# Patient Record
Sex: MALE | Race: WHITE | NOT HISPANIC OR LATINO | Employment: OTHER | URBAN - METROPOLITAN AREA
[De-identification: names, ages, dates, MRNs, and addresses within clinical notes are randomized per-mention and may not be internally consistent; named-entity substitution may affect disease eponyms.]

---

## 2017-01-23 ENCOUNTER — HOSPITAL ENCOUNTER (OUTPATIENT)
Dept: LAB | Facility: HOSPITAL | Age: 57
Discharge: HOME | End: 2017-01-23
Attending: THORACIC SURGERY (CARDIOTHORACIC VASCULAR SURGERY) | Admitting: THORACIC SURGERY (CARDIOTHORACIC VASCULAR SURGERY)

## 2017-01-23 LAB — CREAT UR-MCNC: 0.8 MG/DL (ref 0.7–1.2)

## 2017-01-24 ENCOUNTER — HOSPITAL ENCOUNTER (OUTPATIENT)
Dept: CT IMAGING | Facility: HOSPITAL | Age: 57
Discharge: HOME | End: 2017-01-24
Attending: THORACIC SURGERY (CARDIOTHORACIC VASCULAR SURGERY) | Admitting: THORACIC SURGERY (CARDIOTHORACIC VASCULAR SURGERY)

## 2017-02-16 ENCOUNTER — OFFICE VISIT (OUTPATIENT)
Dept: CARDIAC SURGERY | Facility: CLINIC | Age: 57
End: 2017-02-16

## 2017-02-16 VITALS
HEART RATE: 77 BPM | HEIGHT: 76 IN | TEMPERATURE: 97.5 F | OXYGEN SATURATION: 93 % | WEIGHT: 260 LBS | RESPIRATION RATE: 20 BRPM | BODY MASS INDEX: 31.66 KG/M2 | SYSTOLIC BLOOD PRESSURE: 134 MMHG | DIASTOLIC BLOOD PRESSURE: 88 MMHG

## 2017-02-16 DIAGNOSIS — I10 ESSENTIAL HYPERTENSION: ICD-10-CM

## 2017-02-16 DIAGNOSIS — I71.03 AORTIC DISSECTION, THORACOABDOMINAL (HCC): Primary | ICD-10-CM

## 2017-02-16 PROCEDURE — 99213 OFFICE O/P EST LOW 20 MIN: CPT | Performed by: THORACIC SURGERY (CARDIOTHORACIC VASCULAR SURGERY)

## 2017-02-19 NOTE — PROGRESS NOTES
2/19/2017      Chief Complaint:     Follow up evaluation of thoracic aortic dissection    History of Present Illness:       Dear Dr. Paulino and Colleagues,    It was nice to see Prashant Zhou in follow up evaluation for his thoracic dissection. He is a 56 y.o. male with a history of an acute type B aortic dissection in 3/16. He was admitted to the hospital and treated with Dp/Dt control and pain control protocol. He did well and has normalized his life style . He denies upper back, flank or chest pain, syncope, TIA or LE embolic signs. His last chest CT showed a stable dissection with a flap in the proximal DTA, a max diameter of 4.3 cm and no extravasation. His BP is well controlled. He has no family history of aneurysms or dissections..    Patient Active Problem List   Diagnosis   • Aortic dissection, thoracoabdominal   • Hypertension       Past Medical History   Diagnosis Date   • Aortic dissection    • Heart murmur    • History of noncompliance with medical treatment    • Hypertension        No past surgical history on file.    No Known Allergies      Current Outpatient Prescriptions:   •  amLODIPine (NORVASC) 10 MG tablet, , Disp: , Rfl: 2  •  atorvastatin (LIPITOR) 40 MG tablet, Take 40 mg by mouth daily., Disp: , Rfl:   •  cephalexin (KEFLEX) 250 MG capsule, Take 250 mg by mouth 4 (four) times a day., Disp: , Rfl:   •  cloNIDine (CATAPRES) 0.1 MG tablet, Take 0.1 mg by mouth 2 (two) times a day., Disp: , Rfl:   •  glimepiride (AMARYL) 4 MG tablet, Take 4 mg by mouth every morning before breakfast., Disp: , Rfl:   •  hydrALAZINE (APRESOLINE) 50 MG tablet, Take 50 mg by mouth 3 (three) times a day., Disp: , Rfl:   •  lisinopril-hydrochlorothiazide (PRINZIDE,ZESTORETIC) 20-25 MG per tablet, Take 1 tablet by mouth daily., Disp: , Rfl:   •  metFORMIN (GLUCOPHAGE) 500 MG tablet, Take 500 mg by mouth 2 (two) times a day with meals., Disp: , Rfl:   •  metoprolol tartrate (LOPRESSOR) 50 MG tablet, , Disp: ,  Rfl: 1    Social History     Social History   • Marital status:      Spouse name: N/A   • Number of children: N/A   • Years of education: N/A     Occupational History   • Not on file.     Social History Main Topics   • Smoking status: Current Every Day Smoker   • Smokeless tobacco: Not on file   • Alcohol use No   • Drug use: No   • Sexual activity: Not on file     Other Topics Concern   • Not on file     Social History Narrative     FMH  As HPI    Review of Systems   Constitutional: Positive for fatigue.   Respiratory: Negative for chest tightness and shortness of breath.    Cardiovascular: Negative for chest pain and leg swelling.   Genitourinary: Negative for flank pain.   All other systems reviewed and are negative.      Physical Exam:    Vital Signs:  Weight: 260 lb (118 kg)   Body mass index is 31.65 kg/(m^2).  Temp: 97.5 °F (36.4 °C)   Heart Rate: 77   BP: 134/88     Physical Exam   Constitutional: He is oriented to person, place, and time. He appears well-developed and well-nourished.   HENT:   Head: Normocephalic and atraumatic.   Nose: Nose normal.   Mouth/Throat: Oropharynx is clear and moist.   Eyes: Conjunctivae are normal. Pupils are equal, round, and reactive to light.   Neck: Normal range of motion. Neck supple. No JVD present. No thyromegaly present.   Cardiovascular: Normal rate, regular rhythm, normal heart sounds and intact distal pulses.  Exam reveals no gallop and no friction rub.    No murmur heard.  Pulmonary/Chest: Effort normal and breath sounds normal. He has no rales.   Abdominal: Soft. Bowel sounds are normal. He exhibits no distension and no mass. There is no tenderness.   Musculoskeletal: Normal range of motion. He exhibits no tenderness or deformity.   Lymphadenopathy:     He has no cervical adenopathy.   Neurological: He is alert and oriented to person, place, and time. He has normal reflexes.   Skin: Skin is warm and dry. No rash noted. No erythema.   Psychiatric: He has a  normal mood and affect. His behavior is normal.        Assessment:     Problem List Items Addressed This Visit        Cardiovascular and Mediastinum    Aortic dissection, thoracoabdominal - Primary    Hypertension        Stable type B aortic dissection, chronic at this point.  No mal perfusion or chronic pain.    Recommendation/Plan:     Chest/abdomen CT and office visit in one year.    Thank you for allowing me to participate in his care.    Regards,    Jonathan García M.D.

## 2018-03-26 ENCOUNTER — HOSPITAL ENCOUNTER (OUTPATIENT)
Dept: PREADMISSION TESTING | Facility: HOSPITAL | Age: 58
Discharge: HOME | End: 2018-03-26
Attending: THORACIC SURGERY (CARDIOTHORACIC VASCULAR SURGERY) | Admitting: THORACIC SURGERY (CARDIOTHORACIC VASCULAR SURGERY)

## 2018-03-26 LAB
ANION GAP SERPL CALC-SCNC: 11.6 MMOL/L (ref 10–20)
BUN SERPL-MCNC: 12 MG/DL (ref 8–20)
BUN/CREAT SERPL: 15 (ref 6.2–20.3)
CALCIUM SERPL-MCNC: 10.4 MG/DL (ref 8.9–10.3)
CHLORIDE SERPL-SCNC: 101 MMOL/L (ref 101–111)
CONV CO2: 26 MMOL/L (ref 22–32)
CREAT UR-MCNC: 0.8 MG/DL (ref 0.7–1.2)
GLUCOSE SERPL-MCNC: 228 MG/DL (ref 65–99)
POTASSIUM SERPL-SCNC: 3.6 MMOL/L (ref 3.6–5.1)
SODIUM SERPL-SCNC: 135 MMOL/L (ref 136–144)

## 2018-03-29 ENCOUNTER — HOSPITAL ENCOUNTER (OUTPATIENT)
Dept: CT IMAGING | Facility: HOSPITAL | Age: 58
Discharge: HOME | End: 2018-03-29
Attending: THORACIC SURGERY (CARDIOTHORACIC VASCULAR SURGERY) | Admitting: THORACIC SURGERY (CARDIOTHORACIC VASCULAR SURGERY)

## 2018-04-05 ENCOUNTER — OFFICE VISIT (OUTPATIENT)
Dept: CARDIAC SURGERY | Facility: CLINIC | Age: 58
End: 2018-04-05

## 2018-04-05 VITALS
WEIGHT: 254.2 LBS | SYSTOLIC BLOOD PRESSURE: 136 MMHG | RESPIRATION RATE: 20 BRPM | HEART RATE: 72 BPM | OXYGEN SATURATION: 96 % | HEIGHT: 76 IN | TEMPERATURE: 97.6 F | DIASTOLIC BLOOD PRESSURE: 82 MMHG | BODY MASS INDEX: 30.95 KG/M2

## 2018-04-05 DIAGNOSIS — I71.03 AORTIC DISSECTION, THORACOABDOMINAL (HCC): Primary | ICD-10-CM

## 2018-04-05 DIAGNOSIS — I10 ESSENTIAL HYPERTENSION: ICD-10-CM

## 2018-04-05 PROCEDURE — 99213 OFFICE O/P EST LOW 20 MIN: CPT | Performed by: THORACIC SURGERY (CARDIOTHORACIC VASCULAR SURGERY)

## 2018-04-05 RX ORDER — OMEPRAZOLE 40 MG/1
CAPSULE, DELAYED RELEASE ORAL
Refills: 0 | COMMUNITY
Start: 2017-12-28 | End: 2019-07-23 | Stop reason: ALTCHOICE

## 2018-04-08 PROBLEM — I71.03 AORTIC DISSECTION, THORACOABDOMINAL (HCC): Status: ACTIVE | Noted: 2018-04-08

## 2018-04-08 NOTE — PROGRESS NOTES
4/8/2018    Seen on 4/5/18    Chief Complaint:     Follow up evaluation of chronic aortic dissection    History of Present Illness:       Dear Dr. Paulino and Colleagues,  It was nice to see Prashant Zhou in follow up evaluation for his aortic dissection. He is a 57 y.o. male with a history of HTN and type B aortic dissection in 3/15. At that point he had uncontrolled HTN and non compliance. He has done well since and his BP is normalized . He denies new symptoms or signs in the interval. His last chest CT showed the descending aorta with mild dilatation, but stable at 4 cm. No evidence of mal perfusion. He has no complaints.    Patient Active Problem List   Diagnosis   • Hypertension   • Aortic dissection, thoracoabdominal       Past Medical History:   Diagnosis Date   • Aortic dissection    • Heart murmur    • History of noncompliance with medical treatment    • Hypertension    • Type B hypoplasia of aortic arch        No past surgical history on file.    No Known Allergies      Current Outpatient Prescriptions:   •  amLODIPine (NORVASC) 10 MG tablet, , Disp: , Rfl: 2  •  atorvastatin (LIPITOR) 40 MG tablet, Take 40 mg by mouth daily., Disp: , Rfl:   •  cloNIDine (CATAPRES) 0.1 MG tablet, Take 0.1 mg by mouth 2 (two) times a day., Disp: , Rfl:   •  glimepiride (AMARYL) 4 MG tablet, Take 4 mg by mouth every morning before breakfast., Disp: , Rfl:   •  hydrALAZINE (APRESOLINE) 50 MG tablet, Take 50 mg by mouth 3 (three) times a day., Disp: , Rfl:   •  lisinopril-hydrochlorothiazide (PRINZIDE,ZESTORETIC) 20-25 MG per tablet, Take 1 tablet by mouth daily., Disp: , Rfl:   •  metFORMIN (GLUCOPHAGE) 500 MG tablet, Take 500 mg by mouth 2 (two) times a day with meals., Disp: , Rfl:   •  metoprolol tartrate (LOPRESSOR) 50 MG tablet, , Disp: , Rfl: 1  •  omeprazole (priLOSEC) 40 MG capsule, TK 1 C PO QD BEFORE BREAKFAST, Disp: , Rfl: 0    Social History     Social History   • Marital status:      Spouse name:  N/A   • Number of children: N/A   • Years of education: N/A     Occupational History   • Not on file.     Social History Main Topics   • Smoking status: Current Every Day Smoker   • Smokeless tobacco: Not on file   • Alcohol use No   • Drug use: No   • Sexual activity: Not on file     Other Topics Concern   • Not on file     Social History Narrative   • No narrative on file       FMH:  Negative for aneurysms, dissections or connective tissue disorders    Review of Systems   Constitutional: Negative for fatigue.   Respiratory: Negative for shortness of breath.    Cardiovascular: Negative for chest pain.   Genitourinary: Negative for dysuria and flank pain.   Neurological: Negative for dizziness.   All other systems reviewed and are negative.      Physical Exam:    Vital Signs:  Weight: 115 kg (254 lb 3.2 oz)   Body mass index is 30.94 kg/m².  Temp: 97.6 °F (36.4 °C)   Heart Rate: 72   BP: 136/82     Physical Exam   Constitutional: He is oriented to person, place, and time. He appears well-developed and well-nourished.   HENT:   Head: Normocephalic and atraumatic.   Nose: Nose normal.   Mouth/Throat: Oropharynx is clear and moist.   Eyes: Conjunctivae are normal. Pupils are equal, round, and reactive to light.   Neck: Normal range of motion. Neck supple. No JVD present. No thyromegaly present.   Cardiovascular: Normal rate, regular rhythm, normal heart sounds and intact distal pulses.  Exam reveals no gallop and no friction rub.    No murmur heard.  Pulmonary/Chest: Effort normal and breath sounds normal. He has no rales.   Abdominal: Soft. Bowel sounds are normal. He exhibits no distension and no mass. There is no tenderness.   Musculoskeletal: Normal range of motion. He exhibits no tenderness or deformity.   Lymphadenopathy:     He has no cervical adenopathy.   Neurological: He is alert and oriented to person, place, and time. He has normal reflexes.   Skin: Skin is warm and dry. No rash noted. No erythema.    Psychiatric: He has a normal mood and affect. His behavior is normal.        Assessment:     Problem List Items Addressed This Visit        Cardiovascular and Mediastinum    Hypertension    Aortic dissection, thoracoabdominal - Primary      Other Visit Diagnoses    None.       Type B aortic dissection, mild enlargement, stable  HTN, well controlled    Recommendation/Plan:     I recommend a chest CT and office visit in one year. If stable, then 2 years later.      Thank you for allowing me to participate in his care.    Regards,    Jonathan García M.D.

## 2018-07-24 ENCOUNTER — HOSPITAL ENCOUNTER (OUTPATIENT)
Dept: FAMILY MEDICINE CLINIC | Facility: CLINIC | Age: 58
Setting detail: SPECIMEN
Discharge: HOME | End: 2018-07-24
Attending: FAMILY MEDICINE | Admitting: FAMILY MEDICINE

## 2018-07-24 LAB
ALBUMIN SERPL-MCNC: 4.1 G/DL (ref 3.5–4.8)
ALBUMIN/GLOB SERPL: 1.3 {RATIO} (ref 1–1.7)
ALP SERPL-CCNC: 59 IU/L (ref 32–91)
ALT SERPL-CCNC: 23 IU/L (ref 17–63)
ANION GAP SERPL CALC-SCNC: 12.3 MMOL/L (ref 10–20)
AST SERPL-CCNC: 19 IU/L (ref 15–41)
BASOPHILS # BLD AUTO: 0.1 10*3/UL (ref 0–0.2)
BASOPHILS NFR BLD AUTO: 1 % (ref 0–2)
BILIRUB SERPL-MCNC: 0.7 MG/DL (ref 0.3–1.2)
BUN SERPL-MCNC: 12 MG/DL (ref 8–20)
BUN/CREAT SERPL: 13.3 (ref 6.2–20.3)
CALCIUM SERPL-MCNC: 10.1 MG/DL (ref 8.9–10.3)
CHLORIDE SERPL-SCNC: 99 MMOL/L (ref 101–111)
CHOLEST SERPL-MCNC: 109 MG/DL
CHOLEST/HDLC SERPL: 4.4 {RATIO}
CONV CO2: 28 MMOL/L (ref 22–32)
CONV LDL CHOLESTEROL DIRECT: 59 MG/DL (ref 0–100)
CONV TOTAL PROTEIN: 7.2 G/DL (ref 6.1–7.9)
CREAT UR-MCNC: 0.9 MG/DL (ref 0.7–1.2)
DIFFERENTIAL METHOD BLD: (no result)
EOSINOPHIL # BLD AUTO: 0.1 10*3/UL (ref 0–0.3)
EOSINOPHIL # BLD AUTO: 1 % (ref 0–3)
ERYTHROCYTE [DISTWIDTH] IN BLOOD BY AUTOMATED COUNT: 13.4 % (ref 11.5–14.5)
GLOBULIN UR ELPH-MCNC: 3.1 G/DL (ref 2.5–3.8)
GLUCOSE SERPL-MCNC: 153 MG/DL (ref 65–99)
HCT VFR BLD AUTO: 44.5 % (ref 40–54)
HDLC SERPL-MCNC: 25 MG/DL
HGB BLD-MCNC: 15.4 G/DL (ref 14–18)
LDLC/HDLC SERPL: 2.4 {RATIO}
LIPID INTERPRETATION: ABNORMAL
LYMPHOCYTES # BLD AUTO: 2.8 10*3/UL (ref 0.8–4.8)
LYMPHOCYTES NFR BLD AUTO: 21 % (ref 18–42)
MCH RBC QN AUTO: 31.3 PG (ref 26–32)
MCHC RBC AUTO-ENTMCNC: 34.6 G/DL (ref 32–36)
MCV RBC AUTO: 90.3 FL (ref 80–94)
MONOCYTES # BLD AUTO: 0.9 10*3/UL (ref 0.1–1.3)
MONOCYTES NFR BLD AUTO: 7 % (ref 2–11)
NEUTROPHILS # BLD AUTO: 9.4 10*3/UL (ref 2.3–8.6)
NEUTROPHILS NFR BLD AUTO: 70 % (ref 50–75)
NRBC BLD AUTO-RTO: 0 /100{WBCS}
NRBC/RBC NFR BLD MANUAL: 0 10*3/UL
PLATELET # BLD AUTO: 287 10*3/UL (ref 150–450)
PMV BLD AUTO: 8.4 FL (ref 7.4–10.4)
POTASSIUM SERPL-SCNC: 4.3 MMOL/L (ref 3.6–5.1)
RBC # BLD AUTO: 4.93 10*6/UL (ref 4.6–6)
SODIUM SERPL-SCNC: 135 MMOL/L (ref 136–144)
TRIGL SERPL-MCNC: 182 MG/DL
VLDLC SERPL CALC-MCNC: 24.9 MG/DL
WBC # BLD AUTO: 13.2 10*3/UL (ref 4.5–11.5)

## 2019-01-22 ENCOUNTER — HOSPITAL ENCOUNTER (OUTPATIENT)
Dept: FAMILY MEDICINE CLINIC | Facility: CLINIC | Age: 59
Setting detail: SPECIMEN
Discharge: HOME | End: 2019-01-22
Attending: FAMILY MEDICINE | Admitting: FAMILY MEDICINE

## 2019-01-22 LAB
ALBUMIN SERPL-MCNC: 4.1 G/DL (ref 3.5–4.8)
ALBUMIN/GLOB SERPL: 1.5 {RATIO} (ref 1–1.7)
ALP SERPL-CCNC: 63 IU/L (ref 32–91)
ALT SERPL-CCNC: 25 IU/L (ref 17–63)
ANION GAP SERPL CALC-SCNC: 14.9 MMOL/L (ref 10–20)
AST SERPL-CCNC: 23 IU/L (ref 15–41)
BASOPHILS # BLD AUTO: 0.1 10*3/UL (ref 0–0.2)
BASOPHILS NFR BLD AUTO: 1 % (ref 0–2)
BILIRUB SERPL-MCNC: 0.6 MG/DL (ref 0.3–1.2)
BUN SERPL-MCNC: 12 MG/DL (ref 8–20)
BUN/CREAT SERPL: 15 (ref 6.2–20.3)
CALCIUM SERPL-MCNC: 10 MG/DL (ref 8.9–10.3)
CHLORIDE SERPL-SCNC: 99 MMOL/L (ref 101–111)
CHOLEST SERPL-MCNC: 105 MG/DL
CHOLEST/HDLC SERPL: 4.1 {RATIO}
CONV CO2: 26 MMOL/L (ref 22–32)
CONV LDL CHOLESTEROL DIRECT: 59 MG/DL (ref 0–100)
CONV TOTAL PROTEIN: 6.8 G/DL (ref 6.1–7.9)
CREAT UR-MCNC: 0.8 MG/DL (ref 0.7–1.2)
DIFFERENTIAL METHOD BLD: (no result)
EOSINOPHIL # BLD AUTO: 0.1 10*3/UL (ref 0–0.3)
EOSINOPHIL # BLD AUTO: 1 % (ref 0–3)
ERYTHROCYTE [DISTWIDTH] IN BLOOD BY AUTOMATED COUNT: 13.4 % (ref 11.5–14.5)
GLOBULIN UR ELPH-MCNC: 2.7 G/DL (ref 2.5–3.8)
GLUCOSE SERPL-MCNC: 212 MG/DL (ref 65–99)
HCT VFR BLD AUTO: 44.6 % (ref 40–54)
HDLC SERPL-MCNC: 26 MG/DL
HGB BLD-MCNC: 15.2 G/DL (ref 14–18)
LDLC/HDLC SERPL: 2.3 {RATIO}
LIPID INTERPRETATION: ABNORMAL
LYMPHOCYTES # BLD AUTO: 2.8 10*3/UL (ref 0.8–4.8)
LYMPHOCYTES NFR BLD AUTO: 24 % (ref 18–42)
MCH RBC QN AUTO: 31.3 PG (ref 26–32)
MCHC RBC AUTO-ENTMCNC: 34.1 G/DL (ref 32–36)
MCV RBC AUTO: 91.6 FL (ref 80–94)
MONOCYTES # BLD AUTO: 0.7 10*3/UL (ref 0.1–1.3)
MONOCYTES NFR BLD AUTO: 6 % (ref 2–11)
NEUTROPHILS # BLD AUTO: 7.9 10*3/UL (ref 2.3–8.6)
NEUTROPHILS NFR BLD AUTO: 68 % (ref 50–75)
NRBC BLD AUTO-RTO: 0 /100{WBCS}
NRBC/RBC NFR BLD MANUAL: 0 10*3/UL
PLATELET # BLD AUTO: 263 10*3/UL (ref 150–450)
PMV BLD AUTO: 8.6 FL (ref 7.4–10.4)
POTASSIUM SERPL-SCNC: 3.9 MMOL/L (ref 3.6–5.1)
RBC # BLD AUTO: 4.87 10*6/UL (ref 4.6–6)
SODIUM SERPL-SCNC: 136 MMOL/L (ref 136–144)
TRIGL SERPL-MCNC: 190 MG/DL
VLDLC SERPL CALC-MCNC: 20.2 MG/DL
WBC # BLD AUTO: 11.7 10*3/UL (ref 4.5–11.5)

## 2019-07-13 RX ORDER — CLONIDINE HYDROCHLORIDE 0.1 MG/1
TABLET ORAL
Qty: 180 TABLET | Refills: 0 | Status: SHIPPED | OUTPATIENT
Start: 2019-07-13 | End: 2019-10-11 | Stop reason: SDUPTHER

## 2019-07-13 RX ORDER — HYDRALAZINE HYDROCHLORIDE 50 MG/1
TABLET, FILM COATED ORAL
Qty: 270 TABLET | Refills: 0 | Status: SHIPPED | OUTPATIENT
Start: 2019-07-13 | End: 2019-10-11 | Stop reason: SDUPTHER

## 2019-07-23 ENCOUNTER — OFFICE VISIT (OUTPATIENT)
Dept: FAMILY MEDICINE CLINIC | Facility: CLINIC | Age: 59
End: 2019-07-23

## 2019-07-23 VITALS
SYSTOLIC BLOOD PRESSURE: 121 MMHG | OXYGEN SATURATION: 96 % | DIASTOLIC BLOOD PRESSURE: 76 MMHG | HEART RATE: 59 BPM | BODY MASS INDEX: 30.49 KG/M2 | WEIGHT: 245.2 LBS | TEMPERATURE: 98 F | HEIGHT: 75 IN

## 2019-07-23 DIAGNOSIS — E78.5 HYPERLIPIDEMIA, UNSPECIFIED HYPERLIPIDEMIA TYPE: ICD-10-CM

## 2019-07-23 DIAGNOSIS — I10 ESSENTIAL HYPERTENSION: ICD-10-CM

## 2019-07-23 DIAGNOSIS — E11.65 TYPE 2 DIABETES MELLITUS WITH HYPERGLYCEMIA, WITHOUT LONG-TERM CURRENT USE OF INSULIN (HCC): Primary | ICD-10-CM

## 2019-07-23 LAB
ALBUMIN SERPL-MCNC: 4.2 G/DL (ref 3.5–4.8)
ALBUMIN/GLOB SERPL: 1.8 G/DL (ref 1–1.7)
ALP SERPL-CCNC: 51 U/L (ref 32–91)
ALT SERPL W P-5'-P-CCNC: 21 U/L (ref 17–63)
ANION GAP SERPL CALCULATED.3IONS-SCNC: 14 MMOL/L (ref 5–15)
ARTICHOKE IGE QN: 60 MG/DL (ref 0–100)
AST SERPL-CCNC: 17 U/L (ref 15–41)
BASOPHILS # BLD AUTO: 0.1 10*3/MM3 (ref 0–0.2)
BASOPHILS NFR BLD AUTO: 1.2 % (ref 0–1.5)
BILIRUB SERPL-MCNC: 0.8 MG/DL (ref 0.3–1.2)
BUN BLD-MCNC: 11 MG/DL (ref 8–20)
BUN/CREAT SERPL: 15.7 (ref 6.2–20.3)
CALCIUM SPEC-SCNC: 10 MG/DL (ref 8.9–10.3)
CHLORIDE SERPL-SCNC: 101 MMOL/L (ref 101–111)
CHOLEST SERPL-MCNC: 99 MG/DL
CO2 SERPL-SCNC: 26 MMOL/L (ref 22–32)
CREAT BLD-MCNC: 0.7 MG/DL (ref 0.7–1.2)
DEPRECATED RDW RBC AUTO: 42.9 FL (ref 37–54)
EOSINOPHIL # BLD AUTO: 0.2 10*3/MM3 (ref 0–0.4)
EOSINOPHIL NFR BLD AUTO: 1.4 % (ref 0.3–6.2)
ERYTHROCYTE [DISTWIDTH] IN BLOOD BY AUTOMATED COUNT: 13.4 % (ref 12.3–15.4)
GFR SERPL CREATININE-BSD FRML MDRD: 116 ML/MIN/1.73
GLOBULIN UR ELPH-MCNC: 2.4 GM/DL (ref 2.5–3.8)
GLUCOSE BLD-MCNC: 142 MG/DL (ref 65–99)
HBA1C MFR BLD: 7 % (ref 3.5–5.6)
HCT VFR BLD AUTO: 42.9 % (ref 37.5–51)
HDLC SERPL QL: 3.41
HDLC SERPL-MCNC: 29 MG/DL
HGB BLD-MCNC: 14.8 G/DL (ref 13–17.7)
LDLC/HDLC SERPL: 1.72 {RATIO}
LYMPHOCYTES # BLD AUTO: 2.5 10*3/MM3 (ref 0.7–3.1)
LYMPHOCYTES NFR BLD AUTO: 22.5 % (ref 19.6–45.3)
MCH RBC QN AUTO: 31.2 PG (ref 26.6–33)
MCHC RBC AUTO-ENTMCNC: 34.5 G/DL (ref 31.5–35.7)
MCV RBC AUTO: 90.6 FL (ref 79–97)
MONOCYTES # BLD AUTO: 0.7 10*3/MM3 (ref 0.1–0.9)
MONOCYTES NFR BLD AUTO: 6.3 % (ref 5–12)
NEUTROPHILS # BLD AUTO: 7.7 10*3/MM3 (ref 1.7–7)
NEUTROPHILS NFR BLD AUTO: 68.6 % (ref 42.7–76)
NRBC BLD AUTO-RTO: 0 /100 WBC (ref 0–0.2)
PLATELET # BLD AUTO: 254 10*3/MM3 (ref 140–450)
PMV BLD AUTO: 8.6 FL (ref 6–12)
POTASSIUM BLD-SCNC: 4 MMOL/L (ref 3.6–5.1)
PROT SERPL-MCNC: 6.6 G/DL (ref 6.1–7.9)
RBC # BLD AUTO: 4.73 10*6/MM3 (ref 4.14–5.8)
SODIUM BLD-SCNC: 137 MMOL/L (ref 136–144)
TRIGL SERPL-MCNC: 101 MG/DL
VLDLC SERPL-MCNC: 20.2 MG/DL
WBC NRBC COR # BLD: 11.2 10*3/MM3 (ref 3.4–10.8)

## 2019-07-23 PROCEDURE — 99214 OFFICE O/P EST MOD 30 MIN: CPT | Performed by: FAMILY MEDICINE

## 2019-07-23 PROCEDURE — 83036 HEMOGLOBIN GLYCOSYLATED A1C: CPT | Performed by: FAMILY MEDICINE

## 2019-07-23 PROCEDURE — 80053 COMPREHEN METABOLIC PANEL: CPT | Performed by: FAMILY MEDICINE

## 2019-07-23 PROCEDURE — 85025 COMPLETE CBC W/AUTO DIFF WBC: CPT | Performed by: FAMILY MEDICINE

## 2019-07-23 PROCEDURE — 80061 LIPID PANEL: CPT | Performed by: FAMILY MEDICINE

## 2019-07-23 PROCEDURE — 36415 COLL VENOUS BLD VENIPUNCTURE: CPT | Performed by: FAMILY MEDICINE

## 2019-07-23 RX ORDER — BLOOD-GLUCOSE METER
EACH MISCELLANEOUS
COMMUNITY
Start: 2019-01-22 | End: 2023-01-23

## 2019-07-23 NOTE — PROGRESS NOTES
"Subjective   Prashant Zhou is a 58 y.o. male.     He is in today for follow-up on his chronic health issues.  He has type 2 diabetes, hypertension, and high cholesterol. He has not been good with diet or exercise.                   /76 (BP Location: Left arm, Patient Position: Sitting, Cuff Size: Large Adult)   Pulse 59   Temp 98 °F (36.7 °C) (Oral)   Ht 190.5 cm (75\")   Wt 111 kg (245 lb 3.2 oz)   SpO2 96%   BMI 30.65 kg/m²       Chief Complaint   Patient presents with   • Diabetes     6 month check up - no concerns    • Hypertension   • Hyperlipidemia           Current Outpatient Medications:   •  amLODIPine (NORVASC) 10 MG tablet, , Disp: , Rfl: 2  •  atorvastatin (LIPITOR) 40 MG tablet, Take 40 mg by mouth daily., Disp: , Rfl:   •  Blood Glucose Monitoring Suppl (ONE TOUCH ULTRA 2) w/Device kit, ONETOUCH ULTRA 2 w/Device KIT, Disp: , Rfl:   •  CloNIDine (CATAPRES) 0.1 MG tablet, TAKE ONE TABLET BY MOUTH TWICE A DAY, Disp: 180 tablet, Rfl: 0  •  glimepiride (AMARYL) 4 MG tablet, Take 4 mg by mouth every morning before breakfast., Disp: , Rfl:   •  glucose blood (ONE TOUCH ULTRA TEST) test strip, ONETOUCH ULTRA BLUE STRP, Disp: , Rfl:   •  hydrALAZINE (APRESOLINE) 50 MG tablet, TAKE ONE TABLET BY MOUTH EVERY 8 HOURS, Disp: 270 tablet, Rfl: 0  •  lisinopril-hydrochlorothiazide (PRINZIDE,ZESTORETIC) 20-25 MG per tablet, Take 1 tablet by mouth daily., Disp: , Rfl:   •  metFORMIN (GLUCOPHAGE) 500 MG tablet, Take 500 mg by mouth 2 (two) times a day with meals., Disp: , Rfl:   •  metoprolol tartrate (LOPRESSOR) 50 MG tablet, , Disp: , Rfl: 1  •  ONETOUCH DELICA LANCETS FINE misc, ONETOUCH DELICA LANCETS FINE, Disp: , Rfl:   •  omeprazole (priLOSEC) 40 MG capsule, TK 1 C PO QD BEFORE BREAKFAST, Disp: , Rfl: 0    No results found for: HGBA1C  Lab Results   Component Value Date    CREATININE 0.8 01/22/2019         Wt Readings from Last 3 Encounters:   07/23/19 111 kg (245 lb 3.2 oz)   04/05/18 115 kg (254 " lb 3.2 oz)   02/16/17 118 kg (260 lb)       The following portions of the patient's history were reviewed and updated as appropriate: allergies, current medications, past family history, past medical history, past social history, past surgical history and problem list.    Review of Systems   Constitutional: Negative for activity change, fatigue and fever.   HENT: Negative for congestion, sinus pressure, sinus pain, sore throat, trouble swallowing and voice change.    Eyes: Negative for visual disturbance.   Respiratory: Negative for chest tightness, shortness of breath and wheezing.    Cardiovascular: Negative for chest pain.   Gastrointestinal: Negative for abdominal distention, abdominal pain, constipation, diarrhea, nausea and vomiting.   Endocrine: Negative for polydipsia and polyuria.   Genitourinary: Negative for difficulty urinating, dysuria, frequency and urgency.   Musculoskeletal: Negative for back pain and neck pain.   Neurological: Positive for numbness. Negative for dizziness and weakness.   Psychiatric/Behavioral: Negative for agitation, hallucinations, sleep disturbance and suicidal ideas.       Objective   Physical Exam   Constitutional: He is oriented to person, place, and time. He appears well-developed and well-nourished. No distress.   HENT:   Nose: Nose normal.   Mouth/Throat: Oropharynx is clear and moist.   Eyes: Conjunctivae are normal. Pupils are equal, round, and reactive to light.   Neck: Normal range of motion. Neck supple.   Cardiovascular: Normal rate, regular rhythm and normal heart sounds.   Pulmonary/Chest: Effort normal and breath sounds normal. He has no wheezes. He has no rales.   Abdominal: Soft. Bowel sounds are normal. He exhibits no mass. There is no guarding.   Musculoskeletal: Normal range of motion.   Neurological: He is alert and oriented to person, place, and time. He displays normal reflexes. No sensory deficit.   Nursing note and vitals reviewed.        Assessment/Plan    Problems Addressed this Visit        Cardiovascular and Mediastinum    Hypertension    Relevant Orders    Comprehensive metabolic panel    Lipid panel    Hyperlipidemia    Relevant Orders    Comprehensive metabolic panel    Lipid panel       Endocrine    Type 2 diabetes mellitus with hyperglycemia (CMS/HCC) - Primary    Relevant Orders    Comprehensive metabolic panel    Hemoglobin A1c    Lipid panel    CBC w AUTO Differential      Will get labs today, will adjust meds as indicated by findings  We discussed diet changes and adding exercise  He states he will work at being better  He is also advised to get an eye exam before next visit, and I will see again in 3 mo

## 2019-08-08 DIAGNOSIS — E11.65 TYPE 2 DIABETES MELLITUS WITH HYPERGLYCEMIA, WITHOUT LONG-TERM CURRENT USE OF INSULIN (HCC): Primary | ICD-10-CM

## 2019-08-08 RX ORDER — GLIMEPIRIDE 4 MG/1
4 TABLET ORAL 2 TIMES DAILY
Qty: 180 TABLET | Refills: 0 | Status: SHIPPED | OUTPATIENT
Start: 2019-08-08 | End: 2019-08-12 | Stop reason: SDUPTHER

## 2019-08-12 DIAGNOSIS — E11.65 TYPE 2 DIABETES MELLITUS WITH HYPERGLYCEMIA, WITHOUT LONG-TERM CURRENT USE OF INSULIN (HCC): ICD-10-CM

## 2019-08-12 RX ORDER — GLIMEPIRIDE 4 MG/1
TABLET ORAL
Qty: 60 TABLET | Refills: 1 | Status: SHIPPED | OUTPATIENT
Start: 2019-08-12 | End: 2020-01-10

## 2019-08-19 PROBLEM — E11.9 TYPE 2 DIABETES MELLITUS WITHOUT COMPLICATIONS: Status: ACTIVE | Noted: 2018-07-24

## 2019-08-19 PROBLEM — L98.9 SKIN LESION OF FACE: Status: ACTIVE | Noted: 2018-07-24

## 2019-09-04 ENCOUNTER — OFFICE VISIT (OUTPATIENT)
Dept: CARDIOLOGY | Facility: CLINIC | Age: 59
End: 2019-09-04

## 2019-09-04 VITALS
HEART RATE: 67 BPM | WEIGHT: 243.5 LBS | BODY MASS INDEX: 29.65 KG/M2 | DIASTOLIC BLOOD PRESSURE: 78 MMHG | HEIGHT: 76 IN | SYSTOLIC BLOOD PRESSURE: 132 MMHG | OXYGEN SATURATION: 96 %

## 2019-09-04 DIAGNOSIS — I10 ESSENTIAL HYPERTENSION: ICD-10-CM

## 2019-09-04 DIAGNOSIS — E11.9 TYPE 2 DIABETES MELLITUS WITHOUT COMPLICATION, WITHOUT LONG-TERM CURRENT USE OF INSULIN (HCC): ICD-10-CM

## 2019-09-04 DIAGNOSIS — E78.00 PURE HYPERCHOLESTEROLEMIA: ICD-10-CM

## 2019-09-04 DIAGNOSIS — I71.03 AORTIC DISSECTION, THORACOABDOMINAL (HCC): Primary | ICD-10-CM

## 2019-09-04 PROCEDURE — 99213 OFFICE O/P EST LOW 20 MIN: CPT | Performed by: INTERNAL MEDICINE

## 2019-09-04 PROCEDURE — 93000 ELECTROCARDIOGRAM COMPLETE: CPT | Performed by: INTERNAL MEDICINE

## 2019-09-04 NOTE — PROGRESS NOTES
"    Subjective:     Encounter Date:09/04/2019      Patient ID: Prashant Zhou is a 58 y.o. male.    Chief Complaint:  History of Present Illness 58-year-old white male with history of aortic dissection history of hypertension hyperlipidemia and diabetes presents to my office for follow-up.  Patient is currently stable without any symptoms of chest pain or shortness of breath at rest on exertion.  No complaints of any PND orthopnea.  No palpitations dizziness syncope or swelling of the feet.  Patient has been taking all the medicines regularly.  Patient continues to smoke.  He does not exercise very well.    The following portions of the patient's history were reviewed and updated as appropriate: allergies, current medications, past family history, past medical history, past social history, past surgical history and problem list.  Past Medical History:   Diagnosis Date   • Aortic dissection (CMS/HCC)    • Heart murmur    • History of noncompliance with medical treatment    • Hypertension    • Type B hypoplasia of aortic arch      History reviewed. No pertinent surgical history.  /78 (BP Location: Left arm, Patient Position: Sitting)   Pulse 67   Ht 193 cm (76\")   Wt 110 kg (243 lb 8 oz)   SpO2 96%   BMI 29.64 kg/m²   Family History   Problem Relation Age of Onset   • Diabetes Mother        Current Outpatient Medications:   •  amLODIPine (NORVASC) 10 MG tablet, , Disp: , Rfl: 2  •  atorvastatin (LIPITOR) 40 MG tablet, Take 40 mg by mouth daily., Disp: , Rfl:   •  Blood Glucose Monitoring Suppl (ONE TOUCH ULTRA 2) w/Device kit, ONETOUCH ULTRA 2 w/Device KIT, Disp: , Rfl:   •  CloNIDine (CATAPRES) 0.1 MG tablet, TAKE ONE TABLET BY MOUTH TWICE A DAY, Disp: 180 tablet, Rfl: 0  •  glimepiride (AMARYL) 4 MG tablet, TAKE ONE TABLET BY MOUTH TWICE A DAY, Disp: 60 tablet, Rfl: 1  •  glucose blood (ONE TOUCH ULTRA TEST) test strip, ONETOUCH ULTRA BLUE STRP, Disp: , Rfl:   •  hydrALAZINE (APRESOLINE) 50 MG tablet, " TAKE ONE TABLET BY MOUTH EVERY 8 HOURS, Disp: 270 tablet, Rfl: 0  •  lisinopril-hydrochlorothiazide (PRINZIDE,ZESTORETIC) 20-25 MG per tablet, Take 1 tablet by mouth daily., Disp: , Rfl:   •  metFORMIN (GLUCOPHAGE) 850 MG tablet, TAKE ONE TABLET BY MOUTH TWICE A DAY WITH MEALS, Disp: 180 tablet, Rfl: 0  •  metoprolol tartrate (LOPRESSOR) 50 MG tablet, , Disp: , Rfl: 1  •  ONETOUCH DELICA LANCETS FINE misc, ONETOUCH DELICA LANCETS FINE, Disp: , Rfl:   •  metFORMIN (GLUCOPHAGE) 500 MG tablet, Take 500 mg by mouth 2 (two) times a day with meals., Disp: , Rfl:   No Known Allergies  Social History     Socioeconomic History   • Marital status:      Spouse name: Not on file   • Number of children: Not on file   • Years of education: Not on file   • Highest education level: Not on file   Tobacco Use   • Smoking status: Current Every Day Smoker     Packs/day: 1.00     Types: Cigarettes   Substance and Sexual Activity   • Alcohol use: No   • Drug use: No     Review of Systems   Constitution: Negative for fever and malaise/fatigue.   Cardiovascular: Negative for chest pain, dyspnea on exertion and palpitations.   Respiratory: Negative for cough and shortness of breath.    Skin: Negative for rash.   Gastrointestinal: Negative for abdominal pain, nausea and vomiting.   Neurological: Negative for focal weakness and headaches.   All other systems reviewed and are negative.             Objective:     Physical Exam   Constitutional: He appears well-developed and well-nourished.   HENT:   Head: Normocephalic and atraumatic.   Eyes: Conjunctivae are normal. No scleral icterus.   Neck: Normal range of motion. Neck supple. No JVD present. Carotid bruit is not present.   Cardiovascular: Normal rate, regular rhythm, S1 normal, S2 normal, normal heart sounds and intact distal pulses. PMI is not displaced.   Pulmonary/Chest: Effort normal and breath sounds normal. He has no wheezes. He has no rales.   Abdominal: Soft. Bowel sounds  are normal.   Neurological: He is alert. He has normal strength.   Skin: Skin is warm and dry. No rash noted.       ECG 12 Lead  Date/Time: 9/4/2019 10:42 AM  Performed by: Steve Mckeon MD  Authorized by: Steve Mckeon MD   Comments: Sinus rhythm with right bundle branch block.  When compared with previous ECG no new changes.            Lab Review:       Assessment:          Diagnosis Plan   1. Aortic dissection, thoracoabdominal (CMS/HCC)     2. Essential hypertension     3. Pure hypercholesterolemia     4. Type 2 diabetes mellitus without complication, without long-term current use of insulin (CMS/HCC)            Plan:       Patient has history of aortic dissection and is status post repair and is followed by the surgeon with a CT scan every year.  Patient's blood pressure and heart rate are stable.  Patient's lipid levels are followed by the primary care doctor.  Patient's sugar levels are followed by primary care doctor.  Continue current medicines and follow-up in 1 year.

## 2019-09-11 RX ORDER — METOPROLOL TARTRATE 50 MG/1
TABLET, FILM COATED ORAL
Qty: 180 TABLET | Refills: 0 | Status: SHIPPED | OUTPATIENT
Start: 2019-09-11 | End: 2019-10-11 | Stop reason: SDUPTHER

## 2019-10-12 RX ORDER — METOPROLOL TARTRATE 50 MG/1
TABLET, FILM COATED ORAL
Qty: 402 TABLET | Refills: 0 | Status: SHIPPED | OUTPATIENT
Start: 2019-10-12 | End: 2019-12-24

## 2019-10-12 RX ORDER — HYDRALAZINE HYDROCHLORIDE 50 MG/1
TABLET, FILM COATED ORAL
Qty: 270 TABLET | Refills: 0 | Status: SHIPPED | OUTPATIENT
Start: 2019-10-12 | End: 2020-01-10

## 2019-10-12 RX ORDER — CLONIDINE HYDROCHLORIDE 0.1 MG/1
TABLET ORAL
Qty: 180 TABLET | Refills: 0 | Status: SHIPPED | OUTPATIENT
Start: 2019-10-12 | End: 2020-01-10

## 2019-10-21 ENCOUNTER — OFFICE VISIT (OUTPATIENT)
Dept: FAMILY MEDICINE CLINIC | Facility: CLINIC | Age: 59
End: 2019-10-21

## 2019-10-21 VITALS
HEIGHT: 76 IN | WEIGHT: 242 LBS | OXYGEN SATURATION: 96 % | BODY MASS INDEX: 29.47 KG/M2 | HEART RATE: 63 BPM | DIASTOLIC BLOOD PRESSURE: 74 MMHG | SYSTOLIC BLOOD PRESSURE: 112 MMHG

## 2019-10-21 DIAGNOSIS — I10 ESSENTIAL HYPERTENSION: ICD-10-CM

## 2019-10-21 DIAGNOSIS — E78.00 PURE HYPERCHOLESTEROLEMIA: ICD-10-CM

## 2019-10-21 DIAGNOSIS — E11.65 TYPE 2 DIABETES MELLITUS WITH HYPERGLYCEMIA, WITHOUT LONG-TERM CURRENT USE OF INSULIN (HCC): Primary | ICD-10-CM

## 2019-10-21 LAB
ALBUMIN SERPL-MCNC: 4.3 G/DL (ref 3.5–5.2)
ALBUMIN/GLOB SERPL: 1.3 G/DL
ALP SERPL-CCNC: 58 U/L (ref 39–117)
ALT SERPL W P-5'-P-CCNC: 12 U/L (ref 1–41)
ANION GAP SERPL CALCULATED.3IONS-SCNC: 9.6 MMOL/L (ref 5–15)
AST SERPL-CCNC: 11 U/L (ref 1–40)
BASOPHILS # BLD AUTO: 0.07 10*3/MM3 (ref 0–0.2)
BASOPHILS NFR BLD AUTO: 0.6 % (ref 0–1.5)
BILIRUB SERPL-MCNC: 0.5 MG/DL (ref 0.2–1.2)
BUN BLD-MCNC: 13 MG/DL (ref 6–20)
BUN/CREAT SERPL: 17.6 (ref 7–25)
CALCIUM SPEC-SCNC: 10.1 MG/DL (ref 8.6–10.5)
CHLORIDE SERPL-SCNC: 98 MMOL/L (ref 98–107)
CHOLEST SERPL-MCNC: 100 MG/DL (ref 0–200)
CO2 SERPL-SCNC: 30.4 MMOL/L (ref 22–29)
CREAT BLD-MCNC: 0.74 MG/DL (ref 0.76–1.27)
DEPRECATED RDW RBC AUTO: 39.2 FL (ref 37–54)
EOSINOPHIL # BLD AUTO: 0.14 10*3/MM3 (ref 0–0.4)
EOSINOPHIL NFR BLD AUTO: 1.2 % (ref 0.3–6.2)
ERYTHROCYTE [DISTWIDTH] IN BLOOD BY AUTOMATED COUNT: 12.1 % (ref 12.3–15.4)
GFR SERPL CREATININE-BSD FRML MDRD: 109 ML/MIN/1.73
GLOBULIN UR ELPH-MCNC: 3.3 GM/DL
GLUCOSE BLD-MCNC: 120 MG/DL (ref 65–99)
HCT VFR BLD AUTO: 44.3 % (ref 37.5–51)
HDLC SERPL-MCNC: 29 MG/DL (ref 40–60)
HGB BLD-MCNC: 15.5 G/DL (ref 13–17.7)
IMM GRANULOCYTES # BLD AUTO: 0.04 10*3/MM3 (ref 0–0.05)
IMM GRANULOCYTES NFR BLD AUTO: 0.3 % (ref 0–0.5)
LDLC SERPL CALC-MCNC: 47 MG/DL (ref 0–100)
LDLC/HDLC SERPL: 1.61 {RATIO}
LYMPHOCYTES # BLD AUTO: 2.71 10*3/MM3 (ref 0.7–3.1)
LYMPHOCYTES NFR BLD AUTO: 23 % (ref 19.6–45.3)
MCH RBC QN AUTO: 31.3 PG (ref 26.6–33)
MCHC RBC AUTO-ENTMCNC: 35 G/DL (ref 31.5–35.7)
MCV RBC AUTO: 89.5 FL (ref 79–97)
MONOCYTES # BLD AUTO: 0.91 10*3/MM3 (ref 0.1–0.9)
MONOCYTES NFR BLD AUTO: 7.7 % (ref 5–12)
NEUTROPHILS # BLD AUTO: 7.9 10*3/MM3 (ref 1.7–7)
NEUTROPHILS NFR BLD AUTO: 67.2 % (ref 42.7–76)
NRBC BLD AUTO-RTO: 0 /100 WBC (ref 0–0.2)
PLATELET # BLD AUTO: 305 10*3/MM3 (ref 140–450)
PMV BLD AUTO: 10.4 FL (ref 6–12)
POTASSIUM BLD-SCNC: 3.8 MMOL/L (ref 3.5–5.2)
PROT SERPL-MCNC: 7.6 G/DL (ref 6–8.5)
RBC # BLD AUTO: 4.95 10*6/MM3 (ref 4.14–5.8)
SODIUM BLD-SCNC: 138 MMOL/L (ref 136–145)
TRIGL SERPL-MCNC: 121 MG/DL (ref 0–150)
VLDLC SERPL-MCNC: 24.2 MG/DL (ref 5–40)
WBC NRBC COR # BLD: 11.77 10*3/MM3 (ref 3.4–10.8)

## 2019-10-21 PROCEDURE — 36415 COLL VENOUS BLD VENIPUNCTURE: CPT | Performed by: FAMILY MEDICINE

## 2019-10-21 PROCEDURE — 85025 COMPLETE CBC W/AUTO DIFF WBC: CPT | Performed by: FAMILY MEDICINE

## 2019-10-21 PROCEDURE — 80061 LIPID PANEL: CPT | Performed by: FAMILY MEDICINE

## 2019-10-21 PROCEDURE — 83036 HEMOGLOBIN GLYCOSYLATED A1C: CPT | Performed by: FAMILY MEDICINE

## 2019-10-21 PROCEDURE — 99213 OFFICE O/P EST LOW 20 MIN: CPT | Performed by: FAMILY MEDICINE

## 2019-10-21 PROCEDURE — 80053 COMPREHEN METABOLIC PANEL: CPT | Performed by: FAMILY MEDICINE

## 2019-10-21 RX ORDER — METHYLPREDNISOLONE ACETATE 80 MG/ML
80 INJECTION, SUSPENSION INTRA-ARTICULAR; INTRALESIONAL; INTRAMUSCULAR; SOFT TISSUE ONCE
Status: DISCONTINUED | OUTPATIENT
Start: 2019-10-21 | End: 2019-10-21

## 2019-10-21 NOTE — PROGRESS NOTES
"Subjective   Prashant Zhou is a 58 y.o. male.     He is in for follow-up on his dm2 and htn.  He has high cholesterol as well.  He has been working on diet but knows he can be better.  He has not been exercising.  He feels his fasting sugars have been good, seemingly to run between 100-120.  He has not been checking any postprandial sugars.  He denies any chest pain or headaches.  In general, he has no immediate concerns and feels fine.           /74   Pulse 63   Ht 193 cm (76\")   Wt 110 kg (242 lb)   SpO2 96%   BMI 29.46 kg/m²       Chief Complaint   Patient presents with   • Hyperlipidemia   • Hypertension   • Diabetes           Current Outpatient Medications:   •  amLODIPine (NORVASC) 10 MG tablet, , Disp: , Rfl: 2  •  atorvastatin (LIPITOR) 40 MG tablet, Take 40 mg by mouth daily., Disp: , Rfl:   •  Blood Glucose Monitoring Suppl (ONE TOUCH ULTRA 2) w/Device kit, ONETOUCH ULTRA 2 w/Device KIT, Disp: , Rfl:   •  cloNIDine (CATAPRES) 0.1 MG tablet, TAKE ONE TABLET BY MOUTH TWICE A DAY, Disp: 180 tablet, Rfl: 0  •  glimepiride (AMARYL) 4 MG tablet, TAKE ONE TABLET BY MOUTH TWICE A DAY, Disp: 60 tablet, Rfl: 1  •  glucose blood (ONE TOUCH ULTRA TEST) test strip, ONETOUCH ULTRA BLUE STRP, Disp: , Rfl:   •  hydrALAZINE (APRESOLINE) 50 MG tablet, TAKE ONE TABLET BY MOUTH EVERY 8 HOURS, Disp: 270 tablet, Rfl: 0  •  lisinopril-hydrochlorothiazide (PRINZIDE,ZESTORETIC) 20-25 MG per tablet, Take 1 tablet by mouth daily., Disp: , Rfl:   •  metFORMIN (GLUCOPHAGE) 850 MG tablet, TAKE ONE TABLET BY MOUTH TWICE A DAY WITH MEALS, Disp: 180 tablet, Rfl: 0  •  metoprolol tartrate (LOPRESSOR) 50 MG tablet, TAKE THREE TABLETS BY MOUTH EVERY 12 HOURS, Disp: 402 tablet, Rfl: 0  •  ONETOUCH DELICA LANCETS FINE misc, ONETOUCH DELICA LANCETS FINE, Disp: , Rfl:   No current facility-administered medications for this visit.     Lab Results   Component Value Date    HGBA1C 7.0 (H) 07/23/2019     Lab Results   Component Value " Date    CREATININE 0.70 07/23/2019         Wt Readings from Last 3 Encounters:   10/21/19 110 kg (242 lb)   09/04/19 110 kg (243 lb 8 oz)   07/23/19 111 kg (245 lb 3.2 oz)       The following portions of the patient's history were reviewed and updated as appropriate: allergies, current medications, past family history, past medical history, past social history, past surgical history and problem list.    Review of Systems   Constitutional: Negative for activity change, fatigue and fever.   HENT: Negative for congestion, sinus pressure, sinus pain, sore throat and trouble swallowing.    Eyes: Negative for visual disturbance.   Respiratory: Negative for chest tightness, shortness of breath and wheezing.    Cardiovascular: Negative for chest pain.   Gastrointestinal: Negative for abdominal distention, abdominal pain, constipation, diarrhea, nausea and vomiting.   Endocrine: Negative for polydipsia and polyuria.   Genitourinary: Negative for difficulty urinating, dysuria, frequency and urgency.   Musculoskeletal: Negative for back pain and neck pain.   Skin: Negative for rash.   Neurological: Negative for dizziness, weakness, light-headedness, numbness and headaches.   Psychiatric/Behavioral: Negative for agitation, hallucinations, sleep disturbance and suicidal ideas.       Objective   Physical Exam   Constitutional: He is oriented to person, place, and time. No distress.   HENT:   Nose: Nose normal.   Mouth/Throat: Oropharynx is clear and moist. No oropharyngeal exudate.   Neck: Normal range of motion. Neck supple. No thyromegaly present.   Cardiovascular: Normal rate, regular rhythm and normal heart sounds.   No murmur heard.  Pulmonary/Chest: Effort normal and breath sounds normal. He has no wheezes. He has no rales.   Abdominal: Soft. Bowel sounds are normal. There is no guarding.   Musculoskeletal: Normal range of motion.   Lymphadenopathy:     He has no cervical adenopathy.   Neurological: He is alert and oriented  to person, place, and time. He displays normal reflexes. No sensory deficit.   Nursing note and vitals reviewed.        Assessment/Plan   Problems Addressed this Visit        Cardiovascular and Mediastinum    Hypertension    Hyperlipidemia       Endocrine    Type 2 diabetes mellitus with hyperglycemia (CMS/[facility]) - Primary    Relevant Orders    Comprehensive metabolic panel    Hemoglobin A1c    Lipid panel    CBC w AUTO Differential    CBC Auto Differential          He seems to be doing well but needs labs  I have asked him to be better with diet and better with exercise  I will see him back in 3 months and as long as he continues to have good visits and good readings  I will consider moving him out to every six-month visits after the next visit

## 2019-10-22 LAB — HBA1C MFR BLD: 6.2 % (ref 3.5–5.6)

## 2019-11-10 RX ORDER — AMLODIPINE BESYLATE 10 MG/1
TABLET ORAL
Qty: 90 TABLET | Refills: 0 | Status: SHIPPED | OUTPATIENT
Start: 2019-11-10 | End: 2020-02-10

## 2019-11-10 RX ORDER — ATORVASTATIN CALCIUM 40 MG/1
TABLET, FILM COATED ORAL
Qty: 90 TABLET | Refills: 0 | Status: SHIPPED | OUTPATIENT
Start: 2019-11-10 | End: 2020-02-10

## 2019-11-10 RX ORDER — LISINOPRIL AND HYDROCHLOROTHIAZIDE 25; 20 MG/1; MG/1
TABLET ORAL
Qty: 90 TABLET | Refills: 4 | Status: SHIPPED | OUTPATIENT
Start: 2019-11-10 | End: 2021-01-08

## 2019-12-24 RX ORDER — METOPROLOL TARTRATE 50 MG/1
TABLET, FILM COATED ORAL
Qty: 402 TABLET | Refills: 0 | Status: SHIPPED | OUTPATIENT
Start: 2019-12-24 | End: 2020-01-10

## 2020-01-10 DIAGNOSIS — E11.65 TYPE 2 DIABETES MELLITUS WITH HYPERGLYCEMIA, WITHOUT LONG-TERM CURRENT USE OF INSULIN (HCC): ICD-10-CM

## 2020-01-10 RX ORDER — HYDRALAZINE HYDROCHLORIDE 50 MG/1
TABLET, FILM COATED ORAL
Qty: 270 TABLET | Refills: 0 | Status: SHIPPED | OUTPATIENT
Start: 2020-01-10 | End: 2020-04-04

## 2020-01-10 RX ORDER — METOPROLOL TARTRATE 50 MG/1
TABLET, FILM COATED ORAL
Qty: 402 TABLET | Refills: 0 | Status: SHIPPED | OUTPATIENT
Start: 2020-01-10 | End: 2020-02-28

## 2020-01-10 RX ORDER — GLIMEPIRIDE 4 MG/1
TABLET ORAL
Qty: 44 TABLET | Refills: 0 | Status: SHIPPED | OUTPATIENT
Start: 2020-01-10 | End: 2020-02-10

## 2020-01-10 RX ORDER — CLONIDINE HYDROCHLORIDE 0.1 MG/1
TABLET ORAL
Qty: 180 TABLET | Refills: 0 | Status: SHIPPED | OUTPATIENT
Start: 2020-01-10 | End: 2020-04-04

## 2020-01-20 ENCOUNTER — OFFICE VISIT (OUTPATIENT)
Dept: FAMILY MEDICINE CLINIC | Facility: CLINIC | Age: 60
End: 2020-01-20

## 2020-01-20 VITALS
OXYGEN SATURATION: 96 % | HEIGHT: 75 IN | WEIGHT: 244 LBS | SYSTOLIC BLOOD PRESSURE: 110 MMHG | TEMPERATURE: 98.2 F | DIASTOLIC BLOOD PRESSURE: 71 MMHG | BODY MASS INDEX: 30.34 KG/M2 | HEART RATE: 71 BPM

## 2020-01-20 DIAGNOSIS — E78.00 PURE HYPERCHOLESTEROLEMIA: ICD-10-CM

## 2020-01-20 DIAGNOSIS — Z12.5 ENCOUNTER FOR SCREENING FOR MALIGNANT NEOPLASM OF PROSTATE: ICD-10-CM

## 2020-01-20 DIAGNOSIS — E11.65 TYPE 2 DIABETES MELLITUS WITH HYPERGLYCEMIA, WITHOUT LONG-TERM CURRENT USE OF INSULIN (HCC): Primary | ICD-10-CM

## 2020-01-20 DIAGNOSIS — I10 ESSENTIAL HYPERTENSION: ICD-10-CM

## 2020-01-20 LAB
ALBUMIN SERPL-MCNC: 4.5 G/DL (ref 3.5–5.2)
ALBUMIN/GLOB SERPL: 1.6 G/DL
ALP SERPL-CCNC: 54 U/L (ref 39–117)
ALT SERPL W P-5'-P-CCNC: 17 U/L (ref 1–41)
ANION GAP SERPL CALCULATED.3IONS-SCNC: 14.6 MMOL/L (ref 5–15)
AST SERPL-CCNC: 15 U/L (ref 1–40)
BASOPHILS # BLD AUTO: 0.07 10*3/MM3 (ref 0–0.2)
BASOPHILS NFR BLD AUTO: 0.6 % (ref 0–1.5)
BILIRUB SERPL-MCNC: 0.4 MG/DL (ref 0.2–1.2)
BUN BLD-MCNC: 15 MG/DL (ref 6–20)
BUN/CREAT SERPL: 18.5 (ref 7–25)
CALCIUM SPEC-SCNC: 10 MG/DL (ref 8.6–10.5)
CHLORIDE SERPL-SCNC: 97 MMOL/L (ref 98–107)
CHOLEST SERPL-MCNC: 99 MG/DL (ref 0–200)
CO2 SERPL-SCNC: 28.4 MMOL/L (ref 22–29)
CREAT BLD-MCNC: 0.81 MG/DL (ref 0.76–1.27)
DEPRECATED RDW RBC AUTO: 40.1 FL (ref 37–54)
EOSINOPHIL # BLD AUTO: 0.14 10*3/MM3 (ref 0–0.4)
EOSINOPHIL NFR BLD AUTO: 1.2 % (ref 0.3–6.2)
ERYTHROCYTE [DISTWIDTH] IN BLOOD BY AUTOMATED COUNT: 12.4 % (ref 12.3–15.4)
GFR SERPL CREATININE-BSD FRML MDRD: 98 ML/MIN/1.73
GLOBULIN UR ELPH-MCNC: 2.8 GM/DL
GLUCOSE BLD-MCNC: 107 MG/DL (ref 65–99)
HBA1C MFR BLD: 6.2 % (ref 3.5–5.6)
HCT VFR BLD AUTO: 44.9 % (ref 37.5–51)
HDLC SERPL-MCNC: 28 MG/DL (ref 40–60)
HGB BLD-MCNC: 15.6 G/DL (ref 13–17.7)
IMM GRANULOCYTES # BLD AUTO: 0.04 10*3/MM3 (ref 0–0.05)
IMM GRANULOCYTES NFR BLD AUTO: 0.4 % (ref 0–0.5)
LDLC SERPL CALC-MCNC: 47 MG/DL (ref 0–100)
LDLC/HDLC SERPL: 1.66 {RATIO}
LYMPHOCYTES # BLD AUTO: 2.64 10*3/MM3 (ref 0.7–3.1)
LYMPHOCYTES NFR BLD AUTO: 23.5 % (ref 19.6–45.3)
MCH RBC QN AUTO: 31 PG (ref 26.6–33)
MCHC RBC AUTO-ENTMCNC: 34.7 G/DL (ref 31.5–35.7)
MCV RBC AUTO: 89.3 FL (ref 79–97)
MONOCYTES # BLD AUTO: 0.73 10*3/MM3 (ref 0.1–0.9)
MONOCYTES NFR BLD AUTO: 6.5 % (ref 5–12)
NEUTROPHILS # BLD AUTO: 7.62 10*3/MM3 (ref 1.7–7)
NEUTROPHILS NFR BLD AUTO: 67.8 % (ref 42.7–76)
NRBC BLD AUTO-RTO: 0 /100 WBC (ref 0–0.2)
PLATELET # BLD AUTO: 265 10*3/MM3 (ref 140–450)
PMV BLD AUTO: 10.3 FL (ref 6–12)
POTASSIUM BLD-SCNC: 4.2 MMOL/L (ref 3.5–5.2)
PROT SERPL-MCNC: 7.3 G/DL (ref 6–8.5)
PSA SERPL-MCNC: 0.53 NG/ML (ref 0–4)
RBC # BLD AUTO: 5.03 10*6/MM3 (ref 4.14–5.8)
SODIUM BLD-SCNC: 140 MMOL/L (ref 136–145)
TRIGL SERPL-MCNC: 122 MG/DL (ref 0–150)
VLDLC SERPL-MCNC: 24.4 MG/DL (ref 5–40)
WBC NRBC COR # BLD: 11.24 10*3/MM3 (ref 3.4–10.8)

## 2020-01-20 PROCEDURE — 85025 COMPLETE CBC W/AUTO DIFF WBC: CPT | Performed by: FAMILY MEDICINE

## 2020-01-20 PROCEDURE — G0103 PSA SCREENING: HCPCS | Performed by: FAMILY MEDICINE

## 2020-01-20 PROCEDURE — 80061 LIPID PANEL: CPT | Performed by: FAMILY MEDICINE

## 2020-01-20 PROCEDURE — 99214 OFFICE O/P EST MOD 30 MIN: CPT | Performed by: FAMILY MEDICINE

## 2020-01-20 PROCEDURE — 83036 HEMOGLOBIN GLYCOSYLATED A1C: CPT | Performed by: FAMILY MEDICINE

## 2020-01-20 PROCEDURE — 80053 COMPREHEN METABOLIC PANEL: CPT | Performed by: FAMILY MEDICINE

## 2020-01-20 PROCEDURE — 36415 COLL VENOUS BLD VENIPUNCTURE: CPT | Performed by: FAMILY MEDICINE

## 2020-01-20 NOTE — PROGRESS NOTES
"Subjective   Prashant Zhou is a 59 y.o. male.     He is in today for follow-up on his chronic health issues.  He has type 2 diabetes, high cholesterol and hypertension.  He has been good with diet but he has not been exercising.  He denies any issues with chest pain or difficulty breathing.  He denies issues with bowel or bladder function.  He denies issues with vision or hearing and is due for his eye exam later this year.  He has not had any issues with sleep or mood.         /71 (BP Location: Left arm, Patient Position: Sitting, Cuff Size: Large Adult)   Pulse 71   Temp 98.2 °F (36.8 °C) (Oral)   Ht 190.5 cm (75\")   Wt 111 kg (244 lb)   SpO2 96%   BMI 30.50 kg/m²       Chief Complaint   Patient presents with   • Diabetes     He is in for follow up on dm,bp,and chol. No concerns today.    • Hyperlipidemia   • Hypertension           Current Outpatient Medications:   •  amLODIPine (NORVASC) 10 MG tablet, TAKE ONE TABLET BY MOUTH DAILY, Disp: 90 tablet, Rfl: 0  •  atorvastatin (LIPITOR) 40 MG tablet, TAKE ONE TABLET BY MOUTH EVERY NIGHT AT BEDTIME, Disp: 90 tablet, Rfl: 0  •  Blood Glucose Monitoring Suppl (ONE TOUCH ULTRA 2) w/Device kit, ONETOUCH ULTRA 2 w/Device KIT, Disp: , Rfl:   •  cloNIDine (CATAPRES) 0.1 MG tablet, TAKE ONE TABLET BY MOUTH TWICE A DAY, Disp: 180 tablet, Rfl: 0  •  glimepiride (AMARYL) 4 MG tablet, TAKE ONE TABLET BY MOUTH TWICE A DAY, Disp: 44 tablet, Rfl: 0  •  glucose blood (ONE TOUCH ULTRA TEST) test strip, ONETOUCH ULTRA BLUE STRP, Disp: , Rfl:   •  hydrALAZINE (APRESOLINE) 50 MG tablet, TAKE ONE TABLET BY MOUTH EVERY 8 HOURS, Disp: 270 tablet, Rfl: 0  •  lisinopril-hydrochlorothiazide (PRINZIDE,ZESTORETIC) 20-25 MG per tablet, TAKE ONE TABLET BY MOUTH DAILY, Disp: 90 tablet, Rfl: 4  •  metFORMIN (GLUCOPHAGE) 850 MG tablet, TAKE ONE TABLET BY MOUTH TWICE A DAY WITH MEALS, Disp: 180 tablet, Rfl: 0  •  metoprolol tartrate (LOPRESSOR) 50 MG tablet, TAKE THREE TABLETS BY MOUTH " EVERY 12 HOURS, Disp: 402 tablet, Rfl: 0  •  ONETOUCH DELICA LANCETS FINE misc, ONETOUCH DELICA LANCETS FINE, Disp: , Rfl:     Lab Results   Component Value Date    HGBA1C 6.2 (H) 10/21/2019    HGBA1C 7.0 (H) 07/23/2019     Lab Results   Component Value Date    CREATININE 0.74 (L) 10/21/2019         Wt Readings from Last 3 Encounters:   01/20/20 111 kg (244 lb)   10/21/19 110 kg (242 lb)   09/04/19 110 kg (243 lb 8 oz)       The following portions of the patient's history were reviewed and updated as appropriate: allergies, current medications, past family history, past medical history, past social history, past surgical history and problem list.    Review of Systems   Constitutional: Negative for activity change, fatigue and fever.   HENT: Negative for congestion, sinus pressure, sinus pain, sore throat and trouble swallowing.    Eyes: Negative for visual disturbance.   Respiratory: Negative for chest tightness, shortness of breath and wheezing.    Cardiovascular: Negative for chest pain.   Gastrointestinal: Negative for abdominal distention, abdominal pain, constipation, diarrhea, nausea and vomiting.   Genitourinary: Negative for difficulty urinating, dysuria, frequency and urgency.   Musculoskeletal: Negative for back pain and neck pain.   Neurological: Negative for dizziness, weakness, light-headedness, numbness and headaches.   Psychiatric/Behavioral: Negative for agitation, hallucinations, sleep disturbance and suicidal ideas. The patient is not nervous/anxious.        Objective   Physical Exam   Constitutional: He is oriented to person, place, and time. No distress.   Neck: Normal range of motion. Neck supple. No thyromegaly present.   Cardiovascular: Normal rate, regular rhythm and normal heart sounds.   No murmur heard.  Pulmonary/Chest: Effort normal and breath sounds normal. He has no wheezes.   Abdominal: Soft. Bowel sounds are normal. There is no guarding.   Musculoskeletal: He exhibits no edema or  deformity.   Lymphadenopathy:     He has no cervical adenopathy.   Neurological: He is alert and oriented to person, place, and time. He displays normal reflexes. No sensory deficit.   Skin: Skin is warm. No rash noted.   Nursing note and vitals reviewed.        Assessment/Plan   Problems Addressed this Visit        Cardiovascular and Mediastinum    Hypertension    Hyperlipidemia       Endocrine    Type 2 diabetes mellitus with hyperglycemia (CMS/HCC) - Primary    Relevant Orders    Comprehensive metabolic panel    Hemoglobin A1c    Lipid panel    CBC w AUTO Differential       Other    Encounter for screening for malignant neoplasm of prostate    Relevant Orders    PSA SCREENING          At this time I will intend to keep him on his current medication  I will update labs today to see if changes are needed  I advised him strongly to get better with exercise and to remain on good diet  I will see him back in 6 months, sooner if needed.

## 2020-02-10 DIAGNOSIS — E11.65 TYPE 2 DIABETES MELLITUS WITH HYPERGLYCEMIA, WITHOUT LONG-TERM CURRENT USE OF INSULIN (HCC): ICD-10-CM

## 2020-02-10 RX ORDER — ATORVASTATIN CALCIUM 40 MG/1
TABLET, FILM COATED ORAL
Qty: 90 TABLET | Refills: 0 | Status: SHIPPED | OUTPATIENT
Start: 2020-02-10 | End: 2020-05-04

## 2020-02-10 RX ORDER — GLIMEPIRIDE 4 MG/1
TABLET ORAL
Qty: 44 TABLET | Refills: 0 | Status: SHIPPED | OUTPATIENT
Start: 2020-02-10 | End: 2020-03-09

## 2020-02-10 RX ORDER — AMLODIPINE BESYLATE 10 MG/1
TABLET ORAL
Qty: 90 TABLET | Refills: 0 | Status: SHIPPED | OUTPATIENT
Start: 2020-02-10 | End: 2020-05-04

## 2020-02-28 RX ORDER — METOPROLOL TARTRATE 50 MG/1
TABLET, FILM COATED ORAL
Qty: 402 TABLET | Refills: 0 | Status: SHIPPED | OUTPATIENT
Start: 2020-02-28 | End: 2020-06-22 | Stop reason: SDUPTHER

## 2020-03-09 DIAGNOSIS — E11.65 TYPE 2 DIABETES MELLITUS WITH HYPERGLYCEMIA, WITHOUT LONG-TERM CURRENT USE OF INSULIN (HCC): ICD-10-CM

## 2020-03-09 RX ORDER — GLIMEPIRIDE 4 MG/1
TABLET ORAL
Qty: 44 TABLET | Refills: 0 | OUTPATIENT
Start: 2020-03-09

## 2020-03-09 RX ORDER — GLIMEPIRIDE 4 MG/1
TABLET ORAL
Qty: 60 TABLET | Refills: 2 | Status: SHIPPED | OUTPATIENT
Start: 2020-03-09 | End: 2020-06-09

## 2020-04-04 RX ORDER — CLONIDINE HYDROCHLORIDE 0.1 MG/1
TABLET ORAL
Qty: 180 TABLET | Refills: 0 | Status: SHIPPED | OUTPATIENT
Start: 2020-04-04 | End: 2020-07-05

## 2020-04-04 RX ORDER — HYDRALAZINE HYDROCHLORIDE 50 MG/1
TABLET, FILM COATED ORAL
Qty: 270 TABLET | Refills: 0 | Status: SHIPPED | OUTPATIENT
Start: 2020-04-04 | End: 2020-07-05

## 2020-05-04 RX ORDER — ATORVASTATIN CALCIUM 40 MG/1
TABLET, FILM COATED ORAL
Qty: 90 TABLET | Refills: 1 | Status: SHIPPED | OUTPATIENT
Start: 2020-05-04 | End: 2020-10-11

## 2020-05-04 RX ORDER — AMLODIPINE BESYLATE 10 MG/1
TABLET ORAL
Qty: 90 TABLET | Refills: 1 | Status: SHIPPED | OUTPATIENT
Start: 2020-05-04 | End: 2020-10-11

## 2020-06-09 DIAGNOSIS — E11.65 TYPE 2 DIABETES MELLITUS WITH HYPERGLYCEMIA, WITHOUT LONG-TERM CURRENT USE OF INSULIN (HCC): ICD-10-CM

## 2020-06-09 RX ORDER — GLIMEPIRIDE 4 MG/1
TABLET ORAL
Qty: 60 TABLET | Refills: 2 | Status: SHIPPED | OUTPATIENT
Start: 2020-06-09 | End: 2020-07-23 | Stop reason: SDUPTHER

## 2020-06-22 RX ORDER — METOPROLOL TARTRATE 50 MG/1
50 TABLET, FILM COATED ORAL EVERY 12 HOURS
Qty: 402 TABLET | Refills: 0 | Status: SHIPPED | OUTPATIENT
Start: 2020-06-22 | End: 2020-06-26

## 2020-06-26 ENCOUNTER — TELEPHONE (OUTPATIENT)
Dept: FAMILY MEDICINE CLINIC | Facility: CLINIC | Age: 60
End: 2020-06-26

## 2020-06-26 RX ORDER — METOPROLOL TARTRATE 50 MG/1
150 TABLET, FILM COATED ORAL EVERY 12 HOURS
Qty: 540 TABLET | Refills: 1 | Status: SHIPPED | OUTPATIENT
Start: 2020-06-26 | End: 2020-07-21

## 2020-06-26 NOTE — TELEPHONE ENCOUNTER
Jagdish called to check the directions of his Metoprolol tartrate.   The new script sent over on 06/22/20 was take 1 tablet by mouth every 12 hours.  Before it was take 3 tablets by mouth every 12 hours.

## 2020-07-05 RX ORDER — HYDRALAZINE HYDROCHLORIDE 50 MG/1
TABLET, FILM COATED ORAL
Qty: 270 TABLET | Refills: 0 | Status: SHIPPED | OUTPATIENT
Start: 2020-07-05 | End: 2020-07-27 | Stop reason: SDUPTHER

## 2020-07-05 RX ORDER — CLONIDINE HYDROCHLORIDE 0.1 MG/1
TABLET ORAL
Qty: 180 TABLET | Refills: 0 | Status: SHIPPED | OUTPATIENT
Start: 2020-07-05 | End: 2020-07-27 | Stop reason: SDUPTHER

## 2020-07-20 ENCOUNTER — LAB (OUTPATIENT)
Dept: FAMILY MEDICINE CLINIC | Facility: CLINIC | Age: 60
End: 2020-07-20

## 2020-07-20 ENCOUNTER — OFFICE VISIT (OUTPATIENT)
Dept: FAMILY MEDICINE CLINIC | Facility: CLINIC | Age: 60
End: 2020-07-20

## 2020-07-20 VITALS
BODY MASS INDEX: 30.75 KG/M2 | TEMPERATURE: 97.7 F | SYSTOLIC BLOOD PRESSURE: 150 MMHG | HEART RATE: 72 BPM | WEIGHT: 246 LBS | DIASTOLIC BLOOD PRESSURE: 88 MMHG | OXYGEN SATURATION: 96 %

## 2020-07-20 DIAGNOSIS — G89.29 CHRONIC LEFT SHOULDER PAIN: ICD-10-CM

## 2020-07-20 DIAGNOSIS — M25.512 CHRONIC LEFT SHOULDER PAIN: ICD-10-CM

## 2020-07-20 DIAGNOSIS — E11.65 TYPE 2 DIABETES MELLITUS WITH HYPERGLYCEMIA, WITHOUT LONG-TERM CURRENT USE OF INSULIN (HCC): Primary | ICD-10-CM

## 2020-07-20 DIAGNOSIS — I10 ESSENTIAL HYPERTENSION: ICD-10-CM

## 2020-07-20 LAB
ALBUMIN SERPL-MCNC: 4.4 G/DL (ref 3.5–5.2)
ALBUMIN/GLOB SERPL: 1.5 G/DL
ALP SERPL-CCNC: 51 U/L (ref 39–117)
ALT SERPL W P-5'-P-CCNC: 24 U/L (ref 1–41)
ANION GAP SERPL CALCULATED.3IONS-SCNC: 14.1 MMOL/L (ref 5–15)
AST SERPL-CCNC: 18 U/L (ref 1–40)
BASOPHILS # BLD AUTO: 0.06 10*3/MM3 (ref 0–0.2)
BASOPHILS NFR BLD AUTO: 0.5 % (ref 0–1.5)
BILIRUB SERPL-MCNC: 0.5 MG/DL (ref 0–1.2)
BUN SERPL-MCNC: 12 MG/DL (ref 6–20)
BUN/CREAT SERPL: 16.7 (ref 7–25)
CALCIUM SPEC-SCNC: 10.1 MG/DL (ref 8.6–10.5)
CHLORIDE SERPL-SCNC: 98 MMOL/L (ref 98–107)
CHOLEST SERPL-MCNC: 89 MG/DL (ref 0–200)
CO2 SERPL-SCNC: 25.9 MMOL/L (ref 22–29)
CREAT SERPL-MCNC: 0.72 MG/DL (ref 0.76–1.27)
DEPRECATED RDW RBC AUTO: 43.4 FL (ref 37–54)
EOSINOPHIL # BLD AUTO: 0.11 10*3/MM3 (ref 0–0.4)
EOSINOPHIL NFR BLD AUTO: 1 % (ref 0.3–6.2)
ERYTHROCYTE [DISTWIDTH] IN BLOOD BY AUTOMATED COUNT: 12.6 % (ref 12.3–15.4)
GFR SERPL CREATININE-BSD FRML MDRD: 112 ML/MIN/1.73
GLOBULIN UR ELPH-MCNC: 2.9 GM/DL
GLUCOSE SERPL-MCNC: 149 MG/DL (ref 65–99)
HBA1C MFR BLD: 7 % (ref 3.5–5.6)
HCT VFR BLD AUTO: 46.6 % (ref 37.5–51)
HDLC SERPL-MCNC: 26 MG/DL (ref 40–60)
HGB BLD-MCNC: 16.1 G/DL (ref 13–17.7)
IMM GRANULOCYTES # BLD AUTO: 0.03 10*3/MM3 (ref 0–0.05)
IMM GRANULOCYTES NFR BLD AUTO: 0.3 % (ref 0–0.5)
LDLC SERPL CALC-MCNC: 39 MG/DL (ref 0–100)
LDLC/HDLC SERPL: 1.51 {RATIO}
LYMPHOCYTES # BLD AUTO: 2.52 10*3/MM3 (ref 0.7–3.1)
LYMPHOCYTES NFR BLD AUTO: 21.8 % (ref 19.6–45.3)
MCH RBC QN AUTO: 32.3 PG (ref 26.6–33)
MCHC RBC AUTO-ENTMCNC: 34.5 G/DL (ref 31.5–35.7)
MCV RBC AUTO: 93.6 FL (ref 79–97)
MONOCYTES # BLD AUTO: 0.77 10*3/MM3 (ref 0.1–0.9)
MONOCYTES NFR BLD AUTO: 6.7 % (ref 5–12)
NEUTROPHILS NFR BLD AUTO: 69.7 % (ref 42.7–76)
NEUTROPHILS NFR BLD AUTO: 8.06 10*3/MM3 (ref 1.7–7)
NRBC BLD AUTO-RTO: 0.1 /100 WBC (ref 0–0.2)
PLATELET # BLD AUTO: 268 10*3/MM3 (ref 140–450)
PMV BLD AUTO: 10.2 FL (ref 6–12)
POTASSIUM SERPL-SCNC: 3.9 MMOL/L (ref 3.5–5.2)
PROT SERPL-MCNC: 7.3 G/DL (ref 6–8.5)
RBC # BLD AUTO: 4.98 10*6/MM3 (ref 4.14–5.8)
SODIUM SERPL-SCNC: 138 MMOL/L (ref 136–145)
TRIGL SERPL-MCNC: 119 MG/DL (ref 0–150)
VLDLC SERPL-MCNC: 23.8 MG/DL (ref 5–40)
WBC # BLD AUTO: 11.55 10*3/MM3 (ref 3.4–10.8)

## 2020-07-20 PROCEDURE — 36415 COLL VENOUS BLD VENIPUNCTURE: CPT | Performed by: FAMILY MEDICINE

## 2020-07-20 PROCEDURE — 99214 OFFICE O/P EST MOD 30 MIN: CPT | Performed by: FAMILY MEDICINE

## 2020-07-20 PROCEDURE — 80053 COMPREHEN METABOLIC PANEL: CPT | Performed by: FAMILY MEDICINE

## 2020-07-20 PROCEDURE — 85025 COMPLETE CBC W/AUTO DIFF WBC: CPT | Performed by: FAMILY MEDICINE

## 2020-07-20 PROCEDURE — 80061 LIPID PANEL: CPT | Performed by: FAMILY MEDICINE

## 2020-07-20 PROCEDURE — 83036 HEMOGLOBIN GLYCOSYLATED A1C: CPT | Performed by: FAMILY MEDICINE

## 2020-07-20 NOTE — PROGRESS NOTES
Subjective   Prashant Zhou is a 59 y.o. male.     He is in today for follow-up on his diabetes and high blood pressure.  He has not been as good with diet in the last 6 months.  He has not been exercising as he would like to.  He has had some pain in his left shoulder that he cannot explain and it is limiting his range of motion and making sleep uncomfortable.  He denies any chest pain or difficulty breathing.  He denies any dizziness or lightheadedness.  He denies any issue with bowel or bladder function.  He denies any issue with vision or hearing.  He has not had any fever or illness symptoms and he has been very good at following coronavirus restrictions.         /88 (BP Location: Left arm, Patient Position: Sitting, Cuff Size: Adult)   Pulse 72   Temp 97.7 °F (36.5 °C) (Temporal)   Wt 112 kg (246 lb)   SpO2 96%   BMI 30.75 kg/m²       Chief Complaint   Patient presents with   • Diabetes     6 month f/u           Current Outpatient Medications:   •  amLODIPine (NORVASC) 10 MG tablet, TAKE ONE TABLET BY MOUTH DAILY, Disp: 90 tablet, Rfl: 1  •  atorvastatin (LIPITOR) 40 MG tablet, TAKE ONE TABLET BY MOUTH EVERY NIGHT AT BEDTIME, Disp: 90 tablet, Rfl: 1  •  Blood Glucose Monitoring Suppl (ONE TOUCH ULTRA 2) w/Device kit, ONETOUCH ULTRA 2 w/Device KIT, Disp: , Rfl:   •  cloNIDine (CATAPRES) 0.1 MG tablet, TAKE ONE TABLET BY MOUTH TWICE A DAY, Disp: 180 tablet, Rfl: 0  •  glimepiride (AMARYL) 4 MG tablet, TAKE ONE TABLET BY MOUTH TWICE A DAY, Disp: 60 tablet, Rfl: 2  •  glucose blood (ONE TOUCH ULTRA TEST) test strip, ONETOUCH ULTRA BLUE STRP, Disp: , Rfl:   •  hydrALAZINE (APRESOLINE) 50 MG tablet, TAKE ONE TABLET BY MOUTH EVERY 8 HOURS, Disp: 270 tablet, Rfl: 0  •  lisinopril-hydrochlorothiazide (PRINZIDE,ZESTORETIC) 20-25 MG per tablet, TAKE ONE TABLET BY MOUTH DAILY, Disp: 90 tablet, Rfl: 4  •  metFORMIN (GLUCOPHAGE) 850 MG tablet, TAKE ONE TABLET BY MOUTH TWICE A DAY WITH MEALS, Disp: 180 tablet,  Rfl: 1  •  metoprolol tartrate (LOPRESSOR) 50 MG tablet, Take 3 tablets by mouth Every 12 (Twelve) Hours., Disp: 540 tablet, Rfl: 1  •  ONETOUCH DELICA LANCETS FINE mis, ONETOUCH DELICA LANCETS FINE, Disp: , Rfl:     Lab Results   Component Value Date    HGBA1C 6.2 (H) 01/20/2020    HGBA1C 6.2 (H) 10/21/2019    HGBA1C 7.0 (H) 07/23/2019     Lab Results   Component Value Date    CREATININE 0.81 01/20/2020         Wt Readings from Last 3 Encounters:   07/20/20 112 kg (246 lb)   01/20/20 111 kg (244 lb)   10/21/19 110 kg (242 lb)       The following portions of the patient's history were reviewed and updated as appropriate: allergies, current medications, past family history, past medical history, past social history, past surgical history and problem list.    Review of Systems   Constitutional: Negative for activity change, fatigue and fever.   HENT: Negative for congestion, sinus pressure, sinus pain, sore throat and trouble swallowing.    Eyes: Negative for visual disturbance.   Respiratory: Negative for chest tightness, shortness of breath and wheezing.    Cardiovascular: Negative for chest pain.   Gastrointestinal: Negative for abdominal distention, abdominal pain, constipation, diarrhea, nausea and vomiting.   Genitourinary: Negative for difficulty urinating, dysuria, frequency and urgency.   Musculoskeletal: Positive for arthralgias. Negative for back pain and neck pain.   Neurological: Negative for dizziness, weakness, light-headedness and numbness.   Psychiatric/Behavioral: Positive for sleep disturbance. Negative for agitation, hallucinations and suicidal ideas. The patient is not nervous/anxious.        Objective   Physical Exam   Constitutional: He is oriented to person, place, and time. No distress.   HENT:   Nose: Nose normal.   Mouth/Throat: No oropharyngeal exudate.   Neck: Neck supple. No thyromegaly present.   Cardiovascular: Normal rate, regular rhythm and normal heart sounds.   No murmur  heard.  Pulmonary/Chest: Effort normal and breath sounds normal. He has no wheezes.   Abdominal: Soft. Bowel sounds are normal. He exhibits no mass. There is no guarding.   Musculoskeletal: He exhibits no edema.   Limited range L shoulder, issue at the rotator cuff   Lymphadenopathy:     He has no cervical adenopathy.   Neurological: He is alert and oriented to person, place, and time. He displays normal reflexes.   Psychiatric: He has a normal mood and affect.   Nursing note and vitals reviewed.        Assessment/Plan   Problems Addressed this Visit        Cardiovascular and Mediastinum    Hypertension       Endocrine    Type 2 diabetes mellitus with hyperglycemia (CMS/HCC) - Primary    Relevant Orders    Comprehensive metabolic panel    Hemoglobin A1c    Lipid panel    CBC w AUTO Differential      Other Visit Diagnoses     Chronic left shoulder pain              I will refer to orthopedics for the shoulder issue  I will update appropriate labs for the diabetes and hypertension  I have asked him to be better with both diet and exercise  I will plan to see him back in 6 months, sooner if indicated

## 2020-07-21 RX ORDER — METOPROLOL TARTRATE 50 MG/1
TABLET, FILM COATED ORAL
Qty: 180 TABLET | Refills: 0 | Status: SHIPPED | OUTPATIENT
Start: 2020-07-21 | End: 2021-01-10

## 2020-07-23 DIAGNOSIS — E11.65 TYPE 2 DIABETES MELLITUS WITH HYPERGLYCEMIA, WITHOUT LONG-TERM CURRENT USE OF INSULIN (HCC): ICD-10-CM

## 2020-07-23 RX ORDER — GLIMEPIRIDE 4 MG/1
4 TABLET ORAL 2 TIMES DAILY
Qty: 60 TABLET | Refills: 2 | Status: SHIPPED | OUTPATIENT
Start: 2020-07-23 | End: 2020-12-05

## 2020-07-27 RX ORDER — HYDRALAZINE HYDROCHLORIDE 50 MG/1
50 TABLET, FILM COATED ORAL EVERY 8 HOURS
Qty: 270 TABLET | Refills: 1 | Status: SHIPPED | OUTPATIENT
Start: 2020-07-27 | End: 2021-02-11

## 2020-07-27 RX ORDER — CLONIDINE HYDROCHLORIDE 0.1 MG/1
0.1 TABLET ORAL 2 TIMES DAILY
Qty: 180 TABLET | Refills: 1 | Status: SHIPPED | OUTPATIENT
Start: 2020-07-27 | End: 2021-02-10

## 2020-07-29 ENCOUNTER — OFFICE VISIT (OUTPATIENT)
Dept: ORTHOPEDIC SURGERY | Facility: CLINIC | Age: 60
End: 2020-07-29

## 2020-07-29 VITALS
HEIGHT: 75 IN | BODY MASS INDEX: 30.59 KG/M2 | WEIGHT: 246 LBS | HEART RATE: 73 BPM | DIASTOLIC BLOOD PRESSURE: 81 MMHG | SYSTOLIC BLOOD PRESSURE: 144 MMHG

## 2020-07-29 DIAGNOSIS — M75.52 SUBACROMIAL BURSITIS OF LEFT SHOULDER JOINT: ICD-10-CM

## 2020-07-29 DIAGNOSIS — M25.512 LEFT SHOULDER PAIN, UNSPECIFIED CHRONICITY: Primary | ICD-10-CM

## 2020-07-29 DIAGNOSIS — M75.82 TENDINITIS OF LEFT ROTATOR CUFF: ICD-10-CM

## 2020-07-29 PROCEDURE — 99213 OFFICE O/P EST LOW 20 MIN: CPT | Performed by: FAMILY MEDICINE

## 2020-07-29 NOTE — PROGRESS NOTES
"Primary Care Sports Medicine Office Visit Note     Patient ID: Prashant Zhou is a 59 y.o. male.    Chief Complaint:  Chief Complaint   Patient presents with   • Left Shoulder - Initial Evaluation, Pain     X 2-3 months, nki     HPI:    Mr. Prashant Zhou is a 59 y.o. male who presents to the clinic today for  L shoulder pain. Today he states, L shoulder started bothering him 2-3 months ago. Has no pain at rests, but is moderately painful with specific movements. Knows and understands that working at waist level, causes no pain or problems. Overhead, or reaching behind him, both cause significant pain. No known injury, no fall, insidious onset. Occasional aleve, aspercream as needed.     Past Medical History:   Diagnosis Date   • Aortic dissection (CMS/HCC)    • Heart murmur    • History of noncompliance with medical treatment    • Hypertension    • Type B hypoplasia of aortic arch        History reviewed. No pertinent surgical history.    Family History   Problem Relation Age of Onset   • Diabetes Mother      Social History     Occupational History   • Not on file   Tobacco Use   • Smoking status: Current Every Day Smoker     Packs/day: 1.00     Types: Cigarettes   • Smokeless tobacco: Never Used   Substance and Sexual Activity   • Alcohol use: No   • Drug use: No   • Sexual activity: Defer      Review of Systems   Constitutional: Negative for activity change and fever.   Respiratory: Negative for cough and shortness of breath.    Cardiovascular: Negative for chest pain.   Gastrointestinal: Negative for constipation, diarrhea, nausea and vomiting.   Musculoskeletal: Positive for arthralgias.   Skin: Negative for color change and rash.   Neurological: Negative for weakness.   Hematological: Does not bruise/bleed easily.     Objective:    /81 (BP Location: Left arm, Patient Position: Sitting, Cuff Size: Large Adult)   Pulse 73   Ht 190.5 cm (75\")   Wt 112 kg (246 lb)   BMI 30.75 kg/m²     Physical " Examination:  Physical Exam   Constitutional: He appears well-developed and well-nourished. No distress.   HENT:   Head: Normocephalic and atraumatic.   Eyes: Conjunctivae are normal.   Cardiovascular: Intact distal pulses.   Pulmonary/Chest: Effort normal. No respiratory distress.   Neurological: He is alert.   Skin: Skin is warm. Capillary refill takes less than 2 seconds. He is not diaphoretic.   Nursing note and vitals reviewed.    Left Shoulder Exam     Range of Motion   Active abduction:  120 abnormal   Passive abduction: normal   Extension: normal   External rotation: normal   Forward flexion: normal   Internal rotation 0 degrees:  Sacrum abnormal     Muscle Strength   Abduction: 4/5   Internal rotation: 5/5   External rotation: 3/5   Supraspinatus: 3/5   Subscapularis: 5/5   Biceps: 5/5     Tests   Bush test: positive  Cross arm: negative  Impingement: positive  Drop arm: negative  Sulcus: absent    Other   Erythema: absent  Sensation: normal  Pulse: present     Comments:  Mildly positive Yovanny for pain, positive resisted external rotation for pain as well, negative liftoff.  Negative Emmons's, negative scarf.  Positive Neer.  Negative speeds/Yergason's.      Cervical range of motion is full with no tenderness to palpation to the bony midline or paraspinal cervical musculature.  Spurling's maneuver to the left is negative.            Imaging and other tests:  3V XR of the L shoulder today yields no obvious joint space narrowing or osteoarthritic change of the glenohumeral joint.  There is some mild AC joint arthropathy.  Calcific change noted at the greater tuberosity, consistent with insertional calcific tendinitis of the rotator cuff.    Assessment and Plan:    1. Left shoulder pain, unspecified chronicity  - XR Shoulder 2+ View Left    2. Subacromial bursitis of left shoulder joint    3. Tendinitis of left rotator cuff    After discussing pathology and all treatment options with the patient today, we  "decided to forego daily anti-inflammatory with known diabetes and his age.  I would like for him to start physical therapy for stretching strengthening the rotator cuff musculature, specifically infraspinatus which seems to be bothering him the most today.  He can return in 2-3 months for reevaluation.  We can also consider subacromial bursa corticosteroid injection at a later date if therapy proves to be too painful for him.  He decided to forego this treatment today.  RTC 3 months.    Giancarlo ALVARADO \"Chance\" Cuba MALONE DO, CAQSM  07/29/20  08:52    Disclaimer: Please note that areas of this note were completed with computer voice recognition software.  Quite often unanticipated grammatical, syntax, homophones, and other interpretive errors are inadvertently transcribed by the computer software. Please excuse any errors that have escaped final proofreading.  "

## 2020-08-05 ENCOUNTER — TREATMENT (OUTPATIENT)
Dept: PHYSICAL THERAPY | Facility: CLINIC | Age: 60
End: 2020-08-05

## 2020-08-05 DIAGNOSIS — M25.512 LEFT SHOULDER PAIN, UNSPECIFIED CHRONICITY: ICD-10-CM

## 2020-08-05 DIAGNOSIS — M77.8 TENDINITIS OF LEFT SHOULDER: Primary | ICD-10-CM

## 2020-08-05 PROCEDURE — 97110 THERAPEUTIC EXERCISES: CPT | Performed by: PHYSICAL THERAPIST

## 2020-08-05 PROCEDURE — 97161 PT EVAL LOW COMPLEX 20 MIN: CPT | Performed by: PHYSICAL THERAPIST

## 2020-08-05 NOTE — PROGRESS NOTES
Physical Therapy Initial Evaluation and Plan of Care    Patient: Prashant Zhou   : 1960  Diagnosis/ICD-10 Code:  Tendinitis of left shoulder [M75.82]  Referring practitioner: Giancarlo Brink I*  Date of Initial Visit: 2020  Today's Date: 2020  Patient seen for 1 sessions           Subjective Questionnaire: QuickDASH: ADL:  50%      Subjective Evaluation    History of Present Illness  Mechanism of injury: Pt has had pain for about 3 months ago but let it go and it cont to get worse so he saw Dr. Brink.  He did an xray and an exam and told him he has tendonitis and some fraying of the tendon.   He ordered PT.  He did not give him any exercises to start on.  He does not play any sports but he is very active.       Patient Occupation: pt is on disability due to heart issues   Precautions and Work Restrictions: no heavy lifting or strenuous activity or exerciseQuality of life: fair    Pain  Current pain ratin  At best pain ratin  At worst pain ratin  Location: left shld, upper arm to elbow  Quality: dull ache, sharp, knife-like and burning  Relieving factors: rest  Aggravating factors: sleeping, lifting, overhead activity, movement and outstretched reach  Progression: worsening    Social Support  Lives in: multiple-level home  Lives with: spouse    Hand dominance: right    Diagnostic Tests  X-ray: abnormal (DJD, tdinonitis)    Patient Goals  Patient goals for therapy: decreased pain, increased motion, increased strength, independence with ADLs/IADLs and return to sport/leisure activities             Objective          Postural Observations  Seated posture: fair  Standing posture: fair  Correction of posture: has no consistent effect        Palpation     Additional Palpation Details  TTP:  Negative throughout shld and cervical area    Cervical/Thoracic Screen   Cervical range of motion within normal limits  Cervical range of motion within normal limits with the following exceptions:  No pain in neck or shld    Neurological Testing     Reflexes   Left   Deltoid (C5): normal (2+)  Biceps (C5/C6): normal (2+)  Brachioradialis (C6): normal (2+)    Right   Deltoid (C5): normal (2+)  Biceps (C5/C6): normal (2+)  Brachioradialis (C6): normal (2+)    Active Range of Motion   Left Shoulder   Flexion: 130 degrees   Extension: 50 degrees   Abduction: 60 degrees   External rotation 0°: 20 degrees   External rotation 45°: 30 degrees   Internal rotation BTB: L3     Right Shoulder   Normal active range of motion  Internal rotation BTB: T7     Additional Active Range of Motion Details  Scaption:  110    All active motions stopped due to pain    Passive Range of Motion   Left Shoulder   Flexion: 150 degrees   Abduction: 125 degrees     Additional Passive Range of Motion Details  Scap:  150    Joint Play   Left Shoulder  Hypomobile in the posterior capsule and inferior capsule.    Strength/Myotome Testing     Left Shoulder     Planes of Motion   Flexion: 4-   Extension: 4+   Abduction: 4-   External rotation at 45°: 4-   Internal rotation at 45°: 4-     Right Shoulder   Normal muscle strength    Tests     Left Shoulder   Positive empty can, full can, Hawkin's, Neer's and Khadijah's.           Assessment & Plan     Assessment  Impairments: abnormal muscle tone, abnormal or restricted ROM, activity intolerance, impaired physical strength, lacks appropriate home exercise program and pain with function  Assessment details: Pt is a 60 y/o wm with a dx of left shld pain and tendonitis onset about 3 months ago.  He  Is on disability due to heart issues but reports he is pretty active.  Pt exhibits the above impairments and functional limitations and would benefit from skilled PT to address those areas and maximize function.  Functional Limitations: carrying objects, lifting, sleeping, pulling, pushing, uncomfortable because of pain, moving in bed, reaching behind back and reaching overhead  Goals  Plan Goals: STGs:  6  weeks  1.  Pt to tolerate initial HEP without inc pain.  2.  Pt to tolerate advancement of HEP without inc pain.  3.  Pt to report pain has decreased by at least 25%.  4.  Pt to improve  AROM of left shoulder flex to at least 150 deg and scaption to 140 deg.  5.  Pt to sit with proper posture without cues required.        LTGs:  By DC  1.  Pt to be (I) with HEP.  2.  Pt to report pain has decreased by at least 75% to allow better tolerance to and performance of normal daily activities.  3.  Pt to improve AROM of left shoulder  to WFL/WNL to allow normal performance of daily activities.  4.  Pt to improve strength of left shoulder  to 4+/5 or better to be adequate for      completion/performance of normal daily tasks.  5.  Pt to exhibit improved function as indicated by improved score on DASH vs score at eval.    6.  Pt to demonstrate (I) and appropriate use of body mechanics for daily home and/or work tasks.    Plan  Therapy options: will be seen for skilled physical therapy services  Planned modality interventions: TENS, ultrasound and cryotherapy  Planned therapy interventions: body mechanics training, home exercise program, joint mobilization, manual therapy, neuromuscular re-education, motor coordination training, postural training, strengthening, stretching and therapeutic activities  Frequency: 2x week  Plan details: 30 visits        See flowsheet for treatment details.    History # of Personal Factors and/or Comorbidities: MODERATE (1-2)  Examination of Body System(s): # of elements: MODERATE (3)  Clinical Presentation: STABLE   Clinical Decision Making: LOW       Timed:         Manual Therapy:         mins  70263;     Therapeutic Exercise:  15       mins  90452;     Neuromuscular Damian:        mins  56189;    Therapeutic Activity:          mins  12929;     Gait Training:           mins  38734;     Ultrasound:          mins  00487;    Ionto:                                   mins   77286  Self Care:                             mins   18095  Canalith Repos:                  mins   86327      Un-Timed:  Electrical Stimulation:         mins  01411 ( );  Traction          mins 89006  Low Eval  35        Mins  99131  Mod Eval          Mins  28553  High Eval                            Mins  72816  Re-Eval                               mins  16670        Timed Treatment: 15     mins   Total Treatment:   50     mins    PT SIGNATURE: Avis Baca, PT   DATE TREATMENT INITIATED: 8/5/2020    Initial Certification:  90 days Certification Period: 11/3/2020  I certify that the therapy services are furnished while this patient is under my care.  The services outlined above are required by this patient, and will be reviewed every 90 days.     PHYSICIAN: Giancarlo Brink II, DO      DATE:     Please sign and return via fax to [phone].. Thank you, Psychiatric Physical Therapy.

## 2020-08-13 ENCOUNTER — TREATMENT (OUTPATIENT)
Dept: PHYSICAL THERAPY | Facility: CLINIC | Age: 60
End: 2020-08-13

## 2020-08-13 DIAGNOSIS — M77.8 TENDINITIS OF LEFT SHOULDER: Primary | ICD-10-CM

## 2020-08-13 DIAGNOSIS — M25.512 LEFT SHOULDER PAIN, UNSPECIFIED CHRONICITY: ICD-10-CM

## 2020-08-13 PROCEDURE — 97110 THERAPEUTIC EXERCISES: CPT | Performed by: PHYSICAL THERAPIST

## 2020-08-13 PROCEDURE — G0283 ELEC STIM OTHER THAN WOUND: HCPCS | Performed by: PHYSICAL THERAPIST

## 2020-08-13 PROCEDURE — 97140 MANUAL THERAPY 1/> REGIONS: CPT | Performed by: PHYSICAL THERAPIST

## 2020-08-13 NOTE — PROGRESS NOTES
Physical Therapy Daily Progress Note    VISIT#: 2    Subjective Evaluation    History of Present Illness  Mechanism of injury: He missed his last visit as he totally forgot about it.  He has been working on the exercises at home but he has not inc to 3 sets due to inc pain with any amount of them.  He tried them yesterday and it hurt so bad he had to stop. He noticed he had a knot come up on his outer arm afterward.      Pain  Current pain ratin (at rest)  At worst pain ratin (witrh trying to do the exercises)          Objective     See Exercise, Manual, and Modality Logs for complete treatment.     Re-assessed AROM:  No change from eval    Pt had too much pain with AAROM using stick; trial of pulleys did well;  Addition of isometric ex for HEP as well as pulleys  ES at end of session for pain control    Patient Education: make or purchase pulleys    Assessment/Plan    fco fair today with poor tolerance to initial HEP; able to do easier ex today    Progress per Plan of Care            Timed:         Manual Therapy: 13        mins  25400;     Therapeutic Exercise:  25       mins  97469;     Neuromuscular Damian:        mins  52081;    Therapeutic Activity:          mins  63948;     Gait Training:           mins  10221;     Ultrasound:          mins  28573;    Ionto                                   mins   52236  Self Care                            mins   41975  Canalith Repos                   mins  4209    Un-Timed:  Electrical Stimulation:  18       mins  25552 ( );  Dry Needling          mins self-pay  Traction          mins 24722  Low Eval          Mins  44212  Mod Eval          Mins  00185  High Eval                            Mins  66057  Re-Eval                               mins  52602    Timed Treatment:  56    mins   Total Treatment:    56    mins    Avis Baca PT  Physical Therapist

## 2020-08-24 ENCOUNTER — TREATMENT (OUTPATIENT)
Dept: PHYSICAL THERAPY | Facility: CLINIC | Age: 60
End: 2020-08-24

## 2020-08-24 DIAGNOSIS — M77.8 TENDINITIS OF LEFT SHOULDER: Primary | ICD-10-CM

## 2020-08-24 DIAGNOSIS — M25.512 LEFT SHOULDER PAIN, UNSPECIFIED CHRONICITY: ICD-10-CM

## 2020-08-24 PROCEDURE — 97110 THERAPEUTIC EXERCISES: CPT | Performed by: PHYSICAL THERAPIST

## 2020-08-24 PROCEDURE — 97140 MANUAL THERAPY 1/> REGIONS: CPT | Performed by: PHYSICAL THERAPIST

## 2020-08-24 PROCEDURE — G0283 ELEC STIM OTHER THAN WOUND: HCPCS | Performed by: PHYSICAL THERAPIST

## 2020-08-24 NOTE — PROGRESS NOTES
Physical Therapy Daily Progress Note    VISIT#: 3    Subjective Evaluation    History of Present Illness  Mechanism of injury: Pt reports he has been doing the new exercises and his shoulder feels about the same.  He has mobility but not able to use or raise his arm without pain.  Thinks the ES from last session helped at least some.    Pain  Current pain ratin (up to 6/10 with exercise or use)         Objective     See Exercise, Manual, and Modality Logs for complete treatment.     Cont with man therapy and there ex as before; added to HEP today  Cont with ES for pain control at end of session      Assessment & Plan     Assessment  Assessment details: Pt fco better today and did exercises below 90 deg of flexion and scaption with better tolerance.            Progress per Plan of Care            Timed:         Manual Therapy:  14       mins  25956;     Therapeutic Exercise:    24     mins  49872;     Neuromuscular Damian:        mins  05661;    Therapeutic Activity:          mins  52425;     Gait Training:           mins  58494;     Ultrasound:          mins  27116;    Ionto                                   mins   86092  Self Care                            mins   15584  Canalith Repos                   mins  4209    Un-Timed:  Electrical Stimulation:  18       mins  08210 ( );  Dry Needling          mins self-pay  Traction          mins 35784  Low Eval          Mins  96955  Mod Eval          Mins  23221  High Eval                            Mins  23323  Re-Eval                               mins  23086    Timed Treatment: 38     mins   Total Treatment:   56     mins    Avis Baca PT  Physical Therapist

## 2020-08-27 ENCOUNTER — TREATMENT (OUTPATIENT)
Dept: PHYSICAL THERAPY | Facility: CLINIC | Age: 60
End: 2020-08-27

## 2020-08-27 PROCEDURE — 97110 THERAPEUTIC EXERCISES: CPT | Performed by: PHYSICAL THERAPIST

## 2020-08-27 PROCEDURE — 97140 MANUAL THERAPY 1/> REGIONS: CPT | Performed by: PHYSICAL THERAPIST

## 2020-08-27 PROCEDURE — G0283 ELEC STIM OTHER THAN WOUND: HCPCS | Performed by: PHYSICAL THERAPIST

## 2020-08-27 NOTE — PROGRESS NOTES
Physical Therapy Daily Progress Note    VISIT#: 4    Subjective Evaluation    History of Present Illness  Mechanism of injury: Pt reports he did all of the exercises at home and he did have issues with the new ones but maybe due to getting to those last.      Pain  Current pain ratin         Objective     See Exercise, Manual, and Modality Logs for complete treatment.   Cont with man there, there ex and ES (added CP today);  pt able to progress with some exercises today    Patient Education:  Cont to HOLD on cane ex and try to progress other exercises as able    Assessment & Plan     Assessment  Assessment details: Progressing with ex well and is able to progress some.  He felt better after last session after first feeling somewhat worse immediately after.          Progress per Plan of Care            Timed:         Manual Therapy: 15        mins  93937;     Therapeutic Exercise:  30       mins  44953;     Neuromuscular Damian:        mins  62086;    Therapeutic Activity:          mins  07988;     Gait Training:           mins  88436;     Ultrasound:          mins  13248;    Ionto                                   mins   20046  Self Care                            mins   26163  Canalith Repos                   mins  4209    Un-Timed:  Electrical Stimulation: 18        mins  90314 ( );  Dry Needling          mins self-pay  Traction          mins 28319  Low Eval          Mins  40213  Mod Eval          Mins  75722  High Eval                            Mins  39413  Re-Eval                               mins  42723    Timed Treatment:    45  mins   Total Treatment:    63    mins    Avis Baca, PT  Physical Therapist

## 2020-08-31 ENCOUNTER — TREATMENT (OUTPATIENT)
Dept: PHYSICAL THERAPY | Facility: CLINIC | Age: 60
End: 2020-08-31

## 2020-08-31 PROCEDURE — G0283 ELEC STIM OTHER THAN WOUND: HCPCS | Performed by: PHYSICAL THERAPIST

## 2020-08-31 PROCEDURE — 97140 MANUAL THERAPY 1/> REGIONS: CPT | Performed by: PHYSICAL THERAPIST

## 2020-08-31 PROCEDURE — 97110 THERAPEUTIC EXERCISES: CPT | Performed by: PHYSICAL THERAPIST

## 2020-08-31 NOTE — PROGRESS NOTES
Physical Therapy Daily Progress Note    VISIT#: 5    Subjective Evaluation    History of Present Illness  Mechanism of injury: Pt reports he is doing well with exercises and the only one that hurts is the one he does last.  Pain at rest is going down steadily; with use it still hurts quite a bit.    Pain  Current pain ratin         Objective     See Exercise, Manual, and Modality Logs for complete treatment.   Cont with there ex and manual therapy as noted and ended with ES      Assessment & Plan     Assessment  Assessment details: fco well today with ability to progress with AROM flex and scaption ex        Progress per Plan of Care            Timed:         Manual Therapy: 15        mins  53285;     Therapeutic Exercise:  30       mins  39246;     Neuromuscular Damian:        mins  49100;    Therapeutic Activity:          mins  45822;     Gait Training:           mins  10959;     Ultrasound:          mins  05603;    Ionto                                   mins   63995  Self Care                            mins   68686  Canalith Repos                   mins  4209    Un-Timed:  Electrical Stimulation: 18        mins  98331 ( );  Dry Needling          mins self-pay  Traction          mins 39534  Low Eval          Mins  62456  Mod Eval          Mins  75006  High Eval                            Mins  82621  Re-Eval                               mins  74979    Timed Treatment: 45     mins   Total Treatment:   63     mins    Avis Baca PT  Physical Therapist

## 2020-09-03 ENCOUNTER — TREATMENT (OUTPATIENT)
Dept: PHYSICAL THERAPY | Facility: CLINIC | Age: 60
End: 2020-09-03

## 2020-09-03 DIAGNOSIS — M25.512 LEFT SHOULDER PAIN, UNSPECIFIED CHRONICITY: ICD-10-CM

## 2020-09-03 DIAGNOSIS — M77.8 TENDINITIS OF LEFT SHOULDER: Primary | ICD-10-CM

## 2020-09-03 PROCEDURE — 97110 THERAPEUTIC EXERCISES: CPT | Performed by: PHYSICAL THERAPIST

## 2020-09-03 PROCEDURE — G0283 ELEC STIM OTHER THAN WOUND: HCPCS | Performed by: PHYSICAL THERAPIST

## 2020-09-03 PROCEDURE — 97140 MANUAL THERAPY 1/> REGIONS: CPT | Performed by: PHYSICAL THERAPIST

## 2020-09-03 NOTE — PROGRESS NOTES
Physical Therapy Daily Progress Note    Patient: Prashant Zhou   : 1960  Referring practitioner: Giancarlo Brink I*  Today's Date: 9/3/2020    VISIT#: 6    Subjective Evaluation    History of Present Illness  Mechanism of injury: Pt reports he cont with ex at home and has been changing the order of the exercises to cut down on the pain at the end    Pain  Current pain ratin         Objective     See Exercise, Manual, and Modality Logs for complete treatment.     Cont with man there and there ex as noted; pt able to progress with some ex today    Assessment & Plan     Assessment  Assessment details: Pt tolerated well today with all ex and with only mild discomfort with new ones for home          Progress per Plan of Care            Timed:         Manual Therapy:  15       mins  70548;     Therapeutic Exercise:   25      mins  62465;     Neuromuscular Damian:        mins  94565;    Therapeutic Activity:          mins  99605;     Gait Training:           mins  49501;     Ultrasound:          mins  76367;    Ionto                                   mins   32819  Self Care                            mins   91539  Canalith Repos                   mins  4209    Un-Timed:  Electrical Stimulation:  18       mins  55342 ( );  Dry Needling          mins self-pay  Traction          mins 48214  Low Eval          Mins  34824  Mod Eval          Mins  18214  High Eval                            Mins  85221  Re-Eval                               mins  28415    Timed Treatment: 40     mins   Total Treatment:   58     mins    Avis Baca PT  Physical Therapist

## 2020-09-09 ENCOUNTER — OFFICE VISIT (OUTPATIENT)
Dept: CARDIOLOGY | Facility: CLINIC | Age: 60
End: 2020-09-09

## 2020-09-09 VITALS
HEIGHT: 75 IN | HEART RATE: 70 BPM | SYSTOLIC BLOOD PRESSURE: 118 MMHG | DIASTOLIC BLOOD PRESSURE: 73 MMHG | BODY MASS INDEX: 30.71 KG/M2 | WEIGHT: 247 LBS | OXYGEN SATURATION: 96 %

## 2020-09-09 DIAGNOSIS — E11.9 TYPE 2 DIABETES MELLITUS WITHOUT COMPLICATION, WITHOUT LONG-TERM CURRENT USE OF INSULIN (HCC): ICD-10-CM

## 2020-09-09 DIAGNOSIS — I10 ESSENTIAL HYPERTENSION: Primary | ICD-10-CM

## 2020-09-09 DIAGNOSIS — I71.03 AORTIC DISSECTION, THORACOABDOMINAL (HCC): ICD-10-CM

## 2020-09-09 DIAGNOSIS — E78.00 PURE HYPERCHOLESTEROLEMIA: ICD-10-CM

## 2020-09-09 PROCEDURE — 99214 OFFICE O/P EST MOD 30 MIN: CPT | Performed by: INTERNAL MEDICINE

## 2020-09-09 NOTE — PROGRESS NOTES
"    Subjective:     Encounter Date:09/09/2020      Patient ID: Prashant Zhou is a 59 y.o. male.    Chief Complaint:  History of Present Illness 59-year-old white male with history of thoracoabdominal aortic dissection history of hypertension hyperlipidemia and diabetes presents to my office for follow-up.  Patient is currently stable without incidence of chest pain or shortness of breath at rest on exertion.  No complains any PND orthopnea.  No palpitation dizziness syncope or swelling of the feet.  He is taking his medicine regularly.  He still continues to smoke but is exercising regular.  He follows a good diet    The following portions of the patient's history were reviewed and updated as appropriate: allergies, current medications, past family history, past medical history, past social history, past surgical history and problem list.  Past Medical History:   Diagnosis Date   • Aortic dissection (CMS/HCC)    • Heart murmur    • History of noncompliance with medical treatment    • Hypertension    • Type B hypoplasia of aortic arch      History reviewed. No pertinent surgical history.  /73   Pulse 70   Ht 190.5 cm (75\")   Wt 112 kg (247 lb)   SpO2 96%   BMI 30.87 kg/m²   Family History   Problem Relation Age of Onset   • Diabetes Mother        Current Outpatient Medications:   •  amLODIPine (NORVASC) 10 MG tablet, TAKE ONE TABLET BY MOUTH DAILY, Disp: 90 tablet, Rfl: 1  •  atorvastatin (LIPITOR) 40 MG tablet, TAKE ONE TABLET BY MOUTH EVERY NIGHT AT BEDTIME, Disp: 90 tablet, Rfl: 1  •  Blood Glucose Monitoring Suppl (ONE TOUCH ULTRA 2) w/Device kit, ONETOUCH ULTRA 2 w/Device KIT, Disp: , Rfl:   •  cloNIDine (CATAPRES) 0.1 MG tablet, Take 1 tablet by mouth 2 (Two) Times a Day., Disp: 180 tablet, Rfl: 1  •  glimepiride (AMARYL) 4 MG tablet, Take 1 tablet by mouth 2 (Two) Times a Day., Disp: 60 tablet, Rfl: 2  •  glucose blood (ONE TOUCH ULTRA TEST) test strip, ONETOUCH ULTRA BLUE STRP, Disp: , Rfl:   • "  hydrALAZINE (APRESOLINE) 50 MG tablet, Take 1 tablet by mouth Every 8 (Eight) Hours., Disp: 270 tablet, Rfl: 1  •  lisinopril-hydrochlorothiazide (PRINZIDE,ZESTORETIC) 20-25 MG per tablet, TAKE ONE TABLET BY MOUTH DAILY, Disp: 90 tablet, Rfl: 4  •  metFORMIN (GLUCOPHAGE) 850 MG tablet, TAKE ONE TABLET BY MOUTH TWICE A DAY WITH MEALS, Disp: 180 tablet, Rfl: 1  •  metoprolol tartrate (LOPRESSOR) 50 MG tablet, TAKE THREE TABLETS BY MOUTH EVERY 12 HOURS, Disp: 180 tablet, Rfl: 0  •  ONETOUCH DELICA LANCETS FINE misc, ONETOUCH DELICA LANCETS FINE, Disp: , Rfl:   No Known Allergies  Social History     Socioeconomic History   • Marital status:      Spouse name: Not on file   • Number of children: Not on file   • Years of education: Not on file   • Highest education level: Not on file   Tobacco Use   • Smoking status: Current Every Day Smoker     Packs/day: 1.00     Types: Cigarettes   • Smokeless tobacco: Never Used   Substance and Sexual Activity   • Alcohol use: No   • Drug use: No   • Sexual activity: Defer     Review of Systems   Constitution: Negative for fever and malaise/fatigue.   HENT: Negative for ear pain and nosebleeds.    Eyes: Negative for blurred vision and double vision.   Cardiovascular: Negative for chest pain, dyspnea on exertion, leg swelling and palpitations.   Respiratory: Negative for cough and shortness of breath.    Skin: Negative for rash.   Musculoskeletal: Negative for joint pain.   Gastrointestinal: Negative for abdominal pain, nausea and vomiting.   Neurological: Negative for focal weakness, headaches, light-headedness and numbness.   Psychiatric/Behavioral: Negative for depression. The patient is not nervous/anxious.    All other systems reviewed and are negative.             Objective:     Physical Exam   Constitutional: He appears well-developed and well-nourished.   HENT:   Head: Normocephalic and atraumatic.   Eyes: Pupils are equal, round, and reactive to light. Conjunctivae and  EOM are normal. No scleral icterus.   Neck: Normal range of motion. Neck supple. No JVD present. Carotid bruit is not present.   Cardiovascular: Normal rate, regular rhythm, S1 normal, S2 normal, normal heart sounds and intact distal pulses. PMI is not displaced.   Pulmonary/Chest: Effort normal and breath sounds normal. He has no wheezes. He has no rales.   Abdominal: Soft. Bowel sounds are normal.   Musculoskeletal: Normal range of motion.   Neurological: He is alert. He has normal strength.   No focal deficits   Skin: Skin is warm and dry. No rash noted.   Psychiatric: He has a normal mood and affect.     Procedures    Lab Review:       Assessment:          Diagnosis Plan   1. Essential hypertension     2. Aortic dissection, thoracoabdominal (CMS/HCC)     3. Type 2 diabetes mellitus without complication, without long-term current use of insulin (CMS/HCC)     4. Pure hypercholesterolemia            Plan:       Patient has history of type B aortic dissection in the past and is followed by cardiac surgeons and has annual CT scans  Patient's LV function is normal without valvular heart disease  Patient blood pressure and heart rate stable  Patient's lipid levels are followed by the primary care doctor  Patient has diabetes and is on oral medicines  Continue current medicines and follow him in 1 year

## 2020-09-10 ENCOUNTER — TREATMENT (OUTPATIENT)
Dept: PHYSICAL THERAPY | Facility: CLINIC | Age: 60
End: 2020-09-10

## 2020-09-10 DIAGNOSIS — M25.512 LEFT SHOULDER PAIN, UNSPECIFIED CHRONICITY: ICD-10-CM

## 2020-09-10 DIAGNOSIS — M77.8 TENDINITIS OF LEFT SHOULDER: Primary | ICD-10-CM

## 2020-09-10 PROCEDURE — 97140 MANUAL THERAPY 1/> REGIONS: CPT | Performed by: PHYSICAL THERAPIST

## 2020-09-10 PROCEDURE — 97110 THERAPEUTIC EXERCISES: CPT | Performed by: PHYSICAL THERAPIST

## 2020-09-10 PROCEDURE — G0283 ELEC STIM OTHER THAN WOUND: HCPCS | Performed by: PHYSICAL THERAPIST

## 2020-09-10 NOTE — PROGRESS NOTES
Physical Therapy Daily Progress Note    Patient: Prashant Zhou   : 1960  Referring practitioner: Giancarlo Brink I*  Today's Date: 9/10/2020    VISIT#: 7    Subjective Evaluation    History of Present Illness  Mechanism of injury: Pt reports all about the same.  He can tell his shoulder is getting looser and stronger but the pain is still very easily aggravated and he still avoids using his arm very much at home.      Pain  Current pain ratin             Objective     See Exercise, Manual, and Modality Logs for complete treatment.   Progressed with tband ex today to red    Patient Education:  Cont with there ex and progress as able    Assessment & Plan     Assessment  Assessment details: Progressing with strength as pt is able to inc resistance for some exercises.          Progress per Plan of Care            Timed:         Manual Therapy: 15        mins  97558;     Therapeutic Exercise:  30       mins  64088;     Neuromuscular Damian:        mins  58112;    Therapeutic Activity:          mins  35708;     Gait Training:           mins  57602;     Ultrasound:          mins  82729;    Ionto                                   mins   51705  Self Care                            mins   54068  Canalith Repos                   mins  4209    Un-Timed:  Electrical Stimulation: 18        mins  34493 ( );  Dry Needling          mins self-pay  Traction          mins 47949  Low Eval          Mins  19650  Mod Eval          Mins  64689  High Eval                            Mins  55290  Re-Eval                               mins  12377    Timed Treatment: 45     mins   Total Treatment:    63    mins    Avis Baca PT  Physical Therapist

## 2020-09-14 ENCOUNTER — TREATMENT (OUTPATIENT)
Dept: PHYSICAL THERAPY | Facility: CLINIC | Age: 60
End: 2020-09-14

## 2020-09-14 DIAGNOSIS — M77.8 TENDINITIS OF LEFT SHOULDER: Primary | ICD-10-CM

## 2020-09-14 DIAGNOSIS — M25.512 LEFT SHOULDER PAIN, UNSPECIFIED CHRONICITY: ICD-10-CM

## 2020-09-14 PROCEDURE — G0283 ELEC STIM OTHER THAN WOUND: HCPCS | Performed by: PHYSICAL THERAPIST

## 2020-09-14 PROCEDURE — 97110 THERAPEUTIC EXERCISES: CPT | Performed by: PHYSICAL THERAPIST

## 2020-09-14 PROCEDURE — 97140 MANUAL THERAPY 1/> REGIONS: CPT | Performed by: PHYSICAL THERAPIST

## 2020-09-14 NOTE — PROGRESS NOTES
Physical Therapy Daily Progress Note    Patient: Prashant Zhou   : 1960  Referring practitioner: Giancarlo Brink I*  Today's Date: 2020    VISIT#: 8    Subjective Evaluation    History of Present Illness  Mechanism of injury: He still feels like the pain is about the same but he did get to sleep all night last night.  He is not sure if he laid on it or not.  He usually wakes up after a couple of hours and cannot go back to sleep.  He compensates for his pain with most activities and changes his methods.      Pain  Current pain ratin (at rest )             Objective     See Exercise, Manual, and Modality Logs for complete treatment.     Patient Education:    Assessment & Plan     Assessment  Assessment details: Pt might be turning a corner with progress as he was able to sleep all night last night.   Able to progress with flexion above shld height today.      Progress per Plan of Care            Timed:         Manual Therapy: 18        mins  86933;     Therapeutic Exercise:  27       mins  39418;     Neuromuscular Damian:        mins  64712;    Therapeutic Activity:          mins  78326;     Gait Training:           mins  42597;     Ultrasound:          mins  15222;    Ionto                                   mins   53282  Self Care                            mins   41531  Canalith Repos                   mins  4209    Un-Timed:  Electrical Stimulation:  18       mins  82520 ( );  Dry Needling          mins self-pay  Traction          mins 57634  Low Eval          Mins  55072  Mod Eval          Mins  46307  High Eval                            Mins  54013  Re-Eval                               mins  34093    Timed Treatment: 45     mins   Total Treatment:   60     mins    Avis Baca, PT  Physical Therapist

## 2020-09-17 ENCOUNTER — TREATMENT (OUTPATIENT)
Dept: PHYSICAL THERAPY | Facility: CLINIC | Age: 60
End: 2020-09-17

## 2020-09-17 DIAGNOSIS — M25.512 LEFT SHOULDER PAIN, UNSPECIFIED CHRONICITY: ICD-10-CM

## 2020-09-17 DIAGNOSIS — M77.8 TENDINITIS OF LEFT SHOULDER: Primary | ICD-10-CM

## 2020-09-17 PROCEDURE — 97110 THERAPEUTIC EXERCISES: CPT | Performed by: PHYSICAL THERAPIST

## 2020-09-17 PROCEDURE — 97140 MANUAL THERAPY 1/> REGIONS: CPT | Performed by: PHYSICAL THERAPIST

## 2020-09-17 PROCEDURE — G0283 ELEC STIM OTHER THAN WOUND: HCPCS | Performed by: PHYSICAL THERAPIST

## 2020-09-17 NOTE — PROGRESS NOTES
Physical Therapy Daily Progress Note    Patient: Prashant Zhou   : 1960  Referring practitioner: Giancarlo Brink I*  Today's Date: 2020    VISIT#: 9    Subjective Evaluation    History of Present Illness  Mechanism of injury: Pt reports he still feels like the mobility is good but the pain is still about the same if he raises it up    Pain  Current pain ratin (at rest)         Objective     See Exercise, Manual, and Modality Logs for complete treatment.   Cont with man there and added scap mobs today  Cont with there ex and added prone rows today    AROM:  Able to get above shld height without wt for scaption    Assessment & Plan     Assessment  Assessment details: fco well and able to tolerate progressions today without inc pain          Progress per Plan of Care            Timed:         Manual Therapy: 23        mins  27853;     Therapeutic Exercise:   17      mins  66863;     Neuromuscular Damian:        mins  88733;    Therapeutic Activity:          mins  91284;     Gait Training:           mins  89573;     Ultrasound:          mins  53166;    Ionto                                   mins   03091  Self Care                            mins   59299  Canalith Repos                   mins  4209    Un-Timed:  Electrical Stimulation:  18       mins  29412 ( );  Dry Needling          mins self-pay  Traction          mins 98824  Low Eval          Mins  80461  Mod Eval          Mins  98968  High Eval                            Mins  64756  Re-Eval                               mins  09583    Timed Treatment: 40     mins   Total Treatment:  58      mins    Avis Baca PT  Physical Therapist

## 2020-09-21 ENCOUNTER — TREATMENT (OUTPATIENT)
Dept: PHYSICAL THERAPY | Facility: CLINIC | Age: 60
End: 2020-09-21

## 2020-09-21 DIAGNOSIS — M77.8 TENDINITIS OF LEFT SHOULDER: Primary | ICD-10-CM

## 2020-09-21 DIAGNOSIS — M25.512 LEFT SHOULDER PAIN, UNSPECIFIED CHRONICITY: ICD-10-CM

## 2020-09-21 PROCEDURE — 97110 THERAPEUTIC EXERCISES: CPT | Performed by: PHYSICAL THERAPIST

## 2020-09-21 PROCEDURE — 97140 MANUAL THERAPY 1/> REGIONS: CPT | Performed by: PHYSICAL THERAPIST

## 2020-09-21 NOTE — PROGRESS NOTES
Physical Therapy Daily Progress Note    Patient: Prashant Zhou   : 1960  Referring practitioner: Giancarlo Brink I*  Today's Date: 2020    VISIT#: 10    Subjective Evaluation    History of Present Illness  Mechanism of injury: Pt reports he was able to sleep through the night the last couple of nights without having to get out of bed to sleep elsewhere.  His whole shoulder area was very stiff after the last session when the PT worked on his shoulder blade.  Pt reports 25-30% improvement since start of PT and notes mostly in movement but still with limiting pain.    DASH:  SDL:  50%  (unchanged since eval and most likely due to the patient avoiding doing the things that usually hurt him.)    Pain  Current pain ratin           Objective       Active Range of Motion   Left Shoulder   Flexion: 130 degrees   (unchanged from eval)  Extension: 50 degrees  (unchanged from eval-WFL)  Abduction: 60 degrees   (unchanged from eval)  External rotation 0°: 20 degrees   (40 deg- improved)  External rotation 45°: 30 degrees (45 deg  - improved)  Internal rotation BTB: L3   (unchanged from eval)    Additional Active Range of Motion Details  Scaption:  110  (dec from eval at 80 deg)    All active motions stopped due to pain     Passive Range of Motion   Left Shoulder   Flexion: 150 degrees (unchagned from eval)  Abduction: 125 degrees   (100 deg-improved)    Additional Passive Range of Motion Details  Scap:  150  (160 deg- improved)     Joint Play   Left Shoulder  Hypomobile in the posterior capsule and inferior capsule  (still tight but improved from eval)     Strength/Myotome Testing     Left Shoulder      Planes of Motion   Flexion: 4-    (4 to 4+  Improved)  Extension: 4+  (unchanged from eval --WFL)  Abduction: 4-   (4  Improved)  External rotation at 45°: 4-  (4--improved)  Internal rotation at 45°: 4-   (4 to 4+ -- improved )       Tests      Left Shoulder   Positive empty can  (Negative  today)  full can, Hawkin's, Neer's and Khadijah's  ( all still positive)      See Exercise, Manual, and Modality Logs for complete treatment.     Patient Education:    Assessment & Plan     Assessment  Assessment details: Pt has made some progress with   He has met  He would benefit from continued skilled PT to further improve function of L shoulder and to meet more of his goals as set at eval.           STGs:  6 weeks  1.  Pt to tolerate initial HEP without inc pain.  MET  2.  Pt to tolerate advancement of HEP without inc pain.  MET  3.  Pt to report pain has decreased by at least 25%.  MET  4.  Pt to improve  AROM of left shoulder flex to at least 150 deg and scaption to 140 deg.  5.  Pt to sit with proper posture without cues required.  MET      LTGs:  By DC  1.  Pt to be (I) with HEP.  2.  Pt to report pain has decreased by at least 75% to allow better tolerance to and performance of normal daily activities.  3.  Pt to improve AROM of left shoulder  to WFL/WNL to allow normal performance of daily activities.  4.  Pt to improve strength of left shoulder  to 4+/5 or better to be adequate for completion/performance of normal daily tasks.  5.  Pt to exhibit improved function as indicated by improved score on DASH vs score at eval.    6.  Pt to demonstrate (I) and appropriate use of body mechanics for daily home and/or work tasks.      Progress per Plan of Care            Timed:         Manual Therapy:  23       mins  62728;     Therapeutic Exercise:  20       mins  37380;     Neuromuscular Damian:        mins  38276;    Therapeutic Activity:          mins  11770;     Gait Training:           mins  67809;     Ultrasound:          mins  88269;    Ionto                                   mins   66769  Self Care                            mins   13013  Canalith Repos                   mins  4209    Un-Timed:  Electrical Stimulation:         mins  21093 ( );  Dry Needling          mins self-pay  Traction          mins  72118  Low Eval          Mins  80140  Mod Eval          Mins  14169  High Eval                            Mins  99174  Re-Eval                               mins  62107    Timed Treatment: 43     mins   Total Treatment:    43 __ mins    Avis Baca PT  Physical Therapist

## 2020-09-24 ENCOUNTER — TREATMENT (OUTPATIENT)
Dept: PHYSICAL THERAPY | Facility: CLINIC | Age: 60
End: 2020-09-24

## 2020-09-24 DIAGNOSIS — M25.512 LEFT SHOULDER PAIN, UNSPECIFIED CHRONICITY: ICD-10-CM

## 2020-09-24 DIAGNOSIS — M77.8 TENDINITIS OF LEFT SHOULDER: Primary | ICD-10-CM

## 2020-09-24 PROCEDURE — G0283 ELEC STIM OTHER THAN WOUND: HCPCS | Performed by: PHYSICAL THERAPIST

## 2020-09-24 PROCEDURE — 97110 THERAPEUTIC EXERCISES: CPT | Performed by: PHYSICAL THERAPIST

## 2020-09-24 PROCEDURE — 97140 MANUAL THERAPY 1/> REGIONS: CPT | Performed by: PHYSICAL THERAPIST

## 2020-09-24 NOTE — PROGRESS NOTES
Physical Therapy Daily Progress Note    Patient: Prashant Zhou   : 1960  Referring practitioner: Giancarlo Brink I*  Today's Date: 2020    VISIT#: 11    Subjective Evaluation    History of Present Illness  Mechanism of injury: Pt is going to try to get an appt  with the MD to see about getting an injection.  He will keep me informed.  His pain has been increased for the last 15 hours or so.    Pain  Current pain ratin         Objective     See Exercise, Manual, and Modality Logs for complete treatment.     Assessment & Plan     Assessment  Assessment details: Pt fco fair today with some inc pain upon arrival and with PROM ER and flexion today          Progress per Plan of Care            Timed:         Manual Therapy:  18       mins  24355;     Therapeutic Exercise:  30       mins  86406;     Neuromuscular Damian:        mins  16606;    Therapeutic Activity:          mins  88275;     Gait Training:           mins  75679;     Ultrasound:          mins  67079;    Ionto                                   mins   39857  Self Care                            mins   54666  Canalith Repos                   mins  4209    Un-Timed:  Electrical Stimulation: 18        mins  72835 ( );  Dry Needling          mins self-pay  Traction          mins 81825  Low Eval          Mins  35084  Mod Eval          Mins  98206  High Eval                            Mins  53925  Re-Eval                               mins  44744    Timed Treatment: 48     mins   Total Treatment:   66     mins    Avis Baca PT  Physical Therapist

## 2020-09-28 ENCOUNTER — OFFICE VISIT (OUTPATIENT)
Dept: ORTHOPEDIC SURGERY | Facility: CLINIC | Age: 60
End: 2020-09-28

## 2020-09-28 ENCOUNTER — TREATMENT (OUTPATIENT)
Dept: PHYSICAL THERAPY | Facility: CLINIC | Age: 60
End: 2020-09-28

## 2020-09-28 VITALS
HEART RATE: 73 BPM | WEIGHT: 245 LBS | DIASTOLIC BLOOD PRESSURE: 74 MMHG | BODY MASS INDEX: 30.46 KG/M2 | HEIGHT: 75 IN | SYSTOLIC BLOOD PRESSURE: 117 MMHG

## 2020-09-28 DIAGNOSIS — M75.52 SUBACROMIAL BURSITIS OF LEFT SHOULDER JOINT: ICD-10-CM

## 2020-09-28 DIAGNOSIS — M77.8 TENDINITIS OF LEFT SHOULDER: Primary | ICD-10-CM

## 2020-09-28 DIAGNOSIS — M25.512 LEFT SHOULDER PAIN, UNSPECIFIED CHRONICITY: ICD-10-CM

## 2020-09-28 DIAGNOSIS — M75.82 TENDINITIS OF LEFT ROTATOR CUFF: Primary | ICD-10-CM

## 2020-09-28 PROCEDURE — 99213 OFFICE O/P EST LOW 20 MIN: CPT | Performed by: FAMILY MEDICINE

## 2020-09-28 PROCEDURE — 97140 MANUAL THERAPY 1/> REGIONS: CPT | Performed by: PHYSICAL THERAPIST

## 2020-09-28 PROCEDURE — G0283 ELEC STIM OTHER THAN WOUND: HCPCS | Performed by: PHYSICAL THERAPIST

## 2020-09-28 PROCEDURE — 20610 DRAIN/INJ JOINT/BURSA W/O US: CPT | Performed by: FAMILY MEDICINE

## 2020-09-28 PROCEDURE — 97110 THERAPEUTIC EXERCISES: CPT | Performed by: PHYSICAL THERAPIST

## 2020-09-28 RX ORDER — TRIAMCINOLONE ACETONIDE 40 MG/ML
80 INJECTION, SUSPENSION INTRA-ARTICULAR; INTRAMUSCULAR
Status: COMPLETED | OUTPATIENT
Start: 2020-09-28 | End: 2020-09-28

## 2020-09-28 RX ADMIN — TRIAMCINOLONE ACETONIDE 80 MG: 40 INJECTION, SUSPENSION INTRA-ARTICULAR; INTRAMUSCULAR at 10:37

## 2020-09-28 NOTE — PROGRESS NOTES
Procedure   Large Joint Arthrocentesis: L subacromial bursa  Date/Time: 9/28/2020 10:37 AM  Consent given by: patient  Site marked: site marked  Timeout: Immediately prior to procedure a time out was called to verify the correct patient, procedure, equipment, support staff and site/side marked as required   Supporting Documentation  Indications: pain   Procedure Details  Location: shoulder - L subacromial bursa  Preparation: Patient was prepped and draped in the usual sterile fashion  Needle size: 22 G  Approach: posterior  Medications administered: 80 mg triamcinolone acetonide 40 MG/ML (6cc of 1% lidocaine without epinepherine and 2cc of 40mg Kenalog)  Patient tolerance: patient tolerated the procedure well with no immediate complications (Blood loss negligable, pt admits to almost immediate pain reflief post injection with gentle ROM.)

## 2020-09-28 NOTE — PROGRESS NOTES
Physical Therapy Daily Progress Note    Patient: Prashant Zhou   : 1960  Referring practitioner: Giancarlo Brink I*  Today's Date: 2020    VISIT#: 12    Subjective Evaluation    History of Present Illness  Mechanism of injury: Pt reports he is going to Dr. Brink right after PT today.  He still is testing much at home to see what still hurts.     Pain  Current pain ratin         Objective     See Exercise, Manual, and Modality Logs for complete treatment.   Cont with man therapy and there ex as noted; was able to add to program today with a prone ex into extension    Patient Education:    Assessment & Plan     Assessment  Assessment details: Pt with cont pain with abd and scaption planes of motion above shld height.  Will see ortho today for possible injection.          Progress per Plan of Care            Timed:         Manual Therapy: 18        mins  78956;     Therapeutic Exercise: 30       mins  12570;     Neuromuscular Damian:        mins  13814;    Therapeutic Activity:          mins  76680;     Gait Training:           mins  34686;     Ultrasound:          mins  78656;    Ionto                                   mins   86706  Self Care                            mins   22901  Canalith Repos                   mins  4209    Un-Timed:  Electrical Stimulation: 18        mins  62048 ( );  Dry Needling          mins self-pay  Traction          mins 16856  Low Eval          Mins  47314  Mod Eval          Mins  52255  High Eval                            Mins  18985  Re-Eval                               mins  11779    Timed Treatment: 48     mins   Total Treatment:   66     mins    Avis Baca PT  Physical Therapist

## 2020-09-28 NOTE — PROGRESS NOTES
"Primary Care Sports Medicine Office Visit Note     Patient ID: Prashant Zhou is a 59 y.o. male.    Chief Complaint:  Chief Complaint   Patient presents with   • Left Shoulder - Follow-up     HPI:    Mr. Prashant Zhou is a 59 y.o. male who presents to the clinic today for follow up of L shoulder pain. He states that he has been participating in PT as instructed. Shoulder ROM and strength seems to be improving but pain persists. Returns to request corticosteroid injection.     Past Medical History:   Diagnosis Date   • Aortic dissection (CMS/HCC)    • Heart murmur    • History of noncompliance with medical treatment    • Hypertension    • Type B hypoplasia of aortic arch        History reviewed. No pertinent surgical history.    Family History   Problem Relation Age of Onset   • Diabetes Mother      Social History     Occupational History   • Not on file   Tobacco Use   • Smoking status: Current Every Day Smoker     Packs/day: 1.00     Types: Cigarettes   • Smokeless tobacco: Never Used   Substance and Sexual Activity   • Alcohol use: No   • Drug use: No   • Sexual activity: Defer      Review of Systems   Constitutional: Negative for activity change, fatigue and fever.   Musculoskeletal: Positive for arthralgias.   Skin: Negative for color change and rash.   Neurological: Negative for numbness.       Objective:    /74 (BP Location: Left arm, Patient Position: Sitting, Cuff Size: Large Adult)   Pulse 73   Ht 190.5 cm (75\")   Wt 111 kg (245 lb)   BMI 30.62 kg/m²     Physical Examination:  Physical Exam  Vitals signs and nursing note reviewed.   Constitutional:       General: He is not in acute distress.     Appearance: He is well-developed. He is not diaphoretic.   HENT:      Head: Normocephalic and atraumatic.   Eyes:      Conjunctiva/sclera: Conjunctivae normal.   Pulmonary:      Effort: Pulmonary effort is normal. No respiratory distress.   Skin:     General: Skin is warm.      Capillary Refill: " "Capillary refill takes less than 2 seconds.   Neurological:      Mental Status: He is alert.       Left Shoulder Exam     Range of Motion   Active abduction:  120 abnormal   Passive abduction: normal   Extension: normal   External rotation: normal   Forward flexion: normal   Internal rotation 0 degrees:  Sacrum abnormal     Muscle Strength   Abduction: 4/5   Internal rotation: 5/5   External rotation: 4/5   Supraspinatus: 4/5   Subscapularis: 5/5   Biceps: 5/5     Tests   Drop arm: negative    Other   Erythema: absent  Sensation: normal  Pulse: present     Comments:  Mildly positive Yovanny for pain, positive resisted external rotation for pain as well, negative liftoff.          Imaging and other tests:  No new imaging today.     Assessment and Plan:    1. Tendinitis of left rotator cuff    2. Subacromial bursitis of left shoulder joint    After discussion of risks and benefits, the patient elected to proceed with corticosteroid injection to the L subacromial bursa.  The patient tolerated this procedure well without any complaints or problems.  I recommended continuation of conservative management as previous, RTC in 3-6 months or sooner if symptoms recur.    Giancarlo ALVARADO \"Chance\" Cuba MALONE DO, CAQSM  09/28/20  10:37 EDT    Disclaimer: Please note that areas of this note were completed with computer voice recognition software.  Quite often unanticipated grammatical, syntax, homophones, and other interpretive errors are inadvertently transcribed by the computer software. Please excuse any errors that have escaped final proofreading.  "

## 2020-10-01 ENCOUNTER — TREATMENT (OUTPATIENT)
Dept: PHYSICAL THERAPY | Facility: CLINIC | Age: 60
End: 2020-10-01

## 2020-10-01 DIAGNOSIS — M77.8 TENDINITIS OF LEFT SHOULDER: Primary | ICD-10-CM

## 2020-10-01 DIAGNOSIS — M25.512 LEFT SHOULDER PAIN, UNSPECIFIED CHRONICITY: ICD-10-CM

## 2020-10-01 PROCEDURE — G0283 ELEC STIM OTHER THAN WOUND: HCPCS | Performed by: PHYSICAL THERAPIST

## 2020-10-01 PROCEDURE — 97110 THERAPEUTIC EXERCISES: CPT | Performed by: PHYSICAL THERAPIST

## 2020-10-01 PROCEDURE — 97140 MANUAL THERAPY 1/> REGIONS: CPT | Performed by: PHYSICAL THERAPIST

## 2020-10-01 NOTE — PROGRESS NOTES
Physical Therapy Daily Progress Note    Patient: Prashant Zhou   : 1960  Referring practitioner: Giancarlo Brink I*  Today's Date: 10/1/2020    VISIT#: 13    Subjective Evaluation    History of Present Illness  Mechanism of injury: Pt reports he got the shot in the shoulder and it felt great for several hours but later in the evening it hurt all over the shoulder.  Now it hurts in the front vs the side.      Pain  Current pain ratin         Objective     See Exercise, Manual, and Modality Logs for complete treatment.   Concluded with ES/CP again today for pain management.      Assessment & Plan     Assessment  Assessment details: Pt is sore today from the injection and is not sure it has helped or not.        Progress per Plan of Care            Timed:         Manual Therapy: 20        mins  82290;     Therapeutic Exercise:  25       mins  61030;     Neuromuscular Damian:        mins  98834;    Therapeutic Activity:          mins  04525;     Gait Training:           mins  46505;     Ultrasound:          mins  70142;    Ionto                                   mins   27653  Self Care                            mins   44895  Canalith Repos                   mins  4209    Un-Timed:  Electrical Stimulation: 18        mins  31702 ( );  Dry Needling          mins self-pay  Traction          mins 01424  Low Eval          Mins  47318  Mod Eval          Mins  30483  High Eval                           Mins  68858  Re-Eval                               mins  76287    Timed Treatment: 45     mins   Total Treatment:  63   mins    Avis Baca PT  Physical Therapist

## 2020-10-05 ENCOUNTER — TREATMENT (OUTPATIENT)
Dept: PHYSICAL THERAPY | Facility: CLINIC | Age: 60
End: 2020-10-05

## 2020-10-05 DIAGNOSIS — M25.512 LEFT SHOULDER PAIN, UNSPECIFIED CHRONICITY: ICD-10-CM

## 2020-10-05 DIAGNOSIS — M77.8 TENDINITIS OF LEFT SHOULDER: Primary | ICD-10-CM

## 2020-10-05 PROCEDURE — G0283 ELEC STIM OTHER THAN WOUND: HCPCS | Performed by: PHYSICAL THERAPIST

## 2020-10-05 PROCEDURE — 97110 THERAPEUTIC EXERCISES: CPT | Performed by: PHYSICAL THERAPIST

## 2020-10-05 PROCEDURE — 97140 MANUAL THERAPY 1/> REGIONS: CPT | Performed by: PHYSICAL THERAPIST

## 2020-10-05 NOTE — PROGRESS NOTES
Physical Therapy Daily Progress Note    Patient: Prashant Zhou   : 1960  Referring practitioner: Giancarlo Brink I*  Today's Date: 10/5/2020    VISIT#: 14    Subjective Evaluation    History of Present Illness  Mechanism of injury: He still has pain and is not sure if the shot has helped or the PT or both.  He can now open and close the car door with the left hand and he could not do that before.  Reaching up to get things out of top cabinets is also easier too.  He has not tried to do anything with weight.    Pain  Current pain ratin           Objective     See Exercise, Manual, and Modality Logs for complete treatment.     Patient Education:    Assessment & Plan     Assessment  Assessment details: fco well today with all exercises          Progress per Plan of Care            Timed:         Manual Therapy: 20        mins  54945;     Therapeutic Exercise:  25       mins  13155;     Neuromuscular Damian:        mins  21798;    Therapeutic Activity:          mins  83758;     Gait Training:           mins  74927;     Ultrasound:          mins  61724;    Ionto                                   mins   21252  Self Care                            mins   11400  Canalith Repos                   mins  4209    Un-Timed:  Electrical Stimulation:  18       mins  82172 ( );  Dry Needling          mins self-pay  Traction          mins 94787  Low Eval          Mins  42216  Mod Eval          Mins  12116  High Eval                            Mins  84438  Re-Eval                               mins  72401    Timed Treatment: 45     mins   Total Treatment:   63     mins    Avis Baca PT  Physical Therapist

## 2020-10-08 ENCOUNTER — TREATMENT (OUTPATIENT)
Dept: PHYSICAL THERAPY | Facility: CLINIC | Age: 60
End: 2020-10-08

## 2020-10-08 DIAGNOSIS — M25.512 LEFT SHOULDER PAIN, UNSPECIFIED CHRONICITY: ICD-10-CM

## 2020-10-08 DIAGNOSIS — M77.8 TENDINITIS OF LEFT SHOULDER: Primary | ICD-10-CM

## 2020-10-08 PROCEDURE — 97140 MANUAL THERAPY 1/> REGIONS: CPT | Performed by: PHYSICAL THERAPIST

## 2020-10-08 PROCEDURE — 97110 THERAPEUTIC EXERCISES: CPT | Performed by: PHYSICAL THERAPIST

## 2020-10-08 NOTE — PROGRESS NOTES
Physical Therapy Daily Progress Note    Patient: Prashant Zhou   : 1960  Referring practitioner: Giancarlo Brink I*  Today's Date: 10/8/2020    VISIT#: 15    Subjective Evaluation    History of Present Illness  Mechanism of injury: Pt reports he was able to shower and dry off without hurting his left shoulder.  He still is not able to turn himself over in bed using that arm.    Pain  Current pain ratin             Objective     See Exercise, Manual, and Modality Logs for complete treatment.   Cont with there ex and man therapy as noted    Decided to hold on ES/CP today to re-assess its benefit post-treatment; will discuss at next session    Assessment & Plan     Assessment  Assessment details: Bridgette  Well with all ex today and added more aggressive stabilization act with man therapy          Progress per Plan of Care            Timed:         Manual Therapy: 20        mins  68148;     Therapeutic Exercise:  25       mins  36649;     Neuromuscular Damian:        mins  65525;    Therapeutic Activity:          mins  41079;     Gait Training:           mins  96592;     Ultrasound:          mins  03211;    Ionto                                   mins   91487  Self Care                            mins   50255  Canalith Repos                   mins  4209    Un-Timed:  Electrical Stimulation:         mins  02146 ( );  Dry Needling          mins self-pay  Traction          mins 92223  Low Eval          Mins  07269  Mod Eval          Mins  16070  High Eval                            Mins  46284  Re-Eval                               mins  92071    Timed Treatment: 45     mins   Total Treatment:   45     mins    Avis Baca PT  Physical Therapist

## 2020-10-11 RX ORDER — AMLODIPINE BESYLATE 10 MG/1
TABLET ORAL
Qty: 72 TABLET | Refills: 0 | Status: SHIPPED | OUTPATIENT
Start: 2020-10-11 | End: 2021-01-08

## 2020-10-11 RX ORDER — ATORVASTATIN CALCIUM 40 MG/1
TABLET, FILM COATED ORAL
Qty: 72 TABLET | Refills: 0 | Status: SHIPPED | OUTPATIENT
Start: 2020-10-11 | End: 2021-01-08

## 2020-10-19 ENCOUNTER — TREATMENT (OUTPATIENT)
Dept: PHYSICAL THERAPY | Facility: CLINIC | Age: 60
End: 2020-10-19

## 2020-10-19 DIAGNOSIS — M25.512 LEFT SHOULDER PAIN, UNSPECIFIED CHRONICITY: ICD-10-CM

## 2020-10-19 DIAGNOSIS — M77.8 TENDINITIS OF LEFT SHOULDER: Primary | ICD-10-CM

## 2020-10-19 PROCEDURE — 97110 THERAPEUTIC EXERCISES: CPT | Performed by: PHYSICAL THERAPIST

## 2020-10-19 PROCEDURE — 97140 MANUAL THERAPY 1/> REGIONS: CPT | Performed by: PHYSICAL THERAPIST

## 2020-10-19 NOTE — PROGRESS NOTES
Re-Assessment / Re-Certification        Patient: Prashant Zhou   : 1960  Diagnosis/ICD-10 Code:  Tendinitis of left shoulder [M77.8]  Referring practitioner: Giancarlo Brink I*  Date of Initial Visit: Type: THERAPY  Noted: 2020  Today's Date: 10/19/2020  Patient seen for 16 sessions      Subjective:     Subjective Questionnaire: QuickDASH: ADL:  20%;  Work:  38%  Clinical Progress: improved  Home Program Compliance: Yes  Treatment has included: therapeutic exercise, neuromuscular re-education, manual therapy, electrical stimulation and cryotherapy    Subjective Evaluation    History of Present Illness  Mechanism of injury: Pt reports at least 75% better overall from the start of PT.  He is able to do more now than in the beginning and the shot seemed to help some too.  Worst pain in the last couple of weeks is 4-5/10 and that was after a reflex movement of trying to catch something that was falling off of a table.  He was using his arms overhead this weekend and it did OK.      Pain  Current pain ratin         Objective          Active Range of Motion   Left Shoulder   Flexion: 140 degrees     Additional Active Range of Motion Details  Scaption:  125 deg    Strength/Myotome Testing     Left Shoulder     Planes of Motion   Flexion: 4   Extension: 4+   External rotation at 0°: 4   Internal rotation at 0°: 4     Additional Strength Details  Scaption:  4    NOTE:  Pt with only a twinge of pain with scaption testing and ER testing;  All were painful at the evaluation      Assessment/Plan     STGs:  6 weeks  1.  Pt to tolerate initial HEP without inc pain.  MET  2.  Pt to tolerate advancement of HEP without inc pain.  MET  3.  Pt to report pain has decreased by at least 25%.  MET  4.  Pt to improve  AROM of left shoulder flex to at least 150 deg and scaption to 140 deg.  5.  Pt to sit with proper posture without cues required.  MET      LTGs:  By DC  1.  Pt to be (I) with HEP.  2.  Pt to report  pain has decreased by at least 75% to allow better tolerance to and performance of normal daily activities.   MET  3.  Pt to improve AROM of left shoulder  to WFL/WNL to allow normal performance of daily activities.  4.  Pt to improve strength of left shoulder  to 4+/5 or better to be adequate for completion/performance of normal daily tasks.  5.  Pt to exhibit improved function as indicated by improved score on DASH vs score at eval.  MET  6.  Pt to demonstrate (I) and appropriate use of body mechanics for daily home and/or work tasks.     Progress toward previous goals: Partially Met       Recommendations: Continue as planned  Timeframe: 3 months  Prognosis to achieve goals: good    PT Signature: Avis Baca, PT      Based upon review of the patient's progress and continued therapy plan, it is my medical opinion that Prashant Zhou should continue physical therapy treatment at [facility] [address] [facility]  [address] [facility] [place] [address].    Signature: __________________________________  Giancarlo Brink II, DO  Please sign and return via fax to [phone].. Thank you, [facility].    Timed:         Manual Therapy:  15       mins  13683;     Therapeutic Exercise:   30      mins  82184;     Neuromuscular Damian:        mins  65403;    Therapeutic Activity:          mins  19820;     Gait Training:           mins  91989;     Ultrasound:          mins  69948;    Ionto                                   mins   35954  Self Care                            mins   46833  Canalith Repos:                  mins   49080          Un-Timed:  Electrical Stimulation:         mins  91637 ( );  Traction          mins 38621  Low Eval          Mins  08398  Mod Eval          Mins  17977  High Eval                            Mins  03481  Re-Eval                               mins  21191      Timed Treatment: 45     mins   Total Treatment:   45     mins

## 2020-10-22 ENCOUNTER — TREATMENT (OUTPATIENT)
Dept: PHYSICAL THERAPY | Facility: CLINIC | Age: 60
End: 2020-10-22

## 2020-10-22 DIAGNOSIS — M77.8 TENDINITIS OF LEFT SHOULDER: Primary | ICD-10-CM

## 2020-10-22 DIAGNOSIS — M25.512 LEFT SHOULDER PAIN, UNSPECIFIED CHRONICITY: ICD-10-CM

## 2020-10-22 PROCEDURE — 97110 THERAPEUTIC EXERCISES: CPT | Performed by: PHYSICAL THERAPIST

## 2020-10-22 PROCEDURE — 97140 MANUAL THERAPY 1/> REGIONS: CPT | Performed by: PHYSICAL THERAPIST

## 2020-10-22 NOTE — PROGRESS NOTES
Physical Therapy Daily Progress Note    Patient: Prashant Zhou   : 1960  Referring practitioner: Giancarlo Brink I*  Today's Date: 10/22/2020    VISIT#: 17    Subjective Evaluation    History of Present Illness  Mechanism of injury: Pt reports he does not have anything new to report other than he had a bad day on Tuesday due to babysitting 2 toddlers and an infant.  He had soreness afterwards    Pain  Current pain ratin         Objective     See Exercise, Manual, and Modality Logs for complete treatment.   Cont with man therapy and there ex as noted; pt able to progress with some ex today  At first was tight with scap mobs but loosened up quickly      Assessment & Plan     Assessment  Assessment details: fco well today despite inc soreness for the past couple of days          Progress per Plan of Care            Timed:         Manual Therapy: 20        mins  48243;     Therapeutic Exercise:  25       mins  46943;     Neuromuscular Damian:        mins  50356;    Therapeutic Activity:          mins  01699;     Gait Training:           mins  95472;     Ultrasound:          mins  82164;    Ionto                                   mins   17185  Self Care                            mins   60837  Canalith Repos                   mins  4209    Un-Timed:  Electrical Stimulation:         mins  43095 ( );  Dry Needling          mins self-pay  Traction          mins 88088  Low Eval          Mins  40003  Mod Eval          Mins  24628  High Eval                            Mins  03437  Re-Eval                               mins  26786    Timed Treatment:  45    mins   Total Treatment:    45    mins    Avis Baca PT  Physical Therapist

## 2020-10-26 ENCOUNTER — TREATMENT (OUTPATIENT)
Dept: PHYSICAL THERAPY | Facility: CLINIC | Age: 60
End: 2020-10-26

## 2020-10-26 DIAGNOSIS — M25.512 LEFT SHOULDER PAIN, UNSPECIFIED CHRONICITY: ICD-10-CM

## 2020-10-26 DIAGNOSIS — M77.8 TENDINITIS OF LEFT SHOULDER: Primary | ICD-10-CM

## 2020-10-26 PROCEDURE — 97140 MANUAL THERAPY 1/> REGIONS: CPT | Performed by: PHYSICAL THERAPIST

## 2020-10-26 PROCEDURE — 97110 THERAPEUTIC EXERCISES: CPT | Performed by: PHYSICAL THERAPIST

## 2020-10-26 NOTE — PROGRESS NOTES
Physical Therapy Daily Progress Note    Patient: Prashant Zhou   : 1960  Referring practitioner: Giancarlo Brink I*  Today's Date: 10/26/2020    VISIT#: 18    Subjective Evaluation    History of Present Illness  Mechanism of injury: Pt is still sore but not necessarily from babysitting; he worked over the weekend and used his arm quite a bit.  Sleep is going well and he does not wake up due to shoulder pain    Pain  Current pain ratin         Objective     See Exercise, Manual, and Modality Logs for complete treatment.     Patient Education:    Assessment & Plan     Assessment  Assessment details: Pt tolerated well and was able to inc difficulty with some exercises today          Progress per Plan of Care            Timed:         Manual Therapy:  10       mins  72863;     Therapeutic Exercise:   35      mins  41140;     Neuromuscular Damian:        mins  67848;    Therapeutic Activity:          mins  79874;     Gait Training:           mins  87314;     Ultrasound:          mins  26033;    Ionto                                   mins   96270  Self Care                            mins   27354  Canalith Repos                   mins  4209    Un-Timed:  Electrical Stimulation:         mins  79213 ( );  Dry Needling          mins self-pay  Traction          mins 64424  Low Eval          Mins  02920  Mod Eval          Mins  20956  High Eval                            Mins  50638  Re-Eval                               mins  25798    Timed Treatment: 45     mins   Total Treatment:   45     mins    Avis Baca PT  Physical Therapist

## 2020-10-29 ENCOUNTER — TREATMENT (OUTPATIENT)
Dept: PHYSICAL THERAPY | Facility: CLINIC | Age: 60
End: 2020-10-29

## 2020-10-29 DIAGNOSIS — M25.512 LEFT SHOULDER PAIN, UNSPECIFIED CHRONICITY: ICD-10-CM

## 2020-10-29 DIAGNOSIS — M77.8 TENDINITIS OF LEFT SHOULDER: Primary | ICD-10-CM

## 2020-10-29 PROCEDURE — 97110 THERAPEUTIC EXERCISES: CPT | Performed by: PHYSICAL THERAPIST

## 2020-10-29 PROCEDURE — 97140 MANUAL THERAPY 1/> REGIONS: CPT | Performed by: PHYSICAL THERAPIST

## 2020-10-29 NOTE — PROGRESS NOTES
Physical Therapy Daily Progress Note    Patient: Prashant Zhou   : 1960  Referring practitioner: Giancarlo Brink I*  Today's Date: 10/29/2020    VISIT#: 19    Subjective Evaluation    History of Present Illness  Mechanism of injury: Pt reports nothing new from last session    Pain  Current pain ratin         Objective     See Exercise, Manual, and Modality Logs for complete treatment.     Patient Education:    Assessment & Plan     Assessment  Assessment details: Was able to progress today with some ex without inc pain but they were challenging and he needed extra rest breaks.        Progress per Plan of Care            Timed:         Manual Therapy: 12        mins  88035;     Therapeutic Exercise: 32        mins  16310;     Neuromuscular Damian:        mins  79393;    Therapeutic Activity:          mins  25618;     Gait Training:           mins  26915;     Ultrasound:          mins  08161;    Ionto                                   mins   68730  Self Care                            mins   00016  Canalith Repos                   mins  4209    Un-Timed:  Electrical Stimulation:         mins  05971 ( );  Dry Needling          mins self-pay  Traction          mins 02304  Low Eval          Mins  89406  Mod Eval          Mins  37258  High Eval                            Mins  68619  Re-Eval                               mins  36418    Timed Treatment: 44     mins   Total Treatment:   44     mins    Avis Baca PT  Physical Therapist

## 2020-11-02 ENCOUNTER — TREATMENT (OUTPATIENT)
Dept: PHYSICAL THERAPY | Facility: CLINIC | Age: 60
End: 2020-11-02

## 2020-11-02 DIAGNOSIS — M77.8 TENDINITIS OF LEFT SHOULDER: Primary | ICD-10-CM

## 2020-11-02 DIAGNOSIS — M25.512 LEFT SHOULDER PAIN, UNSPECIFIED CHRONICITY: ICD-10-CM

## 2020-11-02 PROCEDURE — 97110 THERAPEUTIC EXERCISES: CPT | Performed by: PHYSICAL THERAPIST

## 2020-11-02 PROCEDURE — 97140 MANUAL THERAPY 1/> REGIONS: CPT | Performed by: PHYSICAL THERAPIST

## 2020-11-02 NOTE — PROGRESS NOTES
Physical Therapy Daily Progress Note    Patient: Prashant Zhou   : 1960  Referring practitioner: Giancarlo Brink I*  Today's Date: 2020    VISIT#: 20    Subjective Evaluation    History of Present Illness  Mechanism of injury: Sore today but is not sure why.      Pain  Current pain ratin       Objective   Subjective Questionnaire: QuickDASH: ADL:  20%;  Work:  38%    See Exercise, Manual, and Modality Logs for complete treatment.   Cont with man there and there ex as noted, progressing with some without inc pain     Assessment & Plan     Assessment  Assessment details: Pt fco well today despite soreness at the beginning      STGs:  6 weeks  1.  Pt to tolerate initial HEP without inc pain.  MET  2.  Pt to tolerate advancement of HEP without inc pain.  MET  3.  Pt to report pain has decreased by at least 25%.  MET  4.  Pt to improve  AROM of left shoulder flex to at least 150 deg and scaption to 140 deg.  5.  Pt to sit with proper posture without cues required.  MET      LTGs:  By DC  1.  Pt to be (I) with HEP.  2.  Pt to report pain has decreased by at least 75% to allow better tolerance to and performance of normal daily activities.   MET  3.  Pt to improve AROM of left shoulder  to WFL/WNL to allow normal performance of daily activities.  4.  Pt to improve strength of left shoulder  to 4+/5 or better to be adequate for completion/performance of normal daily tasks.  5.  Pt to exhibit improved function as indicated by improved score on DASH vs score at eval.  MET  6.  Pt to demonstrate (I) and appropriate use of body mechanics for daily home and/or work tasks.    Progress per Plan of Care        Timed:         Manual Therapy: 10        mins  17103;     Therapeutic Exercise:  35       mins  15026;     Neuromuscular Damian:        mins  72772;    Therapeutic Activity:          mins  04362;     Gait Training:           mins  39825;     Ultrasound:          mins  32484;    Ionto                                    mins   63192  Self Care                            mins   82459  Canalith Repos                   mins  4209    Un-Timed:  Electrical Stimulation:         mins  01786 ( );  Dry Needling          mins self-pay  Traction          mins 38377  Low Eval          Mins  27342  Mod Eval          Mins  80746  High Eval                            Mins  81492  Re-Eval                               mins  83900    Timed Treatment:  45    mins   Total Treatment:    45    mins    Avis Baca PT  Physical Therapist

## 2020-11-05 ENCOUNTER — TREATMENT (OUTPATIENT)
Dept: PHYSICAL THERAPY | Facility: CLINIC | Age: 60
End: 2020-11-05

## 2020-11-05 DIAGNOSIS — M25.512 LEFT SHOULDER PAIN, UNSPECIFIED CHRONICITY: ICD-10-CM

## 2020-11-05 DIAGNOSIS — M77.8 TENDINITIS OF LEFT SHOULDER: Primary | ICD-10-CM

## 2020-11-05 PROCEDURE — 97110 THERAPEUTIC EXERCISES: CPT | Performed by: PHYSICAL THERAPIST

## 2020-11-05 PROCEDURE — 97140 MANUAL THERAPY 1/> REGIONS: CPT | Performed by: PHYSICAL THERAPIST

## 2020-11-05 NOTE — PROGRESS NOTES
Physical Therapy Daily Progress Note    Patient: Prashant Zhou   : 1960  Referring practitioner: Giancarlo Brink I*  Today's Date: 2020    VISIT#: 21    Subjective Evaluation    History of Present Illness  Mechanism of injury: Nothing new to report; cont with ex at home as instructed    Pain  Current pain ratin         Objective     See Exercise, Manual, and Modality Logs for complete treatment.     Assessment & Plan     Assessment  Assessment details: fco well with today's ex/activities and had fatigue but no inc pain          Progress per Plan of Care            Timed:         Manual Therapy:  10       mins  71830;     Therapeutic Exercise:  30       mins  90029;     Neuromuscular Damian:        mins  43924;    Therapeutic Activity:          mins  54386;     Gait Training:           mins  01958;     Ultrasound:          mins  89458;    Ionto                                   mins   71797  Self Care                            mins   06857  Canalith Repos                   mins  4209    Un-Timed:  Electrical Stimulation:         mins  63994 ( );  Dry Needling          mins self-pay  Traction          mins 88307  Low Eval          Mins  50751  Mod Eval          Mins  94419  High Eval                            Mins  66954  Re-Eval                               mins  28914    Timed Treatment: 40     mins   Total Treatment:   40     mins    Avis Baca PT  Physical Therapist

## 2020-11-09 ENCOUNTER — TREATMENT (OUTPATIENT)
Dept: PHYSICAL THERAPY | Facility: CLINIC | Age: 60
End: 2020-11-09

## 2020-11-09 DIAGNOSIS — M25.512 LEFT SHOULDER PAIN, UNSPECIFIED CHRONICITY: ICD-10-CM

## 2020-11-09 DIAGNOSIS — M77.8 TENDINITIS OF LEFT SHOULDER: Primary | ICD-10-CM

## 2020-11-09 PROCEDURE — 97110 THERAPEUTIC EXERCISES: CPT | Performed by: PHYSICAL THERAPIST

## 2020-11-09 PROCEDURE — 97140 MANUAL THERAPY 1/> REGIONS: CPT | Performed by: PHYSICAL THERAPIST

## 2020-11-09 NOTE — PROGRESS NOTES
Physical Therapy Daily Progress Note    Patient: Prashant Zhou   : 1960  Referring practitioner: Giancarlo Brink I*  Today's Date: 2020    VISIT#: 22    Subjective Evaluation    History of Present Illness  Mechanism of injury: Pt has been able to work overhead some with lowering boxes down from a shelf (maybe weighed 15# or so) and it did not hurt.      Pain  Current pain ratin         Objective     See Exercise, Manual, and Modality Logs for complete treatment.     Cont with man therapy and there ex as noted; able to progress with some exercises today      Assessment & Plan     Assessment  Assessment details: fco well with ex today and able to progress with some today          Progress per Plan of Care          Timed:         Manual Therapy:  10       mins  73904;     Therapeutic Exercise:  35       mins  32893;     Neuromuscular Damian:        mins  83745;    Therapeutic Activity:          mins  10286;     Gait Training:           mins  37490;     Ultrasound:          mins  48747;    Ionto                                   mins   34086  Self Care                            mins   62165  Canalith Repos                   mins  4209    Un-Timed:  Electrical Stimulation:         mins  09554 ( );  Traction          mins 06393  Low Eval          Mins  83629  Mod Eval          Mins  09054  High Eval                            Mins  42725  Re-Eval                               mins  64685    Timed Treatment: 45     mins   Total Treatment:   45     mins    Avis Baca PT  Physical Therapist

## 2020-11-12 ENCOUNTER — TREATMENT (OUTPATIENT)
Dept: PHYSICAL THERAPY | Facility: CLINIC | Age: 60
End: 2020-11-12

## 2020-11-12 DIAGNOSIS — M77.8 TENDINITIS OF LEFT SHOULDER: Primary | ICD-10-CM

## 2020-11-12 DIAGNOSIS — M25.512 LEFT SHOULDER PAIN, UNSPECIFIED CHRONICITY: ICD-10-CM

## 2020-11-12 PROCEDURE — 97140 MANUAL THERAPY 1/> REGIONS: CPT | Performed by: PHYSICAL THERAPIST

## 2020-11-12 PROCEDURE — 97110 THERAPEUTIC EXERCISES: CPT | Performed by: PHYSICAL THERAPIST

## 2020-11-12 NOTE — PROGRESS NOTES
Physical Therapy Daily Progress Note    Patient: Prashant Zhou   : 1960  Referring practitioner: Giancarlo Brink I*  Today's Date: 2020    VISIT#: 23    Subjective Evaluation    History of Present Illness  Mechanism of injury: Pt has felt sore since doing harder ex on machine and he thinks it was the gripping on the weights.    Pain  Current pain ratin         Objective     See Exercise, Manual, and Modality Logs for complete treatment.   Cont with there ex and man therapy as noted; pt progressed several exercisers with some weight today    Patient Education:    Assessment & Plan     Assessment  Assessment details: Some progression of weight on some ex today with pt having a lot of fatigue after           Progress per Plan of Care            Timed:         Manual Therapy:  10       mins  80121;     Therapeutic Exercise:  30       mins  65074;     Neuromuscular Damian:        mins  97130;    Therapeutic Activity:          mins  83298;     Gait Training:           mins  26192;     Ultrasound:          mins  41910;    Ionto                                   mins   35121  Self Care                            mins   69585  Canalith Repos                   mins  4209    Un-Timed:  Electrical Stimulation:         mins  80565 ( );  Traction         mins 12370  Low Eval          Mins  72914  Mod Eval          Mins  29683  High Eval                            Mins  59731  Re-Eval                               mins  98969    Timed Treatment: 40     mins   Total Treatment:   40    mins    Avis Baca PT  Physical Therapist

## 2020-11-16 ENCOUNTER — TREATMENT (OUTPATIENT)
Dept: PHYSICAL THERAPY | Facility: CLINIC | Age: 60
End: 2020-11-16

## 2020-11-16 DIAGNOSIS — M25.512 LEFT SHOULDER PAIN, UNSPECIFIED CHRONICITY: ICD-10-CM

## 2020-11-16 DIAGNOSIS — M77.8 TENDINITIS OF LEFT SHOULDER: Primary | ICD-10-CM

## 2020-11-16 PROCEDURE — 97110 THERAPEUTIC EXERCISES: CPT | Performed by: PHYSICAL THERAPIST

## 2020-11-16 PROCEDURE — 97140 MANUAL THERAPY 1/> REGIONS: CPT | Performed by: PHYSICAL THERAPIST

## 2020-11-16 NOTE — PROGRESS NOTES
Physical Therapy Daily Progress Note    Patient: Prashant Zhou   : 1960  Referring practitioner: Giancarlo Brink I*  Today's Date: 2020    VISIT#: 24    Subjective Evaluation    History of Present Illness  Mechanism of injury: Pt reports he did fine after last session with inc weights and reps on several exercises.  He did ice at home after session and did not have much soreness and did not have inc pain at all.     Pain  Current pain ratin       Objective     See Exercise, Manual, and Modality Logs for complete treatment.   Cont with man and there ex as noted today, progressing with any that are getting less challenging; some inc tightness noted today in upper scapula but loosened up pretty quickly with mobilizations    Patient Education:  Cont with strengthening and stretching at home as instructed; let us know when any are getting easier so we can inc weight as able    Assessment & Plan     Assessment  Assessment details: fco well overall with some inc tightness noted with upper scapula today that loosened pretty quickly.        Progress per Plan of Care        Timed:         Manual Therapy: 10        mins  99041;     Therapeutic Exercise:  35       mins  39512;     Neuromuscular Damian:        mins  59311;    Therapeutic Activity:          mins  12155;     Gait Training:           mins  17617;     Ultrasound:          mins  92611;    Ionto                                   mins   03087  Self Care                            mins   34391  Canalith Repos                   mins  4209    Un-Timed:  Electrical Stimulation:         mins  98588 ( );  Traction          mins 84390  Low Eval          Mins  62061  Mod Eval          Mins  71127  High Eval                            Mins  44532  Re-Eval                               mins  79696    Timed Treatment: 45     mins   Total Treatment:   45     mins    Avis Baca PT  Physical Therapist

## 2020-11-19 ENCOUNTER — TREATMENT (OUTPATIENT)
Dept: PHYSICAL THERAPY | Facility: CLINIC | Age: 60
End: 2020-11-19

## 2020-11-19 DIAGNOSIS — M25.512 LEFT SHOULDER PAIN, UNSPECIFIED CHRONICITY: ICD-10-CM

## 2020-11-19 DIAGNOSIS — M77.8 TENDINITIS OF LEFT SHOULDER: Primary | ICD-10-CM

## 2020-11-19 PROCEDURE — 97110 THERAPEUTIC EXERCISES: CPT | Performed by: PHYSICAL THERAPIST

## 2020-11-19 PROCEDURE — 97140 MANUAL THERAPY 1/> REGIONS: CPT | Performed by: PHYSICAL THERAPIST

## 2020-11-19 NOTE — PROGRESS NOTES
Physical Therapy Daily Progress Note    Patient: Prashant Zhou   : 1960  Referring practitioner: Giancarlo Brink I*  Today's Date: 2020    VISIT#: 25    Subjective Evaluation    History of Present Illness  Mechanism of injury: Doing well with exercises at home          Objective     See Exercise, Manual, and Modality Logs for complete treatment.   Scapula not tight today but did cont with stretching to achieve full ROM    Patient Education:    Assessment & Plan     Assessment  Assessment details: Performed well today with all exercises without inc pain        Progress per Plan of Care          Timed:         Manual Therapy: 10        mins  09376;     Therapeutic Exercise:  35      mins  16634;     Neuromuscular Damian:        mins  83962;    Therapeutic Activity:          mins  23717;     Gait Training:           mins  56935;     Ultrasound:          mins  77635;    Ionto                                   mins   86911  Self Care                            mins   72604  Canalith Repos                   mins  4209    Un-Timed:  Electrical Stimulation:         mins  24349 ( );  Traction          mins 75451  Low Eval          Mins  87732  Mod Eval          Mins  68727  High Eval                            Mins  18524  Re-Eval                               mins  70565    Timed Treatment: 45     mins   Total Treatment:   45     mins    Avis Baca, PT  Physical Therapist

## 2020-11-23 ENCOUNTER — TREATMENT (OUTPATIENT)
Dept: PHYSICAL THERAPY | Facility: CLINIC | Age: 60
End: 2020-11-23

## 2020-11-23 DIAGNOSIS — M77.8 TENDINITIS OF LEFT SHOULDER: Primary | ICD-10-CM

## 2020-11-23 DIAGNOSIS — M25.512 LEFT SHOULDER PAIN, UNSPECIFIED CHRONICITY: ICD-10-CM

## 2020-11-23 PROCEDURE — 97140 MANUAL THERAPY 1/> REGIONS: CPT | Performed by: PHYSICAL THERAPIST

## 2020-11-23 PROCEDURE — 97110 THERAPEUTIC EXERCISES: CPT | Performed by: PHYSICAL THERAPIST

## 2020-11-23 NOTE — PROGRESS NOTES
Physical Therapy Daily Progress Note    Patient: Prashant Zhou   : 1960  Referring practitioner: Giancarlo Brink I*  Today's Date: 2020    VISIT#: 26    Subjective Evaluation    History of Present Illness  Mechanism of injury: Pt reports he had a rough day yesterday as he pushed himself pretty hard with exercises and he has been putting up [employer] lights over the weekend.  His whole arm was sore just from general activity.    Pain  Current pain ratin       Objective     See Exercise, Manual, and Modality Logs for complete treatment.   Cont with there ex and manual therapy as noted; pt was tighter today with PROM flexion vs last session      Assessment & Plan     Assessment  Assessment details: fco well today with all exercises; had fatigue after but no inc pain during or after.      Progress per Plan of Care        Timed:         Manual Therapy: 10        mins  39676;     Therapeutic Exercise:  35       mins  46395;     Neuromuscular Damian:        mins  46569;    Therapeutic Activity:          mins  93684;     Gait Training:           mins  50651;     Ultrasound:          mins  18314;    Ionto                                   mins   39191  Self Care                            mins   56469  Canalith Repos                   mins  4209    Un-Timed:  Electrical Stimulation:         mins  27390 ( );  Traction          mins 38277  Low Eval          Mins  21303  Mod Eval          Mins  81117  High Eval                            Mins  64842  Re-Eval                               mins  26835    Timed Treatment: 45     mins   Total Treatment:    45    mins    Avis Baca PT  Physical Therapist

## 2020-11-30 ENCOUNTER — TREATMENT (OUTPATIENT)
Dept: PHYSICAL THERAPY | Facility: CLINIC | Age: 60
End: 2020-11-30

## 2020-11-30 DIAGNOSIS — M25.512 LEFT SHOULDER PAIN, UNSPECIFIED CHRONICITY: ICD-10-CM

## 2020-11-30 DIAGNOSIS — M77.8 TENDINITIS OF LEFT SHOULDER: Primary | ICD-10-CM

## 2020-11-30 PROCEDURE — 97140 MANUAL THERAPY 1/> REGIONS: CPT | Performed by: PHYSICAL THERAPIST

## 2020-11-30 PROCEDURE — 97110 THERAPEUTIC EXERCISES: CPT | Performed by: PHYSICAL THERAPIST

## 2020-11-30 NOTE — PROGRESS NOTES
Physical Therapy Daily Progress Note    Patient: Prashant Zhou   : 1960  Referring practitioner: Giancarlo Brink I*  Today's Date: 2020    VISIT#: 27    Subjective Evaluation    History of Present Illness  Mechanism of injury: Pt reports he kind of took it easy this weekend and is not really sore today    Pain  Current pain ratin       Objective     See Exercise, Manual, and Modality Logs for complete treatment.   Cont with man and there ex as noted    Patient Education:  Cont with all ex at home, progress as able with more sets or inc weight    Assessment & Plan     Assessment  Assessment details: No soreness today upon arrival and good tolerance to all ex/act today      Progress per Plan of Care          Timed:         Manual Therapy: 12        mins  09063;     Therapeutic Exercise:  32       mins  52529;     Neuromuscular Damian:        mins  84340;    Therapeutic Activity:          mins  31447;     Gait Training:           mins  96844;     Ultrasound:          mins  22862;    Ionto                                   mins   94704  Self Care                            mins   69230  Canalith Repos                   mins  4209    Un-Timed:  Electrical Stimulation:         mins  24493 ( );  Traction          mins 63401  Low Eval          Mins  76810  Mod Eval          Mins  37416  High Eval                            Mins  28279  Re-Eval                               mins  08829    Timed Treatment: 44     mins   Total Treatment:   44     mins    Avis Baca PT  Physical Therapist

## 2020-12-03 ENCOUNTER — TREATMENT (OUTPATIENT)
Dept: PHYSICAL THERAPY | Facility: CLINIC | Age: 60
End: 2020-12-03

## 2020-12-03 DIAGNOSIS — M77.8 TENDINITIS OF LEFT SHOULDER: Primary | ICD-10-CM

## 2020-12-03 DIAGNOSIS — M25.512 LEFT SHOULDER PAIN, UNSPECIFIED CHRONICITY: ICD-10-CM

## 2020-12-03 PROCEDURE — 97110 THERAPEUTIC EXERCISES: CPT | Performed by: PHYSICAL THERAPIST

## 2020-12-03 PROCEDURE — 97140 MANUAL THERAPY 1/> REGIONS: CPT | Performed by: PHYSICAL THERAPIST

## 2020-12-03 NOTE — PROGRESS NOTES
Physical Therapy Daily Progress Note    Patient: Prashant Zhou   : 1960  Referring practitioner: Giancarlo Brink I*  Today's Date: 12/3/2020    VISIT#: 28    Subjective Evaluation    History of Present Illness  Mechanism of injury: Pt reports he has been busy again and he is a little sore today         Objective     See Exercise, Manual, and Modality Logs for complete treatment.     Cont with there ex and man therapy as noted;  Pt required cues for preventing compensation when fatigued during [facility] ER    Assessment/Plan      Progress per Plan of Care        Timed:         Manual Therapy:  10       mins  28316;     Therapeutic Exercise:  35       mins  63089;     Neuromuscular Damian:        mins  60656;    Therapeutic Activity:          mins  48738;     Gait Training:           mins  28670;     Ultrasound:          mins  04714;    Ionto                                   mins   59426  Self Care                            mins   48113  Canalith Repos                   mins  4209    Un-Timed:  Electrical Stimulation:         mins  65610 ( );  Traction          mins 55598  Low Eval          Mins  99649  Mod Eval          Mins  42073  High Eval                            Mins  94726  Re-Eval                               mins  21066    Timed Treatment: 45     mins   Total Treatment:   45     mins    Avis Baca, PT  Physical Therapist

## 2020-12-05 DIAGNOSIS — E11.65 TYPE 2 DIABETES MELLITUS WITH HYPERGLYCEMIA, WITHOUT LONG-TERM CURRENT USE OF INSULIN (HCC): ICD-10-CM

## 2020-12-05 RX ORDER — GLIMEPIRIDE 4 MG/1
TABLET ORAL
Qty: 60 TABLET | Refills: 1 | Status: SHIPPED | OUTPATIENT
Start: 2020-12-05 | End: 2021-01-13

## 2020-12-07 ENCOUNTER — TREATMENT (OUTPATIENT)
Dept: PHYSICAL THERAPY | Facility: CLINIC | Age: 60
End: 2020-12-07

## 2020-12-07 DIAGNOSIS — M25.512 LEFT SHOULDER PAIN, UNSPECIFIED CHRONICITY: ICD-10-CM

## 2020-12-07 DIAGNOSIS — M77.8 TENDINITIS OF LEFT SHOULDER: Primary | ICD-10-CM

## 2020-12-07 PROCEDURE — 97140 MANUAL THERAPY 1/> REGIONS: CPT | Performed by: PHYSICAL THERAPIST

## 2020-12-07 PROCEDURE — 97110 THERAPEUTIC EXERCISES: CPT | Performed by: PHYSICAL THERAPIST

## 2020-12-07 NOTE — PROGRESS NOTES
Physical Therapy Daily Progress Note    Patient: Prashant Zhou   : 1960  Referring practitioner: Giancarlo Brink I*  Today's Date: 2020    VISIT#: 29    Subjective Evaluation    History of Present Illness  Mechanism of injury: Pt reports he was busy over the weekend and used his arm quite a bit.  He is stiff this morning but is not in any more pain.    Pain  Current pain ratin       Subjective Questionnaire: QuickDASH: ADL:  20%;  Work:  50%  Pt's score on Work subsection demonstrates worsening situation due to pt has only recently been doing enough of his normal work to realize how much difficulty he is having       Objective     See Exercise, Manual, and Modality Logs for complete treatment.   Cont with man therapy and there ex as noted    Re-assessed for Medicare visit #30 today    AROM:  Flex:  150                Ext:  50                Scap:  150                ER:  50 at neutral                IR: BTB:  sacrum    Strength:  Flex:  4+                   Ext:  5                   Scap:  4-                   ER:  4-/4                   IR:  4    Patient Education:    Assessment & Plan     Assessment  Assessment details: Pt has improved with AROM and strength overall with all motions, with rotations lagging behind as is normal for a RTC tendonitis/small tear.  He has steadily gained function and continues to demonstrate positive changes in all aspects.  He would benefit from continued skilled PT to normalize ROM and strength and maximize function.         STGs:  6 weeks  1.  Pt to tolerate initial HEP without inc pain.  MET  2.  Pt to tolerate advancement of HEP without inc pain.  MET  3.  Pt to report pain has decreased by at least 25%.  MET  4.  Pt to improve  AROM of left shoulder flex to at least 150 deg and scaption to 140 deg.  MET  5.  Pt to sit with proper posture without cues required.  MET      LTGs:  By DC  1.  Pt to be (I) with HEP.  2.  Pt to report pain has decreased by at  least 75% to allow better tolerance to and performance of normal daily activities.   MET  3.  Pt to improve AROM of left shoulder  to WFL/WNL to allow normal performance of daily activities.  4.  Pt to improve strength of left shoulder  to 4+/5 or better to be adequate for completion/performance of normal daily tasks.  5.  Pt to exhibit improved function as indicated by improved score on DASH vs score at eval.  MET  6.  Pt to demonstrate (I) and appropriate use of body mechanics for daily home and/or work tasks.  MET      Progress per Plan of Care            Timed:         Manual Therapy: 10        mins  60118;     Therapeutic Exercise:   35      mins  17198;     Neuromuscular Damian:        mins  74356;    Therapeutic Activity:          mins  04620;     Gait Training:           mins  19905;     Ultrasound:          mins  35956;    Ionto                                   mins   83086  Self Care                            mins   95115  Canalith Repos                   mins  4209    Un-Timed:  Electrical Stimulation:         mins  61414 ( );  Traction          mins 45716  Low Eval          Mins  58520  Mod Eval          Mins  80790  High Eval                            Mins  91487  Re-Eval                               mins  70787    Timed Treatment: 45     mins   Total Treatment:   45     mins    Avis Baca PT  Physical Therapist

## 2020-12-10 ENCOUNTER — TREATMENT (OUTPATIENT)
Dept: PHYSICAL THERAPY | Facility: CLINIC | Age: 60
End: 2020-12-10

## 2020-12-10 DIAGNOSIS — M77.8 TENDINITIS OF LEFT SHOULDER: Primary | ICD-10-CM

## 2020-12-10 DIAGNOSIS — M25.512 LEFT SHOULDER PAIN, UNSPECIFIED CHRONICITY: ICD-10-CM

## 2020-12-10 PROCEDURE — 97110 THERAPEUTIC EXERCISES: CPT | Performed by: PHYSICAL THERAPIST

## 2020-12-10 PROCEDURE — 97140 MANUAL THERAPY 1/> REGIONS: CPT | Performed by: PHYSICAL THERAPIST

## 2020-12-10 NOTE — PROGRESS NOTES
Physical Therapy Daily Progress Note    Patient: Prashant Zhou   : 1960  Referring practitioner: Giancarlo Brink I*  Today's Date: 12/10/2020    VISIT#: 30    Subjective Evaluation    History of Present Illness  Mechanism of injury: Pt reports he is doing well with everything.  When he does more at home he will have inc pain if he moves the wrong way but he mostly gets fatigued now vs sore or tight from activity.      Pain  Current pain ratin         Objective     See Exercise, Manual, and Modality Logs for complete treatment.     Cont with man there and there ex as noted      Assessment & Plan     Assessment  Assessment details: Tolerated well today; some tightness noted with ER at 90 deg with PROM today      Progress per Plan of Care          Timed:         Manual Therapy: 10        mins  55422;     Therapeutic Exercise:  35       mins  07182;     Neuromuscular Damina:        mins  37504;    Therapeutic Activity:          mins  14503;     Gait Training:           mins  19699;     Ultrasound:          mins  30946;    Ionto                                   mins   49249  Self Care                            mins   24003  Canalith Repos                   mins  4209    Un-Timed:  Electrical Stimulation:         mins  99298 ( );  Traction          mins 83581  Low Eval          Mins  86578  Mod Eval          Mins  66792  High Eval                            Mins  15902  Re-Eval                               mins  47450    Timed Treatment: 45     mins   Total Treatment:   45     mins    Avis Baca PT  Physical Therapist

## 2020-12-14 ENCOUNTER — TREATMENT (OUTPATIENT)
Dept: PHYSICAL THERAPY | Facility: CLINIC | Age: 60
End: 2020-12-14

## 2020-12-14 DIAGNOSIS — M77.8 TENDINITIS OF LEFT SHOULDER: Primary | ICD-10-CM

## 2020-12-14 DIAGNOSIS — M25.512 LEFT SHOULDER PAIN, UNSPECIFIED CHRONICITY: ICD-10-CM

## 2020-12-14 PROCEDURE — 97140 MANUAL THERAPY 1/> REGIONS: CPT | Performed by: PHYSICAL THERAPIST

## 2020-12-14 PROCEDURE — 97110 THERAPEUTIC EXERCISES: CPT | Performed by: PHYSICAL THERAPIST

## 2020-12-14 NOTE — PROGRESS NOTES
Physical Therapy Daily Progress Note    Patient: Prashant Zhou   : 1960  Referring practitioner: Giancarlo Brink I*  Today's Date: 2020    VISIT#: 31    Subjective Evaluation    History of Present Illness  Mechanism of injury: Pt reports he increased the band over the weekend (up to blue) and it was hard but he was able to do it.  He is going back to his normal sleep positions and it is causing some inc pain.      Pain  Current pain ratin       Objective     See Exercise, Manual, and Modality Logs for complete treatment.     Cont with man there and there ex as noted; progressed with some today    Patient Education:  Cont with ex at home but do not do more than 3 sets of 10 of any one exercise    Assessment & Plan     Assessment  Assessment details: fco well with all ex today and was able to progress some today      Progress per Plan of Care          Timed:         Manual Therapy: 10        mins  31298;     Therapeutic Exercise:  35       mins  09592;     Neuromuscular Damian:        mins  03689;    Therapeutic Activity:          mins  07946;     Gait Training:           mins  61920;     Ultrasound:          mins  06401;    Ionto                                   mins   03835  Self Care                            mins   51176  Canalith Repos                   mins  4209    Un-Timed:  Electrical Stimulation:         mins  55566 ( );  Traction          mins 55524  Low Eval          Mins  75014  Mod Eval          Mins  61945  High Eval                            Mins  54317  Re-Eval                               mins  79576    Timed Treatment: 45     mins   Total Treatment:  45      mins    Avis Baca PT  Physical Therapist

## 2020-12-17 ENCOUNTER — TREATMENT (OUTPATIENT)
Dept: PHYSICAL THERAPY | Facility: CLINIC | Age: 60
End: 2020-12-17

## 2020-12-17 DIAGNOSIS — M77.8 TENDINITIS OF LEFT SHOULDER: Primary | ICD-10-CM

## 2020-12-17 DIAGNOSIS — M25.512 LEFT SHOULDER PAIN, UNSPECIFIED CHRONICITY: ICD-10-CM

## 2020-12-17 PROCEDURE — 97110 THERAPEUTIC EXERCISES: CPT | Performed by: PHYSICAL THERAPIST

## 2020-12-17 PROCEDURE — 97140 MANUAL THERAPY 1/> REGIONS: CPT | Performed by: PHYSICAL THERAPIST

## 2020-12-17 NOTE — PROGRESS NOTES
Physical Therapy Daily Progress Note    Patient: Prashant Zhou   : 1960  Referring practitioner: Giancarlo Brink I*  Today's Date: 2020    VISIT#: 32    Subjective Evaluation    History of Present Illness  Mechanism of injury: Pt reports he is doing well with all exercises; he dec to only 3 sets of 10 at home and knows he needs to inc the resistance vs number of sets when they get easy.  He requested the next band after blue today.  He always gets more pain on the days he keeps his grandkids.      Pain  Current pain ratin       Objective     See Exercise, Manual, and Modality Logs for complete treatment.   Cont with man therapy and there ex as noted     Assessment & Plan     Assessment  Assessment details: fco well with all ex today; able to progress with some more ex today      Progress per Plan of Care        Timed:         Manual Therapy:   15      mins  43459;     Therapeutic Exercise:   30      mins  94447;     Neuromuscular Damian:        mins  81269;    Therapeutic Activity:          mins  51509;     Gait Training:           mins  43346;     Ultrasound:          mins  86296;    Ionto                                   mins   02611  Self Care                            mins   24267  Canalith Repos                   mins  4209    Un-Timed:  Electrical Stimulation:         mins  98086 ( );  Traction          mins 15013  Low Eval          Mins  08230  Mod Eval          Mins  14244  High Eval                            Mins  69317  Re-Eval                               mins  58961    Timed Treatment: 45     mins   Total Treatment:   45     mins    Avis Baca PT  Physical Therapist

## 2020-12-21 ENCOUNTER — TREATMENT (OUTPATIENT)
Dept: PHYSICAL THERAPY | Facility: CLINIC | Age: 60
End: 2020-12-21

## 2020-12-21 DIAGNOSIS — M77.8 TENDINITIS OF LEFT SHOULDER: Primary | ICD-10-CM

## 2020-12-21 DIAGNOSIS — M25.512 LEFT SHOULDER PAIN, UNSPECIFIED CHRONICITY: ICD-10-CM

## 2020-12-21 PROCEDURE — 97140 MANUAL THERAPY 1/> REGIONS: CPT | Performed by: PHYSICAL THERAPIST

## 2020-12-21 PROCEDURE — 97530 THERAPEUTIC ACTIVITIES: CPT | Performed by: PHYSICAL THERAPIST

## 2020-12-21 PROCEDURE — 97110 THERAPEUTIC EXERCISES: CPT | Performed by: PHYSICAL THERAPIST

## 2020-12-21 NOTE — PROGRESS NOTES
Physical Therapy Daily Progress Note    Patient: Prashant Zhou   : 1960  Referring practitioner: Giancarlo Brink I*  Today's Date: 2020    VISIT#: 33    Subjective Evaluation    History of Present Illness  Mechanism of injury: Pt reports he changed to the black band over the weekend and did 3 sets of 10 but it is really hard to do.  He is stiff this am from sleeping on his arm but no pain.    Pain  Current pain ratin       Objective     See Exercise, Manual, and Modality Logs for complete treatment.     Added to program today with new stretch for man therapy and new ex for cont strengthening    Patient Education:  Add IR stretch prone at home as much as possible    Assessment & Plan     Assessment  Assessment details: Cont to do well with all man and ex here and progressing well at home with ROM and strength      Progress per Plan of Care        Timed:         Manual Therapy: 16        mins  29287;     Therapeutic Exercise:  20       mins  08367;     Neuromuscular Damian:        mins  04151;    Therapeutic Activity:   10       mins  93587;     Gait Training:           mins  79183;     Ultrasound:          mins  50526;    Ionto                                   mins   68758  Self Care                            mins   23427  Canalith Repos                   mins  4209    Un-Timed:  Electrical Stimulation:         mins  04195 ( );  Traction          mins 56193  Low Eval          Mins  66363  Mod Eval          Mins  13234  High Eval                            Mins  19306  Re-Eval                               mins  12086    Timed Treatment: 46     mins   Total Treatment:   46     mins    Avis Baca PT  Physical Therapist

## 2020-12-28 ENCOUNTER — TREATMENT (OUTPATIENT)
Dept: PHYSICAL THERAPY | Facility: CLINIC | Age: 60
End: 2020-12-28

## 2020-12-28 DIAGNOSIS — M77.8 TENDINITIS OF LEFT SHOULDER: Primary | ICD-10-CM

## 2020-12-28 DIAGNOSIS — M25.512 LEFT SHOULDER PAIN, UNSPECIFIED CHRONICITY: ICD-10-CM

## 2020-12-28 PROCEDURE — 97110 THERAPEUTIC EXERCISES: CPT | Performed by: PHYSICAL THERAPIST

## 2020-12-28 PROCEDURE — 97140 MANUAL THERAPY 1/> REGIONS: CPT | Performed by: PHYSICAL THERAPIST

## 2020-12-28 NOTE — PROGRESS NOTES
Physical Therapy Daily Progress Note    Patient: Prashant Zhou   : 1960  Referring practitioner: Giancarlo Brink I*  Today's Date: 2020    VISIT#: 34    Subjective Evaluation    History of Present Illness  Mechanism of injury: Pt was sore all weekend.  He was thinking he might want to get another shot.  He did his exercises on the weekend but not a much as normal.  He changed back to the blue band from the black one.       Objective     See Exercise, Manual, and Modality Logs for complete treatment.     Cont with man there and there ex as noted; pt tighter with IR and post mobs today      Assessment & Plan     Assessment  Assessment details: fco well today despite inc tightness and inc pain over the weekend      Progress per Plan of Care        Timed:         Manual Therapy:   20      mins  08270;     Therapeutic Exercise:   25      mins  85428;     Neuromuscular Damian:        mins  45807;    Therapeutic Activity:          mins  69023;     Gait Training:           mins  87763;     Ultrasound:          mins  60299;    Ionto                                   mins   17786  Self Care                            mins   04672  Canalith Repos                   mins  4209    Un-Timed:  Electrical Stimulation:         mins  66922 ( );  Traction          mins 30021  Low Eval          Mins  16598  Mod Eval          Mins  78833  High Eval                            Mins  24302  Re-Eval                               mins  55113    Timed Treatment: 45     mins   Total Treatment:   45     mins    Avis Baca PT  Physical Therapist

## 2021-01-04 ENCOUNTER — TREATMENT (OUTPATIENT)
Dept: PHYSICAL THERAPY | Facility: CLINIC | Age: 61
End: 2021-01-04

## 2021-01-04 DIAGNOSIS — M25.512 LEFT SHOULDER PAIN, UNSPECIFIED CHRONICITY: ICD-10-CM

## 2021-01-04 DIAGNOSIS — M77.8 TENDINITIS OF LEFT SHOULDER: Primary | ICD-10-CM

## 2021-01-04 PROCEDURE — 97140 MANUAL THERAPY 1/> REGIONS: CPT | Performed by: PHYSICAL THERAPIST

## 2021-01-04 PROCEDURE — 97110 THERAPEUTIC EXERCISES: CPT | Performed by: PHYSICAL THERAPIST

## 2021-01-04 NOTE — PROGRESS NOTES
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  He is still thinking he would like to get another shot.  He plans to call the MD office but he is not sure when.  The pain got up to about 3/10 brief flashes of pain during some activities but never at rest.    Pain  Current pain ratin       Objective     See Exercise, Manual, and Modality Logs for complete treatment.     Very tight today in all motions and with all mobs and ROM; scapula tighter as well     Patient Education:  Cont with all ex and stretching at home.    Assessment & Plan     Assessment  Assessment details: Tighter today with all mobs and PROM today and required more stretching.       Progress per Plan of Care        Timed:         Manual Therapy: 25       mins  45398;     Therapeutic Exercise:  20       mins  80601;     Neuromuscular Damian:        mins  66435;    Therapeutic Activity:          mins  62862;     Gait Training:           mins  86442;     Ultrasound:          mins  05417;    Ionto                                   mins   08752  Self Care                            mins   92812  Canalith Repos                   mins  4209    Un-Timed:  Electrical Stimulation:         mins  45193 ( );  Traction          mins 47424  Low Eval          Mins  11844  Mod Eval          Mins  12460  High Eval                            Mins  44122  Re-Eval                               mins  76984    Timed Treatment: 45     mins   Total Treatment:   45     mins    Avis Baca PT  Physical Therapist

## 2021-01-07 ENCOUNTER — TREATMENT (OUTPATIENT)
Dept: PHYSICAL THERAPY | Facility: CLINIC | Age: 61
End: 2021-01-07

## 2021-01-07 DIAGNOSIS — M77.8 TENDINITIS OF LEFT SHOULDER: Primary | ICD-10-CM

## 2021-01-07 DIAGNOSIS — M25.512 LEFT SHOULDER PAIN, UNSPECIFIED CHRONICITY: ICD-10-CM

## 2021-01-07 PROCEDURE — 97110 THERAPEUTIC EXERCISES: CPT | Performed by: PHYSICAL THERAPIST

## 2021-01-07 PROCEDURE — 97140 MANUAL THERAPY 1/> REGIONS: CPT | Performed by: PHYSICAL THERAPIST

## 2021-01-07 NOTE — PROGRESS NOTES
Physical Therapy Daily Progress Note    Patient: Prashant Zhou   : 1960  Referring practitioner: Giancarlo Brink I*  Today's Date: 2021    VISIT#: 36    Subjective Evaluation    History of Present Illness  Mechanism of injury: Flared up for some reason over the last couple of weeks    Pain  Current pain ratin         Objective     See Exercise, Manual, and Modality Logs for complete treatment.     Cont with man therapy and there ex today; reversed order of ex today to assess if he does better with less fatigue or it does not make a difference    Patient Education:  Cont with stretching and strengthening at home as much as possible    Assessment & Plan     Assessment  Assessment details: Pt fco well with all ex today; reversing order resulted in better tolerance to all ex today with less fatigue      Progress per Plan of Care        Timed:         Manual Therapy: 20        mins  15150;     Therapeutic Exercise:  25       mins  28465;     Neuromuscular Damian:        mins  58435;    Therapeutic Activity:          mins  87161;     Gait Training:           mins  27281;     Ultrasound:          mins  99365;    Ionto                                   mins   76864  Self Care                            mins   50540  Canalith Repos                   mins  4209    Un-Timed:  Electrical Stimulation:         mins  86812 ( );  Traction          mins 99136  Low Eval          Mins  89537  Mod Eval          Mins  79672  High Eval                            Mins  63505  Re-Eval                               mins  13471    Timed Treatment: 45     mins   Total Treatment:   45     mins    Avis Baca PT  Physical Therapist

## 2021-01-08 RX ORDER — AMLODIPINE BESYLATE 10 MG/1
TABLET ORAL
Qty: 90 TABLET | Refills: 0 | Status: SHIPPED | OUTPATIENT
Start: 2021-01-08 | End: 2021-01-10

## 2021-01-08 RX ORDER — ATORVASTATIN CALCIUM 40 MG/1
TABLET, FILM COATED ORAL
Qty: 90 TABLET | Refills: 0 | Status: SHIPPED | OUTPATIENT
Start: 2021-01-08 | End: 2021-01-10

## 2021-01-08 RX ORDER — LISINOPRIL AND HYDROCHLOROTHIAZIDE 25; 20 MG/1; MG/1
TABLET ORAL
Qty: 90 TABLET | Refills: 3 | Status: SHIPPED | OUTPATIENT
Start: 2021-01-08 | End: 2021-01-10

## 2021-01-10 RX ORDER — ATORVASTATIN CALCIUM 40 MG/1
TABLET, FILM COATED ORAL
Qty: 72 TABLET | Refills: 0 | Status: SHIPPED | OUTPATIENT
Start: 2021-01-10 | End: 2021-10-07

## 2021-01-10 RX ORDER — AMLODIPINE BESYLATE 10 MG/1
TABLET ORAL
Qty: 72 TABLET | Refills: 0 | Status: SHIPPED | OUTPATIENT
Start: 2021-01-10 | End: 2021-10-07

## 2021-01-10 RX ORDER — LISINOPRIL AND HYDROCHLOROTHIAZIDE 25; 20 MG/1; MG/1
TABLET ORAL
Qty: 90 TABLET | Refills: 3 | Status: SHIPPED | OUTPATIENT
Start: 2021-01-10 | End: 2022-04-08

## 2021-01-10 RX ORDER — METOPROLOL TARTRATE 50 MG/1
TABLET, FILM COATED ORAL
Qty: 540 TABLET | Refills: 0 | Status: SHIPPED | OUTPATIENT
Start: 2021-01-10 | End: 2021-05-10

## 2021-01-10 RX ORDER — AMLODIPINE BESYLATE 10 MG/1
TABLET ORAL
Qty: 72 TABLET | Refills: 0 | Status: SHIPPED | OUTPATIENT
Start: 2021-01-10 | End: 2021-07-14

## 2021-01-10 RX ORDER — ATORVASTATIN CALCIUM 40 MG/1
TABLET, FILM COATED ORAL
Qty: 72 TABLET | Refills: 0 | Status: SHIPPED | OUTPATIENT
Start: 2021-01-10 | End: 2021-07-09

## 2021-01-11 ENCOUNTER — TREATMENT (OUTPATIENT)
Dept: PHYSICAL THERAPY | Facility: CLINIC | Age: 61
End: 2021-01-11

## 2021-01-11 DIAGNOSIS — M77.8 TENDINITIS OF LEFT SHOULDER: Primary | ICD-10-CM

## 2021-01-11 DIAGNOSIS — M25.512 LEFT SHOULDER PAIN, UNSPECIFIED CHRONICITY: ICD-10-CM

## 2021-01-11 PROCEDURE — 97140 MANUAL THERAPY 1/> REGIONS: CPT | Performed by: PHYSICAL THERAPIST

## 2021-01-11 PROCEDURE — 97110 THERAPEUTIC EXERCISES: CPT | Performed by: PHYSICAL THERAPIST

## 2021-01-11 NOTE — PROGRESS NOTES
Physical Therapy Daily Progress Note/Discharge Summary    Patient: Prashant Zhou   : 1960  Referring practitioner: Giancarlo Brink I*  Today's Date: 2021    VISIT#: 37    Subjective Evaluation    History of Present Illness  Mechanism of injury: Pt reports he had a good weekend.  He is still busy with work in the [employer] business so he never really stops but he is still being careful with his arm.  He still has pain with certain movements or activities.  He is going to wait on the decision to get another shot.  Pt rates improvement at 70-75% at this point.     DASH:  ADL:  7%               Work:  13%    Pain  Current pain ratin         Objective     See Exercise, Manual, and Modality Logs for complete treatment.     AROM:  Flex:  150                Ext:  50                Scap:  150                ER:  50 at neutral                IR: BTB:  L 4     Strength:  Flex:  4+ to 5                     Ext:  5                   Scap:  4 to 4+                   ER:  4                   IR:  4 to 4+]                   No pain with any     Patient Education:  Progress with strengthening at home, making sure 3 sets of 10 are easy before moving ahead with another color.  Ease into lifting more and more weight to see what he can do without pain.    Assessment & Plan     Assessment  Assessment details: Pt has met all but one goal as set at eval.  He is (I) with HEP and is ready for DC.         STGs:  6 weeks  1.  Pt to tolerate initial HEP without inc pain.  MET  2.  Pt to tolerate advancement of HEP without inc pain.  MET  3.  Pt to report pain has decreased by at least 25%.  MET  4.  Pt to improve  AROM of left shoulder flex to at least 150 deg and scaption to 140 deg.  MET  5.  Pt to sit with proper posture without cues required.  MET      LTGs:  By DC  1.  Pt to be (I) with HEP.  2.  Pt to report pain has decreased by at least 75% to allow better tolerance to and performance of normal daily  activities.   MET  3.  Pt to improve AROM of left shoulder  to WFL/WNL to allow normal performance of daily activities. MET  4.  Pt to improve strength of left shoulder  to 4+/5 or better to be adequate for completion/performance of normal daily tasks.  PART MET  5.  Pt to exhibit improved function as indicated by improved score on DASH vs score at eval.  MET  6.  Pt to demonstrate (I) and appropriate use of body mechanics for daily home and/or work tasks.  MET       Progress per Plan of Care        Timed:         Manual Therapy: 15        mins  91859;     Therapeutic Exercise:  30       mins  99568;     Neuromuscular Damian:        mins  94570;    Therapeutic Activity:          mins  78670;     Gait Training:           mins  96746;     Ultrasound:          mins  98331;    Ionto                                   mins   20889  Self Care                            mins   54638  Canalith Repos                   mins  4209    Un-Timed:  Electrical Stimulation:         mins  66595 ( );  Traction          mins 96964  Low Eval          Mins  51091  Mod Eval          Mins  39644  High Eval                            Mins  00108  Re-Eval                               mins  27024    Timed Treatment: 45     mins   Total Treatment:   45     mins    Avis Baca PT  Physical Therapist        Discharge Summary  Discharge Summary from Physical Therapy Report        Number of Visits: 37     Discharge Status of Patient: See MD Note dated 1-11-21    Goals: Partially Met    Discharge Plan: Continue with current home exercise program as instructed    Comments :    Ready for DC today.    Date of Discharge 1-11-21      Avis Baca PT  Physical Therapist

## 2021-01-13 DIAGNOSIS — E11.65 TYPE 2 DIABETES MELLITUS WITH HYPERGLYCEMIA, WITHOUT LONG-TERM CURRENT USE OF INSULIN (HCC): ICD-10-CM

## 2021-01-13 RX ORDER — GLIMEPIRIDE 4 MG/1
TABLET ORAL
Qty: 60 TABLET | Refills: 5 | Status: SHIPPED | OUTPATIENT
Start: 2021-01-13 | End: 2021-07-09

## 2021-01-20 ENCOUNTER — LAB (OUTPATIENT)
Dept: FAMILY MEDICINE CLINIC | Facility: CLINIC | Age: 61
End: 2021-01-20

## 2021-01-20 ENCOUNTER — OFFICE VISIT (OUTPATIENT)
Dept: FAMILY MEDICINE CLINIC | Facility: CLINIC | Age: 61
End: 2021-01-20

## 2021-01-20 ENCOUNTER — DOCUMENTATION (OUTPATIENT)
Dept: FAMILY MEDICINE CLINIC | Facility: CLINIC | Age: 61
End: 2021-01-20

## 2021-01-20 VITALS
HEART RATE: 66 BPM | SYSTOLIC BLOOD PRESSURE: 125 MMHG | OXYGEN SATURATION: 97 % | TEMPERATURE: 97.3 F | BODY MASS INDEX: 30.37 KG/M2 | WEIGHT: 243 LBS | DIASTOLIC BLOOD PRESSURE: 80 MMHG

## 2021-01-20 DIAGNOSIS — E11.65 TYPE 2 DIABETES MELLITUS WITH HYPERGLYCEMIA, WITHOUT LONG-TERM CURRENT USE OF INSULIN (HCC): Primary | ICD-10-CM

## 2021-01-20 DIAGNOSIS — Z12.5 ENCOUNTER FOR SCREENING FOR MALIGNANT NEOPLASM OF PROSTATE: ICD-10-CM

## 2021-01-20 DIAGNOSIS — I10 ESSENTIAL HYPERTENSION: ICD-10-CM

## 2021-01-20 LAB
ALBUMIN SERPL-MCNC: 4.6 G/DL (ref 3.5–5.2)
ALBUMIN/GLOB SERPL: 1.8 G/DL
ALP SERPL-CCNC: 57 U/L (ref 39–117)
ALT SERPL W P-5'-P-CCNC: 19 U/L (ref 1–41)
ANION GAP SERPL CALCULATED.3IONS-SCNC: 10.7 MMOL/L (ref 5–15)
AST SERPL-CCNC: 17 U/L (ref 1–40)
BASOPHILS # BLD AUTO: 0.07 10*3/MM3 (ref 0–0.2)
BASOPHILS NFR BLD AUTO: 0.5 % (ref 0–1.5)
BILIRUB SERPL-MCNC: 0.5 MG/DL (ref 0–1.2)
BUN SERPL-MCNC: 14 MG/DL (ref 6–20)
BUN/CREAT SERPL: 19.7 (ref 7–25)
CALCIUM SPEC-SCNC: 10.2 MG/DL (ref 8.6–10.5)
CHLORIDE SERPL-SCNC: 98 MMOL/L (ref 98–107)
CHOLEST SERPL-MCNC: 92 MG/DL (ref 0–200)
CO2 SERPL-SCNC: 28.3 MMOL/L (ref 22–29)
CREAT SERPL-MCNC: 0.71 MG/DL (ref 0.76–1.27)
DEPRECATED RDW RBC AUTO: 39.2 FL (ref 37–54)
EOSINOPHIL # BLD AUTO: 0.15 10*3/MM3 (ref 0–0.4)
EOSINOPHIL NFR BLD AUTO: 1.1 % (ref 0.3–6.2)
ERYTHROCYTE [DISTWIDTH] IN BLOOD BY AUTOMATED COUNT: 12 % (ref 12.3–15.4)
GFR SERPL CREATININE-BSD FRML MDRD: 114 ML/MIN/1.73
GLOBULIN UR ELPH-MCNC: 2.5 GM/DL
GLUCOSE SERPL-MCNC: 132 MG/DL (ref 65–99)
HBA1C MFR BLD: 7.3 % (ref 3.5–5.6)
HCT VFR BLD AUTO: 46.3 % (ref 37.5–51)
HDLC SERPL-MCNC: 25 MG/DL (ref 40–60)
HGB BLD-MCNC: 15.6 G/DL (ref 13–17.7)
IMM GRANULOCYTES # BLD AUTO: 0.06 10*3/MM3 (ref 0–0.05)
IMM GRANULOCYTES NFR BLD AUTO: 0.4 % (ref 0–0.5)
LDLC SERPL CALC-MCNC: 45 MG/DL (ref 0–100)
LDLC/HDLC SERPL: 1.72 {RATIO}
LYMPHOCYTES # BLD AUTO: 2.48 10*3/MM3 (ref 0.7–3.1)
LYMPHOCYTES NFR BLD AUTO: 18.4 % (ref 19.6–45.3)
MCH RBC QN AUTO: 30.4 PG (ref 26.6–33)
MCHC RBC AUTO-ENTMCNC: 33.7 G/DL (ref 31.5–35.7)
MCV RBC AUTO: 90.3 FL (ref 79–97)
MONOCYTES # BLD AUTO: 1.03 10*3/MM3 (ref 0.1–0.9)
MONOCYTES NFR BLD AUTO: 7.6 % (ref 5–12)
NEUTROPHILS NFR BLD AUTO: 72 % (ref 42.7–76)
NEUTROPHILS NFR BLD AUTO: 9.71 10*3/MM3 (ref 1.7–7)
NRBC BLD AUTO-RTO: 0 /100 WBC (ref 0–0.2)
PLATELET # BLD AUTO: 301 10*3/MM3 (ref 140–450)
PMV BLD AUTO: 10 FL (ref 6–12)
POTASSIUM SERPL-SCNC: 4.2 MMOL/L (ref 3.5–5.2)
PROT SERPL-MCNC: 7.1 G/DL (ref 6–8.5)
PSA SERPL-MCNC: 0.53 NG/ML (ref 0–4)
RBC # BLD AUTO: 5.13 10*6/MM3 (ref 4.14–5.8)
SODIUM SERPL-SCNC: 137 MMOL/L (ref 136–145)
TRIGL SERPL-MCNC: 120 MG/DL (ref 0–150)
VLDLC SERPL-MCNC: 22 MG/DL (ref 5–40)
WBC # BLD AUTO: 13.5 10*3/MM3 (ref 3.4–10.8)

## 2021-01-20 PROCEDURE — G0103 PSA SCREENING: HCPCS | Performed by: FAMILY MEDICINE

## 2021-01-20 PROCEDURE — 99213 OFFICE O/P EST LOW 20 MIN: CPT | Performed by: FAMILY MEDICINE

## 2021-01-20 PROCEDURE — 83036 HEMOGLOBIN GLYCOSYLATED A1C: CPT | Performed by: FAMILY MEDICINE

## 2021-01-20 PROCEDURE — 80061 LIPID PANEL: CPT | Performed by: FAMILY MEDICINE

## 2021-01-20 PROCEDURE — 85025 COMPLETE CBC W/AUTO DIFF WBC: CPT | Performed by: FAMILY MEDICINE

## 2021-01-20 PROCEDURE — 36415 COLL VENOUS BLD VENIPUNCTURE: CPT | Performed by: FAMILY MEDICINE

## 2021-01-20 PROCEDURE — 80053 COMPREHEN METABOLIC PANEL: CPT | Performed by: FAMILY MEDICINE

## 2021-01-20 RX ORDER — VARENICLINE TARTRATE 1 MG/1
1 TABLET, FILM COATED ORAL 2 TIMES DAILY
Qty: 56 TABLET | Refills: 1 | Status: SHIPPED | OUTPATIENT
Start: 2021-02-17 | End: 2021-04-14

## 2021-01-20 NOTE — PROGRESS NOTES
Subjective   Prashant Zhou is a 60 y.o. male.     He is in today for follow-up on his diabetes and hypertension.  He denies any chest pain or headaches.  He denies any issue with bowel or bladder function.  He denies dizziness or lightheadedness.  He is still seeing physical therapy related to some left shoulder pain and feels he is improving, though he still has some discomfort at times.  Sleep and mood have been very good.  He denies any loss of independence or activities.  He has not had any issues with Covid to date.         /80 (BP Location: Left arm, Patient Position: Sitting, Cuff Size: Large Adult)   Pulse 66   Temp 97.3 °F (36.3 °C) (Temporal)   Wt 110 kg (243 lb)   SpO2 97%   BMI 30.37 kg/m²       Chief Complaint   Patient presents with   • Diabetes     6 month f/u - fasting for labs ( only black coffee)            Current Outpatient Medications:   •  amLODIPine (NORVASC) 10 MG tablet, TAKE ONE TABLET BY MOUTH DAILY, Disp: 72 tablet, Rfl: 0  •  amLODIPine (NORVASC) 10 MG tablet, TAKE ONE TABLET BY MOUTH DAILY, Disp: 72 tablet, Rfl: 0  •  atorvastatin (LIPITOR) 40 MG tablet, TAKE ONE TABLET BY MOUTH EVERY NIGHT AT BEDTIME, Disp: 72 tablet, Rfl: 0  •  atorvastatin (LIPITOR) 40 MG tablet, TAKE ONE TABLET BY MOUTH EVERY NIGHT AT BEDTIME, Disp: 72 tablet, Rfl: 0  •  Blood Glucose Monitoring Suppl (ONE TOUCH ULTRA 2) w/Device kit, ONETOUCH ULTRA 2 w/Device KIT, Disp: , Rfl:   •  cloNIDine (CATAPRES) 0.1 MG tablet, Take 1 tablet by mouth 2 (Two) Times a Day., Disp: 180 tablet, Rfl: 1  •  glimepiride (AMARYL) 4 MG tablet, TAKE ONE TABLET BY MOUTH TWICE A DAY, Disp: 60 tablet, Rfl: 5  •  glucose blood (ONE TOUCH ULTRA TEST) test strip, ONETOUCH ULTRA BLUE STRP, Disp: , Rfl:   •  hydrALAZINE (APRESOLINE) 50 MG tablet, Take 1 tablet by mouth Every 8 (Eight) Hours., Disp: 270 tablet, Rfl: 1  •  lisinopril-hydrochlorothiazide (PRINZIDE,ZESTORETIC) 20-25 MG per tablet, TAKE ONE TABLET BY MOUTH DAILY,  Disp: 90 tablet, Rfl: 3  •  metFORMIN (GLUCOPHAGE) 850 MG tablet, TAKE ONE TABLET BY MOUTH TWICE A DAY WITH A MEAL, Disp: 144 tablet, Rfl: 0  •  metFORMIN (GLUCOPHAGE) 850 MG tablet, TAKE ONE TABLET BY MOUTH TWICE A DAY WITH A MEAL, Disp: 144 tablet, Rfl: 0  •  metoprolol tartrate (LOPRESSOR) 50 MG tablet, TAKE THREE TABLETS BY MOUTH EVERY 12 HOURS, Disp: 540 tablet, Rfl: 0  •  ONETOUCH DELICA LANCETS FINE misc, ONETOUCH DELICA LANCETS FINE, Disp: , Rfl:     Lab Results   Component Value Date    HGBA1C 7.0 (H) 07/20/2020    HGBA1C 6.2 (H) 01/20/2020    HGBA1C 6.2 (H) 10/21/2019     Lab Results   Component Value Date    CREATININE 0.72 (L) 07/20/2020         Wt Readings from Last 3 Encounters:   01/20/21 110 kg (243 lb)   09/28/20 111 kg (245 lb)   09/09/20 112 kg (247 lb)       The following portions of the patient's history were reviewed and updated as appropriate: allergies, current medications, past family history, past medical history, past social history, past surgical history and problem list.    Review of Systems   Constitutional: Negative for activity change, fatigue and fever.   HENT: Negative for congestion, sinus pressure, sinus pain, sore throat and trouble swallowing.    Eyes: Negative for visual disturbance.   Respiratory: Negative for chest tightness, shortness of breath and wheezing.    Cardiovascular: Negative for chest pain.   Gastrointestinal: Negative for abdominal distention, abdominal pain, constipation, diarrhea, nausea and vomiting.   Genitourinary: Negative for difficulty urinating, dysuria, frequency and urgency.   Musculoskeletal: Positive for arthralgias. Negative for back pain and neck pain.   Neurological: Negative for dizziness, weakness, light-headedness and numbness.   Psychiatric/Behavioral: Positive for sleep disturbance. Negative for agitation, hallucinations and suicidal ideas. The patient is not nervous/anxious.        Objective   Physical Exam  Vitals signs and nursing note  reviewed.   Constitutional:       Appearance: Normal appearance.   HENT:      Right Ear: Tympanic membrane, ear canal and external ear normal.      Left Ear: Tympanic membrane, ear canal and external ear normal.   Eyes:      Conjunctiva/sclera: Conjunctivae normal.      Pupils: Pupils are equal, round, and reactive to light.   Neck:      Musculoskeletal: Neck supple.   Cardiovascular:      Rate and Rhythm: Normal rate and regular rhythm.      Heart sounds: Normal heart sounds. No murmur.   Pulmonary:      Effort: Pulmonary effort is normal.      Breath sounds: No wheezing or rales.   Abdominal:      General: Bowel sounds are normal.      Palpations: Abdomen is soft.      Tenderness: There is no abdominal tenderness. There is no guarding.   Musculoskeletal:      Comments: Still has pain in L shoulder despite shots and PT, but has better range at this time compared to previous   Neurological:      General: No focal deficit present.      Mental Status: He is alert and oriented to person, place, and time.   Psychiatric:         Mood and Affect: Mood normal.           Assessment/Plan   Problems Addressed this Visit        Cardiac and Vasculature    Hypertension       Endocrine and Metabolic    Type 2 diabetes mellitus with hyperglycemia (CMS/HCC) - Primary    Relevant Orders    Comprehensive metabolic panel    Hemoglobin A1c    Lipid panel    CBC w AUTO Differential       Genitourinary and Reproductive     Encounter for screening for malignant neoplasm of prostate    Relevant Orders    PSA SCREENING      Diagnoses       Codes Comments    Type 2 diabetes mellitus with hyperglycemia, without long-term current use of insulin (CMS/HCC)    -  Primary ICD-10-CM: E11.65  ICD-9-CM: 250.00, 790.29     Essential hypertension     ICD-10-CM: I10  ICD-9-CM: 401.9     Encounter for screening for malignant neoplasm of prostate     ICD-10-CM: Z12.5  ICD-9-CM: V76.44           I will update appropriate labs today  I will have him  continue to work with therapy and follow with orthopedics on that left shoulder  I will adjust treatment plan as indicated by lab findings  I will see him back in the office in 6 months, sooner if needed

## 2021-02-10 RX ORDER — CLONIDINE HYDROCHLORIDE 0.1 MG/1
TABLET ORAL
Qty: 8 TABLET | Refills: 0 | Status: SHIPPED | OUTPATIENT
Start: 2021-02-10 | End: 2021-02-11 | Stop reason: SDUPTHER

## 2021-02-11 ENCOUNTER — TELEPHONE (OUTPATIENT)
Dept: FAMILY MEDICINE CLINIC | Facility: CLINIC | Age: 61
End: 2021-02-11

## 2021-02-11 RX ORDER — CLONIDINE HYDROCHLORIDE 0.1 MG/1
0.1 TABLET ORAL 2 TIMES DAILY
Qty: 60 TABLET | Refills: 1 | Status: SHIPPED | OUTPATIENT
Start: 2021-02-11 | End: 2021-04-16 | Stop reason: SDUPTHER

## 2021-02-11 RX ORDER — HYDRALAZINE HYDROCHLORIDE 50 MG/1
TABLET, FILM COATED ORAL
Qty: 90 TABLET | Refills: 1 | Status: SHIPPED | OUTPATIENT
Start: 2021-02-11 | End: 2021-04-16 | Stop reason: SDUPTHER

## 2021-02-11 NOTE — TELEPHONE ENCOUNTER
Left detailed message on [facility] voicemail- Clonidine Qty 60 with 1 refill - take 1 tablet by mouth times a day. HUB TO READ

## 2021-02-11 NOTE — TELEPHONE ENCOUNTER
Pharmacy received a rx request for Clonidine with a quantity of 8. Pharmacy checking to see if that is correct.

## 2021-04-16 RX ORDER — HYDRALAZINE HYDROCHLORIDE 50 MG/1
50 TABLET, FILM COATED ORAL EVERY 8 HOURS
Qty: 90 TABLET | Refills: 1 | Status: SHIPPED | OUTPATIENT
Start: 2021-04-16 | End: 2021-06-14 | Stop reason: SDUPTHER

## 2021-04-16 RX ORDER — CLONIDINE HYDROCHLORIDE 0.1 MG/1
0.1 TABLET ORAL 2 TIMES DAILY
Qty: 60 TABLET | Refills: 1 | Status: SHIPPED | OUTPATIENT
Start: 2021-04-16 | End: 2021-06-14 | Stop reason: SDUPTHER

## 2021-05-10 RX ORDER — METOPROLOL TARTRATE 50 MG/1
TABLET, FILM COATED ORAL
Qty: 540 TABLET | Refills: 0 | Status: SHIPPED | OUTPATIENT
Start: 2021-05-10 | End: 2021-08-08

## 2021-06-14 RX ORDER — HYDRALAZINE HYDROCHLORIDE 50 MG/1
50 TABLET, FILM COATED ORAL EVERY 8 HOURS
Qty: 90 TABLET | Refills: 1 | Status: SHIPPED | OUTPATIENT
Start: 2021-06-14 | End: 2021-08-08

## 2021-06-14 RX ORDER — CLONIDINE HYDROCHLORIDE 0.1 MG/1
0.1 TABLET ORAL 2 TIMES DAILY
Qty: 60 TABLET | Refills: 1 | Status: SHIPPED | OUTPATIENT
Start: 2021-06-14 | End: 2021-08-08

## 2021-06-24 ENCOUNTER — TELEPHONE (OUTPATIENT)
Dept: FAMILY MEDICINE CLINIC | Facility: CLINIC | Age: 61
End: 2021-06-24

## 2021-06-24 RX ORDER — CHLORCYCLIZINE HYDROCHLORIDE AND PSEUDOEPHEDRINE HYDROCHLORIDE 25; 60 MG/1; MG/1
0.5 TABLET ORAL 2 TIMES DAILY PRN
Qty: 30 TABLET | Refills: 0 | Status: SHIPPED | OUTPATIENT
Start: 2021-06-24 | End: 2021-07-14

## 2021-06-24 RX ORDER — AMOXICILLIN AND CLAVULANATE POTASSIUM 875; 125 MG/1; MG/1
1 TABLET, FILM COATED ORAL 2 TIMES DAILY
Qty: 20 TABLET | Refills: 0 | Status: SHIPPED | OUTPATIENT
Start: 2021-06-24 | End: 2021-07-04

## 2021-06-24 NOTE — TELEPHONE ENCOUNTER
I sent prescriptions in for Augmentin and Stahist  I just need to know if he is not getting better

## 2021-06-24 NOTE — TELEPHONE ENCOUNTER
Patient HAS A SINUS INFECTION AND HIS APPT IS IN 3 WEEKS.     PATIENT IS WANTING SOMETHING TO BE CALLED IN TO [facility] - [facility] [place] - [address] - [phone]  - [phone]   [phone]    SINUS ARE STARTING TO SWELL     HE IS REQUESTING A CALL BACK FROM Prashant Nuñez (Self) [phone] (DAVID)

## 2021-07-09 DIAGNOSIS — E11.65 TYPE 2 DIABETES MELLITUS WITH HYPERGLYCEMIA, WITHOUT LONG-TERM CURRENT USE OF INSULIN (HCC): ICD-10-CM

## 2021-07-09 RX ORDER — ATORVASTATIN CALCIUM 40 MG/1
TABLET, FILM COATED ORAL
Qty: 90 TABLET | Refills: 0 | Status: SHIPPED | OUTPATIENT
Start: 2021-07-09 | End: 2021-07-14 | Stop reason: SDUPTHER

## 2021-07-09 RX ORDER — GLIMEPIRIDE 4 MG/1
TABLET ORAL
Qty: 78 TABLET | Refills: 0 | Status: SHIPPED | OUTPATIENT
Start: 2021-07-09 | End: 2021-07-12 | Stop reason: SDUPTHER

## 2021-07-12 DIAGNOSIS — E11.65 TYPE 2 DIABETES MELLITUS WITH HYPERGLYCEMIA, WITHOUT LONG-TERM CURRENT USE OF INSULIN (HCC): ICD-10-CM

## 2021-07-12 RX ORDER — GLIMEPIRIDE 4 MG/1
4 TABLET ORAL 2 TIMES DAILY
Qty: 90 TABLET | Refills: 0 | Status: SHIPPED | OUTPATIENT
Start: 2021-07-12 | End: 2021-08-08

## 2021-07-14 ENCOUNTER — OFFICE VISIT (OUTPATIENT)
Dept: CARDIOLOGY | Facility: CLINIC | Age: 61
End: 2021-07-14

## 2021-07-14 VITALS
OXYGEN SATURATION: 96 % | HEART RATE: 77 BPM | DIASTOLIC BLOOD PRESSURE: 76 MMHG | HEIGHT: 75 IN | BODY MASS INDEX: 30.09 KG/M2 | WEIGHT: 242 LBS | SYSTOLIC BLOOD PRESSURE: 117 MMHG

## 2021-07-14 DIAGNOSIS — I10 ESSENTIAL HYPERTENSION: Primary | ICD-10-CM

## 2021-07-14 DIAGNOSIS — E11.9 TYPE 2 DIABETES MELLITUS WITHOUT COMPLICATION, WITHOUT LONG-TERM CURRENT USE OF INSULIN (HCC): ICD-10-CM

## 2021-07-14 DIAGNOSIS — I71.03 AORTIC DISSECTION, THORACOABDOMINAL (HCC): ICD-10-CM

## 2021-07-14 DIAGNOSIS — E78.00 PURE HYPERCHOLESTEROLEMIA: ICD-10-CM

## 2021-07-14 PROCEDURE — 99214 OFFICE O/P EST MOD 30 MIN: CPT | Performed by: INTERNAL MEDICINE

## 2021-07-14 NOTE — PROGRESS NOTES
"    Subjective:     Encounter Date:07/14/2021      Patient ID: Prashant Zhou is a 60 y.o. male.    Chief Complaint:  History of Present Illness 60-year-old white male with history of thoracoabdominal aortic dissection in the past history of diabetes hypertension hyperlipidemia presents to my office for follow-up.  Patient is currently stable without is no chest pain or shortness of breath at rest on exertion but no complains any PND orthopnea.  No palpitation dizziness syncope or swelling of the feet.  Patient has been taking his medicine regularly.  Patient is continues to smoke    The following portions of the patient's history were reviewed and updated as appropriate: allergies, current medications, past family history, past medical history, past social history, past surgical history and problem list.  Past Medical History:   Diagnosis Date   • Aortic dissection (CMS/HCC)    • Heart murmur    • History of noncompliance with medical treatment    • Hypertension    • Type B hypoplasia of aortic arch      History reviewed. No pertinent surgical history.  /76 (BP Location: Left arm, Patient Position: Sitting)   Pulse 77   Ht 190.5 cm (75\")   Wt 110 kg (242 lb)   SpO2 96%   BMI 30.25 kg/m²   Family History   Problem Relation Age of Onset   • Diabetes Mother        Current Outpatient Medications:   •  amLODIPine (NORVASC) 10 MG tablet, TAKE ONE TABLET BY MOUTH DAILY, Disp: 72 tablet, Rfl: 0  •  atorvastatin (LIPITOR) 40 MG tablet, TAKE ONE TABLET BY MOUTH EVERY NIGHT AT BEDTIME, Disp: 72 tablet, Rfl: 0  •  Blood Glucose Monitoring Suppl (ONE TOUCH ULTRA 2) w/Device kit, ONETOUCH ULTRA 2 w/Device KIT, Disp: , Rfl:   •  cloNIDine (CATAPRES) 0.1 MG tablet, Take 1 tablet by mouth 2 (Two) Times a Day., Disp: 60 tablet, Rfl: 1  •  glimepiride (AMARYL) 4 MG tablet, Take 1 tablet by mouth 2 (Two) Times a Day., Disp: 90 tablet, Rfl: 0  •  glucose blood (ONE TOUCH ULTRA TEST) test strip, ONETOUCH ULTRA BLUE STRP, " Disp: , Rfl:   •  hydrALAZINE (APRESOLINE) 50 MG tablet, Take 1 tablet by mouth Every 8 (Eight) Hours., Disp: 90 tablet, Rfl: 1  •  lisinopril-hydrochlorothiazide (PRINZIDE,ZESTORETIC) 20-25 MG per tablet, TAKE ONE TABLET BY MOUTH DAILY, Disp: 90 tablet, Rfl: 3  •  metFORMIN (GLUCOPHAGE) 850 MG tablet, TAKE ONE TABLET BY MOUTH TWICE A DAY WITH A MEAL, Disp: 144 tablet, Rfl: 0  •  metoprolol tartrate (LOPRESSOR) 50 MG tablet, TAKE THREE TABLETS BY MOUTH EVERY 12 HOURS, Disp: 540 tablet, Rfl: 0  •  ONETOUCH DELICA LANCETS FINE misc, ONETOUCH DELICA LANCETS FINE, Disp: , Rfl:   No Known Allergies  Social History     Socioeconomic History   • Marital status:      Spouse name: Not on file   • Number of children: Not on file   • Years of education: Not on file   • Highest education level: Not on file   Tobacco Use   • Smoking status: Current Every Day Smoker     Packs/day: 1.00     Types: Cigarettes   • Smokeless tobacco: Never Used   Substance and Sexual Activity   • Alcohol use: No   • Drug use: No   • Sexual activity: Defer     Review of Systems   Constitutional: Negative for fever and malaise/fatigue.   Cardiovascular: Negative for chest pain, dyspnea on exertion and palpitations.   Respiratory: Negative for cough and shortness of breath.    Skin: Negative for rash.   Gastrointestinal: Negative for abdominal pain, nausea and vomiting.   Neurological: Negative for focal weakness and headaches.   All other systems reviewed and are negative.             Objective:     Constitutional:       Appearance: Well-developed.   Eyes:      General: No scleral icterus.     Conjunctiva/sclera: Conjunctivae normal.   HENT:      Head: Normocephalic and atraumatic.   Neck:      Vascular: No carotid bruit or JVD.   Pulmonary:      Effort: Pulmonary effort is normal.      Breath sounds: Normal breath sounds. No wheezing. No rales.   Cardiovascular:      Normal rate. Regular rhythm.   Pulses:     Intact distal pulses.   Abdominal:       General: Bowel sounds are normal.      Palpations: Abdomen is soft.   Musculoskeletal:      Cervical back: Normal range of motion and neck supple. Skin:     General: Skin is warm and dry.      Findings: No rash.   Neurological:      Mental Status: Alert.       Procedures    Lab Review:         MDM  1.  History of aortic dissection  Patient has aortic dissection in the past but is followed by cardiac surgeons but yearly CT scan which he missed last year and hence he will be followed by them soon  2.  Hypertension  Patient blood pressure currently stable on multiple medications  3.  Hyperlipidemia  Patient's lipid levels are followed by the primary care doctor and is on a statin  4.  Diabetes  Patient is currently on oral medicines and followed by the primary care doctor.

## 2021-07-21 ENCOUNTER — OFFICE VISIT (OUTPATIENT)
Dept: FAMILY MEDICINE CLINIC | Facility: CLINIC | Age: 61
End: 2021-07-21

## 2021-07-21 ENCOUNTER — LAB (OUTPATIENT)
Dept: FAMILY MEDICINE CLINIC | Facility: CLINIC | Age: 61
End: 2021-07-21

## 2021-07-21 VITALS
HEART RATE: 66 BPM | SYSTOLIC BLOOD PRESSURE: 127 MMHG | OXYGEN SATURATION: 94 % | TEMPERATURE: 98.7 F | DIASTOLIC BLOOD PRESSURE: 83 MMHG | WEIGHT: 243 LBS | BODY MASS INDEX: 30.37 KG/M2

## 2021-07-21 DIAGNOSIS — I10 ESSENTIAL HYPERTENSION: ICD-10-CM

## 2021-07-21 DIAGNOSIS — E78.00 PURE HYPERCHOLESTEROLEMIA: ICD-10-CM

## 2021-07-21 DIAGNOSIS — E11.65 TYPE 2 DIABETES MELLITUS WITH HYPERGLYCEMIA, WITHOUT LONG-TERM CURRENT USE OF INSULIN (HCC): Primary | ICD-10-CM

## 2021-07-21 LAB
ALBUMIN SERPL-MCNC: 4.3 G/DL (ref 3.5–5.2)
ALBUMIN/GLOB SERPL: 1.7 G/DL
ALP SERPL-CCNC: 55 U/L (ref 39–117)
ALT SERPL W P-5'-P-CCNC: 18 U/L (ref 1–41)
ANION GAP SERPL CALCULATED.3IONS-SCNC: 8.2 MMOL/L (ref 5–15)
AST SERPL-CCNC: 14 U/L (ref 1–40)
BASOPHILS # BLD AUTO: 0.07 10*3/MM3 (ref 0–0.2)
BASOPHILS NFR BLD AUTO: 0.6 % (ref 0–1.5)
BILIRUB SERPL-MCNC: 0.5 MG/DL (ref 0–1.2)
BUN SERPL-MCNC: 10 MG/DL (ref 6–20)
BUN/CREAT SERPL: 13.5 (ref 7–25)
CALCIUM SPEC-SCNC: 9.9 MG/DL (ref 8.6–10.5)
CHLORIDE SERPL-SCNC: 98 MMOL/L (ref 98–107)
CHOLEST SERPL-MCNC: 103 MG/DL (ref 0–200)
CO2 SERPL-SCNC: 29.8 MMOL/L (ref 22–29)
CREAT SERPL-MCNC: 0.74 MG/DL (ref 0.76–1.27)
DEPRECATED RDW RBC AUTO: 42.2 FL (ref 37–54)
EOSINOPHIL # BLD AUTO: 0.14 10*3/MM3 (ref 0–0.4)
EOSINOPHIL NFR BLD AUTO: 1.2 % (ref 0.3–6.2)
ERYTHROCYTE [DISTWIDTH] IN BLOOD BY AUTOMATED COUNT: 12.3 % (ref 12.3–15.4)
GFR SERPL CREATININE-BSD FRML MDRD: 108 ML/MIN/1.73
GLOBULIN UR ELPH-MCNC: 2.6 GM/DL
GLUCOSE SERPL-MCNC: 147 MG/DL (ref 65–99)
HBA1C MFR BLD: 7.7 % (ref 3.5–5.6)
HCT VFR BLD AUTO: 47.3 % (ref 37.5–51)
HDLC SERPL-MCNC: 29 MG/DL (ref 40–60)
HGB BLD-MCNC: 15.6 G/DL (ref 13–17.7)
IMM GRANULOCYTES # BLD AUTO: 0.04 10*3/MM3 (ref 0–0.05)
IMM GRANULOCYTES NFR BLD AUTO: 0.3 % (ref 0–0.5)
LDLC SERPL CALC-MCNC: 51 MG/DL (ref 0–100)
LDLC/HDLC SERPL: 1.66 {RATIO}
LYMPHOCYTES # BLD AUTO: 2.68 10*3/MM3 (ref 0.7–3.1)
LYMPHOCYTES NFR BLD AUTO: 23.1 % (ref 19.6–45.3)
MCH RBC QN AUTO: 30.5 PG (ref 26.6–33)
MCHC RBC AUTO-ENTMCNC: 33 G/DL (ref 31.5–35.7)
MCV RBC AUTO: 92.6 FL (ref 79–97)
MONOCYTES # BLD AUTO: 0.79 10*3/MM3 (ref 0.1–0.9)
MONOCYTES NFR BLD AUTO: 6.8 % (ref 5–12)
NEUTROPHILS NFR BLD AUTO: 68 % (ref 42.7–76)
NEUTROPHILS NFR BLD AUTO: 7.86 10*3/MM3 (ref 1.7–7)
NRBC BLD AUTO-RTO: 0 /100 WBC (ref 0–0.2)
PLATELET # BLD AUTO: 277 10*3/MM3 (ref 140–450)
PMV BLD AUTO: 10.1 FL (ref 6–12)
POTASSIUM SERPL-SCNC: 4.4 MMOL/L (ref 3.5–5.2)
PROT SERPL-MCNC: 6.9 G/DL (ref 6–8.5)
RBC # BLD AUTO: 5.11 10*6/MM3 (ref 4.14–5.8)
SODIUM SERPL-SCNC: 136 MMOL/L (ref 136–145)
TRIGL SERPL-MCNC: 130 MG/DL (ref 0–150)
VLDLC SERPL-MCNC: 23 MG/DL (ref 5–40)
WBC # BLD AUTO: 11.58 10*3/MM3 (ref 3.4–10.8)

## 2021-07-21 PROCEDURE — 83036 HEMOGLOBIN GLYCOSYLATED A1C: CPT | Performed by: FAMILY MEDICINE

## 2021-07-21 PROCEDURE — 80061 LIPID PANEL: CPT | Performed by: FAMILY MEDICINE

## 2021-07-21 PROCEDURE — 99213 OFFICE O/P EST LOW 20 MIN: CPT | Performed by: FAMILY MEDICINE

## 2021-07-21 PROCEDURE — 36415 COLL VENOUS BLD VENIPUNCTURE: CPT | Performed by: FAMILY MEDICINE

## 2021-07-21 PROCEDURE — 85025 COMPLETE CBC W/AUTO DIFF WBC: CPT | Performed by: FAMILY MEDICINE

## 2021-07-21 PROCEDURE — 80053 COMPREHEN METABOLIC PANEL: CPT | Performed by: FAMILY MEDICINE

## 2021-07-21 NOTE — PROGRESS NOTES
Subjective   Prashant Zhou is a 60 y.o. male.     Prashant Zhou is in for follow up on his issues with diabetes, high blood pressure and high cholesterol. There is no history of chest pain or dyspnea of late. There is no history of issue with bowel or bladder function. There is no history of vision or hearing problems. There is no history of dizziness or lightheadedness. There is no history of issue with sleep or mood. As for checking blood sugar readings, he has not seen anything high lately. There is no issue with medication compliance. Diet and exercise compliance has been better for the most part.         /83 (BP Location: Left arm, Patient Position: Sitting, Cuff Size: Large Adult)   Pulse 66   Temp 98.7 °F (37.1 °C) (Temporal)   Wt 110 kg (243 lb)   SpO2 94%   BMI 30.37 kg/m²       Chief Complaint   Patient presents with   • Diabetes           Current Outpatient Medications:   •  amLODIPine (NORVASC) 10 MG tablet, TAKE ONE TABLET BY MOUTH DAILY, Disp: 72 tablet, Rfl: 0  •  atorvastatin (LIPITOR) 40 MG tablet, TAKE ONE TABLET BY MOUTH EVERY NIGHT AT BEDTIME, Disp: 72 tablet, Rfl: 0  •  Blood Glucose Monitoring Suppl (ONE TOUCH ULTRA 2) w/Device kit, ONETOUCH ULTRA 2 w/Device KIT, Disp: , Rfl:   •  cloNIDine (CATAPRES) 0.1 MG tablet, Take 1 tablet by mouth 2 (Two) Times a Day., Disp: 60 tablet, Rfl: 1  •  glimepiride (AMARYL) 4 MG tablet, Take 1 tablet by mouth 2 (Two) Times a Day., Disp: 90 tablet, Rfl: 0  •  glucose blood (ONE TOUCH ULTRA TEST) test strip, ONETOUCH ULTRA BLUE STRP, Disp: , Rfl:   •  hydrALAZINE (APRESOLINE) 50 MG tablet, Take 1 tablet by mouth Every 8 (Eight) Hours., Disp: 90 tablet, Rfl: 1  •  lisinopril-hydrochlorothiazide (PRINZIDE,ZESTORETIC) 20-25 MG per tablet, TAKE ONE TABLET BY MOUTH DAILY, Disp: 90 tablet, Rfl: 3  •  metFORMIN (GLUCOPHAGE) 850 MG tablet, TAKE ONE TABLET BY MOUTH TWICE A DAY WITH A MEAL, Disp: 144 tablet, Rfl: 0  •  metoprolol tartrate (LOPRESSOR) 50  MG tablet, TAKE THREE TABLETS BY MOUTH EVERY 12 HOURS, Disp: 540 tablet, Rfl: 0  •  ONETOUCH DELICA LANCETS FINE misc, ONETOUCH DELICA LANCETS FINE, Disp: , Rfl:     Lab Results   Component Value Date    HGBA1C 7.3 (H) 01/20/2021    HGBA1C 7.0 (H) 07/20/2020    HGBA1C 6.2 (H) 01/20/2020     Lab Results   Component Value Date    CREATININE 0.71 (L) 01/20/2021         Wt Readings from Last 3 Encounters:   07/21/21 110 kg (243 lb)   07/14/21 110 kg (242 lb)   01/20/21 110 kg (243 lb)       The following portions of the patient's history were reviewed and updated as appropriate: allergies, current medications, past family history, past medical history, past social history, past surgical history, and problem list.    Review of Systems   Constitutional: Negative for activity change, fatigue and fever.   HENT: Negative for congestion, sinus pressure, sinus pain, sore throat and trouble swallowing.    Eyes: Negative for visual disturbance.   Respiratory: Negative for chest tightness, shortness of breath and wheezing.    Cardiovascular: Negative for chest pain.   Gastrointestinal: Negative for abdominal distention, abdominal pain, constipation, diarrhea, nausea and vomiting.   Genitourinary: Negative for difficulty urinating, dysuria, frequency and urgency.   Musculoskeletal: Positive for arthralgias. Negative for back pain and neck pain.   Neurological: Negative for dizziness, weakness, light-headedness and numbness.   Psychiatric/Behavioral: Positive for sleep disturbance. Negative for agitation, hallucinations and suicidal ideas. The patient is not nervous/anxious.        Objective   Physical Exam  Vitals and nursing note reviewed.   Constitutional:       Appearance: Normal appearance.   Eyes:      Conjunctiva/sclera: Conjunctivae normal.      Pupils: Pupils are equal, round, and reactive to light.   Cardiovascular:      Rate and Rhythm: Normal rate and regular rhythm.      Heart sounds: Normal heart sounds. No murmur  heard.     Pulmonary:      Effort: Pulmonary effort is normal.      Breath sounds: No wheezing or rales.   Abdominal:      General: Bowel sounds are normal.      Palpations: Abdomen is soft.      Tenderness: There is no abdominal tenderness. There is no guarding.   Musculoskeletal:      Cervical back: Neck supple.      Right lower leg: No edema.      Comments: Still has pain in L shoulder despite shots and PT, but has better range at this time compared to previous   Skin:     Findings: No rash.   Neurological:      General: No focal deficit present.      Mental Status: He is alert and oriented to person, place, and time.   Psychiatric:         Mood and Affect: Mood normal.           Assessment/Plan   Problems Addressed this Visit        Cardiac and Vasculature    Hypertension    Hyperlipidemia       Endocrine and Metabolic    Type 2 diabetes mellitus with hyperglycemia (CMS/HCC) - Primary    Relevant Orders    Comprehensive metabolic panel    Lipid panel    Hemoglobin A1c    CBC w AUTO Differential      Diagnoses       Codes Comments    Type 2 diabetes mellitus with hyperglycemia, without long-term current use of insulin (CMS/[facility])    -  Primary ICD-10-CM: E11.65  ICD-9-CM: 250.00, 790.29     Essential hypertension     ICD-10-CM: I10  ICD-9-CM: 401.9     Pure hypercholesterolemia     ICD-10-CM: E78.00  ICD-9-CM: 272.0           We discussed diet and exercise changes as he really needs to lose weight  I did answer his questions by Covid vaccine and recommended he get a Pfizer dose today  I will update labs and see if adjustments are needed in the treatment plan  I will see him back in 6 months  He is to call for new concerns in the interim

## 2021-08-08 DIAGNOSIS — E11.65 TYPE 2 DIABETES MELLITUS WITH HYPERGLYCEMIA, WITHOUT LONG-TERM CURRENT USE OF INSULIN (HCC): ICD-10-CM

## 2021-08-08 RX ORDER — GLIMEPIRIDE 4 MG/1
TABLET ORAL
Qty: 78 TABLET | Refills: 1 | Status: SHIPPED | OUTPATIENT
Start: 2021-08-08 | End: 2021-10-07

## 2021-08-08 RX ORDER — HYDRALAZINE HYDROCHLORIDE 50 MG/1
TABLET, FILM COATED ORAL
Qty: 90 TABLET | Refills: 1 | Status: SHIPPED | OUTPATIENT
Start: 2021-08-08 | End: 2021-10-07

## 2021-08-08 RX ORDER — CLONIDINE HYDROCHLORIDE 0.1 MG/1
TABLET ORAL
Qty: 60 TABLET | Refills: 1 | Status: SHIPPED | OUTPATIENT
Start: 2021-08-08 | End: 2021-10-07

## 2021-08-08 RX ORDER — METOPROLOL TARTRATE 50 MG/1
TABLET, FILM COATED ORAL
Qty: 540 TABLET | Refills: 0 | Status: SHIPPED | OUTPATIENT
Start: 2021-08-08 | End: 2021-11-09

## 2021-10-07 DIAGNOSIS — E11.65 TYPE 2 DIABETES MELLITUS WITH HYPERGLYCEMIA, WITHOUT LONG-TERM CURRENT USE OF INSULIN (HCC): ICD-10-CM

## 2021-10-07 RX ORDER — ATORVASTATIN CALCIUM 40 MG/1
TABLET, FILM COATED ORAL
Qty: 90 TABLET | Refills: 1 | Status: SHIPPED | OUTPATIENT
Start: 2021-10-07 | End: 2022-04-08

## 2021-10-07 RX ORDER — GLIMEPIRIDE 4 MG/1
TABLET ORAL
Qty: 60 TABLET | Refills: 5 | Status: SHIPPED | OUTPATIENT
Start: 2021-10-07 | End: 2022-04-08

## 2021-10-07 RX ORDER — HYDRALAZINE HYDROCHLORIDE 50 MG/1
TABLET, FILM COATED ORAL
Qty: 90 TABLET | Refills: 1 | Status: SHIPPED | OUTPATIENT
Start: 2021-10-07 | End: 2021-12-06

## 2021-10-07 RX ORDER — CLONIDINE HYDROCHLORIDE 0.1 MG/1
TABLET ORAL
Qty: 60 TABLET | Refills: 1 | Status: SHIPPED | OUTPATIENT
Start: 2021-10-07 | End: 2021-12-06

## 2021-10-07 RX ORDER — AMLODIPINE BESYLATE 10 MG/1
TABLET ORAL
Qty: 90 TABLET | Refills: 1 | Status: SHIPPED | OUTPATIENT
Start: 2021-10-07 | End: 2022-04-08

## 2021-11-09 RX ORDER — METOPROLOL TARTRATE 50 MG/1
TABLET, FILM COATED ORAL
Qty: 540 TABLET | Refills: 0 | Status: SHIPPED | OUTPATIENT
Start: 2021-11-09 | End: 2022-02-04

## 2021-12-06 RX ORDER — HYDRALAZINE HYDROCHLORIDE 50 MG/1
TABLET, FILM COATED ORAL
Qty: 90 TABLET | Refills: 1 | Status: SHIPPED | OUTPATIENT
Start: 2021-12-06 | End: 2022-04-08

## 2021-12-06 RX ORDER — CLONIDINE HYDROCHLORIDE 0.1 MG/1
TABLET ORAL
Qty: 60 TABLET | Refills: 1 | Status: SHIPPED | OUTPATIENT
Start: 2021-12-06 | End: 2022-02-04

## 2022-01-20 ENCOUNTER — LAB (OUTPATIENT)
Dept: FAMILY MEDICINE CLINIC | Facility: CLINIC | Age: 62
End: 2022-01-20

## 2022-01-20 ENCOUNTER — OFFICE VISIT (OUTPATIENT)
Dept: FAMILY MEDICINE CLINIC | Facility: CLINIC | Age: 62
End: 2022-01-20

## 2022-01-20 VITALS
BODY MASS INDEX: 30.05 KG/M2 | WEIGHT: 240.4 LBS | OXYGEN SATURATION: 97 % | DIASTOLIC BLOOD PRESSURE: 75 MMHG | HEART RATE: 66 BPM | TEMPERATURE: 97.1 F | SYSTOLIC BLOOD PRESSURE: 115 MMHG

## 2022-01-20 DIAGNOSIS — Z12.5 ENCOUNTER FOR SCREENING FOR MALIGNANT NEOPLASM OF PROSTATE: ICD-10-CM

## 2022-01-20 DIAGNOSIS — I10 ESSENTIAL HYPERTENSION: ICD-10-CM

## 2022-01-20 DIAGNOSIS — E11.65 TYPE 2 DIABETES MELLITUS WITH HYPERGLYCEMIA, WITHOUT LONG-TERM CURRENT USE OF INSULIN: Primary | ICD-10-CM

## 2022-01-20 LAB
ALBUMIN SERPL-MCNC: 4.5 G/DL (ref 3.5–5.2)
ALBUMIN UR-MCNC: 6 MG/DL
ALBUMIN/GLOB SERPL: 1.7 G/DL
ALP SERPL-CCNC: 64 U/L (ref 39–117)
ALT SERPL W P-5'-P-CCNC: 27 U/L (ref 1–41)
ANION GAP SERPL CALCULATED.3IONS-SCNC: 9 MMOL/L (ref 5–15)
AST SERPL-CCNC: 24 U/L (ref 1–40)
BASOPHILS # BLD AUTO: 0.06 10*3/MM3 (ref 0–0.2)
BASOPHILS NFR BLD AUTO: 0.6 % (ref 0–1.5)
BILIRUB SERPL-MCNC: 0.4 MG/DL (ref 0–1.2)
BUN SERPL-MCNC: 13 MG/DL (ref 8–23)
BUN/CREAT SERPL: 16.5 (ref 7–25)
CALCIUM SPEC-SCNC: 10.4 MG/DL (ref 8.6–10.5)
CHLORIDE SERPL-SCNC: 98 MMOL/L (ref 98–107)
CHOLEST SERPL-MCNC: 86 MG/DL (ref 0–200)
CO2 SERPL-SCNC: 29 MMOL/L (ref 22–29)
CREAT SERPL-MCNC: 0.79 MG/DL (ref 0.76–1.27)
DEPRECATED RDW RBC AUTO: 39.9 FL (ref 37–54)
EOSINOPHIL # BLD AUTO: 0.12 10*3/MM3 (ref 0–0.4)
EOSINOPHIL NFR BLD AUTO: 1.2 % (ref 0.3–6.2)
ERYTHROCYTE [DISTWIDTH] IN BLOOD BY AUTOMATED COUNT: 12.2 % (ref 12.3–15.4)
GFR SERPL CREATININE-BSD FRML MDRD: 100 ML/MIN/1.73
GLOBULIN UR ELPH-MCNC: 2.6 GM/DL
GLUCOSE SERPL-MCNC: 133 MG/DL (ref 65–99)
HBA1C MFR BLD: 7.4 % (ref 3.5–5.6)
HCT VFR BLD AUTO: 45.5 % (ref 37.5–51)
HDLC SERPL-MCNC: 24 MG/DL (ref 40–60)
HGB BLD-MCNC: 15.7 G/DL (ref 13–17.7)
IMM GRANULOCYTES # BLD AUTO: 0.03 10*3/MM3 (ref 0–0.05)
IMM GRANULOCYTES NFR BLD AUTO: 0.3 % (ref 0–0.5)
LDLC SERPL CALC-MCNC: 42 MG/DL (ref 0–100)
LDLC/HDLC SERPL: 1.72 {RATIO}
LYMPHOCYTES # BLD AUTO: 2.23 10*3/MM3 (ref 0.7–3.1)
LYMPHOCYTES NFR BLD AUTO: 22.9 % (ref 19.6–45.3)
MCH RBC QN AUTO: 31 PG (ref 26.6–33)
MCHC RBC AUTO-ENTMCNC: 34.5 G/DL (ref 31.5–35.7)
MCV RBC AUTO: 89.9 FL (ref 79–97)
MONOCYTES # BLD AUTO: 0.68 10*3/MM3 (ref 0.1–0.9)
MONOCYTES NFR BLD AUTO: 7 % (ref 5–12)
NEUTROPHILS NFR BLD AUTO: 6.62 10*3/MM3 (ref 1.7–7)
NEUTROPHILS NFR BLD AUTO: 68 % (ref 42.7–76)
NRBC BLD AUTO-RTO: 0.1 /100 WBC (ref 0–0.2)
PLATELET # BLD AUTO: 300 10*3/MM3 (ref 140–450)
PMV BLD AUTO: 10 FL (ref 6–12)
POTASSIUM SERPL-SCNC: 3.8 MMOL/L (ref 3.5–5.2)
PROT SERPL-MCNC: 7.1 G/DL (ref 6–8.5)
PSA SERPL-MCNC: 0.55 NG/ML (ref 0–4)
RBC # BLD AUTO: 5.06 10*6/MM3 (ref 4.14–5.8)
SODIUM SERPL-SCNC: 136 MMOL/L (ref 136–145)
TRIGL SERPL-MCNC: 104 MG/DL (ref 0–150)
VLDLC SERPL-MCNC: 20 MG/DL (ref 5–40)
WBC NRBC COR # BLD: 9.74 10*3/MM3 (ref 3.4–10.8)

## 2022-01-20 PROCEDURE — 80061 LIPID PANEL: CPT | Performed by: FAMILY MEDICINE

## 2022-01-20 PROCEDURE — 83036 HEMOGLOBIN GLYCOSYLATED A1C: CPT | Performed by: FAMILY MEDICINE

## 2022-01-20 PROCEDURE — 36415 COLL VENOUS BLD VENIPUNCTURE: CPT | Performed by: FAMILY MEDICINE

## 2022-01-20 PROCEDURE — 80053 COMPREHEN METABOLIC PANEL: CPT | Performed by: FAMILY MEDICINE

## 2022-01-20 PROCEDURE — 99214 OFFICE O/P EST MOD 30 MIN: CPT | Performed by: FAMILY MEDICINE

## 2022-01-20 PROCEDURE — 82043 UR ALBUMIN QUANTITATIVE: CPT | Performed by: FAMILY MEDICINE

## 2022-01-20 PROCEDURE — G0103 PSA SCREENING: HCPCS | Performed by: FAMILY MEDICINE

## 2022-01-20 PROCEDURE — 85025 COMPLETE CBC W/AUTO DIFF WBC: CPT | Performed by: FAMILY MEDICINE

## 2022-01-20 NOTE — PROGRESS NOTES
Subjective   Prashant Zhou is a 61 y.o. male.     Prashant Zhou is in for follow up on his diabetes and high blood pressure. There is no history of chest pain or dyspnea of late. There is no history of issue with bowel or bladder function. There is no history of vision or hearing problems. There is no history of dizziness or lightheadedness. There is no history of issue with mood. As for checking blood sugar readings, he does not check. There is no issue with medication compliance. Diet and exercise compliance has been okay but can be better on both fronts.         /75 (BP Location: Left arm, Patient Position: Sitting, Cuff Size: Large Adult)   Pulse 66   Temp 97.1 °F (36.2 °C) (Temporal)   Wt 109 kg (240 lb 6.4 oz)   SpO2 97%   BMI 30.05 kg/m²       Chief Complaint   Patient presents with   • Diabetes     6 month f/u            Current Outpatient Medications:   •  amLODIPine (NORVASC) 10 MG tablet, TAKE ONE TABLET BY MOUTH DAILY, Disp: 90 tablet, Rfl: 1  •  atorvastatin (LIPITOR) 40 MG tablet, TAKE ONE TABLET BY MOUTH EVERY NIGHT AT BEDTIME, Disp: 90 tablet, Rfl: 1  •  Blood Glucose Monitoring Suppl (ONE TOUCH ULTRA 2) w/Device kit, ONETOUCH ULTRA 2 w/Device KIT, Disp: , Rfl:   •  cloNIDine (CATAPRES) 0.1 MG tablet, TAKE ONE TABLET BY MOUTH TWICE A DAY, Disp: 60 tablet, Rfl: 1  •  glimepiride (AMARYL) 4 MG tablet, TAKE ONE TABLET BY MOUTH TWICE A DAY, Disp: 60 tablet, Rfl: 5  •  glucose blood (ONE TOUCH ULTRA TEST) test strip, ONETOUCH ULTRA BLUE STRP, Disp: , Rfl:   •  hydrALAZINE (APRESOLINE) 50 MG tablet, TAKE ONE TABLET BY MOUTH EVERY 8 HOURS, Disp: 90 tablet, Rfl: 1  •  lisinopril-hydrochlorothiazide (PRINZIDE,ZESTORETIC) 20-25 MG per tablet, TAKE ONE TABLET BY MOUTH DAILY, Disp: 90 tablet, Rfl: 3  •  metFORMIN (GLUCOPHAGE) 850 MG tablet, TAKE ONE TABLET BY MOUTH TWICE A DAY WITH MEALS, Disp: 144 tablet, Rfl: 1  •  metoprolol tartrate (LOPRESSOR) 50 MG tablet, TAKE THREE TABLETS BY MOUTH  EVERY 12 HOURS, Disp: 540 tablet, Rfl: 0  •  ONETOUCH DELICA LANCETS FINE misc, ONETOUCH DELICA LANCETS FINE, Disp: , Rfl:     Lab Results   Component Value Date    HGBA1C 7.7 (H) 07/21/2021    HGBA1C 7.3 (H) 01/20/2021    HGBA1C 7.0 (H) 07/20/2020     Lab Results   Component Value Date    CREATININE 0.74 (L) 07/21/2021         Wt Readings from Last 3 Encounters:   01/20/22 109 kg (240 lb 6.4 oz)   07/21/21 110 kg (243 lb)   07/14/21 110 kg (242 lb)       The following portions of the patient's history were reviewed and updated as appropriate: allergies, current medications, past family history, past medical history, past social history, past surgical history, and problem list.    Review of Systems   Constitutional: Negative for activity change, fatigue and fever.   HENT: Negative for congestion, sinus pressure, sinus pain, sore throat and trouble swallowing.    Eyes: Negative for visual disturbance.   Respiratory: Negative for chest tightness, shortness of breath and wheezing.    Cardiovascular: Negative for chest pain.   Gastrointestinal: Negative for abdominal distention, abdominal pain, constipation, diarrhea, nausea and vomiting.   Genitourinary: Negative for difficulty urinating, dysuria, frequency and urgency.   Musculoskeletal: Positive for arthralgias. Negative for back pain and neck pain.   Neurological: Negative for dizziness, weakness, light-headedness and numbness.   Psychiatric/Behavioral: Positive for sleep disturbance. Negative for agitation, hallucinations and suicidal ideas. The patient is not nervous/anxious.        Objective   Physical Exam  Vitals and nursing note reviewed.   HENT:      Right Ear: Tympanic membrane and ear canal normal.      Left Ear: Tympanic membrane and ear canal normal.   Cardiovascular:      Rate and Rhythm: Normal rate and regular rhythm.      Heart sounds: Normal heart sounds. No murmur heard.      Pulmonary:      Effort: Pulmonary effort is normal.      Breath sounds: No  wheezing or rales.   Abdominal:      General: Bowel sounds are normal.      Palpations: Abdomen is soft.      Tenderness: There is no abdominal tenderness. There is no guarding.   Musculoskeletal:      Cervical back: Neck supple.      Right lower leg: No edema.      Left lower leg: No edema.   Lymphadenopathy:      Cervical: No cervical adenopathy.   Skin:     Findings: No bruising or rash.   Neurological:      Mental Status: He is alert and oriented to person, place, and time. Mental status is at baseline.   Psychiatric:         Mood and Affect: Mood normal.           Assessment/Plan   Problems Addressed this Visit        Endocrine and Metabolic    Type 2 diabetes mellitus with hyperglycemia (HCC) - Primary    Relevant Orders    Comprehensive metabolic panel    Hemoglobin A1c    Lipid panel    CBC w AUTO Differential    MicroAlbumin, Urine, Random - Urine, Clean Catch       Genitourinary and Reproductive     Encounter for screening for malignant neoplasm of prostate    Relevant Orders    PSA SCREENING      Other Visit Diagnoses     Essential hypertension          Diagnoses       Codes Comments    Type 2 diabetes mellitus with hyperglycemia, without long-term current use of insulin (HCC)    -  Primary ICD-10-CM: E11.65  ICD-9-CM: 250.00, 790.29     Essential hypertension     ICD-10-CM: I10  ICD-9-CM: 401.9     Encounter for screening for malignant neoplasm of prostate     ICD-10-CM: Z12.5  ICD-9-CM: V76.44         I we will update some labs and adjust the treatment plan as indicated  I have asked him to be better with diet and exercise  I have asked him to get his COVID booster  I will see him back in 6 months, sooner if needed  He is to call for any new concerns in the interim

## 2022-02-04 RX ORDER — METOPROLOL TARTRATE 50 MG/1
TABLET, FILM COATED ORAL
Qty: 540 TABLET | Refills: 0 | Status: SHIPPED | OUTPATIENT
Start: 2022-02-04 | End: 2022-05-09

## 2022-02-04 RX ORDER — CLONIDINE HYDROCHLORIDE 0.1 MG/1
TABLET ORAL
Qty: 60 TABLET | Refills: 1 | Status: SHIPPED | OUTPATIENT
Start: 2022-02-04 | End: 2022-05-09

## 2022-04-08 DIAGNOSIS — E11.65 TYPE 2 DIABETES MELLITUS WITH HYPERGLYCEMIA, WITHOUT LONG-TERM CURRENT USE OF INSULIN: ICD-10-CM

## 2022-04-08 RX ORDER — LISINOPRIL AND HYDROCHLOROTHIAZIDE 25; 20 MG/1; MG/1
TABLET ORAL
Qty: 90 TABLET | Refills: 3 | Status: SHIPPED | OUTPATIENT
Start: 2022-04-08 | End: 2023-03-09

## 2022-04-08 RX ORDER — ATORVASTATIN CALCIUM 40 MG/1
TABLET, FILM COATED ORAL
Qty: 90 TABLET | Refills: 1 | Status: SHIPPED | OUTPATIENT
Start: 2022-04-08 | End: 2022-10-17

## 2022-04-08 RX ORDER — HYDRALAZINE HYDROCHLORIDE 50 MG/1
TABLET, FILM COATED ORAL
Qty: 90 TABLET | Refills: 1 | Status: SHIPPED | OUTPATIENT
Start: 2022-04-08 | End: 2022-07-20

## 2022-04-08 RX ORDER — AMLODIPINE BESYLATE 10 MG/1
TABLET ORAL
Qty: 90 TABLET | Refills: 1 | Status: SHIPPED | OUTPATIENT
Start: 2022-04-08 | End: 2022-10-17

## 2022-04-08 RX ORDER — GLIMEPIRIDE 4 MG/1
TABLET ORAL
Qty: 180 TABLET | Refills: 1 | Status: SHIPPED | OUTPATIENT
Start: 2022-04-08 | End: 2022-07-20

## 2022-05-09 RX ORDER — CLONIDINE HYDROCHLORIDE 0.1 MG/1
TABLET ORAL
Qty: 60 TABLET | Refills: 1 | Status: SHIPPED | OUTPATIENT
Start: 2022-05-09 | End: 2022-07-13

## 2022-05-09 RX ORDER — METOPROLOL TARTRATE 50 MG/1
TABLET, FILM COATED ORAL
Qty: 540 TABLET | Refills: 0 | Status: SHIPPED | OUTPATIENT
Start: 2022-05-09 | End: 2022-07-13

## 2022-07-13 RX ORDER — METOPROLOL TARTRATE 50 MG/1
TABLET, FILM COATED ORAL
Qty: 540 TABLET | Refills: 0 | Status: SHIPPED | OUTPATIENT
Start: 2022-07-13 | End: 2022-10-17

## 2022-07-13 RX ORDER — CLONIDINE HYDROCHLORIDE 0.1 MG/1
TABLET ORAL
Qty: 60 TABLET | Refills: 1 | Status: SHIPPED | OUTPATIENT
Start: 2022-07-13 | End: 2022-09-20

## 2022-07-20 ENCOUNTER — LAB (OUTPATIENT)
Dept: FAMILY MEDICINE CLINIC | Facility: CLINIC | Age: 62
End: 2022-07-20

## 2022-07-20 ENCOUNTER — OFFICE VISIT (OUTPATIENT)
Dept: FAMILY MEDICINE CLINIC | Facility: CLINIC | Age: 62
End: 2022-07-20

## 2022-07-20 VITALS
WEIGHT: 237 LBS | HEART RATE: 62 BPM | DIASTOLIC BLOOD PRESSURE: 79 MMHG | BODY MASS INDEX: 29.62 KG/M2 | TEMPERATURE: 97.7 F | OXYGEN SATURATION: 95 % | SYSTOLIC BLOOD PRESSURE: 126 MMHG

## 2022-07-20 DIAGNOSIS — E11.65 TYPE 2 DIABETES MELLITUS WITH HYPERGLYCEMIA, WITHOUT LONG-TERM CURRENT USE OF INSULIN: ICD-10-CM

## 2022-07-20 DIAGNOSIS — E11.9 TYPE 2 DIABETES MELLITUS WITHOUT COMPLICATION, WITHOUT LONG-TERM CURRENT USE OF INSULIN: Primary | ICD-10-CM

## 2022-07-20 DIAGNOSIS — E78.00 PURE HYPERCHOLESTEROLEMIA: ICD-10-CM

## 2022-07-20 DIAGNOSIS — I10 PRIMARY HYPERTENSION: ICD-10-CM

## 2022-07-20 DIAGNOSIS — Z12.11 SCREENING FOR COLON CANCER: ICD-10-CM

## 2022-07-20 LAB
ALBUMIN SERPL-MCNC: 4.4 G/DL (ref 3.5–5.2)
ALBUMIN/GLOB SERPL: 1.9 G/DL
ALP SERPL-CCNC: 54 U/L (ref 39–117)
ALT SERPL W P-5'-P-CCNC: 21 U/L (ref 1–41)
ANION GAP SERPL CALCULATED.3IONS-SCNC: 12 MMOL/L (ref 5–15)
AST SERPL-CCNC: 14 U/L (ref 1–40)
BASOPHILS # BLD AUTO: 0.05 10*3/MM3 (ref 0–0.2)
BASOPHILS NFR BLD AUTO: 0.5 % (ref 0–1.5)
BILIRUB SERPL-MCNC: 0.4 MG/DL (ref 0–1.2)
BUN SERPL-MCNC: 10 MG/DL (ref 8–23)
BUN/CREAT SERPL: 15.9 (ref 7–25)
CALCIUM SPEC-SCNC: 10.1 MG/DL (ref 8.6–10.5)
CHLORIDE SERPL-SCNC: 99 MMOL/L (ref 98–107)
CHOLEST SERPL-MCNC: 94 MG/DL (ref 0–200)
CO2 SERPL-SCNC: 28 MMOL/L (ref 22–29)
CREAT SERPL-MCNC: 0.63 MG/DL (ref 0.76–1.27)
DEPRECATED RDW RBC AUTO: 40.9 FL (ref 37–54)
EGFRCR SERPLBLD CKD-EPI 2021: 108.9 ML/MIN/1.73
EOSINOPHIL # BLD AUTO: 0.14 10*3/MM3 (ref 0–0.4)
EOSINOPHIL NFR BLD AUTO: 1.3 % (ref 0.3–6.2)
ERYTHROCYTE [DISTWIDTH] IN BLOOD BY AUTOMATED COUNT: 12.5 % (ref 12.3–15.4)
GLOBULIN UR ELPH-MCNC: 2.3 GM/DL
GLUCOSE SERPL-MCNC: 118 MG/DL (ref 65–99)
HBA1C MFR BLD: 6.7 % (ref 3.5–5.6)
HCT VFR BLD AUTO: 43.5 % (ref 37.5–51)
HDLC SERPL-MCNC: 29 MG/DL (ref 40–60)
HGB BLD-MCNC: 15.2 G/DL (ref 13–17.7)
IMM GRANULOCYTES # BLD AUTO: 0.04 10*3/MM3 (ref 0–0.05)
IMM GRANULOCYTES NFR BLD AUTO: 0.4 % (ref 0–0.5)
LDLC SERPL CALC-MCNC: 45 MG/DL (ref 0–100)
LDLC/HDLC SERPL: 1.53 {RATIO}
LYMPHOCYTES # BLD AUTO: 2.78 10*3/MM3 (ref 0.7–3.1)
LYMPHOCYTES NFR BLD AUTO: 26.4 % (ref 19.6–45.3)
MCH RBC QN AUTO: 31.8 PG (ref 26.6–33)
MCHC RBC AUTO-ENTMCNC: 34.9 G/DL (ref 31.5–35.7)
MCV RBC AUTO: 91 FL (ref 79–97)
MONOCYTES # BLD AUTO: 0.76 10*3/MM3 (ref 0.1–0.9)
MONOCYTES NFR BLD AUTO: 7.2 % (ref 5–12)
NEUTROPHILS NFR BLD AUTO: 6.78 10*3/MM3 (ref 1.7–7)
NEUTROPHILS NFR BLD AUTO: 64.2 % (ref 42.7–76)
NRBC BLD AUTO-RTO: 0 /100 WBC (ref 0–0.2)
PLATELET # BLD AUTO: 271 10*3/MM3 (ref 140–450)
PMV BLD AUTO: 9.9 FL (ref 6–12)
POTASSIUM SERPL-SCNC: 4.1 MMOL/L (ref 3.5–5.2)
PROT SERPL-MCNC: 6.7 G/DL (ref 6–8.5)
RBC # BLD AUTO: 4.78 10*6/MM3 (ref 4.14–5.8)
SODIUM SERPL-SCNC: 139 MMOL/L (ref 136–145)
TRIGL SERPL-MCNC: 103 MG/DL (ref 0–150)
VLDLC SERPL-MCNC: 20 MG/DL (ref 5–40)
WBC NRBC COR # BLD: 10.55 10*3/MM3 (ref 3.4–10.8)

## 2022-07-20 PROCEDURE — 80053 COMPREHEN METABOLIC PANEL: CPT | Performed by: FAMILY MEDICINE

## 2022-07-20 PROCEDURE — 80061 LIPID PANEL: CPT | Performed by: FAMILY MEDICINE

## 2022-07-20 PROCEDURE — 36415 COLL VENOUS BLD VENIPUNCTURE: CPT | Performed by: FAMILY MEDICINE

## 2022-07-20 PROCEDURE — 83036 HEMOGLOBIN GLYCOSYLATED A1C: CPT | Performed by: FAMILY MEDICINE

## 2022-07-20 PROCEDURE — 85025 COMPLETE CBC W/AUTO DIFF WBC: CPT | Performed by: FAMILY MEDICINE

## 2022-07-20 PROCEDURE — 99214 OFFICE O/P EST MOD 30 MIN: CPT | Performed by: FAMILY MEDICINE

## 2022-07-20 RX ORDER — HYDRALAZINE HYDROCHLORIDE 50 MG/1
TABLET, FILM COATED ORAL
Qty: 90 TABLET | Refills: 1 | Status: SHIPPED | OUTPATIENT
Start: 2022-07-20 | End: 2022-10-21

## 2022-07-20 RX ORDER — GLIMEPIRIDE 4 MG/1
TABLET ORAL
Qty: 180 TABLET | Refills: 1 | Status: SHIPPED | OUTPATIENT
Start: 2022-07-20 | End: 2023-03-09

## 2022-07-20 NOTE — PROGRESS NOTES
Subjective   Prashant Zhou is a 61 y.o. male.     Prashant Zhou is in for follow up on his diabetes. There is no history of chest pain or dyspnea of late. There is no history of issue with bowel or bladder function. There is no history of vision or hearing problems. There is no history of dizziness or lightheadedness. There is no history of issue with sleep or mood. As for checking blood sugar readings, he says they run 100-115, but he did not bring his log. There is no issue with medication compliance. Diet compliance has been okay but exercise has been lacking. He has some shoulder pain and arthritic stiffness that he says limits him but he can do his ADLs.         /79 (BP Location: Left arm, Patient Position: Sitting, Cuff Size: Large Adult)   Pulse 62   Temp 97.7 °F (36.5 °C) (Temporal)   Wt 108 kg (237 lb)   SpO2 95%   BMI 29.62 kg/m²       Chief Complaint   Patient presents with   • Diabetes     6 month f/u - fasting for labs            Current Outpatient Medications:   •  amLODIPine (NORVASC) 10 MG tablet, TAKE ONE TABLET BY MOUTH DAILY, Disp: 90 tablet, Rfl: 1  •  atorvastatin (LIPITOR) 40 MG tablet, TAKE ONE TABLET BY MOUTH EVERY NIGHT AT BEDTIME, Disp: 90 tablet, Rfl: 1  •  Blood Glucose Monitoring Suppl (ONE TOUCH ULTRA 2) w/Device kit, ONETOUCH ULTRA 2 w/Device KIT, Disp: , Rfl:   •  cloNIDine (CATAPRES) 0.1 MG tablet, TAKE ONE TABLET BY MOUTH TWICE A DAY, Disp: 60 tablet, Rfl: 1  •  glimepiride (AMARYL) 4 MG tablet, TAKE ONE TABLET BY MOUTH TWICE A DAY, Disp: 180 tablet, Rfl: 1  •  glucose blood test strip, ONETOUCH ULTRA BLUE STRP, Disp: , Rfl:   •  hydrALAZINE (APRESOLINE) 50 MG tablet, TAKE 1 TABLET BY MOUTH EVERY 8 HOURS, Disp: 90 tablet, Rfl: 1  •  lisinopril-hydrochlorothiazide (PRINZIDE,ZESTORETIC) 20-25 MG per tablet, TAKE ONE TABLET BY MOUTH DAILY, Disp: 90 tablet, Rfl: 3  •  metFORMIN (GLUCOPHAGE) 850 MG tablet, TAKE ONE TABLET BY MOUTH TWICE A DAY WITH MEALS, Disp: 144  tablet, Rfl: 1  •  metoprolol tartrate (LOPRESSOR) 50 MG tablet, TAKE THREE TABLETS BY MOUTH EVERY 12 HOURS, Disp: 540 tablet, Rfl: 0  •  ONETOUCH DELICA LANCETS FINE misc, ONETOUCH DELICA LANCETS FINE, Disp: , Rfl:     Lab Results   Component Value Date    HGBA1C 7.4 (H) 01/20/2022    HGBA1C 7.7 (H) 07/21/2021    HGBA1C 7.3 (H) 01/20/2021     Lab Results   Component Value Date    MICROALBUR 6.0 01/20/2022    CREATININE 0.79 01/20/2022         Wt Readings from Last 3 Encounters:   07/20/22 108 kg (237 lb)   01/20/22 109 kg (240 lb 6.4 oz)   07/21/21 110 kg (243 lb)       The following portions of the patient's history were reviewed and updated as appropriate: allergies, current medications, past family history, past medical history, past social history, past surgical history, and problem list.    Review of Systems   Constitutional: Negative for activity change, fatigue and fever.   HENT: Negative for congestion, sinus pressure, sinus pain, sore throat and trouble swallowing.    Eyes: Negative for visual disturbance.   Respiratory: Negative for chest tightness, shortness of breath and wheezing.    Cardiovascular: Negative for chest pain.   Gastrointestinal: Negative for abdominal distention, abdominal pain, constipation, diarrhea, nausea and vomiting.   Genitourinary: Negative for difficulty urinating, dysuria, frequency and urgency.   Musculoskeletal: Positive for arthralgias. Negative for back pain and neck pain.   Neurological: Negative for dizziness, weakness, light-headedness and numbness.   Psychiatric/Behavioral: Positive for sleep disturbance. Negative for agitation, hallucinations and suicidal ideas. The patient is not nervous/anxious.        Objective   Physical Exam  Vitals and nursing note reviewed.   HENT:      Right Ear: Tympanic membrane and ear canal normal.      Left Ear: Tympanic membrane and ear canal normal.   Eyes:      Conjunctiva/sclera: Conjunctivae normal.      Pupils: Pupils are equal,  round, and reactive to light.   Cardiovascular:      Rate and Rhythm: Normal rate and regular rhythm.      Heart sounds: Normal heart sounds. No murmur heard.  Pulmonary:      Effort: Pulmonary effort is normal.      Breath sounds: No wheezing or rales.   Abdominal:      General: Bowel sounds are normal.      Palpations: Abdomen is soft.      Tenderness: There is no abdominal tenderness. There is no guarding.   Musculoskeletal:      Cervical back: Neck supple.      Right lower leg: No edema.      Left lower leg: No edema.   Lymphadenopathy:      Cervical: No cervical adenopathy.   Skin:     Findings: No rash.   Neurological:      General: No focal deficit present.      Mental Status: He is alert and oriented to person, place, and time.   Psychiatric:         Mood and Affect: Mood normal.           Assessment & Plan   Problems Addressed this Visit        Cardiac and Vasculature    Hypertension    Hyperlipidemia       Endocrine and Metabolic    Type 2 diabetes mellitus without complications (HCC) - Primary    Relevant Orders    Comprehensive metabolic panel    Hemoglobin A1c    Lipid panel    CBC w AUTO Differential      Other Visit Diagnoses     Screening for colon cancer        Relevant Orders    Ambulatory Referral For Screening Colonoscopy (Completed)      Diagnoses       Codes Comments    Type 2 diabetes mellitus without complication, without long-term current use of insulin (HCC)    -  Primary ICD-10-CM: E11.9  ICD-9-CM: 250.00     Primary hypertension     ICD-10-CM: I10  ICD-9-CM: 401.9     Pure hypercholesterolemia     ICD-10-CM: E78.00  ICD-9-CM: 272.0     Screening for colon cancer     ICD-10-CM: Z12.11  ICD-9-CM: V76.51         I will update some labs and adjust his treatment plan if indicated  I offered him updated pneumonia vaccine and he declined  I offered him shingles vaccine prescription and he declined  I did order a colonoscopy and encouraged him greatly to get it done  I reminded him to get his eye  exam rescheduled as he had to cancel when his wife recently had COVID  I have asked him to continue fine-tuning his diet and lifestyle and educated him on some exercise techniques  As of this moment I will not change any of his medication but I will review the labs when resulted to see if changes are indicated  I have asked him to bring his glucose log with him to his next visit and I will see him back in 6 months for his follow-up and his Medicare wellness

## 2022-09-20 RX ORDER — CLONIDINE HYDROCHLORIDE 0.1 MG/1
TABLET ORAL
Qty: 60 TABLET | Refills: 1 | Status: SHIPPED | OUTPATIENT
Start: 2022-09-20 | End: 2022-11-29

## 2022-10-17 RX ORDER — METOPROLOL TARTRATE 50 MG/1
TABLET, FILM COATED ORAL
Qty: 540 TABLET | Refills: 0 | Status: SHIPPED | OUTPATIENT
Start: 2022-10-17 | End: 2022-12-27

## 2022-10-17 RX ORDER — AMLODIPINE BESYLATE 10 MG/1
TABLET ORAL
Qty: 90 TABLET | Refills: 0 | Status: SHIPPED | OUTPATIENT
Start: 2022-10-17 | End: 2022-12-27

## 2022-10-17 RX ORDER — ATORVASTATIN CALCIUM 40 MG/1
TABLET, FILM COATED ORAL
Qty: 90 TABLET | Refills: 0 | Status: SHIPPED | OUTPATIENT
Start: 2022-10-17 | End: 2022-12-27

## 2022-10-21 RX ORDER — HYDRALAZINE HYDROCHLORIDE 50 MG/1
TABLET, FILM COATED ORAL
Qty: 90 TABLET | Refills: 1 | Status: SHIPPED | OUTPATIENT
Start: 2022-10-21 | End: 2023-01-02

## 2022-11-29 RX ORDER — CLONIDINE HYDROCHLORIDE 0.1 MG/1
TABLET ORAL
Qty: 60 TABLET | Refills: 5 | Status: SHIPPED | OUTPATIENT
Start: 2022-11-29

## 2022-12-27 RX ORDER — METOPROLOL TARTRATE 50 MG/1
TABLET, FILM COATED ORAL
Qty: 540 TABLET | Refills: 0 | Status: SHIPPED | OUTPATIENT
Start: 2022-12-27 | End: 2023-03-09

## 2022-12-27 RX ORDER — ATORVASTATIN CALCIUM 40 MG/1
TABLET, FILM COATED ORAL
Qty: 90 TABLET | Refills: 0 | Status: SHIPPED | OUTPATIENT
Start: 2022-12-27 | End: 2023-03-09

## 2022-12-27 RX ORDER — AMLODIPINE BESYLATE 10 MG/1
TABLET ORAL
Qty: 90 TABLET | Refills: 0 | Status: SHIPPED | OUTPATIENT
Start: 2022-12-27 | End: 2023-03-09

## 2023-01-02 RX ORDER — HYDRALAZINE HYDROCHLORIDE 50 MG/1
TABLET, FILM COATED ORAL
Qty: 90 TABLET | Refills: 1 | Status: SHIPPED | OUTPATIENT
Start: 2023-01-02 | End: 2023-03-09

## 2023-01-23 ENCOUNTER — OFFICE VISIT (OUTPATIENT)
Dept: FAMILY MEDICINE CLINIC | Facility: CLINIC | Age: 63
End: 2023-01-23
Payer: MEDICARE

## 2023-01-23 ENCOUNTER — LAB (OUTPATIENT)
Dept: FAMILY MEDICINE CLINIC | Facility: CLINIC | Age: 63
End: 2023-01-23
Payer: MEDICARE

## 2023-01-23 ENCOUNTER — TELEPHONE (OUTPATIENT)
Dept: FAMILY MEDICINE CLINIC | Facility: CLINIC | Age: 63
End: 2023-01-23

## 2023-01-23 VITALS
HEART RATE: 62 BPM | WEIGHT: 231 LBS | OXYGEN SATURATION: 96 % | SYSTOLIC BLOOD PRESSURE: 116 MMHG | BODY MASS INDEX: 28.87 KG/M2 | TEMPERATURE: 97.5 F | DIASTOLIC BLOOD PRESSURE: 78 MMHG

## 2023-01-23 DIAGNOSIS — I10 ESSENTIAL HYPERTENSION: ICD-10-CM

## 2023-01-23 DIAGNOSIS — Z12.11 SCREENING FOR COLON CANCER: ICD-10-CM

## 2023-01-23 DIAGNOSIS — E11.65 TYPE 2 DIABETES MELLITUS WITH HYPERGLYCEMIA, WITHOUT LONG-TERM CURRENT USE OF INSULIN: ICD-10-CM

## 2023-01-23 DIAGNOSIS — Z00.00 MEDICARE ANNUAL WELLNESS VISIT, SUBSEQUENT: Primary | ICD-10-CM

## 2023-01-23 DIAGNOSIS — Z12.5 ENCOUNTER FOR SCREENING FOR MALIGNANT NEOPLASM OF PROSTATE: ICD-10-CM

## 2023-01-23 LAB
ALBUMIN SERPL-MCNC: 4.6 G/DL (ref 3.5–5.2)
ALBUMIN UR-MCNC: 18 MG/DL
ALBUMIN/GLOB SERPL: 1.8 G/DL
ALP SERPL-CCNC: 75 U/L (ref 39–117)
ALT SERPL W P-5'-P-CCNC: 11 U/L (ref 1–41)
ANION GAP SERPL CALCULATED.3IONS-SCNC: 10 MMOL/L (ref 5–15)
AST SERPL-CCNC: 14 U/L (ref 1–40)
BASOPHILS # BLD AUTO: 0.08 10*3/MM3 (ref 0–0.2)
BASOPHILS NFR BLD AUTO: 0.7 % (ref 0–1.5)
BILIRUB SERPL-MCNC: 0.4 MG/DL (ref 0–1.2)
BUN SERPL-MCNC: 11 MG/DL (ref 8–23)
BUN/CREAT SERPL: 15.3 (ref 7–25)
CALCIUM SPEC-SCNC: 10.3 MG/DL (ref 8.6–10.5)
CHLORIDE SERPL-SCNC: 98 MMOL/L (ref 98–107)
CHOLEST SERPL-MCNC: 109 MG/DL (ref 0–200)
CO2 SERPL-SCNC: 30 MMOL/L (ref 22–29)
CREAT SERPL-MCNC: 0.72 MG/DL (ref 0.76–1.27)
DEPRECATED RDW RBC AUTO: 37.7 FL (ref 37–54)
EGFRCR SERPLBLD CKD-EPI 2021: 103.9 ML/MIN/1.73
EOSINOPHIL # BLD AUTO: 0.15 10*3/MM3 (ref 0–0.4)
EOSINOPHIL NFR BLD AUTO: 1.4 % (ref 0.3–6.2)
ERYTHROCYTE [DISTWIDTH] IN BLOOD BY AUTOMATED COUNT: 11.9 % (ref 12.3–15.4)
GLOBULIN UR ELPH-MCNC: 2.5 GM/DL
GLUCOSE SERPL-MCNC: 136 MG/DL (ref 65–99)
HBA1C MFR BLD: 7.3 % (ref 3.5–5.6)
HCT VFR BLD AUTO: 44.9 % (ref 37.5–51)
HDLC SERPL-MCNC: 31 MG/DL (ref 40–60)
HGB BLD-MCNC: 15.4 G/DL (ref 13–17.7)
IMM GRANULOCYTES # BLD AUTO: 0.03 10*3/MM3 (ref 0–0.05)
IMM GRANULOCYTES NFR BLD AUTO: 0.3 % (ref 0–0.5)
LDLC SERPL CALC-MCNC: 58 MG/DL (ref 0–100)
LDLC/HDLC SERPL: 1.85 {RATIO}
LYMPHOCYTES # BLD AUTO: 2.65 10*3/MM3 (ref 0.7–3.1)
LYMPHOCYTES NFR BLD AUTO: 24.8 % (ref 19.6–45.3)
MCH RBC QN AUTO: 30.5 PG (ref 26.6–33)
MCHC RBC AUTO-ENTMCNC: 34.3 G/DL (ref 31.5–35.7)
MCV RBC AUTO: 88.9 FL (ref 79–97)
MONOCYTES # BLD AUTO: 0.68 10*3/MM3 (ref 0.1–0.9)
MONOCYTES NFR BLD AUTO: 6.4 % (ref 5–12)
NEUTROPHILS NFR BLD AUTO: 66.4 % (ref 42.7–76)
NEUTROPHILS NFR BLD AUTO: 7.1 10*3/MM3 (ref 1.7–7)
NRBC BLD AUTO-RTO: 0 /100 WBC (ref 0–0.2)
PLATELET # BLD AUTO: 275 10*3/MM3 (ref 140–450)
PMV BLD AUTO: 10 FL (ref 6–12)
POTASSIUM SERPL-SCNC: 3.8 MMOL/L (ref 3.5–5.2)
PROT SERPL-MCNC: 7.1 G/DL (ref 6–8.5)
PSA SERPL-MCNC: 0.59 NG/ML (ref 0–4)
RBC # BLD AUTO: 5.05 10*6/MM3 (ref 4.14–5.8)
SODIUM SERPL-SCNC: 138 MMOL/L (ref 136–145)
TRIGL SERPL-MCNC: 104 MG/DL (ref 0–150)
VLDLC SERPL-MCNC: 20 MG/DL (ref 5–40)
WBC NRBC COR # BLD: 10.69 10*3/MM3 (ref 3.4–10.8)

## 2023-01-23 PROCEDURE — 1170F FXNL STATUS ASSESSED: CPT | Performed by: FAMILY MEDICINE

## 2023-01-23 PROCEDURE — 82043 UR ALBUMIN QUANTITATIVE: CPT | Performed by: FAMILY MEDICINE

## 2023-01-23 PROCEDURE — 1159F MED LIST DOCD IN RCRD: CPT | Performed by: FAMILY MEDICINE

## 2023-01-23 PROCEDURE — 83036 HEMOGLOBIN GLYCOSYLATED A1C: CPT | Performed by: FAMILY MEDICINE

## 2023-01-23 PROCEDURE — 80053 COMPREHEN METABOLIC PANEL: CPT | Performed by: FAMILY MEDICINE

## 2023-01-23 PROCEDURE — 1160F RVW MEDS BY RX/DR IN RCRD: CPT | Performed by: FAMILY MEDICINE

## 2023-01-23 PROCEDURE — 36415 COLL VENOUS BLD VENIPUNCTURE: CPT | Performed by: FAMILY MEDICINE

## 2023-01-23 PROCEDURE — G0439 PPPS, SUBSEQ VISIT: HCPCS | Performed by: FAMILY MEDICINE

## 2023-01-23 PROCEDURE — 85025 COMPLETE CBC W/AUTO DIFF WBC: CPT | Performed by: FAMILY MEDICINE

## 2023-01-23 PROCEDURE — G0103 PSA SCREENING: HCPCS | Performed by: FAMILY MEDICINE

## 2023-01-23 PROCEDURE — 80061 LIPID PANEL: CPT | Performed by: FAMILY MEDICINE

## 2023-01-23 PROCEDURE — 99213 OFFICE O/P EST LOW 20 MIN: CPT | Performed by: FAMILY MEDICINE

## 2023-01-23 RX ORDER — LANCETS
EACH MISCELLANEOUS
Qty: 100 EACH | Refills: 12 | Status: SHIPPED | OUTPATIENT
Start: 2023-01-23 | End: 2024-01-23

## 2023-01-23 RX ORDER — BLOOD-GLUCOSE METER
1 EACH MISCELLANEOUS DAILY
Qty: 1 KIT | Refills: 0 | Status: SHIPPED | OUTPATIENT
Start: 2023-01-23

## 2023-01-23 RX ORDER — LANCETS
EACH MISCELLANEOUS
Qty: 100 EACH | Refills: 12 | Status: SHIPPED | OUTPATIENT
Start: 2023-01-23 | End: 2023-01-23 | Stop reason: SDUPTHER

## 2023-01-23 NOTE — PROGRESS NOTES
The ABCs of the Annual Wellness Visit  Subsequent Medicare Wellness Visit    Subjective    Prashant Zhou is a 62 y.o. male who presents for a Subsequent Medicare Wellness Visit.    The following portions of the patient's history were reviewed and   updated as appropriate: allergies, current medications, past family history, past medical history, past social history, past surgical history and problem list.    Compared to one year ago, the patient feels his physical   health is the same.    Compared to one year ago, the patient feels his mental   health is the same.    Recent Hospitalizations:  He was not admitted to the hospital during the last year.       Current Medical Providers:  Patient Care Team:  Lazaro Paulino MD as PCP - General  Steve Mckeon MD as Consulting Physician (Cardiology)    Outpatient Medications Prior to Visit   Medication Sig Dispense Refill   • amLODIPine (NORVASC) 10 MG tablet TAKE ONE TABLET BY MOUTH DAILY 90 tablet 0   • atorvastatin (LIPITOR) 40 MG tablet TAKE ONE TABLET BY MOUTH EVERY NIGHT AT BEDTIME 90 tablet 0   • Blood Glucose Monitoring Suppl (ONE TOUCH ULTRA 2) w/Device kit ONETOUCH ULTRA 2 w/Device KIT     • cloNIDine (CATAPRES) 0.1 MG tablet TAKE ONE TABLET BY MOUTH TWICE A DAY 60 tablet 5   • glimepiride (AMARYL) 4 MG tablet TAKE ONE TABLET BY MOUTH TWICE A  tablet 1   • glucose blood test strip ONETOUCH ULTRA BLUE STRP     • hydrALAZINE (APRESOLINE) 50 MG tablet TAKE ONE TABLET BY MOUTH EVERY 8 HOURS 90 tablet 1   • lisinopril-hydrochlorothiazide (PRINZIDE,ZESTORETIC) 20-25 MG per tablet TAKE ONE TABLET BY MOUTH DAILY 90 tablet 3   • metFORMIN (GLUCOPHAGE) 850 MG tablet TAKE ONE TABLET BY MOUTH TWICE A DAY WITH MEALS 144 tablet 0   • metoprolol tartrate (LOPRESSOR) 50 MG tablet TAKE THREE TABLETS BY MOUTH EVERY 12 HOURS 540 tablet 0   • ONETOUCH DELICA LANCETS FINE misc ONETOUCH DELICA LANCETS FINE       No facility-administered medications prior to visit.  "      No opioid medication identified on active medication list. I have reviewed chart for other potential  high risk medication/s and harmful drug interactions in the elderly.          Aspirin is not on active medication list.  Aspirin use is not indicated based on review of current medical condition/s. Risk of harm outweighs potential benefits.  .    Patient Active Problem List   Diagnosis   • Hypertension   • Aortic dissection, thoracoabdominal (HCC)   • Type 2 diabetes mellitus with hyperglycemia (HCC)   • Hyperlipidemia   • Encounter for screening for malignant neoplasm of prostate   • Type 2 diabetes mellitus without complications (HCC)   • Skin lesion of face   • Encounter for general adult medical examination without abnormal findings     Advance Care Planning  Advance Directive is not on file.  ACP discussion was held with the patient during this visit. Patient does not have an advance directive, information provided.     Objective    Vitals:    01/23/23 1038   BP: 116/78   BP Location: Left arm   Patient Position: Sitting   Cuff Size: Large Adult   Pulse: 62   Temp: 97.5 °F (36.4 °C)   TempSrc: Temporal   SpO2: 96%   Weight: 105 kg (231 lb)     Estimated body mass index is 28.87 kg/m² as calculated from the following:    Height as of 7/14/21: 190.5 cm (75\").    Weight as of this encounter: 105 kg (231 lb).    BMI is >= 25 and <30. (Overweight) The following options were offered after discussion;: exercise counseling/recommendations and nutrition counseling/recommendations      Does the patient have evidence of cognitive impairment? No          HEALTH RISK ASSESSMENT    Smoking Status:  Social History     Tobacco Use   Smoking Status Every Day   • Packs/day: 1.00   • Types: Cigarettes   Smokeless Tobacco Never     Alcohol Consumption:  Social History     Substance and Sexual Activity   Alcohol Use No     Fall Risk Screen:    STEADI Fall Risk Assessment was completed, and patient is at LOW risk for " falls.Assessment completed on:1/23/2023    Depression Screening:  PHQ-2/PHQ-9 Depression Screening 1/23/2023   Little Interest or Pleasure in Doing Things 0-->not at all   Feeling Down, Depressed or Hopeless 0-->not at all   PHQ-9: Brief Depression Severity Measure Score 0       Health Habits and Functional and Cognitive Screening:  Functional & Cognitive Status 1/23/2023   Do you have difficulty preparing food and eating? No   Do you have difficulty bathing yourself, getting dressed or grooming yourself? No   Do you have difficulty using the toilet? No   Do you have difficulty moving around from place to place? No   Do you have trouble with steps or getting out of a bed or a chair? No   Current Diet Well Balanced Diet   Dental Exam Not up to date   Eye Exam Up to date   Exercise (times per week) 0 times per week   Current Exercises Include Yard Work;House Cleaning;No Regular Exercise   Do you need help using the phone?  No   Are you deaf or do you have serious difficulty hearing?  No   Do you need help with transportation? No   Do you need help shopping? No   Do you need help preparing meals?  No   Do you need help with housework?  No   Do you need help with laundry? No   Do you need help taking your medications? No   Do you need help managing money? No   Do you ever drive or ride in a car without wearing a seat belt? Yes   Have you felt unusual stress, anger or loneliness in the last month? Yes   Who do you live with? Spouse   If you need help, do you have trouble finding someone available to you? No   Have you been bothered in the last four weeks by sexual problems? No   Do you have difficulty concentrating, remembering or making decisions? No       Age-appropriate Screening Schedule:  Refer to the list below for future screening recommendations based on patient's age, sex and/or medical conditions. Orders for these recommended tests are listed in the plan section. The patient has been provided with a written  plan.    Health Maintenance   Topic Date Due   • TDAP/TD VACCINES (1 - Tdap) Never done   • ZOSTER VACCINE (1 of 2) Never done   • DIABETIC FOOT EXAM  Never done   • INFLUENZA VACCINE  Never done   • HEMOGLOBIN A1C  01/20/2023   • URINE MICROALBUMIN  01/20/2023   • LIPID PANEL  07/20/2023   • DIABETIC EYE EXAM  07/26/2023                CMS Preventative Services Quick Reference  Risk Factors Identified During Encounter  Immunizations Discussed/Encouraged: Influenza, Shingrix and COVID19  Inactivity/Sedentary: Patient was advised to exercise at least 150 minutes a week per CDC recommendations.  Vision Screening Recommended  The above risks/problems have been discussed with the patient.  Pertinent information has been shared with the patient in the After Visit Summary.  An After Visit Summary and PPPS were made available to the patient.    Follow Up:   Next Medicare Wellness visit to be scheduled in 1 year.       Additional E&M Note during same encounter follows:  Patient has multiple medical problems which are significant and separately identifiable that require additional work above and beyond the Medicare Wellness Visit.      Chief Complaint  Medicare Wellness-subsequent (Needs a new glucose meter and strips - his stopped working & fasting for labs also PSA due last one 01/20/22)    Subjective        HPI  Prashant Zhou is also being seen today for follow-up on his diabetes and high blood pressure.  He has lost a little weight since our last visit.  He has not been checking his blood sugars of late because his glucometer has quit working and he needs new supplies.  He is as up-to-date on vaccines as he is willing to be at the moment.  He denies any issue with bowel or bladder function.  He denies any issue with chest pain or difficulty breathing.  I had ordered a colonoscopy previously that he refused to schedule but he is now willing to get a Cologuard.    Review of Systems   Constitutional: Negative for  activity change and fatigue.   HENT: Negative for congestion, facial swelling, nosebleeds, postnasal drip, rhinorrhea, tinnitus and trouble swallowing.    Eyes: Negative for visual disturbance.   Respiratory: Negative for cough, shortness of breath and wheezing.    Cardiovascular: Negative for chest pain and leg swelling.   Gastrointestinal: Negative for abdominal pain, constipation, diarrhea, nausea and vomiting.   Genitourinary: Negative for difficulty urinating, frequency and urgency.   Musculoskeletal: Positive for arthralgias. Negative for back pain and neck pain.   Skin: Negative for rash.   Neurological: Negative for dizziness, syncope, weakness, light-headedness, numbness and confusion.   Hematological: Does not bruise/bleed easily.   Psychiatric/Behavioral: Negative for decreased concentration, sleep disturbance and suicidal ideas. The patient is not nervous/anxious.        Objective   Vital Signs:  /78 (BP Location: Left arm, Patient Position: Sitting, Cuff Size: Large Adult)   Pulse 62   Temp 97.5 °F (36.4 °C) (Temporal)   Wt 105 kg (231 lb)   SpO2 96%   BMI 28.87 kg/m²     Physical Exam  Vitals and nursing note reviewed.   Constitutional:       Appearance: He is obese.   HENT:      Right Ear: Tympanic membrane and ear canal normal.      Left Ear: Tympanic membrane and ear canal normal.   Eyes:      Conjunctiva/sclera: Conjunctivae normal.      Pupils: Pupils are equal, round, and reactive to light.   Cardiovascular:      Rate and Rhythm: Normal rate and regular rhythm.      Heart sounds: Normal heart sounds. No murmur heard.  Pulmonary:      Effort: Pulmonary effort is normal.      Breath sounds: No wheezing or rales.   Abdominal:      General: Bowel sounds are normal.      Palpations: Abdomen is soft.      Tenderness: There is no abdominal tenderness. There is no guarding.   Musculoskeletal:      Cervical back: Neck supple.      Right lower leg: No edema.      Left lower leg: No edema.    Lymphadenopathy:      Cervical: No cervical adenopathy.   Neurological:      General: No focal deficit present.      Mental Status: He is alert and oriented to person, place, and time.   Psychiatric:         Mood and Affect: Mood normal.          The following data was reviewed by: Lazaro Paulino MD on 01/23/2023:  Common labs    Common Labs 7/20/22 7/20/22 7/20/22 7/20/22    1027 1027 1027 1027   Glucose  118 (A)     BUN  10     Creatinine  0.63 (A)     Sodium  139     Potassium  4.1     Chloride  99     Calcium  10.1     Albumin  4.40     Total Bilirubin  0.4     Alkaline Phosphatase  54     AST (SGOT)  14     ALT (SGPT)  21     WBC    10.55   Hemoglobin    15.2   Hematocrit    43.5   Platelets    271   Total Cholesterol   94    Triglycerides   103    HDL Cholesterol   29 (A)    LDL Cholesterol    45    Hemoglobin A1C 6.7 (A)      (A) Abnormal value            Data reviewed: N/A           Assessment and Plan   Diagnoses and all orders for this visit:    1. Medicare annual wellness visit, subsequent (Primary)    2. Type 2 diabetes mellitus with hyperglycemia, without long-term current use of insulin (HCC)    3. Essential hypertension           I spent 35 minutes caring for Prashant on this date of service. This time includes time spent by me in the following activities:preparing for the visit, obtaining and/or reviewing a separately obtained history, performing a medically appropriate examination and/or evaluation , counseling and educating the patient/family/caregiver, ordering medications, tests, or procedures and documenting information in the medical record  Follow Up   No follow-ups on file.  Patient was given instructions and counseling regarding his condition or for health maintenance advice. Please see specific information pulled into the AVS if appropriate.     I will order a Cologuard as he is at least more willing to do that  We discussed vaccinations but he is not willing to get any new ones  at this time  I have asked him to see me again in 6 months  I am going to get labs today and adjust his treatment plan if indicated in regard to his diabetes and high blood pressure  We discussed some exercise and diet changes that he can work on to improve his diabetes and hypertension control/ stability  I have asked him to call me with any new concerns

## 2023-01-27 NOTE — TELEPHONE ENCOUNTER
They sent a fax back saying they need these 3 things.     1. ICD 10 Dx code  2. How many times per day are they checking their blood sugar?  3. What is the days supply?

## 2023-02-16 DIAGNOSIS — R19.5 POSITIVE COLORECTAL CANCER SCREENING USING COLOGUARD TEST: Primary | ICD-10-CM

## 2023-03-09 DIAGNOSIS — E11.65 TYPE 2 DIABETES MELLITUS WITH HYPERGLYCEMIA, WITHOUT LONG-TERM CURRENT USE OF INSULIN: ICD-10-CM

## 2023-03-09 RX ORDER — METOPROLOL TARTRATE 50 MG/1
TABLET, FILM COATED ORAL
Qty: 540 TABLET | Refills: 0 | Status: SHIPPED | OUTPATIENT
Start: 2023-03-09

## 2023-03-09 RX ORDER — HYDRALAZINE HYDROCHLORIDE 50 MG/1
TABLET, FILM COATED ORAL
Qty: 90 TABLET | Refills: 1 | Status: SHIPPED | OUTPATIENT
Start: 2023-03-09

## 2023-03-09 RX ORDER — LISINOPRIL AND HYDROCHLOROTHIAZIDE 25; 20 MG/1; MG/1
TABLET ORAL
Qty: 90 TABLET | Refills: 3 | Status: SHIPPED | OUTPATIENT
Start: 2023-03-09

## 2023-03-09 RX ORDER — GLIMEPIRIDE 4 MG/1
TABLET ORAL
Qty: 180 TABLET | Refills: 1 | Status: SHIPPED | OUTPATIENT
Start: 2023-03-09

## 2023-03-09 RX ORDER — ATORVASTATIN CALCIUM 40 MG/1
TABLET, FILM COATED ORAL
Qty: 90 TABLET | Refills: 0 | Status: SHIPPED | OUTPATIENT
Start: 2023-03-09

## 2023-03-09 RX ORDER — AMLODIPINE BESYLATE 10 MG/1
TABLET ORAL
Qty: 90 TABLET | Refills: 0 | Status: SHIPPED | OUTPATIENT
Start: 2023-03-09

## 2023-05-26 ENCOUNTER — OFFICE (OUTPATIENT)
Dept: URBAN - METROPOLITAN AREA CLINIC 64 | Facility: CLINIC | Age: 63
End: 2023-05-26

## 2023-05-26 VITALS
HEIGHT: 76 IN | DIASTOLIC BLOOD PRESSURE: 93 MMHG | WEIGHT: 235 LBS | HEART RATE: 69 BPM | SYSTOLIC BLOOD PRESSURE: 151 MMHG

## 2023-05-26 DIAGNOSIS — R19.5 OTHER FECAL ABNORMALITIES: ICD-10-CM

## 2023-05-26 PROCEDURE — 99204 OFFICE O/P NEW MOD 45 MIN: CPT | Performed by: INTERNAL MEDICINE

## 2023-06-12 RX ORDER — HYDRALAZINE HYDROCHLORIDE 50 MG/1
TABLET, FILM COATED ORAL
Qty: 90 TABLET | Refills: 1 | Status: SHIPPED | OUTPATIENT
Start: 2023-06-12

## 2023-07-24 ENCOUNTER — LAB (OUTPATIENT)
Dept: FAMILY MEDICINE CLINIC | Facility: CLINIC | Age: 63
End: 2023-07-24
Payer: MEDICARE

## 2023-07-24 ENCOUNTER — OFFICE VISIT (OUTPATIENT)
Dept: FAMILY MEDICINE CLINIC | Facility: CLINIC | Age: 63
End: 2023-07-24
Payer: MEDICARE

## 2023-07-24 VITALS
DIASTOLIC BLOOD PRESSURE: 70 MMHG | TEMPERATURE: 97.3 F | HEART RATE: 68 BPM | BODY MASS INDEX: 28.8 KG/M2 | SYSTOLIC BLOOD PRESSURE: 110 MMHG | OXYGEN SATURATION: 95 % | WEIGHT: 230.4 LBS

## 2023-07-24 DIAGNOSIS — E11.65 TYPE 2 DIABETES MELLITUS WITH HYPERGLYCEMIA, WITHOUT LONG-TERM CURRENT USE OF INSULIN: Primary | ICD-10-CM

## 2023-07-24 DIAGNOSIS — I10 ESSENTIAL HYPERTENSION: ICD-10-CM

## 2023-07-24 LAB
ALBUMIN SERPL-MCNC: 4.7 G/DL (ref 3.5–5.2)
ALBUMIN/GLOB SERPL: 2.1 G/DL
ALP SERPL-CCNC: 67 U/L (ref 39–117)
ALT SERPL W P-5'-P-CCNC: 15 U/L (ref 1–41)
ANION GAP SERPL CALCULATED.3IONS-SCNC: 12 MMOL/L (ref 5–15)
AST SERPL-CCNC: 12 U/L (ref 1–40)
BASOPHILS # BLD AUTO: 0.08 10*3/MM3 (ref 0–0.2)
BASOPHILS NFR BLD AUTO: 0.7 % (ref 0–1.5)
BILIRUB SERPL-MCNC: 0.4 MG/DL (ref 0–1.2)
BUN SERPL-MCNC: 11 MG/DL (ref 8–23)
BUN/CREAT SERPL: 15.7 (ref 7–25)
CALCIUM SPEC-SCNC: 10.1 MG/DL (ref 8.6–10.5)
CHLORIDE SERPL-SCNC: 99 MMOL/L (ref 98–107)
CHOLEST SERPL-MCNC: 102 MG/DL (ref 0–200)
CO2 SERPL-SCNC: 26 MMOL/L (ref 22–29)
CREAT SERPL-MCNC: 0.7 MG/DL (ref 0.76–1.27)
DEPRECATED RDW RBC AUTO: 39.6 FL (ref 37–54)
EGFRCR SERPLBLD CKD-EPI 2021: 104.8 ML/MIN/1.73
EOSINOPHIL # BLD AUTO: 0.14 10*3/MM3 (ref 0–0.4)
EOSINOPHIL NFR BLD AUTO: 1.2 % (ref 0.3–6.2)
ERYTHROCYTE [DISTWIDTH] IN BLOOD BY AUTOMATED COUNT: 12.2 % (ref 12.3–15.4)
GLOBULIN UR ELPH-MCNC: 2.2 GM/DL
GLUCOSE SERPL-MCNC: 151 MG/DL (ref 65–99)
HBA1C MFR BLD: 8.3 % (ref 4.8–5.6)
HCT VFR BLD AUTO: 43.7 % (ref 37.5–51)
HDLC SERPL-MCNC: 28 MG/DL (ref 40–60)
HGB BLD-MCNC: 15 G/DL (ref 13–17.7)
IMM GRANULOCYTES # BLD AUTO: 0.04 10*3/MM3 (ref 0–0.05)
IMM GRANULOCYTES NFR BLD AUTO: 0.4 % (ref 0–0.5)
LDLC SERPL CALC-MCNC: 51 MG/DL (ref 0–100)
LDLC/HDLC SERPL: 1.74 {RATIO}
LYMPHOCYTES # BLD AUTO: 2.36 10*3/MM3 (ref 0.7–3.1)
LYMPHOCYTES NFR BLD AUTO: 21 % (ref 19.6–45.3)
MAGNESIUM SERPL-MCNC: 1.7 MG/DL (ref 1.6–2.4)
MCH RBC QN AUTO: 30.5 PG (ref 26.6–33)
MCHC RBC AUTO-ENTMCNC: 34.3 G/DL (ref 31.5–35.7)
MCV RBC AUTO: 89 FL (ref 79–97)
MONOCYTES # BLD AUTO: 0.83 10*3/MM3 (ref 0.1–0.9)
MONOCYTES NFR BLD AUTO: 7.4 % (ref 5–12)
NEUTROPHILS NFR BLD AUTO: 69.3 % (ref 42.7–76)
NEUTROPHILS NFR BLD AUTO: 7.77 10*3/MM3 (ref 1.7–7)
NRBC BLD AUTO-RTO: 0 /100 WBC (ref 0–0.2)
PLATELET # BLD AUTO: 286 10*3/MM3 (ref 140–450)
PMV BLD AUTO: 9.8 FL (ref 6–12)
POTASSIUM SERPL-SCNC: 4.1 MMOL/L (ref 3.5–5.2)
PROT SERPL-MCNC: 6.9 G/DL (ref 6–8.5)
RBC # BLD AUTO: 4.91 10*6/MM3 (ref 4.14–5.8)
SODIUM SERPL-SCNC: 137 MMOL/L (ref 136–145)
TRIGL SERPL-MCNC: 127 MG/DL (ref 0–150)
VLDLC SERPL-MCNC: 23 MG/DL (ref 5–40)
WBC NRBC COR # BLD: 11.22 10*3/MM3 (ref 3.4–10.8)

## 2023-07-24 PROCEDURE — 80053 COMPREHEN METABOLIC PANEL: CPT | Performed by: FAMILY MEDICINE

## 2023-07-24 PROCEDURE — 99214 OFFICE O/P EST MOD 30 MIN: CPT | Performed by: FAMILY MEDICINE

## 2023-07-24 PROCEDURE — 3051F HG A1C>EQUAL 7.0%<8.0%: CPT | Performed by: FAMILY MEDICINE

## 2023-07-24 PROCEDURE — 85025 COMPLETE CBC W/AUTO DIFF WBC: CPT | Performed by: FAMILY MEDICINE

## 2023-07-24 PROCEDURE — 3074F SYST BP LT 130 MM HG: CPT | Performed by: FAMILY MEDICINE

## 2023-07-24 PROCEDURE — 36415 COLL VENOUS BLD VENIPUNCTURE: CPT | Performed by: FAMILY MEDICINE

## 2023-07-24 PROCEDURE — 3078F DIAST BP <80 MM HG: CPT | Performed by: FAMILY MEDICINE

## 2023-07-24 PROCEDURE — 83036 HEMOGLOBIN GLYCOSYLATED A1C: CPT | Performed by: FAMILY MEDICINE

## 2023-07-24 PROCEDURE — 1159F MED LIST DOCD IN RCRD: CPT | Performed by: FAMILY MEDICINE

## 2023-07-24 PROCEDURE — 1160F RVW MEDS BY RX/DR IN RCRD: CPT | Performed by: FAMILY MEDICINE

## 2023-07-24 PROCEDURE — 83735 ASSAY OF MAGNESIUM: CPT | Performed by: FAMILY MEDICINE

## 2023-07-24 PROCEDURE — 80061 LIPID PANEL: CPT | Performed by: FAMILY MEDICINE

## 2023-07-24 RX ORDER — AMLODIPINE BESYLATE 10 MG/1
5 TABLET ORAL DAILY
Qty: 90 TABLET | Refills: 0 | COMMUNITY
Start: 2023-07-24 | End: 2023-07-28

## 2023-07-24 NOTE — PROGRESS NOTES
Subjective   Prashant Zhou is a 62 y.o. male.     History of Present Illness  Prashant Zhou is in for follow up on his issues with diabetes and high blood pressure. There is no history of chest pain or dyspnea of late. There is no history of issue with bowel or bladder function. There is no history of vision or hearing problems. There is no history of dizziness or lightheadedness. There is no history of issue with sleep or mood. As for checking blood sugar readings, he has been seeing 160s. There is no issue with medication compliance. Diet and exercise compliance has been less than expected. He has backed off on his fishing due to it taking more effort than he wants to give at this point.    Diabetes  Pertinent negatives for hypoglycemia include no dizziness or nervousness/anxiousness. Pertinent negatives for diabetes include no chest pain, no fatigue and no weakness.        /70 (BP Location: Left arm, Patient Position: Sitting, Cuff Size: Large Adult)   Pulse 68   Temp 97.3 °F (36.3 °C) (Temporal)   Wt 105 kg (230 lb 6.4 oz)   SpO2 95%   BMI 28.80 kg/m²       Chief Complaint   Patient presents with    Diabetes     6 month f/u & Medicare Wellness next visit & Fasting for labs            Current Outpatient Medications:     Accu-Chek Softclix Lancets lancets, Test daily E11.9, Disp: 100 each, Rfl: 12    amLODIPine (NORVASC) 10 MG tablet, TAKE ONE TABLET BY MOUTH DAILY, Disp: 90 tablet, Rfl: 0    atorvastatin (LIPITOR) 40 MG tablet, TAKE ONE TABLET BY MOUTH EVERY NIGHT AT BEDTIME, Disp: 90 tablet, Rfl: 0    Blood Glucose Monitoring Suppl (Accu-Chek Guide) w/Device kit, 1 Device Daily., Disp: 1 kit, Rfl: 0    cloNIDine (CATAPRES) 0.1 MG tablet, TAKE ONE TABLET BY MOUTH TWICE A DAY, Disp: 60 tablet, Rfl: 5    glimepiride (AMARYL) 4 MG tablet, TAKE ONE TABLET BY MOUTH TWICE A DAY, Disp: 180 tablet, Rfl: 1    glucose blood (Accu-Chek Guide) test strip, Test daily e11.9, Disp: 50 each, Rfl: 12     hydrALAZINE (APRESOLINE) 50 MG tablet, TAKE ONE TABLET BY MOUTH EVERY 8 HOURS, Disp: 90 tablet, Rfl: 1    lisinopril-hydrochlorothiazide (PRINZIDE,ZESTORETIC) 20-25 MG per tablet, TAKE ONE TABLET BY MOUTH DAILY, Disp: 90 tablet, Rfl: 3    metFORMIN (GLUCOPHAGE) 850 MG tablet, TAKE ONE TABLET BY MOUTH TWICE A DAY WITH MEALS, Disp: 144 tablet, Rfl: 1    metoprolol tartrate (LOPRESSOR) 50 MG tablet, TAKE THREE TABLETS BY MOUTH EVERY 12 HOURS, Disp: 540 tablet, Rfl: 0    Lab Results   Component Value Date    HGBA1C 7.3 (H) 01/23/2023    HGBA1C 6.7 (H) 07/20/2022    HGBA1C 7.4 (H) 01/20/2022     Lab Results   Component Value Date    MICROALBUR 18.0 01/23/2023    CREATININE 0.72 (L) 01/23/2023         Wt Readings from Last 3 Encounters:   07/24/23 105 kg (230 lb 6.4 oz)   01/23/23 105 kg (231 lb)   07/20/22 108 kg (237 lb)       The following portions of the patient's history were reviewed and updated as appropriate: allergies, current medications, past family history, past medical history, past social history, past surgical history, and problem list.    Review of Systems   Constitutional:  Negative for activity change, fatigue and fever.   HENT:  Negative for congestion, sinus pressure, sinus pain, sore throat and trouble swallowing.    Eyes:  Negative for visual disturbance.   Respiratory:  Negative for chest tightness, shortness of breath and wheezing.    Cardiovascular:  Negative for chest pain.   Gastrointestinal:  Negative for abdominal distention, abdominal pain, constipation, diarrhea, nausea and vomiting.   Genitourinary:  Negative for difficulty urinating, dysuria, frequency and urgency.   Musculoskeletal:  Positive for arthralgias. Negative for back pain and neck pain.   Neurological:  Negative for dizziness, weakness, light-headedness and numbness.   Psychiatric/Behavioral:  Positive for sleep disturbance. Negative for agitation, hallucinations and suicidal ideas. The patient is not nervous/anxious.       Objective   Physical Exam  Vitals and nursing note reviewed.   Constitutional:       Appearance: Normal appearance.   HENT:      Right Ear: Tympanic membrane and ear canal normal.      Left Ear: Tympanic membrane and ear canal normal.   Cardiovascular:      Rate and Rhythm: Normal rate and regular rhythm.      Heart sounds: Normal heart sounds. No murmur heard.  Pulmonary:      Effort: Pulmonary effort is normal.      Breath sounds: No wheezing or rales.   Abdominal:      General: Bowel sounds are normal.      Palpations: Abdomen is soft.      Tenderness: There is no abdominal tenderness. There is no guarding.   Musculoskeletal:      Cervical back: Neck supple.      Right lower leg: No edema.      Left lower leg: No edema.      Comments: Diffusely arthritic, but nothing limiting or unstable   Lymphadenopathy:      Cervical: No cervical adenopathy.   Neurological:      Mental Status: He is alert and oriented to person, place, and time. Mental status is at baseline.   Psychiatric:         Mood and Affect: Mood normal.         Assessment & Plan   Problems Addressed this Visit          Endocrine and Metabolic    Type 2 diabetes mellitus with hyperglycemia - Primary     Other Visit Diagnoses       Essential hypertension              Diagnoses         Codes Comments    Type 2 diabetes mellitus with hyperglycemia, without long-term current use of insulin    -  Primary ICD-10-CM: E11.65  ICD-9-CM: 250.00, 790.29     Essential hypertension     ICD-10-CM: I10  ICD-9-CM: 401.9           I will update some labs and adjust his treatment plan as indicated  I have asked him to be a little better with physical activities and hobbies  I will reduce his amlodipine to 5 mg to help with the lower blood pressure readings  I have asked him to monitor his blood sugar a little more closely  I have asked him to find some physical activities that he can do more comfortably and regularly  I have asked him to keep eye exams up-to-date and  see me again in 6 months for his Medicare wellness

## 2023-07-28 RX ORDER — AMLODIPINE BESYLATE 10 MG/1
TABLET ORAL
Qty: 90 TABLET | Refills: 0 | Status: SHIPPED | OUTPATIENT
Start: 2023-07-28 | End: 2023-07-31

## 2023-07-28 RX ORDER — METOPROLOL TARTRATE 50 MG/1
TABLET, FILM COATED ORAL
Qty: 540 TABLET | Refills: 0 | Status: SHIPPED | OUTPATIENT
Start: 2023-07-28 | End: 2023-07-31

## 2023-07-28 RX ORDER — ATORVASTATIN CALCIUM 40 MG/1
TABLET, FILM COATED ORAL
Qty: 90 TABLET | Refills: 0 | Status: SHIPPED | OUTPATIENT
Start: 2023-07-28 | End: 2023-07-31

## 2023-07-31 RX ORDER — METOPROLOL TARTRATE 50 MG/1
TABLET, FILM COATED ORAL
Qty: 540 TABLET | Refills: 0 | Status: SHIPPED | OUTPATIENT
Start: 2023-07-31

## 2023-07-31 RX ORDER — ATORVASTATIN CALCIUM 40 MG/1
TABLET, FILM COATED ORAL
Qty: 90 TABLET | Refills: 0 | Status: SHIPPED | OUTPATIENT
Start: 2023-07-31

## 2023-07-31 RX ORDER — AMLODIPINE BESYLATE 10 MG/1
TABLET ORAL
Qty: 90 TABLET | Refills: 0 | Status: SHIPPED | OUTPATIENT
Start: 2023-07-31

## 2023-08-02 RX ORDER — HYDRALAZINE HYDROCHLORIDE 50 MG/1
50 TABLET, FILM COATED ORAL EVERY 8 HOURS
Qty: 90 TABLET | Refills: 1 | Status: SHIPPED | OUTPATIENT
Start: 2023-08-02

## 2023-08-31 ENCOUNTER — HOSPITAL ENCOUNTER (OUTPATIENT)
Facility: HOSPITAL | Age: 63
Setting detail: HOSPITAL OUTPATIENT SURGERY
Discharge: HOME | End: 2023-08-31
Attending: INTERNAL MEDICINE | Admitting: INTERNAL MEDICINE
Payer: MEDICARE

## 2023-08-31 ENCOUNTER — ANESTHESIA EVENT (OUTPATIENT)
Dept: GASTROENTEROLOGY | Facility: HOSPITAL | Age: 63
End: 2023-08-31
Payer: MEDICARE

## 2023-08-31 ENCOUNTER — ANESTHESIA (OUTPATIENT)
Dept: GASTROENTEROLOGY | Facility: HOSPITAL | Age: 63
End: 2023-08-31
Payer: MEDICARE

## 2023-08-31 ENCOUNTER — ON CAMPUS - OUTPATIENT (OUTPATIENT)
Dept: URBAN - METROPOLITAN AREA HOSPITAL 85 | Facility: HOSPITAL | Age: 63
End: 2023-08-31

## 2023-08-31 VITALS
SYSTOLIC BLOOD PRESSURE: 135 MMHG | OXYGEN SATURATION: 95 % | DIASTOLIC BLOOD PRESSURE: 84 MMHG | WEIGHT: 222.66 LBS | HEART RATE: 106 BPM | RESPIRATION RATE: 16 BRPM | HEIGHT: 76 IN | TEMPERATURE: 97.8 F | BODY MASS INDEX: 27.11 KG/M2

## 2023-08-31 DIAGNOSIS — R19.5 POSITIVE COLORECTAL CANCER SCREENING USING COLOGUARD TEST: ICD-10-CM

## 2023-08-31 DIAGNOSIS — K64.8 OTHER HEMORRHOIDS: ICD-10-CM

## 2023-08-31 DIAGNOSIS — R19.5 OTHER FECAL ABNORMALITIES: ICD-10-CM

## 2023-08-31 DIAGNOSIS — K63.5 POLYP OF COLON: ICD-10-CM

## 2023-08-31 DIAGNOSIS — K63.89 OTHER SPECIFIED DISEASES OF INTESTINE: ICD-10-CM

## 2023-08-31 LAB — GLUCOSE BLDC GLUCOMTR-MCNC: 177 MG/DL (ref 70–105)

## 2023-08-31 PROCEDURE — 45380 COLONOSCOPY AND BIOPSY: CPT | Mod: 59 | Performed by: INTERNAL MEDICINE

## 2023-08-31 PROCEDURE — 82948 REAGENT STRIP/BLOOD GLUCOSE: CPT

## 2023-08-31 PROCEDURE — 45381 COLONOSCOPY SUBMUCOUS NJX: CPT | Mod: 59 | Performed by: INTERNAL MEDICINE

## 2023-08-31 PROCEDURE — 25010000002 PROPOFOL 1000 MG/100ML EMULSION: Performed by: NURSE ANESTHETIST, CERTIFIED REGISTERED

## 2023-08-31 PROCEDURE — 45385 COLONOSCOPY W/LESION REMOVAL: CPT | Performed by: INTERNAL MEDICINE

## 2023-08-31 PROCEDURE — 88342 IMHCHEM/IMCYTCHM 1ST ANTB: CPT | Performed by: INTERNAL MEDICINE

## 2023-08-31 PROCEDURE — 88305 TISSUE EXAM BY PATHOLOGIST: CPT | Performed by: INTERNAL MEDICINE

## 2023-08-31 RX ORDER — LIDOCAINE HYDROCHLORIDE 20 MG/ML
INJECTION, SOLUTION EPIDURAL; INFILTRATION; INTRACAUDAL; PERINEURAL AS NEEDED
Status: DISCONTINUED | OUTPATIENT
Start: 2023-08-31 | End: 2023-08-31 | Stop reason: SURG

## 2023-08-31 RX ORDER — PROPOFOL 10 MG/ML
INJECTION, EMULSION INTRAVENOUS AS NEEDED
Status: DISCONTINUED | OUTPATIENT
Start: 2023-08-31 | End: 2023-08-31 | Stop reason: SURG

## 2023-08-31 RX ORDER — SODIUM CHLORIDE 9 MG/ML
50 INJECTION, SOLUTION INTRAVENOUS CONTINUOUS
Status: DISCONTINUED | OUTPATIENT
Start: 2023-08-31 | End: 2023-08-31 | Stop reason: HOSPADM

## 2023-08-31 RX ADMIN — SODIUM CHLORIDE 50 ML/HR: 9 INJECTION, SOLUTION INTRAVENOUS at 10:35

## 2023-08-31 RX ADMIN — PROPOFOL 20 MG: 10 INJECTION, EMULSION INTRAVENOUS at 11:03

## 2023-08-31 RX ADMIN — PROPOFOL 80 MG: 10 INJECTION, EMULSION INTRAVENOUS at 10:52

## 2023-08-31 RX ADMIN — PROPOFOL 20 MG: 10 INJECTION, EMULSION INTRAVENOUS at 11:11

## 2023-08-31 RX ADMIN — LIDOCAINE HYDROCHLORIDE 100 MG: 20 INJECTION, SOLUTION EPIDURAL; INFILTRATION; INTRACAUDAL; PERINEURAL at 10:52

## 2023-08-31 RX ADMIN — PROPOFOL 20 MG: 10 INJECTION, EMULSION INTRAVENOUS at 11:07

## 2023-08-31 RX ADMIN — PROPOFOL 20 MG: 10 INJECTION, EMULSION INTRAVENOUS at 10:59

## 2023-08-31 RX ADMIN — PROPOFOL 20 MG: 10 INJECTION, EMULSION INTRAVENOUS at 10:55

## 2023-08-31 NOTE — DISCHARGE INSTRUCTIONS
A responsible adult should stay with you and you should rest quietly for the rest of the day.    Do not drink alcohol, drive, operate any heavy machinery or power tools or make any legal/important decisions for the next 24 hours.     Progress your diet as tolerated.  If you begin to experience severe pain, increased shortness of breath, racing heartbeat or a fever above 101 F, seek immediate medical attention.     Follow up with MD as instructed. Call office for results in 3 to 5 days if needed.     Dr Jacob @ [phone]    Impression:  1.  Large circumferential obstructing mass in the sigmoid colon at approx 30cm from the anal verge, with ulceration, friable mucosa, contact bleeding highly concerning for malignancy.  The mass was nearly completely obstructing, could see a tiny lumen but could not traverse with colonoscope, pediatric colonoscope or gastroscope.  The mass appeared to be very distal sigmoid colon or near rectosigmoid junction. Multiple biopsies were obtained with cold forceps for histology.  Submucosal injection was performed with scleroneedle for tattoo 1 fold distal to the mass.  Was unable to traverse and was unable to reach the cecum.  2.  Four colon polyps which appeared benign, sessile and removed in a single piece with cold snare polypectomy and were completely removed.  Two polyps in the sigmoid colon 4 to 5 mm in diameter.  Two polyps in the rectum 3 to 4 mm in diameter.  3.  Medium size nonbleeding internal hemorrhoids        Recommendations:  -We will refer to colorectal surgery  -Needs CT of chest, abdomen, pelvis for staging  -Consider pelvic MRI if appears to be in the rectum on imaging  -will refer to oncology once we have pathology results  -Will need colonoscopy to clear the rest of the colon after surgery, will plan for 6-month recall or could be done around the time of surgery  -Recommend MiraLAX daily to prevent constipation/obstruction

## 2023-08-31 NOTE — H&P
GI CONSULT  NOTE:    Referring Provider:    Lazaro Paulino MD  [unfilled]    Chief complaint: <principal problem not specified>    Subjective .       Pre op diagnosis  Positive colorectal cancer screening using Cologuard test [R19.5]  Other fecal abnormalities [R19.5]      History of present illness:      Prashant Zhou is a 62 y.o. male who presents today for Procedure(s):  COLONOSCOPY for the indications listed below.     The updated Patient Profile was reviewed prior to the procedure, in conjunction with the Physical Exam, including medical conditions, surgical procedures, medications, allergies, family history and social history.     Pre-operatively, I reviewed the indication(s) for the procedure, the risks of the procedure [including but not limited to: unexpected bleeding possibly requiring hospitalization and/or unplanned repeat procedures, perforation possibly requiring surgical treatment, missed lesions and complications of sedation/MAC (also explained by anesthesia staff)].     I have evaluated the patient for risks associated with the planned anesthesia and the procedure to be performed and find the patient an acceptable candidate for IV sedation.    Multiple opportunities were provided for any questions or concerns, and all questions were answered satisfactorily before any anesthesia was administered. We will proceed with the planned procedure.    Past Medical History:  Past Medical History:   Diagnosis Date    Aortic dissection     Diabetes mellitus     Heart murmur     History of noncompliance with medical treatment     Hyperlipidemia     Hypertension     Type B hypoplasia of aortic arch        Past Surgical History:  History reviewed. No pertinent surgical history.    Social History:  Social History     Tobacco Use    Smoking status: Every Day     Packs/day: 1.00     Types: Cigarettes    Smokeless tobacco: Never    Tobacco comments:     Do not smoke dos    Substance Use Topics     Alcohol use: No    Drug use: No       Family History:  Family History   Problem Relation Age of Onset    Diabetes Mother        Medications:  Medications Prior to Admission   Medication Sig Dispense Refill Last Dose    Accu-Chek Softclix Lancets lancets Test daily E11.9 100 each 12 8/30/2023    amLODIPine (NORVASC) 10 MG tablet TAKE ONE TABLET BY MOUTH DAILY (Patient taking differently: Take 1 tablet by mouth Daily.) 90 tablet 0 8/30/2023    atorvastatin (LIPITOR) 40 MG tablet TAKE ONE TABLET BY MOUTH EVERY NIGHT AT BEDTIME (Patient taking differently: Take 1 tablet by mouth.) 90 tablet 0 8/30/2023    Blood Glucose Monitoring Suppl (Accu-Chek Guide) w/Device kit 1 Device Daily. 1 kit 0 8/30/2023    cloNIDine (CATAPRES) 0.1 MG tablet TAKE ONE TABLET BY MOUTH TWICE A DAY (Patient taking differently: Take 1 tablet by mouth 2 (Two) Times a Day.) 60 tablet 5 8/30/2023    glimepiride (AMARYL) 4 MG tablet TAKE ONE TABLET BY MOUTH TWICE A DAY (Patient taking differently: Take 1 tablet by mouth 2 (Two) Times a Day.) 180 tablet 1 8/30/2023    glucose blood (Accu-Chek Guide) test strip Test daily e11.9 50 each 12 8/30/2023    hydrALAZINE (APRESOLINE) 50 MG tablet Take 1 tablet by mouth Every 8 (Eight) Hours. 90 tablet 1 8/30/2023    lisinopril-hydrochlorothiazide (PRINZIDE,ZESTORETIC) 20-25 MG per tablet TAKE ONE TABLET BY MOUTH DAILY (Patient taking differently: Take 1 tablet by mouth Daily.) 90 tablet 3 8/30/2023    metFORMIN (GLUCOPHAGE) 850 MG tablet TAKE ONE TABLET BY MOUTH TWICE A DAY WITH MEALS (Patient taking differently: Take 1 tablet by mouth 2 (Two) Times a Day With Meals.) 144 tablet 1 8/30/2023    metoprolol tartrate (LOPRESSOR) 50 MG tablet TAKE THREE TABLETS BY MOUTH EVERY 12 HOURS (Patient taking differently: Take 1 tablet by mouth 2 (Two) Times a Day.) 540 tablet 0 8/30/2023       Scheduled Meds:  Continuous Infusions:No current facility-administered medications for this encounter.    PRN  "Meds:.    ALLERGIES:  Patient has no known allergies.    ROS:  The following systems were reviewed and negative;  Constitution:  No fevers, chills, no unintentional weight loss  Skin: no rash, no jaundice  Eyes:  No blurry vision, no eye pain  HENT:  No change in hearing or smell  Resp:  No dyspnea or cough  CV:  No chest pain or palpitations  :  No dysuria, hematuria  Musculoskeletal:  No leg cramps or arthralgias  Neuro:  No tremor, no numbness  Psych:  No depression or confsusion    Objective     Vital Signs:   Vitals:    08/21/23 1638   Weight: 99.8 kg (220 lb)   Height: 193 cm (76\")       Physical Exam:       General Appearance:    Awake and alert, in no acute distress   Head:    Normocephalic, without obvious abnormality, atraumatic   Throat:   No oral lesions, no thrush, oral mucosa moist   Lungs:     respirations regular, even and unlabored   Skin:   No rash, no jaundice       Results Review:  Lab Results (last 24 hours)       Procedure Component Value Units Date/Time    POC Glucose Once [[id number]]  (Abnormal) Collected: 08/31/23 1013    Specimen: Blood Updated: 08/31/23 1014     Glucose 177 mg/dL      Comment: Serial Number: [id number]:  [id number]               Imaging Results (Last 24 Hours)       ** No results found for the last 24 hours. **             I reviewed the patient's labs and imaging.    ASSESSMENT AND PLAN:      Active Problems:    * No active hospital problems. *       Procedure(s):  COLONOSCOPY      I discussed the patient's findings and my recommendations with the patient.    CESAR Jacob MD  08/31/23  10:25 EDT                "

## 2023-08-31 NOTE — ANESTHESIA PREPROCEDURE EVALUATION
Anesthesia Evaluation     Patient summary reviewed and Nursing notes reviewed   NPO Solid Status: > 8 hours  NPO Liquid Status: > 8 hours           Airway   Mallampati: III  TM distance: >3 FB  Neck ROM: full  No difficulty expected  Dental - normal exam     Pulmonary - normal exam   Cardiovascular - normal exam    ECG reviewed    (+) hypertension, hyperlipidemia      Neuro/Psych  GI/Hepatic/Renal/Endo    (+) diabetes mellitus    Musculoskeletal     Abdominal  - normal exam    Bowel sounds: normal.   Substance History      OB/GYN          Other        ROS/Med Hx Other: Hx asc aortic aneurysm per patient 4cm  No follow up or symptoms since 5 yrs ago  Abdominal CT normal, no AAA                  Anesthesia Plan    ASA 3     general and MAC     intravenous induction     Anesthetic plan, risks, benefits, and alternatives have been provided, discussed and informed consent has been obtained with: patient.    Plan discussed with CRNA.    CODE STATUS:          79 yo M w/ hx prostate cancer, metastatic HCC, admitted to medicine with SBP and thrombocytopenia. General surgery consulted for CT finding c/f subcapsular hematoma.     - may be small subcapsular hematoma however given patient stability and Hgb 8 no intervention is indicated at this time  - recommend serial CBCs  - serial abd exams  - if patient becomes unstable recommend calling interventional radiology for angioembolization  - plan discussed with chief resident and attending on call

## 2023-08-31 NOTE — OP NOTE
COLONOSCOPY Procedure Report    Patient Name:  Prashant Zhou  YOB: 1960    Date of Surgery:  8/31/2023     Pre-Op Diagnosis:  Positive colorectal cancer screening using Cologuard test [R19.5]  Other fecal abnormalities [R19.5]       Post-Op Diagnosis Codes:     * Positive colorectal cancer screening using Cologuard test [R19.5]     * Other fecal abnormalities [R19.5]    Postop diagnosis:  1.  Sigmoid colon mass concerning for malignancy  2.  Colon polyps  3.  Internal hemorrhoids      Procedure/CPT® Codes:      Procedure(s):  COLONOSCOPY with sigmoid mass tattooing at 35cm, sigmoid and rectal polypectomy    Staff:  Surgeon(s):  TORIBIO Jacob MD      Anesthesia: Monitored Anesthesia Care    Description of Procedure:  A description of the procedure as well as risks, benefits and alternative methods were explained to the patient who voiced understanding and signed the corresponding consent form. A physical exam was performed and vital signs were monitored throughout the procedure.    A rectal exam was performed which was normal. An Olympus colonoscope was placed into the rectum and proceeded under direct visualization through the colon to the sigmoid colon, the cecum was not reached due to obstructing mass. Careful visualization occurred upon slow withdraw of the scope. The scope was then retroflexed and the distal rectum was visualized. The quality of the prep was good. The procedure was not difficult and there were no immediate complications.  There was minimal amount of blood loss from biopsies.    Impression:  1.  Large circumferential obstructing mass in the sigmoid colon at approx 30cm from the anal verge, with ulceration, friable mucosa, contact bleeding highly concerning for malignancy.  The mass was nearly completely obstructing, could see a tiny lumen but could not traverse with colonoscope, pediatric colonoscope or gastroscope.  The mass appeared to be very distal sigmoid colon or  near rectosigmoid junction. Multiple biopsies were obtained with cold forceps for histology.  Submucosal injection was performed with scleroneedle for tattoo 1 fold distal to the mass.  Was unable to traverse and was unable to reach the cecum.  2.  Four colon polyps which appeared benign, sessile and removed in a single piece with cold snare polypectomy and were completely removed.  Two polyps in the sigmoid colon 4 to 5 mm in diameter.  Two polyps in the rectum 3 to 4 mm in diameter.  3.  Medium size nonbleeding internal hemorrhoids      Recommendations:  -We will refer to colorectal surgery  -Needs CT of chest, abdomen, pelvis for staging  -Consider pelvic MRI if appears to be in the rectum on imaging  -will refer to oncology once we have pathology results  -Will need colonoscopy to clear the rest of the colon after surgery, will plan for 6-month recall or could be done around the time of surgery  -Recommend MiraLAX daily to prevent constipation/obstruction      CESAR Jacob MD     Date: 8/31/2023    Time: 11:20 EDT

## 2023-08-31 NOTE — ANESTHESIA POSTPROCEDURE EVALUATION
Patient: Prashant Zhou    Procedure Summary       Date: 08/31/23 Room / Location: [facility] ENDOSCOPY 1 / [facility] ENDOSCOPY    Anesthesia Start: 1044 Anesthesia Stop: 1126    Procedure: COLONOSCOPY with sigmoid mass tattooing at 35cm, sigmoid and rectal polypectomy Diagnosis:       Positive colorectal cancer screening using Cologuard test      Other fecal abnormalities      (Positive colorectal cancer screening using Cologuard test [R19.5])      (Other fecal abnormalities [R19.5])    Surgeons: TORIBIO Jacob MD Provider: Ernesto Morgan MD    Anesthesia Type: general, MAC ASA Status: 3            Anesthesia Type: general, MAC    Vitals  Vitals Value Taken Time   /84 08/31/23 1142   Temp     Pulse 106 08/31/23 1142   Resp 16 08/31/23 1142   SpO2 95 % 08/31/23 1142           Post Anesthesia Care and Evaluation    Patient location during evaluation: PACU  Patient participation: complete - patient participated  Level of consciousness: awake  Pain management: adequate    Airway patency: patent  Anesthetic complications: No anesthetic complications  PONV Status: none  Cardiovascular status: acceptable  Respiratory status: acceptable  Hydration status: acceptable    Comments: Patient seen and examined postoperatively; vital signs stable; SpO2 greater than or equal to 90%; cardiopulmonary status stable; nausea/vomiting adequately controlled; pain adequately controlled; no apparent anesthesia complications; patient discharged from anesthesia care when discharge criteria were met

## 2023-09-01 LAB
LAB AP CASE REPORT: NORMAL
LAB AP DIAGNOSIS COMMENT: NORMAL
PATH REPORT.FINAL DX SPEC: NORMAL
PATH REPORT.GROSS SPEC: NORMAL

## 2023-09-05 LAB
LAB AP CASE REPORT: NORMAL
LAB AP DIAGNOSIS COMMENT: NORMAL
PATH REPORT.ADDENDUM SPEC: NORMAL
PATH REPORT.FINAL DX SPEC: NORMAL
PATH REPORT.GROSS SPEC: NORMAL

## 2023-10-25 DIAGNOSIS — E11.65 TYPE 2 DIABETES MELLITUS WITH HYPERGLYCEMIA, WITHOUT LONG-TERM CURRENT USE OF INSULIN: ICD-10-CM

## 2023-10-25 RX ORDER — GLIMEPIRIDE 4 MG/1
TABLET ORAL
Qty: 180 TABLET | Refills: 1 | Status: SHIPPED | OUTPATIENT
Start: 2023-10-25

## 2023-11-03 RX ORDER — HYDRALAZINE HYDROCHLORIDE 50 MG/1
50 TABLET, FILM COATED ORAL EVERY 8 HOURS
Qty: 90 TABLET | Refills: 1 | Status: SHIPPED | OUTPATIENT
Start: 2023-11-03 | End: 2023-11-06

## 2023-11-06 RX ORDER — HYDRALAZINE HYDROCHLORIDE 50 MG/1
50 TABLET, FILM COATED ORAL EVERY 8 HOURS
Qty: 90 TABLET | Refills: 1 | Status: SHIPPED | OUTPATIENT
Start: 2023-11-06

## 2023-11-07 RX ORDER — CLONIDINE HYDROCHLORIDE 0.1 MG/1
0.1 TABLET ORAL 2 TIMES DAILY
Qty: 8 TABLET | Refills: 2 | Status: SHIPPED | OUTPATIENT
Start: 2023-11-07 | End: 2023-11-09

## 2023-11-09 RX ORDER — CLONIDINE HYDROCHLORIDE 0.1 MG/1
0.1 TABLET ORAL 2 TIMES DAILY
Qty: 8 TABLET | Refills: 2 | Status: SHIPPED | OUTPATIENT
Start: 2023-11-09

## 2023-11-14 ENCOUNTER — HOSPITAL ENCOUNTER (OUTPATIENT)
Dept: INTERVENTIONAL RADIOLOGY/VASCULAR | Facility: HOSPITAL | Age: 63
Discharge: HOME | End: 2023-11-14
Admitting: RADIOLOGY
Payer: MEDICARE

## 2023-11-14 VITALS
TEMPERATURE: 97.7 F | WEIGHT: 216 LBS | HEART RATE: 61 BPM | SYSTOLIC BLOOD PRESSURE: 112 MMHG | DIASTOLIC BLOOD PRESSURE: 61 MMHG | HEIGHT: 76 IN | OXYGEN SATURATION: 98 % | BODY MASS INDEX: 26.3 KG/M2 | RESPIRATION RATE: 14 BRPM

## 2023-11-14 DIAGNOSIS — F17.200 SMOKING: ICD-10-CM

## 2023-11-14 DIAGNOSIS — D36.9 ADENOMA: ICD-10-CM

## 2023-11-14 DIAGNOSIS — E55.9 VITAMIN D DEFICIENCY: ICD-10-CM

## 2023-11-14 DIAGNOSIS — N48.9 LESION OF PENIS: ICD-10-CM

## 2023-11-14 LAB
APTT PPP: 28.2 SECONDS (ref 24–31)
BASOPHILS # BLD AUTO: 0.1 10*3/MM3 (ref 0–0.2)
BASOPHILS NFR BLD AUTO: 0.7 % (ref 0–1.5)
DEPRECATED RDW RBC AUTO: 43.8 FL (ref 37–54)
EOSINOPHIL # BLD AUTO: 0.2 10*3/MM3 (ref 0–0.4)
EOSINOPHIL NFR BLD AUTO: 1.3 % (ref 0.3–6.2)
ERYTHROCYTE [DISTWIDTH] IN BLOOD BY AUTOMATED COUNT: 13.8 % (ref 12.3–15.4)
HCT VFR BLD AUTO: 46 % (ref 37.5–51)
HGB BLD-MCNC: 15.9 G/DL (ref 13–17.7)
INR PPP: 0.99 (ref 0.93–1.1)
LYMPHOCYTES # BLD AUTO: 2.6 10*3/MM3 (ref 0.7–3.1)
LYMPHOCYTES NFR BLD AUTO: 17.8 % (ref 19.6–45.3)
MCH RBC QN AUTO: 31.6 PG (ref 26.6–33)
MCHC RBC AUTO-ENTMCNC: 34.6 G/DL (ref 31.5–35.7)
MCV RBC AUTO: 91.3 FL (ref 79–97)
MONOCYTES # BLD AUTO: 1.1 10*3/MM3 (ref 0.1–0.9)
MONOCYTES NFR BLD AUTO: 7.3 % (ref 5–12)
NEUTROPHILS NFR BLD AUTO: 10.7 10*3/MM3 (ref 1.7–7)
NEUTROPHILS NFR BLD AUTO: 72.9 % (ref 42.7–76)
NRBC BLD AUTO-RTO: 0 /100 WBC (ref 0–0.2)
PLATELET # BLD AUTO: 293 10*3/MM3 (ref 140–450)
PMV BLD AUTO: 8.1 FL (ref 6–12)
PROTHROMBIN TIME: 10.8 SECONDS (ref 9.6–11.7)
RBC # BLD AUTO: 5.04 10*6/MM3 (ref 4.14–5.8)
WBC NRBC COR # BLD: 14.7 10*3/MM3 (ref 3.4–10.8)

## 2023-11-14 PROCEDURE — C1788 PORT, INDWELLING, IMP: HCPCS

## 2023-11-14 PROCEDURE — 25010000002 LIDOCAINE 1 % SOLUTION: Performed by: RADIOLOGY

## 2023-11-14 PROCEDURE — C1894 INTRO/SHEATH, NON-LASER: HCPCS

## 2023-11-14 PROCEDURE — 85730 THROMBOPLASTIN TIME PARTIAL: CPT | Performed by: RADIOLOGY

## 2023-11-14 PROCEDURE — 85610 PROTHROMBIN TIME: CPT | Performed by: RADIOLOGY

## 2023-11-14 PROCEDURE — 99152 MOD SED SAME PHYS/QHP 5/>YRS: CPT

## 2023-11-14 PROCEDURE — 25010000002 ONDANSETRON PER 1 MG: Performed by: RADIOLOGY

## 2023-11-14 PROCEDURE — 25010000002 HEPARIN LOCK FLUSH PER 10 UNITS: Performed by: RADIOLOGY

## 2023-11-14 PROCEDURE — 25010000002 MIDAZOLAM PER 1 MG: Performed by: RADIOLOGY

## 2023-11-14 PROCEDURE — 85025 COMPLETE CBC W/AUTO DIFF WBC: CPT | Performed by: RADIOLOGY

## 2023-11-14 PROCEDURE — 25010000002 FENTANYL CITRATE (PF) 50 MCG/ML SOLUTION: Performed by: RADIOLOGY

## 2023-11-14 PROCEDURE — 77001 FLUOROGUIDE FOR VEIN DEVICE: CPT

## 2023-11-14 PROCEDURE — 25010000002 CEFAZOLIN PER 500 MG: Performed by: RADIOLOGY

## 2023-11-14 PROCEDURE — 99153 MOD SED SAME PHYS/QHP EA: CPT

## 2023-11-14 PROCEDURE — 76937 US GUIDE VASCULAR ACCESS: CPT

## 2023-11-14 PROCEDURE — 25810000003 SODIUM CHLORIDE 0.9 % SOLUTION: Performed by: RADIOLOGY

## 2023-11-14 RX ORDER — HEPARIN SODIUM (PORCINE) LOCK FLUSH IV SOLN 100 UNIT/ML 100 UNIT/ML
SOLUTION INTRAVENOUS AS NEEDED
Status: COMPLETED | OUTPATIENT
Start: 2023-11-14 | End: 2023-11-14

## 2023-11-14 RX ORDER — MIDAZOLAM HYDROCHLORIDE 1 MG/ML
INJECTION INTRAMUSCULAR; INTRAVENOUS AS NEEDED
Status: COMPLETED | OUTPATIENT
Start: 2023-11-14 | End: 2023-11-14

## 2023-11-14 RX ORDER — SODIUM CHLORIDE 0.9 % (FLUSH) 0.9 %
10 SYRINGE (ML) INJECTION AS NEEDED
Status: DISCONTINUED | OUTPATIENT
Start: 2023-11-14 | End: 2023-11-15 | Stop reason: HOSPADM

## 2023-11-14 RX ORDER — LIDOCAINE HYDROCHLORIDE AND EPINEPHRINE 10; 10 MG/ML; UG/ML
INJECTION, SOLUTION INFILTRATION; PERINEURAL AS NEEDED
Status: COMPLETED | OUTPATIENT
Start: 2023-11-14 | End: 2023-11-14

## 2023-11-14 RX ORDER — SODIUM CHLORIDE 9 MG/ML
75 INJECTION, SOLUTION INTRAVENOUS CONTINUOUS
Status: DISCONTINUED | OUTPATIENT
Start: 2023-11-14 | End: 2023-11-15 | Stop reason: HOSPADM

## 2023-11-14 RX ORDER — ONDANSETRON 2 MG/ML
INJECTION INTRAMUSCULAR; INTRAVENOUS AS NEEDED
Status: COMPLETED | OUTPATIENT
Start: 2023-11-14 | End: 2023-11-14

## 2023-11-14 RX ORDER — FENTANYL CITRATE 50 UG/ML
INJECTION, SOLUTION INTRAMUSCULAR; INTRAVENOUS AS NEEDED
Status: COMPLETED | OUTPATIENT
Start: 2023-11-14 | End: 2023-11-14

## 2023-11-14 RX ORDER — SODIUM CHLORIDE 0.9 % (FLUSH) 0.9 %
10 SYRINGE (ML) INJECTION EVERY 12 HOURS SCHEDULED
Status: DISCONTINUED | OUTPATIENT
Start: 2023-11-14 | End: 2023-11-15 | Stop reason: HOSPADM

## 2023-11-14 RX ORDER — LIDOCAINE HYDROCHLORIDE 10 MG/ML
INJECTION, SOLUTION INFILTRATION; PERINEURAL AS NEEDED
Status: COMPLETED | OUTPATIENT
Start: 2023-11-14 | End: 2023-11-14

## 2023-11-14 RX ADMIN — Medication 500 UNITS: at 13:41

## 2023-11-14 RX ADMIN — FENTANYL CITRATE 50 MCG: 50 INJECTION, SOLUTION INTRAMUSCULAR; INTRAVENOUS at 13:35

## 2023-11-14 RX ADMIN — LIDOCAINE HYDROCHLORIDE,EPINEPHRINE BITARTRATE 10 ML: 10; .01 INJECTION, SOLUTION INFILTRATION; PERINEURAL at 13:39

## 2023-11-14 RX ADMIN — Medication 10 ML: at 12:22

## 2023-11-14 RX ADMIN — MIDAZOLAM 1 MG: 1 INJECTION INTRAMUSCULAR; INTRAVENOUS at 13:32

## 2023-11-14 RX ADMIN — SODIUM CHLORIDE 75 ML/HR: 9 INJECTION, SOLUTION INTRAVENOUS at 11:30

## 2023-11-14 RX ADMIN — MIDAZOLAM 1 MG: 1 INJECTION INTRAMUSCULAR; INTRAVENOUS at 13:26

## 2023-11-14 RX ADMIN — CEFAZOLIN 2000 MG: 1 INJECTION, POWDER, FOR SOLUTION INTRAMUSCULAR; INTRAVENOUS at 13:13

## 2023-11-14 RX ADMIN — FENTANYL CITRATE 100 MCG: 50 INJECTION, SOLUTION INTRAMUSCULAR; INTRAVENOUS at 13:26

## 2023-11-14 RX ADMIN — ONDANSETRON 4 MG: 2 INJECTION INTRAMUSCULAR; INTRAVENOUS at 13:13

## 2023-11-14 RX ADMIN — LIDOCAINE HYDROCHLORIDE 10 ML: 10 INJECTION, SOLUTION INFILTRATION; PERINEURAL at 13:38

## 2023-11-14 NOTE — H&P
[facility]   Interventional Radiology H&P    Patient Name: Prashant Zhou  : 1960  MRN: [id number]  Primary Care Physician:  Lazaro Paulino MD  Referring Physician: Dayday Greer MD  Date of admission: 2023    Subjective   Subjective     HPI:  Prashant Zhou is a 62 y.o. male here for port placement..    Review of Systems:   Constitutional no fever,  no weight loss       Otolaryngeal no difficulty swallowing   Cardiovascular no chest pain   Pulmonary no cough, no sputum production   Gastrointestinal no constipation, no diarrhea                         Personal History       Past Medical/Surgical History:   Past Medical History:   Diagnosis Date    Aortic dissection     Diabetes mellitus     Heart murmur     History of noncompliance with medical treatment     Hyperlipidemia     Hypertension     Type B hypoplasia of aortic arch      Past Surgical History:   Procedure Laterality Date    COLONOSCOPY N/A 2023    Procedure: COLONOSCOPY with sigmoid mass tattooing at 35cm, sigmoid and rectal polypectomy;  Surgeon: TORIBIO Jacob MD;  Location: [facility] ENDOSCOPY;  Service: Gastroenterology;  Laterality: N/A;  sigmoid mass, colon polyps       Social History:  reports that he has been smoking cigarettes. He has been smoking an average of 1 pack per day. He has never used smokeless tobacco. He reports that he does not drink alcohol and does not use drugs.    Medications:  (Not in a hospital admission)    Current medications:  sodium chloride, 10 mL, Intravenous, Q12H      Current IV drips:  ceFAZolin,   sodium chloride, 75 mL/hr, Last Rate: 75 mL/hr (23 1130)        Allergies:  No Known Allergies    Objective    Objective     Vitals:   Temp:  [97.7 °F (36.5 °C)] 97.7 °F (36.5 °C)  Heart Rate:  [65] 65  Resp:  [14] 14  BP: (110)/(62) 110/62      Physical Exam:   Constitutional: Awake, alert, No acute distress    Respiratory: Clear to auscultation bilaterally, nonlabored  "respirations    Cardiovascular: RRR, no murmurs, rubs, or gallops, palpable pedal pulses bilaterally   Gastrointestinal: Positive bowel sounds, soft, nontender, nondistended        ASA SCALE ASSESSMENT:  2-Mild to moderate systemic disease, medically well controlled, with no functional limitation    MALLAMPATI CLASSIFICATION:  2-Able to visualize the soft palate, fauces, uvula. The anterior & posterior tonsilar pillars are hidden by the tongue.       Result Review        Result Review:     No results found for: \"NA\"    No results found for: \"K\"    No results found for: \"CL\"    No results found for: \"PLASMABICARB\"    No results found for: \"BUN\"    No results found for: \"CREATININE\"    No results found for: \"CALCIUM\"        No components found for: \"GLUCOSE.*\"  Results from last 7 days   Lab Units 11/14/23  1103   WBC 10*3/mm3 14.70*   HEMOGLOBIN g/dL 15.9   HEMATOCRIT % 46.0   PLATELETS 10*3/mm3 293      Results from last 7 days   Lab Units 11/14/23  1103   INR  0.99           Assessment / Plan     Assesment:   Port placement.      Plan:   Port placement.    The risks and benefits of the procedure were discussed with the patient.    Electronically signed by Aldair Martinez MD, 11/14/23, 1:19 PM EST.   "

## 2023-11-30 RX ORDER — CLONIDINE HYDROCHLORIDE 0.1 MG/1
0.1 TABLET ORAL 2 TIMES DAILY
Qty: 60 TABLET | Refills: 5 | Status: SHIPPED | OUTPATIENT
Start: 2023-11-30

## 2024-01-03 ENCOUNTER — TELEPHONE (OUTPATIENT)
Dept: ONCOLOGY | Facility: CLINIC | Age: 64
End: 2024-01-03
Payer: MEDICARE

## 2024-01-03 ENCOUNTER — TELEPHONE (OUTPATIENT)
Dept: ONCOLOGY | Facility: CLINIC | Age: 64
End: 2024-01-03

## 2024-01-03 NOTE — TELEPHONE ENCOUNTER
Caller: Prashant Zhou    Relationship to patient: Self    Best call back number: [phone]     Chief complaint: PATIENT RETURNING CALL AS HE WAS PREVIOUSLY MISTAKEN ON LAST TREATMENT W/ MD JONES.    PATIENT LAST INFUSION PUMP REMOVAL 12/28/24.    HE WILL BE DUE FOR NEST TREATMENT 1/9/24 NOT 1/19/24.    CALLING TO VERIFY IF CHANGE NEEDS TO BE MADE TO NEW PATIENT APPT

## 2024-01-03 NOTE — TELEPHONE ENCOUNTER
After having a secure chat w/both Ginna and Jennifer.  I called the Pt to find out if he was getting his INF on 1/9. He stated he was and was due again on 1/23. I stated this on the chat. I called Pt back and changed appt to 1/22 as instructed. Pt v/u

## 2024-01-19 NOTE — PROGRESS NOTES
HEMATOLOGY ONCOLOGY OUTPATIENT CONSULTATION       Patient name: Prashant Zhou  : 1960  MRN: [id number]  Primary Care Physician: Lazaro Paulino MD  Referring Physician: No ref. 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     History of Present Illness:  Patient is a 63 y.o.     2024: Mr. Zhou carried a long history of severe hypertension and of an aneurysm of the ascending aorta.  He had been receiving antihypertensive medication after correction of the aneurysm with good results.  In 2023 he underwent his first screening colonoscopy.  A large circumferential and obstructive tumor was identified at the sigmoid/rectosigmoid junction.  The tumor could not be traversed even with the smallest instrument.  Biopsies showed moderately differentiated colonic adenocarcinoma without loss of nuclear expression of the mismatch repair proteins.  Imaging studies at the time revealed the known thoracic aneurysm that has increased mildly since the previous scan.  There was a benign-appearing subpleural nodule in the right posteromedial chest wall and pulmonary nodules that have been identified since 2018 remained stable.  Some mediastinal nonspecific and stable adenopathy was reported as well.  In the abdomen the sigmoid tumor was confirmed and there was no suggestion of metastatic disease.  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  He underwent excision of the squamous cell carcinoma that proved to be a high-grade squamous intraepithelial lesion.  He also had a robotic left colectomy.  The final report of pathology was of moderately differentiated adenocarcinoma of the colon, that measured 3.5 cm.  The tumor involved the serosal surface and the antimesenteric serosa.  Lymphovascular space invasion was identified.  All margins were  negative for disease but 2 of 27 lymph nodes had metastatic involvement.  As well there were at least 5 peritoneal deposits that were excised and that were confirmed to correspond to metastatic lesions.  The tumor had intact expression of MLH1, MSH 1, MSH 6 and PMS2.  Next-generation sequencing revealed a pathogenic mutation of exon 2 of the KRAS gene. ERBB2, BRAF, NTRK, RET, and PIK3CA were negative. PD-L1 was negative, as well.  He was started on treatment with FOLFOX and bevacizumab on November 28, 2023.  He reported adequate tolerance to the treatment, with the expected cold intolerance.  He had had no nausea but since the beginning of the present illness he had lost approximately 20 pounds.  He was eating reasonably well.  He had been free of chest pains and no longer had abdominal pain.  All his surgical incisions had healed and he was having regular defecations, at times of liquid stool.  The physical exam revealed him to be a chronically ill-appearing man who did not seem in distress.  He was conversant, in good spirits and without jaundice.  Lungs diminished bilaterally.  Heart regular.  Abdomen soft and nontender.  No edema.  The laboratory exams and all the records were reviewed and discussed with him and with his wife.  To resume treatment with the idea of obtaining a new set of scans after 6 cycles of chemotherapy.  After 12 cycles of chemotherapy discuss the possibility of additional surgery if there was any residual peritoneal disease at the time of surgery.  To see me at the end of February with the scans.    Subjective:  1/22/2024 in the office for the first time with the above.    Past Medical History:   Diagnosis Date    Aortic dissection     Diabetes mellitus     Heart murmur     History of noncompliance with medical treatment     Hyperlipidemia     Hypertension     Type B hypoplasia of aortic arch      Past Surgical History:   Procedure Laterality Date    COLECTOMY PARTIAL / TOTAL  2023     COLONOSCOPY N/A 08/31/2023    Procedure: COLONOSCOPY with sigmoid mass tattooing at 35cm, sigmoid and rectal polypectomy;  Surgeon: TORIBIO Jacob MD;  Location: [facility] ENDOSCOPY;  Service: Gastroenterology;  Laterality: N/A;  sigmoid mass, colon polyps       Current Outpatient Medications:     Accu-Chek Softclix Lancets lancets, Test daily E11.9, Disp: 100 each, Rfl: 12    amLODIPine (NORVASC) 10 MG tablet, TAKE ONE TABLET BY MOUTH DAILY (Patient taking differently: Take 1 tablet by mouth Daily.), Disp: 90 tablet, Rfl: 0    atorvastatin (LIPITOR) 40 MG tablet, TAKE ONE TABLET BY MOUTH EVERY NIGHT AT BEDTIME (Patient taking differently: Take 1 tablet by mouth.), Disp: 90 tablet, Rfl: 0    Blood Glucose Monitoring Suppl (Accu-Chek Guide) w/Device kit, 1 Device Daily., Disp: 1 kit, Rfl: 0    cloNIDine (CATAPRES) 0.1 MG tablet, Take 1 tablet by mouth 2 (Two) Times a Day., Disp: 60 tablet, Rfl: 5    glimepiride (AMARYL) 4 MG tablet, TAKE ONE TABLET BY MOUTH TWICE A DAY, Disp: 180 tablet, Rfl: 1    glucose blood (Accu-Chek Guide) test strip, Test daily e11.9, Disp: 50 each, Rfl: 12    hydrALAZINE (APRESOLINE) 50 MG tablet, TAKE ONE TABLET BY MOUTH EVERY 8 HOURS, Disp: 90 tablet, Rfl: 1    lisinopril-hydrochlorothiazide (PRINZIDE,ZESTORETIC) 20-25 MG per tablet, TAKE ONE TABLET BY MOUTH DAILY (Patient taking differently: Take 1 tablet by mouth Daily.), Disp: 90 tablet, Rfl: 3    metFORMIN (GLUCOPHAGE) 850 MG tablet, TAKE 1 TABLET BY MOUTH TWICE A DAY WITH MEALS, Disp: 144 tablet, Rfl: 1    metoprolol tartrate (LOPRESSOR) 50 MG tablet, TAKE THREE TABLETS BY MOUTH EVERY 12 HOURS (Patient taking differently: Take 1 tablet by mouth 2 (Two) Times a Day.), Disp: 540 tablet, Rfl: 0    potassium chloride 10 MEQ CR tablet, , Disp: , Rfl:     vitamin D3 125 MCG (5000 UT) capsule capsule, Take 1 capsule by mouth Daily., Disp: , Rfl:     No Known Allergies    Family History   Problem Relation Age of Onset    Diabetes Mother      Dementia Mother     Sudden death Father      Cancer-related family history is not on file.    Social History     Tobacco Use    Smoking status: Every Day     Packs/day: 2.00     Years: 53.00     Additional pack years: 0.00     Total pack years: 106.00     Types: Cigarettes     Start date: 1971    Smokeless tobacco: Never    Tobacco comments:     Do not smoke dos    Vaping Use    Vaping Use: Never used   Substance Use Topics    Alcohol use: No     Comment: Abstinent since around 2008    Drug use: Not Currently     Types: Marijuana     Comment: Abstinent since at least the 1980's     Social History     Social History Narrative    Not on file     ROS:   Review of Systems   Constitutional:  Positive for fatigue. Negative for activity change, appetite change, chills, diaphoresis, fever and unexpected weight change.   HENT:  Negative for congestion, dental problem, drooling, ear discharge, ear pain, facial swelling, hearing loss, mouth sores, nosebleeds, postnasal drip, rhinorrhea, sinus pressure, sinus pain, sneezing, sore throat, tinnitus, trouble swallowing and voice change.    Eyes:  Negative for photophobia, pain, discharge, redness, itching and visual disturbance.   Respiratory:  Negative for apnea, cough, choking, chest tightness, shortness of breath, wheezing and stridor.    Cardiovascular:  Negative for chest pain, palpitations and leg swelling.   Gastrointestinal:  Negative for abdominal distention, abdominal pain, anal bleeding, blood in stool, constipation, diarrhea, nausea, rectal pain and vomiting.   Endocrine: Negative for cold intolerance, heat intolerance, polydipsia and polyuria.   Genitourinary:  Negative for decreased urine volume, difficulty urinating, dysuria, flank pain, frequency, genital sores, hematuria and urgency.   Musculoskeletal:  Negative for arthralgias, back pain, gait problem, joint swelling, myalgias, neck pain and neck stiffness.   Skin:  Negative for color change, pallor and rash.  "  Neurological:  Negative for dizziness, tremors, seizures, syncope, facial asymmetry, speech difficulty, weakness, light-headedness, numbness and headaches.        Increased cold sensitivity since the commencement of treatment with oxaliplatin   Hematological:  Negative for adenopathy. Does not bruise/bleed easily.   Psychiatric/Behavioral:  Negative for agitation, behavioral problems, confusion, decreased concentration, hallucinations, self-injury, sleep disturbance and suicidal ideas. The patient is not nervous/anxious.      Objective:    Vital Signs:  Vitals:    01/22/24 0839   BP: 108/72   Pulse: 73   Temp: 98.1 °F (36.7 °C)   TempSrc: Oral   SpO2: 93%   Weight: 96.4 kg (212 lb 9.6 oz)   Height: 193 cm (76\")   PainSc: 0-No pain     Body mass index is 25.88 kg/m².    ECOG  (1) Restricted in physically strenuous activity, ambulatory and able to do work of light nature    Physical Exam:   Physical Exam  Constitutional:       General: He is not in acute distress.     Appearance: He is not ill-appearing, toxic-appearing or diaphoretic.   HENT:      Head: Normocephalic and atraumatic.      Right Ear: External ear normal.      Left Ear: External ear normal.      Nose: Nose normal.      Mouth/Throat:      Mouth: Mucous membranes are moist.      Pharynx: Oropharynx is clear.   Eyes:      General: No scleral icterus.        Right eye: No discharge.         Left eye: No discharge.      Conjunctiva/sclera: Conjunctivae normal.      Pupils: Pupils are equal, round, and reactive to light.   Cardiovascular:      Rate and Rhythm: Normal rate and regular rhythm.      Pulses: Normal pulses.      Heart sounds: Normal heart sounds. No murmur heard.     No friction rub. No gallop.   Pulmonary:      Effort: No respiratory distress.      Breath sounds: No stridor. No wheezing, rhonchi or rales.      Comments: Diminished symmetrically bilaterally.  Chest:      Chest wall: No tenderness.   Abdominal:      General: Bowel sounds are " normal. There is no distension.      Palpations: Abdomen is soft. There is no mass.      Tenderness: There is no abdominal tenderness. There is no right CVA tenderness, left CVA tenderness, guarding or rebound.      Comments: Surgical incisions well-healed.   Musculoskeletal:         General: No swelling, tenderness, deformity or signs of injury.      Cervical back: No rigidity.      Right lower leg: No edema.      Left lower leg: No edema.   Lymphadenopathy:      Cervical: No cervical adenopathy.   Skin:     General: Skin is warm and dry.      Coloration: Skin is not jaundiced.      Findings: No bruising or rash.   Neurological:      General: No focal deficit present.      Mental Status: He is alert and oriented to person, place, and time.      Gait: Gait normal.   Psychiatric:         Mood and Affect: Mood normal.         Behavior: Behavior normal.         Thought Content: Thought content normal.         Judgment: Judgment normal.     MIGUEL Wagner MD performed the physical exam on 1/22/2024 as documented above.    Lab Results - Last 18 Months   Lab Units 01/22/24  0834 11/14/23  1103 07/24/23  1133   WBC 10*3/mm3 12.17* 14.70* 11.22*   HEMOGLOBIN g/dL 13.7 15.9 15.0   HEMATOCRIT % 41.6 46.0 43.7   PLATELETS 10*3/mm3 203 293 286   MCV fL 94.3 91.3 89.0     Lab Results - Last 18 Months   Lab Units 07/24/23  1133 01/23/23  1113   SODIUM mmol/L 137 138   POTASSIUM mmol/L 4.1 3.8   CHLORIDE mmol/L 99 98   CO2 mmol/L 26.0 30.0*   BUN mg/dL 11 11   CREATININE mg/dL 0.70* 0.72*   CALCIUM mg/dL 10.1 10.3   BILIRUBIN mg/dL 0.4 0.4   ALK PHOS U/L 67 75   ALT (SGPT) U/L 15 11   AST (SGOT) U/L 12 14   GLUCOSE mg/dL 151* 136*     Lab Results   Component Value Date    GLUCOSE 151 (H) 07/24/2023    BUN 11 07/24/2023    CREATININE 0.70 (L) 07/24/2023    EGFRIFNONA 100 01/20/2022    BCR 15.7 07/24/2023    K 4.1 07/24/2023    CO2 26.0 07/24/2023    CALCIUM 10.1 07/24/2023    ALBUMIN 4.7 07/24/2023    LABIL2 1.5 01/22/2019     AST 12 07/24/2023    ALT 15 07/24/2023     Lab Results - Last 18 Months   Lab Units 11/14/23  1103   INR  0.99   APTT seconds 28.2     Lab Results   Component Value Date    PTT 28.2 11/14/2023    INR 0.99 11/14/2023     Lab Results   Component Value Date    CEA 1.2 09/13/2023       Lab Results   Component Value Date    PSA 0.594 01/23/2023     Assessment & Plan     1.  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  Discussed with him at great length.  Explained the plan.  Consider scans after 6 cycles of chemotherapy.  Discuss surgery after at least 12 cycles of chemotherapy.  This only if residual peritoneal disease was present after the initial surgery.  Tumor markers were explained.  Will follow closely and he will have scans in mid February 2024 and will see me with the results.  2.  Cigarette smoker: Discussed with him at length.  3.  Aortic aneurysm  4.  Severe hypertension  5.  I reviewed all the records from [facility] and from [facility].  Reviewed all laboratory exams and all imaging studies available.  6.  He is to see me in February 2024.    Lars Wagner MD on 1/22/2024 at 10:05 AM.

## 2024-01-22 ENCOUNTER — LAB (OUTPATIENT)
Dept: LAB | Facility: HOSPITAL | Age: 64
End: 2024-01-22
Payer: MEDICARE

## 2024-01-22 ENCOUNTER — CONSULT (OUTPATIENT)
Dept: ONCOLOGY | Facility: CLINIC | Age: 64
End: 2024-01-22
Payer: MEDICARE

## 2024-01-22 VITALS
WEIGHT: 212.6 LBS | BODY MASS INDEX: 25.89 KG/M2 | DIASTOLIC BLOOD PRESSURE: 72 MMHG | HEIGHT: 76 IN | HEART RATE: 73 BPM | OXYGEN SATURATION: 93 % | TEMPERATURE: 98.1 F | SYSTOLIC BLOOD PRESSURE: 108 MMHG

## 2024-01-22 DIAGNOSIS — I10 PRIMARY HYPERTENSION: Primary | ICD-10-CM

## 2024-01-22 DIAGNOSIS — C20 RECTAL CANCER: ICD-10-CM

## 2024-01-22 DIAGNOSIS — C20 RECTAL CANCER: Primary | ICD-10-CM

## 2024-01-22 PROBLEM — C78.6 METASTASIS TO PERITONEAL CAVITY: Status: ACTIVE | Noted: 2024-01-22

## 2024-01-22 LAB
ALBUMIN SERPL-MCNC: 4.3 G/DL (ref 3.5–5.2)
ALBUMIN/GLOB SERPL: 1.2 G/DL
ALP SERPL-CCNC: 86 U/L (ref 39–117)
ALT SERPL W P-5'-P-CCNC: 16 U/L (ref 1–41)
ANION GAP SERPL CALCULATED.3IONS-SCNC: 14 MMOL/L (ref 5–15)
AST SERPL-CCNC: 16 U/L (ref 1–40)
BASOPHILS # BLD AUTO: 0.05 10*3/MM3 (ref 0–0.2)
BASOPHILS NFR BLD AUTO: 0.4 % (ref 0–1.5)
BILIRUB SERPL-MCNC: 0.6 MG/DL (ref 0–1.2)
BUN SERPL-MCNC: 9 MG/DL (ref 8–23)
BUN/CREAT SERPL: 12.5 (ref 7–25)
CALCIUM SPEC-SCNC: 10.7 MG/DL (ref 8.6–10.5)
CEA SERPL-MCNC: 1.84 NG/ML
CHLORIDE SERPL-SCNC: 95 MMOL/L (ref 98–107)
CO2 SERPL-SCNC: 29 MMOL/L (ref 22–29)
CREAT SERPL-MCNC: 0.72 MG/DL (ref 0.76–1.27)
DEPRECATED RDW RBC AUTO: 50.6 FL (ref 37–54)
EGFRCR SERPLBLD CKD-EPI 2021: 103.3 ML/MIN/1.73
EOSINOPHIL # BLD AUTO: 0.19 10*3/MM3 (ref 0–0.4)
EOSINOPHIL NFR BLD AUTO: 1.6 % (ref 0.3–6.2)
ERYTHROCYTE [DISTWIDTH] IN BLOOD BY AUTOMATED COUNT: 15.4 % (ref 12.3–15.4)
GLOBULIN UR ELPH-MCNC: 3.5 GM/DL
GLUCOSE SERPL-MCNC: 101 MG/DL (ref 65–99)
HCT VFR BLD AUTO: 41.6 % (ref 37.5–51)
HGB BLD-MCNC: 13.7 G/DL (ref 13–17.7)
HOLD SPECIMEN: NORMAL
LYMPHOCYTES # BLD AUTO: 1.83 10*3/MM3 (ref 0.7–3.1)
LYMPHOCYTES NFR BLD AUTO: 15 % (ref 19.6–45.3)
MCH RBC QN AUTO: 31.1 PG (ref 26.6–33)
MCHC RBC AUTO-ENTMCNC: 32.9 G/DL (ref 31.5–35.7)
MCV RBC AUTO: 94.3 FL (ref 79–97)
MONOCYTES # BLD AUTO: 1.36 10*3/MM3 (ref 0.1–0.9)
MONOCYTES NFR BLD AUTO: 11.2 % (ref 5–12)
NEUTROPHILS NFR BLD AUTO: 71.8 % (ref 42.7–76)
NEUTROPHILS NFR BLD AUTO: 8.74 10*3/MM3 (ref 1.7–7)
PLATELET # BLD AUTO: 203 10*3/MM3 (ref 140–450)
PMV BLD AUTO: 9.1 FL (ref 6–12)
POTASSIUM SERPL-SCNC: 3.9 MMOL/L (ref 3.5–5.2)
PROT SERPL-MCNC: 7.8 G/DL (ref 6–8.5)
RBC # BLD AUTO: 4.41 10*6/MM3 (ref 4.14–5.8)
SODIUM SERPL-SCNC: 138 MMOL/L (ref 136–145)
WBC NRBC COR # BLD AUTO: 12.17 10*3/MM3 (ref 3.4–10.8)

## 2024-01-22 PROCEDURE — 3078F DIAST BP <80 MM HG: CPT | Performed by: INTERNAL MEDICINE

## 2024-01-22 PROCEDURE — 1160F RVW MEDS BY RX/DR IN RCRD: CPT | Performed by: INTERNAL MEDICINE

## 2024-01-22 PROCEDURE — 3074F SYST BP LT 130 MM HG: CPT | Performed by: INTERNAL MEDICINE

## 2024-01-22 PROCEDURE — 82378 CARCINOEMBRYONIC ANTIGEN: CPT | Performed by: INTERNAL MEDICINE

## 2024-01-22 PROCEDURE — 99204 OFFICE O/P NEW MOD 45 MIN: CPT | Performed by: INTERNAL MEDICINE

## 2024-01-22 PROCEDURE — 85025 COMPLETE CBC W/AUTO DIFF WBC: CPT

## 2024-01-22 PROCEDURE — 1159F MED LIST DOCD IN RCRD: CPT | Performed by: INTERNAL MEDICINE

## 2024-01-22 PROCEDURE — 80053 COMPREHEN METABOLIC PANEL: CPT | Performed by: INTERNAL MEDICINE

## 2024-01-22 PROCEDURE — 36415 COLL VENOUS BLD VENIPUNCTURE: CPT

## 2024-01-22 PROCEDURE — 1126F AMNT PAIN NOTED NONE PRSNT: CPT | Performed by: INTERNAL MEDICINE

## 2024-01-22 RX ORDER — POTASSIUM CHLORIDE 750 MG/1
TABLET, FILM COATED, EXTENDED RELEASE ORAL
COMMUNITY
Start: 2023-12-26

## 2024-01-23 ENCOUNTER — HOSPITAL ENCOUNTER (OUTPATIENT)
Dept: ONCOLOGY | Facility: HOSPITAL | Age: 64
Discharge: HOME | End: 2024-01-23
Payer: MEDICARE

## 2024-01-23 VITALS
TEMPERATURE: 97.5 F | WEIGHT: 211.3 LBS | OXYGEN SATURATION: 93 % | HEART RATE: 75 BPM | HEIGHT: 76 IN | DIASTOLIC BLOOD PRESSURE: 72 MMHG | RESPIRATION RATE: 16 BRPM | SYSTOLIC BLOOD PRESSURE: 119 MMHG | BODY MASS INDEX: 25.73 KG/M2

## 2024-01-23 DIAGNOSIS — C78.6 METASTASIS TO PERITONEAL CAVITY: ICD-10-CM

## 2024-01-23 DIAGNOSIS — R80.9 PROTEINURIA, UNSPECIFIED TYPE: Primary | ICD-10-CM

## 2024-01-23 DIAGNOSIS — C20 RECTAL CANCER: Primary | ICD-10-CM

## 2024-01-23 LAB
BILIRUB UR QL STRIP: ABNORMAL
CLARITY UR: ABNORMAL
COLOR UR: ABNORMAL
GLUCOSE UR STRIP-MCNC: ABNORMAL MG/DL
HGB UR QL STRIP.AUTO: NEGATIVE
KETONES UR QL STRIP: ABNORMAL
LEUKOCYTE ESTERASE UR QL STRIP.AUTO: NEGATIVE
NITRITE UR QL STRIP: NEGATIVE
PH UR STRIP.AUTO: 5.5 [PH] (ref 5–8)
PROT UR QL STRIP: ABNORMAL
SP GR UR STRIP: >=1.03 (ref 1–1.03)
UROBILINOGEN UR QL STRIP: ABNORMAL

## 2024-01-23 PROCEDURE — 96413 CHEMO IV INFUSION 1 HR: CPT

## 2024-01-23 PROCEDURE — 25010000002 BEVACIZUMAB-BVZR 400 MG/16ML SOLUTION 16 ML VIAL: Performed by: STUDENT IN AN ORGANIZED HEALTH CARE EDUCATION/TRAINING PROGRAM

## 2024-01-23 PROCEDURE — 96366 THER/PROPH/DIAG IV INF ADDON: CPT

## 2024-01-23 PROCEDURE — 25010000002 LEUCOVORIN 200 MG RECONSTITUTED SOLUTION 200 MG VIAL: Performed by: INTERNAL MEDICINE

## 2024-01-23 PROCEDURE — 96416 CHEMO PROLONG INFUSE W/PUMP: CPT

## 2024-01-23 PROCEDURE — 25810000003 SODIUM CHLORIDE 0.9 % SOLUTION: Performed by: STUDENT IN AN ORGANIZED HEALTH CARE EDUCATION/TRAINING PROGRAM

## 2024-01-23 PROCEDURE — 25010000002 OXALIPLATIN PER 0.5 MG: Performed by: STUDENT IN AN ORGANIZED HEALTH CARE EDUCATION/TRAINING PROGRAM

## 2024-01-23 PROCEDURE — 96368 THER/DIAG CONCURRENT INF: CPT

## 2024-01-23 PROCEDURE — 25010000002 PALONOSETRON 0.25 MG/5ML SOLUTION PREFILLED SYRINGE: Performed by: STUDENT IN AN ORGANIZED HEALTH CARE EDUCATION/TRAINING PROGRAM

## 2024-01-23 PROCEDURE — 0 DEXTROSE 5 % SOLUTION: Performed by: STUDENT IN AN ORGANIZED HEALTH CARE EDUCATION/TRAINING PROGRAM

## 2024-01-23 PROCEDURE — 0 DEXTROSE 5 % SOLUTION 250 ML FLEX CONT: Performed by: STUDENT IN AN ORGANIZED HEALTH CARE EDUCATION/TRAINING PROGRAM

## 2024-01-23 PROCEDURE — G0498 CHEMO EXTEND IV INFUS W/PUMP: HCPCS

## 2024-01-23 PROCEDURE — 25010000002 FLUOROURACIL PER 500 MG: Performed by: STUDENT IN AN ORGANIZED HEALTH CARE EDUCATION/TRAINING PROGRAM

## 2024-01-23 PROCEDURE — 96415 CHEMO IV INFUSION ADDL HR: CPT

## 2024-01-23 PROCEDURE — 96411 CHEMO IV PUSH ADDL DRUG: CPT

## 2024-01-23 PROCEDURE — 25010000002 DEXAMETHASONE SODIUM PHOSPHATE 120 MG/30ML SOLUTION: Performed by: STUDENT IN AN ORGANIZED HEALTH CARE EDUCATION/TRAINING PROGRAM

## 2024-01-23 PROCEDURE — 25010000002 LEUCOVORIN 500 MG RECONSTITUTED SOLUTION 1 EACH VIAL: Performed by: INTERNAL MEDICINE

## 2024-01-23 PROCEDURE — 25010000002 BEVACIZUMAB-BVZR 100 MG/4ML SOLUTION 4 ML VIAL: Performed by: STUDENT IN AN ORGANIZED HEALTH CARE EDUCATION/TRAINING PROGRAM

## 2024-01-23 PROCEDURE — 96417 CHEMO IV INFUS EACH ADDL SEQ: CPT

## 2024-01-23 PROCEDURE — 25810000003 SODIUM CHLORIDE 0.9 % SOLUTION 500 ML FLEX CONT: Performed by: STUDENT IN AN ORGANIZED HEALTH CARE EDUCATION/TRAINING PROGRAM

## 2024-01-23 PROCEDURE — 96375 TX/PRO/DX INJ NEW DRUG ADDON: CPT

## 2024-01-23 PROCEDURE — 96367 TX/PROPH/DG ADDL SEQ IV INF: CPT

## 2024-01-23 PROCEDURE — 81003 URINALYSIS AUTO W/O SCOPE: CPT | Performed by: INTERNAL MEDICINE

## 2024-01-23 PROCEDURE — 0 DEXTROSE 5 % SOLUTION 250 ML FLEX CONT: Performed by: INTERNAL MEDICINE

## 2024-01-23 RX ORDER — DEXTROSE MONOHYDRATE 50 MG/ML
20 INJECTION, SOLUTION INTRAVENOUS ONCE
Status: COMPLETED | OUTPATIENT
Start: 2024-01-23 | End: 2024-01-23

## 2024-01-23 RX ORDER — PALONOSETRON 0.05 MG/ML
0.25 INJECTION, SOLUTION INTRAVENOUS ONCE
Status: COMPLETED | OUTPATIENT
Start: 2024-01-23 | End: 2024-01-23

## 2024-01-23 RX ORDER — SODIUM CHLORIDE 9 MG/ML
20 INJECTION, SOLUTION INTRAVENOUS ONCE
Status: COMPLETED | OUTPATIENT
Start: 2024-01-23 | End: 2024-01-23

## 2024-01-23 RX ORDER — FLUOROURACIL 50 MG/ML
400 INJECTION, SOLUTION INTRAVENOUS ONCE
Status: COMPLETED | OUTPATIENT
Start: 2024-01-23 | End: 2024-01-23

## 2024-01-23 RX ADMIN — OXALIPLATIN 195 MG: 5 INJECTION, SOLUTION INTRAVENOUS at 10:10

## 2024-01-23 RX ADMIN — PALONOSETRON 0.25 MG: 0.25 INJECTION, SOLUTION INTRAVENOUS at 09:10

## 2024-01-23 RX ADMIN — SODIUM CHLORIDE 20 ML/HR: 9 INJECTION, SOLUTION INTRAVENOUS at 09:09

## 2024-01-23 RX ADMIN — FLUOROURACIL 5450 MG: 50 INJECTION, SOLUTION INTRAVENOUS at 12:19

## 2024-01-23 RX ADMIN — LEUCOVORIN CALCIUM 900 MG: 500 INJECTION, POWDER, LYOPHILIZED, FOR SOLUTION INTRAMUSCULAR; INTRAVENOUS at 10:10

## 2024-01-23 RX ADMIN — FLUOROURACIL 910 MG: 50 INJECTION, SOLUTION INTRAVENOUS at 12:14

## 2024-01-23 RX ADMIN — DEXTROSE MONOHYDRATE 20 ML/HR: 50 INJECTION, SOLUTION INTRAVENOUS at 10:08

## 2024-01-23 RX ADMIN — DEXAMETHASONE SODIUM PHOSPHATE 12 MG: 4 INJECTION, SOLUTION INTRA-ARTICULAR; INTRALESIONAL; INTRAMUSCULAR; INTRAVENOUS; SOFT TISSUE at 09:09

## 2024-01-23 RX ADMIN — SODIUM CHLORIDE 480 MG: 900 INJECTION, SOLUTION INTRAVENOUS at 09:33

## 2024-01-23 NOTE — PROGRESS NOTES
Patient in clinic for C5 D1 Zirabev/Folfox.  Port accessed by Nely Garcia RN with sterile technique and positive blood return noted. Labs were drawn on 1/22/24 prior to MD visit. UA was obtained and urine protein is 2+. Dr. Wagner made aware and gave to the order okay to treat. Patient tolerated treatment. Declined AVS.

## 2024-01-25 ENCOUNTER — HOSPITAL ENCOUNTER (OUTPATIENT)
Dept: ONCOLOGY | Facility: HOSPITAL | Age: 64
Discharge: HOME | End: 2024-01-25
Payer: MEDICARE

## 2024-01-25 ENCOUNTER — OFFICE VISIT (OUTPATIENT)
Dept: FAMILY MEDICINE CLINIC | Facility: CLINIC | Age: 64
End: 2024-01-25
Payer: MEDICARE

## 2024-01-25 ENCOUNTER — LAB (OUTPATIENT)
Dept: FAMILY MEDICINE CLINIC | Facility: CLINIC | Age: 64
End: 2024-01-25
Payer: MEDICARE

## 2024-01-25 VITALS
OXYGEN SATURATION: 97 % | DIASTOLIC BLOOD PRESSURE: 80 MMHG | HEART RATE: 78 BPM | TEMPERATURE: 98.6 F | BODY MASS INDEX: 26.32 KG/M2 | WEIGHT: 216.2 LBS | SYSTOLIC BLOOD PRESSURE: 133 MMHG

## 2024-01-25 VITALS — HEART RATE: 89 BPM | SYSTOLIC BLOOD PRESSURE: 128 MMHG | DIASTOLIC BLOOD PRESSURE: 72 MMHG

## 2024-01-25 DIAGNOSIS — I10 ESSENTIAL HYPERTENSION: ICD-10-CM

## 2024-01-25 DIAGNOSIS — C78.6 METASTASIS TO PERITONEAL CAVITY: Primary | ICD-10-CM

## 2024-01-25 DIAGNOSIS — R19.5 POSITIVE COLORECTAL CANCER SCREENING USING COLOGUARD TEST: ICD-10-CM

## 2024-01-25 DIAGNOSIS — C20 RECTAL CANCER: ICD-10-CM

## 2024-01-25 DIAGNOSIS — E11.65 TYPE 2 DIABETES MELLITUS WITH HYPERGLYCEMIA, WITHOUT LONG-TERM CURRENT USE OF INSULIN: ICD-10-CM

## 2024-01-25 DIAGNOSIS — Z00.00 MEDICARE ANNUAL WELLNESS VISIT, SUBSEQUENT: Primary | ICD-10-CM

## 2024-01-25 LAB
CHOLEST SERPL-MCNC: 105 MG/DL (ref 0–200)
HBA1C MFR BLD: 7.4 % (ref 4.8–5.6)
HDLC SERPL-MCNC: 35 MG/DL (ref 40–60)
LDLC SERPL CALC-MCNC: 46 MG/DL (ref 0–100)
LDLC/HDLC SERPL: 1.24 {RATIO}
TRIGL SERPL-MCNC: 133 MG/DL (ref 0–150)
VLDLC SERPL-MCNC: 24 MG/DL (ref 5–40)

## 2024-01-25 PROCEDURE — 80061 LIPID PANEL: CPT | Performed by: FAMILY MEDICINE

## 2024-01-25 PROCEDURE — 83036 HEMOGLOBIN GLYCOSYLATED A1C: CPT | Performed by: FAMILY MEDICINE

## 2024-01-25 PROCEDURE — 25010000002 HEPARIN LOCK FLUSH PER 10 UNITS: Performed by: INTERNAL MEDICINE

## 2024-01-25 RX ORDER — HEPARIN SODIUM (PORCINE) LOCK FLUSH IV SOLN 100 UNIT/ML 100 UNIT/ML
500 SOLUTION INTRAVENOUS AS NEEDED
OUTPATIENT
Start: 2024-01-25

## 2024-01-25 RX ORDER — SODIUM CHLORIDE 0.9 % (FLUSH) 0.9 %
20 SYRINGE (ML) INJECTION AS NEEDED
OUTPATIENT
Start: 2024-01-25

## 2024-01-25 RX ORDER — HEPARIN SODIUM (PORCINE) LOCK FLUSH IV SOLN 100 UNIT/ML 100 UNIT/ML
500 SOLUTION INTRAVENOUS AS NEEDED
Status: DISCONTINUED | OUTPATIENT
Start: 2024-01-25 | End: 2024-01-27 | Stop reason: HOSPADM

## 2024-01-25 RX ORDER — SODIUM CHLORIDE 0.9 % (FLUSH) 0.9 %
20 SYRINGE (ML) INJECTION AS NEEDED
Status: DISCONTINUED | OUTPATIENT
Start: 2024-01-25 | End: 2024-01-27 | Stop reason: HOSPADM

## 2024-01-25 RX ADMIN — Medication 20 ML: at 14:48

## 2024-01-25 RX ADMIN — HEPARIN 500 UNITS: 100 SYRINGE at 14:48

## 2024-01-25 NOTE — PROGRESS NOTES
The ABCs of the Annual Wellness Visit  Subsequent Medicare Wellness Visit    Subjective    Prashant Zhou is a 63 y.o. male who presents for a Subsequent Medicare Wellness Visit.    The following portions of the patient's history were reviewed and   updated as appropriate: allergies, current medications, past family history, past medical history, past social history, past surgical history, and problem list.    Compared to one year ago, the patient feels his physical   health is worse.    Compared to one year ago, the patient feels his mental   health is worse.    Recent Hospitalizations:  He was not admitted to the hospital during the last year.       Current Medical Providers:  Patient Care Team:  Lazaro Paulino MD as PCP - General  Steve Mckeon MD as Consulting Physician (Cardiology)  Lars Wagner MD as Consulting Physician (Hematology and Oncology)    Outpatient Medications Prior to Visit   Medication Sig Dispense Refill    amLODIPine (NORVASC) 10 MG tablet TAKE ONE TABLET BY MOUTH DAILY (Patient taking differently: Take 1 tablet by mouth Daily.) 90 tablet 0    atorvastatin (LIPITOR) 40 MG tablet TAKE ONE TABLET BY MOUTH EVERY NIGHT AT BEDTIME (Patient taking differently: Take 1 tablet by mouth.) 90 tablet 0    Blood Glucose Monitoring Suppl (Accu-Chek Guide) w/Device kit 1 Device Daily. 1 kit 0    cloNIDine (CATAPRES) 0.1 MG tablet Take 1 tablet by mouth 2 (Two) Times a Day. 60 tablet 5    glimepiride (AMARYL) 4 MG tablet TAKE ONE TABLET BY MOUTH TWICE A  tablet 1    glucose blood (Accu-Chek Guide) test strip Test daily e11.9 50 each 12    hydrALAZINE (APRESOLINE) 50 MG tablet TAKE ONE TABLET BY MOUTH EVERY 8 HOURS 90 tablet 1    lisinopril-hydrochlorothiazide (PRINZIDE,ZESTORETIC) 20-25 MG per tablet TAKE ONE TABLET BY MOUTH DAILY (Patient taking differently: Take 1 tablet by mouth Daily.) 90 tablet 3    metFORMIN (GLUCOPHAGE) 850 MG tablet TAKE 1 TABLET BY MOUTH TWICE A DAY WITH  "MEALS 144 tablet 1    metoprolol tartrate (LOPRESSOR) 50 MG tablet TAKE THREE TABLETS BY MOUTH EVERY 12 HOURS (Patient taking differently: Take 1 tablet by mouth 2 (Two) Times a Day.) 540 tablet 0    potassium chloride 10 MEQ CR tablet       vitamin D3 125 MCG (5000 UT) capsule capsule Take 1 capsule by mouth Daily.       No facility-administered medications prior to visit.       No opioid medication identified on active medication list. I have reviewed chart for other potential  high risk medication/s and harmful drug interactions in the elderly.        Aspirin is not on active medication list.  Aspirin use is not indicated based on review of current medical condition/s. Risk of harm outweighs potential benefits.  .    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  ACP discussion was held with the patient during this visit. Patient does not have an advance directive, information provided.     Objective    Vitals:    01/25/24 0921   BP: 133/80   BP Location: Left arm   Patient Position: Sitting   Cuff Size: Large Adult   Pulse: 78   Temp: 98.6 °F (37 °C)   TempSrc: Temporal   SpO2: 97%   Weight: 98.1 kg (216 lb 3.2 oz)     Estimated body mass index is 26.32 kg/m² as calculated from the following:    Height as of 1/23/24: 193 cm (76\").    Weight as of this encounter: 98.1 kg (216 lb 3.2 oz).    BMI is >= 25 and <30. (Overweight) The following options were offered after discussion;: nutrition counseling/recommendations      Does the patient have " evidence of cognitive impairment? No          HEALTH RISK ASSESSMENT    Smoking Status:  Social History     Tobacco Use   Smoking Status Every Day    Packs/day: 2.00    Years: 53.00    Additional pack years: 0.00    Total pack years: 106.00    Types: Cigarettes    Start date:    Smokeless Tobacco Never   Tobacco Comments    Do not smoke dos      Alcohol Consumption:  Social History     Substance and Sexual Activity   Alcohol Use No    Comment: Abstinent since around      Fall Risk Screen:    DANIELLE Fall Risk Assessment was completed, and patient is at LOW risk for falls.Assessment completed on:2024    Depression Screenin/25/2024     9:19 AM   PHQ-2/PHQ-9 Depression Screening   Little Interest or Pleasure in Doing Things 0-->not at all   Feeling Down, Depressed or Hopeless 0-->not at all   PHQ-9: Brief Depression Severity Measure Score 0       Health Habits and Functional and Cognitive Screenin/25/2024     9:24 AM   Functional & Cognitive Status   Do you have difficulty preparing food and eating? No   Do you have difficulty bathing yourself, getting dressed or grooming yourself? No   Do you have difficulty using the toilet? No   Do you have difficulty moving around from place to place? No   Do you have trouble with steps or getting out of a bed or a chair? No   Current Diet Well Balanced Diet   Dental Exam Up to date   Eye Exam Not up to date   Exercise (times per week) 0 times per week   Current Exercises Include No Regular Exercise;Walking   Do you need help using the phone?  No   Are you deaf or do you have serious difficulty hearing?  No   Do you need help to go to places out of walking distance? No   Do you need help shopping? No   Do you need help preparing meals?  No   Do you need help with housework?  No   Do you need help with laundry? No   Do you need help taking your medications? No   Do you need help managing money? No   Do you ever drive or ride in a car without wearing a  seat belt? No   Have you felt unusual stress, anger or loneliness in the last month? Yes   Who do you live with? Spouse   If you need help, do you have trouble finding someone available to you? No   Have you been bothered in the last four weeks by sexual problems? No   Do you have difficulty concentrating, remembering or making decisions? No       Age-appropriate Screening Schedule:  Refer to the list below for future screening recommendations based on patient's age, sex and/or medical conditions. Orders for these recommended tests are listed in the plan section. The patient has been provided with a written plan.    Health Maintenance   Topic Date Due    TDAP/TD VACCINES (1 - Tdap) Never done    ZOSTER VACCINE (1 of 2) Never done    Pneumococcal Vaccine 0-64 (2 of 2 - PPSV23 or PCV20) 11/24/2015    HEPATITIS C SCREENING  Never done    DIABETIC FOOT EXAM  Never done    INFLUENZA VACCINE  Never done    COVID-19 Vaccine (3 - 2023-24 season) 09/01/2023    URINE MICROALBUMIN  01/23/2024    BMI FOLLOWUP  01/23/2024    HEMOGLOBIN A1C  03/26/2024    LIPID PANEL  07/24/2024    DIABETIC EYE EXAM  08/01/2024    LUNG CANCER SCREENING  09/19/2024    ANNUAL WELLNESS VISIT  01/25/2025    COLONOSCOPY  08/31/2033    COLOGUARD  Discontinued                  CMS Preventative Services Quick Reference  Risk Factors Identified During Encounter  Immunizations Discussed/Encouraged: COVID19 and RSV (Respiratory Syncytial Virus)  Dental Screening Recommended  Vision Screening Recommended  The above risks/problems have been discussed with the patient.  Pertinent information has been shared with the patient in the After Visit Summary.  An After Visit Summary and PPPS were made available to the patient.    Follow Up:   Next Medicare Wellness visit to be scheduled in 1 year.       Additional E&M Note during same encounter follows:  Patient has multiple medical problems which are significant and separately identifiable that require additional  work above and beyond the Medicare Wellness Visit.      Chief Complaint  Medicare Wellness-subsequent    Subjective        HPI  Prashant Zhou is also being seen today for follow up on his diabetes and high cholesterol.  He is currently an active patient with our oncology group for treatment of metastatic colorectal cancer.  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  He is on chemotherapy and has had a port placed.  He is visibly concerned and worried but understands that he does have at least a chance with treatment.         Objective   Vital Signs:  /80 (BP Location: Left arm, Patient Position: Sitting, Cuff Size: Large Adult)   Pulse 78   Temp 98.6 °F (37 °C) (Temporal)   Wt 98.1 kg (216 lb 3.2 oz)   SpO2 97%   BMI 26.32 kg/m²     Physical Exam     The following data was reviewed by: Lazaro Paulino MD on 01/25/2024:  Common labs          7/24/2023    11:33 11/14/2023    11:03 1/22/2024    08:34   Common Labs   Glucose 151   101    BUN 11   9    Creatinine 0.70   0.72    Sodium 137   138    Potassium 4.1   3.9    Chloride 99   95    Calcium 10.1   10.7    Albumin 4.7   4.3    Total Bilirubin 0.4   0.6    Alkaline Phosphatase 67   86    AST (SGOT) 12   16    ALT (SGPT) 15   16    WBC 11.22  14.70  12.17    Hemoglobin 15.0  15.9  13.7    Hematocrit 43.7  46.0  41.6    Platelets 286  293  203    Total Cholesterol 102      Triglycerides 127      HDL Cholesterol 28      LDL Cholesterol  51      Hemoglobin A1C 8.30                   Assessment and Plan   Diagnoses and all orders for this visit:    1. Medicare annual wellness visit, subsequent (Primary)    2. Type 2 diabetes mellitus with hyperglycemia, without long-term current use of insulin  -     Lipid panel  -     Hemoglobin A1c    3. Essential hypertension    4. Positive colorectal cancer screening using Cologuard test    5. Rectal cancer           I spent 40 minutes  caring for Prashant on this date of service. This time includes time spent by me in the following activities:preparing for the visit, reviewing tests, obtaining and/or reviewing a separately obtained history, performing a medically appropriate examination and/or evaluation , counseling and educating the patient/family/caregiver, ordering medications, tests, or procedures, and documenting information in the medical record  Follow Up   Return in about 6 months (around 7/25/2024) for Recheck.  Patient was given instructions and counseling regarding his condition or for health maintenance advice. Please see specific information pulled into the AVS if appropriate.     I will follow-up on his response to chemotherapy and his visits with oncology  They do get labs but I will add a lipid and A1c to his next lab draw  I did ask him to keep me apprised of his condition and status  I did ask him to stay as active as he can tolerate  I did ask him to make sure he is eating reasonably well and staying hydrated  I plan to see him back no later than 6 months  I did ask him to call me with any new concerns or questions

## 2024-01-28 RX ORDER — ATORVASTATIN CALCIUM 40 MG/1
TABLET, FILM COATED ORAL
Qty: 90 TABLET | Refills: 0 | Status: SHIPPED | OUTPATIENT
Start: 2024-01-28

## 2024-01-28 RX ORDER — METOPROLOL TARTRATE 50 MG/1
TABLET, FILM COATED ORAL
Qty: 540 TABLET | Refills: 0 | Status: SHIPPED | OUTPATIENT
Start: 2024-01-28

## 2024-01-28 RX ORDER — AMLODIPINE BESYLATE 10 MG/1
10 TABLET ORAL DAILY
Qty: 90 TABLET | Refills: 0 | Status: SHIPPED | OUTPATIENT
Start: 2024-01-28

## 2024-01-29 ENCOUNTER — TELEPHONE (OUTPATIENT)
Dept: FAMILY MEDICINE CLINIC | Facility: CLINIC | Age: 64
End: 2024-01-29
Payer: MEDICARE

## 2024-01-29 RX ORDER — CHLORCYCLIZINE HYDROCHLORIDE AND PSEUDOEPHEDRINE HYDROCHLORIDE 25; 60 MG/1; MG/1
0.5 TABLET ORAL 2 TIMES DAILY PRN
Qty: 42 TABLET | Refills: 0 | Status: SHIPPED | OUTPATIENT
Start: 2024-01-29

## 2024-01-29 RX ORDER — PREDNISONE 10 MG/1
TABLET ORAL
Qty: 40 TABLET | Refills: 0 | Status: SHIPPED | OUTPATIENT
Start: 2024-01-29

## 2024-01-29 NOTE — TELEPHONE ENCOUNTER
He thought you were going to call in something for a cold/chest or control sinus. [address]

## 2024-02-01 ENCOUNTER — APPOINTMENT (OUTPATIENT)
Dept: PET IMAGING | Facility: HOSPITAL | Age: 64
End: 2024-02-01
Payer: MEDICARE

## 2024-02-01 ENCOUNTER — HOSPITAL ENCOUNTER (OUTPATIENT)
Dept: PET IMAGING | Facility: HOSPITAL | Age: 64
Discharge: HOME | End: 2024-02-01
Admitting: INTERNAL MEDICINE
Payer: MEDICARE

## 2024-02-01 DIAGNOSIS — C20 RECTAL CANCER: ICD-10-CM

## 2024-02-01 PROCEDURE — 71260 CT THORAX DX C+: CPT

## 2024-02-01 PROCEDURE — 74177 CT ABD & PELVIS W/CONTRAST: CPT

## 2024-02-01 PROCEDURE — 25510000001 IOPAMIDOL PER 1 ML: Performed by: INTERNAL MEDICINE

## 2024-02-01 RX ADMIN — IOPAMIDOL 100 ML: 755 INJECTION, SOLUTION INTRAVENOUS at 14:27

## 2024-02-06 ENCOUNTER — HOSPITAL ENCOUNTER (OUTPATIENT)
Dept: ONCOLOGY | Facility: HOSPITAL | Age: 64
Discharge: HOME | End: 2024-02-06
Admitting: INTERNAL MEDICINE
Payer: MEDICARE

## 2024-02-06 VITALS
HEART RATE: 59 BPM | SYSTOLIC BLOOD PRESSURE: 122 MMHG | RESPIRATION RATE: 16 BRPM | DIASTOLIC BLOOD PRESSURE: 78 MMHG | TEMPERATURE: 97.9 F | HEIGHT: 76 IN | OXYGEN SATURATION: 95 % | BODY MASS INDEX: 25.36 KG/M2 | WEIGHT: 208.3 LBS

## 2024-02-06 DIAGNOSIS — C78.6 METASTASIS TO PERITONEAL CAVITY: ICD-10-CM

## 2024-02-06 DIAGNOSIS — C20 RECTAL CANCER: Primary | ICD-10-CM

## 2024-02-06 LAB
ALBUMIN SERPL-MCNC: 4.2 G/DL (ref 3.5–5.2)
ALBUMIN/GLOB SERPL: 1.3 G/DL
ALP SERPL-CCNC: 90 U/L (ref 39–117)
ALT SERPL W P-5'-P-CCNC: 9 U/L (ref 1–41)
ANION GAP SERPL CALCULATED.3IONS-SCNC: 10 MMOL/L (ref 5–15)
AST SERPL-CCNC: 9 U/L (ref 1–40)
BASOPHILS # BLD AUTO: 0.04 10*3/MM3 (ref 0–0.2)
BASOPHILS NFR BLD AUTO: 0.5 % (ref 0–1.5)
BILIRUB SERPL-MCNC: 0.4 MG/DL (ref 0–1.2)
BILIRUB UR QL STRIP: ABNORMAL
BUN SERPL-MCNC: 19 MG/DL (ref 8–23)
BUN/CREAT SERPL: 29.7 (ref 7–25)
CALCIUM SPEC-SCNC: 11.4 MG/DL (ref 8.6–10.5)
CHLORIDE SERPL-SCNC: 96 MMOL/L (ref 98–107)
CLARITY UR: ABNORMAL
CO2 SERPL-SCNC: 30 MMOL/L (ref 22–29)
COLOR UR: ABNORMAL
CREAT SERPL-MCNC: 0.64 MG/DL (ref 0.76–1.27)
DEPRECATED RDW RBC AUTO: 51.3 FL (ref 37–54)
EGFRCR SERPLBLD CKD-EPI 2021: 107 ML/MIN/1.73
EOSINOPHIL # BLD AUTO: 0.03 10*3/MM3 (ref 0–0.4)
EOSINOPHIL NFR BLD AUTO: 0.3 % (ref 0.3–6.2)
ERYTHROCYTE [DISTWIDTH] IN BLOOD BY AUTOMATED COUNT: 15.7 % (ref 12.3–15.4)
GLOBULIN UR ELPH-MCNC: 3.3 GM/DL
GLUCOSE SERPL-MCNC: 187 MG/DL (ref 65–99)
GLUCOSE UR STRIP-MCNC: ABNORMAL MG/DL
HCT VFR BLD AUTO: 40.5 % (ref 37.5–51)
HGB BLD-MCNC: 13.4 G/DL (ref 13–17.7)
HGB UR QL STRIP.AUTO: NEGATIVE
KETONES UR QL STRIP: NEGATIVE
LEUKOCYTE ESTERASE UR QL STRIP.AUTO: NEGATIVE
LYMPHOCYTES # BLD AUTO: 1.87 10*3/MM3 (ref 0.7–3.1)
LYMPHOCYTES NFR BLD AUTO: 21.5 % (ref 19.6–45.3)
MCH RBC QN AUTO: 31 PG (ref 26.6–33)
MCHC RBC AUTO-ENTMCNC: 33.1 G/DL (ref 31.5–35.7)
MCV RBC AUTO: 93.8 FL (ref 79–97)
MONOCYTES # BLD AUTO: 0.88 10*3/MM3 (ref 0.1–0.9)
MONOCYTES NFR BLD AUTO: 10.1 % (ref 5–12)
NEUTROPHILS NFR BLD AUTO: 5.86 10*3/MM3 (ref 1.7–7)
NEUTROPHILS NFR BLD AUTO: 67.6 % (ref 42.7–76)
NITRITE UR QL STRIP: NEGATIVE
PH UR STRIP.AUTO: 5.5 [PH] (ref 5–8)
PLATELET # BLD AUTO: 277 10*3/MM3 (ref 140–450)
PMV BLD AUTO: 9.1 FL (ref 6–12)
POTASSIUM SERPL-SCNC: 4.2 MMOL/L (ref 3.5–5.2)
PROT SERPL-MCNC: 7.5 G/DL (ref 6–8.5)
PROT UR QL STRIP: ABNORMAL
RBC # BLD AUTO: 4.32 10*6/MM3 (ref 4.14–5.8)
SODIUM SERPL-SCNC: 136 MMOL/L (ref 136–145)
SP GR UR STRIP: >=1.03 (ref 1–1.03)
UROBILINOGEN UR QL STRIP: ABNORMAL
WBC NRBC COR # BLD AUTO: 8.68 10*3/MM3 (ref 3.4–10.8)

## 2024-02-06 PROCEDURE — 25010000002 PALONOSETRON 0.25 MG/5ML SOLUTION PREFILLED SYRINGE: Performed by: INTERNAL MEDICINE

## 2024-02-06 PROCEDURE — 25810000003 SODIUM CHLORIDE 0.9 % SOLUTION 250 ML FLEX CONT: Performed by: INTERNAL MEDICINE

## 2024-02-06 PROCEDURE — 25810000003 SODIUM CHLORIDE 0.9 % SOLUTION: Performed by: INTERNAL MEDICINE

## 2024-02-06 PROCEDURE — 96367 TX/PROPH/DG ADDL SEQ IV INF: CPT

## 2024-02-06 PROCEDURE — 0 DEXTROSE 5 % SOLUTION 250 ML FLEX CONT: Performed by: INTERNAL MEDICINE

## 2024-02-06 PROCEDURE — 25010000002 BEVACIZUMAB-BVZR 400 MG/16ML SOLUTION 16 ML VIAL: Performed by: INTERNAL MEDICINE

## 2024-02-06 PROCEDURE — 85025 COMPLETE CBC W/AUTO DIFF WBC: CPT | Performed by: INTERNAL MEDICINE

## 2024-02-06 PROCEDURE — 0 DEXTROSE 5 % SOLUTION: Performed by: INTERNAL MEDICINE

## 2024-02-06 PROCEDURE — 96411 CHEMO IV PUSH ADDL DRUG: CPT

## 2024-02-06 PROCEDURE — 81003 URINALYSIS AUTO W/O SCOPE: CPT | Performed by: INTERNAL MEDICINE

## 2024-02-06 PROCEDURE — 96417 CHEMO IV INFUS EACH ADDL SEQ: CPT

## 2024-02-06 PROCEDURE — 25010000002 LEUCOVORIN CALCIUM PER 50 MG: Performed by: INTERNAL MEDICINE

## 2024-02-06 PROCEDURE — 25010000002 OXALIPLATIN PER 0.5 MG: Performed by: INTERNAL MEDICINE

## 2024-02-06 PROCEDURE — 25010000002 FLUOROURACIL PER 500 MG: Performed by: INTERNAL MEDICINE

## 2024-02-06 PROCEDURE — 25010000002 BEVACIZUMAB-BVZR 100 MG/4ML SOLUTION 4 ML VIAL: Performed by: INTERNAL MEDICINE

## 2024-02-06 PROCEDURE — 96415 CHEMO IV INFUSION ADDL HR: CPT

## 2024-02-06 PROCEDURE — 96366 THER/PROPH/DIAG IV INF ADDON: CPT

## 2024-02-06 PROCEDURE — 96413 CHEMO IV INFUSION 1 HR: CPT

## 2024-02-06 PROCEDURE — 25010000002 LEUCOVORIN 200 MG RECONSTITUTED SOLUTION 200 MG VIAL: Performed by: INTERNAL MEDICINE

## 2024-02-06 PROCEDURE — 25010000002 DEXAMETHASONE SODIUM PHOSPHATE 120 MG/30ML SOLUTION: Performed by: INTERNAL MEDICINE

## 2024-02-06 PROCEDURE — G0498 CHEMO EXTEND IV INFUS W/PUMP: HCPCS

## 2024-02-06 PROCEDURE — 96368 THER/DIAG CONCURRENT INF: CPT

## 2024-02-06 PROCEDURE — 80053 COMPREHEN METABOLIC PANEL: CPT | Performed by: INTERNAL MEDICINE

## 2024-02-06 PROCEDURE — 96416 CHEMO PROLONG INFUSE W/PUMP: CPT

## 2024-02-06 PROCEDURE — 96375 TX/PRO/DX INJ NEW DRUG ADDON: CPT

## 2024-02-06 RX ORDER — SODIUM CHLORIDE 9 MG/ML
20 INJECTION, SOLUTION INTRAVENOUS ONCE
Status: COMPLETED | OUTPATIENT
Start: 2024-02-06 | End: 2024-02-06

## 2024-02-06 RX ORDER — PALONOSETRON 0.05 MG/ML
0.25 INJECTION, SOLUTION INTRAVENOUS ONCE
Status: COMPLETED | OUTPATIENT
Start: 2024-02-06 | End: 2024-02-06

## 2024-02-06 RX ORDER — DEXTROSE MONOHYDRATE 50 MG/ML
20 INJECTION, SOLUTION INTRAVENOUS ONCE
Status: CANCELLED | OUTPATIENT
Start: 2024-02-06

## 2024-02-06 RX ORDER — FLUOROURACIL 50 MG/ML
400 INJECTION, SOLUTION INTRAVENOUS ONCE
Status: COMPLETED | OUTPATIENT
Start: 2024-02-06 | End: 2024-02-06

## 2024-02-06 RX ORDER — SODIUM CHLORIDE 9 MG/ML
20 INJECTION, SOLUTION INTRAVENOUS ONCE
Status: CANCELLED | OUTPATIENT
Start: 2024-02-06

## 2024-02-06 RX ORDER — DEXTROSE MONOHYDRATE 50 MG/ML
20 INJECTION, SOLUTION INTRAVENOUS ONCE
Status: COMPLETED | OUTPATIENT
Start: 2024-02-06 | End: 2024-02-06

## 2024-02-06 RX ORDER — FLUOROURACIL 50 MG/ML
400 INJECTION, SOLUTION INTRAVENOUS ONCE
Status: CANCELLED | OUTPATIENT
Start: 2024-02-06

## 2024-02-06 RX ORDER — PALONOSETRON 0.05 MG/ML
0.25 INJECTION, SOLUTION INTRAVENOUS ONCE
Status: CANCELLED | OUTPATIENT
Start: 2024-02-06

## 2024-02-06 RX ORDER — DIPHENHYDRAMINE HYDROCHLORIDE 50 MG/ML
50 INJECTION INTRAMUSCULAR; INTRAVENOUS AS NEEDED
Status: CANCELLED | OUTPATIENT
Start: 2024-02-06

## 2024-02-06 RX ORDER — FAMOTIDINE 10 MG/ML
20 INJECTION, SOLUTION INTRAVENOUS AS NEEDED
Status: CANCELLED | OUTPATIENT
Start: 2024-02-06

## 2024-02-06 RX ADMIN — LEUCOVORIN CALCIUM 900 MG: 350 INJECTION, POWDER, LYOPHILIZED, FOR SUSPENSION INTRAMUSCULAR; INTRAVENOUS at 10:07

## 2024-02-06 RX ADMIN — PALONOSETRON 0.25 MG: 0.25 INJECTION, SOLUTION INTRAVENOUS at 09:05

## 2024-02-06 RX ADMIN — SODIUM CHLORIDE 20 ML/HR: 9 INJECTION, SOLUTION INTRAVENOUS at 09:03

## 2024-02-06 RX ADMIN — DEXTROSE MONOHYDRATE 20 ML/HR: 50 INJECTION, SOLUTION INTRAVENOUS at 10:06

## 2024-02-06 RX ADMIN — SODIUM CHLORIDE 480 MG: 9 INJECTION, SOLUTION INTRAVENOUS at 09:31

## 2024-02-06 RX ADMIN — OXALIPLATIN 195 MG: 5 INJECTION, SOLUTION INTRAVENOUS at 10:07

## 2024-02-06 RX ADMIN — FLUOROURACIL 5450 MG: 50 INJECTION, SOLUTION INTRAVENOUS at 12:18

## 2024-02-06 RX ADMIN — DEXAMETHASONE SODIUM PHOSPHATE 12 MG: 4 INJECTION, SOLUTION INTRA-ARTICULAR; INTRALESIONAL; INTRAMUSCULAR; INTRAVENOUS; SOFT TISSUE at 09:07

## 2024-02-06 RX ADMIN — FLUOROURACIL 910 MG: 50 INJECTION, SOLUTION INTRAVENOUS at 12:13

## 2024-02-06 NOTE — PROGRESS NOTES
Pt here for C6D1 Zirabev/ Folfox. CBC ok, He does have ongoing cough that he reports has had for couple months, he reports coughing up white phlegm (denies fevers). He saw Dr Paulino (PCP) and was placed on prednisone dose pack. He reports has around 5 days left. He did had CT on 2/1/24 with results available. Reviewed above with Dr Caldwell and pt to proceed with treatment as planned.

## 2024-02-08 ENCOUNTER — HOSPITAL ENCOUNTER (OUTPATIENT)
Dept: ONCOLOGY | Facility: HOSPITAL | Age: 64
Discharge: HOME | End: 2024-02-08
Payer: MEDICARE

## 2024-02-08 VITALS — TEMPERATURE: 97.5 F | SYSTOLIC BLOOD PRESSURE: 131 MMHG | DIASTOLIC BLOOD PRESSURE: 78 MMHG | HEART RATE: 65 BPM

## 2024-02-08 DIAGNOSIS — C78.6 METASTASIS TO PERITONEAL CAVITY: Primary | ICD-10-CM

## 2024-02-08 DIAGNOSIS — C20 RECTAL CANCER: ICD-10-CM

## 2024-02-08 PROCEDURE — 25010000002 HEPARIN LOCK FLUSH PER 10 UNITS: Performed by: INTERNAL MEDICINE

## 2024-02-08 RX ORDER — SODIUM CHLORIDE 0.9 % (FLUSH) 0.9 %
20 SYRINGE (ML) INJECTION AS NEEDED
OUTPATIENT
Start: 2024-02-08

## 2024-02-08 RX ORDER — SODIUM CHLORIDE 0.9 % (FLUSH) 0.9 %
20 SYRINGE (ML) INJECTION AS NEEDED
Status: DISCONTINUED | OUTPATIENT
Start: 2024-02-08 | End: 2024-02-09 | Stop reason: HOSPADM

## 2024-02-08 RX ORDER — HEPARIN SODIUM (PORCINE) LOCK FLUSH IV SOLN 100 UNIT/ML 100 UNIT/ML
500 SOLUTION INTRAVENOUS AS NEEDED
OUTPATIENT
Start: 2024-02-08

## 2024-02-08 RX ORDER — HEPARIN SODIUM (PORCINE) LOCK FLUSH IV SOLN 100 UNIT/ML 100 UNIT/ML
500 SOLUTION INTRAVENOUS AS NEEDED
Status: DISCONTINUED | OUTPATIENT
Start: 2024-02-08 | End: 2024-02-09 | Stop reason: HOSPADM

## 2024-02-08 RX ADMIN — HEPARIN 500 UNITS: 100 SYRINGE at 14:07

## 2024-02-08 RX ADMIN — Medication 20 ML: at 14:07

## 2024-02-08 NOTE — PROGRESS NOTES
Pt here for CIV DC,  pump empty with blood return noted.  Pt only c/o is that everything taste same.  Advised pt to use peppermint/lemon drop prior to eating.  Advised to pt to try using plastic cutlery.  Pt v/u.

## 2024-02-13 ENCOUNTER — TELEPHONE (OUTPATIENT)
Dept: ONCOLOGY | Facility: CLINIC | Age: 64
End: 2024-02-13
Payer: MEDICARE

## 2024-02-13 PROBLEM — E86.0 DEHYDRATION: Status: ACTIVE | Noted: 2024-02-13

## 2024-02-13 RX ORDER — FLUCONAZOLE 100 MG/1
200 TABLET ORAL DAILY
Qty: 20 TABLET | Refills: 0 | Status: SHIPPED | OUTPATIENT
Start: 2024-02-13 | End: 2024-02-23

## 2024-02-14 ENCOUNTER — HOSPITAL ENCOUNTER (OUTPATIENT)
Dept: ONCOLOGY | Facility: HOSPITAL | Age: 64
Discharge: HOME | End: 2024-02-14
Admitting: INTERNAL MEDICINE
Payer: MEDICARE

## 2024-02-14 VITALS
HEART RATE: 123 BPM | OXYGEN SATURATION: 95 % | RESPIRATION RATE: 18 BRPM | DIASTOLIC BLOOD PRESSURE: 100 MMHG | SYSTOLIC BLOOD PRESSURE: 147 MMHG | BODY MASS INDEX: 23.89 KG/M2 | WEIGHT: 196.3 LBS | TEMPERATURE: 97.8 F

## 2024-02-14 DIAGNOSIS — C78.6 METASTASIS TO PERITONEAL CAVITY: ICD-10-CM

## 2024-02-14 DIAGNOSIS — E86.0 DEHYDRATION: Primary | ICD-10-CM

## 2024-02-14 LAB
ALP BLD-CCNC: 67 U/L (ref 53–128)
BASOPHILS # BLD AUTO: 0.02 10*3/MM3 (ref 0–0.2)
BASOPHILS NFR BLD AUTO: 0.1 % (ref 0–1.5)
BUN BLDA-MCNC: 20 MG/DL (ref 7–22)
CALCIUM BLD QL: 10.9 MG/DL (ref 8–10.3)
CHLORIDE BLDA-SCNC: 96 MMOL/L (ref 98–108)
CO2 BLDA-SCNC: 29 MMOL/L (ref 18–33)
CREAT BLDA-MCNC: 0.6 MG/DL (ref 0.6–1.2)
DEPRECATED RDW RBC AUTO: 53.3 FL (ref 37–54)
EGFRCR SERPLBLD CKD-EPI 2021: 109.1 ML/MIN/1.73
EOSINOPHIL # BLD AUTO: 0.03 10*3/MM3 (ref 0–0.4)
EOSINOPHIL NFR BLD AUTO: 0.2 % (ref 0.3–6.2)
ERYTHROCYTE [DISTWIDTH] IN BLOOD BY AUTOMATED COUNT: 16.2 % (ref 12.3–15.4)
GLUCOSE BLDC GLUCOMTR-MCNC: 220 MG/DL (ref 73–118)
HCT VFR BLD AUTO: 47.1 % (ref 37.5–51)
HGB BLD-MCNC: 15.4 G/DL (ref 13–17.7)
LYMPHOCYTES # BLD AUTO: 2.86 10*3/MM3 (ref 0.7–3.1)
LYMPHOCYTES NFR BLD AUTO: 21.1 % (ref 19.6–45.3)
MCH RBC QN AUTO: 30.7 PG (ref 26.6–33)
MCHC RBC AUTO-ENTMCNC: 32.7 G/DL (ref 31.5–35.7)
MCV RBC AUTO: 94 FL (ref 79–97)
MONOCYTES # BLD AUTO: 1.46 10*3/MM3 (ref 0.1–0.9)
MONOCYTES NFR BLD AUTO: 10.8 % (ref 5–12)
NEUTROPHILS NFR BLD AUTO: 67.8 % (ref 42.7–76)
NEUTROPHILS NFR BLD AUTO: 9.16 10*3/MM3 (ref 1.7–7)
PLATELET # BLD AUTO: 270 10*3/MM3 (ref 140–450)
PMV BLD AUTO: 9.9 FL (ref 6–12)
POC ALBUMIN: 3.9 G/L (ref 3.3–5.5)
POC ALT (SGPT): 21 U/L (ref 10–47)
POC AST (SGOT): 24 U/L (ref 11–38)
POC TOTAL BILIRUBIN: 0.9 MG/DL (ref 0.2–1.6)
POC TOTAL PROTEIN: 7.2 G/DL (ref 6.4–8.1)
POTASSIUM BLDA-SCNC: 3.5 MMOL/L (ref 3.6–5.1)
RBC # BLD AUTO: 5.01 10*6/MM3 (ref 4.14–5.8)
SODIUM BLD-SCNC: 143 MMOL/L (ref 128–145)
WBC NRBC COR # BLD AUTO: 13.53 10*3/MM3 (ref 3.4–10.8)

## 2024-02-14 PROCEDURE — 80053 COMPREHEN METABOLIC PANEL: CPT

## 2024-02-14 PROCEDURE — 25810000003 SODIUM CHLORIDE 0.9 % SOLUTION: Performed by: INTERNAL MEDICINE

## 2024-02-14 PROCEDURE — 85025 COMPLETE CBC W/AUTO DIFF WBC: CPT | Performed by: INTERNAL MEDICINE

## 2024-02-14 PROCEDURE — 25010000002 HEPARIN LOCK FLUSH PER 10 UNITS: Performed by: INTERNAL MEDICINE

## 2024-02-14 PROCEDURE — 96360 HYDRATION IV INFUSION INIT: CPT

## 2024-02-14 PROCEDURE — 96361 HYDRATE IV INFUSION ADD-ON: CPT

## 2024-02-14 RX ORDER — HEPARIN SODIUM (PORCINE) LOCK FLUSH IV SOLN 100 UNIT/ML 100 UNIT/ML
500 SOLUTION INTRAVENOUS AS NEEDED
Status: DISCONTINUED | OUTPATIENT
Start: 2024-02-14 | End: 2024-02-15 | Stop reason: HOSPADM

## 2024-02-14 RX ORDER — SODIUM CHLORIDE 0.9 % (FLUSH) 0.9 %
20 SYRINGE (ML) INJECTION AS NEEDED
Status: DISCONTINUED | OUTPATIENT
Start: 2024-02-14 | End: 2024-02-15 | Stop reason: HOSPADM

## 2024-02-14 RX ORDER — HEPARIN SODIUM (PORCINE) LOCK FLUSH IV SOLN 100 UNIT/ML 100 UNIT/ML
500 SOLUTION INTRAVENOUS AS NEEDED
Status: CANCELLED | OUTPATIENT
Start: 2024-02-14

## 2024-02-14 RX ORDER — SODIUM CHLORIDE 0.9 % (FLUSH) 0.9 %
20 SYRINGE (ML) INJECTION AS NEEDED
Status: CANCELLED | OUTPATIENT
Start: 2024-02-14

## 2024-02-14 RX ADMIN — Medication 20 ML: at 12:22

## 2024-02-14 RX ADMIN — HEPARIN 500 UNITS: 100 SYRINGE at 12:22

## 2024-02-14 RX ADMIN — SODIUM CHLORIDE 1000 ML: 9 INJECTION, SOLUTION INTRAVENOUS at 10:17

## 2024-02-16 ENCOUNTER — OFFICE VISIT (OUTPATIENT)
Dept: ONCOLOGY | Facility: CLINIC | Age: 64
End: 2024-02-16
Payer: MEDICARE

## 2024-02-16 ENCOUNTER — LAB (OUTPATIENT)
Dept: LAB | Facility: HOSPITAL | Age: 64
End: 2024-02-16
Payer: MEDICARE

## 2024-02-16 ENCOUNTER — HOSPITAL ENCOUNTER (OUTPATIENT)
Dept: ONCOLOGY | Facility: HOSPITAL | Age: 64
Discharge: HOME | End: 2024-02-16
Payer: MEDICARE

## 2024-02-16 VITALS
BODY MASS INDEX: 24.06 KG/M2 | SYSTOLIC BLOOD PRESSURE: 121 MMHG | HEIGHT: 76 IN | WEIGHT: 197.6 LBS | HEART RATE: 131 BPM | DIASTOLIC BLOOD PRESSURE: 80 MMHG | OXYGEN SATURATION: 91 %

## 2024-02-16 DIAGNOSIS — I48.91 ATRIAL FIBRILLATION, UNSPECIFIED TYPE: Primary | ICD-10-CM

## 2024-02-16 DIAGNOSIS — J18.9 PNEUMONIA OF LEFT LOWER LOBE DUE TO INFECTIOUS ORGANISM: ICD-10-CM

## 2024-02-16 DIAGNOSIS — C20 RECTAL CANCER: ICD-10-CM

## 2024-02-16 DIAGNOSIS — E86.0 DEHYDRATION: Primary | ICD-10-CM

## 2024-02-16 DIAGNOSIS — C78.6 METASTASIS TO PERITONEAL CAVITY: ICD-10-CM

## 2024-02-16 LAB
ALBUMIN SERPL-MCNC: 3.8 G/DL (ref 3.5–5.2)
ALBUMIN/GLOB SERPL: 1 G/DL
ALP SERPL-CCNC: 98 U/L (ref 39–117)
ALT SERPL W P-5'-P-CCNC: 8 U/L (ref 1–41)
ANION GAP SERPL CALCULATED.3IONS-SCNC: 15 MMOL/L (ref 5–15)
AST SERPL-CCNC: 10 U/L (ref 1–40)
BASOPHILS # BLD AUTO: 0.02 10*3/MM3 (ref 0–0.2)
BASOPHILS NFR BLD AUTO: 0.1 % (ref 0–1.5)
BILIRUB SERPL-MCNC: 0.7 MG/DL (ref 0–1.2)
BUN SERPL-MCNC: 19 MG/DL (ref 8–23)
BUN/CREAT SERPL: 20.7 (ref 7–25)
CALCIUM SPEC-SCNC: 12.4 MG/DL (ref 8.6–10.5)
CEA SERPL-MCNC: 1.77 NG/ML
CHLORIDE SERPL-SCNC: 96 MMOL/L (ref 98–107)
CO2 SERPL-SCNC: 24 MMOL/L (ref 22–29)
CREAT SERPL-MCNC: 0.92 MG/DL (ref 0.76–1.27)
DEPRECATED RDW RBC AUTO: 57.1 FL (ref 37–54)
EGFRCR SERPLBLD CKD-EPI 2021: 94.1 ML/MIN/1.73
EOSINOPHIL # BLD AUTO: 0.01 10*3/MM3 (ref 0–0.4)
EOSINOPHIL NFR BLD AUTO: 0 % (ref 0.3–6.2)
ERYTHROCYTE [DISTWIDTH] IN BLOOD BY AUTOMATED COUNT: 16.9 % (ref 12.3–15.4)
GLOBULIN UR ELPH-MCNC: 3.8 GM/DL
GLUCOSE SERPL-MCNC: 292 MG/DL (ref 65–99)
HCT VFR BLD AUTO: 44.5 % (ref 37.5–51)
HGB BLD-MCNC: 14.4 G/DL (ref 13–17.7)
LYMPHOCYTES # BLD AUTO: 1.5 10*3/MM3 (ref 0.7–3.1)
LYMPHOCYTES NFR BLD AUTO: 5.2 % (ref 19.6–45.3)
MCH RBC QN AUTO: 31.2 PG (ref 26.6–33)
MCHC RBC AUTO-ENTMCNC: 32.4 G/DL (ref 31.5–35.7)
MCV RBC AUTO: 96.5 FL (ref 79–97)
MONOCYTES # BLD AUTO: 3.46 10*3/MM3 (ref 0.1–0.9)
MONOCYTES NFR BLD AUTO: 11.9 % (ref 5–12)
NEUTROPHILS NFR BLD AUTO: 24.02 10*3/MM3 (ref 1.7–7)
NEUTROPHILS NFR BLD AUTO: 82.8 % (ref 42.7–76)
PLATELET # BLD AUTO: 226 10*3/MM3 (ref 140–450)
PMV BLD AUTO: 10 FL (ref 6–12)
POTASSIUM SERPL-SCNC: 4.2 MMOL/L (ref 3.5–5.2)
PROT SERPL-MCNC: 7.6 G/DL (ref 6–8.5)
RBC # BLD AUTO: 4.61 10*6/MM3 (ref 4.14–5.8)
SODIUM SERPL-SCNC: 135 MMOL/L (ref 136–145)
WBC NRBC COR # BLD AUTO: 29.01 10*3/MM3 (ref 3.4–10.8)

## 2024-02-16 PROCEDURE — 85025 COMPLETE CBC W/AUTO DIFF WBC: CPT

## 2024-02-16 PROCEDURE — 96361 HYDRATE IV INFUSION ADD-ON: CPT

## 2024-02-16 PROCEDURE — 80053 COMPREHEN METABOLIC PANEL: CPT | Performed by: INTERNAL MEDICINE

## 2024-02-16 PROCEDURE — 25010000002 HEPARIN LOCK FLUSH PER 10 UNITS: Performed by: INTERNAL MEDICINE

## 2024-02-16 PROCEDURE — 82378 CARCINOEMBRYONIC ANTIGEN: CPT | Performed by: INTERNAL MEDICINE

## 2024-02-16 PROCEDURE — 96360 HYDRATION IV INFUSION INIT: CPT

## 2024-02-16 PROCEDURE — 36415 COLL VENOUS BLD VENIPUNCTURE: CPT

## 2024-02-16 PROCEDURE — 25810000003 SODIUM CHLORIDE 0.9 % SOLUTION: Performed by: INTERNAL MEDICINE

## 2024-02-16 RX ORDER — DOXYCYCLINE HYCLATE 100 MG
100 TABLET ORAL 2 TIMES DAILY
Qty: 14 TABLET | Refills: 0 | Status: SHIPPED | OUTPATIENT
Start: 2024-02-16

## 2024-02-16 RX ORDER — TRAMADOL HYDROCHLORIDE 50 MG/1
50 TABLET ORAL EVERY 6 HOURS PRN
Qty: 60 TABLET | Refills: 0 | Status: SHIPPED | OUTPATIENT
Start: 2024-02-16

## 2024-02-16 RX ORDER — SODIUM CHLORIDE 0.9 % (FLUSH) 0.9 %
20 SYRINGE (ML) INJECTION AS NEEDED
OUTPATIENT
Start: 2024-02-16

## 2024-02-16 RX ORDER — SODIUM CHLORIDE 0.9 % (FLUSH) 0.9 %
20 SYRINGE (ML) INJECTION AS NEEDED
Status: DISCONTINUED | OUTPATIENT
Start: 2024-02-16 | End: 2024-02-17 | Stop reason: HOSPADM

## 2024-02-16 RX ORDER — HEPARIN SODIUM (PORCINE) LOCK FLUSH IV SOLN 100 UNIT/ML 100 UNIT/ML
500 SOLUTION INTRAVENOUS AS NEEDED
OUTPATIENT
Start: 2024-02-16

## 2024-02-16 RX ORDER — HEPARIN SODIUM (PORCINE) LOCK FLUSH IV SOLN 100 UNIT/ML 100 UNIT/ML
500 SOLUTION INTRAVENOUS AS NEEDED
Status: DISCONTINUED | OUTPATIENT
Start: 2024-02-16 | End: 2024-02-17 | Stop reason: HOSPADM

## 2024-02-16 RX ADMIN — SODIUM CHLORIDE 1000 ML: 9 INJECTION, SOLUTION INTRAVENOUS at 11:24

## 2024-02-16 RX ADMIN — Medication 20 ML: at 13:26

## 2024-02-16 RX ADMIN — HEPARIN 500 UNITS: 100 SYRINGE at 13:26

## 2024-02-20 ENCOUNTER — HOSPITAL ENCOUNTER (OUTPATIENT)
Dept: ONCOLOGY | Facility: HOSPITAL | Age: 64
Discharge: HOME | End: 2024-02-20
Admitting: INTERNAL MEDICINE
Payer: MEDICARE

## 2024-02-20 VITALS
HEIGHT: 76 IN | HEART RATE: 82 BPM | BODY MASS INDEX: 24.65 KG/M2 | WEIGHT: 202.4 LBS | RESPIRATION RATE: 18 BRPM | OXYGEN SATURATION: 96 % | SYSTOLIC BLOOD PRESSURE: 107 MMHG | TEMPERATURE: 98 F | DIASTOLIC BLOOD PRESSURE: 76 MMHG

## 2024-02-20 DIAGNOSIS — C20 RECTAL CANCER: Primary | ICD-10-CM

## 2024-02-20 DIAGNOSIS — C78.6 METASTASIS TO PERITONEAL CAVITY: ICD-10-CM

## 2024-02-20 DIAGNOSIS — E86.0 DEHYDRATION: ICD-10-CM

## 2024-02-20 LAB
ALBUMIN SERPL-MCNC: 3.2 G/DL (ref 3.5–5.2)
ALBUMIN/GLOB SERPL: 0.9 G/DL
ALP SERPL-CCNC: 91 U/L (ref 39–117)
ALT SERPL W P-5'-P-CCNC: 16 U/L (ref 1–41)
ANION GAP SERPL CALCULATED.3IONS-SCNC: 14 MMOL/L (ref 5–15)
AST SERPL-CCNC: 18 U/L (ref 1–40)
BASOPHILS # BLD AUTO: 0.03 10*3/MM3 (ref 0–0.2)
BASOPHILS NFR BLD AUTO: 0.2 % (ref 0–1.5)
BILIRUB SERPL-MCNC: 0.6 MG/DL (ref 0–1.2)
BILIRUB UR QL STRIP: ABNORMAL
BUN SERPL-MCNC: 29 MG/DL (ref 8–23)
BUN/CREAT SERPL: 42.6 (ref 7–25)
CALCIUM SPEC-SCNC: 11.2 MG/DL (ref 8.6–10.5)
CHLORIDE SERPL-SCNC: 98 MMOL/L (ref 98–107)
CLARITY UR: ABNORMAL
CO2 SERPL-SCNC: 25 MMOL/L (ref 22–29)
COLOR UR: ABNORMAL
CREAT SERPL-MCNC: 0.68 MG/DL (ref 0.76–1.27)
DEPRECATED RDW RBC AUTO: 58.4 FL (ref 37–54)
EGFRCR SERPLBLD CKD-EPI 2021: 105.1 ML/MIN/1.73
EOSINOPHIL # BLD AUTO: 0.04 10*3/MM3 (ref 0–0.4)
EOSINOPHIL NFR BLD AUTO: 0.3 % (ref 0.3–6.2)
ERYTHROCYTE [DISTWIDTH] IN BLOOD BY AUTOMATED COUNT: 17.4 % (ref 12.3–15.4)
GLOBULIN UR ELPH-MCNC: 3.5 GM/DL
GLUCOSE SERPL-MCNC: 251 MG/DL (ref 65–99)
GLUCOSE UR STRIP-MCNC: ABNORMAL MG/DL
HCT VFR BLD AUTO: 41.1 % (ref 37.5–51)
HGB BLD-MCNC: 13.3 G/DL (ref 13–17.7)
HGB UR QL STRIP.AUTO: NEGATIVE
KETONES UR QL STRIP: NEGATIVE
LEUKOCYTE ESTERASE UR QL STRIP.AUTO: NEGATIVE
LYMPHOCYTES # BLD AUTO: 2.82 10*3/MM3 (ref 0.7–3.1)
LYMPHOCYTES NFR BLD AUTO: 22 % (ref 19.6–45.3)
MCH RBC QN AUTO: 31.2 PG (ref 26.6–33)
MCHC RBC AUTO-ENTMCNC: 32.4 G/DL (ref 31.5–35.7)
MCV RBC AUTO: 96.5 FL (ref 79–97)
MONOCYTES # BLD AUTO: 1.58 10*3/MM3 (ref 0.1–0.9)
MONOCYTES NFR BLD AUTO: 12.3 % (ref 5–12)
NEUTROPHILS NFR BLD AUTO: 65.2 % (ref 42.7–76)
NEUTROPHILS NFR BLD AUTO: 8.34 10*3/MM3 (ref 1.7–7)
NITRITE UR QL STRIP: NEGATIVE
PH UR STRIP.AUTO: 6 [PH] (ref 5–8)
PLATELET # BLD AUTO: 188 10*3/MM3 (ref 140–450)
PMV BLD AUTO: 10 FL (ref 6–12)
POTASSIUM SERPL-SCNC: 3.9 MMOL/L (ref 3.5–5.2)
PROT SERPL-MCNC: 6.7 G/DL (ref 6–8.5)
PROT UR QL STRIP: ABNORMAL
RBC # BLD AUTO: 4.26 10*6/MM3 (ref 4.14–5.8)
SODIUM SERPL-SCNC: 137 MMOL/L (ref 136–145)
SP GR UR STRIP: >=1.03 (ref 1–1.03)
UROBILINOGEN UR QL STRIP: ABNORMAL
WBC NRBC COR # BLD AUTO: 12.81 10*3/MM3 (ref 3.4–10.8)

## 2024-02-20 PROCEDURE — 85025 COMPLETE CBC W/AUTO DIFF WBC: CPT | Performed by: INTERNAL MEDICINE

## 2024-02-20 PROCEDURE — 80053 COMPREHEN METABOLIC PANEL: CPT | Performed by: INTERNAL MEDICINE

## 2024-02-20 PROCEDURE — 96360 HYDRATION IV INFUSION INIT: CPT

## 2024-02-20 PROCEDURE — 25010000002 HEPARIN LOCK FLUSH PER 10 UNITS: Performed by: INTERNAL MEDICINE

## 2024-02-20 PROCEDURE — 25810000003 SODIUM CHLORIDE 0.9 % SOLUTION: Performed by: NURSE PRACTITIONER

## 2024-02-20 PROCEDURE — 81003 URINALYSIS AUTO W/O SCOPE: CPT | Performed by: INTERNAL MEDICINE

## 2024-02-20 PROCEDURE — 96361 HYDRATE IV INFUSION ADD-ON: CPT

## 2024-02-20 RX ORDER — HEPARIN SODIUM (PORCINE) LOCK FLUSH IV SOLN 100 UNIT/ML 100 UNIT/ML
500 SOLUTION INTRAVENOUS AS NEEDED
Status: DISCONTINUED | OUTPATIENT
Start: 2024-02-20 | End: 2024-02-21 | Stop reason: HOSPADM

## 2024-02-20 RX ORDER — SODIUM CHLORIDE 0.9 % (FLUSH) 0.9 %
20 SYRINGE (ML) INJECTION AS NEEDED
OUTPATIENT
Start: 2024-02-20

## 2024-02-20 RX ORDER — HEPARIN SODIUM (PORCINE) LOCK FLUSH IV SOLN 100 UNIT/ML 100 UNIT/ML
500 SOLUTION INTRAVENOUS AS NEEDED
OUTPATIENT
Start: 2024-02-20

## 2024-02-20 RX ORDER — SODIUM CHLORIDE 0.9 % (FLUSH) 0.9 %
20 SYRINGE (ML) INJECTION AS NEEDED
Status: DISCONTINUED | OUTPATIENT
Start: 2024-02-20 | End: 2024-02-21 | Stop reason: HOSPADM

## 2024-02-20 RX ADMIN — Medication 20 ML: at 10:45

## 2024-02-20 RX ADMIN — HEPARIN 500 UNITS: 100 SYRINGE at 10:45

## 2024-02-20 RX ADMIN — SODIUM CHLORIDE 1000 ML: 9 INJECTION, SOLUTION INTRAVENOUS at 09:00

## 2024-02-20 NOTE — PROGRESS NOTES
Patient in clinic for C7 Folfox/Zirabev.  Port accessed by Nely Garcia RN with sterile technique and positive blood return noted.  Blood drawn from port.  10 cc blood wasted prior to specimen collection.  Specimens sent to lab for processing.  Dr. Wagner and Dr. Caldwell made aware that he does not feel well today. He is very weak and fatigued. He fell this morning after becoming dizzy when standing. He did not hit his head or loose consciousness. He twisted his ankle. His mouth sores and pain are improving but he hasn't been able to eat or drink a lot due to loss of appetite, mouth pain and taste changes. He is drinking protein drinks, 2-3 a day, and eating fruit. He has a strong, congested cough that is about the same as last week. He is taking his Doxycycline. /76, HR 82. UA 2+ protein. Dr. Caldwell ordered 1 L NS bolus over two hours and after 1 hour I reassessed dizziness. Patient was still weak and unsteady on his feet. The patient stated he would like to hold treatment this week and Dr. Wagner was agreeable. Treatment was rescheduled for next week. Port de accessed. Patient discharged with AVS.

## 2024-02-27 ENCOUNTER — HOSPITAL ENCOUNTER (OUTPATIENT)
Dept: ONCOLOGY | Facility: HOSPITAL | Age: 64
Discharge: HOME | End: 2024-02-27
Admitting: INTERNAL MEDICINE
Payer: MEDICARE

## 2024-02-27 VITALS
BODY MASS INDEX: 25.15 KG/M2 | TEMPERATURE: 97.7 F | RESPIRATION RATE: 16 BRPM | DIASTOLIC BLOOD PRESSURE: 72 MMHG | WEIGHT: 206.5 LBS | SYSTOLIC BLOOD PRESSURE: 94 MMHG | HEART RATE: 74 BPM | HEIGHT: 76 IN | OXYGEN SATURATION: 94 %

## 2024-02-27 DIAGNOSIS — C78.6 METASTASIS TO PERITONEAL CAVITY: ICD-10-CM

## 2024-02-27 DIAGNOSIS — C20 RECTAL CANCER: Primary | ICD-10-CM

## 2024-02-27 DIAGNOSIS — C20 RECTAL CANCER: ICD-10-CM

## 2024-02-27 DIAGNOSIS — C78.6 METASTASIS TO PERITONEAL CAVITY: Primary | ICD-10-CM

## 2024-02-27 LAB
ALBUMIN SERPL-MCNC: 3.1 G/DL (ref 3.5–5.2)
ALBUMIN/GLOB SERPL: 0.8 G/DL
ALP SERPL-CCNC: 92 U/L (ref 39–117)
ALT SERPL W P-5'-P-CCNC: 30 U/L (ref 1–41)
ANION GAP SERPL CALCULATED.3IONS-SCNC: 12 MMOL/L (ref 5–15)
AST SERPL-CCNC: 28 U/L (ref 1–40)
BASOPHILS # BLD AUTO: 0.04 10*3/MM3 (ref 0–0.2)
BASOPHILS NFR BLD AUTO: 0.2 % (ref 0–1.5)
BILIRUB SERPL-MCNC: 0.3 MG/DL (ref 0–1.2)
BILIRUB UR QL STRIP: NEGATIVE
BUN SERPL-MCNC: 21 MG/DL (ref 8–23)
BUN/CREAT SERPL: 52.5 (ref 7–25)
CALCIUM SPEC-SCNC: 10.3 MG/DL (ref 8.6–10.5)
CHLORIDE SERPL-SCNC: 97 MMOL/L (ref 98–107)
CLARITY UR: CLEAR
CO2 SERPL-SCNC: 26 MMOL/L (ref 22–29)
COLOR UR: YELLOW
CREAT SERPL-MCNC: 0.4 MG/DL (ref 0.76–1.27)
DEPRECATED RDW RBC AUTO: 62.2 FL (ref 37–54)
EGFRCR SERPLBLD CKD-EPI 2021: 123.4 ML/MIN/1.73
EOSINOPHIL # BLD AUTO: 0.11 10*3/MM3 (ref 0–0.4)
EOSINOPHIL NFR BLD AUTO: 0.7 % (ref 0.3–6.2)
ERYTHROCYTE [DISTWIDTH] IN BLOOD BY AUTOMATED COUNT: 18.1 % (ref 12.3–15.4)
GLOBULIN UR ELPH-MCNC: 3.7 GM/DL
GLUCOSE SERPL-MCNC: 128 MG/DL (ref 65–99)
GLUCOSE UR STRIP-MCNC: NEGATIVE MG/DL
HCT VFR BLD AUTO: 36.4 % (ref 37.5–51)
HGB BLD-MCNC: 11.5 G/DL (ref 13–17.7)
HGB UR QL STRIP.AUTO: NEGATIVE
KETONES UR QL STRIP: ABNORMAL
LEUKOCYTE ESTERASE UR QL STRIP.AUTO: ABNORMAL
LYMPHOCYTES # BLD AUTO: 2.01 10*3/MM3 (ref 0.7–3.1)
LYMPHOCYTES NFR BLD AUTO: 12 % (ref 19.6–45.3)
MCH RBC QN AUTO: 31 PG (ref 26.6–33)
MCHC RBC AUTO-ENTMCNC: 31.6 G/DL (ref 31.5–35.7)
MCV RBC AUTO: 98.1 FL (ref 79–97)
MONOCYTES # BLD AUTO: 1.65 10*3/MM3 (ref 0.1–0.9)
MONOCYTES NFR BLD AUTO: 9.8 % (ref 5–12)
NEUTROPHILS NFR BLD AUTO: 12.99 10*3/MM3 (ref 1.7–7)
NEUTROPHILS NFR BLD AUTO: 77.3 % (ref 42.7–76)
NITRITE UR QL STRIP: NEGATIVE
PH UR STRIP.AUTO: 6 [PH] (ref 5–8)
PLATELET # BLD AUTO: 280 10*3/MM3 (ref 140–450)
PMV BLD AUTO: 9.3 FL (ref 6–12)
POTASSIUM SERPL-SCNC: 4.2 MMOL/L (ref 3.5–5.2)
PROT SERPL-MCNC: 6.8 G/DL (ref 6–8.5)
PROT UR QL STRIP: ABNORMAL
RBC # BLD AUTO: 3.71 10*6/MM3 (ref 4.14–5.8)
SODIUM SERPL-SCNC: 135 MMOL/L (ref 136–145)
SP GR UR STRIP: 1.02 (ref 1–1.03)
UROBILINOGEN UR QL STRIP: ABNORMAL
WBC NRBC COR # BLD AUTO: 16.8 10*3/MM3 (ref 3.4–10.8)

## 2024-02-27 PROCEDURE — G0498 CHEMO EXTEND IV INFUS W/PUMP: HCPCS

## 2024-02-27 PROCEDURE — 0 DEXTROSE 5 % SOLUTION 250 ML FLEX CONT: Performed by: INTERNAL MEDICINE

## 2024-02-27 PROCEDURE — 81003 URINALYSIS AUTO W/O SCOPE: CPT | Performed by: INTERNAL MEDICINE

## 2024-02-27 PROCEDURE — 96417 CHEMO IV INFUS EACH ADDL SEQ: CPT

## 2024-02-27 PROCEDURE — 96367 TX/PROPH/DG ADDL SEQ IV INF: CPT

## 2024-02-27 PROCEDURE — 85025 COMPLETE CBC W/AUTO DIFF WBC: CPT | Performed by: INTERNAL MEDICINE

## 2024-02-27 PROCEDURE — 25010000002 PALONOSETRON 0.25 MG/5ML SOLUTION PREFILLED SYRINGE: Performed by: INTERNAL MEDICINE

## 2024-02-27 PROCEDURE — 25010000002 BEVACIZUMAB-BVZR 400 MG/16ML SOLUTION 16 ML VIAL: Performed by: INTERNAL MEDICINE

## 2024-02-27 PROCEDURE — 0 DEXTROSE 5 % SOLUTION: Performed by: INTERNAL MEDICINE

## 2024-02-27 PROCEDURE — 80053 COMPREHEN METABOLIC PANEL: CPT | Performed by: INTERNAL MEDICINE

## 2024-02-27 PROCEDURE — 96368 THER/DIAG CONCURRENT INF: CPT

## 2024-02-27 PROCEDURE — 25010000002 DEXAMETHASONE SODIUM PHOSPHATE 120 MG/30ML SOLUTION: Performed by: INTERNAL MEDICINE

## 2024-02-27 PROCEDURE — 25010000002 LEUCOVORIN CALCIUM PER 50 MG: Performed by: INTERNAL MEDICINE

## 2024-02-27 PROCEDURE — 25810000003 SODIUM CHLORIDE 0.9 % SOLUTION: Performed by: INTERNAL MEDICINE

## 2024-02-27 PROCEDURE — 25010000002 FLUOROURACIL PER 500 MG: Performed by: INTERNAL MEDICINE

## 2024-02-27 PROCEDURE — 25010000002 BEVACIZUMAB-BVZR 100 MG/4ML SOLUTION 4 ML VIAL: Performed by: INTERNAL MEDICINE

## 2024-02-27 PROCEDURE — 96413 CHEMO IV INFUSION 1 HR: CPT

## 2024-02-27 PROCEDURE — 96416 CHEMO PROLONG INFUSE W/PUMP: CPT

## 2024-02-27 PROCEDURE — 25010000002 LEUCOVORIN 200 MG RECONSTITUTED SOLUTION 200 MG VIAL: Performed by: INTERNAL MEDICINE

## 2024-02-27 PROCEDURE — 96375 TX/PRO/DX INJ NEW DRUG ADDON: CPT

## 2024-02-27 PROCEDURE — 96415 CHEMO IV INFUSION ADDL HR: CPT

## 2024-02-27 PROCEDURE — 25810000003 SODIUM CHLORIDE 0.9 % SOLUTION 250 ML FLEX CONT: Performed by: INTERNAL MEDICINE

## 2024-02-27 PROCEDURE — 25010000002 OXALIPLATIN PER 0.5 MG: Performed by: INTERNAL MEDICINE

## 2024-02-27 RX ORDER — DIPHENHYDRAMINE HYDROCHLORIDE 50 MG/ML
50 INJECTION INTRAMUSCULAR; INTRAVENOUS AS NEEDED
Status: CANCELLED | OUTPATIENT
Start: 2024-02-27

## 2024-02-27 RX ORDER — FAMOTIDINE 10 MG/ML
20 INJECTION, SOLUTION INTRAVENOUS AS NEEDED
Status: CANCELLED | OUTPATIENT
Start: 2024-02-27

## 2024-02-27 RX ORDER — SODIUM CHLORIDE 9 MG/ML
20 INJECTION, SOLUTION INTRAVENOUS ONCE
Status: COMPLETED | OUTPATIENT
Start: 2024-02-27 | End: 2024-02-27

## 2024-02-27 RX ORDER — DEXTROSE MONOHYDRATE 50 MG/ML
20 INJECTION, SOLUTION INTRAVENOUS ONCE
Status: COMPLETED | OUTPATIENT
Start: 2024-02-27 | End: 2024-02-27

## 2024-02-27 RX ORDER — DEXTROSE MONOHYDRATE 50 MG/ML
20 INJECTION, SOLUTION INTRAVENOUS ONCE
Status: CANCELLED | OUTPATIENT
Start: 2024-02-27

## 2024-02-27 RX ORDER — PALONOSETRON 0.05 MG/ML
0.25 INJECTION, SOLUTION INTRAVENOUS ONCE
Status: COMPLETED | OUTPATIENT
Start: 2024-02-27 | End: 2024-02-27

## 2024-02-27 RX ADMIN — SODIUM CHLORIDE 480 MG: 9 INJECTION, SOLUTION INTRAVENOUS at 10:00

## 2024-02-27 RX ADMIN — DEXTROSE MONOHYDRATE 20 ML/HR: 50 INJECTION, SOLUTION INTRAVENOUS at 10:36

## 2024-02-27 RX ADMIN — DEXAMETHASONE SODIUM PHOSPHATE 12 MG: 4 INJECTION, SOLUTION INTRA-ARTICULAR; INTRALESIONAL; INTRAMUSCULAR; INTRAVENOUS; SOFT TISSUE at 09:38

## 2024-02-27 RX ADMIN — DEXTROSE MONOHYDRATE 900 MG: 50 INJECTION, SOLUTION INTRAVENOUS at 10:57

## 2024-02-27 RX ADMIN — OXALIPLATIN 195 MG: 5 INJECTION, SOLUTION INTRAVENOUS at 10:56

## 2024-02-27 RX ADMIN — SODIUM CHLORIDE 20 ML/HR: 9 INJECTION, SOLUTION INTRAVENOUS at 09:35

## 2024-02-27 RX ADMIN — FLUOROURACIL 5450 MG: 50 INJECTION, SOLUTION INTRAVENOUS at 13:06

## 2024-02-27 RX ADMIN — PALONOSETRON 0.25 MG: 0.25 INJECTION, SOLUTION INTRAVENOUS at 09:36

## 2024-02-29 ENCOUNTER — HOSPITAL ENCOUNTER (OUTPATIENT)
Dept: ONCOLOGY | Facility: HOSPITAL | Age: 64
Discharge: HOME | End: 2024-02-29
Admitting: INTERNAL MEDICINE
Payer: MEDICARE

## 2024-02-29 VITALS
SYSTOLIC BLOOD PRESSURE: 111 MMHG | TEMPERATURE: 97.6 F | HEART RATE: 82 BPM | OXYGEN SATURATION: 93 % | DIASTOLIC BLOOD PRESSURE: 76 MMHG

## 2024-02-29 DIAGNOSIS — C20 RECTAL CANCER: ICD-10-CM

## 2024-02-29 DIAGNOSIS — E86.0 DEHYDRATION: ICD-10-CM

## 2024-02-29 DIAGNOSIS — C78.6 METASTASIS TO PERITONEAL CAVITY: Primary | ICD-10-CM

## 2024-02-29 PROCEDURE — 96361 HYDRATE IV INFUSION ADD-ON: CPT

## 2024-02-29 PROCEDURE — 25010000002 HEPARIN LOCK FLUSH PER 10 UNITS: Performed by: INTERNAL MEDICINE

## 2024-02-29 PROCEDURE — 25810000003 SODIUM CHLORIDE 0.9 % SOLUTION: Performed by: INTERNAL MEDICINE

## 2024-02-29 PROCEDURE — 96360 HYDRATION IV INFUSION INIT: CPT

## 2024-02-29 RX ORDER — HEPARIN SODIUM (PORCINE) LOCK FLUSH IV SOLN 100 UNIT/ML 100 UNIT/ML
500 SOLUTION INTRAVENOUS AS NEEDED
Status: DISCONTINUED | OUTPATIENT
Start: 2024-02-29 | End: 2024-03-01 | Stop reason: HOSPADM

## 2024-02-29 RX ORDER — SODIUM CHLORIDE 0.9 % (FLUSH) 0.9 %
20 SYRINGE (ML) INJECTION AS NEEDED
Status: DISCONTINUED | OUTPATIENT
Start: 2024-02-29 | End: 2024-03-01 | Stop reason: HOSPADM

## 2024-02-29 RX ORDER — SODIUM CHLORIDE 0.9 % (FLUSH) 0.9 %
20 SYRINGE (ML) INJECTION AS NEEDED
OUTPATIENT
Start: 2024-02-29

## 2024-02-29 RX ORDER — HEPARIN SODIUM (PORCINE) LOCK FLUSH IV SOLN 100 UNIT/ML 100 UNIT/ML
500 SOLUTION INTRAVENOUS AS NEEDED
OUTPATIENT
Start: 2024-02-29

## 2024-02-29 RX ADMIN — HEPARIN 500 UNITS: 100 SYRINGE at 14:57

## 2024-02-29 RX ADMIN — Medication 20 ML: at 14:57

## 2024-02-29 RX ADMIN — SODIUM CHLORIDE 1000 ML: 9 INJECTION, SOLUTION INTRAVENOUS at 13:17

## 2024-03-05 DIAGNOSIS — C78.6 METASTASIS TO PERITONEAL CAVITY: ICD-10-CM

## 2024-03-05 DIAGNOSIS — C20 RECTAL CANCER: ICD-10-CM

## 2024-03-05 DIAGNOSIS — J18.9 PNEUMONIA OF LEFT LOWER LOBE DUE TO INFECTIOUS ORGANISM: ICD-10-CM

## 2024-03-05 DIAGNOSIS — I48.91 ATRIAL FIBRILLATION, UNSPECIFIED TYPE: ICD-10-CM

## 2024-03-07 RX ORDER — TRAMADOL HYDROCHLORIDE 50 MG/1
50 TABLET ORAL EVERY 6 HOURS PRN
Qty: 28 TABLET | Refills: 0 | Status: ON HOLD | OUTPATIENT
Start: 2024-03-07

## 2024-03-07 NOTE — PROGRESS NOTES
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     History of Present Illness:  Patient is a 63 y.o.     2024: Mr. Zhou carried a long history of severe hypertension and of an aneurysm of the ascending aorta.  He had been receiving antihypertensive medication after correction of the aneurysm with good results.  In 2023 he underwent his first screening colonoscopy.  A large circumferential and obstructive tumor was identified at the sigmoid/rectosigmoid junction.  The tumor could not be traversed even with the smallest instrument.  Biopsies showed moderately differentiated colonic adenocarcinoma without loss of nuclear expression of the mismatch repair proteins.  Imaging studies at the time revealed the known thoracic aneurysm that has increased mildly since the previous scan.  There was a benign-appearing subpleural nodule in the right posteromedial chest wall and pulmonary nodules that have been identified since 2018 remained stable.  Some mediastinal nonspecific and stable adenopathy was reported as well.  In the abdomen the sigmoid tumor was confirmed and there was no suggestion of metastatic disease.  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  He underwent excision of the squamous cell carcinoma that proved to be a high-grade squamous intraepithelial lesion.  He also had a robotic left colectomy.  The final report of pathology was of moderately differentiated adenocarcinoma of the colon, that measured 3.5 cm.  The tumor involved the serosal surface and the antimesenteric serosa.  Lymphovascular space invasion was identified.  All margins were  negative for disease but 2 of 27 lymph nodes had metastatic involvement.  As well there were at least 5 peritoneal deposits that were excised and that were confirmed to correspond to metastatic lesions.  The tumor had intact expression of MLH1, MSH 1, MSH 6 and PMS2.  Next-generation sequencing revealed a pathogenic mutation of exon 2 of the KRAS gene. ERBB2, BRAF, NTRK, RET, and PIK3CA were negative. PD-L1 was negative, as well.  He was started on treatment with FOLFOX and bevacizumab on November 28, 2023.  He reported adequate tolerance to the treatment, with the expected cold intolerance.  He had had no nausea but since the beginning of the present illness he had lost approximately 20 pounds.  He was eating reasonably well.  He had been free of chest pains and no longer had abdominal pain.  All his surgical incisions had healed and he was having regular defecations, at times of liquid stool.  The physical exam revealed him to be a chronically ill-appearing man who did not seem in distress.  He was conversant, in good spirits and without jaundice.  Lungs diminished bilaterally.  Heart regular.  Abdomen soft and nontender.  No edema.  The laboratory exams and all the records were reviewed and discussed with him and with his wife.  To resume treatment with the idea of obtaining a new set of scans after 6 cycles of chemotherapy.  After 12 cycles of chemotherapy discuss the possibility of additional surgery if there was any residual peritoneal disease at the time of surgery.  To see me at the end of February with the scans.    2/16/2024: In the office to review scans. In the last few days has had severe glossodynia and odynophagia. Also has been coughing and expectorating some clear sputum. No fevers. Has had pain on the left side of the chest. No dyspnea. He was prescribed Hiral's magic potion and fluconazole that has resulted in relief. On exam he does not appear acutely ill. No jaundice or pallor. Lungs are  diminished bilaterally and heart irregularly irregular. Abdomen soft. No edema. Reviewed the laboratory exams. Reviewed the images of the scans. Sent prescriptions for doxycycline as the lesion in the left lower lobe is likely infectious in nature, given his leukocytosis and symptoms. Will obtain an electrocardiogram. Will follow next week.     3/8/2024: Feeling poorly. Weak and not moving much. Has continued to have diffuse pain. No fevers. His spouse believes he has an ulcer on the backside. On exam he is conversant and oriented. No jaundice. No pallor. The lungs are diminished bilaterally. Heart regular. There is indeed an ulcer in the sacral region, in the intergluteal crease. Reviewed the laboratory exams. He is to continue with the same chemotherapy. Referred to the wound clinic. To have an electrocardiogram. He is to see me in a few weeks with new scans.     Past Medical History:   Diagnosis Date    Aortic dissection     Diabetes mellitus     Heart murmur     History of noncompliance with medical treatment     Hyperlipidemia     Hypertension     Type B hypoplasia of aortic arch      Past Surgical History:   Procedure Laterality Date    COLECTOMY PARTIAL / TOTAL  2023    COLONOSCOPY N/A 08/31/2023    Procedure: COLONOSCOPY with sigmoid mass tattooing at 35cm, sigmoid and rectal polypectomy;  Surgeon: TORIBIO Jacob MD;  Location: [facility] ENDOSCOPY;  Service: Gastroenterology;  Laterality: N/A;  sigmoid mass, colon polyps       Current Outpatient Medications:     amLODIPine (NORVASC) 10 MG tablet, TAKE 1 TABLET BY MOUTH DAILY, Disp: 90 tablet, Rfl: 0    atorvastatin (LIPITOR) 40 MG tablet, TAKE ONE TABLET BY MOUTH EVERY NIGHT AT BEDTIME, Disp: 90 tablet, Rfl: 0    Blood Glucose Monitoring Suppl (Accu-Chek Guide) w/Device kit, 1 Device Daily., Disp: 1 kit, Rfl: 0    Chlorcyclizine-Pseudoephed (Stahist AD) 25-60 MG tablet, Take 0.5 tablets by mouth 2 (Two) Times a Day As Needed (Congestion, drainage)., Disp: 42  tablet, Rfl: 0    cloNIDine (CATAPRES) 0.1 MG tablet, Take 1 tablet by mouth 2 (Two) Times a Day., Disp: 60 tablet, Rfl: 5    doxycycline (VIBRAMYICN) 100 MG tablet, Take 1 tablet by mouth 2 (Two) Times a Day., Disp: 14 tablet, Rfl: 0    glimepiride (AMARYL) 4 MG tablet, TAKE ONE TABLET BY MOUTH TWICE A DAY, Disp: 180 tablet, Rfl: 1    glucose blood (Accu-Chek Guide) test strip, Test daily e11.9, Disp: 50 each, Rfl: 12    hydrALAZINE (APRESOLINE) 50 MG tablet, TAKE ONE TABLET BY MOUTH EVERY 8 HOURS, Disp: 90 tablet, Rfl: 1    lisinopril-hydrochlorothiazide (PRINZIDE,ZESTORETIC) 20-25 MG per tablet, TAKE ONE TABLET BY MOUTH DAILY (Patient taking differently: Take 1 tablet by mouth Daily.), Disp: 90 tablet, Rfl: 3    magic mouthwash oral susp (nystatin - diphenhydrAMINE HCl - hydrocortisone), Swish and swallow 5 mL Every 6 (Six) Hours As Needed for Mucositis or Stomatitis., Disp: 1 each, Rfl: 3    metFORMIN (GLUCOPHAGE) 850 MG tablet, TAKE 1 TABLET BY MOUTH TWICE A DAY WITH MEALS, Disp: 144 tablet, Rfl: 1    metoprolol tartrate (LOPRESSOR) 50 MG tablet, TAKE THREE TABLETS BY MOUTH EVERY 12 HOURS, Disp: 540 tablet, Rfl: 0    potassium chloride 10 MEQ CR tablet, , Disp: , Rfl:     predniSONE (DELTASONE) 10 MG tablet, Take 4 tabs po daily x 4 d, then 3 tabs po daily x 4 d , then 2 tabs po daily x 4 d, then 1 tab po daily x 4 d, Disp: 40 tablet, Rfl: 0    traMADol (ULTRAM) 50 MG tablet, TAKE 1 TABLET BY MOUTH EVERY 6 HOURS AS NEEDED FOR MODERATE PAIN, Disp: 28 tablet, Rfl: 0    vitamin D3 125 MCG (5000 UT) capsule capsule, Take 1 capsule by mouth Daily., Disp: , Rfl:     No Known Allergies    Family History   Problem Relation Age of Onset    Diabetes Mother     Dementia Mother     Sudden death Father      Cancer-related family history is not on file.    Social History     Tobacco Use    Smoking status: Every Day     Current packs/day: 2.00     Average packs/day: 2.0 packs/day for 53.2 years (106.4 ttl pk-yrs)     Types:  Cigarettes     Start date: 1971    Smokeless tobacco: Never   Vaping Use    Vaping status: Never Used   Substance Use Topics    Alcohol use: No     Comment: Abstinent since around 2008    Drug use: Not Currently     Types: Marijuana     Comment: Abstinent since at least the 1980's     Social History     Social History Narrative    Not on file     ROS:   Review of Systems   Constitutional:  Positive for activity change, appetite change, fatigue and unexpected weight change. Negative for chills, diaphoresis and fever.   HENT:  Positive for sore throat and trouble swallowing. Negative for congestion, dental problem, drooling, ear discharge, ear pain, facial swelling, hearing loss, mouth sores, nosebleeds, postnasal drip, rhinorrhea, sinus pressure, sinus pain, sneezing, tinnitus and voice change.    Eyes:  Negative for photophobia, pain, discharge, redness, itching and visual disturbance.   Respiratory:  Positive for cough and shortness of breath. Negative for apnea, choking, chest tightness, wheezing and stridor.    Cardiovascular:  Negative for chest pain, palpitations and leg swelling.   Gastrointestinal:  Negative for abdominal distention, abdominal pain, anal bleeding, blood in stool, constipation, diarrhea, nausea, rectal pain and vomiting.   Endocrine: Negative for cold intolerance, heat intolerance, polydipsia and polyuria.   Genitourinary:  Negative for decreased urine volume, difficulty urinating, dysuria, flank pain, frequency, genital sores, hematuria and urgency.   Musculoskeletal:  Negative for arthralgias, back pain, gait problem, joint swelling, myalgias, neck pain and neck stiffness.   Skin:  Negative for color change, pallor and rash.   Neurological:  Negative for dizziness, tremors, seizures, syncope, facial asymmetry, speech difficulty, weakness, light-headedness, numbness and headaches.        Increased cold sensitivity since the commencement of treatment with oxaliplatin   Hematological:  Negative  "for adenopathy. Does not bruise/bleed easily.   Psychiatric/Behavioral:  Negative for agitation, behavioral problems, confusion, decreased concentration, hallucinations, self-injury, sleep disturbance and suicidal ideas. The patient is not nervous/anxious.      Objective:    Vital Signs:  Vitals:    03/08/24 1137   BP: 109/71   Pulse: 86   Temp: 97.4 °F (36.3 °C)   TempSrc: Oral   SpO2: 94%   Weight: 91.2 kg (201 lb)   Height: 193 cm (76\")   PainSc: 0-No pain     Body mass index is 24.47 kg/m².    ECOG  (1) Restricted in physically strenuous activity, ambulatory and able to do work of light nature    Physical Exam:   Physical Exam  Constitutional:       General: He is not in acute distress.     Appearance: He is not ill-appearing, toxic-appearing or diaphoretic.      Comments: Does not appear acutely ill. Conversant and oriented. No jaundice or pallor.    HENT:      Head: Normocephalic and atraumatic.      Right Ear: External ear normal.      Left Ear: External ear normal.      Nose: Nose normal.      Mouth/Throat:      Mouth: Mucous membranes are moist.      Pharynx: Oropharynx is clear.   Eyes:      General: No scleral icterus.        Right eye: No discharge.         Left eye: No discharge.      Conjunctiva/sclera: Conjunctivae normal.      Pupils: Pupils are equal, round, and reactive to light.   Cardiovascular:      Rate and Rhythm: Normal rate. Rhythm irregular.      Pulses: Normal pulses.      Heart sounds: Normal heart sounds. No murmur heard.     No friction rub. No gallop.      Comments: Irregularly irregular.   Pulmonary:      Effort: No respiratory distress.      Breath sounds: No stridor. No wheezing, rhonchi or rales.      Comments: Diminished bilaterally and with fine crackles in the bases.   Chest:      Chest wall: No tenderness.   Abdominal:      General: Bowel sounds are normal. There is no distension.      Palpations: Abdomen is soft. There is no mass.      Tenderness: There is no abdominal " tenderness. There is no right CVA tenderness, left CVA tenderness, guarding or rebound.      Comments: Surgical incisions well-healed.   Musculoskeletal:         General: No swelling, tenderness, deformity or signs of injury.      Cervical back: No rigidity.      Right lower leg: No edema.      Left lower leg: No edema.   Lymphadenopathy:      Cervical: No cervical adenopathy.   Skin:     General: Skin is warm and dry.      Coloration: Skin is not jaundiced.      Findings: No bruising or rash.   Neurological:      General: No focal deficit present.      Mental Status: He is alert and oriented to person, place, and time.      Gait: Gait normal.   Psychiatric:         Mood and Affect: Mood normal.         Behavior: Behavior normal.         Thought Content: Thought content normal.         Judgment: Judgment normal.     MIGUEL Wagner MD performed the physical exam on 3/8/2024 as documented above.     Lab Results - Last 18 Months   Lab Units 03/08/24  1139 02/27/24  0827 02/20/24  0828   WBC 10*3/mm3 12.09* 16.80* 12.81*   HEMOGLOBIN g/dL 11.0* 11.5* 13.3   HEMATOCRIT % 34.7* 36.4* 41.1   PLATELETS 10*3/mm3 347 280 188   MCV fL 98.0* 98.1* 96.5     Lab Results - Last 18 Months   Lab Units 02/27/24  0827 02/20/24  0828 02/16/24  0953   SODIUM mmol/L 135* 137 135*   POTASSIUM mmol/L 4.2 3.9 4.2   CHLORIDE mmol/L 97* 98 96*   CO2 mmol/L 26.0 25.0 24.0   BUN mg/dL 21 29* 19   CREATININE mg/dL 0.40* 0.68* 0.92   CALCIUM mg/dL 10.3 11.2* 12.4*   BILIRUBIN mg/dL 0.3 0.6 0.7   ALK PHOS U/L 92 91 98   ALT (SGPT) U/L 30 16 8   AST (SGOT) U/L 28 18 10   GLUCOSE mg/dL 128* 251* 292*     Lab Results   Component Value Date    GLUCOSE 128 (H) 02/27/2024    BUN 21 02/27/2024    CREATININE 0.40 (L) 02/27/2024    EGFRIFNONA 100 01/20/2022    BCR 52.5 (H) 02/27/2024    K 4.2 02/27/2024    CO2 26.0 02/27/2024    CALCIUM 10.3 02/27/2024    ALBUMIN 3.1 (L) 02/27/2024    LABIL2 1.5 01/22/2019    AST 28 02/27/2024    ALT 30 02/27/2024      Lab Results - Last 18 Months   Lab Units 11/14/23  1103   INR  0.99   APTT seconds 28.2     Lab Results   Component Value Date    PTT 28.2 11/14/2023    INR 0.99 11/14/2023     Lab Results   Component Value Date    CEA 1.77 02/16/2024       Lab Results   Component Value Date    PSA 0.594 01/23/2023     Assessment & Plan     1.  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  Recent scans don't show evidence of progressive disease. To follow the left lower lobe nodule. Continue same chemotherapy.   2. Pneumonia?: Completed the antibiotic. Will follow with a repeat scan.   2.  Cigarette smoker: Discussed with him. He continues to smoke  3.  Aortic aneurysm  4.  Severe hypertension: Much better controlled.   5.  Sacral decubitus ulcer: Referred to the wound clinic.   6.  Atrial fibrillation?  7.  He will see me in 4 weeks with new scans of the chest.      Lars Wagner MD on 3/8/2024 at 12:56

## 2024-03-08 ENCOUNTER — HOSPITAL ENCOUNTER (OUTPATIENT)
Dept: CARDIOLOGY | Facility: HOSPITAL | Age: 64
Discharge: HOME | End: 2024-03-08
Payer: MEDICARE

## 2024-03-08 ENCOUNTER — LAB (OUTPATIENT)
Dept: LAB | Facility: HOSPITAL | Age: 64
End: 2024-03-08
Payer: MEDICARE

## 2024-03-08 ENCOUNTER — OFFICE VISIT (OUTPATIENT)
Dept: ONCOLOGY | Facility: CLINIC | Age: 64
End: 2024-03-08
Payer: MEDICARE

## 2024-03-08 VITALS
HEART RATE: 86 BPM | WEIGHT: 201 LBS | BODY MASS INDEX: 24.48 KG/M2 | HEIGHT: 76 IN | TEMPERATURE: 97.4 F | SYSTOLIC BLOOD PRESSURE: 109 MMHG | DIASTOLIC BLOOD PRESSURE: 71 MMHG | OXYGEN SATURATION: 94 %

## 2024-03-08 DIAGNOSIS — C20 RECTAL CANCER: Primary | ICD-10-CM

## 2024-03-08 DIAGNOSIS — C20 RECTAL CANCER: ICD-10-CM

## 2024-03-08 DIAGNOSIS — C78.6 METASTASIS TO PERITONEAL CAVITY: ICD-10-CM

## 2024-03-08 DIAGNOSIS — I48.91 ATRIAL FIBRILLATION, UNSPECIFIED TYPE: ICD-10-CM

## 2024-03-08 LAB
BASOPHILS # BLD AUTO: 0.04 10*3/MM3 (ref 0–0.2)
BASOPHILS NFR BLD AUTO: 0.3 % (ref 0–1.5)
DEPRECATED RDW RBC AUTO: 58.8 FL (ref 37–54)
EOSINOPHIL # BLD AUTO: 0 10*3/MM3 (ref 0–0.4)
EOSINOPHIL NFR BLD AUTO: 0 % (ref 0.3–6.2)
ERYTHROCYTE [DISTWIDTH] IN BLOOD BY AUTOMATED COUNT: 17.3 % (ref 12.3–15.4)
HCT VFR BLD AUTO: 34.7 % (ref 37.5–51)
HGB BLD-MCNC: 11 G/DL (ref 13–17.7)
HOLD SPECIMEN: NORMAL
HOLD SPECIMEN: NORMAL
LYMPHOCYTES # BLD AUTO: 1.38 10*3/MM3 (ref 0.7–3.1)
LYMPHOCYTES NFR BLD AUTO: 11.4 % (ref 19.6–45.3)
MCH RBC QN AUTO: 31.1 PG (ref 26.6–33)
MCHC RBC AUTO-ENTMCNC: 31.7 G/DL (ref 31.5–35.7)
MCV RBC AUTO: 98 FL (ref 79–97)
MONOCYTES # BLD AUTO: 1.31 10*3/MM3 (ref 0.1–0.9)
MONOCYTES NFR BLD AUTO: 10.8 % (ref 5–12)
NEUTROPHILS NFR BLD AUTO: 77.5 % (ref 42.7–76)
NEUTROPHILS NFR BLD AUTO: 9.36 10*3/MM3 (ref 1.7–7)
PLATELET # BLD AUTO: 347 10*3/MM3 (ref 140–450)
PMV BLD AUTO: 8.9 FL (ref 6–12)
QT INTERVAL: 400 MS
QTC INTERVAL: 456 MS
RBC # BLD AUTO: 3.54 10*6/MM3 (ref 4.14–5.8)
WBC NRBC COR # BLD AUTO: 12.09 10*3/MM3 (ref 3.4–10.8)

## 2024-03-08 PROCEDURE — 85025 COMPLETE CBC W/AUTO DIFF WBC: CPT

## 2024-03-08 PROCEDURE — 36415 COLL VENOUS BLD VENIPUNCTURE: CPT

## 2024-03-08 PROCEDURE — 93005 ELECTROCARDIOGRAM TRACING: CPT | Performed by: INTERNAL MEDICINE

## 2024-03-09 ENCOUNTER — APPOINTMENT (OUTPATIENT)
Dept: CT IMAGING | Facility: HOSPITAL | Age: 64
DRG: 853 | End: 2024-03-09
Payer: MEDICARE

## 2024-03-09 ENCOUNTER — HOSPITAL ENCOUNTER (INPATIENT)
Facility: HOSPITAL | Age: 64
LOS: 8 days | Discharge: NURSING, REHAB OR LONG-TERM CARE | DRG: 853 | End: 2024-03-17
Attending: EMERGENCY MEDICINE | Admitting: STUDENT IN AN ORGANIZED HEALTH CARE EDUCATION/TRAINING PROGRAM
Payer: MEDICARE

## 2024-03-09 ENCOUNTER — APPOINTMENT (OUTPATIENT)
Dept: GENERAL RADIOLOGY | Facility: HOSPITAL | Age: 64
DRG: 853 | End: 2024-03-09
Payer: MEDICARE

## 2024-03-09 DIAGNOSIS — J86.9 EMPYEMA: ICD-10-CM

## 2024-03-09 DIAGNOSIS — J18.9 PNEUMONIA DUE TO INFECTIOUS ORGANISM, UNSPECIFIED LATERALITY, UNSPECIFIED PART OF LUNG: Primary | ICD-10-CM

## 2024-03-09 DIAGNOSIS — C78.6 METASTASIS TO PERITONEAL CAVITY: ICD-10-CM

## 2024-03-09 PROBLEM — E87.1 HYPONATREMIA: Status: ACTIVE | Noted: 2024-03-09

## 2024-03-09 PROBLEM — Z72.0 TOBACCO USE: Status: ACTIVE | Noted: 2024-03-09

## 2024-03-09 PROBLEM — I74.10 AORTIC THROMBUS: Status: ACTIVE | Noted: 2024-03-09

## 2024-03-09 PROBLEM — A41.9 SEPSIS: Status: ACTIVE | Noted: 2024-03-09

## 2024-03-09 PROBLEM — I71.20 THORACIC AORTIC ANEURYSM WITHOUT RUPTURE: Status: ACTIVE | Noted: 2024-03-09

## 2024-03-09 PROBLEM — J96.01 ACUTE RESPIRATORY FAILURE WITH HYPOXIA: Status: ACTIVE | Noted: 2024-03-09

## 2024-03-09 LAB
ALBUMIN SERPL-MCNC: 3.1 G/DL (ref 3.5–5.2)
ALBUMIN/GLOB SERPL: 0.6 G/DL
ALP SERPL-CCNC: 118 U/L (ref 39–117)
ALT SERPL W P-5'-P-CCNC: 50 U/L (ref 1–41)
ANION GAP SERPL CALCULATED.3IONS-SCNC: 16 MMOL/L (ref 5–15)
AST SERPL-CCNC: 33 U/L (ref 1–40)
B PARAPERT DNA SPEC QL NAA+PROBE: NOT DETECTED
B PERT DNA SPEC QL NAA+PROBE: NOT DETECTED
BASOPHILS # BLD AUTO: 0 10*3/MM3 (ref 0–0.2)
BASOPHILS # BLD AUTO: 0.1 10*3/MM3 (ref 0–0.2)
BASOPHILS NFR BLD AUTO: 0.5 % (ref 0–1.5)
BASOPHILS NFR BLD AUTO: 1.1 % (ref 0–1.5)
BILIRUB SERPL-MCNC: 0.3 MG/DL (ref 0–1.2)
BUN SERPL-MCNC: 11 MG/DL (ref 8–23)
BUN/CREAT SERPL: 19.6 (ref 7–25)
C PNEUM DNA NPH QL NAA+NON-PROBE: NOT DETECTED
CALCIUM SPEC-SCNC: 11.1 MG/DL (ref 8.6–10.5)
CHLORIDE SERPL-SCNC: 88 MMOL/L (ref 98–107)
CO2 SERPL-SCNC: 26 MMOL/L (ref 22–29)
CREAT SERPL-MCNC: 0.56 MG/DL (ref 0.76–1.27)
D-LACTATE SERPL-SCNC: 3.8 MMOL/L (ref 0.3–2)
D-LACTATE SERPL-SCNC: 4.3 MMOL/L (ref 0.5–2)
DEPRECATED RDW RBC AUTO: 59.5 FL (ref 37–54)
DEPRECATED RDW RBC AUTO: 63.4 FL (ref 37–54)
EGFRCR SERPLBLD CKD-EPI 2021: 111.4 ML/MIN/1.73
EOSINOPHIL # BLD AUTO: 0 10*3/MM3 (ref 0–0.4)
EOSINOPHIL # BLD AUTO: 0 10*3/MM3 (ref 0–0.4)
EOSINOPHIL NFR BLD AUTO: 0 % (ref 0.3–6.2)
EOSINOPHIL NFR BLD AUTO: 0 % (ref 0.3–6.2)
ERYTHROCYTE [DISTWIDTH] IN BLOOD BY AUTOMATED COUNT: 18.8 % (ref 12.3–15.4)
ERYTHROCYTE [DISTWIDTH] IN BLOOD BY AUTOMATED COUNT: 19 % (ref 12.3–15.4)
FLUAV SUBTYP SPEC NAA+PROBE: NOT DETECTED
FLUBV RNA ISLT QL NAA+PROBE: NOT DETECTED
GLOBULIN UR ELPH-MCNC: 4.9 GM/DL
GLUCOSE SERPL-MCNC: 291 MG/DL (ref 65–99)
HADV DNA SPEC NAA+PROBE: NOT DETECTED
HCOV 229E RNA SPEC QL NAA+PROBE: NOT DETECTED
HCOV HKU1 RNA SPEC QL NAA+PROBE: NOT DETECTED
HCOV NL63 RNA SPEC QL NAA+PROBE: NOT DETECTED
HCOV OC43 RNA SPEC QL NAA+PROBE: NOT DETECTED
HCT VFR BLD AUTO: 30.6 % (ref 37.5–51)
HCT VFR BLD AUTO: 34 % (ref 37.5–51)
HGB BLD-MCNC: 10.1 G/DL (ref 13–17.7)
HGB BLD-MCNC: 11.3 G/DL (ref 13–17.7)
HMPV RNA NPH QL NAA+NON-PROBE: NOT DETECTED
HPIV1 RNA ISLT QL NAA+PROBE: NOT DETECTED
HPIV2 RNA SPEC QL NAA+PROBE: NOT DETECTED
HPIV3 RNA NPH QL NAA+PROBE: NOT DETECTED
HPIV4 P GENE NPH QL NAA+PROBE: NOT DETECTED
LYMPHOCYTES # BLD AUTO: 0.9 10*3/MM3 (ref 0.7–3.1)
LYMPHOCYTES # BLD AUTO: 1 10*3/MM3 (ref 0.7–3.1)
LYMPHOCYTES NFR BLD AUTO: 11.7 % (ref 19.6–45.3)
LYMPHOCYTES NFR BLD AUTO: 8.8 % (ref 19.6–45.3)
M PNEUMO IGG SER IA-ACNC: NOT DETECTED
MCH RBC QN AUTO: 30 PG (ref 26.6–33)
MCH RBC QN AUTO: 30.2 PG (ref 26.6–33)
MCHC RBC AUTO-ENTMCNC: 32.9 G/DL (ref 31.5–35.7)
MCHC RBC AUTO-ENTMCNC: 33.2 G/DL (ref 31.5–35.7)
MCV RBC AUTO: 90.8 FL (ref 79–97)
MCV RBC AUTO: 91.2 FL (ref 79–97)
MONOCYTES # BLD AUTO: 1.5 10*3/MM3 (ref 0.1–0.9)
MONOCYTES # BLD AUTO: 1.6 10*3/MM3 (ref 0.1–0.9)
MONOCYTES NFR BLD AUTO: 15.1 % (ref 5–12)
MONOCYTES NFR BLD AUTO: 19.1 % (ref 5–12)
NEUTROPHILS NFR BLD AUTO: 5.6 10*3/MM3 (ref 1.7–7)
NEUTROPHILS NFR BLD AUTO: 68.1 % (ref 42.7–76)
NEUTROPHILS NFR BLD AUTO: 7.4 10*3/MM3 (ref 1.7–7)
NEUTROPHILS NFR BLD AUTO: 75.6 % (ref 42.7–76)
NRBC BLD AUTO-RTO: 0 /100 WBC (ref 0–0.2)
NRBC BLD AUTO-RTO: 0.1 /100 WBC (ref 0–0.2)
NT-PROBNP SERPL-MCNC: 425 PG/ML (ref 0–900)
PLATELET # BLD AUTO: 396 10*3/MM3 (ref 140–450)
PLATELET # BLD AUTO: 435 10*3/MM3 (ref 140–450)
PMV BLD AUTO: 7.1 FL (ref 6–12)
PMV BLD AUTO: 7.8 FL (ref 6–12)
POTASSIUM SERPL-SCNC: 4.5 MMOL/L (ref 3.5–5.2)
PROT SERPL-MCNC: 8 G/DL (ref 6–8.5)
RBC # BLD AUTO: 3.36 10*6/MM3 (ref 4.14–5.8)
RBC # BLD AUTO: 3.75 10*6/MM3 (ref 4.14–5.8)
RHINOVIRUS RNA SPEC NAA+PROBE: NOT DETECTED
RSV RNA NPH QL NAA+NON-PROBE: NOT DETECTED
SARS-COV-2 RNA NPH QL NAA+NON-PROBE: NOT DETECTED
SODIUM SERPL-SCNC: 130 MMOL/L (ref 136–145)
TROPONIN T SERPL HS-MCNC: 10 NG/L
WBC NRBC COR # BLD AUTO: 8.2 10*3/MM3 (ref 3.4–10.8)
WBC NRBC COR # BLD AUTO: 9.8 10*3/MM3 (ref 3.4–10.8)

## 2024-03-09 PROCEDURE — 71275 CT ANGIOGRAPHY CHEST: CPT

## 2024-03-09 PROCEDURE — 25010000002 CEFEPIME PER 500 MG: Performed by: NURSE PRACTITIONER

## 2024-03-09 PROCEDURE — 87040 BLOOD CULTURE FOR BACTERIA: CPT | Performed by: NURSE PRACTITIONER

## 2024-03-09 PROCEDURE — 93005 ELECTROCARDIOGRAM TRACING: CPT

## 2024-03-09 PROCEDURE — 84484 ASSAY OF TROPONIN QUANT: CPT | Performed by: NURSE PRACTITIONER

## 2024-03-09 PROCEDURE — 25010000002 METRONIDAZOLE 500 MG/100ML SOLUTION: Performed by: NURSE PRACTITIONER

## 2024-03-09 PROCEDURE — 93005 ELECTROCARDIOGRAM TRACING: CPT | Performed by: EMERGENCY MEDICINE

## 2024-03-09 PROCEDURE — 25010000002 CEFEPIME PER 500 MG: Performed by: EMERGENCY MEDICINE

## 2024-03-09 PROCEDURE — 83605 ASSAY OF LACTIC ACID: CPT

## 2024-03-09 PROCEDURE — 99285 EMERGENCY DEPT VISIT HI MDM: CPT

## 2024-03-09 PROCEDURE — 83880 ASSAY OF NATRIURETIC PEPTIDE: CPT | Performed by: NURSE PRACTITIONER

## 2024-03-09 PROCEDURE — 25810000003 SEPSIS FLUID NS 0.9 % SOLUTION: Performed by: EMERGENCY MEDICINE

## 2024-03-09 PROCEDURE — 71045 X-RAY EXAM CHEST 1 VIEW: CPT

## 2024-03-09 PROCEDURE — 85025 COMPLETE CBC W/AUTO DIFF WBC: CPT | Performed by: NURSE PRACTITIONER

## 2024-03-09 PROCEDURE — 25010000002 VANCOMYCIN HCL IN NACL 1.75-0.9 GM/500ML-% SOLUTION: Performed by: EMERGENCY MEDICINE

## 2024-03-09 PROCEDURE — 36415 COLL VENOUS BLD VENIPUNCTURE: CPT

## 2024-03-09 PROCEDURE — 25810000003 SODIUM CHLORIDE 0.9 % SOLUTION: Performed by: NURSE PRACTITIONER

## 2024-03-09 PROCEDURE — 25510000001 IOPAMIDOL PER 1 ML: Performed by: EMERGENCY MEDICINE

## 2024-03-09 PROCEDURE — 25810000003 SODIUM CHLORIDE 0.9 % SOLUTION: Performed by: EMERGENCY MEDICINE

## 2024-03-09 PROCEDURE — 0202U NFCT DS 22 TRGT SARS-COV-2: CPT | Performed by: NURSE PRACTITIONER

## 2024-03-09 PROCEDURE — 87641 MR-STAPH DNA AMP PROBE: CPT | Performed by: NURSE PRACTITIONER

## 2024-03-09 PROCEDURE — 80053 COMPREHEN METABOLIC PANEL: CPT | Performed by: NURSE PRACTITIONER

## 2024-03-09 RX ORDER — HYDROCHLOROTHIAZIDE 25 MG/1
25 TABLET ORAL
Status: DISCONTINUED | OUTPATIENT
Start: 2024-03-10 | End: 2024-03-17 | Stop reason: HOSPADM

## 2024-03-09 RX ORDER — METRONIDAZOLE 500 MG/100ML
500 INJECTION, SOLUTION INTRAVENOUS ONCE
Status: DISCONTINUED | OUTPATIENT
Start: 2024-03-09 | End: 2024-03-09

## 2024-03-09 RX ORDER — TRAMADOL HYDROCHLORIDE 50 MG/1
50 TABLET ORAL EVERY 6 HOURS PRN
Status: DISCONTINUED | OUTPATIENT
Start: 2024-03-09 | End: 2024-03-11

## 2024-03-09 RX ORDER — ATORVASTATIN CALCIUM 40 MG/1
40 TABLET, FILM COATED ORAL DAILY
Status: DISCONTINUED | OUTPATIENT
Start: 2024-03-10 | End: 2024-03-17 | Stop reason: HOSPADM

## 2024-03-09 RX ORDER — AMLODIPINE BESYLATE 5 MG/1
10 TABLET ORAL DAILY
Status: DISCONTINUED | OUTPATIENT
Start: 2024-03-10 | End: 2024-03-17 | Stop reason: HOSPADM

## 2024-03-09 RX ORDER — HYDRALAZINE HYDROCHLORIDE 25 MG/1
50 TABLET, FILM COATED ORAL EVERY 8 HOURS SCHEDULED
Status: DISCONTINUED | OUTPATIENT
Start: 2024-03-09 | End: 2024-03-17 | Stop reason: HOSPADM

## 2024-03-09 RX ORDER — IPRATROPIUM BROMIDE AND ALBUTEROL SULFATE 2.5; .5 MG/3ML; MG/3ML
3 SOLUTION RESPIRATORY (INHALATION) EVERY 4 HOURS PRN
Status: DISCONTINUED | OUTPATIENT
Start: 2024-03-09 | End: 2024-03-17 | Stop reason: HOSPADM

## 2024-03-09 RX ORDER — ACETAMINOPHEN 325 MG/1
650 TABLET ORAL EVERY 6 HOURS PRN
Status: DISCONTINUED | OUTPATIENT
Start: 2024-03-09 | End: 2024-03-11

## 2024-03-09 RX ORDER — IBUPROFEN 600 MG/1
1 TABLET ORAL
Status: DISCONTINUED | OUTPATIENT
Start: 2024-03-09 | End: 2024-03-17 | Stop reason: HOSPADM

## 2024-03-09 RX ORDER — DEXTROSE MONOHYDRATE 25 G/50ML
25 INJECTION, SOLUTION INTRAVENOUS
Status: DISCONTINUED | OUTPATIENT
Start: 2024-03-09 | End: 2024-03-17 | Stop reason: HOSPADM

## 2024-03-09 RX ORDER — CLONIDINE HYDROCHLORIDE 0.1 MG/1
0.1 TABLET ORAL 2 TIMES DAILY
Status: DISCONTINUED | OUTPATIENT
Start: 2024-03-09 | End: 2024-03-17 | Stop reason: HOSPADM

## 2024-03-09 RX ORDER — BENZONATATE 100 MG/1
100 CAPSULE ORAL 3 TIMES DAILY PRN
Status: DISCONTINUED | OUTPATIENT
Start: 2024-03-09 | End: 2024-03-17 | Stop reason: HOSPADM

## 2024-03-09 RX ORDER — ACETAMINOPHEN 500 MG
1000 TABLET ORAL ONCE
Status: DISCONTINUED | OUTPATIENT
Start: 2024-03-09 | End: 2024-03-11

## 2024-03-09 RX ORDER — NICOTINE POLACRILEX 4 MG
15 LOZENGE BUCCAL
Status: DISCONTINUED | OUTPATIENT
Start: 2024-03-09 | End: 2024-03-17 | Stop reason: HOSPADM

## 2024-03-09 RX ORDER — GUAIFENESIN 600 MG/1
600 TABLET, EXTENDED RELEASE ORAL EVERY 12 HOURS SCHEDULED
Status: DISCONTINUED | OUTPATIENT
Start: 2024-03-09 | End: 2024-03-13

## 2024-03-09 RX ORDER — LISINOPRIL AND HYDROCHLOROTHIAZIDE 25; 20 MG/1; MG/1
1 TABLET ORAL DAILY
COMMUNITY
End: 2024-03-22

## 2024-03-09 RX ORDER — METOPROLOL TARTRATE 50 MG/1
150 TABLET, FILM COATED ORAL EVERY 12 HOURS SCHEDULED
Status: DISCONTINUED | OUTPATIENT
Start: 2024-03-09 | End: 2024-03-15

## 2024-03-09 RX ORDER — LISINOPRIL 20 MG/1
20 TABLET ORAL
Status: DISCONTINUED | OUTPATIENT
Start: 2024-03-10 | End: 2024-03-17 | Stop reason: HOSPADM

## 2024-03-09 RX ORDER — METRONIDAZOLE 500 MG/100ML
500 INJECTION, SOLUTION INTRAVENOUS EVERY 8 HOURS
Status: DISCONTINUED | OUTPATIENT
Start: 2024-03-09 | End: 2024-03-15

## 2024-03-09 RX ORDER — INSULIN LISPRO 100 [IU]/ML
2-9 INJECTION, SOLUTION INTRAVENOUS; SUBCUTANEOUS
Status: DISCONTINUED | OUTPATIENT
Start: 2024-03-09 | End: 2024-03-17 | Stop reason: HOSPADM

## 2024-03-09 RX ORDER — VANCOMYCIN 1.75 GRAM/500 ML IN 0.9 % SODIUM CHLORIDE INTRAVENOUS
20 ONCE
Status: COMPLETED | OUTPATIENT
Start: 2024-03-09 | End: 2024-03-09

## 2024-03-09 RX ORDER — SODIUM CHLORIDE 0.9 % (FLUSH) 0.9 %
10 SYRINGE (ML) INJECTION AS NEEDED
Status: DISCONTINUED | OUTPATIENT
Start: 2024-03-09 | End: 2024-03-17 | Stop reason: HOSPADM

## 2024-03-09 RX ORDER — SODIUM CHLORIDE 9 MG/ML
100 INJECTION, SOLUTION INTRAVENOUS CONTINUOUS
Status: DISCONTINUED | OUTPATIENT
Start: 2024-03-09 | End: 2024-03-12

## 2024-03-09 RX ADMIN — METOPROLOL TARTRATE 150 MG: 50 TABLET, FILM COATED ORAL at 23:50

## 2024-03-09 RX ADMIN — IOPAMIDOL 100 ML: 755 INJECTION, SOLUTION INTRAVENOUS at 19:38

## 2024-03-09 RX ADMIN — TRAMADOL HYDROCHLORIDE 50 MG: 50 TABLET, COATED ORAL at 23:50

## 2024-03-09 RX ADMIN — CLONIDINE HYDROCHLORIDE 0.1 MG: 0.1 TABLET ORAL at 23:50

## 2024-03-09 RX ADMIN — METRONIDAZOLE 500 MG: 500 INJECTION, SOLUTION INTRAVENOUS at 23:51

## 2024-03-09 RX ADMIN — BENZONATATE 100 MG: 100 CAPSULE ORAL at 23:50

## 2024-03-09 RX ADMIN — SODIUM CHLORIDE 2736 ML: 9 INJECTION, SOLUTION INTRAVENOUS at 21:16

## 2024-03-09 RX ADMIN — SODIUM CHLORIDE 1000 ML: 9 INJECTION, SOLUTION INTRAVENOUS at 17:57

## 2024-03-09 RX ADMIN — GUAIFENESIN 600 MG: 600 TABLET, EXTENDED RELEASE ORAL at 23:50

## 2024-03-09 RX ADMIN — SODIUM CHLORIDE 100 ML/HR: 9 INJECTION, SOLUTION INTRAVENOUS at 23:54

## 2024-03-09 RX ADMIN — CEFEPIME 2000 MG: 2 INJECTION, POWDER, FOR SOLUTION INTRAVENOUS at 17:57

## 2024-03-09 RX ADMIN — HYDRALAZINE HYDROCHLORIDE 50 MG: 25 TABLET ORAL at 23:50

## 2024-03-09 RX ADMIN — CEFEPIME 2000 MG: 2 INJECTION, POWDER, FOR SOLUTION INTRAVENOUS at 23:50

## 2024-03-09 RX ADMIN — Medication 1750 MG: at 17:58

## 2024-03-09 NOTE — LETTER
EMS Transport Request  For use at [facility], [place], [place], [place], and [place] only   Patient Name: Prashant Zhou : 1960   Weight:89.4 kg (197 lb) Pick-up Location: [facility] BLS/ALS: BLS/ALS: BLS   Insurance: MEDICARE Auth End Date:    Pre-Cert #: D/C Summary complete:    Destination: Other [facility]   Contact Precautions: None   Equipment (O2, Fluids, etc.): O2, settings 4L   Arrive By Date/Time: 3/16/2024 1500 Stretcher/WC: Wheelchair   CM Requesting: Gladys Mendoza RN Ext: [phone]   Notes/Medical Necessity: verify readiness with primary rn     ______________________________________________________________________    *Only 2 patient bags OR 1 carry-on size bag are permitted.  Wheelchairs and walkers CANNOT transported with the patient. Acknowledge: Yes

## 2024-03-09 NOTE — ED PROVIDER NOTES
Subjective     Provider in Triage Note  Patient is a 63-year-old woman who has colon cancer and is taking chemotherapy next dose is due Tuesday-began having shortness of breath cough congestion last night that got worse today he presents to triage with a room air saturation of 81% he was immediately placed on oxygen at 2 L and oxygen saturation increased to 90%.  Patient is alert oriented nontoxic in no acute distress he does have a pulse of 122 the oral temperature was 99 the patient needs a rectal temp.    Due to significant overcrowding in the emergency department patient was initially seen and evaluated in triage.  Provider in triage recommended patient placement in the treatment area to initiate therapy and movement to an ER bed as soon as possible.    History of Present Illness  Chief complaint: Patient is a 63-year-old with a known history of colon cancer.  I reviewed the provider in triage note.  Patient has a history of colon cancer.  Has been on chemotherapy and is due chemotherapy on Tuesday.  He has had progressive cough and shortness of breath that started yesterday and became more severe today.  He presents 81% on room air triage.  He is coughing up thick white mucus.  He has no history of lung problems.  No history of COPD.  He has not had any swelling or pain in his leg.  No pain in his chest.  He is not sure if he has had a fever or not.  He has no abdominal pain or vomiting.  But with worsening progressive severe shortness of breath he presented here to the emergency department.    Context:    Duration:    Timing:    Severity: Severe    Associated Symptoms:        PCP:  LMP:      Review of Systems   Constitutional:  Positive for fatigue.   HENT:  Positive for congestion.    Respiratory:  Positive for cough and shortness of breath.    Cardiovascular:  Negative for chest pain and leg swelling.   Gastrointestinal:  Negative for abdominal pain.   Genitourinary: Negative.    Musculoskeletal: Negative.     Skin: Negative.    Neurological: Negative.        Past Medical History:   Diagnosis Date    Aortic dissection     Diabetes mellitus     Heart murmur     History of noncompliance with medical treatment     Hyperlipidemia     Hypertension     Type B hypoplasia of aortic arch        No Known Allergies    Past Surgical History:   Procedure Laterality Date    COLECTOMY PARTIAL / TOTAL  2023    COLONOSCOPY N/A 08/31/2023    Procedure: COLONOSCOPY with sigmoid mass tattooing at 35cm, sigmoid and rectal polypectomy;  Surgeon: TORIBIO Jacob MD;  Location: [facility] ENDOSCOPY;  Service: Gastroenterology;  Laterality: N/A;  sigmoid mass, colon polyps       Family History   Problem Relation Age of Onset    Diabetes Mother     Dementia Mother     Sudden death Father        Social History     Socioeconomic History    Marital status:    Tobacco Use    Smoking status: Every Day     Current packs/day: 2.00     Average packs/day: 2.0 packs/day for 53.2 years (106.4 ttl pk-yrs)     Types: Cigarettes     Start date: 1971    Smokeless tobacco: Never   Vaping Use    Vaping status: Never Used   Substance and Sexual Activity    Alcohol use: No     Comment: Abstinent since around 2008    Drug use: Not Currently     Types: Marijuana     Comment: Abstinent since at least the 1980's    Sexual activity: Defer           Objective   Physical Exam  Vitals and nursing note reviewed.   Constitutional:       General: He is in acute distress.   HENT:      Head: Normocephalic and atraumatic.   Cardiovascular:      Rate and Rhythm: Regular rhythm. Tachycardia present.   Pulmonary:      Effort: Tachypnea present.      Breath sounds: Examination of the right-middle field reveals rales. Examination of the right-lower field reveals rales. Decreased breath sounds present.   Abdominal:      Palpations: Abdomen is soft.   Musculoskeletal:      Cervical back: Normal range of motion.      Right lower leg: No tenderness. No edema.      Left lower leg:  No tenderness. No edema.   Skin:     General: Skin is warm and dry.   Neurological:      Mental Status: He is alert.   Psychiatric:         Mood and Affect: Mood normal.         Behavior: Behavior normal.         Procedures           ED Course                                 Results for orders placed or performed during the hospital encounter of 03/09/24   Respiratory Panel PCR w/COVID-19(SARS-CoV-2) BREA/BELL/JOHNIE/PAD/[facility]/FINESSE In-House, NP Swab in UTM/VTM, 2 HR TAT - Swab, Nasopharynx    Specimen: Nasopharynx; Swab   Result Value Ref Range    ADENOVIRUS, PCR Not Detected Not Detected    Coronavirus 229E Not Detected Not Detected    Coronavirus HKU1 Not Detected Not Detected    Coronavirus NL63 Not Detected Not Detected    Coronavirus OC43 Not Detected Not Detected    COVID19 Not Detected Not Detected - Ref. Range    Human Metapneumovirus Not Detected Not Detected    Human Rhinovirus/Enterovirus Not Detected Not Detected    Influenza A PCR Not Detected Not Detected    Influenza B PCR Not Detected Not Detected    Parainfluenza Virus 1 Not Detected Not Detected    Parainfluenza Virus 2 Not Detected Not Detected    Parainfluenza Virus 3 Not Detected Not Detected    Parainfluenza Virus 4 Not Detected Not Detected    RSV, PCR Not Detected Not Detected    Bordetella pertussis pcr Not Detected Not Detected    Bordetella parapertussis PCR Not Detected Not Detected    Chlamydophila pneumoniae PCR Not Detected Not Detected    Mycoplasma pneumo by PCR Not Detected Not Detected   Comprehensive Metabolic Panel    Specimen: Blood   Result Value Ref Range    Glucose 291 (H) 65 - 99 mg/dL    BUN 11 8 - 23 mg/dL    Creatinine 0.56 (L) 0.76 - 1.27 mg/dL    Sodium 130 (L) 136 - 145 mmol/L    Potassium 4.5 3.5 - 5.2 mmol/L    Chloride 88 (L) 98 - 107 mmol/L    CO2 26.0 22.0 - 29.0 mmol/L    Calcium 11.1 (H) 8.6 - 10.5 mg/dL    Total Protein 8.0 6.0 - 8.5 g/dL    Albumin 3.1 (L) 3.5 - 5.2 g/dL    ALT (SGPT) 50 (H) 1 - 41 U/L    AST (SGOT)  33 1 - 40 U/L    Alkaline Phosphatase 118 (H) 39 - 117 U/L    Total Bilirubin 0.3 0.0 - 1.2 mg/dL    Globulin 4.9 gm/dL    A/G Ratio 0.6 g/dL    BUN/Creatinine Ratio 19.6 7.0 - 25.0    Anion Gap 16.0 (H) 5.0 - 15.0 mmol/L    eGFR 111.4 >60.0 mL/min/1.73   BNP    Specimen: Blood   Result Value Ref Range    proBNP 425.0 0.0 - 900.0 pg/mL   Single High Sensitivity Troponin T    Specimen: Blood   Result Value Ref Range    HS Troponin T 10 <22 ng/L   CBC Auto Differential    Specimen: Blood   Result Value Ref Range    WBC 9.80 3.40 - 10.80 10*3/mm3    RBC 3.75 (L) 4.14 - 5.80 10*6/mm3    Hemoglobin 11.3 (L) 13.0 - 17.7 g/dL    Hematocrit 34.0 (L) 37.5 - 51.0 %    MCV 90.8 79.0 - 97.0 fL    MCH 30.2 26.6 - 33.0 pg    MCHC 33.2 31.5 - 35.7 g/dL    RDW 19.0 (H) 12.3 - 15.4 %    RDW-SD 63.4 (H) 37.0 - 54.0 fl    MPV 7.8 6.0 - 12.0 fL    Platelets 435 140 - 450 10*3/mm3    Neutrophil % 75.6 42.7 - 76.0 %    Lymphocyte % 8.8 (L) 19.6 - 45.3 %    Monocyte % 15.1 (H) 5.0 - 12.0 %    Eosinophil % 0.0 (L) 0.3 - 6.2 %    Basophil % 0.5 0.0 - 1.5 %    Neutrophils, Absolute 7.40 (H) 1.70 - 7.00 10*3/mm3    Lymphocytes, Absolute 0.90 0.70 - 3.10 10*3/mm3    Monocytes, Absolute 1.50 (H) 0.10 - 0.90 10*3/mm3    Eosinophils, Absolute 0.00 0.00 - 0.40 10*3/mm3    Basophils, Absolute 0.00 0.00 - 0.20 10*3/mm3    nRBC 0.1 0.0 - 0.2 /100 WBC   STAT Lactic Acid, Reflex    Specimen: Blood   Result Value Ref Range    Lactate 4.3 (C) 0.5 - 2.0 mmol/L   POC Lactate    Specimen: Blood   Result Value Ref Range    Lactate 3.8 (C) 0.3 - 2.0 mmol/L   ECG 12 Lead Dyspnea   Result Value Ref Range    QT Interval 332 ms    QTC Interval 481 ms       CT Angiogram Chest Pulmonary Embolism    Result Date: 3/9/2024  Impression: Groundglass opacities and tree-in-bud nodularity throughout the left upper and lower lobes as well as the right middle lobe consistent with multifocal pneumonia. There is a 17 cm loculated gas and fluid collection within the left  pleural space with associated thickening of the pleural wall, which likely represents empyema. No evidence of pulmonary embolism. There is pulmonary artery enlargement which can be seen with pulmonary hypertension. 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 Recommend vascular surgery referral. Electronically Signed: Camacho Vee MD  3/9/2024 7:48 PM EST  Workstation ID: [id number]    XR Chest 1 View    Result Date: 3/9/2024  Impression: 1.Tenting left hemidiaphragm with underlying airspace disease which could be secondary to pneumonia. Given the more masslike appearance on prior CT a follow-up CT chest suggested to reassess. 2.Possible left parapneumonic effusion. 3.There is air shadow in the left upper quadrant of the abdomen. This could relate to bowel as opposed to air within the left basilar area that might relate to cavitary area or loculated pneumothorax. This again could be assessed by follow-up CT. Electronically Signed: Santiago Tariq MD  3/9/2024 5:34 PM EST  Workstation ID: [id number]      SEPTIC SHOCK FOCUSED EXAM ATTESTATION    I attest that I have reassessed tissue perfusion after the fluid bolus given.    Jose Ortiz,   03/09/24  21:04 EST        Medical Decision Making  Patient was seen and evaluated for the above problem    Differential diagnosis includes was not limited to sepsis, pneumonia, CHF, PE    Patient had initial EKG interpreted myself sinus tachycardia rate of 126 with PVCs.  There is a right bundle branch block that is present on old EKG dated 3/8/2024.  Patient's initial troponin is normal as well as BNP.  He did have fever here and initial lactic acid was elevated.  He received an initial liter of fluids and then finished the sepsis bolus here in the emergency department.  He has multifocal pneumonia with an empyema present on CT scan.  No signs of PE.  Flagyl  was added to the cefepime and vancomycin.  At this point in time will be admitted to the hospital for further workup of his infection and sepsis.  I discussed findings with patient.  He verbalized understanding.  I discussed with Marilu on-call for Dr. Naqvi who agrees to admit the patient    Problems Addressed:  Pneumonia due to infectious organism, unspecified laterality, unspecified part of lung: complicated acute illness or injury    Amount and/or Complexity of Data Reviewed  Labs: ordered. Decision-making details documented in ED Course.     Details: Labs reviewed by myself  Radiology: ordered and independent interpretation performed. Decision-making details documented in ED Course.     Details: CT scan reviewed by myself as above  ECG/medicine tests: ordered and independent interpretation performed.    Risk  OTC drugs.  Prescription drug management.  Decision regarding hospitalization.        Final diagnoses:   None   Multifocal pneumonia  Empyema  Sepsis    ED Disposition  ED Disposition       None            No follow-up provider specified.       Medication List      No changes were made to your prescriptions during this visit.            Jose Ortiz,   03/09/24 1955

## 2024-03-09 NOTE — Clinical Note
Level of Care: Progressive Care [20]   Diagnosis: Sepsis [[id number]]   Admitting Physician: YASH KRAMER [[id number]]   Attending Physician: YASH KRAMER [[id number]]   Bed Request Comments: cardiac monitor   Certification: I Certify That Inpatient Hospital Services Are Medically Necessary For Greater Than 2 Midnights

## 2024-03-09 NOTE — ED NOTES
Patient evaluated by provider and will return to waiting room with Intravenous line in place.  Patient has been instructed not to inject anything into IV, or leave premises with line in place. Patient pending further evaluation, treatment, testing / monitoring.

## 2024-03-10 LAB
ABO GROUP BLD: NORMAL
ANION GAP SERPL CALCULATED.3IONS-SCNC: 10 MMOL/L (ref 5–15)
ANION GAP SERPL CALCULATED.3IONS-SCNC: 11 MMOL/L (ref 5–15)
APTT PPP: 31.2 SECONDS (ref 24–31)
BASOPHILS # BLD AUTO: 0 10*3/MM3 (ref 0–0.2)
BASOPHILS NFR BLD AUTO: 0.4 % (ref 0–1.5)
BLD GP AB SCN SERPL QL: NEGATIVE
BUN SERPL-MCNC: 7 MG/DL (ref 8–23)
BUN SERPL-MCNC: 8 MG/DL (ref 8–23)
BUN/CREAT SERPL: 19.4 (ref 7–25)
BUN/CREAT SERPL: 20 (ref 7–25)
CALCIUM SPEC-SCNC: 10.1 MG/DL (ref 8.6–10.5)
CALCIUM SPEC-SCNC: 9.8 MG/DL (ref 8.6–10.5)
CHLORIDE SERPL-SCNC: 95 MMOL/L (ref 98–107)
CHLORIDE SERPL-SCNC: 95 MMOL/L (ref 98–107)
CO2 SERPL-SCNC: 26 MMOL/L (ref 22–29)
CO2 SERPL-SCNC: 28 MMOL/L (ref 22–29)
CREAT SERPL-MCNC: 0.36 MG/DL (ref 0.76–1.27)
CREAT SERPL-MCNC: 0.4 MG/DL (ref 0.76–1.27)
D-LACTATE SERPL-SCNC: 2 MMOL/L (ref 0.5–2)
D-LACTATE SERPL-SCNC: 2.5 MMOL/L (ref 0.5–2)
DEPRECATED RDW RBC AUTO: 57.8 FL (ref 37–54)
EGFRCR SERPLBLD CKD-EPI 2021: 123.4 ML/MIN/1.73
EGFRCR SERPLBLD CKD-EPI 2021: 127.4 ML/MIN/1.73
EOSINOPHIL # BLD AUTO: 0 10*3/MM3 (ref 0–0.4)
EOSINOPHIL NFR BLD AUTO: 0.6 % (ref 0.3–6.2)
ERYTHROCYTE [DISTWIDTH] IN BLOOD BY AUTOMATED COUNT: 18.4 % (ref 12.3–15.4)
GLUCOSE BLDC GLUCOMTR-MCNC: 150 MG/DL (ref 70–105)
GLUCOSE BLDC GLUCOMTR-MCNC: 164 MG/DL (ref 70–105)
GLUCOSE BLDC GLUCOMTR-MCNC: 171 MG/DL (ref 70–105)
GLUCOSE BLDC GLUCOMTR-MCNC: 185 MG/DL (ref 70–105)
GLUCOSE SERPL-MCNC: 110 MG/DL (ref 65–99)
GLUCOSE SERPL-MCNC: 182 MG/DL (ref 65–99)
HCT VFR BLD AUTO: 28.9 % (ref 37.5–51)
HGB BLD-MCNC: 9.4 G/DL (ref 13–17.7)
INR PPP: 1.21 (ref 0.93–1.1)
LYMPHOCYTES # BLD AUTO: 0.8 10*3/MM3 (ref 0.7–3.1)
LYMPHOCYTES NFR BLD AUTO: 14.6 % (ref 19.6–45.3)
MCH RBC QN AUTO: 29.5 PG (ref 26.6–33)
MCHC RBC AUTO-ENTMCNC: 32.6 G/DL (ref 31.5–35.7)
MCV RBC AUTO: 90.6 FL (ref 79–97)
MONOCYTES # BLD AUTO: 1.2 10*3/MM3 (ref 0.1–0.9)
MONOCYTES NFR BLD AUTO: 20.9 % (ref 5–12)
MRSA DNA SPEC QL NAA+PROBE: NORMAL
MRSA DNA SPEC QL NAA+PROBE: NORMAL
NEUTROPHILS NFR BLD AUTO: 3.6 10*3/MM3 (ref 1.7–7)
NEUTROPHILS NFR BLD AUTO: 63.5 % (ref 42.7–76)
NRBC BLD AUTO-RTO: 0.1 /100 WBC (ref 0–0.2)
PLATELET # BLD AUTO: 355 10*3/MM3 (ref 140–450)
PMV BLD AUTO: 6.9 FL (ref 6–12)
POTASSIUM SERPL-SCNC: 3.4 MMOL/L (ref 3.5–5.2)
POTASSIUM SERPL-SCNC: 4 MMOL/L (ref 3.5–5.2)
PROTHROMBIN TIME: 13 SECONDS (ref 9.6–11.7)
QT INTERVAL: 332 MS
QTC INTERVAL: 481 MS
RBC # BLD AUTO: 3.19 10*6/MM3 (ref 4.14–5.8)
RH BLD: POSITIVE
S PNEUM AG SPEC QL LA: NEGATIVE
SODIUM SERPL-SCNC: 131 MMOL/L (ref 136–145)
SODIUM SERPL-SCNC: 134 MMOL/L (ref 136–145)
T&S EXPIRATION DATE: NORMAL
VANCOMYCIN SERPL-MCNC: 4.6 MCG/ML (ref 5–40)
VANCOMYCIN SERPL-MCNC: 4.9 MCG/ML (ref 5–40)
WBC NRBC COR # BLD AUTO: 5.7 10*3/MM3 (ref 3.4–10.8)

## 2024-03-10 PROCEDURE — 25810000003 SODIUM CHLORIDE 0.9 % SOLUTION: Performed by: NURSE PRACTITIONER

## 2024-03-10 PROCEDURE — 80202 ASSAY OF VANCOMYCIN: CPT | Performed by: INTERNAL MEDICINE

## 2024-03-10 PROCEDURE — 80202 ASSAY OF VANCOMYCIN: CPT | Performed by: NURSE PRACTITIONER

## 2024-03-10 PROCEDURE — 94664 DEMO&/EVAL PT USE INHALER: CPT

## 2024-03-10 PROCEDURE — 94640 AIRWAY INHALATION TREATMENT: CPT

## 2024-03-10 PROCEDURE — 85025 COMPLETE CBC W/AUTO DIFF WBC: CPT | Performed by: NURSE PRACTITIONER

## 2024-03-10 PROCEDURE — 99222 1ST HOSP IP/OBS MODERATE 55: CPT | Performed by: STUDENT IN AN ORGANIZED HEALTH CARE EDUCATION/TRAINING PROGRAM

## 2024-03-10 PROCEDURE — 63710000001 INSULIN LISPRO (HUMAN) PER 5 UNITS: Performed by: NURSE PRACTITIONER

## 2024-03-10 PROCEDURE — 25810000003 SODIUM CHLORIDE 0.9 % SOLUTION 250 ML FLEX CONT: Performed by: NURSE PRACTITIONER

## 2024-03-10 PROCEDURE — 85610 PROTHROMBIN TIME: CPT | Performed by: SURGERY

## 2024-03-10 PROCEDURE — 83605 ASSAY OF LACTIC ACID: CPT

## 2024-03-10 PROCEDURE — 97162 PT EVAL MOD COMPLEX 30 MIN: CPT

## 2024-03-10 PROCEDURE — 25010000002 HEPARIN (PORCINE) PER 1000 UNITS: Performed by: INTERNAL MEDICINE

## 2024-03-10 PROCEDURE — 82948 REAGENT STRIP/BLOOD GLUCOSE: CPT

## 2024-03-10 PROCEDURE — 25010000002 VANCOMYCIN HCL 1.25 G RECONSTITUTED SOLUTION 1 EACH VIAL: Performed by: NURSE PRACTITIONER

## 2024-03-10 PROCEDURE — 25010000002 CEFEPIME PER 500 MG: Performed by: NURSE PRACTITIONER

## 2024-03-10 PROCEDURE — 87641 MR-STAPH DNA AMP PROBE: CPT | Performed by: SURGERY

## 2024-03-10 PROCEDURE — 80048 BASIC METABOLIC PNL TOTAL CA: CPT | Performed by: NURSE PRACTITIONER

## 2024-03-10 PROCEDURE — 99222 1ST HOSP IP/OBS MODERATE 55: CPT | Performed by: SURGERY

## 2024-03-10 PROCEDURE — 85730 THROMBOPLASTIN TIME PARTIAL: CPT | Performed by: SURGERY

## 2024-03-10 PROCEDURE — 86850 RBC ANTIBODY SCREEN: CPT | Performed by: SURGERY

## 2024-03-10 PROCEDURE — 94799 UNLISTED PULMONARY SVC/PX: CPT

## 2024-03-10 PROCEDURE — 86900 BLOOD TYPING SEROLOGIC ABO: CPT | Performed by: SURGERY

## 2024-03-10 PROCEDURE — 87205 SMEAR GRAM STAIN: CPT | Performed by: NURSE PRACTITIONER

## 2024-03-10 PROCEDURE — 86900 BLOOD TYPING SEROLOGIC ABO: CPT

## 2024-03-10 PROCEDURE — 36415 COLL VENOUS BLD VENIPUNCTURE: CPT | Performed by: NURSE PRACTITIONER

## 2024-03-10 PROCEDURE — 86901 BLOOD TYPING SEROLOGIC RH(D): CPT

## 2024-03-10 PROCEDURE — 87899 AGENT NOS ASSAY W/OPTIC: CPT | Performed by: NURSE PRACTITIONER

## 2024-03-10 PROCEDURE — 86901 BLOOD TYPING SEROLOGIC RH(D): CPT | Performed by: SURGERY

## 2024-03-10 PROCEDURE — 87070 CULTURE OTHR SPECIMN AEROBIC: CPT | Performed by: NURSE PRACTITIONER

## 2024-03-10 PROCEDURE — 94761 N-INVAS EAR/PLS OXIMETRY MLT: CPT

## 2024-03-10 PROCEDURE — 25010000002 METRONIDAZOLE 500 MG/100ML SOLUTION: Performed by: NURSE PRACTITIONER

## 2024-03-10 PROCEDURE — 82948 REAGENT STRIP/BLOOD GLUCOSE: CPT | Performed by: NURSE PRACTITIONER

## 2024-03-10 RX ORDER — IPRATROPIUM BROMIDE AND ALBUTEROL SULFATE 2.5; .5 MG/3ML; MG/3ML
3 SOLUTION RESPIRATORY (INHALATION)
Status: DISCONTINUED | OUTPATIENT
Start: 2024-03-10 | End: 2024-03-13

## 2024-03-10 RX ORDER — HEPARIN SODIUM 5000 [USP'U]/ML
5000 INJECTION, SOLUTION INTRAVENOUS; SUBCUTANEOUS EVERY 8 HOURS SCHEDULED
Status: DISCONTINUED | OUTPATIENT
Start: 2024-03-10 | End: 2024-03-17 | Stop reason: HOSPADM

## 2024-03-10 RX ORDER — BUDESONIDE 0.5 MG/2ML
0.5 INHALANT ORAL
Status: DISCONTINUED | OUTPATIENT
Start: 2024-03-10 | End: 2024-03-17 | Stop reason: HOSPADM

## 2024-03-10 RX ADMIN — CLONIDINE HYDROCHLORIDE 0.1 MG: 0.1 TABLET ORAL at 10:41

## 2024-03-10 RX ADMIN — HYDRALAZINE HYDROCHLORIDE 50 MG: 25 TABLET ORAL at 06:34

## 2024-03-10 RX ADMIN — HEPARIN SODIUM 5000 UNITS: 5000 INJECTION, SOLUTION INTRAVENOUS; SUBCUTANEOUS at 22:39

## 2024-03-10 RX ADMIN — METRONIDAZOLE 500 MG: 500 INJECTION, SOLUTION INTRAVENOUS at 13:37

## 2024-03-10 RX ADMIN — SODIUM CHLORIDE 100 ML/HR: 9 INJECTION, SOLUTION INTRAVENOUS at 23:17

## 2024-03-10 RX ADMIN — Medication 10 ML: at 06:29

## 2024-03-10 RX ADMIN — HYDRALAZINE HYDROCHLORIDE 50 MG: 25 TABLET ORAL at 13:37

## 2024-03-10 RX ADMIN — METOPROLOL TARTRATE 150 MG: 50 TABLET, FILM COATED ORAL at 10:41

## 2024-03-10 RX ADMIN — TRAMADOL HYDROCHLORIDE 50 MG: 50 TABLET, COATED ORAL at 06:34

## 2024-03-10 RX ADMIN — HYDRALAZINE HYDROCHLORIDE 50 MG: 25 TABLET ORAL at 22:40

## 2024-03-10 RX ADMIN — GUAIFENESIN 600 MG: 600 TABLET, EXTENDED RELEASE ORAL at 20:59

## 2024-03-10 RX ADMIN — AMLODIPINE BESYLATE 10 MG: 5 TABLET ORAL at 10:41

## 2024-03-10 RX ADMIN — METRONIDAZOLE 500 MG: 500 INJECTION, SOLUTION INTRAVENOUS at 22:36

## 2024-03-10 RX ADMIN — HEPARIN SODIUM 5000 UNITS: 5000 INJECTION, SOLUTION INTRAVENOUS; SUBCUTANEOUS at 15:47

## 2024-03-10 RX ADMIN — Medication 5000 UNITS: at 15:52

## 2024-03-10 RX ADMIN — INSULIN LISPRO 2 UNITS: 100 INJECTION, SOLUTION INTRAVENOUS; SUBCUTANEOUS at 10:41

## 2024-03-10 RX ADMIN — METRONIDAZOLE 500 MG: 500 INJECTION, SOLUTION INTRAVENOUS at 06:29

## 2024-03-10 RX ADMIN — INSULIN LISPRO 2 UNITS: 100 INJECTION, SOLUTION INTRAVENOUS; SUBCUTANEOUS at 18:14

## 2024-03-10 RX ADMIN — SODIUM CHLORIDE 100 ML/HR: 9 INJECTION, SOLUTION INTRAVENOUS at 13:59

## 2024-03-10 RX ADMIN — LISINOPRIL 20 MG: 20 TABLET ORAL at 10:41

## 2024-03-10 RX ADMIN — INSULIN LISPRO 2 UNITS: 100 INJECTION, SOLUTION INTRAVENOUS; SUBCUTANEOUS at 13:37

## 2024-03-10 RX ADMIN — HYDROCHLOROTHIAZIDE 25 MG: 25 TABLET ORAL at 10:41

## 2024-03-10 RX ADMIN — Medication 10 ML: at 06:27

## 2024-03-10 RX ADMIN — VANCOMYCIN HYDROCHLORIDE 1250 MG: 1.25 INJECTION, POWDER, LYOPHILIZED, FOR SOLUTION INTRAVENOUS at 18:15

## 2024-03-10 RX ADMIN — ATORVASTATIN CALCIUM 40 MG: 40 TABLET, FILM COATED ORAL at 10:41

## 2024-03-10 RX ADMIN — CEFEPIME 2000 MG: 2 INJECTION, POWDER, FOR SOLUTION INTRAVENOUS at 15:47

## 2024-03-10 RX ADMIN — IPRATROPIUM BROMIDE AND ALBUTEROL SULFATE 3 ML: .5; 3 SOLUTION RESPIRATORY (INHALATION) at 14:45

## 2024-03-10 RX ADMIN — GUAIFENESIN 600 MG: 600 TABLET, EXTENDED RELEASE ORAL at 10:41

## 2024-03-10 RX ADMIN — VANCOMYCIN HYDROCHLORIDE 1250 MG: 1.25 INJECTION, POWDER, LYOPHILIZED, FOR SOLUTION INTRAVENOUS at 06:26

## 2024-03-10 RX ADMIN — CEFEPIME 2000 MG: 2 INJECTION, POWDER, FOR SOLUTION INTRAVENOUS at 10:40

## 2024-03-10 NOTE — ATTESTATION SEPSIS FOCUSED EXAM
SEPTIC SHOCK FOCUSED EXAM ATTESTATION    I attest that I have reassessed tissue perfusion after the fluid bolus given.    Marilu Sams, APRN  03/10/24  00:08 EST

## 2024-03-10 NOTE — H&P
"    [facility] Medicine Services  History & Physical    Patient Name: Prashant Zhou  : 1960  MRN: [id number]  Primary Care Physician:  Lazaro Paulino MD  Date of admission: 3/9/2024  Date and Time of Service: 3/9/2024 at 2150    Subjective      Chief Complaint: shortness of breath     History of Present Illness: Prashant Zhou is a 63 y.o. male with a CMH of rectal cance status post partial colectomy, , currently undergoing chemotherapy next course of chemotherapy this Tuesday, diabetes mellitus type 2, hypertension, hyperlipidemia, thoracic aortic aneurysm secondary to hypertension, who presented to [facility] on 3/9/2024 with plaints of shortness of breath, cough and congestion since last night it worsened today.  He denies any fevers chills or diaphoresis.  Cough productive of yellow sputum.  Air saturation was 81% in ED and was placed on 2 L oxygen high flow nasal cannula.  He does not wear home oxygen.  Abs today showed a sodium of 130, chloride 88, glucose 291, albumin 3.1, lactate was 3.8, WBC not elevated, hemoglobin 11.3, CTA per radiology shows:    \"Groundglass opacities and tree-in-bud nodularity throughout the left upper and lower lobes as well as the right middle lobe consistent with multifocal pneumonia. There is a 17 cm loculated gas and fluid collection within the left pleural space with   associated thickening of the pleural wall, which likely represents empyema.     No evidence of pulmonary embolism. There is pulmonary artery enlargement which can be seen with pulmonary hypertension.     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 Recommend vascular surgery referral.  \"    He triggered sepsis for lactate, and tachycardia and fever of 100.2.  He is not hypotensive.  He was given IV fluid bolus in ED per sepsis protocol and started on IV " cefepime and IV vancomycin with blood cultures pending.  500 mg IV Flagyl has been added given empyema and pulmonary has been consulted for  empyema . Given increased eccentric noncalcified thrombus in aorta vascular surgery has been consulted.  Per RN spoke to vascular surgery no heparin drip needed will see in AM.    Review of Systems   HENT: Negative.     Eyes: Negative.    Respiratory:  Positive for cough and shortness of breath.    Gastrointestinal: Negative.    Endocrine: Negative.    Genitourinary: Negative.    Musculoskeletal: Negative.    Skin: Negative.    Allergic/Immunologic: Negative.    Neurological: Negative.    Hematological: Negative.    Psychiatric/Behavioral: Negative.     All other systems reviewed and are negative.      Personal History     Past Medical History:   Diagnosis Date    Aortic dissection     Diabetes mellitus     Heart murmur     History of noncompliance with medical treatment     Hyperlipidemia     Hypertension     Type B hypoplasia of aortic arch        Past Surgical History:   Procedure Laterality Date    COLECTOMY PARTIAL / TOTAL  2023    COLONOSCOPY N/A 08/31/2023    Procedure: COLONOSCOPY with sigmoid mass tattooing at 35cm, sigmoid and rectal polypectomy;  Surgeon: TORIBIO Jacob MD;  Location: [facility] ENDOSCOPY;  Service: Gastroenterology;  Laterality: N/A;  sigmoid mass, colon polyps       Family History: family history includes Dementia in his mother; Diabetes in his mother; Sudden death in his father. Otherwise pertinent FHx was reviewed and not pertinent to current issue.    Social History:  reports that he has been smoking cigarettes. He started smoking about 53 years ago. He has a 106.4 pack-year smoking history. He has never used smokeless tobacco. He reports that he does not currently use drugs after having used the following drugs: Marijuana. He reports that he does not drink alcohol.    Home Medications:  Prior to Admission Medications       Prescriptions Last  Dose Informant Patient Reported? Taking?    amLODIPine (NORVASC) 10 MG tablet   No No    TAKE 1 TABLET BY MOUTH DAILY    atorvastatin (LIPITOR) 40 MG tablet   No No    TAKE ONE TABLET BY MOUTH EVERY NIGHT AT BEDTIME    Blood Glucose Monitoring Suppl (Accu-Chek Guide) w/Device kit   No No    1 Device Daily.    Chlorcyclizine-Pseudoephed (Stahist AD) 25-60 MG tablet   No No    Take 0.5 tablets by mouth 2 (Two) Times a Day As Needed (Congestion, drainage).    cloNIDine (CATAPRES) 0.1 MG tablet   No No    Take 1 tablet by mouth 2 (Two) Times a Day.    doxycycline (VIBRAMYICN) 100 MG tablet   No No    Take 1 tablet by mouth 2 (Two) Times a Day.    glimepiride (AMARYL) 4 MG tablet   No No    TAKE ONE TABLET BY MOUTH TWICE A DAY    glucose blood (Accu-Chek Guide) test strip   No No    Test daily e11.9    hydrALAZINE (APRESOLINE) 50 MG tablet   No No    TAKE ONE TABLET BY MOUTH EVERY 8 HOURS    lisinopril-hydrochlorothiazide (PRINZIDE,ZESTORETIC) 20-25 MG per tablet   No No    TAKE ONE TABLET BY MOUTH DAILY    Patient taking differently:  Take 1 tablet by mouth Daily.    magic mouthwash oral susp (nystatin - diphenhydrAMINE HCl - hydrocortisone)   No No    Swish and swallow 5 mL Every 6 (Six) Hours As Needed for Mucositis or Stomatitis.    metFORMIN (GLUCOPHAGE) 850 MG tablet   No No    TAKE 1 TABLET BY MOUTH TWICE A DAY WITH MEALS    metoprolol tartrate (LOPRESSOR) 50 MG tablet   No No    TAKE THREE TABLETS BY MOUTH EVERY 12 HOURS    potassium chloride 10 MEQ CR tablet   Yes No    predniSONE (DELTASONE) 10 MG tablet   No No    Take 4 tabs po daily x 4 d, then 3 tabs po daily x 4 d , then 2 tabs po daily x 4 d, then 1 tab po daily x 4 d    traMADol (ULTRAM) 50 MG tablet   No No    TAKE 1 TABLET BY MOUTH EVERY 6 HOURS AS NEEDED FOR MODERATE PAIN    vitamin D3 125 MCG (5000 UT) capsule capsule   Yes No    Take 1 capsule by mouth Daily.              Allergies:  No Known Allergies    Objective      Vitals:   Temp:  [99 °F (37.2  °C)-100.2 °F (37.9 °C)] 100.2 °F (37.9 °C)  Heart Rate:  [122-168] 129  Resp:  [20] 20  BP: (111-138)/(66-80) 125/66  Body mass index is 25.12 kg/m².  Physical Exam  Vitals reviewed.   Constitutional:       Appearance: He is ill-appearing.   HENT:      Head: Normocephalic and atraumatic.      Right Ear: External ear normal.      Left Ear: External ear normal.      Nose: Nose normal.      Mouth/Throat:      Mouth: Mucous membranes are moist.   Eyes:      Extraocular Movements: Extraocular movements intact.   Cardiovascular:      Rate and Rhythm: Regular rhythm. Tachycardia present.      Pulses: Normal pulses.      Heart sounds: Normal heart sounds.   Pulmonary:      Effort: Pulmonary effort is normal.      Breath sounds: Wheezing present.   Abdominal:      Palpations: Abdomen is soft.   Genitourinary:     Comments: deferred  Musculoskeletal:         General: Normal range of motion.      Cervical back: Normal range of motion and neck supple.   Skin:     General: Skin is warm and dry.   Neurological:      General: No focal deficit present.      Mental Status: He is alert and oriented to person, place, and time.   Psychiatric:         Mood and Affect: Mood normal.         Behavior: Behavior normal.         Thought Content: Thought content normal.         Judgment: Judgment normal.         Diagnostic Data:  Lab Results (last 24 hours)       Procedure Component Value Units Date/Time    CBC & Differential [[id number]]  (Abnormal) Collected: 03/09/24 2351    Specimen: Blood Updated: 03/09/24 2354    Narrative:      The following orders were created for panel order CBC & Differential.  Procedure                               Abnormality         Status                     ---------                               -----------         ------                     CBC Auto Differential[[id number]]        Abnormal            Final result                 Please view results for these tests on the individual orders.    CBC Auto  Differential [[id number]]  (Abnormal) Collected: 03/09/24 2351    Specimen: Blood Updated: 03/09/24 2354     WBC 8.20 10*3/mm3      RBC 3.36 10*6/mm3      Hemoglobin 10.1 g/dL      Hematocrit 30.6 %      MCV 91.2 fL      MCH 30.0 pg      MCHC 32.9 g/dL      RDW 18.8 %      RDW-SD 59.5 fl      MPV 7.1 fL      Platelets 396 10*3/mm3      Neutrophil % 68.1 %      Lymphocyte % 11.7 %      Monocyte % 19.1 %      Eosinophil % 0.0 %      Basophil % 1.1 %      Neutrophils, Absolute 5.60 10*3/mm3      Lymphocytes, Absolute 1.00 10*3/mm3      Monocytes, Absolute 1.60 10*3/mm3      Eosinophils, Absolute 0.00 10*3/mm3      Basophils, Absolute 0.10 10*3/mm3      nRBC 0.0 /100 WBC     MRSA Screen, PCR (Inpatient) - Swab, Nares [[id number]] Collected: 03/09/24 2351    Specimen: Swab from Nares Updated: 03/09/24 2351    Basic Metabolic Panel [[id number]] Collected: 03/09/24 2351    Specimen: Blood Updated: 03/09/24 2351    STAT Lactic Acid, Reflex [[id number]] Collected: 03/09/24 2351    Specimen: Blood Updated: 03/09/24 2351    Lactic Acid, Plasma [[id number]] Collected: 03/09/24 2351    Specimen: Blood Updated: 03/09/24 2351    STAT Lactic Acid, Reflex [[id number]]  (Abnormal) Collected: 03/09/24 2006    Specimen: Blood Updated: 03/09/24 2043     Lactate 4.3 mmol/L     Respiratory Panel PCR w/COVID-19(SARS-CoV-2) BREA/BELL/JOHNIE/PAD/COR/FINESSE In-House, NP Swab in UTM/VTM, 2 HR TAT - Swab, Nasopharynx [[id number]]  (Normal) Collected: 03/09/24 1701    Specimen: Swab from Nasopharynx Updated: 03/09/24 1756     ADENOVIRUS, PCR Not Detected     Coronavirus 229E Not Detected     Coronavirus HKU1 Not Detected     Coronavirus NL63 Not Detected     Coronavirus OC43 Not Detected     COVID19 Not Detected     Human Metapneumovirus Not Detected     Human Rhinovirus/Enterovirus Not Detected     Influenza A PCR Not Detected     Influenza B PCR Not Detected     Parainfluenza Virus 1 Not Detected     Parainfluenza Virus 2 Not Detected     Parainfluenza Virus  3 Not Detected     Parainfluenza Virus 4 Not Detected     RSV, PCR Not Detected     Bordetella pertussis pcr Not Detected     Bordetella parapertussis PCR Not Detected     Chlamydophila pneumoniae PCR Not Detected     Mycoplasma pneumo by PCR Not Detected    Narrative:      In the setting of a positive respiratory panel with a viral infection PLUS a negative procalcitonin without other underlying concern for bacterial infection, consider observing off antibiotics or discontinuation of antibiotics and continue supportive care. If the respiratory panel is positive for atypical bacterial infection (Bordetella pertussis, Chlamydophila pneumoniae, or Mycoplasma pneumoniae), consider antibiotic de-escalation to target atypical bacterial infection.    Blood Culture - Blood, Arm, Right [[id number]] Collected: 03/09/24 1745    Specimen: Blood from Arm, Right Updated: 03/09/24 1747    CBC & Differential [[id number]]  (Abnormal) Collected: 03/09/24 1701    Specimen: Blood Updated: 03/09/24 1745    Narrative:      The following orders were created for panel order CBC & Differential.  Procedure                               Abnormality         Status                     ---------                               -----------         ------                     CBC Auto Differential[[id number]]        Abnormal            Final result               Scan Slide[[id number]]                                                                    Please view results for these tests on the individual orders.    CBC Auto Differential [[id number]]  (Abnormal) Collected: 03/09/24 1701    Specimen: Blood Updated: 03/09/24 1745     WBC 9.80 10*3/mm3      RBC 3.75 10*6/mm3      Hemoglobin 11.3 g/dL      Hematocrit 34.0 %      MCV 90.8 fL      MCH 30.2 pg      MCHC 33.2 g/dL      RDW 19.0 %      RDW-SD 63.4 fl      MPV 7.8 fL      Platelets 435 10*3/mm3      Neutrophil % 75.6 %      Lymphocyte % 8.8 %      Monocyte % 15.1 %      Eosinophil % 0.0 %       Basophil % 0.5 %      Neutrophils, Absolute 7.40 10*3/mm3      Lymphocytes, Absolute 0.90 10*3/mm3      Monocytes, Absolute 1.50 10*3/mm3      Eosinophils, Absolute 0.00 10*3/mm3      Basophils, Absolute 0.00 10*3/mm3      nRBC 0.1 /100 WBC     Comprehensive Metabolic Panel [[id number]]  (Abnormal) Collected: 03/09/24 1701    Specimen: Blood Updated: 03/09/24 1735     Glucose 291 mg/dL      BUN 11 mg/dL      Creatinine 0.56 mg/dL      Sodium 130 mmol/L      Potassium 4.5 mmol/L      Chloride 88 mmol/L      CO2 26.0 mmol/L      Calcium 11.1 mg/dL      Total Protein 8.0 g/dL      Albumin 3.1 g/dL      ALT (SGPT) 50 U/L      AST (SGOT) 33 U/L      Alkaline Phosphatase 118 U/L      Total Bilirubin 0.3 mg/dL      Globulin 4.9 gm/dL      A/G Ratio 0.6 g/dL      BUN/Creatinine Ratio 19.6     Anion Gap 16.0 mmol/L      eGFR 111.4 mL/min/1.73     Narrative:      GFR Normal >60  Chronic Kidney Disease <60  Kidney Failure <15      BNP [[id number]]  (Normal) Collected: 03/09/24 1701    Specimen: Blood Updated: 03/09/24 1735     proBNP 425.0 pg/mL     Narrative:      This assay is used as an aid in the diagnosis of individuals suspected of having heart failure. It can be used as an aid in the diagnosis of acute decompensated heart failure (ADHF) in patients presenting with signs and symptoms of ADHF to the emergency department (ED). In addition, NT-proBNP of <300 pg/mL indicates ADHF is not likely.    Age Range Result Interpretation  NT-proBNP Concentration (pg/mL:      <50             Positive            >450                   Gray                 300-450                    Negative             <300    50-75           Positive            >900                  Gray                300-900                  Negative            <300      >75             Positive            >1800                  Gray                300-1800                  Negative            <300    Single High Sensitivity Troponin T [[id number]]  (Normal) Collected:  03/09/24 1701    Specimen: Blood Updated: 03/09/24 1735     HS Troponin T 10 ng/L     Narrative:      High Sensitive Troponin T Reference Range:  <14.0 ng/L- Negative Female for AMI  <22.0 ng/L- Negative Male for AMI  >=14 - Abnormal Female indicating possible myocardial injury.  >=22 - Abnormal Male indicating possible myocardial injury.   Clinicians would have to utilize clinical acumen, EKG, Troponin, and serial changes to determine if it is an Acute Myocardial Infarction or myocardial injury due to an underlying chronic condition.         POC Lactate [[id number]]  (Abnormal) Collected: 03/09/24 1705    Specimen: Blood Updated: 03/09/24 1707     Lactate 3.8 mmol/L      Comment: Serial Number: [id number]:  [id number]       Blood Culture - Blood, Arm, Left [[id number]] Collected: 03/09/24 1701    Specimen: Blood from Arm, Left Updated: 03/09/24 1705             Imaging Results (Last 24 Hours)       Procedure Component Value Units Date/Time    CT Angiogram Chest Pulmonary Embolism [[id number]] Collected: 03/09/24 1940     Updated: 03/09/24 1950    Narrative:      CT ANGIOGRAM CHEST PULMONARY EMBOLISM    Date of Exam: 3/9/2024 7:37 PM EST    Indication: dyspnea hypoxia.    Comparison: Same day chest radiograph    Technique: Axial CT images were obtained of the chest after the uneventful intravenous administration of iodinated contrast utilizing pulmonary embolism protocol.  Sagittal and coronal reconstructions were performed.  Automated exposure control and   iterative reconstruction methods were used.      Findings:  There is no evidence of pulmonary embolism. The pulmonary arteries are enlarged with the right main pulmonary artery measuring up to 3.3 cm. There is no evidence of right heart strain, however the right heart is enlarged. No significant pericardial   effusion. Atherosclerotic calcifications of the coronary arteries. There is a descending aortic aneurysm measuring up to 4.3 cm as seen on axial image  85. There is also eccentric none calcified thrombus within the posterior aspect of the proximal   descending aorta (sagittal image 88), which appears increased from 2/1/2024 CT.    The central airways are patent. There are groundglass opacities and tree-in-bud nodularity throughout the left upper and lower lobes as well as the right middle lobe. There is denser consolidation in the posterior left lower lobe. There is an adjacent   loculated gas and fluid collection within the left pleura with thickening of the pleural wall. This collection measures 17.0 x 5.4 cm on axial image 114. There is no evidence of right-sided pleural effusion or pneumothorax. Mild centrilobular emphysema.    Right chest wall port with distal lead tip in the superior vena cava. The chest wall soft tissues show no acute abnormality. There is no evidence of fracture or suspicious osseous lesion.    Included portions of the upper abdomen show no acute abnormality.      Impression:      Impression:  Groundglass opacities and tree-in-bud nodularity throughout the left upper and lower lobes as well as the right middle lobe consistent with multifocal pneumonia. There is a 17 cm loculated gas and fluid collection within the left pleural space with   associated thickening of the pleural wall, which likely represents empyema.    No evidence of pulmonary embolism. There is pulmonary artery enlargement which can be seen with pulmonary hypertension.    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 Recommend vascular surgery referral.        Electronically Signed: Camacho Vee MD    3/9/2024 7:48 PM EST    Workstation ID: [id number]    XR Chest 1 View [[id number]] Collected: 03/09/24 1730     Updated: 03/09/24 1736    Narrative:      XR CHEST 1 VW    Date of Exam: 3/9/2024 5:17 PM EST    Indication: CHF/COPD Protocol  CHF/COPD  Protocol    Comparison: CT chest February 1, 2024  Findings:   There is tenting left hemidiaphragm with airspace disease noted in this area. On the prior CT there was a somewhat masslike area of consolidation. Findings may be secondary to pneumonia. There may be a parapneumonic effusion. There is air shadow at the   left base. It is to tell whether this is subpulmonic or intrapulmonary. This may relate to bowel in the left upper quadrant of the abdomen. The right lung is clear. A port catheter is noted with its tip in the superior vena cava.      Impression:      Impression:  1.Tenting left hemidiaphragm with underlying airspace disease which could be secondary to pneumonia. Given the more masslike appearance on prior CT a follow-up CT chest suggested to reassess.  2.Possible left parapneumonic effusion.  3.There is air shadow in the left upper quadrant of the abdomen. This could relate to bowel as opposed to air within the left basilar area that might relate to cavitary area or loculated pneumothorax. This again could be assessed by follow-up CT.      Electronically Signed: Santiago Tariq MD    3/9/2024 5:34 PM EST    Workstation ID: [id number]              Assessment & Plan        This is a 63 y.o. male with:    Active and Resolved Problems  Active Hospital Problems    Diagnosis  POA    **Sepsis [A41.9]  Yes     Priority: High    Acute respiratory failure with hypoxia [J96.01]  Yes     Priority: High    Pneumonia [J18.9]  Yes     Priority: Medium    Empyema lung [J86.9]  Yes     Priority: Medium    Thoracic aortic aneurysm without rupture [I71.20]  Yes     Priority: Medium    Aortic thrombus [I74.10]  Yes     Priority: Medium    Rectal cancer [C20]  Yes     Priority: Medium    Hyponatremia [E87.1]  Yes    Tobacco use [Z72.0]  Yes    Hypertension [I10]  Yes    Type 2 diabetes mellitus with hyperglycemia [E11.65]  Yes    Hyperlipidemia [E78.5]  Yes      Resolved Hospital Problems   No resolved problems to display.      Sepsis, lactate 3.8, tachycardia, fever 100.2, not hypotensive likely secondary to pneumonia lung empyema, received IV fluid bolus in ED per sepsis protocol, continue IV vancomycin IV cefepime pharmacy to dose until blood cultures resulted, added IV Flagyl for empyema, maintenance fluids ordered, lactic acid every 6 hours until normalized, monitor CBC    Acute respiratory failure with hypoxia, likely secondary to above, oxygen to maintain sats 90 to 92%, DuoNebs every 4 hours as needed    Pneumonia/empyema lung, pulmonary consulted, see plan above, Tessalon Perles for cough, Mucinex, DuoNebs, antibiotics as above    Thoracic aortic aneurysm with without rupture/aortic thrombus increased per CTA report, vascular surgery consulted, no heparin drip per vascular at this time vascular to see in a.m.    Rectal cancer, status postresection on chemotherapy followed by oncology, oncology consulted is to have next round of chemotherapy on Tuesday, on home tramadol    Hyponatremia, normal saline IV infusion repeat BMP in a.m.    Tobacco use encourage cessation declined nicotine patch    Hypertension, reordered home amlodipine, clonidine, hydralazine, lisinopril, hydrochlorothiazide, metoprolol monitor BP    Type 2 diabetes mellitus with hyperglycemia, hold home glimepiride and metformin while inpatient and SSI as needed with Accu-Cheks ACHS consistent carb heart healthy diet    Hyperlipidemia on statin      DVT prophylaxis:  No DVT prophylaxis order currently exists.        The patient desires to be as follows:    CODE STATUS:    Code Status (Patient has no pulse and is not breathing): CPR (Attempt to Resuscitate)  Medical Interventions (Patient has pulse or is breathing): Full Support        Admission Status:  I believe this patient meets inpatient status.    Expected Length of Stay: pending clinical course    PDMP and Medication Dispenses via Sidebar reviewed and consistent with patient reported medications.    I  discussed the patient's findings and my recommendations with patient and family.      Signature:     This document has been electronically signed by ROSA MARIA Boyce on March 10, 2024 00:07 [facility]

## 2024-03-10 NOTE — THERAPY EVALUATION
Patient Name: Prashant Zhou  : 1960    MRN: [id number]                              Today's Date: 3/10/2024       Admit Date: 3/9/2024    Visit Dx:     ICD-10-CM ICD-9-CM   1. Pneumonia due to infectious organism, unspecified laterality, unspecified part of lung  J18.9 486   2. 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    Dehydration    Sepsis    Pneumonia    Empyema lung    Acute respiratory failure with hypoxia    Hyponatremia    Thoracic aortic aneurysm without rupture    Aortic thrombus    Tobacco use     Past Medical History:   Diagnosis Date    Aortic dissection     Diabetes mellitus     Heart murmur     History of noncompliance with medical treatment     Hyperlipidemia     Hypertension     Type B hypoplasia of aortic arch      Past Surgical History:   Procedure Laterality Date    COLECTOMY PARTIAL / TOTAL      COLONOSCOPY N/A 2023    Procedure: COLONOSCOPY with sigmoid mass tattooing at 35cm, sigmoid and rectal polypectomy;  Surgeon: TORIBIO Jacob MD;  Location: [facility] ENDOSCOPY;  Service: Gastroenterology;  Laterality: N/A;  sigmoid mass, colon polyps      General Information       Row Name 03/10/24 1502          Physical Therapy Time and Intention    Document Type evaluation  -EL     Mode of Treatment individual therapy;physical therapy  -EL       Row Name 03/10/24 1502          General Information    Prior Level of Function independent:;all household mobility;ADL's  Has had a decline, since beginning Chemo treatments  -EL       Row Name 03/10/24 1503          Living  Environment    People in Home spouse  -       Row Name 03/10/24 1502          Home Main Entrance    Number of Stairs, Main Entrance six  -EL     Stair Railings, Main Entrance railings safe and in good condition  -       Row Name 03/10/24 1502          Stairs Within Home, Primary    Number of Stairs, Within Home, Primary twelve;other (see comments)  has a bed on main floor, shower up flight of steps  -       Row Name 03/10/24 1502          Cognition    Orientation Status (Cognition) oriented x 4  -       Row Name 03/10/24 1502          Safety Issues, Functional Mobility    Impairments Affecting Function (Mobility) pain;strength  -EL               User Key  (r) = Recorded By, (t) = Taken By, (c) = Cosigned By      Initials Name Provider Type    Prabhjot Monzon PT Physical Therapist                   Mobility       Row Name 03/10/24 1508          Bed Mobility    Bed Mobility supine-sit  -EL     Supine-Sit May (Bed Mobility) modified independence  -EL     Assistive Device (Bed Mobility) bed rails  -       Row Name 03/10/24 1508          Sit-Stand Transfer    Sit-Stand May (Transfers) contact guard  -EL     Assistive Device (Sit-Stand Transfers) walker, front-wheeled  -EL       Row Name 03/10/24 1508          Gait/Stairs (Locomotion)    May Level (Gait) contact guard  -EL     Assistive Device (Gait) walker, front-wheeled  -EL     Distance in Feet (Gait) 30  -EL     Deviations/Abnormal Patterns (Gait) gait speed decreased  -EL     Comment, (Gait/Stairs) Reliant on RWx  -EL               User Key  (r) = Recorded By, (t) = Taken By, (c) = Cosigned By      Initials Name Provider Type    Prabhjot Monzon PT Physical Therapist                   Obj/Interventions       Row Name 03/10/24 1509          Range of Motion Comprehensive    General Range of Motion bilateral lower extremity ROM WFL  -       Row Name 03/10/24 1509          Strength Comprehensive (MMT)    General Manual Muscle Testing  (MMT) Assessment lower extremity strength deficits identified  -EL     Comment, General Manual Muscle Testing (MMT) Assessment Global weakness BLE 4-/5 gross  -EL       Row Name 03/10/24 1509          Balance    Balance Assessment sitting static balance;standing static balance;standing dynamic balance  -EL     Static Sitting Balance independent  -EL     Static Standing Balance contact guard  -EL     Dynamic Standing Balance contact guard  -EL       Row Name 03/10/24 1509          Sensory Assessment (Somatosensory)    Sensory Assessment (Somatosensory) sensation intact  -EL               User Key  (r) = Recorded By, (t) = Taken By, (c) = Cosigned By      Initials Name Provider Type    EL Prabhjot Tobias, PT Physical Therapist                   Goals/Plan       Row Name 03/10/24 1521          Bed Mobility Goal 1 (PT)    Activity/Assistive Device (Bed Mobility Goal 1, PT) bed mobility activities, all  -EL     [place] Level/Cues Needed (Bed Mobility Goal 1, PT) independent  -EL     Time Frame (Bed Mobility Goal 1, PT) long term goal (LTG);2 weeks  -EL       Row Name 03/10/24 1521          Transfer Goal 1 (PT)    Activity/Assistive Device (Transfer Goal 1, PT) transfers, all;walker, rolling  -EL     [place] Level/Cues Needed (Transfer Goal 1, PT) modified independence  -EL     Time Frame (Transfer Goal 1, PT) long term goal (LTG);2 weeks  -EL       Row Name 03/10/24 1521          Gait Training Goal 1 (PT)    Activity/Assistive Device (Gait Training Goal 1, PT) gait (walking locomotion);walker, rolling  -EL     [place] Level (Gait Training Goal 1, PT) modified independence  -EL     Time Frame (Gait Training Goal 1, PT) long term goal (LTG);2 weeks  -EL       Row Name 03/10/24 1521          Stairs Goal 1 (PT)    Activity/Assistive Device (Stairs Goal 1, PT) stairs, all skills  -EL     [place] Level/Cues Needed (Stairs Goal 1, PT) standby assist  -EL     Number of Stairs (Stairs Goal 1, PT) 10  -EL     Time  Frame (Stairs Goal 1, PT) long term goal (LTG);2 weeks  -       Row Name 03/10/24 1521          Therapy Assessment/Plan (PT)    Planned Therapy Interventions (PT) neuromuscular re-education;balance training;bed mobility training;transfer training;gait training;patient/family education;strengthening  -EL               User Key  (r) = Recorded By, (t) = Taken By, (c) = Cosigned By      Initials Name Provider Type    Prabhjot Monzon, PT Physical Therapist                   Clinical Impression       Row Name 03/10/24 1512          Pain    Additional Documentation Pain Scale: FACES Pre/Post-Treatment (Group)  -       Row Name 03/10/24 1512          Pain Scale: FACES Pre/Post-Treatment    Pain: FACES Scale, Pretreatment 2-->hurts little bit  -EL     Posttreatment Pain Rating 2-->hurts little bit  -EL     Pain Location generalized  -       Row Name 03/10/24 1512          Plan of Care Review    Plan of Care Reviewed With patient;spouse;son  -EL     Outcome Evaluation Pt is a 62 YO M with hx of colon cancer, currently undergoing chemo treatments. Pt with severe SOA upon admission and O2 saturations at 81% in ER. Pt states he lives at home with spouse and was independent prior to initiation of chemo treatments. There are 6 steps to enter home with a rail and a flight of steps to shower. Pt since has required assistance with bathing, but is ambulating in home with rollator independently. Pt this date demonstrates global weakness but good mobiltiy using RWx. Pt ambulated in room with CGA, on 5L O2 with O2 maintaining above 90%. Pt tyipcally uses not supplemental O2 at home. Pt will require 6 min walk test prior to d/c and recommendation is return home with family assist and HHPT.  -EL       Row Name 03/10/24 1512          Therapy Assessment/Plan (PT)    Rehab Potential (PT) good, to achieve stated therapy goals  -EL     Criteria for Skilled Interventions Met (PT) yes  -EL     Therapy Frequency (PT) 5 times/wk  -EL      Predicted Duration of Therapy Intervention (PT) until d/c  -EL       Row Name 03/10/24 1512          Vital Signs    O2 Delivery Pre Treatment room air  -EL     O2 Delivery Intra Treatment room air  -EL     O2 Delivery Post Treatment room air  -EL     Pre Patient Position Supine  -EL     Intra Patient Position Standing  -EL     Post Patient Position Sitting  -EL       Row Name 03/10/24 1512          Positioning and Restraints    Pre-Treatment Position in bed  -EL     Post Treatment Position bed  -EL     In Bed notified nsg;sitting EOB;call light within reach;encouraged to call for assist  -EL               User Key  (r) = Recorded By, (t) = Taken By, (c) = Cosigned By      Initials Name Provider Type    Prabhjot Monzon PT Physical Therapist                   Outcome Measures       Row Name 03/10/24 1522 03/10/24 0331       How much help from another person do you currently need...    Turning from your back to your side while in flat bed without using bedrails? 3  -EL 2  -LG    Moving from lying on back to sitting on the side of a flat bed without bedrails? 3  -EL 2  -LG    Moving to and from a bed to a chair (including a wheelchair)? 3  -EL 2  -LG    Standing up from a chair using your arms (e.g., wheelchair, bedside chair)? 3  -EL 2  -LG    Climbing 3-5 steps with a railing? 2  -EL 2  -LG    To walk in hospital room? 3  -EL 2  -LG    AM-PAC 6 Clicks Score (PT) 17  -EL 12  -LG    Highest Level of Mobility Goal 5 --> Static standing  -EL 4 --> Transfer to chair/commode  -LG      Row Name 03/10/24 1522          Functional Assessment    Outcome Measure Options AM-PAC 6 Clicks Basic Mobility (PT)  -EL               User Key  (r) = Recorded By, (t) = Taken By, (c) = Cosigned By      Initials Name Provider Type    Prabhjot Monzon PT Physical Therapist    Sindi Esquivel, RN Registered Nurse                                 Physical Therapy Education       Title: PT OT SLP Therapies (Done)       Topic: Physical Therapy  (Done)       Point: Mobility training (Done)       Learning Progress Summary             Patient Acceptance, E,TB, VU by  at 3/10/2024 1524                         Point: Precautions (Done)       Learning Progress Summary             Patient Acceptance, E,TB, VU by  at 3/10/2024 1524                                         User Key       Initials Effective Dates Name Provider Type Discipline     06/23/20 -  Prabhjot Tobias, PT Physical Therapist PT                  PT Recommendation and Plan  Planned Therapy Interventions (PT): neuromuscular re-education, balance training, bed mobility training, transfer training, gait training, patient/family education, strengthening  Plan of Care Reviewed With: patient, spouse, son  Outcome Evaluation: Pt is a 64 YO M with hx of colon cancer, currently undergoing chemo treatments. Pt with severe SOA upon admission and O2 saturations at 81% in ER. Pt states he lives at home with spouse and was independent prior to initiation of chemo treatments. There are 6 steps to enter home with a rail and a flight of steps to shower. Pt since has required assistance with bathing, but is ambulating in home with rollator independently. Pt this date demonstrates global weakness but good mobiltiy using RWx. Pt ambulated in room with CGA, on 5L O2 with O2 maintaining above 90%. Pt tyipcally uses not supplemental O2 at home. Pt will require 6 min walk test prior to d/c and recommendation is return home with family assist and HHPT.     Time Calculation:   PT Evaluation Complexity  History, PT Evaluation Complexity: 1-2 personal factors and/or comorbidities  Examination of Body Systems (PT Eval Complexity): total of 3 or more elements  Clinical Presentation (PT Evaluation Complexity): evolving  Clinical Decision Making (PT Evaluation Complexity): moderate complexity  Overall Complexity (PT Evaluation Complexity): moderate complexity     PT Charges       Row Name 03/10/24 1525             Time  Calculation    Start Time 1335  -EL      Stop Time 1355  -EL      Time Calculation (min) 20 min  -EL      PT Received On 03/10/24  -EL      PT - Next Appointment 03/11/24  -EL      PT Goal Re-Cert Due Date 03/24/24  -EL                User Key  (r) = Recorded By, (t) = Taken By, (c) = Cosigned By      Initials Name Provider Type    rPabhjot Monzon, PT Physical Therapist                  Therapy Charges for Today       Code Description Service Date Service Provider Modifiers Qty    [id number] HC PT EVAL MOD COMPLEXITY 4 3/10/2024 Prabhjot Tobias, PT GP 1            PT G-Codes  Outcome Measure Options: AM-PAC 6 Clicks Basic Mobility (PT)  AM-PAC 6 Clicks Score (PT): 17  PT Discharge Summary  Anticipated Discharge Disposition (PT): home with home health    Prabhjot Tobias PT  3/10/2024

## 2024-03-10 NOTE — PLAN OF CARE
Goal Outcome Evaluation:      Pt remains on 4L high flow, eating well, vitals stable. Wife remains at bed side.

## 2024-03-10 NOTE — CONSULTS
Infectious Diseases Consult Note    Referring Provider: Joel Hollingsworth MD    Reason for Consultation: Possible empyema    Patient Care Team:  Lazaro Paulino MD as PCP - Steve Keith MD as Consulting Physician (Cardiology)  Lars Wagner MD as Consulting Physician (Hematology and Oncology)    Chief complaint shortness of breath, cough, fatigue    Subjective     The patient has been afebrile for the last 24 hours.  The patient is on room air, hemodynamically stable, and is tolerating antimicrobial therapy.    History of present illness:      This is a 63-year-old male presents to the hospital on 3/9/2024 with complaints of worsening shortness of breath cough and fatigue has been going on for several days.  Patient does not usually wear oxygen at home.  Patient is currently receiving chemotherapy for rectal cancer.  Has had some low-grade fevers and chills.  Denies GI symptoms, abdominal pain, chest pain, urinary symptoms.  Patient denies any significant wounds.    Review of Systems   Review of Systems   Constitutional: Negative.  Positive for chills, fatigue and fever.   HENT: Negative.     Eyes: Negative.    Respiratory: Negative.  Positive for cough and shortness of breath.    Cardiovascular: Negative.    Gastrointestinal: Negative.    Endocrine: Negative.    Genitourinary: Negative.    Musculoskeletal: Negative.    Skin: Negative.    Neurological: Negative.    Psychiatric/Behavioral: Negative.     All other systems reviewed and are negative.      Medications  Medications Prior to Admission   Medication Sig Dispense Refill Last Dose    amLODIPine (NORVASC) 10 MG tablet TAKE 1 TABLET BY MOUTH DAILY 90 tablet 0     atorvastatin (LIPITOR) 40 MG tablet TAKE ONE TABLET BY MOUTH EVERY NIGHT AT BEDTIME 90 tablet 0     Chlorcyclizine-Pseudoephed (Stahist AD) 25-60 MG tablet Take 0.5 tablets by mouth 2 (Two) Times a Day As Needed (Congestion, drainage). 42 tablet 0     cloNIDine (CATAPRES) 0.1  [facility]  Progress Note     Billy Cates Patient Status:  Observation    1938 MRN [id number]   [facility] 3NW-A Attending Sheldon Bautista MD   Hosp Day # 0 PCP PHYSICIAN NONSTAFF       Chief Complaint: back pain     HPI/[place] MG tablet Take 1 tablet by mouth 2 (Two) Times a Day. 60 tablet 5     glimepiride (AMARYL) 4 MG tablet TAKE ONE TABLET BY MOUTH TWICE A  tablet 1     hydrALAZINE (APRESOLINE) 50 MG tablet TAKE ONE TABLET BY MOUTH EVERY 8 HOURS 90 tablet 1     lisinopril-hydrochlorothiazide (PRINZIDE,ZESTORETIC) 20-25 MG per tablet Take 1 tablet by mouth Daily.       metFORMIN (GLUCOPHAGE) 850 MG tablet TAKE 1 TABLET BY MOUTH TWICE A DAY WITH MEALS 144 tablet 1     metoprolol tartrate (LOPRESSOR) 50 MG tablet TAKE THREE TABLETS BY MOUTH EVERY 12 HOURS 540 tablet 0     potassium chloride 10 MEQ CR tablet Take 1 tablet by mouth Daily.       traMADol (ULTRAM) 50 MG tablet TAKE 1 TABLET BY MOUTH EVERY 6 HOURS AS NEEDED FOR MODERATE PAIN 28 tablet 0     vitamin D3 125 MCG (5000 UT) capsule capsule Take 1 capsule by mouth Daily.       Blood Glucose Monitoring Suppl (Accu-Chek Guide) w/Device kit 1 Device Daily. 1 kit 0     glucose blood (Accu-Chek Guide) test strip Test daily e11.9 50 each 12        History  Past Medical History:   Diagnosis Date    Aortic dissection     Diabetes mellitus     Heart murmur     History of noncompliance with medical treatment     Hyperlipidemia     Hypertension     Type B hypoplasia of aortic arch      Past Surgical History:   Procedure Laterality Date    COLECTOMY PARTIAL / TOTAL  2023    COLONOSCOPY N/A 08/31/2023    Procedure: COLONOSCOPY with sigmoid mass tattooing at 35cm, sigmoid and rectal polypectomy;  Surgeon: TORIBIO Jacob MD;  Location: [facility] ENDOSCOPY;  Service: Gastroenterology;  Laterality: N/A;  sigmoid mass, colon polyps       Family History  Family History   Problem Relation Age of Onset    Diabetes Mother     Dementia Mother     Sudden death Father        Social History   reports that he has been smoking cigarettes. He started smoking about 53 years ago. He has a 106.4 pack-year smoking history. He has never used smokeless tobacco. He reports that he does not currently use drugs  after having used the following drugs: Marijuana. He reports that he does not drink alcohol.    Allergies  Patient has no known allergies.    Objective     Vital Signs   Vital Signs (last 24 hours)         03/09 0700  03/10 0659 03/10 0700  03/10 1619   Most Recent      Temp (°F) 99 -  100.2      99.2     99.2 (37.3) 03/10 0932    Heart Rate 97 -  168    88 -  107     93 03/10 1452    Resp   20    24 -  26     24 03/10 1452    /69 -  138/80    122/97 -  131/73     122/97 03/10 1040    SpO2 (%) 90 -  94      92     92 03/10 1452    Flow (L/min) 4 -  6      4     4 03/10 1452    Oxygen Concentration (%)     36     36 03/10 1445            Physical Exam:  Physical Exam  Vitals and nursing note reviewed.   Constitutional:       General: He is not in acute distress.     Appearance: He is well-developed and normal weight. He is ill-appearing. He is not diaphoretic.   HENT:      Head: Normocephalic and atraumatic.   Eyes:      Conjunctiva/sclera: Conjunctivae normal.      Pupils: Pupils are equal, round, and reactive to light.   Cardiovascular:      Rate and Rhythm: Normal rate and regular rhythm.      Heart sounds: Normal heart sounds, S1 normal and S2 normal.   Pulmonary:      Effort: Pulmonary effort is normal. No respiratory distress.      Breath sounds: No stridor. Wheezing present. No rales.      Comments: Diminished throughout  Abdominal:      General: Bowel sounds are normal. There is no distension.      Palpations: Abdomen is soft. There is no mass.      Tenderness: There is no abdominal tenderness. There is no guarding.   Musculoskeletal:         General: No deformity. Normal range of motion.      Cervical back: Neck supple.   Skin:     General: Skin is warm and dry.      Coloration: Skin is not pale.      Findings: No erythema or rash.      Comments: Port   Neurological:      Mental Status: He is alert and oriented to person, place, and time.      Cranial Nerves: No cranial nerve deficit.   Psychiatric:          Mood and Affect: Mood normal.         Microbiology  Microbiology Results (last 10 days)       Procedure Component Value - Date/Time    MRSA Screen, PCR (Inpatient) - Swab, Nares [[id number]]  (Normal) Collected: 03/09/24 2351    Lab Status: Final result Specimen: Swab from Nares Updated: 03/10/24 0103     MRSA PCR No MRSA Detected    Narrative:      The negative predictive value of this diagnostic test is high and should only be used to consider de-escalating anti-MRSA therapy. A positive result may indicate colonization with MRSA and must be correlated clinically.    Respiratory Panel PCR w/COVID-19(SARS-CoV-2) BREA/BELL/JOHNIE/PAD/COR/FINESSE In-House, NP Swab in UTM/VTM, 2 HR TAT - Swab, Nasopharynx [[id number]]  (Normal) Collected: 03/09/24 1701    Lab Status: Final result Specimen: Swab from Nasopharynx Updated: 03/09/24 1756     ADENOVIRUS, PCR Not Detected     Coronavirus 229E Not Detected     Coronavirus HKU1 Not Detected     Coronavirus NL63 Not Detected     Coronavirus OC43 Not Detected     COVID19 Not Detected     Human Metapneumovirus Not Detected     Human Rhinovirus/Enterovirus Not Detected     Influenza A PCR Not Detected     Influenza B PCR Not Detected     Parainfluenza Virus 1 Not Detected     Parainfluenza Virus 2 Not Detected     Parainfluenza Virus 3 Not Detected     Parainfluenza Virus 4 Not Detected     RSV, PCR Not Detected     Bordetella pertussis pcr Not Detected     Bordetella parapertussis PCR Not Detected     Chlamydophila pneumoniae PCR Not Detected     Mycoplasma pneumo by PCR Not Detected    Narrative:      In the setting of a positive respiratory panel with a viral infection PLUS a negative procalcitonin without other underlying concern for bacterial infection, consider observing off antibiotics or discontinuation of antibiotics and continue supportive care. If the respiratory panel is positive for atypical bacterial infection (Bordetella pertussis, Chlamydophila pneumoniae, or Mycoplasma  pneumoniae), consider antibiotic de-escalation to target atypical bacterial infection.            Laboratory  Results from last 7 days   Lab Units 03/09/24  2351   WBC 10*3/mm3 8.20   HEMOGLOBIN g/dL 10.1*   HEMATOCRIT % 30.6*   PLATELETS 10*3/mm3 396     Results from last 7 days   Lab Units 03/09/24  2351   SODIUM mmol/L 131*   POTASSIUM mmol/L 4.0   CHLORIDE mmol/L 95*   CO2 mmol/L 26.0   BUN mg/dL 8   CREATININE mg/dL 0.40*   GLUCOSE mg/dL 182*   CALCIUM mg/dL 9.8     Results from last 7 days   Lab Units 03/09/24  2351   SODIUM mmol/L 131*   POTASSIUM mmol/L 4.0   CHLORIDE mmol/L 95*   CO2 mmol/L 26.0   BUN mg/dL 8   CREATININE mg/dL 0.40*   GLUCOSE mg/dL 182*   CALCIUM mg/dL 9.8                   Radiology  Imaging Results (Last 72 Hours)       Procedure Component Value Units Date/Time    CT Angiogram Chest Pulmonary Embolism [[id number]] Collected: 03/09/24 1940     Updated: 03/09/24 1950    Narrative:      CT ANGIOGRAM CHEST PULMONARY EMBOLISM    Date of Exam: 3/9/2024 7:37 PM EST    Indication: dyspnea hypoxia.    Comparison: Same day chest radiograph    Technique: Axial CT images were obtained of the chest after the uneventful intravenous administration of iodinated contrast utilizing pulmonary embolism protocol.  Sagittal and coronal reconstructions were performed.  Automated exposure control and   iterative reconstruction methods were used.      Findings:  There is no evidence of pulmonary embolism. The pulmonary arteries are enlarged with the right main pulmonary artery measuring up to 3.3 cm. There is no evidence of right heart strain, however the right heart is enlarged. No significant pericardial   effusion. Atherosclerotic calcifications of the coronary arteries. There is a descending aortic aneurysm measuring up to 4.3 cm as seen on axial image 85. There is also eccentric none calcified thrombus within the posterior aspect of the proximal   descending aorta (sagittal image 88), which appears increased  from 2/1/2024 CT.    The central airways are patent. There are groundglass opacities and tree-in-bud nodularity throughout the left upper and lower lobes as well as the right middle lobe. There is denser consolidation in the posterior left lower lobe. There is an adjacent   loculated gas and fluid collection within the left pleura with thickening of the pleural wall. This collection measures 17.0 x 5.4 cm on axial image 114. There is no evidence of right-sided pleural effusion or pneumothorax. Mild centrilobular emphysema.    Right chest wall port with distal lead tip in the superior vena cava. The chest wall soft tissues show no acute abnormality. There is no evidence of fracture or suspicious osseous lesion.    Included portions of the upper abdomen show no acute abnormality.      Impression:      Impression:  Groundglass opacities and tree-in-bud nodularity throughout the left upper and lower lobes as well as the right middle lobe consistent with multifocal pneumonia. There is a 17 cm loculated gas and fluid collection within the left pleural space with   associated thickening of the pleural wall, which likely represents empyema.    No evidence of pulmonary embolism. There is pulmonary artery enlargement which can be seen with pulmonary hypertension.    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 Recommend vascular surgery referral.        Electronically Signed: Camacho Vee MD    3/9/2024 7:48 PM EST    Workstation ID: [id number]    XR Chest 1 View [[id number]] Collected: 03/09/24 1730     Updated: 03/09/24 1736    Narrative:      XR CHEST 1 VW    Date of Exam: 3/9/2024 5:17 PM EST    Indication: CHF/COPD Protocol  CHF/COPD Protocol    Comparison: CT chest February 1, 2024  Findings:   There is tenting left hemidiaphragm with airspace disease noted in this area. On the prior CT there was a  somewhat masslike area of consolidation. Findings may be secondary to pneumonia. There may be a parapneumonic effusion. There is air shadow at the   left base. It is to tell whether this is subpulmonic or intrapulmonary. This may relate to bowel in the left upper quadrant of the abdomen. The right lung is clear. A port catheter is noted with its tip in the superior vena cava.      Impression:      Impression:  1.Tenting left hemidiaphragm with underlying airspace disease which could be secondary to pneumonia. Given the more masslike appearance on prior CT a follow-up CT chest suggested to reassess.  2.Possible left parapneumonic effusion.  3.There is air shadow in the left upper quadrant of the abdomen. This could relate to bowel as opposed to air within the left basilar area that might relate to cavitary area or loculated pneumothorax. This again could be assessed by follow-up CT.      Electronically Signed: Santiago Tariq MD    3/9/2024 5:34 PM EST    Workstation ID: [id number]            Cardiology      Results Review:  I have reviewed all clinical data, test, lab, and imaging results.       Schedule Meds  acetaminophen, 1,000 mg, Oral, Once  amLODIPine, 10 mg, Oral, Daily  atorvastatin, 40 mg, Oral, Daily  budesonide, 0.5 mg, Nebulization, BID - RT  cefepime, 2,000 mg, Intravenous, Q8H  cloNIDine, 0.1 mg, Oral, BID  guaiFENesin, 600 mg, Oral, Q12H  heparin (porcine), 5,000 Units, Subcutaneous, Q8H  hydrALAZINE, 50 mg, Oral, Q8H  lisinopril, 20 mg, Oral, Q24H   And  hydroCHLOROthiazide, 25 mg, Oral, Q24H  insulin lispro, 2-9 Units, Subcutaneous, 4x Daily AC & at Bedtime  ipratropium-albuterol, 3 mL, Nebulization, 4x Daily - RT  metoprolol tartrate, 150 mg, Oral, Q12H  metroNIDAZOLE, 500 mg, Intravenous, Q8H  vancomycin, 1,250 mg, Intravenous, Q12H  vitamin D3, 5,000 Units, Oral, Daily        Infusion Meds  Pharmacy to Dose Cefepime,   Pharmacy to dose vancomycin,   sodium chloride, 100 mL/hr, Last Rate: 100 mL/hr  (03/10/24 1737)        PRN Meds    acetaminophen    benzonatate    dextrose    dextrose    glucagon (human recombinant)    ipratropium-albuterol    Pharmacy to Dose Cefepime    Pharmacy to dose vancomycin    sodium chloride    traMADol      Assessment & Plan       Assessment    Multifocal pneumonia with a loculated gas and fluid collection in the left pleural space concerning for empyema.  Patient is currently on 2 L of oxygen by nasal cannula.  MRSA of the nares screen is negative.    Patient is recently been on doxycycline.  Pulmonary also following    Rectosigmoid cancer-currently on chemotherapy.  Oncology following patient does have a port    Type 2 diabetes-recent A1c is 7.4    Tobacco abuse    Plan    Continue IV vancomycin asked pharmacy to monitor dose  Continue IV cefepime 2 g every 8 hours  Continue IV Flagyl 500 mg every 8 hours  Pneumococcal urine antigen  Sputum culture  Thoracic surgery has already been consulted  Patient will likely need at least a chest tube placed but may need a decortication/VATS  Continue supportive care  A.m. labs  Discussed with patient and wife at bedside    ROSA MARIA Leiva  03/10/24  16:19 EDT    Note is dictated utilizing voice recognition software/Dragon

## 2024-03-10 NOTE — PROGRESS NOTES
"    [facility] MEDICINE SERVICE  DAILY PROGRESS NOTE    NAME: Prashant Zhou  : 1960  MRN: [id number]      LOS: 1 day     PROVIDER OF SERVICE: Joel Hollingsworth MD    Chief Complaint: Sepsis    Subjective:     Interval History:  History taken from: patient    Subjective       Chief Complaint: shortness of breath      History of Present Illness: Prashant Zhou is a 63 y.o. male with a CMH of rectal cance status post partial colectomy, , currently undergoing chemotherapy next course of chemotherapy this Tuesday, diabetes mellitus type 2, hypertension, hyperlipidemia, thoracic aortic aneurysm secondary to hypertension, who presented to [facility] on 3/9/2024 with plaints of shortness of breath, cough and congestion since last night it worsened today.  He denies any fevers chills or diaphoresis.  Cough productive of yellow sputum.  Air saturation was 81% in ED and was placed on 2 L oxygen high flow nasal cannula.  He does not wear home oxygen.  Abs today showed a sodium of 130, chloride 88, glucose 291, albumin 3.1, lactate was 3.8, WBC not elevated, hemoglobin 11.3, CTA per radiology shows:     \"Groundglass opacities and tree-in-bud nodularity throughout the left upper and lower lobes as well as the right middle lobe consistent with multifocal pneumonia. There is a 17 cm loculated gas and fluid collection within the left pleural space with   associated thickening of the pleural wall, which likely represents empyema.     No evidence of pulmonary embolism. There is pulmonary artery enlargement which can be seen with pulmonary hypertension.     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 Recommend vascular surgery referral.  \"     He triggered sepsis for lactate, and tachycardia and fever of 100.2.  He is not hypotensive.  He was given IV fluid bolus in ED per sepsis " protocol and started on IV cefepime and IV vancomycin with blood cultures pending.  500 mg IV Flagyl has been added given empyema and pulmonary has been consulted for  empyema . Given increased eccentric noncalcified thrombus in aorta vascular surgery has been consulted.  Per RN spoke to vascular surgery no heparin drip needed will see in AM.     3/10/2024 patient seen and examined in bed no acute distress, patient with dyspnea fatigue weakness.  Tolerating antibiotics.  WBC 8.2, Tmax 100.2.  Review of Systems  ENT: Negative.     Eyes: Negative.    Respiratory:  Positive for cough and shortness of breath.    Gastrointestinal: Negative.    Endocrine: Negative.    Genitourinary: Negative.    Musculoskeletal: Negative.    Skin: Negative.    Allergic/Immunologic: Negative.    Neurological: Negative.    Hematological: Negative.    Psychiatric/Behavioral: Negative.     All other systems reviewed and are negative.  Objective:     Vital Signs  Temp:  [99 °F (37.2 °C)-100.2 °F (37.9 °C)] 99.2 °F (37.3 °C)  Heart Rate:  [] 107  Resp:  [20-26] 26  BP: (111-138)/(66-97) 122/97  Flow (L/min):  [4-6] 4   Body mass index is 25.1 kg/m².      Physical Exam  Constitutional:       Appearance: He is ill-appearing.   HENT:      Head: Normocephalic and atraumatic.      Right Ear: External ear normal.      Left Ear: External ear normal.      Nose: Nose normal.      Mouth/Throat:      Mouth: Mucous membranes are moist.   Eyes:      Extraocular Movements: Extraocular movements intact.   Cardiovascular:      Rate and Rhythm: Regular rhythm. Tachycardia present.      Pulses: Normal pulses.      Heart sounds: Normal heart sounds.   Pulmonary:      Effort: Pulmonary effort is normal.      Breath sounds: Wheezing present.   Abdominal:      Palpations: Abdomen is soft.   Genitourinary:     Comments: deferred  Musculoskeletal:         General: Normal range of motion.      Cervical back: Normal range of motion and neck supple.   Skin:      General: Skin is warm and dry.   Neurological:      General: No focal deficit present.      Mental Status: He is alert and oriented to person, place, and time.   Psychiatric:         Mood and Affect: Mood normal.         Behavior: Behavior normal.         Thought Content: Thought content normal.         Judgment: Judgment normal.      Scheduled Meds   acetaminophen, 1,000 mg, Oral, Once  amLODIPine, 10 mg, Oral, Daily  atorvastatin, 40 mg, Oral, Daily  cefepime, 2,000 mg, Intravenous, Q8H  cloNIDine, 0.1 mg, Oral, BID  guaiFENesin, 600 mg, Oral, Q12H  hydrALAZINE, 50 mg, Oral, Q8H  lisinopril, 20 mg, Oral, Q24H   And  hydroCHLOROthiazide, 25 mg, Oral, Q24H  insulin lispro, 2-9 Units, Subcutaneous, 4x Daily AC & at Bedtime  metoprolol tartrate, 150 mg, Oral, Q12H  metroNIDAZOLE, 500 mg, Intravenous, Q8H  vancomycin, 1,250 mg, Intravenous, Q12H  vitamin D3, 5,000 Units, Oral, Daily       PRN Meds     acetaminophen    benzonatate    dextrose    dextrose    glucagon (human recombinant)    ipratropium-albuterol    Pharmacy to Dose Cefepime    Pharmacy to dose vancomycin    sodium chloride    traMADol   Infusions  Pharmacy to Dose Cefepime,   Pharmacy to dose vancomycin,   sodium chloride, 100 mL/hr, Last Rate: 100 mL/hr (03/09/24 2354)          Diagnostic Data    Results from last 7 days   Lab Units 03/09/24  2351 03/09/24  1701   WBC 10*3/mm3 8.20 9.80   HEMOGLOBIN g/dL 10.1* 11.3*   HEMATOCRIT % 30.6* 34.0*   PLATELETS 10*3/mm3 396 435   GLUCOSE mg/dL 182* 291*   CREATININE mg/dL 0.40* 0.56*   BUN mg/dL 8 11   SODIUM mmol/L 131* 130*   POTASSIUM mmol/L 4.0 4.5   AST (SGOT) U/L  --  33   ALT (SGPT) U/L  --  50*   ALK PHOS U/L  --  118*   BILIRUBIN mg/dL  --  0.3   ANION GAP mmol/L 10.0 16.0*       CT Angiogram Chest Pulmonary Embolism    Result Date: 3/9/2024  Impression: Groundglass opacities and tree-in-bud nodularity throughout the left upper and lower lobes as well as the right middle lobe consistent with multifocal  pneumonia. There is a 17 cm loculated gas and fluid collection within the left pleural space with associated thickening of the pleural wall, which likely represents empyema. No evidence of pulmonary embolism. There is pulmonary artery enlargement which can be seen with pulmonary hypertension. 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 Recommend vascular surgery referral. Electronically Signed: Camacho Vee MD  3/9/2024 7:48 PM EST  Workstation ID: [id number]    XR Chest 1 View    Result Date: 3/9/2024  Impression: 1.Tenting left hemidiaphragm with underlying airspace disease which could be secondary to pneumonia. Given the more masslike appearance on prior CT a follow-up CT chest suggested to reassess. 2.Possible left parapneumonic effusion. 3.There is air shadow in the left upper quadrant of the abdomen. This could relate to bowel as opposed to air within the left basilar area that might relate to cavitary area or loculated pneumothorax. This again could be assessed by follow-up CT. Electronically Signed: Santiago Tariq MD  3/9/2024 5:34 PM EST  Workstation ID: [id number]       I reviewed the patient's new clinical results.    Assessment/Plan:     Active and Resolved Problems  Active Hospital Problems    Diagnosis  POA    **Sepsis [A41.9]  Yes    Pneumonia [J18.9]  Yes    Empyema lung [J86.9]  Yes    Acute respiratory failure with hypoxia [J96.01]  Yes    Hyponatremia [E87.1]  Yes    Thoracic aortic aneurysm without rupture [I71.20]  Yes    Aortic thrombus [I74.10]  Yes    Tobacco use [Z72.0]  Yes    Rectal cancer [C20]  Yes    Hypertension [I10]  Yes    Type 2 diabetes mellitus with hyperglycemia [E11.65]  Yes    Hyperlipidemia [E78.5]  Yes      Resolved Hospital Problems   No resolved problems to display.     Sepsis, lactate 3.8, tachycardia, fever 100.2, not hypotensive likely secondary to  pneumonia lung empyema, received IV fluid bolus in ED per sepsis protocol, continue IV vancomycin IV cefepime pharmacy to dose until blood cultures resulted, added IV Flagyl for empyema, maintenance fluids ordered, lactic acid every 6 hours until normalized, monitor CBC     Acute respiratory failure with hypoxia, likely secondary to above, oxygen to maintain sats 90 to 92%, DuoNebs every 4 hours as needed     Pneumonia/empyema lung, pulmonary consulted, see plan above, Tessalon Perles for cough, Mucinex, DuoNebs, antibiotics as above     Thoracic aortic aneurysm with without rupture/aortic thrombus increased per CTA report, vascular surgery consulted, no heparin drip per vascular at this time vascular to see in a.m.     Rectal cancer, status postresection on chemotherapy followed by oncology, oncology consulted is to have next round of chemotherapy on Tuesday, on home tramadol     Hyponatremia, normal saline IV infusion repeat BMP in a.m.     Tobacco use encourage cessation declined nicotine patch     Hypertension, reordered home amlodipine, clonidine, hydralazine, lisinopril, hydrochlorothiazide, metoprolol monitor BP     Type 2 diabetes mellitus with hyperglycemia, hold home glimepiride and metformin while inpatient and SSI as needed with Accu-Cheks ACHS consistent carb heart healthy diet     Hyperlipidemia on statin     DVT prophylaxis:  No DVT prophylaxis order currently exists.    Code status is   Code Status and Medical Interventions:   Ordered at: 03/09/24 2117     Code Status (Patient has no pulse and is not breathing):    CPR (Attempt to Resuscitate)     Medical Interventions (Patient has pulse or is breathing):    Full Support     Plan for disposition:nh  in 3 days    Time: 30 minutes    Signature: Electronically signed by Joel Hollingsworth MD, 03/10/24, 12:41 EDT.  [facility] Hospitalist Team

## 2024-03-10 NOTE — CONSULTS
Hematology/Oncology Inpatient Consultation    Patient name: Prashant Zhou  : 1960  MRN: [id number]  Referring Provider: Dr. Naqvi  Reason for Consultation: Established patient    Chief complaint: Shortness of breath    History of present illness:    Prashant Zhou is a 63 y.o. male who presented to [facility] on 3/9/2024 with complaints of shortness of breath, cough, and congestion that has worsened.  He denies fevers, chills, or diaphoresis.  Cough is productive with yellow sputum.  In ED, air saturation was 81% and he was placed on 2 L oxygen high flow nasal cannula.  He typically does not wear oxygen at home.  Patient was tachycardic and low-grade fever of 100.2 Fahrenheit.  He is normotensive.  Respiratory panel negative.  Cultures are pending.    3/9/2024 CT angiogram chest:  Impression:  Groundglass opacities and tree-in-bud nodularity throughout the left upper and lower lobes as well as the right middle lobe consistent with multifocal pneumonia. There is a 17 cm loculated gas and fluid collection within the left pleural space with   associated thickening of the pleural wall, which likely represents empyema.     No evidence of pulmonary embolism. There is pulmonary artery enlargement which can be seen with pulmonary hypertension.     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 Recommend vascular surgery referral.    03/10/24  Hematology/Oncology was consulted.  Patient is established with Dr. Wagner for metastatic adenocarcinoma of the rectosigmoid currently on treatment with FOLFOX and bevacizumab.  Most recent cycle 7-day 1 on 2024.  CT imaging from 2024 with no evidence of progressive disease.  There was a new area masslike consolidation in the left lower lobe for which for which she was sent antibiotics, doxycycline.        Oncology History  - from record:  1/22/2024: Mr. Zhou carried a long history of severe hypertension and of an aneurysm of the ascending aorta.  He had been receiving antihypertensive medication after correction of the aneurysm with good results.  In August 2023 he underwent his first screening colonoscopy.  A large circumferential and obstructive tumor was identified at the sigmoid/rectosigmoid junction.  The tumor could not be traversed even with the smallest instrument.  Biopsies showed moderately differentiated colonic adenocarcinoma without loss of nuclear expression of the mismatch repair proteins.  Imaging studies at the time revealed the known thoracic aneurysm that has increased mildly since the previous scan.  There was a benign-appearing subpleural nodule in the right posteromedial chest wall and pulmonary nodules that have been identified since 2018 remained stable.  Some mediastinal nonspecific and stable adenopathy was reported as well.  In the abdomen the sigmoid tumor was confirmed and there was no suggestion of metastatic disease.  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  He underwent excision of the squamous cell carcinoma that proved to be a high-grade squamous intraepithelial lesion.  He also had a robotic left colectomy.  The final report of pathology was of moderately differentiated adenocarcinoma of the colon, that measured 3.5 cm.  The tumor involved the serosal surface and the antimesenteric serosa.  Lymphovascular space invasion was identified.  All margins were negative for disease but 2 of 27 lymph nodes had metastatic involvement.  As well there were at least 5 peritoneal deposits that were excised and that were confirmed to correspond to metastatic lesions.  The tumor had intact expression of MLH1, MSH 1, MSH 6 and PMS2.  Next-generation sequencing revealed a pathogenic mutation of exon 2 of the KRAS gene. ERBB2, BRAF, NTRK, RET, and PIK3CA were negative.  PD-L1 was negative, as well.  He was started on treatment with FOLFOX and bevacizumab on November 28, 2023.  He reported adequate tolerance to the treatment, with the expected cold intolerance.  He had had no nausea but since the beginning of the present illness he had lost approximately 20 pounds.  He was eating reasonably well.  He had been free of chest pains and no longer had abdominal pain.  All his surgical incisions had healed and he was having regular defecations, at times of liquid stool.  The physical exam revealed him to be a chronically ill-appearing man who did not seem in distress.  He was conversant, in good spirits and without jaundice.  Lungs diminished bilaterally.  Heart regular.  Abdomen soft and nontender.  No edema.  The laboratory exams and all the records were reviewed and discussed with him and with his wife.  To resume treatment with the idea of obtaining a new set of scans after 6 cycles of chemotherapy.  After 12 cycles of chemotherapy discuss the possibility of additional surgery if there was any residual peritoneal disease at the time of surgery.  To see me at the end of February with the scans.     2/16/2024: In the office to review scans. In the last few days has had severe glossodynia and odynophagia. Also has been coughing and expectorating some clear sputum. No fevers. Has had pain on the left side of the chest. No dyspnea. He was prescribed Hiral's magic potion and fluconazole that has resulted in relief. On exam he does not appear acutely ill. No jaundice or pallor. Lungs are diminished bilaterally and heart irregularly irregular. Abdomen soft. No edema. Reviewed the laboratory exams. Reviewed the images of the scans. Sent prescriptions for doxycycline as the lesion in the left lower lobe is likely infectious in nature, given his leukocytosis and symptoms. Will obtain an electrocardiogram. Will follow next week.      3/8/2024: Feeling poorly. Weak and not moving much. Has  continued to have diffuse pain. No fevers. His spouse believes he has an ulcer on the backside. On exam he is conversant and oriented. No jaundice. No pallor. The lungs are diminished bilaterally. Heart regular. There is indeed an ulcer in the sacral region, in the intergluteal crease. Reviewed the laboratory exams. He is to continue with the same chemotherapy. Referred to the wound clinic. To have an electrocardiogram. He is to see me in a few weeks with new scans.     He/She  has a past medical history of Aortic dissection, Diabetes mellitus, Heart murmur, History of noncompliance with medical treatment, Hyperlipidemia, Hypertension, and Type B hypoplasia of aortic arch.    PCP: Lazaro Paulino MD    INTERVAL HISTORY: patient seen today for initial evaluation. He appears to be short of breath on lying down and while having conversation. Denied any febrile episodes recently. He reported having worsening cough and shortness of breath since Friday evening, cough is associated with whitish sputum production. Reported feeling weak and debilitated overall. On 7L/min o2 supplementation. ROS otherwise negative.    History:  Past Medical History:   Diagnosis Date    Aortic dissection     Diabetes mellitus     Heart murmur     History of noncompliance with medical treatment     Hyperlipidemia     Hypertension     Type B hypoplasia of aortic arch    ,   Past Surgical History:   Procedure Laterality Date    COLECTOMY PARTIAL / TOTAL  2023    COLONOSCOPY N/A 08/31/2023    Procedure: COLONOSCOPY with sigmoid mass tattooing at 35cm, sigmoid and rectal polypectomy;  Surgeon: TORIBIO Jacob MD;  Location: [facility] ENDOSCOPY;  Service: Gastroenterology;  Laterality: N/A;  sigmoid mass, colon polyps   ,   Family History   Problem Relation Age of Onset    Diabetes Mother     Dementia Mother     Sudden death Father    ,   Social History     Tobacco Use    Smoking status: Every Day     Current packs/day: 2.00     Average  packs/day: 2.0 packs/day for 53.2 years (106.4 ttl pk-yrs)     Types: Cigarettes     Start date: 1971    Smokeless tobacco: Never   Vaping Use    Vaping status: Never Used   Substance Use Topics    Alcohol use: No     Comment: Abstinent since around 2008    Drug use: Not Currently     Types: Marijuana     Comment: Abstinent since at least the 1980's   ,   Medications Prior to Admission   Medication Sig Dispense Refill Last Dose    amLODIPine (NORVASC) 10 MG tablet TAKE 1 TABLET BY MOUTH DAILY 90 tablet 0     atorvastatin (LIPITOR) 40 MG tablet TAKE ONE TABLET BY MOUTH EVERY NIGHT AT BEDTIME 90 tablet 0     Chlorcyclizine-Pseudoephed (Stahist AD) 25-60 MG tablet Take 0.5 tablets by mouth 2 (Two) Times a Day As Needed (Congestion, drainage). 42 tablet 0     cloNIDine (CATAPRES) 0.1 MG tablet Take 1 tablet by mouth 2 (Two) Times a Day. 60 tablet 5     glimepiride (AMARYL) 4 MG tablet TAKE ONE TABLET BY MOUTH TWICE A  tablet 1     hydrALAZINE (APRESOLINE) 50 MG tablet TAKE ONE TABLET BY MOUTH EVERY 8 HOURS 90 tablet 1     lisinopril-hydrochlorothiazide (PRINZIDE,ZESTORETIC) 20-25 MG per tablet Take 1 tablet by mouth Daily.       metFORMIN (GLUCOPHAGE) 850 MG tablet TAKE 1 TABLET BY MOUTH TWICE A DAY WITH MEALS 144 tablet 1     metoprolol tartrate (LOPRESSOR) 50 MG tablet TAKE THREE TABLETS BY MOUTH EVERY 12 HOURS 540 tablet 0     potassium chloride 10 MEQ CR tablet Take 1 tablet by mouth Daily.       traMADol (ULTRAM) 50 MG tablet TAKE 1 TABLET BY MOUTH EVERY 6 HOURS AS NEEDED FOR MODERATE PAIN 28 tablet 0     vitamin D3 125 MCG (5000 UT) capsule capsule Take 1 capsule by mouth Daily.       Blood Glucose Monitoring Suppl (Accu-Chek Guide) w/Device kit 1 Device Daily. 1 kit 0     glucose blood (Accu-Chek Guide) test strip Test daily e11.9 50 each 12    , Scheduled Meds:  acetaminophen, 1,000 mg, Oral, Once  amLODIPine, 10 mg, Oral, Daily  atorvastatin, 40 mg, Oral, Daily  cefepime, 2,000 mg, Intravenous,  "Q8H  cloNIDine, 0.1 mg, Oral, BID  guaiFENesin, 600 mg, Oral, Q12H  hydrALAZINE, 50 mg, Oral, Q8H  lisinopril, 20 mg, Oral, Q24H   And  hydroCHLOROthiazide, 25 mg, Oral, Q24H  insulin lispro, 2-9 Units, Subcutaneous, 4x Daily AC & at Bedtime  metoprolol tartrate, 150 mg, Oral, Q12H  metroNIDAZOLE, 500 mg, Intravenous, Q8H  vancomycin, 1,250 mg, Intravenous, Q12H  vitamin D3, 5,000 Units, Oral, Daily    , Continuous Infusions:  Pharmacy to Dose Cefepime,   Pharmacy to dose vancomycin,   sodium chloride, 100 mL/hr, Last Rate: 100 mL/hr (03/09/24 8560)    , PRN Meds:    acetaminophen    benzonatate    dextrose    dextrose    glucagon (human recombinant)    ipratropium-albuterol    Pharmacy to Dose Cefepime    Pharmacy to dose vancomycin    sodium chloride    traMADol   Allergies:  Patient has no known allergies.    Subjective     ROS:  Review of Systems   Constitutional: Negative.    HENT: Negative.     Eyes: Negative.    Respiratory:  Positive for cough, chest tightness and shortness of breath.    Cardiovascular: Negative.    Gastrointestinal: Negative.    Endocrine: Negative.    Genitourinary: Negative.    Musculoskeletal: Negative.    Skin: Negative.    Allergic/Immunologic: Negative.    Neurological: Negative.    Hematological: Negative.    Psychiatric/Behavioral: Negative.          Objective   Vital Signs:   /75   Pulse 99   Temp 99.5 °F (37.5 °C)   Resp 20   Ht 190.5 cm (75\")   Wt 91.1 kg (200 lb 13.4 oz)   SpO2 94%   BMI 25.10 kg/m²     Physical Exam: (performed by MD)  Physical Exam  Constitutional:       Appearance: Normal appearance. He is normal weight.   HENT:      Head: Normocephalic and atraumatic.      Right Ear: External ear normal.      Left Ear: External ear normal.      Nose: Nose normal.      Mouth/Throat:      Mouth: Mucous membranes are moist.      Pharynx: Oropharynx is clear.   Eyes:      Extraocular Movements: Extraocular movements intact.      Conjunctiva/sclera: Conjunctivae " normal.      Pupils: Pupils are equal, round, and reactive to light.   Cardiovascular:      Rate and Rhythm: Normal rate and regular rhythm.      Pulses: Normal pulses.      Heart sounds: Normal heart sounds.   Pulmonary:      Effort: Pulmonary effort is normal. Respiratory distress present.      Breath sounds: Wheezing present.      Comments: Decreased breath sounds bilaterally  Abdominal:      General: Abdomen is flat. Bowel sounds are normal.      Palpations: Abdomen is soft.   Musculoskeletal:         General: Normal range of motion.      Cervical back: Normal range of motion and neck supple.   Skin:     General: Skin is warm.   Neurological:      Mental Status: He is alert.   Psychiatric:         Mood and Affect: Mood normal.         Behavior: Behavior normal.         Thought Content: Thought content normal.         Judgment: Judgment normal.         Results Review:  Lab Results (last 48 hours)       Procedure Component Value Units Date/Time    POC Glucose 4x Daily Before Meals & at Bedtime [[id number]]  (Abnormal) Collected: 03/10/24 0720    Specimen: Blood Updated: 03/10/24 0722     Glucose 185 mg/dL      Comment: Serial Number: [id number]:  [id number]       STAT Lactic Acid, Reflex [[id number]]  (Normal) Collected: 03/10/24 0427    Specimen: Blood Updated: 03/10/24 0459     Lactate 2.0 mmol/L     MRSA Screen, PCR (Inpatient) - Swab, Nares [[id number]]  (Normal) Collected: 03/09/24 2351    Specimen: Swab from Nares Updated: 03/10/24 0103     MRSA PCR No MRSA Detected    Narrative:      The negative predictive value of this diagnostic test is high and should only be used to consider de-escalating anti-MRSA therapy. A positive result may indicate colonization with MRSA and must be correlated clinically.    STAT Lactic Acid, Reflex [[id number]]  (Abnormal) Collected: 03/09/24 2351    Specimen: Blood Updated: 03/10/24 0032     Lactate 2.5 mmol/L     Basic Metabolic Panel [[id number]]  (Abnormal) Collected:  03/09/24 2351    Specimen: Blood Updated: 03/10/24 0023     Glucose 182 mg/dL      BUN 8 mg/dL      Creatinine 0.40 mg/dL      Sodium 131 mmol/L      Potassium 4.0 mmol/L      Chloride 95 mmol/L      CO2 26.0 mmol/L      Calcium 9.8 mg/dL      BUN/Creatinine Ratio 20.0     Anion Gap 10.0 mmol/L      eGFR 123.4 mL/min/1.73     Narrative:      GFR Normal >60  Chronic Kidney Disease <60  Kidney Failure <15      CBC & Differential [[id number]]  (Abnormal) Collected: 03/09/24 2351    Specimen: Blood Updated: 03/09/24 2354    Narrative:      The following orders were created for panel order CBC & Differential.  Procedure                               Abnormality         Status                     ---------                               -----------         ------                     CBC Auto Differential[[id number]]        Abnormal            Final result                 Please view results for these tests on the individual orders.    CBC Auto Differential [[id number]]  (Abnormal) Collected: 03/09/24 2351    Specimen: Blood Updated: 03/09/24 2354     WBC 8.20 10*3/mm3      RBC 3.36 10*6/mm3      Hemoglobin 10.1 g/dL      Hematocrit 30.6 %      MCV 91.2 fL      MCH 30.0 pg      MCHC 32.9 g/dL      RDW 18.8 %      RDW-SD 59.5 fl      MPV 7.1 fL      Platelets 396 10*3/mm3      Neutrophil % 68.1 %      Lymphocyte % 11.7 %      Monocyte % 19.1 %      Eosinophil % 0.0 %      Basophil % 1.1 %      Neutrophils, Absolute 5.60 10*3/mm3      Lymphocytes, Absolute 1.00 10*3/mm3      Monocytes, Absolute 1.60 10*3/mm3      Eosinophils, Absolute 0.00 10*3/mm3      Basophils, Absolute 0.10 10*3/mm3      nRBC 0.0 /100 WBC     STAT Lactic Acid, Reflex [[id number]]  (Abnormal) Collected: 03/09/24 2006    Specimen: Blood Updated: 03/09/24 2043     Lactate 4.3 mmol/L     Respiratory Panel PCR w/COVID-19(SARS-CoV-2) BREA/BELL/JOHNIE/PAD/COR/FINESSE In-House, NP Swab in UTM/VTM, 2 HR TAT - Swab, Nasopharynx [[id number]]  (Normal) Collected: 03/09/24 1641     Specimen: Swab from Nasopharynx Updated: 03/09/24 1756     ADENOVIRUS, PCR Not Detected     Coronavirus 229E Not Detected     Coronavirus HKU1 Not Detected     Coronavirus NL63 Not Detected     Coronavirus OC43 Not Detected     COVID19 Not Detected     Human Metapneumovirus Not Detected     Human Rhinovirus/Enterovirus Not Detected     Influenza A PCR Not Detected     Influenza B PCR Not Detected     Parainfluenza Virus 1 Not Detected     Parainfluenza Virus 2 Not Detected     Parainfluenza Virus 3 Not Detected     Parainfluenza Virus 4 Not Detected     RSV, PCR Not Detected     Bordetella pertussis pcr Not Detected     Bordetella parapertussis PCR Not Detected     Chlamydophila pneumoniae PCR Not Detected     Mycoplasma pneumo by PCR Not Detected    Narrative:      In the setting of a positive respiratory panel with a viral infection PLUS a negative procalcitonin without other underlying concern for bacterial infection, consider observing off antibiotics or discontinuation of antibiotics and continue supportive care. If the respiratory panel is positive for atypical bacterial infection (Bordetella pertussis, Chlamydophila pneumoniae, or Mycoplasma pneumoniae), consider antibiotic de-escalation to target atypical bacterial infection.    Blood Culture - Blood, Arm, Right [[id number]] Collected: 03/09/24 1745    Specimen: Blood from Arm, Right Updated: 03/09/24 1747    CBC & Differential [[id number]]  (Abnormal) Collected: 03/09/24 1701    Specimen: Blood Updated: 03/09/24 1745    Narrative:      The following orders were created for panel order CBC & Differential.  Procedure                               Abnormality         Status                     ---------                               -----------         ------                     CBC Auto Differential[[id number]]        Abnormal            Final result               Scan Slide[[id number]]                                                                    Please  view results for these tests on the individual orders.    CBC Auto Differential [[id number]]  (Abnormal) Collected: 03/09/24 1701    Specimen: Blood Updated: 03/09/24 1745     WBC 9.80 10*3/mm3      RBC 3.75 10*6/mm3      Hemoglobin 11.3 g/dL      Hematocrit 34.0 %      MCV 90.8 fL      MCH 30.2 pg      MCHC 33.2 g/dL      RDW 19.0 %      RDW-SD 63.4 fl      MPV 7.8 fL      Platelets 435 10*3/mm3      Neutrophil % 75.6 %      Lymphocyte % 8.8 %      Monocyte % 15.1 %      Eosinophil % 0.0 %      Basophil % 0.5 %      Neutrophils, Absolute 7.40 10*3/mm3      Lymphocytes, Absolute 0.90 10*3/mm3      Monocytes, Absolute 1.50 10*3/mm3      Eosinophils, Absolute 0.00 10*3/mm3      Basophils, Absolute 0.00 10*3/mm3      nRBC 0.1 /100 WBC     Comprehensive Metabolic Panel [[id number]]  (Abnormal) Collected: 03/09/24 1701    Specimen: Blood Updated: 03/09/24 1735     Glucose 291 mg/dL      BUN 11 mg/dL      Creatinine 0.56 mg/dL      Sodium 130 mmol/L      Potassium 4.5 mmol/L      Chloride 88 mmol/L      CO2 26.0 mmol/L      Calcium 11.1 mg/dL      Total Protein 8.0 g/dL      Albumin 3.1 g/dL      ALT (SGPT) 50 U/L      AST (SGOT) 33 U/L      Alkaline Phosphatase 118 U/L      Total Bilirubin 0.3 mg/dL      Globulin 4.9 gm/dL      A/G Ratio 0.6 g/dL      BUN/Creatinine Ratio 19.6     Anion Gap 16.0 mmol/L      eGFR 111.4 mL/min/1.73     Narrative:      GFR Normal >60  Chronic Kidney Disease <60  Kidney Failure <15      BNP [[id number]]  (Normal) Collected: 03/09/24 1701    Specimen: Blood Updated: 03/09/24 1735     proBNP 425.0 pg/mL     Narrative:      This assay is used as an aid in the diagnosis of individuals suspected of having heart failure. It can be used as an aid in the diagnosis of acute decompensated heart failure (ADHF) in patients presenting with signs and symptoms of ADHF to the emergency department (ED). In addition, NT-proBNP of <300 pg/mL indicates ADHF is not likely.    Age Range Result Interpretation   NT-proBNP Concentration (pg/mL:      <50             Positive            >450                   Gray                 300-450                    Negative             <300    50-75           Positive            >900                  Gray                300-900                  Negative            <300      >75             Positive            >1800                  Gray                300-1800                  Negative            <300    Single High Sensitivity Troponin T [[id number]]  (Normal) Collected: 03/09/24 1701    Specimen: Blood Updated: 03/09/24 1735     HS Troponin T 10 ng/L     Narrative:      High Sensitive Troponin T Reference Range:  <14.0 ng/L- Negative Female for AMI  <22.0 ng/L- Negative Male for AMI  >=14 - Abnormal Female indicating possible myocardial injury.  >=22 - Abnormal Male indicating possible myocardial injury.   Clinicians would have to utilize clinical acumen, EKG, Troponin, and serial changes to determine if it is an Acute Myocardial Infarction or myocardial injury due to an underlying chronic condition.         POC Lactate [[id number]]  (Abnormal) Collected: 03/09/24 1705    Specimen: Blood Updated: 03/09/24 1707     Lactate 3.8 mmol/L      Comment: Serial Number: [id number]:  [id number]       Blood Culture - Blood, Arm, Left [[id number]] Collected: 03/09/24 1701    Specimen: Blood from Arm, Left Updated: 03/09/24 1705             Pending Results: Blood cultures    Imaging Reviewed:   CT Angiogram Chest Pulmonary Embolism    Result Date: 3/9/2024  Impression: Groundglass opacities and tree-in-bud nodularity throughout the left upper and lower lobes as well as the right middle lobe consistent with multifocal pneumonia. There is a 17 cm loculated gas and fluid collection within the left pleural space with associated thickening of the pleural wall, which likely represents empyema. No evidence of pulmonary embolism. There is pulmonary artery enlargement which can be seen with pulmonary  hypertension. 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 Recommend vascular surgery referral. Electronically Signed: Camacho Vee MD  3/9/2024 7:48 PM EST  Workstation ID: [id number]    XR Chest 1 View    Result Date: 3/9/2024  Impression: 1.Tenting left hemidiaphragm with underlying airspace disease which could be secondary to pneumonia. Given the more masslike appearance on prior CT a follow-up CT chest suggested to reassess. 2.Possible left parapneumonic effusion. 3.There is air shadow in the left upper quadrant of the abdomen. This could relate to bowel as opposed to air within the left basilar area that might relate to cavitary area or loculated pneumothorax. This again could be assessed by follow-up CT. Electronically Signed: Santiago Tariq MD  3/9/2024 5:34 PM EST  Workstation ID: [id number]          Assessment & Plan     Prashant Zhou is a 63 y.o. 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  Admitted with concern of sepsis secondary to a focal pneumonia and possible empyema.    [id number] invasive moderately differentiated adenocarcinoma of the sigmoid with metastatic involvement of the peritoneum and KRAS mutated.    Currently on treatment with FOLFOX bevacizumab.  No plans for chemotherapy while admitted.  Will consider restarting once improvement in condition.    Multifocal Infiltrate: suspected pneumonia.    Suspected left sided empyema.    Patient has failed outpatient antibiotics with doxycycline with worsening of respiratory symptoms and new onset loculated fluid collection.    Now on broad spectrum IV antibiotics.    Will request Pulmonology consultation for consideration of bronchoscopy & chest tube placement/drainage of empyema.  -Will request ID evaluation for appropriate antibiotic  therapy.    Normocytic anemia.    Likely multifactorial with ongoing treatment and acute infection.  Continue to monitor CBC    Non occlusive aortic plaque: Thrombus vs atherosclerosis:  -Noted to have a non occlusive plaque in descending aorta/Arch of aorta. This could be concerning for atherosclerotic deposition vs Thrombosis, although Aortic Thrombosis is rare and could be associated with significant complications and morbidity.  -Vascular Surgery eval as per Primary team.    Sacral decubitus ulcer.    Referred to the wound clinic as outpatient    Electronically signed by Cheri Veloz PA-C, 03/10/24    I agree with history, ROS & Assessment/Plan as documented by APRN/PA. Patient has been independently evaluated and examined by myself. Pertinent changes have been made to the relevant sections of this note to reflect my assessment and recommendations.      Thank you for this consult. We will be happy to follow along with you.

## 2024-03-10 NOTE — PLAN OF CARE
Goal Outcome Evaluation:  Plan of Care Reviewed With: patient, spouse, son           Outcome Evaluation: Pt is a 62 YO M with hx of colon cancer, currently undergoing chemo treatments. Pt with severe SOA upon admission and O2 saturations at 81% in ER. Pt states he lives at home with spouse and was independent prior to initiation of chemo treatments. There are 6 steps to enter home with a rail and a flight of steps to shower. Pt since has required assistance with bathing, but is ambulating in home with rollator independently. Pt this date demonstrates global weakness but good mobiltiy using RWx. Pt ambulated in room with CGA, on 5L O2 with O2 maintaining above 90%. Pt tyipcally uses not supplemental O2 at home. Pt will require 6 min walk test prior to d/c and recommendation is return home with family assist and HHPT.      Anticipated Discharge Disposition (PT): home with home health

## 2024-03-10 NOTE — CONSULTS
PULMONARY CRITICAL CARE CONSULT NOTE      PATIENT IDENTIFICATION:  Name: Prashant Zhou  MRN: [id number]  :  1960     Age: 63 y.o.  Sex: male        DATE OF CONSULTATION:  3/10/2024  PRIMARY CARE PHYSICIAN    Lazaro Paulino MD                  CHIEF COMPLAINT: SOB    History of Present Illness:   Prashant Zhou is a 63 y.o. male with a history of COPD, HTN, Tobacco abuse, DM II, HTN, Hyperlipidemia, Rectal cancer currently receiving chemotherapy developed esophagitis treated with Diflucan, AAA, and Hx medical non-compliance who came to the ER with complaints of shortness of breath, cough and congestion. Patient noted with pneumonia and admitted for further treatment.       Review of Systems:   Constitutional: As above   Eyes: negative   ENT/oropharynx: negative   Cardiovascular: negative   Respiratory: As above   Gastrointestinal: negative   Genitourinary: negative   Neurological: negative   Musculoskeletal: negative   Integument/breast: negative   Endocrine: negative   Allergic/Immunologic: negative     Past Medical History:  Past Medical History:   Diagnosis Date    Aortic dissection     Diabetes mellitus     Heart murmur     History of noncompliance with medical treatment     Hyperlipidemia     Hypertension     Type B hypoplasia of aortic arch        Past Surgical History:  Past Surgical History:   Procedure Laterality Date    COLECTOMY PARTIAL / TOTAL      COLONOSCOPY N/A 2023    Procedure: COLONOSCOPY with sigmoid mass tattooing at 35cm, sigmoid and rectal polypectomy;  Surgeon: TORIBIO Jacob MD;  Location: [facility] ENDOSCOPY;  Service: Gastroenterology;  Laterality: N/A;  sigmoid mass, colon polyps        Family History:  Family History   Problem Relation Age of Onset    Diabetes Mother     Dementia Mother     Sudden death Father         Social History:   Social History     Tobacco Use    Smoking status: Every Day     Current packs/day: 2.00     Average packs/day: 2.0  packs/day for 53.2 years (106.4 ttl pk-yrs)     Types: Cigarettes     Start date:     Smokeless tobacco: Never   Substance Use Topics    Alcohol use: No     Comment: Abstinent since around         Allergies:  No Known Allergies    Home Meds:  Medications Prior to Admission   Medication Sig Dispense Refill Last Dose    amLODIPine (NORVASC) 10 MG tablet TAKE 1 TABLET BY MOUTH DAILY 90 tablet 0     atorvastatin (LIPITOR) 40 MG tablet TAKE ONE TABLET BY MOUTH EVERY NIGHT AT BEDTIME 90 tablet 0     Chlorcyclizine-Pseudoephed (Stahist AD) 25-60 MG tablet Take 0.5 tablets by mouth 2 (Two) Times a Day As Needed (Congestion, drainage). 42 tablet 0     cloNIDine (CATAPRES) 0.1 MG tablet Take 1 tablet by mouth 2 (Two) Times a Day. 60 tablet 5     glimepiride (AMARYL) 4 MG tablet TAKE ONE TABLET BY MOUTH TWICE A  tablet 1     hydrALAZINE (APRESOLINE) 50 MG tablet TAKE ONE TABLET BY MOUTH EVERY 8 HOURS 90 tablet 1     lisinopril-hydrochlorothiazide (PRINZIDE,ZESTORETIC) 20-25 MG per tablet Take 1 tablet by mouth Daily.       metFORMIN (GLUCOPHAGE) 850 MG tablet TAKE 1 TABLET BY MOUTH TWICE A DAY WITH MEALS 144 tablet 1     metoprolol tartrate (LOPRESSOR) 50 MG tablet TAKE THREE TABLETS BY MOUTH EVERY 12 HOURS 540 tablet 0     potassium chloride 10 MEQ CR tablet Take 1 tablet by mouth Daily.       traMADol (ULTRAM) 50 MG tablet TAKE 1 TABLET BY MOUTH EVERY 6 HOURS AS NEEDED FOR MODERATE PAIN 28 tablet 0     vitamin D3 125 MCG (5000 UT) capsule capsule Take 1 capsule by mouth Daily.       Blood Glucose Monitoring Suppl (Accu-Chek Guide) w/Device kit 1 Device Daily. 1 kit 0     glucose blood (Accu-Chek Guide) test strip Test daily e11.9 50 each 12        Objective:  tMax 24 hrs: Temp (24hrs), Av.5 °F (37.5 °C), Min:99 °F (37.2 °C), Max:100.2 °F (37.9 °C)      Vitals Ranges:   Temp:  [99 °F (37.2 °C)-100.2 °F (37.9 °C)] 99.2 °F (37.3 °C)  Heart Rate:  [] 107  Resp:  [20-26] 26  BP: (111-138)/(84-20)  "122/97    Intake and Output Last 3 Shifts:   I/O last 3 completed shifts:  In: 2260 [P.O.:150; I.V.:310; IV Piggyback:1800]  Out: 400 [Urine:400]    Exam:  /97   Pulse 107   Temp 99.2 °F (37.3 °C) (Oral)   Resp 26   Ht 190.5 cm (75\")   Wt 91.1 kg (200 lb 13.4 oz)   SpO2 94%   BMI 25.10 kg/m²       03/10/24  0401 03/10/24  0500   Weight: 91.1 kg (200 lb 13.4 oz) 91.1 kg (200 lb 13.4 oz)     General Appearance: Alert on O2     HEENT:  Normocephalic, without obvious abnormality, atraumatic. Conjunctivae/corneas clear.  Normal external ear canals. Nares normal, no drainage.  Neck:  Supple, symmetrical, trachea midline. No JVD.  Lungs /Chest wall:   Bilateral basal rhonchi, respirations unlabored, symmetrical wall movement.     Heart:  Regular rate and rhythm, systolic murmur PMI left sternal border  Abdomen: Soft, nontender, no masses, no organomegaly.    Extremities: Trace edema, no clubbing or cyanosis        Data Review:  All labs (24hrs):   Recent Results (from the past 24 hour(s))   ECG 12 Lead Dyspnea    Collection Time: 03/09/24  4:50 PM   Result Value Ref Range    QT Interval 332 ms    QTC Interval 481 ms   Comprehensive Metabolic Panel    Collection Time: 03/09/24  5:01 PM    Specimen: Blood   Result Value Ref Range    Glucose 291 (H) 65 - 99 mg/dL    BUN 11 8 - 23 mg/dL    Creatinine 0.56 (L) 0.76 - 1.27 mg/dL    Sodium 130 (L) 136 - 145 mmol/L    Potassium 4.5 3.5 - 5.2 mmol/L    Chloride 88 (L) 98 - 107 mmol/L    CO2 26.0 22.0 - 29.0 mmol/L    Calcium 11.1 (H) 8.6 - 10.5 mg/dL    Total Protein 8.0 6.0 - 8.5 g/dL    Albumin 3.1 (L) 3.5 - 5.2 g/dL    ALT (SGPT) 50 (H) 1 - 41 U/L    AST (SGOT) 33 1 - 40 U/L    Alkaline Phosphatase 118 (H) 39 - 117 U/L    Total Bilirubin 0.3 0.0 - 1.2 mg/dL    Globulin 4.9 gm/dL    A/G Ratio 0.6 g/dL    BUN/Creatinine Ratio 19.6 7.0 - 25.0    Anion Gap 16.0 (H) 5.0 - 15.0 mmol/L    eGFR 111.4 >60.0 mL/min/1.73   BNP    Collection Time: 03/09/24  5:01 PM    Specimen: " Blood   Result Value Ref Range    proBNP 425.0 0.0 - 900.0 pg/mL   Single High Sensitivity Troponin T    Collection Time: 03/09/24  5:01 PM    Specimen: Blood   Result Value Ref Range    HS Troponin T 10 <22 ng/L   CBC Auto Differential    Collection Time: 03/09/24  5:01 PM    Specimen: Blood   Result Value Ref Range    WBC 9.80 3.40 - 10.80 10*3/mm3    RBC 3.75 (L) 4.14 - 5.80 10*6/mm3    Hemoglobin 11.3 (L) 13.0 - 17.7 g/dL    Hematocrit 34.0 (L) 37.5 - 51.0 %    MCV 90.8 79.0 - 97.0 fL    MCH 30.2 26.6 - 33.0 pg    MCHC 33.2 31.5 - 35.7 g/dL    RDW 19.0 (H) 12.3 - 15.4 %    RDW-SD 63.4 (H) 37.0 - 54.0 fl    MPV 7.8 6.0 - 12.0 fL    Platelets 435 140 - 450 10*3/mm3    Neutrophil % 75.6 42.7 - 76.0 %    Lymphocyte % 8.8 (L) 19.6 - 45.3 %    Monocyte % 15.1 (H) 5.0 - 12.0 %    Eosinophil % 0.0 (L) 0.3 - 6.2 %    Basophil % 0.5 0.0 - 1.5 %    Neutrophils, Absolute 7.40 (H) 1.70 - 7.00 10*3/mm3    Lymphocytes, Absolute 0.90 0.70 - 3.10 10*3/mm3    Monocytes, Absolute 1.50 (H) 0.10 - 0.90 10*3/mm3    Eosinophils, Absolute 0.00 0.00 - 0.40 10*3/mm3    Basophils, Absolute 0.00 0.00 - 0.20 10*3/mm3    nRBC 0.1 0.0 - 0.2 /100 WBC   Respiratory Panel PCR w/COVID-19(SARS-CoV-2) BREA/BELL/JOHNIE/PAD/COR/[facility]ES[facility] In-House, NP Swab in [facility], 2 HR TAT - Swab, Nasopharynx    Collection Time: 03/09/24  5:01 PM    Specimen: Nasopharynx; Swab   Result Value Ref Range    ADENOVIRUS, PCR Not Detected Not Detected    Coronavirus 229E Not Detected Not Detected    Coronavirus HKU1 Not Detected Not Detected    Coronavirus NL63 Not Detected Not Detected    Coronavirus OC43 Not Detected Not Detected    COVID19 Not Detected Not Detected - Ref. Range    Human Metapneumovirus Not Detected Not Detected    Human Rhinovirus/Enterovirus Not Detected Not Detected    Influenza A PCR Not Detected Not Detected    Influenza B PCR Not Detected Not Detected    Parainfluenza Virus 1 Not Detected Not Detected    Parainfluenza Virus 2 Not Detected Not Detected     Parainfluenza Virus 3 Not Detected Not Detected    Parainfluenza Virus 4 Not Detected Not Detected    RSV, PCR Not Detected Not Detected    Bordetella pertussis pcr Not Detected Not Detected    Bordetella parapertussis PCR Not Detected Not Detected    Chlamydophila pneumoniae PCR Not Detected Not Detected    Mycoplasma pneumo by PCR Not Detected Not Detected   POC Lactate    Collection Time: 03/09/24  5:05 PM    Specimen: Blood   Result Value Ref Range    Lactate 3.8 (C) 0.3 - 2.0 mmol/L   STAT Lactic Acid, Reflex    Collection Time: 03/09/24  8:06 PM    Specimen: Blood   Result Value Ref Range    Lactate 4.3 (C) 0.5 - 2.0 mmol/L   STAT Lactic Acid, Reflex    Collection Time: 03/09/24 11:51 PM    Specimen: Blood   Result Value Ref Range    Lactate 2.5 (C) 0.5 - 2.0 mmol/L   Basic Metabolic Panel    Collection Time: 03/09/24 11:51 PM    Specimen: Blood   Result Value Ref Range    Glucose 182 (H) 65 - 99 mg/dL    BUN 8 8 - 23 mg/dL    Creatinine 0.40 (L) 0.76 - 1.27 mg/dL    Sodium 131 (L) 136 - 145 mmol/L    Potassium 4.0 3.5 - 5.2 mmol/L    Chloride 95 (L) 98 - 107 mmol/L    CO2 26.0 22.0 - 29.0 mmol/L    Calcium 9.8 8.6 - 10.5 mg/dL    BUN/Creatinine Ratio 20.0 7.0 - 25.0    Anion Gap 10.0 5.0 - 15.0 mmol/L    eGFR 123.4 >60.0 mL/min/1.73   CBC Auto Differential    Collection Time: 03/09/24 11:51 PM    Specimen: Blood   Result Value Ref Range    WBC 8.20 3.40 - 10.80 10*3/mm3    RBC 3.36 (L) 4.14 - 5.80 10*6/mm3    Hemoglobin 10.1 (L) 13.0 - 17.7 g/dL    Hematocrit 30.6 (L) 37.5 - 51.0 %    MCV 91.2 79.0 - 97.0 fL    MCH 30.0 26.6 - 33.0 pg    MCHC 32.9 31.5 - 35.7 g/dL    RDW 18.8 (H) 12.3 - 15.4 %    RDW-SD 59.5 (H) 37.0 - 54.0 fl    MPV 7.1 6.0 - 12.0 fL    Platelets 396 140 - 450 10*3/mm3    Neutrophil % 68.1 42.7 - 76.0 %    Lymphocyte % 11.7 (L) 19.6 - 45.3 %    Monocyte % 19.1 (H) 5.0 - 12.0 %    Eosinophil % 0.0 (L) 0.3 - 6.2 %    Basophil % 1.1 0.0 - 1.5 %    Neutrophils, Absolute 5.60 1.70 - 7.00 10*3/mm3     Lymphocytes, Absolute 1.00 0.70 - 3.10 10*3/mm3    Monocytes, Absolute 1.60 (H) 0.10 - 0.90 10*3/mm3    Eosinophils, Absolute 0.00 0.00 - 0.40 10*3/mm3    Basophils, Absolute 0.10 0.00 - 0.20 10*3/mm3    nRBC 0.0 0.0 - 0.2 /100 WBC   MRSA Screen, PCR (Inpatient) - Swab, Nares    Collection Time: 03/09/24 11:51 PM    Specimen: Nares; Swab   Result Value Ref Range    MRSA PCR No MRSA Detected No MRSA Detected   STAT Lactic Acid, Reflex    Collection Time: 03/10/24  4:27 AM    Specimen: Blood   Result Value Ref Range    Lactate 2.0 0.5 - 2.0 mmol/L   POC Glucose 4x Daily Before Meals & at Bedtime    Collection Time: 03/10/24  7:20 AM    Specimen: Blood   Result Value Ref Range    Glucose 185 (H) 70 - 105 mg/dL   POC Glucose 4x Daily Before Meals & at Bedtime    Collection Time: 03/10/24 11:51 AM    Specimen: Blood   Result Value Ref Range    Glucose 171 (H) 70 - 105 mg/dL        Imaging:  CT Angiogram Chest Pulmonary Embolism    Result Date: 3/9/2024  CT ANGIOGRAM CHEST PULMONARY EMBOLISM Date of Exam: 3/9/2024 7:37 PM EST Indication: dyspnea hypoxia. Comparison: Same day chest radiograph Technique: Axial CT images were obtained of the chest after the uneventful intravenous administration of iodinated contrast utilizing pulmonary embolism protocol.  Sagittal and coronal reconstructions were performed.  Automated exposure control and iterative reconstruction methods were used. Findings: There is no evidence of pulmonary embolism. The pulmonary arteries are enlarged with the right main pulmonary artery measuring up to 3.3 cm. There is no evidence of right heart strain, however the right heart is enlarged. No significant pericardial effusion. Atherosclerotic calcifications of the coronary arteries. There is a descending aortic aneurysm measuring up to 4.3 cm as seen on axial image 85. There is also eccentric none calcified thrombus within the posterior aspect of the proximal descending aorta (sagittal image 88), which  appears increased from 2/1/2024 CT. The central airways are patent. There are groundglass opacities and tree-in-bud nodularity throughout the left upper and lower lobes as well as the right middle lobe. There is denser consolidation in the posterior left lower lobe. There is an adjacent loculated gas and fluid collection within the left pleura with thickening of the pleural wall. This collection measures 17.0 x 5.4 cm on axial image 114. There is no evidence of right-sided pleural effusion or pneumothorax. Mild centrilobular emphysema. Right chest wall port with distal lead tip in the superior vena cava. The chest wall soft tissues show no acute abnormality. There is no evidence of fracture or suspicious osseous lesion. Included portions of the upper abdomen show no acute abnormality.     Impression: Groundglass opacities and tree-in-bud nodularity throughout the left upper and lower lobes as well as the right middle lobe consistent with multifocal pneumonia. There is a 17 cm loculated gas and fluid collection within the left pleural space with associated thickening of the pleural wall, which likely represents empyema. No evidence of pulmonary embolism. There is pulmonary artery enlargement which can be seen with pulmonary hypertension. 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 Recommend vascular surgery referral. Electronically Signed: Camacho Vee MD  3/9/2024 7:48 PM EST  Workstation ID: [id number]    XR Chest 1 View    Result Date: 3/9/2024  XR CHEST 1 VW Date of Exam: 3/9/2024 5:17 PM EST Indication: CHF/COPD Protocol CHF/COPD Protocol Comparison: CT chest February 1, 2024 Findings:  There is tenting left hemidiaphragm with airspace disease noted in this area. On the prior CT there was a somewhat masslike area of consolidation. Findings may be secondary to pneumonia. There may be a  parapneumonic effusion. There is air shadow at the left base. It is to tell whether this is subpulmonic or intrapulmonary. This may relate to bowel in the left upper quadrant of the abdomen. The right lung is clear. A port catheter is noted with its tip in the superior vena cava.     Impression: 1.Tenting left hemidiaphragm with underlying airspace disease which could be secondary to pneumonia. Given the more masslike appearance on prior CT a follow-up CT chest suggested to reassess. 2.Possible left parapneumonic effusion. 3.There is air shadow in the left upper quadrant of the abdomen. This could relate to bowel as opposed to air within the left basilar area that might relate to cavitary area or loculated pneumothorax. This again could be assessed by follow-up CT. Electronically Signed: Santiago Tariq MD  3/9/2024 5:34 PM EST  Workstation ID: [id number]       Current:  acetaminophen, 1,000 mg, Oral, Once  amLODIPine, 10 mg, Oral, Daily  atorvastatin, 40 mg, Oral, Daily  cefepime, 2,000 mg, Intravenous, Q8H  cloNIDine, 0.1 mg, Oral, BID  guaiFENesin, 600 mg, Oral, Q12H  hydrALAZINE, 50 mg, Oral, Q8H  lisinopril, 20 mg, Oral, Q24H   And  hydroCHLOROthiazide, 25 mg, Oral, Q24H  insulin lispro, 2-9 Units, Subcutaneous, 4x Daily AC & at Bedtime  metoprolol tartrate, 150 mg, Oral, Q12H  metroNIDAZOLE, 500 mg, Intravenous, Q8H  vancomycin, 1,250 mg, Intravenous, Q12H  vitamin D3, 5,000 Units, Oral, Daily        Infusion:  Pharmacy to Dose Cefepime,   Pharmacy to dose vancomycin,   sodium chloride, 100 mL/hr, Last Rate: 100 mL/hr (03/09/24 8749)         PRN:    acetaminophen    benzonatate    dextrose    dextrose    glucagon (human recombinant)    ipratropium-albuterol    Pharmacy to Dose Cefepime    Pharmacy to dose vancomycin    sodium chloride    traMADol    ASSESSMENT:  Acute hypoxic respiratory failure   Pneumonia possible aspiration  Empyema  Sepsis  HTN  DM II  Hyperlipidemia  Rectal cancer  adenocarcinoma  Hyponatremia  AAA  Tobacco abuse          PLAN:  The pleural effusion localized in multiple pocket with some free fluid I am going to ask CT surgery to evaluate for possible need for decortication  Broad-spectrum antibiotics for now  Encouraged use I-S flutter valve  Bronchodilators  Inhaled corticosteroids  Incentive spirometer/Flutter valve  Electrolytes/ glycemic control  DVT and GI prophylaxis patient with history of malignancy or putting on subcu heparin    Discussed with Dr Gustavo Muhammad, APRN  3/10/2024  13:03 EDT    I personally have examined  and interviewed the patient. I have reviewed the history, data, problems, assessment and plan with our NP.  Total Critical care time in direct medical management (   ) minutes, This time specifically excludes time spent performing procedures.    Chico Chen MD   3/10/2024  13:37 EDT

## 2024-03-10 NOTE — CONSULTS
Progress note    Full consult note to follow.    Consult received for left loculated effusion/empyema.    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    He had a CT scan of the chest on 2/1/2024 which showed 3.6 cm left lower lobe mass/consolidation.  I am not sure if this was the starting point of community-acquired pneumonia or the mass represent pulmonary metastasis.  He has developed significant loculated effusion with a thick rind around the left lower lobe.  There is also nodularity along the pleura which could be pleural plaque versus metastasis.  Though it would be unusual that the nodularity represent accelerated metastases in 6 weeks.    I will discuss with the patient possible left thoracoscopy, pleural biopsy and intraoperatively depending on benign or malignant lesion I will determine whether he needs decortication or not.  I will also discuss his case with his oncologist to determine his long-term prognosis.    I have ordered transthoracic echo for preop risk stratification.  He has some EKG changes and he has no prior echo in the chart.    NPO at midnight.  Tentatively scheduled for the OR tomorrow, pending discussion with the patient and transthoracic echo results.    Saqib Keller MD  Thoracic surgeon

## 2024-03-10 NOTE — CONSULTS
Name: Prashant Zhou ADMIT: 3/9/2024   : 1960  PCP: Lazaro Paulino MD    MRN: [id number] LOS: 1 days   AGE/SEX: 63 y.o. male  ROOM:    [facility]      Patient Care Team:  Lazaro Paulino MD as PCP - General  Steve Mckeon MD as Consulting Physician (Cardiology)  Lars Wagner MD as Consulting Physician (Hematology and Oncology)  Chief Complaint   Patient presents with    Shortness of Breath     Pt reports increased SOA that started today and worsened.  Pt was 81% on RA upon arrival to triage.  Pt reports productive cough with thick white sputum.   Pt reports his next chemo treatment for colon cancer on Tuesday.      CC: Thoracic aneurysm evaluation.    Subjective     Inpatient Vascular Surgery Consult  Consult performed by: Dax Dowling MD  Consult ordered by: Marilu Sams APRN        History of Present Illness  Review of Systems    Past Medical History:   Diagnosis Date    Aortic dissection     Diabetes mellitus     Heart murmur     History of noncompliance with medical treatment     Hyperlipidemia     Hypertension     Type B hypoplasia of aortic arch      Past Surgical History:   Procedure Laterality Date    COLECTOMY PARTIAL / TOTAL      COLONOSCOPY N/A 2023    Procedure: COLONOSCOPY with sigmoid mass tattooing at 35cm, sigmoid and rectal polypectomy;  Surgeon: TORIBIO Jacob MD;  Location: [facility];  Service: Gastroenterology;  Laterality: N/A;  sigmoid mass, colon polyps     Family History   Problem Relation Age of Onset    Diabetes Mother     Dementia Mother     Sudden death Father        Social History     Tobacco Use    Smoking status: Every Day     Current packs/day: 2.00     Average packs/day: 2.0 packs/day for 53.2 years (106.4 ttl pk-yrs)     Types: Cigarettes     Start date:     Smokeless tobacco: Never   Vaping Use    Vaping status: Never Used   Substance Use Topics    Alcohol use: No     Comment: Abstinent since around  2008    Drug use: Not Currently     Types: Marijuana     Comment: Abstinent since at least the 1980's     Medications Prior to Admission   Medication Sig Dispense Refill Last Dose    amLODIPine (NORVASC) 10 MG tablet TAKE 1 TABLET BY MOUTH DAILY 90 tablet 0     atorvastatin (LIPITOR) 40 MG tablet TAKE ONE TABLET BY MOUTH EVERY NIGHT AT BEDTIME 90 tablet 0     Chlorcyclizine-Pseudoephed (Stahist AD) 25-60 MG tablet Take 0.5 tablets by mouth 2 (Two) Times a Day As Needed (Congestion, drainage). 42 tablet 0     cloNIDine (CATAPRES) 0.1 MG tablet Take 1 tablet by mouth 2 (Two) Times a Day. 60 tablet 5     glimepiride (AMARYL) 4 MG tablet TAKE ONE TABLET BY MOUTH TWICE A  tablet 1     hydrALAZINE (APRESOLINE) 50 MG tablet TAKE ONE TABLET BY MOUTH EVERY 8 HOURS 90 tablet 1     lisinopril-hydrochlorothiazide (PRINZIDE,ZESTORETIC) 20-25 MG per tablet Take 1 tablet by mouth Daily.       metFORMIN (GLUCOPHAGE) 850 MG tablet TAKE 1 TABLET BY MOUTH TWICE A DAY WITH MEALS 144 tablet 1     metoprolol tartrate (LOPRESSOR) 50 MG tablet TAKE THREE TABLETS BY MOUTH EVERY 12 HOURS 540 tablet 0     potassium chloride 10 MEQ CR tablet Take 1 tablet by mouth Daily.       traMADol (ULTRAM) 50 MG tablet TAKE 1 TABLET BY MOUTH EVERY 6 HOURS AS NEEDED FOR MODERATE PAIN 28 tablet 0     vitamin D3 125 MCG (5000 UT) capsule capsule Take 1 capsule by mouth Daily.       Blood Glucose Monitoring Suppl (Accu-Chek Guide) w/Device kit 1 Device Daily. 1 kit 0     glucose blood (Accu-Chek Guide) test strip Test daily e11.9 50 each 12      acetaminophen, 1,000 mg, Oral, Once  amLODIPine, 10 mg, Oral, Daily  atorvastatin, 40 mg, Oral, Daily  budesonide, 0.5 mg, Nebulization, BID - RT  cefepime, 2,000 mg, Intravenous, Q8H  cloNIDine, 0.1 mg, Oral, BID  guaiFENesin, 600 mg, Oral, Q12H  heparin (porcine), 5,000 Units, Subcutaneous, Q8H  hydrALAZINE, 50 mg, Oral, Q8H  lisinopril, 20 mg, Oral, Q24H   And  hydroCHLOROthiazide, 25 mg, Oral, Q24H  insulin  "lispro, 2-9 Units, Subcutaneous, 4x Daily AC & at Bedtime  ipratropium-albuterol, 3 mL, Nebulization, 4x Daily - RT  metoprolol tartrate, 150 mg, Oral, Q12H  metroNIDAZOLE, 500 mg, Intravenous, Q8H  vancomycin, 1,250 mg, Intravenous, Q12H  vitamin D3, 5,000 Units, Oral, Daily      Pharmacy to Dose Cefepime,   Pharmacy to dose vancomycin,   sodium chloride, 100 mL/hr, Last Rate: 100 mL/hr (03/10/24 1359)        acetaminophen    benzonatate    dextrose    dextrose    glucagon (human recombinant)    ipratropium-albuterol    Pharmacy to Dose Cefepime    Pharmacy to dose vancomycin    sodium chloride    traMADol  Patient has no known allergies.    Objective     Physical Exam:  Physical Exam  Constitutional:       Appearance: He is well-developed.   Pulmonary:      Effort: Pulmonary effort is normal. No respiratory distress.   Abdominal:      General: There is no distension.      Palpations: Abdomen is soft.   Neurological:      Mental Status: He is alert and oriented to person, place, and time.          Vital Signs and Labs:  Vital Signs Patient Vitals for the past 24 hrs:   BP Temp Temp src Pulse Resp SpO2 Height Weight   03/10/24 1801 98/61 98.1 °F (36.7 °C) Oral -- 26 -- -- --   03/10/24 1452 -- -- -- 93 24 92 % -- --   03/10/24 1445 -- -- -- 88 24 92 % -- --   03/10/24 1300 103/59 98.3 °F (36.8 °C) Oral -- (!) 29 -- -- --   03/10/24 1040 122/97 -- -- 107 -- -- -- --   03/10/24 0932 131/73 99.2 °F (37.3 °C) Oral 92 26 -- -- --   03/10/24 0634 115/75 -- -- 99 -- -- -- --   03/10/24 0500 117/69 -- -- -- -- -- -- 91.1 kg (200 lb 13.4 oz)   03/10/24 0453 125/73 99.5 °F (37.5 °C) -- 97 -- -- -- --   03/10/24 0401 127/73 99.5 °F (37.5 °C) Oral -- 20 94 % 190.5 cm (75\") 91.1 kg (200 lb 13.4 oz)   03/09/24 2350 125/66 -- -- (!) 129 -- -- -- --   03/09/24 2051 -- -- -- (!) 124 -- 94 % -- --   03/09/24 2047 111/69 -- -- (!) 130 -- 92 % -- --   03/09/24 2008 -- -- -- (!) 129 -- 93 % -- --   03/09/24 2001 138/80 -- -- (!) 168 -- " 91 % -- --     I/O:  I/O last 3 completed shifts:  In: 2260 [P.O.:150; I.V.:310; IV Piggyback:1800]  Out: 400 [Urine:400]    CBC    Results from last 7 days   Lab Units 03/09/24  2351 03/09/24  1701 03/08/24  1139   WBC 10*3/mm3 8.20 9.80 12.09*   HEMOGLOBIN g/dL 10.1* 11.3* 11.0*   PLATELETS 10*3/mm3 396 435 347     BMP   Results from last 7 days   Lab Units 03/09/24  2351 03/09/24  1701   SODIUM mmol/L 131* 130*   POTASSIUM mmol/L 4.0 4.5   CHLORIDE mmol/L 95* 88*   CO2 mmol/L 26.0 26.0   BUN mg/dL 8 11   CREATININE mg/dL 0.40* 0.56*   GLUCOSE mg/dL 182* 291*     Cr Clearance Estimated Creatinine Clearance: 246.7 mL/min (A) (by C-G formula based on SCr of 0.4 mg/dL (L)).  [facility] [place] CT Angiogram Chest Pulmonary Embolism    Result Date: 3/9/2024  Impression: Groundglass opacities and tree-in-bud nodularity throughout the left upper and lower lobes as well as the right middle lobe consistent with multifocal pneumonia. There is a 17 cm loculated gas and fluid collection within the left pleural space with associated thickening of the pleural wall, which likely represents empyema. No evidence of pulmonary embolism. There is pulmonary artery enlargement which can be seen with pulmonary hypertension. 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 Recommend vascular surgery referral. Electronically Signed: Camacho Vee MD  3/9/2024 7:48 PM EST  Workstation ID: [id number]    XR Chest 1 View    Result Date: 3/9/2024  Impression: 1.Tenting left hemidiaphragm with underlying airspace disease which could be secondary to pneumonia. Given the more masslike appearance on prior CT a follow-up CT chest suggested to reassess. 2.Possible left parapneumonic effusion. 3.There is air shadow in the left upper quadrant of the abdomen. This could relate to bowel as opposed to air within the left basilar  area that might relate to cavitary area or loculated pneumothorax. This again could be assessed by follow-up CT. Electronically Signed: Santiago Tariq MD  3/9/2024 5:34 PM EST  Workstation ID: [id number]     Active Hospital Problems    Diagnosis  POA    **Sepsis [A41.9]  Yes    Pneumonia [J18.9]  Yes    Empyema lung [J86.9]  Yes    Acute respiratory failure with hypoxia [J96.01]  Yes    Hyponatremia [E87.1]  Yes    Thoracic aortic aneurysm without rupture [I71.20]  Yes    Aortic thrombus [I74.10]  Yes    Tobacco use [Z72.0]  Yes    Rectal cancer [C20]  Yes    Hypertension [I10]  Yes    Type 2 diabetes mellitus with hyperglycemia [E11.65]  Yes    Hyperlipidemia [E78.5]  Yes      Resolved Hospital Problems   No resolved problems to display.     Problem Points:  3:  Patient has a new problem, with no additional work-up planned (max of 1)  Total problem points:3    Data Points:  1:  I have reviewed or order clinical lab test  1:  I have reviewed or order radiology test (except heart catheterization or echo)  2:  I have personally and independently review of image, tracing, or specimen  Total data points:4 or more    Risk Points:  High:  Any illness that poses threat to life or body funciton    MDM requires 2/3 (Problem points, Data points and Risk)  MDM Prob point Data point Risk   SF 1 1 Minimal   Low 2 2 Low   Mod 3 3 Moderate   High 4 4 High     Code requires 3/3 (MDM, History and Exam)  Code MDM History Exam Time   42033 SF/Low Detailed Detailed 30   28843 Mod Comprehensive Comprehensive 50   09594 High Comprehensive Comprehensive 70     Detailed history:  4 elements HPI or status of 3 chronic problems; 2-9 system ROS  Comprehensive:  4 elements HPI or status of 3 chronic problems;  10 system ROS    Detailed Exam:  12 findings from any organ system  Comprehensive Exam:  2 findings from each of 9 systems.   22852    Assessment & Plan       Sepsis    Hypertension    Type 2 diabetes mellitus with hyperglycemia     Hyperlipidemia    Rectal cancer    Pneumonia    Empyema lung    Acute respiratory failure with hypoxia    Hyponatremia    Thoracic aortic aneurysm without rupture    Aortic thrombus    Tobacco use      63 y.o. male patient admitted to the hospitalist for multifocal pneumonia.  Incidentally seen on his CT scan was a 4.3 cm unchanged thoracic aneurysm.  He knew about this and says this was related to a dissection years ago.  Is currently asymptomatic.  Would recommend the patient be on aspirin and statin therapy.  He can follow-up with me 1 to 2 months postdischarge for ongoing surveillance.    I discussed the patients findings and my recommendations with patient and family.    Dax Dowling MD  03/10/24  18:32 EDT    Please call my office with any question: [phone]

## 2024-03-10 NOTE — PROGRESS NOTES
"Pharmacy Antimicrobial Dosing Service    Subjective:  Prashant Zhou is a 63 y.o.male admitted with SOA and congestion. Pharmacy has been consulted to dose Vancomycin and Cefepime for possible pneumonia/sepsis.    PMH: DM, hx of colon cancer currently on chemotherapy      Assessment/Plan    1. Day #1 Vancomycin: Goal -600 mcg*h/mL. 1750mg (~19mg/kg ABW) IV x1 dose followed by 1250mg (~14mg/kg ABW) IV q12h.  Random level ordered for 3/10 at 1300.    2. Day #1 Cefepime: 2000mg IV q8h for estCrCl > 60 mL/min.    3. Day #1 Metronidazole 500mg IV q8h    Will continue to monitor drug levels, renal function, culture and sensitivities, and patient clinical status.       Objective:  Relevant clinical data and objective history reviewed:  190.5 cm (75\")   91.2 kg (201 lb)   Ideal body weight: 84.5 kg (186 lb 4.6 oz)  Adjusted ideal body weight: 87.2 kg (192 lb 2.8 oz)  Body mass index is 25.12 kg/m².        Results from last 7 days   Lab Units 03/09/24  1701   CREATININE mg/dL 0.56*     Estimated Creatinine Clearance: 176.4 mL/min (A) (by C-G formula based on SCr of 0.56 mg/dL (L)).  I/O last 3 completed shifts:  In: 100 [IV Piggyback:100]  Out: -     Results from last 7 days   Lab Units 03/09/24  1701 03/08/24  1139   WBC 10*3/mm3 9.80 12.09*     Temperature    03/09/24 1644 03/09/24 1746   Temp: 99 °F (37.2 °C) 100.2 °F (37.9 °C)     Baseline culture/source/susceptibility:  Microbiology Results (last 10 days)       Procedure Component Value - Date/Time    Respiratory Panel PCR w/COVID-19(SARS-CoV-2) [facility] In-House, NP Swab in UTM/VTM, 2 HR TAT - Swab, Nasopharynx [[id number]]  (Normal) Collected: 03/09/24 1701    Lab Status: Final result Specimen: Swab from Nasopharynx Updated: 03/09/24 1756     ADENOVIRUS, PCR Not Detected     Coronavirus 229E Not Detected     Coronavirus HKU1 Not Detected     Coronavirus NL63 Not Detected     Coronavirus OC43 Not Detected     COVID19 Not Detected     Human " Metapneumovirus Not Detected     Human Rhinovirus/Enterovirus Not Detected     Influenza A PCR Not Detected     Influenza B PCR Not Detected     Parainfluenza Virus 1 Not Detected     Parainfluenza Virus 2 Not Detected     Parainfluenza Virus 3 Not Detected     Parainfluenza Virus 4 Not Detected     RSV, PCR Not Detected     Bordetella pertussis pcr Not Detected     Bordetella parapertussis PCR Not Detected     Chlamydophila pneumoniae PCR Not Detected     Mycoplasma pneumo by PCR Not Detected    Narrative:      In the setting of a positive respiratory panel with a viral infection PLUS a negative procalcitonin without other underlying concern for bacterial infection, consider observing off antibiotics or discontinuation of antibiotics and continue supportive care. If the respiratory panel is positive for atypical bacterial infection (Bordetella pertussis, Chlamydophila pneumoniae, or Mycoplasma pneumoniae), consider antibiotic de-escalation to target atypical bacterial infection.            Manish Chavez  03/09/24 22:02 EST

## 2024-03-11 ENCOUNTER — APPOINTMENT (OUTPATIENT)
Dept: GENERAL RADIOLOGY | Facility: HOSPITAL | Age: 64
DRG: 853 | End: 2024-03-11
Payer: MEDICARE

## 2024-03-11 ENCOUNTER — APPOINTMENT (OUTPATIENT)
Dept: CARDIOLOGY | Facility: HOSPITAL | Age: 64
DRG: 853 | End: 2024-03-11
Payer: MEDICARE

## 2024-03-11 ENCOUNTER — ANESTHESIA (OUTPATIENT)
Dept: PERIOP | Facility: HOSPITAL | Age: 64
End: 2024-03-11
Payer: MEDICARE

## 2024-03-11 ENCOUNTER — ANESTHESIA EVENT (OUTPATIENT)
Dept: PERIOP | Facility: HOSPITAL | Age: 64
End: 2024-03-11
Payer: MEDICARE

## 2024-03-11 DIAGNOSIS — J86.9 EMPYEMA LUNG: Primary | ICD-10-CM

## 2024-03-11 LAB
ANION GAP SERPL CALCULATED.3IONS-SCNC: 10 MMOL/L (ref 5–15)
ARTERIAL PATENCY WRIST A: ABNORMAL
ARTERIAL PATENCY WRIST A: ABNORMAL
ATMOSPHERIC PRESS: ABNORMAL MM[HG]
ATMOSPHERIC PRESS: ABNORMAL MM[HG]
BASE DEFICIT: ABNORMAL
BASE EXCESS BLDA CALC-SCNC: -1.2 MMOL/L (ref 0–3)
BASE EXCESS BLDA CALC-SCNC: -1.7 MMOL/L (ref 0–3)
BASE EXCESS BLDA CALC-SCNC: <0 MMOL/L (ref 0–3)
BDY SITE: ABNORMAL
BDY SITE: ABNORMAL
BH CV ECHO MEAS - ACS: 2.3 CM
BH CV ECHO MEAS - AO MAX PG: 7.5 MMHG
BH CV ECHO MEAS - AO MEAN PG: 4 MMHG
BH CV ECHO MEAS - AO ROOT DIAM: 3.3 CM
BH CV ECHO MEAS - AO V2 MAX: 137 CM/SEC
BH CV ECHO MEAS - AO V2 VTI: 16.8 CM
BH CV ECHO MEAS - AVA(I,D): 4.3 CM2
BH CV ECHO MEAS - EDV(CUBED): 59.3 ML
BH CV ECHO MEAS - EDV(MOD-SP4): 73.3 ML
BH CV ECHO MEAS - EF(MOD-BP): 65 %
BH CV ECHO MEAS - EF(MOD-SP4): 65.2 %
BH CV ECHO MEAS - ESV(CUBED): 17.6 ML
BH CV ECHO MEAS - ESV(MOD-SP4): 25.5 ML
BH CV ECHO MEAS - FS: 33.3 %
BH CV ECHO MEAS - IVS/LVPW: 1 CM
BH CV ECHO MEAS - IVSD: 1.1 CM
BH CV ECHO MEAS - LA DIMENSION: 4.2 CM
BH CV ECHO MEAS - LV DIASTOLIC VOL/BSA (35-75): 33.6 CM2
BH CV ECHO MEAS - LV MASS(C)D: 140.1 GRAMS
BH CV ECHO MEAS - LV MAX PG: 6.8 MMHG
BH CV ECHO MEAS - LV MEAN PG: 4 MMHG
BH CV ECHO MEAS - LV SYSTOLIC VOL/BSA (12-30): 11.7 CM2
BH CV ECHO MEAS - LV V1 MAX: 130 CM/SEC
BH CV ECHO MEAS - LV V1 VTI: 17.2 CM
BH CV ECHO MEAS - LVIDD: 3.9 CM
BH CV ECHO MEAS - LVIDS: 2.6 CM
BH CV ECHO MEAS - LVOT AREA: 4.2 CM2
BH CV ECHO MEAS - LVOT DIAM: 2.3 CM
BH CV ECHO MEAS - LVPWD: 1.1 CM
BH CV ECHO MEAS - MV A MAX VEL: 104 CM/SEC
BH CV ECHO MEAS - MV DEC SLOPE: 1631 CM/SEC2
BH CV ECHO MEAS - MV DEC TIME: 0.09 SEC
BH CV ECHO MEAS - MV E MAX VEL: 92 CM/SEC
BH CV ECHO MEAS - MV E/A: 0.88
BH CV ECHO MEAS - MV MAX PG: 7.7 MMHG
BH CV ECHO MEAS - MV MEAN PG: 4 MMHG
BH CV ECHO MEAS - MV P1/2T: 23.7 MSEC
BH CV ECHO MEAS - MV V2 VTI: 22 CM
BH CV ECHO MEAS - MVA(P1/2T): 9.3 CM2
BH CV ECHO MEAS - MVA(VTI): 3.2 CM2
BH CV ECHO MEAS - PA V2 MAX: 122 CM/SEC
BH CV ECHO MEAS - RAP SYSTOLE: 15 MMHG
BH CV ECHO MEAS - RV MAX PG: 1.44 MMHG
BH CV ECHO MEAS - RV V1 MAX: 59.9 CM/SEC
BH CV ECHO MEAS - RV V1 VTI: 6.6 CM
BH CV ECHO MEAS - RVSP: 33.7 MMHG
BH CV ECHO MEAS - SI(MOD-SP4): 21.9 ML/M2
BH CV ECHO MEAS - SV(LVOT): 71.5 ML
BH CV ECHO MEAS - SV(MOD-SP4): 47.8 ML
BH CV ECHO MEAS - TAPSE (>1.6): 1.9 CM
BH CV ECHO MEAS - TR MAX PG: 18.7 MMHG
BH CV ECHO MEAS - TR MAX VEL: 216 CM/SEC
BUN SERPL-MCNC: 8 MG/DL (ref 8–23)
BUN/CREAT SERPL: 18.6 (ref 7–25)
CA-I BLDA-SCNC: 1.46 MMOL/L (ref 1.12–1.32)
CALCIUM SPEC-SCNC: 10.1 MG/DL (ref 8.6–10.5)
CHLORIDE SERPL-SCNC: 99 MMOL/L (ref 98–107)
CO2 BLDA-SCNC: 25.2 MMOL/L (ref 22–29)
CO2 BLDA-SCNC: 26.3 MMOL/L (ref 22–29)
CO2 BLDA-SCNC: 27 MMOL/L (ref 23–27)
CO2 SERPL-SCNC: 26 MMOL/L (ref 22–29)
CREAT SERPL-MCNC: 0.43 MG/DL (ref 0.76–1.27)
DEPRECATED RDW RBC AUTO: 62.1 FL (ref 37–54)
EGFRCR SERPLBLD CKD-EPI 2021: 120.7 ML/MIN/1.73
ERYTHROCYTE [DISTWIDTH] IN BLOOD BY AUTOMATED COUNT: 18.8 % (ref 12.3–15.4)
GLUCOSE BLDC GLUCOMTR-MCNC: 103 MG/DL (ref 70–105)
GLUCOSE BLDC GLUCOMTR-MCNC: 104 MG/DL (ref 70–105)
GLUCOSE BLDC GLUCOMTR-MCNC: 144 MG/DL (ref 70–105)
GLUCOSE BLDC GLUCOMTR-MCNC: 151 MG/DL (ref 70–105)
GLUCOSE SERPL-MCNC: 137 MG/DL (ref 65–99)
HCO3 BLDA-SCNC: 23.9 MMOL/L (ref 21–28)
HCO3 BLDA-SCNC: 24.8 MMOL/L (ref 21–28)
HCO3 BLDA-SCNC: 25.1 MMOL/L (ref 22–26)
HCT VFR BLD AUTO: 29 % (ref 37.5–51)
HCT VFR BLDA CALC: 28 % (ref 38–51)
HEMODILUTION: NO
HEMODILUTION: NO
HGB BLD-MCNC: 9.5 G/DL (ref 13–17.7)
HGB BLDA-MCNC: 9.5 G/DL (ref 12–17)
INHALED O2 CONCENTRATION: 100 %
INHALED O2 CONCENTRATION: 70 %
LEFT ATRIUM VOLUME INDEX: 21.4 ML/M2
MAGNESIUM SERPL-MCNC: 1.9 MG/DL (ref 1.6–2.4)
MCH RBC QN AUTO: 29.3 PG (ref 26.6–33)
MCHC RBC AUTO-ENTMCNC: 32.6 G/DL (ref 31.5–35.7)
MCV RBC AUTO: 89.6 FL (ref 79–97)
MODALITY: ABNORMAL
MODALITY: ABNORMAL
PCO2 BLDA: 41 MM HG (ref 35–48)
PCO2 BLDA: 47.7 MM HG (ref 35–45)
PCO2 BLDA: 49.2 MM HG (ref 35–48)
PEEP RESPIRATORY: 10 CM[H2O]
PEEP RESPIRATORY: 8 CM[H2O]
PH BLDA: 7.31 PH UNITS (ref 7.35–7.45)
PH BLDA: 7.33 PH UNITS (ref 7.35–7.45)
PH BLDA: 7.38 PH UNITS (ref 7.35–7.45)
PLATELET # BLD AUTO: 503 10*3/MM3 (ref 140–450)
PMV BLD AUTO: 7.5 FL (ref 6–12)
PO2 BLDA: 175.1 MM HG (ref 83–108)
PO2 BLDA: 46.5 MM HG (ref 83–108)
PO2 BLDA: 48 MM HG (ref 80–105)
POTASSIUM BLDA-SCNC: 3.4 MMOL/L (ref 3.5–4.9)
POTASSIUM SERPL-SCNC: 3.5 MMOL/L (ref 3.5–5.2)
PSV: 20 CMH2O
QT INTERVAL: 328 MS
QTC INTERVAL: 515 MS
RBC # BLD AUTO: 3.23 10*6/MM3 (ref 4.14–5.8)
RESPIRATORY RATE: 15
SAO2 % BLDCOA: 80 % (ref 95–98)
SAO2 % BLDCOA: 81.1 % (ref 94–98)
SAO2 % BLDCOA: 99.4 % (ref 94–98)
SINUS: 3.1 CM
SODIUM BLD-SCNC: 135 MMOL/L (ref 138–146)
SODIUM SERPL-SCNC: 135 MMOL/L (ref 136–145)
VENTILATOR MODE: AC
VT ON VENT VENT: 550 ML
WBC NRBC COR # BLD AUTO: 10.2 10*3/MM3 (ref 3.4–10.8)

## 2024-03-11 PROCEDURE — C9290 INJ, BUPIVACAINE LIPOSOME: HCPCS | Performed by: ANESTHESIOLOGY

## 2024-03-11 PROCEDURE — 25010000002 ALBUMIN HUMAN 5% PER 50 ML: Performed by: SURGERY

## 2024-03-11 PROCEDURE — 25010000002 PROPOFOL 200 MG/20ML EMULSION: Performed by: NURSE ANESTHETIST, CERTIFIED REGISTERED

## 2024-03-11 PROCEDURE — 88305 TISSUE EXAM BY PATHOLOGIST: CPT | Performed by: SURGERY

## 2024-03-11 PROCEDURE — 82330 ASSAY OF CALCIUM: CPT

## 2024-03-11 PROCEDURE — 94799 UNLISTED PULMONARY SVC/PX: CPT

## 2024-03-11 PROCEDURE — 25010000002 PHENYLEPHRINE 10 MG/ML SOLUTION: Performed by: NURSE ANESTHETIST, CERTIFIED REGISTERED

## 2024-03-11 PROCEDURE — 25010000002 BUPIVACAINE 0.25 % SOLUTION: Performed by: SURGERY

## 2024-03-11 PROCEDURE — 85027 COMPLETE CBC AUTOMATED: CPT | Performed by: SURGERY

## 2024-03-11 PROCEDURE — 25010000002 HEPARIN (PORCINE) 1000-0.9 UT/500ML-% SOLUTION: Performed by: ANESTHESIOLOGY

## 2024-03-11 PROCEDURE — P9041 ALBUMIN (HUMAN),5%, 50ML: HCPCS | Performed by: SURGERY

## 2024-03-11 PROCEDURE — 80048 BASIC METABOLIC PNL TOTAL CA: CPT | Performed by: SURGERY

## 2024-03-11 PROCEDURE — C1729 CATH, DRAINAGE: HCPCS | Performed by: SURGERY

## 2024-03-11 PROCEDURE — 93306 TTE W/DOPPLER COMPLETE: CPT

## 2024-03-11 PROCEDURE — 25010000002 FENTANYL CITRATE (PF) 100 MCG/2ML SOLUTION: Performed by: ANESTHESIOLOGY

## 2024-03-11 PROCEDURE — 25010000002 PROPOFOL 10 MG/ML EMULSION: Performed by: ANESTHESIOLOGY

## 2024-03-11 PROCEDURE — 25810000003 SODIUM CHLORIDE 0.9 % SOLUTION 250 ML FLEX CONT: Performed by: NURSE ANESTHETIST, CERTIFIED REGISTERED

## 2024-03-11 PROCEDURE — 94002 VENT MGMT INPAT INIT DAY: CPT

## 2024-03-11 PROCEDURE — 82803 BLOOD GASES ANY COMBINATION: CPT

## 2024-03-11 PROCEDURE — P9041 ALBUMIN (HUMAN),5%, 50ML: HCPCS | Performed by: NURSE ANESTHETIST, CERTIFIED REGISTERED

## 2024-03-11 PROCEDURE — 25810000003 SODIUM CHLORIDE 0.9 % SOLUTION: Performed by: NURSE ANESTHETIST, CERTIFIED REGISTERED

## 2024-03-11 PROCEDURE — 25810000003 SODIUM CHLORIDE 0.9 % SOLUTION: Performed by: NURSE PRACTITIONER

## 2024-03-11 PROCEDURE — 83735 ASSAY OF MAGNESIUM: CPT | Performed by: SURGERY

## 2024-03-11 PROCEDURE — 0BNL4ZZ RELEASE LEFT LUNG, PERCUTANEOUS ENDOSCOPIC APPROACH: ICD-10-PCS | Performed by: SURGERY

## 2024-03-11 PROCEDURE — 25010000002 BUPIVACAINE (PF) 0.5 % SOLUTION: Performed by: ANESTHESIOLOGY

## 2024-03-11 PROCEDURE — 25810000003 SODIUM CHLORIDE 0.9 % SOLUTION 250 ML FLEX CONT: Performed by: NURSE PRACTITIONER

## 2024-03-11 PROCEDURE — 25010000002 VANCOMYCIN HCL 1.25 G RECONSTITUTED SOLUTION 1 EACH VIAL: Performed by: NURSE PRACTITIONER

## 2024-03-11 PROCEDURE — 87075 CULTR BACTERIA EXCEPT BLOOD: CPT | Performed by: SURGERY

## 2024-03-11 PROCEDURE — 87102 FUNGUS ISOLATION CULTURE: CPT | Performed by: SURGERY

## 2024-03-11 PROCEDURE — C9290 INJ, BUPIVACAINE LIPOSOME: HCPCS | Performed by: SURGERY

## 2024-03-11 PROCEDURE — 94664 DEMO&/EVAL PT USE INHALER: CPT

## 2024-03-11 PROCEDURE — 25010000002 MIDAZOLAM PER 1 MG: Performed by: ANESTHESIOLOGY

## 2024-03-11 PROCEDURE — 71045 X-RAY EXAM CHEST 1 VIEW: CPT

## 2024-03-11 PROCEDURE — 87206 SMEAR FLUORESCENT/ACID STAI: CPT | Performed by: SURGERY

## 2024-03-11 PROCEDURE — 0BBP4ZZ EXCISION OF LEFT PLEURA, PERCUTANEOUS ENDOSCOPIC APPROACH: ICD-10-PCS | Performed by: SURGERY

## 2024-03-11 PROCEDURE — 84295 ASSAY OF SERUM SODIUM: CPT

## 2024-03-11 PROCEDURE — 82948 REAGENT STRIP/BLOOD GLUCOSE: CPT

## 2024-03-11 PROCEDURE — 88312 SPECIAL STAINS GROUP 1: CPT | Performed by: SURGERY

## 2024-03-11 PROCEDURE — 25010000002 PHENYLEPHRINE 10 MG/ML SOLUTION 5 ML VIAL: Performed by: NURSE ANESTHETIST, CERTIFIED REGISTERED

## 2024-03-11 PROCEDURE — 25010000002 GLYCOPYRROLATE 0.2 MG/ML SOLUTION: Performed by: ANESTHESIOLOGY

## 2024-03-11 PROCEDURE — 32652 THORACOSCOPY REM TOTL CORTEX: CPT | Performed by: SURGERY

## 2024-03-11 PROCEDURE — 94761 N-INVAS EAR/PLS OXIMETRY MLT: CPT

## 2024-03-11 PROCEDURE — 25010000002 METRONIDAZOLE 500 MG/100ML SOLUTION: Performed by: NURSE PRACTITIONER

## 2024-03-11 PROCEDURE — 82948 REAGENT STRIP/BLOOD GLUCOSE: CPT | Performed by: NURSE PRACTITIONER

## 2024-03-11 PROCEDURE — 25010000002 KETOROLAC TROMETHAMINE PER 15 MG: Performed by: ANESTHESIOLOGY

## 2024-03-11 PROCEDURE — 25010000002 CEFEPIME PER 500 MG: Performed by: NURSE PRACTITIONER

## 2024-03-11 PROCEDURE — 94660 CPAP INITIATION&MGMT: CPT

## 2024-03-11 PROCEDURE — 87205 SMEAR GRAM STAIN: CPT | Performed by: SURGERY

## 2024-03-11 PROCEDURE — 85014 HEMATOCRIT: CPT

## 2024-03-11 PROCEDURE — 8E0W4CZ ROBOTIC ASSISTED PROCEDURE OF TRUNK REGION, PERCUTANEOUS ENDOSCOPIC APPROACH: ICD-10-PCS | Performed by: SURGERY

## 2024-03-11 PROCEDURE — 0 BUPIVACAINE LIPOSOME 1.3 % SUSPENSION: Performed by: ANESTHESIOLOGY

## 2024-03-11 PROCEDURE — 87070 CULTURE OTHR SPECIMN AEROBIC: CPT | Performed by: SURGERY

## 2024-03-11 PROCEDURE — 87116 MYCOBACTERIA CULTURE: CPT | Performed by: SURGERY

## 2024-03-11 PROCEDURE — 25010000002 ALBUMIN HUMAN 5% PER 50 ML: Performed by: NURSE ANESTHETIST, CERTIFIED REGISTERED

## 2024-03-11 PROCEDURE — 93306 TTE W/DOPPLER COMPLETE: CPT | Performed by: INTERNAL MEDICINE

## 2024-03-11 PROCEDURE — 0BJ08ZZ INSPECTION OF TRACHEOBRONCHIAL TREE, VIA NATURAL OR ARTIFICIAL OPENING ENDOSCOPIC: ICD-10-PCS | Performed by: SURGERY

## 2024-03-11 PROCEDURE — 0 BUPIVACAINE LIPOSOME 1.3 % SUSPENSION: Performed by: SURGERY

## 2024-03-11 PROCEDURE — 93005 ELECTROCARDIOGRAM TRACING: CPT | Performed by: INTERNAL MEDICINE

## 2024-03-11 PROCEDURE — 25810000003 LACTATED RINGERS PER 1000 ML: Performed by: ANESTHESIOLOGY

## 2024-03-11 PROCEDURE — 93005 ELECTROCARDIOGRAM TRACING: CPT | Performed by: NURSE PRACTITIONER

## 2024-03-11 PROCEDURE — 99222 1ST HOSP IP/OBS MODERATE 55: CPT | Performed by: INTERNAL MEDICINE

## 2024-03-11 PROCEDURE — 82947 ASSAY GLUCOSE BLOOD QUANT: CPT

## 2024-03-11 PROCEDURE — 25010000002 SUGAMMADEX 200 MG/2ML SOLUTION: Performed by: ANESTHESIOLOGY

## 2024-03-11 PROCEDURE — 84132 ASSAY OF SERUM POTASSIUM: CPT

## 2024-03-11 PROCEDURE — 25010000002 DEXAMETHASONE SODIUM PHOSPHATE 20 MG/5ML SOLUTION: Performed by: NURSE ANESTHETIST, CERTIFIED REGISTERED

## 2024-03-11 PROCEDURE — 25010000002 SUCCINYLCHOLINE PER 20 MG: Performed by: NURSE ANESTHETIST, CERTIFIED REGISTERED

## 2024-03-11 RX ORDER — EPHEDRINE SULFATE 5 MG/ML
5 INJECTION INTRAVENOUS ONCE AS NEEDED
Status: DISCONTINUED | OUTPATIENT
Start: 2024-03-11 | End: 2024-03-11 | Stop reason: HOSPADM

## 2024-03-11 RX ORDER — DIPHENHYDRAMINE HYDROCHLORIDE 50 MG/ML
12.5 INJECTION INTRAMUSCULAR; INTRAVENOUS
Status: DISCONTINUED | OUTPATIENT
Start: 2024-03-11 | End: 2024-03-11 | Stop reason: HOSPADM

## 2024-03-11 RX ORDER — ALBUTEROL SULFATE 2.5 MG/3ML
2.5 SOLUTION RESPIRATORY (INHALATION)
Status: DISCONTINUED | OUTPATIENT
Start: 2024-03-12 | End: 2024-03-12

## 2024-03-11 RX ORDER — NITROGLYCERIN 0.4 MG/1
0.4 TABLET SUBLINGUAL
Status: DISCONTINUED | OUTPATIENT
Start: 2024-03-11 | End: 2024-03-17 | Stop reason: HOSPADM

## 2024-03-11 RX ORDER — LIDOCAINE HYDROCHLORIDE 20 MG/ML
INJECTION, SOLUTION EPIDURAL; INFILTRATION; INTRACAUDAL; PERINEURAL AS NEEDED
Status: DISCONTINUED | OUTPATIENT
Start: 2024-03-11 | End: 2024-03-11 | Stop reason: SURG

## 2024-03-11 RX ORDER — MIDAZOLAM HYDROCHLORIDE 1 MG/ML
INJECTION INTRAMUSCULAR; INTRAVENOUS AS NEEDED
Status: DISCONTINUED | OUTPATIENT
Start: 2024-03-11 | End: 2024-03-11 | Stop reason: SURG

## 2024-03-11 RX ORDER — HYDRALAZINE HYDROCHLORIDE 20 MG/ML
5 INJECTION INTRAMUSCULAR; INTRAVENOUS
Status: DISCONTINUED | OUTPATIENT
Start: 2024-03-11 | End: 2024-03-11 | Stop reason: HOSPADM

## 2024-03-11 RX ORDER — SUCCINYLCHOLINE CHLORIDE 20 MG/ML
INJECTION INTRAMUSCULAR; INTRAVENOUS AS NEEDED
Status: DISCONTINUED | OUTPATIENT
Start: 2024-03-11 | End: 2024-03-11 | Stop reason: SURG

## 2024-03-11 RX ORDER — OXYCODONE HYDROCHLORIDE 5 MG/1
5 TABLET ORAL EVERY 4 HOURS PRN
Status: DISCONTINUED | OUTPATIENT
Start: 2024-03-11 | End: 2024-03-16

## 2024-03-11 RX ORDER — VANCOMYCIN/0.9 % SOD CHLORIDE 1.5G/250ML
1500 PLASTIC BAG, INJECTION (ML) INTRAVENOUS EVERY 12 HOURS
Status: DISCONTINUED | OUTPATIENT
Start: 2024-03-11 | End: 2024-03-14

## 2024-03-11 RX ORDER — BUPIVACAINE HYDROCHLORIDE 5 MG/ML
INJECTION, SOLUTION EPIDURAL; INTRACAUDAL
Status: COMPLETED | OUTPATIENT
Start: 2024-03-11 | End: 2024-03-11

## 2024-03-11 RX ORDER — ONDANSETRON 2 MG/ML
4 INJECTION INTRAMUSCULAR; INTRAVENOUS ONCE AS NEEDED
Status: DISCONTINUED | OUTPATIENT
Start: 2024-03-11 | End: 2024-03-11 | Stop reason: HOSPADM

## 2024-03-11 RX ORDER — SODIUM CHLORIDE, SODIUM LACTATE, POTASSIUM CHLORIDE, CALCIUM CHLORIDE 600; 310; 30; 20 MG/100ML; MG/100ML; MG/100ML; MG/100ML
9 INJECTION, SOLUTION INTRAVENOUS CONTINUOUS PRN
Status: DISCONTINUED | OUTPATIENT
Start: 2024-03-11 | End: 2024-03-11 | Stop reason: HOSPADM

## 2024-03-11 RX ORDER — DIPHENHYDRAMINE HYDROCHLORIDE 50 MG/ML
12.5 INJECTION INTRAMUSCULAR; INTRAVENOUS ONCE AS NEEDED
Status: DISCONTINUED | OUTPATIENT
Start: 2024-03-11 | End: 2024-03-11 | Stop reason: HOSPADM

## 2024-03-11 RX ORDER — SODIUM CHLORIDE 9 MG/ML
INJECTION, SOLUTION INTRAVENOUS CONTINUOUS PRN
Status: DISCONTINUED | OUTPATIENT
Start: 2024-03-11 | End: 2024-03-11 | Stop reason: SURG

## 2024-03-11 RX ORDER — GLYCOPYRROLATE 0.2 MG/ML
INJECTION INTRAMUSCULAR; INTRAVENOUS AS NEEDED
Status: DISCONTINUED | OUTPATIENT
Start: 2024-03-11 | End: 2024-03-11 | Stop reason: SURG

## 2024-03-11 RX ORDER — KETOROLAC TROMETHAMINE 30 MG/ML
INJECTION, SOLUTION INTRAMUSCULAR; INTRAVENOUS AS NEEDED
Status: DISCONTINUED | OUTPATIENT
Start: 2024-03-11 | End: 2024-03-11 | Stop reason: SURG

## 2024-03-11 RX ORDER — GABAPENTIN 300 MG/1
300 CAPSULE ORAL 3 TIMES DAILY
Status: DISCONTINUED | OUTPATIENT
Start: 2024-03-12 | End: 2024-03-17 | Stop reason: HOSPADM

## 2024-03-11 RX ORDER — ALBUTEROL SULFATE 2.5 MG/3ML
SOLUTION RESPIRATORY (INHALATION) AS NEEDED
Status: DISCONTINUED | OUTPATIENT
Start: 2024-03-11 | End: 2024-03-11 | Stop reason: SURG

## 2024-03-11 RX ORDER — ROCURONIUM BROMIDE 10 MG/ML
INJECTION, SOLUTION INTRAVENOUS AS NEEDED
Status: DISCONTINUED | OUTPATIENT
Start: 2024-03-11 | End: 2024-03-11 | Stop reason: SURG

## 2024-03-11 RX ORDER — ACETAMINOPHEN 500 MG
1000 TABLET ORAL 3 TIMES DAILY
Status: DISCONTINUED | OUTPATIENT
Start: 2024-03-12 | End: 2024-03-17 | Stop reason: HOSPADM

## 2024-03-11 RX ORDER — FENTANYL CITRATE 50 UG/ML
INJECTION, SOLUTION INTRAMUSCULAR; INTRAVENOUS AS NEEDED
Status: DISCONTINUED | OUTPATIENT
Start: 2024-03-11 | End: 2024-03-11 | Stop reason: SURG

## 2024-03-11 RX ORDER — KETOROLAC TROMETHAMINE 30 MG/ML
15 INJECTION, SOLUTION INTRAMUSCULAR; INTRAVENOUS EVERY 8 HOURS
Qty: 15 ML | Refills: 0 | Status: DISCONTINUED | OUTPATIENT
Start: 2024-03-12 | End: 2024-03-16

## 2024-03-11 RX ORDER — OXYCODONE HYDROCHLORIDE 5 MG/1
10 TABLET ORAL EVERY 4 HOURS PRN
Status: DISCONTINUED | OUTPATIENT
Start: 2024-03-11 | End: 2024-03-11 | Stop reason: HOSPADM

## 2024-03-11 RX ORDER — HEPARIN SODIUM 200 [USP'U]/100ML
INJECTION, SOLUTION INTRAVENOUS
Status: COMPLETED | OUTPATIENT
Start: 2024-03-11 | End: 2024-03-11

## 2024-03-11 RX ORDER — PHENYLEPHRINE HYDROCHLORIDE 10 MG/ML
INJECTION INTRAVENOUS AS NEEDED
Status: DISCONTINUED | OUTPATIENT
Start: 2024-03-11 | End: 2024-03-11 | Stop reason: SURG

## 2024-03-11 RX ORDER — ALBUMIN, HUMAN INJ 5% 5 %
250 SOLUTION INTRAVENOUS ONCE
Status: COMPLETED | OUTPATIENT
Start: 2024-03-11 | End: 2024-03-11

## 2024-03-11 RX ORDER — DEXAMETHASONE SODIUM PHOSPHATE 4 MG/ML
INJECTION, SOLUTION INTRA-ARTICULAR; INTRALESIONAL; INTRAMUSCULAR; INTRAVENOUS; SOFT TISSUE AS NEEDED
Status: DISCONTINUED | OUTPATIENT
Start: 2024-03-11 | End: 2024-03-11 | Stop reason: SURG

## 2024-03-11 RX ORDER — ALBUMIN, HUMAN INJ 5% 5 %
SOLUTION INTRAVENOUS CONTINUOUS PRN
Status: DISCONTINUED | OUTPATIENT
Start: 2024-03-11 | End: 2024-03-11 | Stop reason: SURG

## 2024-03-11 RX ORDER — ONDANSETRON 2 MG/ML
4 INJECTION INTRAMUSCULAR; INTRAVENOUS EVERY 6 HOURS PRN
Status: DISCONTINUED | OUTPATIENT
Start: 2024-03-11 | End: 2024-03-17 | Stop reason: HOSPADM

## 2024-03-11 RX ORDER — SODIUM CHLORIDE, SODIUM LACTATE, POTASSIUM CHLORIDE, CALCIUM CHLORIDE 600; 310; 30; 20 MG/100ML; MG/100ML; MG/100ML; MG/100ML
40 INJECTION, SOLUTION INTRAVENOUS CONTINUOUS
Status: DISCONTINUED | OUTPATIENT
Start: 2024-03-12 | End: 2024-03-14

## 2024-03-11 RX ORDER — LABETALOL HYDROCHLORIDE 5 MG/ML
5 INJECTION, SOLUTION INTRAVENOUS
Status: DISCONTINUED | OUTPATIENT
Start: 2024-03-11 | End: 2024-03-11 | Stop reason: HOSPADM

## 2024-03-11 RX ORDER — BUPIVACAINE HYDROCHLORIDE 2.5 MG/ML
INJECTION, SOLUTION INFILTRATION; PERINEURAL AS NEEDED
Status: DISCONTINUED | OUTPATIENT
Start: 2024-03-11 | End: 2024-03-11 | Stop reason: HOSPADM

## 2024-03-11 RX ORDER — OXYCODONE HYDROCHLORIDE 5 MG/1
5 TABLET ORAL ONCE AS NEEDED
Status: DISCONTINUED | OUTPATIENT
Start: 2024-03-11 | End: 2024-03-11 | Stop reason: HOSPADM

## 2024-03-11 RX ORDER — SODIUM CHLORIDE 0.9 % (FLUSH) 0.9 %
10 SYRINGE (ML) INJECTION AS NEEDED
Status: DISCONTINUED | OUTPATIENT
Start: 2024-03-11 | End: 2024-03-11 | Stop reason: HOSPADM

## 2024-03-11 RX ORDER — DILTIAZEM HCL/D5W 125 MG/125
5-15 PLASTIC BAG, INJECTION (ML) INTRAVENOUS
Status: DISCONTINUED | OUTPATIENT
Start: 2024-03-11 | End: 2024-03-11 | Stop reason: HOSPADM

## 2024-03-11 RX ORDER — POLYETHYLENE GLYCOL 3350 17 G/17G
17 POWDER, FOR SOLUTION ORAL DAILY
Status: DISCONTINUED | OUTPATIENT
Start: 2024-03-12 | End: 2024-03-17 | Stop reason: HOSPADM

## 2024-03-11 RX ORDER — NALOXONE HCL 0.4 MG/ML
0.4 VIAL (ML) INJECTION AS NEEDED
Status: DISCONTINUED | OUTPATIENT
Start: 2024-03-11 | End: 2024-03-11 | Stop reason: HOSPADM

## 2024-03-11 RX ORDER — DILTIAZEM HCL/D5W 125 MG/125
5-15 PLASTIC BAG, INJECTION (ML) INTRAVENOUS
Status: DISCONTINUED | OUTPATIENT
Start: 2024-03-11 | End: 2024-03-12

## 2024-03-11 RX ORDER — NITROGLYCERIN 0.4 MG/1
0.4 TABLET SUBLINGUAL
Status: DISCONTINUED | OUTPATIENT
Start: 2024-03-11 | End: 2024-03-11 | Stop reason: SDUPTHER

## 2024-03-11 RX ORDER — SODIUM CHLORIDE 0.9 % (FLUSH) 0.9 %
10 SYRINGE (ML) INJECTION EVERY 12 HOURS SCHEDULED
Status: DISCONTINUED | OUTPATIENT
Start: 2024-03-11 | End: 2024-03-11 | Stop reason: HOSPADM

## 2024-03-11 RX ORDER — PROPOFOL 10 MG/ML
INJECTION, EMULSION INTRAVENOUS AS NEEDED
Status: DISCONTINUED | OUTPATIENT
Start: 2024-03-11 | End: 2024-03-11 | Stop reason: SURG

## 2024-03-11 RX ORDER — DOCUSATE SODIUM 100 MG/1
100 CAPSULE, LIQUID FILLED ORAL 2 TIMES DAILY
Status: DISCONTINUED | OUTPATIENT
Start: 2024-03-12 | End: 2024-03-17 | Stop reason: HOSPADM

## 2024-03-11 RX ORDER — ACETYLCYSTEINE 200 MG/ML
3 SOLUTION ORAL; RESPIRATORY (INHALATION)
Status: DISCONTINUED | OUTPATIENT
Start: 2024-03-12 | End: 2024-03-13

## 2024-03-11 RX ORDER — ONDANSETRON 4 MG/1
4 TABLET, ORALLY DISINTEGRATING ORAL EVERY 6 HOURS PRN
Status: DISCONTINUED | OUTPATIENT
Start: 2024-03-11 | End: 2024-03-17 | Stop reason: HOSPADM

## 2024-03-11 RX ORDER — IPRATROPIUM BROMIDE AND ALBUTEROL SULFATE 2.5; .5 MG/3ML; MG/3ML
3 SOLUTION RESPIRATORY (INHALATION) ONCE AS NEEDED
Status: DISCONTINUED | OUTPATIENT
Start: 2024-03-11 | End: 2024-03-11 | Stop reason: HOSPADM

## 2024-03-11 RX ADMIN — PROPOFOL INJECTABLE EMULSION 20 MCG/KG/MIN: 10 INJECTION, EMULSION INTRAVENOUS at 22:12

## 2024-03-11 RX ADMIN — ACETYLCYSTEINE 3 ML: 200 SOLUTION ORAL; RESPIRATORY (INHALATION) at 23:40

## 2024-03-11 RX ADMIN — ALBUTEROL SULFATE 2.5 MG: 2.5 SOLUTION RESPIRATORY (INHALATION) at 23:39

## 2024-03-11 RX ADMIN — LISINOPRIL 20 MG: 20 TABLET ORAL at 09:30

## 2024-03-11 RX ADMIN — PROPOFOL 150 MG: 10 INJECTION, EMULSION INTRAVENOUS at 18:11

## 2024-03-11 RX ADMIN — VANCOMYCIN HYDROCHLORIDE 1250 MG: 1.25 INJECTION, POWDER, LYOPHILIZED, FOR SOLUTION INTRAVENOUS at 01:49

## 2024-03-11 RX ADMIN — SODIUM CHLORIDE 100 ML/HR: 9 INJECTION, SOLUTION INTRAVENOUS at 09:29

## 2024-03-11 RX ADMIN — HYDROCHLOROTHIAZIDE 25 MG: 25 TABLET ORAL at 09:30

## 2024-03-11 RX ADMIN — BUPIVACAINE 10 ML: 13.3 INJECTION, SUSPENSION, LIPOSOMAL INFILTRATION at 17:05

## 2024-03-11 RX ADMIN — FENTANYL CITRATE 50 MCG: 50 INJECTION, SOLUTION INTRAMUSCULAR; INTRAVENOUS at 20:09

## 2024-03-11 RX ADMIN — ALBUMIN (HUMAN): 12.5 INJECTION, SOLUTION INTRAVENOUS at 18:56

## 2024-03-11 RX ADMIN — SUGAMMADEX 200 MG: 100 INJECTION, SOLUTION INTRAVENOUS at 20:09

## 2024-03-11 RX ADMIN — IPRATROPIUM BROMIDE AND ALBUTEROL SULFATE 3 ML: .5; 3 SOLUTION RESPIRATORY (INHALATION) at 07:35

## 2024-03-11 RX ADMIN — BUDESONIDE 0.5 MG: 0.5 INHALANT RESPIRATORY (INHALATION) at 07:40

## 2024-03-11 RX ADMIN — CEFEPIME 2000 MG: 2 INJECTION, POWDER, FOR SOLUTION INTRAVENOUS at 09:30

## 2024-03-11 RX ADMIN — VANCOMYCIN HYDROCHLORIDE 1250 MG: 1.25 INJECTION, POWDER, LYOPHILIZED, FOR SOLUTION INTRAVENOUS at 09:31

## 2024-03-11 RX ADMIN — PHENYLEPHRINE HYDROCHLORIDE 0.5 MCG/KG/MIN: 10 INJECTION INTRAVENOUS at 18:15

## 2024-03-11 RX ADMIN — ROCURONIUM BROMIDE 10 MG: 10 INJECTION, SOLUTION INTRAVENOUS at 18:11

## 2024-03-11 RX ADMIN — HEPARIN SODIUM 1000 UNITS: 200 INJECTION, SOLUTION INTRAVENOUS at 18:12

## 2024-03-11 RX ADMIN — ALBUMIN (HUMAN) 250 ML: 12.5 INJECTION, SOLUTION INTRAVENOUS at 22:01

## 2024-03-11 RX ADMIN — AMLODIPINE BESYLATE 10 MG: 5 TABLET ORAL at 09:29

## 2024-03-11 RX ADMIN — SODIUM CHLORIDE, SODIUM LACTATE, POTASSIUM CHLORIDE, AND CALCIUM CHLORIDE: .6; .31; .03; .02 INJECTION, SOLUTION INTRAVENOUS at 18:05

## 2024-03-11 RX ADMIN — IPRATROPIUM BROMIDE AND ALBUTEROL SULFATE 3 ML: .5; 3 SOLUTION RESPIRATORY (INHALATION) at 11:50

## 2024-03-11 RX ADMIN — SUCCINYLCHOLINE CHLORIDE 100 MG: 20 INJECTION, SOLUTION INTRAMUSCULAR; INTRAVENOUS at 18:11

## 2024-03-11 RX ADMIN — ACETAMINOPHEN 650 MG: 325 TABLET, FILM COATED ORAL at 09:36

## 2024-03-11 RX ADMIN — GUAIFENESIN 600 MG: 600 TABLET, EXTENDED RELEASE ORAL at 09:30

## 2024-03-11 RX ADMIN — MIDAZOLAM 2 MG: 1 INJECTION INTRAMUSCULAR; INTRAVENOUS at 16:55

## 2024-03-11 RX ADMIN — FENTANYL CITRATE 50 MCG: 50 INJECTION, SOLUTION INTRAMUSCULAR; INTRAVENOUS at 20:12

## 2024-03-11 RX ADMIN — ROCURONIUM BROMIDE 30 MG: 10 INJECTION, SOLUTION INTRAVENOUS at 18:30

## 2024-03-11 RX ADMIN — METOPROLOL TARTRATE 150 MG: 50 TABLET, FILM COATED ORAL at 07:03

## 2024-03-11 RX ADMIN — GLYCOPYRROLATE 0.2 MG: 0.2 INJECTION INTRAMUSCULAR; INTRAVENOUS at 20:10

## 2024-03-11 RX ADMIN — Medication 5000 UNITS: at 09:29

## 2024-03-11 RX ADMIN — Medication 5 MG/HR: at 19:32

## 2024-03-11 RX ADMIN — LIDOCAINE HYDROCHLORIDE 50 MG: 20 INJECTION, SOLUTION EPIDURAL; INFILTRATION; INTRACAUDAL; PERINEURAL at 18:11

## 2024-03-11 RX ADMIN — CLONIDINE HYDROCHLORIDE 0.1 MG: 0.1 TABLET ORAL at 09:30

## 2024-03-11 RX ADMIN — ATORVASTATIN CALCIUM 40 MG: 40 TABLET, FILM COATED ORAL at 09:29

## 2024-03-11 RX ADMIN — METRONIDAZOLE 500 MG: 500 INJECTION, SOLUTION INTRAVENOUS at 17:11

## 2024-03-11 RX ADMIN — TRAMADOL HYDROCHLORIDE 50 MG: 50 TABLET, COATED ORAL at 05:50

## 2024-03-11 RX ADMIN — BUPIVACAINE HYDROCHLORIDE 10 ML: 5 INJECTION, SOLUTION EPIDURAL; INTRACAUDAL; PERINEURAL at 17:05

## 2024-03-11 RX ADMIN — ALBUTEROL SULFATE 2.5 MG: 2.5 SOLUTION RESPIRATORY (INHALATION) at 20:47

## 2024-03-11 RX ADMIN — KETOROLAC TROMETHAMINE 30 MG: 30 INJECTION, SOLUTION INTRAMUSCULAR at 20:25

## 2024-03-11 RX ADMIN — METRONIDAZOLE 500 MG: 500 INJECTION, SOLUTION INTRAVENOUS at 05:43

## 2024-03-11 RX ADMIN — DEXAMETHASONE SODIUM PHOSPHATE 8 MG: 4 INJECTION, SOLUTION INTRAMUSCULAR; INTRAVENOUS at 18:45

## 2024-03-11 RX ADMIN — SODIUM CHLORIDE: 9 INJECTION, SOLUTION INTRAVENOUS at 18:15

## 2024-03-11 RX ADMIN — PHENYLEPHRINE HYDROCHLORIDE 100 MCG: 10 INJECTION INTRAVENOUS at 18:53

## 2024-03-11 RX ADMIN — ROCURONIUM BROMIDE 10 MG: 10 INJECTION, SOLUTION INTRAVENOUS at 18:53

## 2024-03-11 RX ADMIN — HYDRALAZINE HYDROCHLORIDE 50 MG: 25 TABLET ORAL at 05:19

## 2024-03-11 RX ADMIN — PHENYLEPHRINE HYDROCHLORIDE 100 MCG: 10 INJECTION INTRAVENOUS at 18:19

## 2024-03-11 RX ADMIN — CEFEPIME 2000 MG: 2 INJECTION, POWDER, FOR SOLUTION INTRAVENOUS at 00:03

## 2024-03-11 NOTE — CASE MANAGEMENT/SOCIAL WORK
Continued Stay Note  NAZARIO Aguilar     Patient Name: Prashant Zhou  MRN: [id number]  Today's Date: 3/11/2024    Admit Date: 3/9/2024        Discharge Plan       Row Name 03/11/24 1502       Plan    Patient/Family in Agreement with Plan unable to assess    Plan Comments Attempted to meet with patient at bedside to discuss dc planning and CM assessment, however patient off the floor for scheduled thoracoscopy to be performed. Attempted to contact patient’s spouse, Jodi listed in emergency contacts, however no answer so voicemail was left with CM name and phone number. CM will f/u at later time.                Kaylah Wilburn RN     Office Phone: [phone]  Office Cell: [phone]

## 2024-03-11 NOTE — ANESTHESIA PROCEDURE NOTES
Airway  Date/Time: 3/11/2024 6:12 PM    General Information and Staff    Patient location during procedure: OR    Indications and Patient Condition  Indications for airway management: airway protection    Preoxygenated: yes  MILS maintained throughout  Mask difficulty assessment: 1 - vent by mask    Final Airway Details  Final airway type: endotracheal airway      Successful airway: EBT - double lumen left  Cuffed: yes   Successful intubation technique: video laryngoscopy  Facilitating devices/methods: intubating stylet and cricoid pressure  Endotracheal tube insertion site: oral  Blade: Field  Blade size: 4  EBT DL size (fr): 39  Cormack-Lehane Classification: grade I - full view of glottis  Placement verified by: chest auscultation and capnometry   Number of attempts at approach: 1  Assessment: lips, teeth, and gum same as pre-op and atraumatic intubation

## 2024-03-11 NOTE — CONSULTS
CARDIOLOGY CONSULT:    Prashant Zhou  1960  male  [id number]      Referring Provider: Hospitalist  Reason for Consultation: Shortness of breath and A-fib    Patient Care Team:  Lazaro Paulino MD as PCP - General  Steve Mckeon MD as Consulting Physician (Cardiology)  Lars Wagner MD as Consulting Physician (Hematology and Oncology)    Chief complaint shortness of breath    Subjective .     History of present illness:  Prashant Zhou is a 63 y.o. male with history of aortic dissection in the past hypertension hyperlipidemia diabetes and history of colon cancer with carcinomatosis presented to the hospital complains of increasing shortness of breath.  No complaint of any chest pain.  No PND or orthopnea.  No palpitation dizziness syncope or swelling of the feet.  Patient was noted to have pneumonia and is on antibiotics but he later had atrial fibrillation with rapid response and hence a cardiology consult is called.  Patient had history of aortic dissection in the past but never had surgery and was on medical treatment only.  Review of Systems   Constitutional: Negative for fever and malaise/fatigue.   HENT:  Negative for ear pain and nosebleeds.    Eyes:  Negative for blurred vision and double vision.   Cardiovascular:  Negative for chest pain, dyspnea on exertion and palpitations.   Respiratory:  Positive for shortness of breath. Negative for cough.    Skin:  Negative for rash.   Musculoskeletal:  Negative for joint pain.   Gastrointestinal:  Negative for abdominal pain, nausea and vomiting.   Neurological:  Negative for focal weakness and headaches.   Psychiatric/Behavioral:  Negative for depression. The patient is not nervous/anxious.    All other systems reviewed and are negative.      History  Past Medical History:   Diagnosis Date    Aortic dissection     Diabetes mellitus     Heart murmur     History of noncompliance with medical treatment     Hyperlipidemia     Hypertension      Type B hypoplasia of aortic arch        Past Surgical History:   Procedure Laterality Date    COLECTOMY PARTIAL / TOTAL  2023    COLONOSCOPY N/A 08/31/2023    Procedure: COLONOSCOPY with sigmoid mass tattooing at 35cm, sigmoid and rectal polypectomy;  Surgeon: TORIBIO Jacob MD;  Location: [facility] ENDOSCOPY;  Service: Gastroenterology;  Laterality: N/A;  sigmoid mass, colon polyps       Family History   Problem Relation Age of Onset    Diabetes Mother     Dementia Mother     Sudden death Father        Social History     Tobacco Use    Smoking status: Every Day     Current packs/day: 2.00     Average packs/day: 2.0 packs/day for 53.2 years (106.4 ttl pk-yrs)     Types: Cigarettes     Start date: 1971    Smokeless tobacco: Never   Vaping Use    Vaping status: Never Used   Substance Use Topics    Alcohol use: No     Comment: Abstinent since around 2008    Drug use: Not Currently     Types: Marijuana     Comment: Abstinent since at least the 1980's        Medications Prior to Admission   Medication Sig Dispense Refill Last Dose    amLODIPine (NORVASC) 10 MG tablet TAKE 1 TABLET BY MOUTH DAILY 90 tablet 0     atorvastatin (LIPITOR) 40 MG tablet TAKE ONE TABLET BY MOUTH EVERY NIGHT AT BEDTIME 90 tablet 0     Chlorcyclizine-Pseudoephed (Stahist AD) 25-60 MG tablet Take 0.5 tablets by mouth 2 (Two) Times a Day As Needed (Congestion, drainage). 42 tablet 0     cloNIDine (CATAPRES) 0.1 MG tablet Take 1 tablet by mouth 2 (Two) Times a Day. 60 tablet 5     glimepiride (AMARYL) 4 MG tablet TAKE ONE TABLET BY MOUTH TWICE A  tablet 1     hydrALAZINE (APRESOLINE) 50 MG tablet TAKE ONE TABLET BY MOUTH EVERY 8 HOURS 90 tablet 1     lisinopril-hydrochlorothiazide (PRINZIDE,ZESTORETIC) 20-25 MG per tablet Take 1 tablet by mouth Daily.       metFORMIN (GLUCOPHAGE) 850 MG tablet TAKE 1 TABLET BY MOUTH TWICE A DAY WITH MEALS 144 tablet 1     metoprolol tartrate (LOPRESSOR) 50 MG tablet TAKE THREE TABLETS BY MOUTH EVERY 12 HOURS  "540 tablet 0     potassium chloride 10 MEQ CR tablet Take 1 tablet by mouth Daily.       traMADol (ULTRAM) 50 MG tablet TAKE 1 TABLET BY MOUTH EVERY 6 HOURS AS NEEDED FOR MODERATE PAIN 28 tablet 0     vitamin D3 125 MCG (5000 UT) capsule capsule Take 1 capsule by mouth Daily.       Blood Glucose Monitoring Suppl (Accu-Chek Guide) w/Device kit 1 Device Daily. 1 kit 0     glucose blood (Accu-Chek Guide) test strip Test daily e11.9 50 each 12        Allergies: Patient has no known allergies.    Scheduled Meds:acetaminophen, 1,000 mg, Oral, Once  amLODIPine, 10 mg, Oral, Daily  atorvastatin, 40 mg, Oral, Daily  budesonide, 0.5 mg, Nebulization, BID - RT  cefepime, 2,000 mg, Intravenous, Q8H  cloNIDine, 0.1 mg, Oral, BID  guaiFENesin, 600 mg, Oral, Q12H  heparin (porcine), 5,000 Units, Subcutaneous, Q8H  hydrALAZINE, 50 mg, Oral, Q8H  lisinopril, 20 mg, Oral, Q24H   And  hydroCHLOROthiazide, 25 mg, Oral, Q24H  insulin lispro, 2-9 Units, Subcutaneous, 4x Daily AC & at Bedtime  ipratropium-albuterol, 3 mL, Nebulization, 4x Daily - RT  metoprolol tartrate, 150 mg, Oral, Q12H  metroNIDAZOLE, 500 mg, Intravenous, Q8H  vancomycin, 1,250 mg, Intravenous, Q8H  vitamin D3, 5,000 Units, Oral, Daily      Continuous Infusions:Pharmacy to Dose Cefepime,   Pharmacy to dose vancomycin,   sodium chloride, 100 mL/hr, Last Rate: 100 mL/hr (03/11/24 0929)      PRN Meds:.  acetaminophen    benzonatate    dextrose    dextrose    glucagon (human recombinant)    ipratropium-albuterol    Pharmacy to Dose Cefepime    Pharmacy to dose vancomycin    sodium chloride    traMADol    Objective     VITAL SIGNS  Vitals:    03/11/24 0740 03/11/24 0749 03/11/24 0846 03/11/24 0853   BP:    99/74   BP Location:       Patient Position:       Pulse: (!) 131 (!) 131  100   Resp: 20 (!) 2  19   Temp:    97.3 °F (36.3 °C)   TempSrc:    Oral   SpO2: 95% 93%  91%   Weight:   89.4 kg (197 lb)    Height:   190.5 cm (75\")        Flowsheet Rows      Flowsheet Row " "First Filed Value   Admission Height 190.5 cm (75\") Documented at 03/09/2024 1644   Admission Weight 91.2 kg (201 lb) Documented at 03/09/2024 1644             TELEMETRY: Atrial fibrillation with bundle branch block pattern.    Physical Exam:  Constitutional:       Appearance: Well-developed.   Eyes:      General: No scleral icterus.     Conjunctiva/sclera: Conjunctivae normal.      Pupils: Pupils are equal, round, and reactive to light.   HENT:      Head: Normocephalic and atraumatic.   Neck:      Vascular: No carotid bruit or JVD.   Pulmonary:      Effort: Pulmonary effort is normal.      Breath sounds: Normal breath sounds. No wheezing. No rales.   Cardiovascular:      Normal rate. Regular rhythm.   Pulses:     Intact distal pulses.   Abdominal:      General: Bowel sounds are normal.      Palpations: Abdomen is soft.   Musculoskeletal: Normal range of motion.      Cervical back: Normal range of motion and neck supple. Skin:     General: Skin is warm and dry.      Findings: No rash.   Neurological:      Mental Status: Alert.      Comments: No focal deficits          Results Review:   I reviewed the patient's new clinical results.  Lab Results (last 24 hours)       Procedure Component Value Units Date/Time    Respiratory Culture - Sputum, Cough [[id number]] Collected: 03/10/24 2102    Specimen: Sputum from Cough Updated: 03/11/24 1117     Respiratory Culture No growth     Gram Stain Many (4+) WBCs per low power field      No Epithelial cells seen      Few (2+) Gram positive cocci in chains    POC Glucose 4x Daily Before Meals & at Bedtime [[id number]]  (Normal) Collected: 03/11/24 1106    Specimen: Blood Updated: 03/11/24 1107     Glucose 103 mg/dL      Comment: Serial Number: [id number]:  [id number]       POC Glucose 4x Daily Before Meals & at Bedtime [[id number]]  (Normal) Collected: 03/11/24 0705    Specimen: Blood Updated: 03/11/24 0706     Glucose 104 mg/dL      Comment: Serial Number: [id number]: "  [id number]       Basic Metabolic Panel [[id number]]  (Abnormal) Collected: 03/10/24 2236    Specimen: Blood Updated: 03/10/24 2307     Glucose 110 mg/dL      BUN 7 mg/dL      Creatinine 0.36 mg/dL      Sodium 134 mmol/L      Potassium 3.4 mmol/L      Chloride 95 mmol/L      CO2 28.0 mmol/L      Calcium 10.1 mg/dL      BUN/Creatinine Ratio 19.4     Anion Gap 11.0 mmol/L      eGFR 127.4 mL/min/1.73     Narrative:      GFR Normal >60  Chronic Kidney Disease <60  Kidney Failure <15      Protime-INR [[id number]]  (Abnormal) Collected: 03/10/24 2236    Specimen: Blood Updated: 03/10/24 2302     Protime 13.0 Seconds      INR 1.21    aPTT [[id number]]  (Abnormal) Collected: 03/10/24 2236    Specimen: Blood Updated: 03/10/24 2302     PTT 31.2 seconds     S. Pneumo Ag Urine or CSF - Urine, Urine, Clean Catch [[id number]]  (Normal) Collected: 03/10/24 2111    Specimen: Urine, Clean Catch Updated: 03/10/24 2256     Strep Pneumo Ag Negative    MRSA Screen, PCR (Inpatient) - Swab, Nares [[id number]]  (Normal) Collected: 03/10/24 2056    Specimen: Swab from Nares Updated: 03/10/24 2248     MRSA PCR No MRSA Detected    Narrative:      The negative predictive value of this diagnostic test is high and should only be used to consider de-escalating anti-MRSA therapy. A positive result may indicate colonization with MRSA and must be correlated clinically.    CBC & Differential [[id number]]  (Abnormal) Collected: 03/10/24 2236    Specimen: Blood Updated: 03/10/24 2247    Narrative:      The following orders were created for panel order CBC & Differential.  Procedure                               Abnormality         Status                     ---------                               -----------         ------                     CBC Auto Differential[[id number]]        Abnormal            Final result               Scan Slide[[id number]]                                                                    Please view results for these tests on the  individual orders.    CBC Auto Differential [[id number]]  (Abnormal) Collected: 03/10/24 2236    Specimen: Blood Updated: 03/10/24 2246     WBC 5.70 10*3/mm3      RBC 3.19 10*6/mm3      Hemoglobin 9.4 g/dL      Hematocrit 28.9 %      MCV 90.6 fL      MCH 29.5 pg      MCHC 32.6 g/dL      RDW 18.4 %      RDW-SD 57.8 fl      MPV 6.9 fL      Platelets 355 10*3/mm3      Neutrophil % 63.5 %      Lymphocyte % 14.6 %      Monocyte % 20.9 %      Eosinophil % 0.6 %      Basophil % 0.4 %      Neutrophils, Absolute 3.60 10*3/mm3      Lymphocytes, Absolute 0.80 10*3/mm3      Monocytes, Absolute 1.20 10*3/mm3      Eosinophils, Absolute 0.00 10*3/mm3      Basophils, Absolute 0.00 10*3/mm3      nRBC 0.1 /100 WBC     POC Glucose Once [[id number]]  (Abnormal) Collected: 03/10/24 1913    Specimen: Blood Updated: 03/10/24 1915     Glucose 150 mg/dL      Comment: Serial Number: [id number]:  [id number]       Blood Culture - Blood, Arm, Right [[id number]]  (Normal) Collected: 03/09/24 1745    Specimen: Blood from Arm, Right Updated: 03/10/24 1900     Blood Culture No growth at 24 hours    Blood Culture - Blood, Arm, Left [[id number]]  (Normal) Collected: 03/09/24 1701    Specimen: Blood from Arm, Left Updated: 03/10/24 1815     Blood Culture No growth at 24 hours    Vancomycin, Random [[id number]]  (Abnormal) Collected: 03/10/24 1609    Specimen: Blood from Arm, Right Updated: 03/10/24 1641     Vancomycin Random 4.90 mcg/mL     Narrative:      Therapeutic Ranges for Vancomycin    Vancomycin Random   5.0-40.0 mcg/mL  Vancomycin Trough   5.0-20.0 mcg/mL  Vancomycin Peak     20.0-40.0 mcg/mL    Vancomycin, Random [[id number]]  (Abnormal) Collected: 03/10/24 1609    Specimen: Blood from Arm, Right Updated: 03/10/24 1638     Vancomycin Random 4.60 mcg/mL     Narrative:      Therapeutic Ranges for Vancomycin    Vancomycin Random   5.0-40.0 mcg/mL  Vancomycin Trough   5.0-20.0 mcg/mL  Vancomycin Peak     20.0-40.0 mcg/mL    POC Glucose Once  [[id number]]  (Abnormal) Collected: 03/10/24 1630    Specimen: Blood Updated: 03/10/24 1633     Glucose 164 mg/dL      Comment: Serial Number: [id number]:  [id number]       POC Glucose 4x Daily Before Meals & at Bedtime [[id number]]  (Abnormal) Collected: 03/10/24 1151    Specimen: Blood Updated: 03/10/24 1153     Glucose 171 mg/dL      Comment: Serial Number: [id number]:  [id number]               Imaging Results (Last 24 Hours)       Procedure Component Value Units Date/Time    XR Chest 1 View [[id number]] Collected: 03/11/24 0750     Updated: 03/11/24 0755    Narrative:      XR CHEST 1 VW    Date of Exam: 3/11/2024 4:27 AM EDT    Indication: pre-op testing    Comparison: 3/9/2024 at 1720 hours, CT of the chest 3/9/2024.    Findings:  Infiltrates are again seen throughout the left lung. There remains evidence for left empyema. Mild atelectasis versus infiltrates are also noted within the right lower lobe. The findings are stable. The cardiac silhouette and mediastinum are stable. The   right portacatheter is stable in position.      Impression:      Impression:  Stable chest x-ray.      Electronically Signed: Willy Ugalde MD    3/11/2024 7:53 AM EDT    Workstation ID: [id number]            EKG      I personally viewed and interpreted the patient's EKG/Telemetry data:    ECHOCARDIOGRAM:  Results for orders placed during the hospital encounter of 03/09/24    Adult Transthoracic Echo Complete W/ Cont if Necessary Per Protocol    Interpretation Summary    Left ventricular systolic function is normal. Calculated left ventricular EF = 65% Left ventricular ejection fraction appears to be 61 - 65%.    Left ventricular diastolic function is consistent with (grade I) impaired relaxation.    There is calcification of the aortic valve mainly affecting the non-coronary cusp(s).    Estimated right ventricular systolic pressure from tricuspid regurgitation is normal (<35 mmHg).    No significant valvular abnormalities  noted.         STRESS MYOVIEW:       CARDIAC CATHETERIZATION:    No results found for this or any previous visit.       OTHER:         MDM      1 shortness of breath  Patient presented with shortness of breath and had significant pneumonia and is on IV antibiotics  Patient will have an echocardiogram performed  2.  Aortic dissection  Patient had history of aortic dissection in the past but had medical treatment only at that time and no surgery and is followed by the vascular surgeons  3.  Atrial fibrillation  Patient has new onset atrial fibrillation and is on metoprolol at home which we will restart but he may need anticoagulation once all the tests are done and if required with vascular and oncologist.  Patient is ruled out for MI  Patient is having an echocardiogram performed for LV function valvular abnormalities  Further treatment based on echocardiogram findings  4.  Hypertension  Patient blood pressure currently stable on beta-blockers along with hydralazine and amlodipine  5.  Diabetes  Patient is on oral medicines and followed by the primary care doctor  6.  Hyperlipidemia  Patient is on statins and the lipid levels are followed by the primary care doctor  7.  Rectal cancer  Patient received chemotherapy and after that he developed a esophagitis which was treated with Diflucan but he is also followed by the oncologist    I discussed the patients findings and my recommendations with patient and nurse    Steve Mckeon MD  03/11/24  11:48 EDT

## 2024-03-11 NOTE — ANESTHESIA PREPROCEDURE EVALUATION
Anesthesia Evaluation     Patient summary reviewed and Nursing notes reviewed   history of anesthetic complications:  prolonged sedation  NPO Solid Status: > 8 hours  NPO Liquid Status: > 2 hours           Airway   Dental      Pulmonary    (+) pneumonia , pleural effusion, a smoker Current,shortness of breath  Cardiovascular     ECG reviewed  Patient on routine beta blocker    (+) hypertension, valvular problems/murmurs murmur, hyperlipidemia      Neuro/Psych  GI/Hepatic/Renal/Endo    (+) diabetes mellitus    Musculoskeletal     Abdominal    Substance History      OB/GYN          Other   blood dyscrasia anemia,   history of cancer    ROS/Med Hx Other: Additional History:  Hyponatremia, hyperglycemia, Afib/RVR, rectal cancer with peritoneal mets, type B hypoplasia of aortic arch, aortic thrombus, h/o aortic dissection, thoracic aortic aneurysm, noncompliance, sepsis, ?aspiration, empyema, acute respiratory failure with hypoxemia, dehydration, slight coagulopathy    Echo:    Echocardiogram Findings    Left Ventricle Left ventricular systolic function is normal. Calculated left ventricular EF = 65% Left ventricular ejection fraction appears to be 61 - 65%.     Normal left ventricular cavity size and wall thickness noted. All left ventricular wall segments contract normally. Left ventricular diastolic function is consistent with (grade I) impaired relaxation.  Right Ventricle Normal right ventricular cavity size, wall thickness, systolic function and septal motion noted.  Left Atrium Normal left atrial size and volume noted.  Right Atrium Normal right atrial cavity size noted.  Aortic Valve The aortic valve is abnormal in structure. There is calcification of the aortic valve mainly affecting the non-coronary cusp(s). Trace aortic valve regurgitation is present. No aortic valve stenosis is present.  Mitral Valve The mitral valve is grossly normal in structure. Trace to mild mitral valve regurgitation is present. No  significant mitral valve stenosis is present.  Tricuspid Valve The tricuspid valve is grossly normal in structure. Trace to mild tricuspid valve regurgitation is present. Estimated right ventricular systolic pressure from tricuspid regurgitation is normal (<35 mmHg). No evidence of pulmonary hypertension is present. No tricuspid valve stenosis is present.  Pulmonic Valve The pulmonic valve is not well visualized. The pulmonic valve is grossly normal in structure. There is trace to mild pulmonic valve regurgitation present. There is no pulmonic valve stenosis present.  Greater Vessels No dilation of the aortic root is present. No dilation of the sinuses of Valsalva is present.  Pericardium There is no evidence of pericardial effusion. .        PSH:  COLONOSCOPY COLECTOMY PARTIAL / TOTAL              Anesthesia Plan    ASA 4     general with block     (Patient identified; pre-operative vital signs, all relevant labs/studies, complete medical/surgical/anesthetic history, full medication list, full allergy list, and NPO status obtained/reviewed; physical assessment performed; anesthetic options, side effects, potential complications, risks, and benefits discussed; questions answered; written anesthesia consent obtained; patient cleared for procedure; anesthesia machine and equipment checked and functioning)  intravenous induction     Anesthetic plan, risks, benefits, and alternatives have been provided, discussed and informed consent has been obtained with: patient.    Plan discussed with CRNA and CAA.    CODE STATUS:    Code Status (Patient has no pulse and is not breathing): CPR (Attempt to Resuscitate)  Medical Interventions (Patient has pulse or is breathing): Full Support

## 2024-03-11 NOTE — PROGRESS NOTES
Daily Progress Note          Assessment    Acute hypoxic respiratory failure   Pneumonia possible aspiration  Empyema  Sepsis  HTN  DM II  Hyperlipidemia  Rectal cancer adenocarcinoma  Hyponatremia  AAA  Tobacco abuse            PLAN:  Oxygen supplement and titration to maintain saturation 90-95%: Currently requiring 8 L per nasal cannula  CTS following the patient and planning surgery today  Broad-spectrum antibiotics   Encouraged use I-S flutter valve  Bronchodilators  Inhaled corticosteroids  Incentive spirometer/Flutter valve  Electrolytes/ glycemic control  Mucinex  Blood pressure controlled  Glucose control  DVT  prophylaxis     I personally reviewed the radiological images             LOS: 2 days     Subjective     Patient reports cough and shortness of breath    Objective     Vital signs for last 24 hours:  Vitals:    03/11/24 0735 03/11/24 0739 03/11/24 0740 03/11/24 0749   BP:       BP Location:       Patient Position:       Pulse: (!) 130 (!) 130 (!) 131 (!) 131   Resp: 22 22 20 (!) 2   Temp:       TempSrc:       SpO2: 96% 96% 95% 93%   Weight:       Height:           Intake/Output last 3 shifts:  I/O last 3 completed shifts:  In: 3160 [P.O.:150; I.V.:1310; IV Piggyback:1700]  Out: 2300 [Urine:2300]  Intake/Output this shift:  No intake/output data recorded.      Radiology  Imaging Results (Last 24 Hours)       Procedure Component Value Units Date/Time    XR Chest 1 View [[id number]] Collected: 03/11/24 0750     Updated: 03/11/24 0755    Narrative:      XR CHEST 1 VW    Date of Exam: 3/11/2024 4:27 AM EDT    Indication: pre-op testing    Comparison: 3/9/2024 at 1720 hours, CT of the chest 3/9/2024.    Findings:  Infiltrates are again seen throughout the left lung. There remains evidence for left empyema. Mild atelectasis versus infiltrates are also noted within the right lower lobe. The findings are stable. The cardiac silhouette and mediastinum are stable. The   right portacatheter is stable in  position.      Impression:      Impression:  Stable chest x-ray.      Electronically Signed: Willy Ugalde MD    3/11/2024 7:53 AM EDT    Workstation ID: [id number]            Labs:  Results from last 7 days   Lab Units 03/10/24  2236   WBC 10*3/mm3 5.70   HEMOGLOBIN g/dL 9.4*   HEMATOCRIT % 28.9*   PLATELETS 10*3/mm3 355     Results from last 7 days   Lab Units 03/10/24  2236 03/09/24  2351 03/09/24  1701   SODIUM mmol/L 134*   < > 130*   POTASSIUM mmol/L 3.4*   < > 4.5   CHLORIDE mmol/L 95*   < > 88*   CO2 mmol/L 28.0   < > 26.0   BUN mg/dL 7*   < > 11   CREATININE mg/dL 0.36*   < > 0.56*   CALCIUM mg/dL 10.1   < > 11.1*   BILIRUBIN mg/dL  --   --  0.3   ALK PHOS U/L  --   --  118*   ALT (SGPT) U/L  --   --  50*   AST (SGOT) U/L  --   --  33   GLUCOSE mg/dL 110*   < > 291*    < > = values in this interval not displayed.         Results from last 7 days   Lab Units 03/09/24  1701   ALBUMIN g/dL 3.1*     Results from last 7 days   Lab Units 03/09/24  1701   HSTROP T ng/L 10             Results from last 7 days   Lab Units 03/10/24  2236   INR  1.21*   APTT seconds 31.2*               Meds:   SCHEDULE  acetaminophen, 1,000 mg, Oral, Once  amLODIPine, 10 mg, Oral, Daily  atorvastatin, 40 mg, Oral, Daily  budesonide, 0.5 mg, Nebulization, BID - RT  cefepime, 2,000 mg, Intravenous, Q8H  cloNIDine, 0.1 mg, Oral, BID  guaiFENesin, 600 mg, Oral, Q12H  heparin (porcine), 5,000 Units, Subcutaneous, Q8H  hydrALAZINE, 50 mg, Oral, Q8H  lisinopril, 20 mg, Oral, Q24H   And  hydroCHLOROthiazide, 25 mg, Oral, Q24H  insulin lispro, 2-9 Units, Subcutaneous, 4x Daily AC & at Bedtime  ipratropium-albuterol, 3 mL, Nebulization, 4x Daily - RT  metoprolol tartrate, 150 mg, Oral, Q12H  metroNIDAZOLE, 500 mg, Intravenous, Q8H  vancomycin, 1,250 mg, Intravenous, Q8H  vitamin D3, 5,000 Units, Oral, Daily      Infusions  Pharmacy to Dose Cefepime,   Pharmacy to dose vancomycin,   sodium chloride, 100 mL/hr, Last Rate: 100 mL/hr (03/10/24  0907)      PRNs    acetaminophen    benzonatate    dextrose    dextrose    glucagon (human recombinant)    ipratropium-albuterol    Pharmacy to Dose Cefepime    Pharmacy to dose vancomycin    sodium chloride    traMADol    Physical Exam:  General Appearance:  Alert   HEENT:  Normocephalic, without obvious abnormality, Conjunctiva/corneas clear,.   Nares normal, no drainage     Neck:  Supple, symmetrical, trachea midline.   Lungs /Chest wall:   Left-sided rhonchi with decreased breath sounds, respirations unlabored, symmetrical wall movement.     Heart:  Regular rate and rhythm, S1 S2 normal  Abdomen: Soft, non-tender, no masses, no organomegaly.    Extremities: No edema, no clubbing or cyanosis     ROS  Constitutional: Negative for chills, fever and malaise/fatigue.   HENT: Negative.    Eyes: Negative.    Cardiovascular: Negative.    Respiratory: Positive for cough and shortness of breath.    Skin: Negative.    Musculoskeletal: Negative.    Gastrointestinal: Negative.    Genitourinary: Negative.    Neurological: Generalized weakness    I reviewed the recent clinical results  I personally reviewed the latest radiological studies    Part of this note may be an electronic transcription/translation of spoken language to printed text using the Dragon Dictation System.

## 2024-03-11 NOTE — ANESTHESIA PROCEDURE NOTES
Arterial Line    Pre-sedation assessment completed: 3/11/2024 6:12 PM    Patient reassessed immediately prior to procedure    Patient location during procedure: OR  Start time: 3/11/2024 6:12 PM  Stop Time:3/11/2024 6:14 PM       Line placed for hemodynamic monitoring, MD/Surgeon request and ABGs/Labs/ISTAT.  Performed By   Anesthesiologist: Sidney Estrella MD   Preanesthetic Checklist  Completed: patient identified, IV checked, site marked, risks and benefits discussed, surgical consent, monitors and equipment checked, pre-op evaluation and timeout performed  Arterial Line Prep    Sterile Tech: cap, gloves, mask and sterile barriers  Prep: ChloraPrep  Patient monitoring: blood pressure monitoring, continuous pulse oximetry and EKG  Arterial Line Procedure   Laterality:left  Location:  radial artery  Catheter size: 20 G   Guidance: palpation technique  Number of attempts: 1  Successful placement: yes  Heparin (Porcine) in NaCl 1000-0.9 UT/500ML-% for arterial line pressure bag - Intra-arterial   1,000 Units - 3/11/2024 6:12:00 PM   Post Assessment   Dressing Type: occlusive dressing applied, secured with tape and wrist guard applied.   Complications no  Circ/Move/Sens Assessment: normal and unchanged.   Patient Tolerance: patient tolerated the procedure well with no apparent complications

## 2024-03-11 NOTE — PROGRESS NOTES
"    [facility] MEDICINE SERVICE  DAILY PROGRESS NOTE    NAME: Prashant Zhou  : 1960  MRN: [id number]      LOS: 2 days     PROVIDER OF SERVICE: Joel Hollingsworth MD    Chief Complaint: Sepsis    Subjective:     Interval History:  History taken from: patient    Subjective       Chief Complaint: shortness of breath      History of Present Illness: Prashant Zhou is a 63 y.o. male with a CMH of rectal cance status post partial colectomy, , currently undergoing chemotherapy next course of chemotherapy this Tuesday, diabetes mellitus type 2, hypertension, hyperlipidemia, thoracic aortic aneurysm secondary to hypertension, who presented to [facility] on 3/9/2024 with plaints of shortness of breath, cough and congestion since last night it worsened today.  He denies any fevers chills or diaphoresis.  Cough productive of yellow sputum.  Air saturation was 81% in ED and was placed on 2 L oxygen high flow nasal cannula.  He does not wear home oxygen.  Abs today showed a sodium of 130, chloride 88, glucose 291, albumin 3.1, lactate was 3.8, WBC not elevated, hemoglobin 11.3, CTA per radiology shows:     \"Groundglass opacities and tree-in-bud nodularity throughout the left upper and lower lobes as well as the right middle lobe consistent with multifocal pneumonia. There is a 17 cm loculated gas and fluid collection within the left pleural space with   associated thickening of the pleural wall, which likely represents empyema.     No evidence of pulmonary embolism. There is pulmonary artery enlargement which can be seen with pulmonary hypertension.     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 Recommend vascular surgery referral.  \"     He triggered sepsis for lactate, and tachycardia and fever of 100.2.  He is not hypotensive.  He was given IV fluid bolus in ED per sepsis " protocol and started on IV cefepime and IV vancomycin with blood cultures pending.  500 mg IV Flagyl has been added given empyema and pulmonary has been consulted for  empyema . Given increased eccentric noncalcified thrombus in aorta vascular surgery has been consulted.  Per RN spoke to vascular surgery no heparin drip needed will see in AM.     3/10/2024 patient seen and examined in bed no acute distress, patient with dyspnea fatigue weakness.  Tolerating antibiotics.  WBC 8.2, Tmax 100.2.  3/11/2024 patient seen and examined in bed no acute distress, tolerating antibiotics, MRSA screen negative.  Vancomycin discontinued.  Discussed with RN.CTS following the patient and planning surgery today     Review of Systems  ENT: Negative.     Eyes: Negative.    Respiratory:  Positive for cough and shortness of breath.    Gastrointestinal: Negative.    Endocrine: Negative.    Genitourinary: Negative.    Musculoskeletal: Negative.    Skin: Negative.    Allergic/Immunologic: Negative.    Neurological: Negative.    Hematological: Negative.    Psychiatric/Behavioral: Negative.     All other systems reviewed and are negative.  Objective:     Vital Signs  Temp:  [97.3 °F (36.3 °C)-98.3 °F (36.8 °C)] 97.3 °F (36.3 °C)  Heart Rate:  [] 87  Resp:  [2-29] 20  BP: ()/(59-82) 99/74  Flow (L/min):  [4-8] 8   Body mass index is 24.62 kg/m².      Physical Exam  Constitutional:       Appearance: He is ill-appearing.   HENT:      Head: Normocephalic and atraumatic.      Right Ear: External ear normal.      Left Ear: External ear normal.      Nose: Nose normal.      Mouth/Throat:      Mouth: Mucous membranes are moist.   Eyes:      Extraocular Movements: Extraocular movements intact.   Cardiovascular:      Rate and Rhythm: Regular rhythm. Tachycardia present.      Pulses: Normal pulses.      Heart sounds: Normal heart sounds.   Pulmonary:      Effort: Pulmonary effort is normal.      Breath sounds: Wheezing present.   Abdominal:       Palpations: Abdomen is soft.   Genitourinary:     Comments: deferred  Musculoskeletal:         General: Normal range of motion.      Cervical back: Normal range of motion and neck supple.   Skin:     General: Skin is warm and dry.   Neurological:      General: No focal deficit present.      Mental Status: He is alert and oriented to person, place, and time.   Psychiatric:         Mood and Affect: Mood normal.         Behavior: Behavior normal.         Thought Content: Thought content normal.         Judgment: Judgment normal.      Scheduled Meds   acetaminophen, 1,000 mg, Oral, Once  amLODIPine, 10 mg, Oral, Daily  atorvastatin, 40 mg, Oral, Daily  budesonide, 0.5 mg, Nebulization, BID - RT  cefepime, 2,000 mg, Intravenous, Q8H  cloNIDine, 0.1 mg, Oral, BID  guaiFENesin, 600 mg, Oral, Q12H  heparin (porcine), 5,000 Units, Subcutaneous, Q8H  hydrALAZINE, 50 mg, Oral, Q8H  lisinopril, 20 mg, Oral, Q24H   And  hydroCHLOROthiazide, 25 mg, Oral, Q24H  insulin lispro, 2-9 Units, Subcutaneous, 4x Daily AC & at Bedtime  ipratropium-albuterol, 3 mL, Nebulization, 4x Daily - RT  metoprolol tartrate, 150 mg, Oral, Q12H  metroNIDAZOLE, 500 mg, Intravenous, Q8H  vitamin D3, 5,000 Units, Oral, Daily       PRN Meds     acetaminophen    benzonatate    dextrose    dextrose    glucagon (human recombinant)    ipratropium-albuterol    Pharmacy to Dose Cefepime    sodium chloride    traMADol   Infusions  Pharmacy to Dose Cefepime,   sodium chloride, 100 mL/hr, Last Rate: 100 mL/hr (03/11/24 0929)          Diagnostic Data    Results from last 7 days   Lab Units 03/10/24  2236 03/09/24  2351 03/09/24  1701   WBC 10*3/mm3 5.70   < > 9.80   HEMOGLOBIN g/dL 9.4*   < > 11.3*   HEMATOCRIT % 28.9*   < > 34.0*   PLATELETS 10*3/mm3 355   < > 435   GLUCOSE mg/dL 110*   < > 291*   CREATININE mg/dL 0.36*   < > 0.56*   BUN mg/dL 7*   < > 11   SODIUM mmol/L 134*   < > 130*   POTASSIUM mmol/L 3.4*   < > 4.5   AST (SGOT) U/L  --   --  33   ALT  (SGPT) U/L  --   --  50*   ALK PHOS U/L  --   --  118*   BILIRUBIN mg/dL  --   --  0.3   ANION GAP mmol/L 11.0   < > 16.0*    < > = values in this interval not displayed.       XR Chest 1 View    Result Date: 3/11/2024  Impression: Stable chest x-ray. Electronically Signed: Willy Ugalde MD  3/11/2024 7:53 AM EDT  Workstation ID: [id number]    CT Angiogram Chest Pulmonary Embolism    Result Date: 3/9/2024  Impression: Groundglass opacities and tree-in-bud nodularity throughout the left upper and lower lobes as well as the right middle lobe consistent with multifocal pneumonia. There is a 17 cm loculated gas and fluid collection within the left pleural space with associated thickening of the pleural wall, which likely represents empyema. No evidence of pulmonary embolism. There is pulmonary artery enlargement which can be seen with pulmonary hypertension. 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 Recommend vascular surgery referral. Electronically Signed: Camacho Vee MD  3/9/2024 7:48 PM EST  Workstation ID: [id number]    XR Chest 1 View    Result Date: 3/9/2024  Impression: 1.Tenting left hemidiaphragm with underlying airspace disease which could be secondary to pneumonia. Given the more masslike appearance on prior CT a follow-up CT chest suggested to reassess. 2.Possible left parapneumonic effusion. 3.There is air shadow in the left upper quadrant of the abdomen. This could relate to bowel as opposed to air within the left basilar area that might relate to cavitary area or loculated pneumothorax. This again could be assessed by follow-up CT. Electronically Signed: Santiago Tariq MD  3/9/2024 5:34 PM EST  Workstation ID: [id number]       I reviewed the patient's new clinical results.    Assessment/Plan:     Active and Resolved Problems  Active Hospital Problems    Diagnosis  POA    **Sepsis  [A41.9]  Yes    Pneumonia [J18.9]  Yes    Empyema lung [J86.9]  Yes    Acute respiratory failure with hypoxia [J96.01]  Yes    Hyponatremia [E87.1]  Yes    Thoracic aortic aneurysm without rupture [I71.20]  Yes    Aortic thrombus [I74.10]  Yes    Tobacco use [Z72.0]  Yes    Rectal cancer [C20]  Yes    Hypertension [I10]  Yes    Type 2 diabetes mellitus with hyperglycemia [E11.65]  Yes    Hyperlipidemia [E78.5]  Yes      Resolved Hospital Problems   No resolved problems to display.     Sepsis, lactate 3.8, tachycardia, fever 100.2, not hypotensive likely secondary to pneumonia lung empyema,  - received IV fluid bolus in ED per sepsis protocol,   -continue IV cefepime pharmacy to dose until blood cultures resulted,   -added IV Flagyl for empyema, maintenance fluids ordered, lactic acid every 6 hours until normalized,   -monitor CBC  -CTS following the patient and planning surgery today      Acute respiratory failure with hypoxia, likely secondary to above, oxygen to maintain sats 90 to 92%, DuoNebs every 4 hours as needed     Pneumonia/empyema lung, pulmonary consulted, see plan above, Tessalon Perles for cough, Mucinex, DuoNebs, antibiotics as above     Thoracic aortic aneurysm with without rupture/aortic thrombus increased per CTA report, vascular surgery consulted, no heparin drip per vascular at this time vascular to see in a.m.     Rectal cancer, status postresection on chemotherapy followed by oncology, oncology consulted is to have next round of chemotherapy on Tuesday, on home tramadol     Hyponatremia, normal saline IV infusion repeat BMP in a.m.     Tobacco use encourage cessation declined nicotine patch     Hypertension, reordered home amlodipine, clonidine, hydralazine, lisinopril, hydrochlorothiazide, metoprolol monitor BP     Type 2 diabetes mellitus with hyperglycemia, hold home glimepiride and metformin while inpatient and SSI as needed with Accu-Cheks ACHS consistent carb heart healthy diet      Hyperlipidemia on statin     DVT prophylaxis:  Medical and mechanical DVT prophylaxis orders are present.    Code status is   Code Status and Medical Interventions:   Ordered at: 03/09/24 211     Code Status (Patient has no pulse and is not breathing):    CPR (Attempt to Resuscitate)     Medical Interventions (Patient has pulse or is breathing):    Full Support     Plan for disposition:nh  in 3 days    Time: 30 minutes    Signature: Electronically signed by Joel Hollingsworth MD, 03/11/24, 12:49 EDT.  [facility] Hospitalist Team

## 2024-03-11 NOTE — PLAN OF CARE
Goal Outcome Evaluation:  Plan of Care Reviewed With: patient, spouse        Progress: no change  Outcome Evaluation: Patient alert and oriented, wife at bedside,patient continues IVF's NS@100, continues IV ABX, patient NPO, patient continues O2@4liters vis nc, patient to haveleft Thoracoscopy video with Davini pleural biopsy, poss. decortication, patient has rested well this shift.

## 2024-03-11 NOTE — ANESTHESIA PROCEDURE NOTES
Peripheral Block    Pre-sedation assessment completed: 3/11/2024 4:30 PM    Patient reassessed immediately prior to procedure    Patient location during procedure: pre-op  Reason for block: procedure for pain, at surgeon's request, post-op pain management and secondary anesthetic  Performed by  Anesthesiologist: Domingo Durand MD  Preanesthetic Checklist  Completed: patient identified, IV checked, site marked, risks and benefits discussed, surgical consent, monitors and equipment checked, pre-op evaluation and timeout performed  Prep:  Pt Position: sitting  Sterile barriers:cap, gloves, mask and washed/disinfected hands  Prep: ChloraPrep  Patient monitoring: blood pressure monitoring, continuous pulse oximetry and EKG  Procedure    Sedation: yes  Performed under: local infiltration  Guidance:ultrasound guided and landmark technique    ULTRASOUND INTERPRETATION.  Using ultrasound guidance a 21 G gauge needle was placed in close proximity to the nerve, at which point, under ultrasound guidance anesthetic was injected in the area of the nerve and spread of the anesthesia was seen on ultrasound in close proximity thereto.  There were no abnormalities seen on ultrasound; a digital image was taken; and the patient tolerated the procedure with no complications. Images:still images obtained, printed/placed on chart    Laterality:left  Block Type:erector spinae block  Injection Technique:single-shot  Needle Type:echogenic  Needle Gauge:21 G  Resistance on Injection: less than 15 psi    Medications Used: bupivacaine liposome (EXPAREL) injection 1.3% - Perineural   10 mL - 3/11/2024 5:05:00 PM  bupivacaine PF (MARCAINE) injection 0.5% - Perineural   10 mL - 3/11/2024 5:05:00 PM      Medications  Preservative Free Saline:10ml    Post Assessment  Injection Assessment: negative aspiration for heme, no paresthesia on injection and incremental injection  Patient Tolerance:comfortable throughout  block  Complications:no  Additional Notes  Pre-procedure:  PARESH block performed preoperatively prior to the start of anesthesia time at the request of the patient and the surgeon for the management of postoperative acute surgical pain as well as for a secondary intraoperative anesthetic (general anesthesia is the primary intraoperative anesthetic); patient identified; pre-procedure vital signs, all relevant labs/studies, complete medical/surgical/anesthetic history, full medication list, full allergy list, and NPO status obtained/reviewed; physical assessment performed; anesthetic options, side effects, potential complications, risks, and benefits discussed; questions answered; patient wishes to proceed with the procedure; written anesthesia procedure consent obtained; patient cleared for procedure; time out performed; IV access in situ    Procedure:  ASA monitor placed; supplemental oxygen provided; patient positioned; hand hygiene performed; sterile technique maintained throughout the procedure; sterile prep applied; insertion site determined by anatomical landmarks, palpation, and ultrasound imaging; live ultrasound guidance throughout the procedure; target nerves/landmarks identified on live ultrasound; skin and subcutaneous tissues numbed by injection of 1% lidocaine; 110 mm 21G Sono TAP Cannula Needle used; realtime needle advancement and placement in the target anatomic plane witnessed on live ultrasound; negative aspiration prior to injection; correct needle placement confirmed on live ultrasound by local anesthetic spread in the correct anatomic plane; local anesthetic mixture injected with negative aspiration prior to each injection and after each 1-5 mL injected; needle withdrawn; dressing applied; ultrasound image printed and placed in the patient's permanent medical record    Post-procedure:  PARESH block placed successfully; good block; no apparent complications; minimal estimated blood loss; vital signs  stable throughout; see nurse's notes for vitals; transported to the OR, general anesthesia induced, and surgery started

## 2024-03-11 NOTE — NURSING NOTE
WOCN note:    63 yr old male admitted 3/9/24 with shortness of breath, cough and congestion. Patient has a hx of rectal cancer and is s/p partial colectomy and undergoing chemotherapy. WOCN consult received for a pressure injury to the coccyx.     Patient presents with an unstageable pressure injury to his right gluteal fold measuring approximately 2.5x1cm. The wound has been present for several weeks according to the wife and he has been referred to OP wound center but has not been seen there yet. Patient stated he has been using Bacitracin on the wound at home.     There is a silicone bordered foam dressing in place and patient is lying on his side. His wife reports he has been sitting a lot at home. Instructed patient and wife on frequent position changes side to side to avoid pressure directly on the wound. Offered a foam wedge to assist with sacral off-loading if needed as well as a waffle cushion if patient will be sitting in the chair. Recommend to continue with the silicone foam dressing and follow up with outpatient wound center after discharge. We will continue to follow as needed.

## 2024-03-11 NOTE — PROGRESS NOTES
Infectious Diseases Progress Note      LOS: 2 days   Patient Care Team:  Lazaro Paulino MD as PCP - General  Steve Mckeon MD as Consulting Physician (Cardiology)  Lars Wagner MD as Consulting Physician (Hematology and Oncology)    Chief Complaint shortness of breath, cough and fatigue at admission    Subjective       The patient has been afebrile for the last 24 hours.  The patient is on 8 L of oxygen by high flow nasal cannula hemodynamically stable, and is tolerating antimicrobial therapy.  No new complaints      Review of Systems:   Review of Systems   Constitutional:  Positive for fatigue.   HENT: Negative.     Eyes: Negative.    Respiratory:  Positive for cough and shortness of breath.    Cardiovascular: Negative.    Gastrointestinal: Negative.    Endocrine: Negative.    Genitourinary: Negative.    Musculoskeletal: Negative.    Skin: Negative.    Neurological: Negative.    Psychiatric/Behavioral: Negative.     All other systems reviewed and are negative.       Objective     Vital Signs  Temp:  [97.3 °F (36.3 °C)-98.4 °F (36.9 °C)] 98.4 °F (36.9 °C)  Heart Rate:  [] 110  Resp:  [2-29] 24  BP: ()/(55-82) 98/59    Physical Exam:  Physical Exam  Vitals and nursing note reviewed.   Constitutional:       General: He is not in acute distress.     Appearance: He is well-developed and normal weight. He is ill-appearing. He is not diaphoretic.   HENT:      Head: Normocephalic and atraumatic.   Eyes:      Conjunctiva/sclera: Conjunctivae normal.      Pupils: Pupils are equal, round, and reactive to light.   Cardiovascular:      Rate and Rhythm: Normal rate and regular rhythm.      Heart sounds: Normal heart sounds, S1 normal and S2 normal.   Pulmonary:      Effort: Pulmonary effort is normal. No respiratory distress.      Breath sounds: No stridor. Wheezing present. No rales.      Comments: Diminished throughout  Abdominal:      General: Bowel sounds are normal. There is no distension.       Palpations: Abdomen is soft. There is no mass.      Tenderness: There is no abdominal tenderness. There is no guarding.   Musculoskeletal:         General: No deformity. Normal range of motion.      Cervical back: Neck supple.   Skin:     General: Skin is warm and dry.      Coloration: Skin is not pale.      Findings: No erythema or rash.      Comments: Port   Neurological:      Mental Status: He is alert and oriented to person, place, and time.      Cranial Nerves: No cranial nerve deficit.   Psychiatric:         Mood and Affect: Mood normal.          Results Review:    I have reviewed all clinical data, test, lab, and imaging results.     Radiology  Adult Transthoracic Echo Complete W/ Cont if Necessary Per Protocol    Result Date: 3/11/2024    Left ventricular systolic function is normal. Calculated left ventricular EF = 65% Left ventricular ejection fraction appears to be 61 - 65%.   Left ventricular diastolic function is consistent with (grade I) impaired relaxation.   There is calcification of the aortic valve mainly affecting the non-coronary cusp(s).   Estimated right ventricular systolic pressure from tricuspid regurgitation is normal (<35 mmHg).   No significant valvular abnormalities noted.     XR Chest 1 View    Result Date: 3/11/2024  XR CHEST 1 VW Date of Exam: 3/11/2024 4:27 AM EDT Indication: pre-op testing Comparison: 3/9/2024 at 1720 hours, CT of the chest 3/9/2024. Findings: Infiltrates are again seen throughout the left lung. There remains evidence for left empyema. Mild atelectasis versus infiltrates are also noted within the right lower lobe. The findings are stable. The cardiac silhouette and mediastinum are stable. The right portacatheter is stable in position.     Impression: Stable chest x-ray. Electronically Signed: Willy Ugalde MD  3/11/2024 7:53 AM EDT  Workstation ID: [id number]     Cardiology    Laboratory    Results from last 7 days   Lab Units 03/10/24  3011 03/09/24  4413  03/09/24  1701 03/08/24  1139   WBC 10*3/mm3 5.70 8.20 9.80 12.09*   HEMOGLOBIN g/dL 9.4* 10.1* 11.3* 11.0*   HEMATOCRIT % 28.9* 30.6* 34.0* 34.7*   PLATELETS 10*3/mm3 355 396 435 347     Results from last 7 days   Lab Units 03/10/24  2236 03/09/24  2351 03/09/24  1701   SODIUM mmol/L 134* 131* 130*   POTASSIUM mmol/L 3.4* 4.0 4.5   CHLORIDE mmol/L 95* 95* 88*   CO2 mmol/L 28.0 26.0 26.0   BUN mg/dL 7* 8 11   CREATININE mg/dL 0.36* 0.40* 0.56*   GLUCOSE mg/dL 110* 182* 291*   ALBUMIN g/dL  --   --  3.1*   BILIRUBIN mg/dL  --   --  0.3   ALK PHOS U/L  --   --  118*   AST (SGOT) U/L  --   --  33   ALT (SGPT) U/L  --   --  50*   CALCIUM mg/dL 10.1 9.8 11.1*                 Microbiology   Microbiology Results (last 10 days)       Procedure Component Value - Date/Time    S. Pneumo Ag Urine or CSF - Urine, Urine, Clean Catch [[id number]]  (Normal) Collected: 03/10/24 2111    Lab Status: Final result Specimen: Urine, Clean Catch Updated: 03/10/24 2256     Strep Pneumo Ag Negative    Respiratory Culture - Sputum, Cough [[id number]] Collected: 03/10/24 2102    Lab Status: Preliminary result Specimen: Sputum from Cough Updated: 03/11/24 1117     Respiratory Culture No growth     Gram Stain Many (4+) WBCs per low power field      No Epithelial cells seen      Few (2+) Gram positive cocci in chains    MRSA Screen, PCR (Inpatient) - Swab, Nares [[id number]]  (Normal) Collected: 03/10/24 2056    Lab Status: Final result Specimen: Swab from Nares Updated: 03/10/24 2248     MRSA PCR No MRSA Detected    Narrative:      The negative predictive value of this diagnostic test is high and should only be used to consider de-escalating anti-MRSA therapy. A positive result may indicate colonization with MRSA and must be correlated clinically.    MRSA Screen, PCR (Inpatient) - Swab, Nares [[id number]]  (Normal) Collected: 03/09/24 9121    Lab Status: Final result Specimen: Swab from Nares Updated: 03/10/24 0103     MRSA PCR No MRSA Detected     Narrative:      The negative predictive value of this diagnostic test is high and should only be used to consider de-escalating anti-MRSA therapy. A positive result may indicate colonization with MRSA and must be correlated clinically.    Blood Culture - Blood, Arm, Right [[id number]]  (Normal) Collected: 03/09/24 1745    Lab Status: Preliminary result Specimen: Blood from Arm, Right Updated: 03/10/24 1900     Blood Culture No growth at 24 hours    Blood Culture - Blood, Arm, Left [[id number]]  (Normal) Collected: 03/09/24 1701    Lab Status: Preliminary result Specimen: Blood from Arm, Left Updated: 03/10/24 1815     Blood Culture No growth at 24 hours    Respiratory Panel PCR w/COVID-19(SARS-CoV-2) [facility] In-House, NP Swab in UTM/VTM, 2 HR TAT - Swab, Nasopharynx [[id number]]  (Normal) Collected: 03/09/24 1701    Lab Status: Final result Specimen: Swab from Nasopharynx Updated: 03/09/24 1756     ADENOVIRUS, PCR Not Detected     Coronavirus 229E Not Detected     Coronavirus HKU1 Not Detected     Coronavirus NL63 Not Detected     Coronavirus OC43 Not Detected     COVID19 Not Detected     Human Metapneumovirus Not Detected     Human Rhinovirus/Enterovirus Not Detected     Influenza A PCR Not Detected     Influenza B PCR Not Detected     Parainfluenza Virus 1 Not Detected     Parainfluenza Virus 2 Not Detected     Parainfluenza Virus 3 Not Detected     Parainfluenza Virus 4 Not Detected     RSV, PCR Not Detected     Bordetella pertussis pcr Not Detected     Bordetella parapertussis PCR Not Detected     Chlamydophila pneumoniae PCR Not Detected     Mycoplasma pneumo by PCR Not Detected    Narrative:      In the setting of a positive respiratory panel with a viral infection PLUS a negative procalcitonin without other underlying concern for bacterial infection, consider observing off antibiotics or discontinuation of antibiotics and continue supportive care. If the respiratory panel is positive for atypical  bacterial infection (Bordetella pertussis, Chlamydophila pneumoniae, or Mycoplasma pneumoniae), consider antibiotic de-escalation to target atypical bacterial infection.            Medication Review:       Schedule Meds  [Transfer Hold] acetaminophen, 1,000 mg, Oral, Once  amLODIPine, 10 mg, Oral, Daily  [Transfer Hold] atorvastatin, 40 mg, Oral, Daily  [Transfer Hold] budesonide, 0.5 mg, Nebulization, BID - RT  cefepime, 2,000 mg, Intravenous, Q8H  cloNIDine, 0.1 mg, Oral, BID  [Transfer Hold] guaiFENesin, 600 mg, Oral, Q12H  [Transfer Hold] heparin (porcine), 5,000 Units, Subcutaneous, Q8H  hydrALAZINE, 50 mg, Oral, Q8H  [Transfer Hold] lisinopril, 20 mg, Oral, Q24H   And  [Transfer Hold] hydroCHLOROthiazide, 25 mg, Oral, Q24H  [Transfer Hold] insulin lispro, 2-9 Units, Subcutaneous, 4x Daily AC & at Bedtime  [Transfer Hold] ipratropium-albuterol, 3 mL, Nebulization, 4x Daily - RT  metoprolol tartrate, 150 mg, Oral, Q12H  metroNIDAZOLE, 500 mg, Intravenous, Q8H  sodium chloride, 10 mL, Intravenous, Q12H  [Transfer Hold] vitamin D3, 5,000 Units, Oral, Daily        Infusion Meds  lactated ringers, 9 mL/hr  Pharmacy to Dose Cefepime,   Pharmacy to dose vancomycin,   sodium chloride, 100 mL/hr, Last Rate: 100 mL/hr (03/11/24 0929)        PRN Meds    [Transfer Hold] acetaminophen    [Transfer Hold] benzonatate    [Transfer Hold] dextrose    [Transfer Hold] dextrose    [Transfer Hold] glucagon (human recombinant)    [Transfer Hold] ipratropium-albuterol    lactated ringers    Pharmacy to Dose Cefepime    Pharmacy to dose vancomycin    [Transfer Hold] sodium chloride    sodium chloride    [Transfer Hold] traMADol        Assessment & Plan       Antimicrobial Therapy   1.  IV vancomycin        2.  IV cefepime        3.  IV Flagyl        4.        5.            Assessment     Multifocal pneumonia with a loculated gas and fluid collection in the left pleural space concerning for empyema.  Patient is up to 8 L of oxygen by  nasal cannula.  MRSA of the nares screen is negative.    Patient is recently been on doxycycline.  Pulmonary also following.  Pneumococcal urine antigen is negative.  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  Oncology following patient does have a port     Type 2 diabetes-recent A1c is 7.4     Tobacco abuse     Plan     Continue IV vancomycin asked pharmacy to monitor dose  Continue IV cefepime 2 g every 8 hours  Continue IV Flagyl 500 mg every 8 hours  Thoracic surgery is taking the patient this afternoon for a VATS decortication  Will wait on culture and pathology  Continue supportive care  A.m. labs  Discussed with pharmacy    Katja Ramos, APRN  03/11/24  16:13 EDT    Note is dictated utilizing voice recognition software/Dragon

## 2024-03-11 NOTE — CONSULTS
Palliative Care Social Work Progress Note    Code Status:full code    Goals of Care: Full Treatment    Narrative:Palliative care  met with patient at bedside to discuss goals of care. He states he receives chemo every other Tuesday and although he has stage 4 rectal cancer the oncologist has given him hope. Patient still plans to continue treatment. He has a planned procedure today at 3 and is optimistic. He affirms he is a full code with full intervention. Palliative care will continue to follow.     Plan: Palliative care team following          Keila Cabrera

## 2024-03-11 NOTE — SIGNIFICANT NOTE
Pt scheduled for L thoracotomy with possible decortication this date.  Pt requesting therapist to attempt again 3/12

## 2024-03-11 NOTE — PROGRESS NOTES
"Pharmacy Antimicrobial Dosing Service    Subjective:  Prashant Zhou is a 63 y.o.male admitted with possible emyema. Pharmacy has been consulted to dose Vancomycin for possible empyema.      Assessment/Plan    1. Day #3 Vancomycin [restart]: Goal -600 mcg*h/mL. Patient previously receiving vancomycin 1250 mg q12h, initial level was only 4.6 mcg/mL. Insight predicts patient has a very short half-life, however patient is 63 years old and likely half life is not as short as predicted. Given patient's popPK likely does not match Bayesian calculator, will re-initiate vancomycin 1500 mg (17 mg/kg ABW) IV q12h and order both peak and trough levels tomorrow.    2. Day #3 Cefepime: 2 gm IV q8h for estCrCl > 60 mL/min    Will continue to monitor drug levels, renal function, culture and sensitivities, and patient clinical status.       Objective:  Relevant clinical data and objective history reviewed:  190.5 cm (75\")   89.4 kg (197 lb)   Ideal body weight: 84.5 kg (186 lb 4.6 oz)  Adjusted ideal body weight: 86.4 kg (190 lb 9.2 oz)  Body mass index is 24.62 kg/m².    Results from last 7 days   Lab Units 03/10/24  1609   VANCOMYCIN RM mcg/mL 4.90*  4.60*     Results from last 7 days   Lab Units 03/10/24  2236 03/09/24  2351 03/09/24  1701   CREATININE mg/dL 0.36* 0.40* 0.56*     Estimated Creatinine Clearance: 269 mL/min (A) (by C-G formula based on SCr of 0.36 mg/dL (L)).  I/O last 3 completed shifts:  In: 3160 [P.O.:150; I.V.:1310; IV Piggyback:1700]  Out: 2300 [Urine:2300]    Results from last 7 days   Lab Units 03/10/24  2236 03/09/24  2351 03/09/24  1701   WBC 10*3/mm3 5.70 8.20 9.80     Temperature    03/11/24 0853 03/11/24 1307 03/11/24 1449   Temp: 97.3 °F (36.3 °C) 97.5 °F (36.4 °C) 98.4 °F (36.9 °C)     Baseline culture/source/susceptibility:  Microbiology Results (last 10 days)       Procedure Component Value - Date/Time    S. Pneumo Ag Urine or CSF - Urine, Urine, Clean Catch [[id number]]  (Normal) " Collected: 03/10/24 2111    Lab Status: Final result Specimen: Urine, Clean Catch Updated: 03/10/24 2256     Strep Pneumo Ag Negative    Respiratory Culture - Sputum, Cough [[id number]] Collected: 03/10/24 2102    Lab Status: Preliminary result Specimen: Sputum from Cough Updated: 03/11/24 1117     Respiratory Culture No growth     Gram Stain Many (4+) WBCs per low power field      No Epithelial cells seen      Few (2+) Gram positive cocci in chains    MRSA Screen, PCR (Inpatient) - Swab, Nares [[id number]]  (Normal) Collected: 03/10/24 2056    Lab Status: Final result Specimen: Swab from Nares Updated: 03/10/24 2248     MRSA PCR No MRSA Detected    Narrative:      The negative predictive value of this diagnostic test is high and should only be used to consider de-escalating anti-MRSA therapy. A positive result may indicate colonization with MRSA and must be correlated clinically.    MRSA Screen, PCR (Inpatient) - Swab, Nares [[id number]]  (Normal) Collected: 03/09/24 2351    Lab Status: Final result Specimen: Swab from Nares Updated: 03/10/24 0103     MRSA PCR No MRSA Detected    Narrative:      The negative predictive value of this diagnostic test is high and should only be used to consider de-escalating anti-MRSA therapy. A positive result may indicate colonization with MRSA and must be correlated clinically.    Blood Culture - Blood, Arm, Right [[id number]]  (Normal) Collected: 03/09/24 1745    Lab Status: Preliminary result Specimen: Blood from Arm, Right Updated: 03/10/24 1900     Blood Culture No growth at 24 hours    Blood Culture - Blood, Arm, Left [[id number]]  (Normal) Collected: 03/09/24 1701    Lab Status: Preliminary result Specimen: Blood from Arm, Left Updated: 03/10/24 1815     Blood Culture No growth at 24 hours    Respiratory Panel PCR w/COVID-19(SARS-CoV-2) [facility] In-House, NP Swab in UTM/VTM, 2 HR TAT - Swab, Nasopharynx [[id number]]  (Normal) Collected: 03/09/24 1701    Lab Status:  Final result Specimen: Swab from Nasopharynx Updated: 03/09/24 1752     ADENOVIRUS, PCR Not Detected     Coronavirus 229E Not Detected     Coronavirus HKU1 Not Detected     Coronavirus NL63 Not Detected     Coronavirus OC43 Not Detected     COVID19 Not Detected     Human Metapneumovirus Not Detected     Human Rhinovirus/Enterovirus Not Detected     Influenza A PCR Not Detected     Influenza B PCR Not Detected     Parainfluenza Virus 1 Not Detected     Parainfluenza Virus 2 Not Detected     Parainfluenza Virus 3 Not Detected     Parainfluenza Virus 4 Not Detected     RSV, PCR Not Detected     Bordetella pertussis pcr Not Detected     Bordetella parapertussis PCR Not Detected     Chlamydophila pneumoniae PCR Not Detected     Mycoplasma pneumo by PCR Not Detected    Narrative:      In the setting of a positive respiratory panel with a viral infection PLUS a negative procalcitonin without other underlying concern for bacterial infection, consider observing off antibiotics or discontinuation of antibiotics and continue supportive care. If the respiratory panel is positive for atypical bacterial infection (Bordetella pertussis, Chlamydophila pneumoniae, or Mycoplasma pneumoniae), consider antibiotic de-escalation to target atypical bacterial infection.            Ivanna Zarco, PharmD  03/11/24 16:32 EDT

## 2024-03-11 NOTE — PROGRESS NOTES
"Pharmacy Antimicrobial Dosing Service    Subjective:  Prashant Zhou is a 63 y.o.male admitted with SOA and congestion.      Assessment/Plan    1. Day #2 Vancomycin: 1250mg  IV q12h for estCrCl > 60 mL/min.  -SrCr stable, WBC improving, Vanc Tr reported @ 4.9.  -Will change Vanc 1250 mg from q12 hr to q 8 hrs, Predicted AUC of 481  -vanc levels due 3/11 around 1800 dose.    2. Day #2 Cefepime: 2000mg IV q8hh for estCrCl > 60 mL/min.  -cont with no changes    Will continue to monitor drug levels, renal function, culture and sensitivities, and patient clinical status.       Objective:  Relevant clinical data and objective history reviewed:  190.5 cm (75\")   91.1 kg (200 lb 13.4 oz)   Ideal body weight: 84.5 kg (186 lb 4.6 oz)  Adjusted ideal body weight: 87.1 kg (192 lb 1.7 oz)  Body mass index is 25.1 kg/m².    Results from last 7 days   Lab Units 03/09/24 2351 03/09/24  1701   CREATININE mg/dL 0.40* 0.56*     Estimated Creatinine Clearance: 246.7 mL/min (A) (by C-G formula based on SCr of 0.4 mg/dL (L)).  I/O last 3 completed shifts:  In: 2260 [P.O.:150; I.V.:310; IV Piggyback:1800]  Out: 1100 [Urine:1100]    Results from last 7 days   Lab Units 03/09/24  2351 03/09/24  1701 03/08/24  1139   WBC 10*3/mm3 8.20 9.80 12.09*     Temperature    03/10/24 1300 03/10/24 1801 03/10/24 2122   Temp: 98.3 °F (36.8 °C) 98.1 °F (36.7 °C) 98 °F (36.7 °C)     Baseline culture/source/susceptibility:  Microbiology Results (last 10 days)       Procedure Component Value - Date/Time    MRSA Screen, PCR (Inpatient) - Swab, Nares [[id number]]  (Normal) Collected: 03/09/24 2351    Lab Status: Final result Specimen: Swab from Nares Updated: 03/10/24 0103     MRSA PCR No MRSA Detected    Narrative:      The negative predictive value of this diagnostic test is high and should only be used to consider de-escalating anti-MRSA therapy. A positive result may indicate colonization with MRSA and must be correlated clinically.    Blood Culture - " Blood, Arm, Right [[id number]]  (Normal) Collected: 03/09/24 1745    Lab Status: Preliminary result Specimen: Blood from Arm, Right Updated: 03/10/24 1900     Blood Culture No growth at 24 hours    Blood Culture - Blood, Arm, Left [[id number]]  (Normal) Collected: 03/09/24 1701    Lab Status: Preliminary result Specimen: Blood from Arm, Left Updated: 03/10/24 1815     Blood Culture No growth at 24 hours    Respiratory Panel PCR w/COVID-19(SARS-CoV-2) [facility] In-House, NP Swab in UTM/VTM, 2 HR TAT - Swab, Nasopharynx [[id number]]  (Normal) Collected: 03/09/24 1701    Lab Status: Final result Specimen: Swab from Nasopharynx Updated: 03/09/24 1756     ADENOVIRUS, PCR Not Detected     Coronavirus 229E Not Detected     Coronavirus HKU1 Not Detected     Coronavirus NL63 Not Detected     Coronavirus OC43 Not Detected     COVID19 Not Detected     Human Metapneumovirus Not Detected     Human Rhinovirus/Enterovirus Not Detected     Influenza A PCR Not Detected     Influenza B PCR Not Detected     Parainfluenza Virus 1 Not Detected     Parainfluenza Virus 2 Not Detected     Parainfluenza Virus 3 Not Detected     Parainfluenza Virus 4 Not Detected     RSV, PCR Not Detected     Bordetella pertussis pcr Not Detected     Bordetella parapertussis PCR Not Detected     Chlamydophila pneumoniae PCR Not Detected     Mycoplasma pneumo by PCR Not Detected    Narrative:      In the setting of a positive respiratory panel with a viral infection PLUS a negative procalcitonin without other underlying concern for bacterial infection, consider observing off antibiotics or discontinuation of antibiotics and continue supportive care. If the respiratory panel is positive for atypical bacterial infection (Bordetella pertussis, Chlamydophila pneumoniae, or Mycoplasma pneumoniae), consider antibiotic de-escalation to target atypical bacterial infection.             Gato Lancaster RPH  03/10/24 21:55 EDT

## 2024-03-11 NOTE — PLAN OF CARE
Goal Outcome Evaluation:  Plan of Care Reviewed With: patient        Progress: no change  Outcome Evaluation: Thoracoscopy today. Continuing IV fluids and IV antibiotics. Currently on 8L. Patient having some pain related to his cancer.

## 2024-03-12 ENCOUNTER — APPOINTMENT (OUTPATIENT)
Dept: GENERAL RADIOLOGY | Facility: HOSPITAL | Age: 64
DRG: 853 | End: 2024-03-12
Payer: MEDICARE

## 2024-03-12 LAB
ANION GAP SERPL CALCULATED.3IONS-SCNC: 9 MMOL/L (ref 5–15)
ARTERIAL PATENCY WRIST A: NORMAL
ATMOSPHERIC PRESS: NORMAL MM[HG]
BACTERIA SPEC RESP CULT: NORMAL
BASE EXCESS BLDA CALC-SCNC: 2.3 MMOL/L (ref 0–3)
BASOPHILS # BLD AUTO: 0 10*3/MM3 (ref 0–0.2)
BASOPHILS NFR BLD AUTO: 0.1 % (ref 0–1.5)
BDY SITE: NORMAL
BUN SERPL-MCNC: 9 MG/DL (ref 8–23)
BUN/CREAT SERPL: 19.6 (ref 7–25)
CALCIUM SPEC-SCNC: 10 MG/DL (ref 8.6–10.5)
CHLORIDE SERPL-SCNC: 100 MMOL/L (ref 98–107)
CO2 BLDA-SCNC: 28.3 MMOL/L (ref 22–29)
CO2 SERPL-SCNC: 27 MMOL/L (ref 22–29)
CREAT SERPL-MCNC: 0.46 MG/DL (ref 0.76–1.27)
DEPRECATED RDW RBC AUTO: 63.9 FL (ref 37–54)
EGFRCR SERPLBLD CKD-EPI 2021: 118.3 ML/MIN/1.73
EOSINOPHIL # BLD AUTO: 0 10*3/MM3 (ref 0–0.4)
EOSINOPHIL NFR BLD AUTO: 0 % (ref 0.3–6.2)
ERYTHROCYTE [DISTWIDTH] IN BLOOD BY AUTOMATED COUNT: 18.8 % (ref 12.3–15.4)
GLUCOSE BLDC GLUCOMTR-MCNC: 227 MG/DL (ref 70–105)
GLUCOSE BLDC GLUCOMTR-MCNC: 235 MG/DL (ref 70–105)
GLUCOSE BLDC GLUCOMTR-MCNC: 245 MG/DL (ref 70–105)
GLUCOSE BLDC GLUCOMTR-MCNC: 281 MG/DL (ref 70–105)
GLUCOSE SERPL-MCNC: 249 MG/DL (ref 65–99)
GRAM STN SPEC: NORMAL
HCO3 BLDA-SCNC: 27 MMOL/L (ref 21–28)
HCT VFR BLD AUTO: 28 % (ref 37.5–51)
HEMODILUTION: NO
HGB BLD-MCNC: 9.2 G/DL (ref 13–17.7)
INHALED O2 CONCENTRATION: 40 %
LYMPHOCYTES # BLD AUTO: 0.4 10*3/MM3 (ref 0.7–3.1)
LYMPHOCYTES NFR BLD AUTO: 3.4 % (ref 19.6–45.3)
MCH RBC QN AUTO: 30.1 PG (ref 26.6–33)
MCHC RBC AUTO-ENTMCNC: 32.9 G/DL (ref 31.5–35.7)
MCV RBC AUTO: 91.3 FL (ref 79–97)
MODALITY: NORMAL
MONOCYTES # BLD AUTO: 1.5 10*3/MM3 (ref 0.1–0.9)
MONOCYTES NFR BLD AUTO: 11.5 % (ref 5–12)
NEUTROPHILS NFR BLD AUTO: 10.8 10*3/MM3 (ref 1.7–7)
NEUTROPHILS NFR BLD AUTO: 85 % (ref 42.7–76)
NRBC BLD AUTO-RTO: 0.1 /100 WBC (ref 0–0.2)
PCO2 BLDA: 41.6 MM HG (ref 35–48)
PEEP RESPIRATORY: 5 CM[H2O]
PH BLDA: 7.42 PH UNITS (ref 7.35–7.45)
PLATELET # BLD AUTO: 469 10*3/MM3 (ref 140–450)
PMV BLD AUTO: 7.1 FL (ref 6–12)
PO2 BLDA: 85.4 MM HG (ref 83–108)
POTASSIUM SERPL-SCNC: 3.8 MMOL/L (ref 3.5–5.2)
PSV: 7 CMH2O
QT INTERVAL: 400 MS
QT INTERVAL: 433 MS
QTC INTERVAL: 456 MS
QTC INTERVAL: 489 MS
RBC # BLD AUTO: 3.07 10*6/MM3 (ref 4.14–5.8)
SAO2 % BLDCOA: 96.6 % (ref 94–98)
SODIUM SERPL-SCNC: 136 MMOL/L (ref 136–145)
VENTILATOR MODE: NORMAL
WBC NRBC COR # BLD AUTO: 12.7 10*3/MM3 (ref 3.4–10.8)

## 2024-03-12 PROCEDURE — 94799 UNLISTED PULMONARY SVC/PX: CPT

## 2024-03-12 PROCEDURE — 80048 BASIC METABOLIC PNL TOTAL CA: CPT | Performed by: NURSE PRACTITIONER

## 2024-03-12 PROCEDURE — 99232 SBSQ HOSP IP/OBS MODERATE 35: CPT | Performed by: INTERNAL MEDICINE

## 2024-03-12 PROCEDURE — 25010000002 CEFEPIME PER 500 MG: Performed by: SURGERY

## 2024-03-12 PROCEDURE — 71045 X-RAY EXAM CHEST 1 VIEW: CPT

## 2024-03-12 PROCEDURE — 82948 REAGENT STRIP/BLOOD GLUCOSE: CPT

## 2024-03-12 PROCEDURE — 25010000002 PROPOFOL 10 MG/ML EMULSION: Performed by: ANESTHESIOLOGY

## 2024-03-12 PROCEDURE — 25010000002 METRONIDAZOLE 500 MG/100ML SOLUTION: Performed by: SURGERY

## 2024-03-12 PROCEDURE — 25810000003 SODIUM CHLORIDE 0.9 % SOLUTION: Performed by: SURGERY

## 2024-03-12 PROCEDURE — 94003 VENT MGMT INPAT SUBQ DAY: CPT

## 2024-03-12 PROCEDURE — 63710000001 INSULIN LISPRO (HUMAN) PER 5 UNITS: Performed by: SURGERY

## 2024-03-12 PROCEDURE — 97112 NEUROMUSCULAR REEDUCATION: CPT

## 2024-03-12 PROCEDURE — 82948 REAGENT STRIP/BLOOD GLUCOSE: CPT | Performed by: SURGERY

## 2024-03-12 PROCEDURE — 25010000002 HYDROMORPHONE 1 MG/ML SOLUTION: Performed by: SURGERY

## 2024-03-12 PROCEDURE — 94761 N-INVAS EAR/PLS OXIMETRY MLT: CPT

## 2024-03-12 PROCEDURE — 85025 COMPLETE CBC W/AUTO DIFF WBC: CPT | Performed by: NURSE PRACTITIONER

## 2024-03-12 PROCEDURE — 25010000002 METRONIDAZOLE 500 MG/100ML SOLUTION: Performed by: INTERNAL MEDICINE

## 2024-03-12 PROCEDURE — 97530 THERAPEUTIC ACTIVITIES: CPT

## 2024-03-12 PROCEDURE — 93005 ELECTROCARDIOGRAM TRACING: CPT | Performed by: NURSE PRACTITIONER

## 2024-03-12 PROCEDURE — 25010000002 VANCOMYCIN 10 G RECONSTITUTED SOLUTION: Performed by: SURGERY

## 2024-03-12 PROCEDURE — 25010000002 HEPARIN (PORCINE) PER 1000 UNITS: Performed by: SURGERY

## 2024-03-12 PROCEDURE — 94664 DEMO&/EVAL PT USE INHALER: CPT

## 2024-03-12 PROCEDURE — 25810000003 LACTATED RINGERS PER 1000 ML: Performed by: SURGERY

## 2024-03-12 PROCEDURE — 82803 BLOOD GASES ANY COMBINATION: CPT

## 2024-03-12 PROCEDURE — 97164 PT RE-EVAL EST PLAN CARE: CPT

## 2024-03-12 PROCEDURE — 25010000002 KETOROLAC TROMETHAMINE PER 15 MG: Performed by: SURGERY

## 2024-03-12 PROCEDURE — 99024 POSTOP FOLLOW-UP VISIT: CPT | Performed by: NURSE PRACTITIONER

## 2024-03-12 RX ORDER — ARGININE/GLUTAMINE/CALCIUM BMB 7G-7G-1.5G
1 POWDER IN PACKET (EA) ORAL 2 TIMES DAILY
Status: DISCONTINUED | OUTPATIENT
Start: 2024-03-12 | End: 2024-03-17 | Stop reason: HOSPADM

## 2024-03-12 RX ADMIN — IPRATROPIUM BROMIDE AND ALBUTEROL SULFATE 3 ML: .5; 3 SOLUTION RESPIRATORY (INHALATION) at 23:49

## 2024-03-12 RX ADMIN — ACETYLCYSTEINE 3 ML: 200 SOLUTION ORAL; RESPIRATORY (INHALATION) at 08:30

## 2024-03-12 RX ADMIN — KETOROLAC TROMETHAMINE 15 MG: 30 INJECTION INTRAMUSCULAR; INTRAVENOUS at 20:27

## 2024-03-12 RX ADMIN — ACETAMINOPHEN 1000 MG: 500 TABLET, FILM COATED ORAL at 20:27

## 2024-03-12 RX ADMIN — HEPARIN SODIUM 5000 UNITS: 5000 INJECTION, SOLUTION INTRAVENOUS; SUBCUTANEOUS at 20:27

## 2024-03-12 RX ADMIN — CEFEPIME 2000 MG: 2 INJECTION, POWDER, FOR SOLUTION INTRAVENOUS at 16:27

## 2024-03-12 RX ADMIN — POLYETHYLENE GLYCOL 3350 17 G: 17 POWDER, FOR SOLUTION ORAL at 16:44

## 2024-03-12 RX ADMIN — SODIUM CHLORIDE, POTASSIUM CHLORIDE, SODIUM LACTATE AND CALCIUM CHLORIDE 40 ML/HR: 600; 310; 30; 20 INJECTION, SOLUTION INTRAVENOUS at 00:19

## 2024-03-12 RX ADMIN — HYDROMORPHONE HYDROCHLORIDE 0.5 MG: 1 INJECTION, SOLUTION INTRAMUSCULAR; INTRAVENOUS; SUBCUTANEOUS at 20:38

## 2024-03-12 RX ADMIN — ACETYLCYSTEINE 3 ML: 200 SOLUTION ORAL; RESPIRATORY (INHALATION) at 15:53

## 2024-03-12 RX ADMIN — METRONIDAZOLE 500 MG: 500 INJECTION, SOLUTION INTRAVENOUS at 09:15

## 2024-03-12 RX ADMIN — METRONIDAZOLE 500 MG: 500 INJECTION, SOLUTION INTRAVENOUS at 16:28

## 2024-03-12 RX ADMIN — GABAPENTIN 300 MG: 300 CAPSULE ORAL at 16:27

## 2024-03-12 RX ADMIN — INSULIN LISPRO 4 UNITS: 100 INJECTION, SOLUTION INTRAVENOUS; SUBCUTANEOUS at 09:15

## 2024-03-12 RX ADMIN — PROPOFOL INJECTABLE EMULSION 40 MCG/KG/MIN: 10 INJECTION, EMULSION INTRAVENOUS at 02:32

## 2024-03-12 RX ADMIN — PROPOFOL INJECTABLE EMULSION 40 MCG/KG/MIN: 10 INJECTION, EMULSION INTRAVENOUS at 05:37

## 2024-03-12 RX ADMIN — SODIUM CHLORIDE, POTASSIUM CHLORIDE, SODIUM LACTATE AND CALCIUM CHLORIDE 40 ML/HR: 600; 310; 30; 20 INJECTION, SOLUTION INTRAVENOUS at 00:23

## 2024-03-12 RX ADMIN — BUDESONIDE 0.5 MG: 0.5 INHALANT RESPIRATORY (INHALATION) at 18:49

## 2024-03-12 RX ADMIN — Medication 1500 MG: at 16:35

## 2024-03-12 RX ADMIN — CEFEPIME 2000 MG: 2 INJECTION, POWDER, FOR SOLUTION INTRAVENOUS at 09:15

## 2024-03-12 RX ADMIN — Medication 1500 MG: at 05:31

## 2024-03-12 RX ADMIN — HEPARIN SODIUM 5000 UNITS: 5000 INJECTION, SOLUTION INTRAVENOUS; SUBCUTANEOUS at 14:34

## 2024-03-12 RX ADMIN — IPRATROPIUM BROMIDE AND ALBUTEROL SULFATE 3 ML: .5; 3 SOLUTION RESPIRATORY (INHALATION) at 15:53

## 2024-03-12 RX ADMIN — BUDESONIDE 0.5 MG: 0.5 INHALANT RESPIRATORY (INHALATION) at 08:31

## 2024-03-12 RX ADMIN — INSULIN LISPRO 4 UNITS: 100 INJECTION, SOLUTION INTRAVENOUS; SUBCUTANEOUS at 17:05

## 2024-03-12 RX ADMIN — Medication 10 ML: at 20:29

## 2024-03-12 RX ADMIN — HEPARIN SODIUM 5000 UNITS: 5000 INJECTION, SOLUTION INTRAVENOUS; SUBCUTANEOUS at 05:30

## 2024-03-12 RX ADMIN — Medication 1 PACKET: at 20:29

## 2024-03-12 RX ADMIN — ACETYLCYSTEINE 3 ML: 200 SOLUTION ORAL; RESPIRATORY (INHALATION) at 23:49

## 2024-03-12 RX ADMIN — ACETAMINOPHEN 1000 MG: 500 TABLET, FILM COATED ORAL at 16:27

## 2024-03-12 RX ADMIN — GABAPENTIN 300 MG: 300 CAPSULE ORAL at 20:27

## 2024-03-12 RX ADMIN — CEFEPIME 2000 MG: 2 INJECTION, POWDER, FOR SOLUTION INTRAVENOUS at 00:23

## 2024-03-12 RX ADMIN — HYDROMORPHONE HYDROCHLORIDE 0.5 MG: 1 INJECTION, SOLUTION INTRAMUSCULAR; INTRAVENOUS; SUBCUTANEOUS at 11:42

## 2024-03-12 RX ADMIN — GUAIFENESIN 600 MG: 600 TABLET, EXTENDED RELEASE ORAL at 20:27

## 2024-03-12 RX ADMIN — IPRATROPIUM BROMIDE AND ALBUTEROL SULFATE 3 ML: .5; 3 SOLUTION RESPIRATORY (INHALATION) at 08:30

## 2024-03-12 RX ADMIN — INSULIN LISPRO 6 UNITS: 100 INJECTION, SOLUTION INTRAVENOUS; SUBCUTANEOUS at 20:26

## 2024-03-12 RX ADMIN — KETOROLAC TROMETHAMINE 15 MG: 30 INJECTION INTRAMUSCULAR; INTRAVENOUS at 05:31

## 2024-03-12 RX ADMIN — DOCUSATE SODIUM 100 MG: 100 CAPSULE, LIQUID FILLED ORAL at 20:27

## 2024-03-12 RX ADMIN — IPRATROPIUM BROMIDE AND ALBUTEROL SULFATE 3 ML: .5; 3 SOLUTION RESPIRATORY (INHALATION) at 12:15

## 2024-03-12 RX ADMIN — METRONIDAZOLE 500 MG: 500 INJECTION, SOLUTION INTRAVENOUS at 00:20

## 2024-03-12 NOTE — PLAN OF CARE
Goal Outcome Evaluation:  Plan of Care Reviewed With: patient, beth  Physical Therapy Re-evaluation due to change of medical status.   Patient underwent Thoracoscopy video assisted with decortication with da Alexandre robot on 3/11/2024, with 2 left chest tubes placed.  Postoperatively he was extubated, however shortly thereafter during recovery and PACU patient had respiratory distress and went into A-fib RVR.  CXR obtained showed complete opacification of the left hemothorax, and patient was emergently reintubated and emergent bronc was completed by Dr. Keller.  Repeat CXR showed improvement after.  He remained intubated and on Cardizem drip for his A-fib RVR and transferred to ICU. Pt A and O x 4. This date, pt requiring min assist for supine>sit and min assist of 2 for sit<>stand. Pt did ambulatory transfer from bed to recliner with mod assist of 2 with gait tolerance limited by Cts on wall suction. All goals were reviewed and updated as needed. Pt has had a decline in functional mobility since PT eval but he is expected to regain his mobility well as his medical condition improves. PT continues to recommend Home Health Physical therapy but will change home assist to 24/7 care.                 Anticipated Discharge Disposition (PT): home with 24/7 care, home with home health

## 2024-03-12 NOTE — PROGRESS NOTES
CARDIOLOGY PROGRESS NOTE:    Prashant Zhou  63 y.o.  male  1960  [id number]      Referring Provider: Intensivist    Reason for follow-up: Shortness of breath and A-fib     Patient Care Team:  Lazaro Paulino MD as PCP - General  Steve Mckeon MD as Consulting Physician (Cardiology)  Lars Wagner MD as Consulting Physician (Hematology and Oncology)    Subjective .  Patient is doing well without chest pain but has shortness of breath    Objective lying in bed comfortably     Review of Systems   Constitutional: Negative for fever and malaise/fatigue.   HENT:  Negative for ear pain and nosebleeds.    Eyes:  Negative for blurred vision and double vision.   Cardiovascular:  Negative for chest pain, dyspnea on exertion and palpitations.   Respiratory:  Positive for shortness of breath. Negative for cough.    Skin:  Negative for rash.   Musculoskeletal:  Negative for joint pain.   Gastrointestinal:  Negative for abdominal pain, nausea and vomiting.   Neurological:  Negative for focal weakness and headaches.   Psychiatric/Behavioral:  Negative for depression. The patient is not nervous/anxious.    All other systems reviewed and are negative.      Allergies: Patient has no known allergies.    Scheduled Meds:acetaminophen, 1,000 mg, Oral, TID  acetylcysteine, 3 mL, Nebulization, Q8H - RT  albuterol, 2.5 mg, Nebulization, Q8H - RT  [Held by provider] amLODIPine, 10 mg, Oral, Daily  atorvastatin, 40 mg, Oral, Daily  budesonide, 0.5 mg, Nebulization, BID - RT  cefepime, 2,000 mg, Intravenous, Q8H  [Held by provider] cloNIDine, 0.1 mg, Oral, BID  docusate sodium, 100 mg, Oral, BID  gabapentin, 300 mg, Oral, TID  guaiFENesin, 600 mg, Oral, Q12H  heparin (porcine), 5,000 Units, Subcutaneous, Q8H  [Held by provider] hydrALAZINE, 50 mg, Oral, Q8H  [Held by provider] lisinopril, 20 mg, Oral, Q24H   And  [Held by provider] hydroCHLOROthiazide, 25 mg, Oral, Q24H  insulin lispro, 2-9 Units, Subcutaneous, 4x Daily  "AC & at Bedtime  ipratropium-albuterol, 3 mL, Nebulization, 4x Daily - RT  ketorolac, 15 mg, Intravenous, Q8H  [Held by provider] metoprolol tartrate, 150 mg, Oral, Q12H  metroNIDAZOLE, 500 mg, Intravenous, Q8H  polyethylene glycol, 17 g, Oral, Daily  vancomycin, 1,500 mg, Intravenous, Q12H  vitamin D3, 5,000 Units, Oral, Daily      Continuous Infusions:dilTIAZem, 5-15 mg/hr, Last Rate: Stopped (03/12/24 0025)  lactated ringers, 40 mL/hr, Last Rate: 40 mL/hr (03/12/24 0023)  Pharmacy to Dose Cefepime,   Pharmacy to dose vancomycin,   phenylephrine, 0.5-3 mcg/kg/min, Last Rate: Stopped (03/12/24 0537)  propofol, 5-50 mcg/kg/min, Last Rate: 20 mcg/kg/min (03/12/24 0620)  sodium chloride, 100 mL/hr, Last Rate: 100 mL/hr (03/11/24 0929)      PRN Meds:.  benzonatate    dextrose    dextrose    glucagon (human recombinant)    HYDROmorphone    ipratropium-albuterol    nitroglycerin    ondansetron ODT **OR** ondansetron    oxyCODONE    Pharmacy to Dose Cefepime    Pharmacy to dose vancomycin    sodium chloride        VITAL SIGNS  Vitals:    03/12/24 0600 03/12/24 0630 03/12/24 0700 03/12/24 0826   BP: 130/71      BP Location: Other (Comment)      Pulse: 89 87 87    Resp: 20   25   Temp:    98.4 °F (36.9 °C)   TempSrc:    Axillary   SpO2: 100% 100% 100%    Weight:       Height:           Flowsheet Rows      Flowsheet Row First Filed Value   Admission Height 190.5 cm (75\") Documented at 03/09/2024 1644   Admission Weight 91.2 kg (201 lb) Documented at 03/09/2024 1644             TELEMETRY: Sinus rhythm    Physical Exam:  Constitutional:       Appearance: Well-developed.   Eyes:      General: No scleral icterus.     Conjunctiva/sclera: Conjunctivae normal.      Pupils: Pupils are equal, round, and reactive to light.   HENT:      Head: Normocephalic and atraumatic.   Neck:      Vascular: No carotid bruit or JVD.   Pulmonary:      Effort: Pulmonary effort is normal.      Breath sounds: Decreased breath sounds present. No " wheezing. No rales.   Cardiovascular:      Normal rate. Regular rhythm.   Pulses:     Intact distal pulses.   Abdominal:      General: Bowel sounds are normal.      Palpations: Abdomen is soft.   Musculoskeletal: Normal range of motion.      Cervical back: Normal range of motion and neck supple. Skin:     General: Skin is warm and dry.      Findings: No rash.   Neurological:      Mental Status: Alert.      Comments: No focal deficits          Results Review:   I reviewed the patient's new clinical results.  Lab Results (last 24 hours)       Procedure Component Value Units Date/Time    Tissue Pathology Exam [[id number]] Collected: 03/11/24 1947    Specimen: Tissue from Pleura Updated: 03/12/24 1115    POC Glucose 4x Daily Before Meals & at Bedtime [[id number]]  (Abnormal) Collected: 03/12/24 1100    Specimen: Blood Updated: 03/12/24 1102     Glucose 245 mg/dL      Comment: Serial Number: [id number]:  [id number]       Blood Gas, Arterial - [[id number]] Collected: 03/12/24 1012    Specimen: Arterial Blood Updated: 03/12/24 1025     Site Arterial Line     Brennon's Test N/A     pH, Arterial 7.420 pH units      pCO2, Arterial 41.6 mm Hg      pO2, Arterial 85.4 mm Hg      HCO3, Arterial 27.0 mmol/L      Base Excess, Arterial 2.3 mmol/L      Comment: Serial Number: [id number]:  [id number]        O2 Saturation, Arterial 96.6 %      CO2 Content 28.3 mmol/L      Barometric Pressure for Blood Gas --     Comment: N/A        Modality Adult Vent     FIO2 40 %      Ventilator Mode CPAP/PS     PEEP 5     PSV 7 cmH2O      Hemodilution No    Respiratory Culture - Sputum, Cough [[id number]] Collected: 03/10/24 2102    Specimen: Sputum from Cough Updated: 03/12/24 0941     Respiratory Culture Scant growth (1+) Normal respiratory dejon. No S. aureus or Pseudomonas aeruginosa detected. Final report.     Gram Stain Many (4+) WBCs per low power field      No Epithelial cells seen      Few (2+) Gram positive cocci in chains    AFB Culture -  Tissue, Pleura [[id number]] Collected: 03/11/24 1839    Specimen: Tissue from Pleura Updated: 03/12/24 0836     AFB Stain No acid fast bacilli seen on concentrated smear    POC Glucose 4x Daily Before Meals & at Bedtime [[id number]]  (Abnormal) Collected: 03/12/24 0722    Specimen: Blood Updated: 03/12/24 0725     Glucose 227 mg/dL      Comment: Serial Number: [id number]:  [id number]       Body Fluid Culture - Body Fluid, Pleural Cavity [[id number]] Collected: 03/11/24 1840    Specimen: Body Fluid from Pleural Cavity Updated: 03/12/24 0649     Gram Stain Many (4+) WBCs per low power field      No organisms seen    Basic Metabolic Panel [[id number]]  (Abnormal) Collected: 03/12/24 0521    Specimen: Blood, Arterial Line Updated: 03/12/24 0557     Glucose 249 mg/dL      BUN 9 mg/dL      Creatinine 0.46 mg/dL      Sodium 136 mmol/L      Potassium 3.8 mmol/L      Chloride 100 mmol/L      CO2 27.0 mmol/L      Calcium 10.0 mg/dL      BUN/Creatinine Ratio 19.6     Anion Gap 9.0 mmol/L      eGFR 118.3 mL/min/1.73     Narrative:      GFR Normal >60  Chronic Kidney Disease <60  Kidney Failure <15      CBC & Differential [[id number]]  (Abnormal) Collected: 03/12/24 0521    Specimen: Blood, Arterial Line Updated: 03/12/24 0530    Narrative:      The following orders were created for panel order CBC & Differential.  Procedure                               Abnormality         Status                     ---------                               -----------         ------                     CBC Auto Differential[[id number]]        Abnormal            Final result                 Please view results for these tests on the individual orders.    CBC Auto Differential [[id number]]  (Abnormal) Collected: 03/12/24 0521    Specimen: Blood, Arterial Line Updated: 03/12/24 0530     WBC 12.70 10*3/mm3      RBC 3.07 10*6/mm3      Hemoglobin 9.2 g/dL      Hematocrit 28.0 %      MCV 91.3 fL      MCH 30.1 pg      MCHC 32.9 g/dL      RDW 18.8 %       RDW-SD 63.9 fl      MPV 7.1 fL      Platelets 469 10*3/mm3      Neutrophil % 85.0 %      Lymphocyte % 3.4 %      Monocyte % 11.5 %      Eosinophil % 0.0 %      Basophil % 0.1 %      Neutrophils, Absolute 10.80 10*3/mm3      Lymphocytes, Absolute 0.40 10*3/mm3      Monocytes, Absolute 1.50 10*3/mm3      Eosinophils, Absolute 0.00 10*3/mm3      Basophils, Absolute 0.00 10*3/mm3      nRBC 0.1 /100 WBC     Blood Gas, Arterial - [[id number]]  (Abnormal) Collected: 03/11/24 2331    Specimen: Arterial Blood Updated: 03/11/24 2336     Site Arterial Line     Brennon's Test N/A     pH, Arterial 7.310 pH units      pCO2, Arterial 49.2 mm Hg      pO2, Arterial 175.1 mm Hg      HCO3, Arterial 24.8 mmol/L      Base Excess, Arterial -1.7 mmol/L      Comment: Serial Number: [id number]:  [id number]        O2 Saturation, Arterial 99.4 %      CO2 Content 26.3 mmol/L      Barometric Pressure for Blood Gas --     Comment: N/A        Modality Adult Vent     FIO2 70 %      Ventilator Mode AC     Set Tidal Volume 550     PEEP 8     Hemodilution No     Respiratory Rate 15    Blood Gas, Arterial - [[id number]]  (Abnormal) Collected: 03/11/24 2134    Specimen: Arterial Blood Updated: 03/11/24 2155     Site Arterial Line     Brennon's Test N/A     pH, Arterial 7.375 pH units      pCO2, Arterial 41.0 mm Hg      pO2, Arterial 46.5 mm Hg      HCO3, Arterial 23.9 mmol/L      Base Excess, Arterial -1.2 mmol/L      Comment: Serial Number: [id number]:  [id number]        O2 Saturation, Arterial 81.1 %      CO2 Content 25.2 mmol/L      Barometric Pressure for Blood Gas --     Comment: N/A        Modality BiPap     FIO2 100 %      PEEP 10     PSV 20 cmH2O      Hemodilution No    Basic Metabolic Panel [[id number]]  (Abnormal) Collected: 03/11/24 2104    Specimen: Blood Updated: 03/11/24 2134     Glucose 137 mg/dL      BUN 8 mg/dL      Creatinine 0.43 mg/dL      Sodium 135 mmol/L      Potassium 3.5 mmol/L      Comment: Slight hemolysis detected by analyzer. Result  may be falsely elevated.        Chloride 99 mmol/L      CO2 26.0 mmol/L      Calcium 10.1 mg/dL      BUN/Creatinine Ratio 18.6     Anion Gap 10.0 mmol/L      eGFR 120.7 mL/min/1.73     Narrative:      GFR Normal >60  Chronic Kidney Disease <60  Kidney Failure <15      Magnesium [[id number]]  (Normal) Collected: 03/11/24 2104    Specimen: Blood Updated: 03/11/24 2134     Magnesium 1.9 mg/dL     CBC (No Diff) [[id number]]  (Abnormal) Collected: 03/11/24 2104    Specimen: Blood Updated: 03/11/24 2124     WBC 10.20 10*3/mm3      RBC 3.23 10*6/mm3      Hemoglobin 9.5 g/dL      Hematocrit 29.0 %      MCV 89.6 fL      MCH 29.3 pg      MCHC 32.6 g/dL      RDW 18.8 %      RDW-SD 62.1 fl      MPV 7.5 fL      Platelets 503 10*3/mm3     POC Chem 8, arterial (ISTAT) [[id number]]  (Abnormal) Collected: 03/11/24 2104    Specimen: Arterial Blood Updated: 03/11/24 2112     Ionized Calcium 1.46 mmol/L      pH, Arterial 7.330 pH units      pCO2, Arterial 47.7 mm Hg      pO2, Arterial 48.0 mm Hg      HCO3, Arterial 25.1 mmol/L      Base Excess, Arterial <0.0 mmol/L      Base Deficit --     O2 Saturation, Arterial 80.0 %      Glucose 144 mg/dL      Comment: Serial Number: [id number]:  [id number]        Sodium 135 mmol/L      POC Potassium 3.4 mmol/L      Hematocrit 28 %      Hemoglobin 9.5 g/dL      CO2 Content 27 mmol/L     POC Glucose Once [[id number]]  (Abnormal) Collected: 03/11/24 2103    Specimen: Blood Updated: 03/11/24 2105     Glucose 151 mg/dL      Comment: Serial Number: [id number]:  [id number]       Blood Culture - Blood, Arm, Right [[id number]]  (Normal) Collected: 03/09/24 1745    Specimen: Blood from Arm, Right Updated: 03/11/24 1900     Blood Culture No growth at 2 days    Anaerobic Culture - Tissue, Pleura [[id number]] Collected: 03/11/24 1839    Specimen: Tissue from Pleura Updated: 03/11/24 1855    Fungus Culture - Tissue, Pleura [[id number]] Collected: 03/11/24 1839    Specimen: Tissue from Pleura Updated: 03/11/24  1855    Blood Culture - Blood, Arm, Left [[id number]]  (Normal) Collected: 03/09/24 1701    Specimen: Blood from Arm, Left Updated: 03/11/24 1815     Blood Culture No growth at 2 days            Imaging Results (Last 24 Hours)       Procedure Component Value Units Date/Time    XR Chest 1 View [[id number]] Collected: 03/12/24 0916     Updated: 03/12/24 0923    Narrative:      XR CHEST 1 VW    Date of Exam: 3/12/2024 8:45 AM EDT    Indication: Post Op Lung Surgery    Comparison: March 12, 2024    Findings:  There are 2 thoracotomy tubes on the left. A pneumothorax not definitely confirmed. There is airspace disease in left basilar that could be secondary to pneumonia. Underlying effusion suspected. There is increasing density at the left base as compared to   the earlier study. Subpulmonic air noted on the earlier chest x-ray is not seen on the current exam. There is a port catheter noted with its tip in the superior vena cava. ET tube is present with its tip above the kristyn. Right lung seems relatively   clear.      Impression:      Impression:  1.Increasing density at the left lung base that could relate to some underlying consolidation and possibly effusion. There is additional airspace disease noted within the lingular area more interstitial in nature. Findings of pneumonia were noted on the   more recent CT involving the left lung as well changes of probable empyema.      Electronically Signed: Santiago Tariq MD    3/12/2024 9:21 AM EDT    Workstation ID: [id number]    XR Chest 1 View [[id number]] Collected: 03/12/24 0055     Updated: 03/12/24 0059    Narrative:      XR CHEST 1 VW    Date of Exam: 3/12/2024 12:38 AM EDT    Indication: chest tube leak    Comparison: 3/11/2024.    Findings:  The heart appears mildly enlarged, stable as compared to the previous study. Right internal jugular Mediport with the tip in the proximal SVC. There are 2 left-sided chest tubes present. Suspected tiny left apical pneumothorax  present with degree of   pleural separation measuring up to 8 mm. No additional pneumothorax identified. Patchy airspace disease is seen within the left lung base which appears to have improved as compared to the previous study. Probable small left-sided pleural effusion   present. Endotracheal tube present with the tip in the mid trachea. No significant air along the left chest wall.      Impression:      Impression:  Suspected tiny left apical pneumothorax with degree of pleural separation measuring up to 8 mm. 2 left-sided chest tubes present. No significant air along the left chest wall. Improving left basilar airspace disease.    Electronically Signed: Jenny Olmedo MD    3/12/2024 12:57 AM EDT    Workstation ID: [id number]    XR Chest 1 View [[id number]] Collected: 03/11/24 2208     Updated: 03/11/24 2217    Narrative:      XR CHEST 1 VW    Date of Exam: 3/11/2024 9:57 PM EDT    Indication: et tube placement    Comparison: Chest radiograph same date.    Findings:  Endotracheal tube tip overlies the midthoracic trachea with tip 6.3 cm above the kristyn. Unchanged position of right chest port and 2 left chest tubes. Cardiomediastinal silhouette is unchanged. Patchy airspace disease within the left mid/lower lung.   Right lung is clear. There may be a trace left pneumothorax.      Impression:      Impression:  Endotracheal tube tip overlies the midthoracic trachea. Otherwise, similar postsurgical changes of the left chest with patchy airspace disease in the left mid/lower lung and suspected trace left pneumothorax, with 2 left chest tubes in place.      Electronically Signed: Darrion Neal MD    3/11/2024 10:15 PM EDT    Workstation ID: [id number]    XR Chest 1 View [[id number]] Collected: 03/11/24 2133     Updated: 03/11/24 2138    Narrative:      XR CHEST 1 VW    Date of Exam: 3/11/2024 9:16 PM EDT    Indication: Post Op Lung Surgery    Comparison: Chest radiograph same day.    Findings:  Postsurgical changes of the  left chest with 2 left chest tubes in place. Right chest port tip unchanged in position. Cardiomediastinal silhouette is obscured. Partial opacification of the left hemithorax with some aerated lung in the apex. Right lung is   grossly clear. No significant pneumothorax is evident on this exam.       Impression:      Impression:  Postsurgical changes of the left chest with partial opacification of the left hemithorax and 2 left chest tubes in place.      Electronically Signed: Darrion Neal MD    3/11/2024 9:36 PM EDT    Workstation ID: [id number]            EKG      I personally viewed and interpreted the patient's EKG/Telemetry data:    ECHOCARDIOGRAM:  Results for orders placed during the hospital encounter of 03/09/24    Adult Transthoracic Echo Complete W/ Cont if Necessary Per Protocol    Interpretation Summary    Left ventricular systolic function is normal. Calculated left ventricular EF = 65% Left ventricular ejection fraction appears to be 61 - 65%.    Left ventricular diastolic function is consistent with (grade I) impaired relaxation.    There is calcification of the aortic valve mainly affecting the non-coronary cusp(s).    Estimated right ventricular systolic pressure from tricuspid regurgitation is normal (<35 mmHg).    No significant valvular abnormalities noted.       STRESS MYOVIEW:       CARDIAC CATHETERIZATION:  No results found for this or any previous visit.       OTHER:         Assessment & Plan     1 shortness of breath  Patient presented with shortness of breath and had significant pneumonia and is on IV antibiotics  Patient will have an echocardiogram performed  Patient had multifocal pneumonia concerning for empyema and hence a chest tube placed.  Patient thoracoscopy video-assisted with decortication with da Alexandre robot.    2.  History of aortic dissection  Patient had history of aortic dissection in the past but had medical treatment only at that time and no surgery and is followed by the  vascular surgeons  3.  Atrial fibrillation  Patient has new onset atrial fibrillation and is on metoprolol at home which we will restart but he may need anticoagulation once all the tests are done and if required with vascular and oncologist.  Patient is ruled out for MI  Patient is having an echocardiogram performed for LV function valvular abnormalities  Further treatment based on echocardiogram findings  Patient had normal LV systolic function and is currently in sinus rhythm with beta-blockers and hence no further cardiac workup  4.  Hypertension  Patient blood pressure currently stable on beta-blockers along with hydralazine and amlodipine  5.  Diabetes  Patient is on oral medicines and followed by the primary care doctor  6.  Hyperlipidemia  Patient is on statins and the lipid levels are followed by the primary care doctor  7.  Rectal cancer  Patient received chemotherapy and after that he developed a esophagitis which was treated with Diflucan but he is also followed by the oncologist    I discussed the patients findings and my recommendations with patient and nurse    Steve Mckeon MD  03/12/24  12:10 EDT

## 2024-03-12 NOTE — SIGNIFICANT NOTE
03/12/24 0849   OTHER   Discipline physical therapist   Rehab Time/Intention   Session Not Performed unable to evaluate, medical status change;other (see comments)  (Pt intubated currently but plan to extubate today- PT will follow-up in PM.)   Recommendation   PT - Next Appointment 03/13/24

## 2024-03-12 NOTE — PROGRESS NOTES
"  POST-OPERATIVE NOTE     Chief Complaint: left empyema  S/P: Left robot assisted decortication  POD # 1    Subjective    Extubated this morning. Feeling well. Soreness to chest tube sites.    Objective:  General Appearance:  Comfortable and well-appearing.    Vital signs: (most recent): Blood pressure 130/71, pulse 97, temperature 97.8 °F (36.6 °C), temperature source Oral, resp. rate 20, height 190.5 cm (75\"), weight 89.4 kg (197 lb), SpO2 96%.    HEENT: Normal HEENT exam.  (Nasal cannula)    Lungs:  Normal effort.  He is not in respiratory distress.    Heart: Normal rate.    Chest: Chest wall tenderness present.    Extremities: Decreased range of motion.    Pulses: Distal pulses are intact.    Neurological: Patient is alert.    Skin:  Warm and dry.                Chest tube:   Site: Left, Clean, Dry, Intact, and Securement device intact  Suction: -20 cm  Air Leak: intermittent  24 Hour Total: 130/160ml    Results Review:     I reviewed the patient's new clinical results.  I reviewed the patient's new imaging results and agree with the interpretation.  Discussed with patient, RN and Dr. Keller    Assessment & Plan     Mr. hZou is POD 1 s/p robot assisted left decortication. Stable course. Extubated this morning, now on nasal cannula. CXR with expected post-op appearance. Intermittent air leak to chest tubes with positioning. Leave chest tubes to -20cm suction and re-check CXR in the morning. Remainder of care per primary service.     Andree Wisdom DNP, APRN  [facility]  03/12/24  15:04 EDT    Patient was seen and assessed while wearing personal protective equipment including facemask, protective eyewear and gloves.  Hand hygiene performed prior to entering the room and upon exiting with doffing of gloves.       "

## 2024-03-12 NOTE — ANESTHESIA POSTPROCEDURE EVALUATION
Patient: Prashant Zhou    Procedure Summary       Date: 03/11/24 Room / Location: [facility] OR 08 / [facility] MAIN OR    Anesthesia Start: 1805 Anesthesia Stop: 2056    Procedure: THORACOSCOPY VIDEO ASSISTED WITH DECORTICATION WITH DAVINCI ROBOT (Left: Chest) Diagnosis:       Pneumonia due to infectious organism, unspecified laterality, unspecified part of lung      (Pneumonia due to infectious organism, unspecified laterality, unspecified part of lung [J18.9])    Surgeons: Saqib Keller MD Provider: Sidney Estrella MD    Anesthesia Type: general with block ASA Status: 4            Anesthesia Type: general with block    Vitals  Vitals Value Taken Time   BP 96/68 03/11/24 2224   Temp 94 °F (34.4 °C) 03/11/24 2050   Pulse 114 03/11/24 2227   Resp 20 03/11/24 2150   SpO2 97 % 03/11/24 2227   Vitals shown include unfiled device data.        Post Anesthesia Care and Evaluation    Patient location during evaluation: PACU  Patient participation: complete - patient participated  Level of consciousness: awake  Pain score: 3 (See nurse's notes for pain score)  Pain management: adequate    Airway patency: patent  Anesthetic complications: No anesthetic complications  PONV Status: none  Cardiovascular status: acceptable  Respiratory status: spontaneous ventilation, unstable and ETT  Hydration status: acceptable    Comments: Patient seen and examined postoperatively; vital signs stable; SpO2 greater than or equal to 90%; cardiopulmonary status stable; nausea/vomiting adequately controlled; pain adequately controlled; no apparent anesthesia complications; patient discharged from anesthesia care when discharge criteria were met

## 2024-03-12 NOTE — ANESTHESIA PROCEDURE NOTES
Airway  Urgency: emergent    Date/Time: 3/11/2024 9:45 PM  End Time:3/11/2024 9:55 PM  Airway not difficult    General Information and Staff    Patient location during procedure: PACU  Anesthesiologist: Sidney Estrella MD    Consent for Airway (if performed for an anesthetic, see related documentation for consents)  Patient identity confirmed: verbally with patient  Consent: No emergent situation. Verbal consent obtained. Written consent obtained.  Risks and benefits: risks, benefits and alternatives were discussed  Consent given by: patient      Indications and Patient Condition  Indications for airway management: respiratory distress/failure    Preoxygenated: yes  MILS maintained throughout  Mask difficulty assessment: 0 - not attempted    Final Airway Details  Final airway type: endotracheal airway      Successful airway: ETT  Cuffed: yes   Successful intubation technique: video laryngoscopy  Endotracheal tube insertion site: oral  Blade: Field  Blade size: 3  ETT size (mm): 7.5  Cormack-Lehane Classification: grade IIa - partial view of glottis  Placement verified by: chest auscultation   Measured from: lips  ETT/EBT  to lips (cm): 23  Number of attempts at approach: 1  Assessment: lips, teeth, and gum same as pre-op and atraumatic intubation    Additional Comments  Called to PACU emergently as patient was declining on CPAP, iSTAT with poor PaO2.  Discussion with Dr. Keller need for emergent intubation to prevent further decline and allow for dx bronchoscopy.

## 2024-03-12 NOTE — CONSULTS
Critical Care Consult Note   Prashant Zhou : 1960 MRN:[id number] LOS:2 ROOM: [facility] MAIN OR/MAIN OR     Reason for admission: Sepsis     Assessment / Plan     Acute respiratory failure with hypercapnia and hypoxia  -on vent, continue support to keep O2 saturation > 91%  -CXR Reviewed  -EKG Reviewed  -ABG, monitor as ordered  -BNP, monitor as ordered  -Duoneb  -Pulmicort    Septic Shock: ( Temp > 100.4 or < 96.8, HR>90, RR >20 or PCO2 <32, Lactic acid>2, and MAP<65 or SBP<90 )  -Precipitated by pneumonia/left empyema   -Blood cultures still pending  -S/p VATS on 3/11/2024, cultures pending  -Left sided chest tube x2 in place; noted leak to anterior CT  -Emergent bronchoscopy by CT surgery following VATS  -Started on Javi-Synephrine for hypotension, likely secondary to A-fib RVR; will wean as tolerated  -Persistent hypotension in spite of adequate fluid resuscitation  -C/w additional fluids as needed. Avoid fluid overload  -Titrate vasopressors for a target MAP of 65  -Continue cefepime and Flagyl; escalate pending cultures    Atrial fibrillation with RVR -resolved  -Cardizem drip initiated after VATS  -Changed order to amiodarone drip with hypotension  -Patient converted to normal sinus rhythm prior to initiation of amiodarone  -On anticoagulation    Hyponatremia -improving  -Continue NS  -Continue to trend    Essential hypertension  -Hold home antihypertensives secondary to hypotension  -Resume once clinically appropriate    Type 2 diabetes mellitus, controlled  -Hold home metformin and glimepiride while in ICU  -Glucose checks every 6 hours while intubated  -SSI as needed    Hyperlipidemia  -Continue home atorvastatin    Code Status (Patient has no pulse and is not breathing): CPR (Attempt to Resuscitate)  Medical Interventions (Patient has pulse or is breathing): Full Support       Nutrition: NPO Diet NPO Type: Sips with Meds Patient isn't on Tube Feeding     DVT prophylaxis:  Mechanical DVT prophylaxis  "orders are signed and held. Medical and mechanical DVT prophylaxis orders are present.         History of Present illness     Prashant Zhou is a 63 y.o. male with a PMH of rectal cancer, status post partial colectomy, currently undergoing chemotherapy, with next course of chemotherapy due on  3/12/2024, Type 2 diabetes mellitus, essential hypertension, hyperlipidemia, thoracic aortic aneurysm secondary to hypertension, who [facility] with plaints of shortness of breath, cough and congestion x1 day.  Information for HPI taken from chart review as patient is intubated at arrival to ICU.     Per documentation:  H&P on 3/9/2024:  He denies any fevers chills or diaphoresis.  Cough productive of yellow sputum.  Air saturation was 81% in ED and was placed on 2 L oxygen high flow nasal cannula.  He does not wear home oxygen.  Abs today showed a sodium of 130, chloride 88, glucose 291, albumin 3.1, lactate was 3.8, WBC not elevated, hemoglobin 11.3, CTA per radiology shows: \"Groundglass opacities and tree-in-bud nodularity throughout the left upper and lower lobes as well as the right middle lobe consistent with multifocal pneumonia. There is a 17 cm loculated gas and fluid collection within the left pleural space with   associated thickening of the pleural wall, which likely represents empyema.  No evidence of pulmonary embolism. There is pulmonary artery enlargement which can be seen with pulmonary hypertension.  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 Recommend vascular surgery referral\".  He triggered sepsis for lactate, and tachycardia and fever of 100.2.  He is not hypotensive.  He was given IV fluid bolus in ED per sepsis protocol and started on IV cefepime and IV vancomycin with blood cultures pending.  500 mg IV Flagyl has been added given empyema and " pulmonary has been consulted for  empyema . Given increased eccentric noncalcified thrombus in aorta vascular surgery has been consulted.  Per RN spoke to vascular surgery no heparin drip needed will see in AM.    Pulmonology was consulted and after examining patient consulted CT surgery to evaluate for possible decortication of left-sided pleural effusion.  Vascular surgery was consulted for evaluation of thoracic aneurysm which was an incidental finding on his CT chest.  He was asymptomatic with this and vascular surgery recommended aspirin and statin therapy, as well at his follow-up in 1 to 2 months postdischarge.  CT surgery saw patient for left loculated effusion/empyema.  Patient underwent Thoracoscopy video assisted with decortication with da Alexandre robot on 3/11/2024, with 2 left chest tubes placed.  Postoperatively he was extubated, however shortly thereafter during recovery and PACU patient had respiratory distress and went into A-fib RVR.  CXR obtained showed complete opacification of the left hemothorax, and patient was emergently reintubated and emergent bronc was completed by Dr. Keller.  Repeat CXR showed improvement after.  He remained intubated and on Cardizem drip for his A-fib RVR and transferred to ICU.    Patient was seen and examined on 03/11/24 at 21:41 EDT .    Subjective / Review of systems     Review of Systems   Unable to perform ROS: Intubated        Past Medical/Surgical/Social/Family History & Allergies     Past Medical History:   Diagnosis Date    Aortic dissection     Diabetes mellitus     Heart murmur     History of noncompliance with medical treatment     Hyperlipidemia     Hypertension     Type B hypoplasia of aortic arch       Past Surgical History:   Procedure Laterality Date    COLECTOMY PARTIAL / TOTAL  2023    COLONOSCOPY N/A 08/31/2023    Procedure: COLONOSCOPY with sigmoid mass tattooing at 35cm, sigmoid and rectal polypectomy;  Surgeon: TORIBIO Jacob MD;  Location: [facility]  ENDOSCOPY;  Service: Gastroenterology;  Laterality: N/A;  sigmoid mass, colon polyps      Social History     Socioeconomic History    Marital status:    Tobacco Use    Smoking status: Every Day     Current packs/day: 2.00     Average packs/day: 2.0 packs/day for 53.2 years (106.4 ttl pk-yrs)     Types: Cigarettes     Start date: 1971    Smokeless tobacco: Never   Vaping Use    Vaping status: Never Used   Substance and Sexual Activity    Alcohol use: No     Comment: Abstinent since around 2008    Drug use: Not Currently     Types: Marijuana     Comment: Abstinent since at least the 1980's    Sexual activity: Defer      Family History   Problem Relation Age of Onset    Diabetes Mother     Dementia Mother     Sudden death Father       No Known Allergies     Home Medications     Prior to Admission medications    Medication Sig Start Date End Date Taking? Authorizing Provider   amLODIPine (NORVASC) 10 MG tablet TAKE 1 TABLET BY MOUTH DAILY 1/28/24  Yes Lazaro Paulino MD   atorvastatin (LIPITOR) 40 MG tablet TAKE ONE TABLET BY MOUTH EVERY NIGHT AT BEDTIME 1/28/24  Yes Lazaro Paulino MD   Chlorcyclizine-Pseudoephed (Stahist AD) 25-60 MG tablet Take 0.5 tablets by mouth 2 (Two) Times a Day As Needed (Congestion, drainage). 1/29/24  Yes Lazaro Paulino MD   cloNIDine (CATAPRES) 0.1 MG tablet Take 1 tablet by mouth 2 (Two) Times a Day. 11/30/23  Yes Lazaro Paulino MD   glimepiride (AMARYL) 4 MG tablet TAKE ONE TABLET BY MOUTH TWICE A DAY 10/25/23  Yes Lazaro Paulino MD   hydrALAZINE (APRESOLINE) 50 MG tablet TAKE ONE TABLET BY MOUTH EVERY 8 HOURS 11/6/23  Yes Lazaro Paulino MD   lisinopril-hydrochlorothiazide (PRINZIDE,ZESTORETIC) 20-25 MG per tablet Take 1 tablet by mouth Daily.   Yes ProviderBettina MD   metFORMIN (GLUCOPHAGE) 850 MG tablet TAKE 1 TABLET BY MOUTH TWICE A DAY WITH MEALS 2/21/24  Yes Lazaro Paulino MD   metoprolol tartrate  (LOPRESSOR) 50 MG tablet TAKE THREE TABLETS BY MOUTH EVERY 12 HOURS 1/28/24  Yes aLzaro Paulino MD   potassium chloride 10 MEQ CR tablet Take 1 tablet by mouth Daily. 12/26/23  Yes Bettina Cole MD   traMADol (ULTRAM) 50 MG tablet TAKE 1 TABLET BY MOUTH EVERY 6 HOURS AS NEEDED FOR MODERATE PAIN 3/7/24  Yes Lars Wagner MD   vitamin D3 125 MCG (5000 UT) capsule capsule Take 1 capsule by mouth Daily.   Yes Bettina Cole MD   Blood Glucose Monitoring Suppl (Accu-Chek Guide) w/Device kit 1 Device Daily. 1/23/23   Lazaro Paulino MD   glucose blood (Accu-Chek Guide) test strip Test daily e11.9 1/23/23   Lazaro Paulino MD        Objective / Physical Exam     Vital signs:  Temp: 94 °F (34.4 °C)  BP: 90/52  Heart Rate: (!) 140  Resp: 21  SpO2: (!) 83 %  Weight: 89.4 kg (197 lb)    Admission Weight: Weight: 91.2 kg (201 lb)    Physical Exam  Constitutional:       Appearance: He is obese.      Interventions: He is intubated.   HENT:      Head: Normocephalic.      Nose: Nose normal.      Mouth/Throat:      Mouth: Mucous membranes are moist.   Eyes:      Extraocular Movements: Extraocular movements intact.      Pupils: Pupils are equal, round, and reactive to light.      Right eye: Pupil is sluggish.      Left eye: Pupil is sluggish.   Cardiovascular:      Rate and Rhythm: Tachycardia present. Rhythm irregular.      Pulses: Normal pulses.      Heart sounds: Normal heart sounds.   Pulmonary:      Effort: Pulmonary effort is normal. He is intubated.      Breath sounds: Decreased air movement present. Examination of the left-middle field reveals decreased breath sounds and rales. Examination of the right-lower field reveals decreased breath sounds. Examination of the left-lower field reveals decreased breath sounds and rales. Decreased breath sounds and rales present.   Chest:      Comments: Left sided chest tubes in place.  Notable air leak to anterior chest tube.  CTS  aware  Abdominal:      General: Abdomen is flat. Bowel sounds are normal.      Palpations: Abdomen is soft.   Musculoskeletal:         General: Normal range of motion.   Skin:     General: Skin is warm.      Coloration: Skin is pale.   Neurological:      Comments: Intubated/sedated   Psychiatric:      Comments: Intubated/sedated            Labs     Results from last 7 days   Lab Units 03/11/24  2104 03/10/24  2236 03/09/24  2351 03/09/24  1701 03/08/24  1139   WBC 10*3/mm3 10.20 5.70 8.20 9.80 12.09*   HEMATOCRIT % 29.0* 28.9* 30.6* 34.0* 34.7*   HEMATOCRIT POC % 28*  --   --   --   --    PLATELETS 10*3/mm3 503* 355 396 435 347      Results from last 7 days   Lab Units 03/11/24  2104 03/10/24  2236 03/09/24  2351 03/09/24  1701   SODIUM mmol/L 135* 134* 131* 130*   POTASSIUM mmol/L 3.5 3.4* 4.0 4.5   CHLORIDE mmol/L 99 95* 95* 88*   CO2 mmol/L 26.0 28.0 26.0 26.0   BUN mg/dL 8 7* 8 11   CREATININE mg/dL 0.43* 0.36* 0.40* 0.56*        Imaging     XR Chest 1 View    Result Date: 3/11/2024  Impression: Postsurgical changes of the left chest with partial opacification of the left hemithorax and 2 left chest tubes in place. Electronically Signed: Darrion Neal MD  3/11/2024 9:36 PM EDT  Workstation ID: [id number]    XR Chest 1 View    Result Date: 3/11/2024  Impression: Stable chest x-ray. Electronically Signed: Willy Ugalde MD  3/11/2024 7:53 AM EDT  Workstation ID: [id number]        Current Medications     Scheduled Meds:  [Transfer Hold] acetaminophen, 1,000 mg, Oral, Once  amLODIPine, 10 mg, Oral, Daily  [Transfer Hold] atorvastatin, 40 mg, Oral, Daily  [Transfer Hold] budesonide, 0.5 mg, Nebulization, BID - RT  cefepime, 2,000 mg, Intravenous, Q8H  cloNIDine, 0.1 mg, Oral, BID  [Transfer Hold] guaiFENesin, 600 mg, Oral, Q12H  [Transfer Hold] heparin (porcine), 5,000 Units, Subcutaneous, Q8H  hydrALAZINE, 50 mg, Oral, Q8H  [Transfer Hold] lisinopril, 20 mg, Oral, Q24H   And  [Transfer Hold] hydroCHLOROthiazide, 25 mg,  Oral, Q24H  [Transfer Hold] insulin lispro, 2-9 Units, Subcutaneous, 4x Daily AC & at Bedtime  [Transfer Hold] ipratropium-albuterol, 3 mL, Nebulization, 4x Daily - RT  metoprolol tartrate, 150 mg, Oral, Q12H  metroNIDAZOLE, 500 mg, Intravenous, Q8H  vancomycin, 1,500 mg, Intravenous, Q12H  [Transfer Hold] vitamin D3, 5,000 Units, Oral, Daily         Continuous Infusions:  lactated ringers, 9 mL/hr, Last Rate: 50 mL/hr (03/11/24 1805)  Pharmacy to Dose Cefepime,   Pharmacy to dose vancomycin,   sodium chloride, 100 mL/hr, Last Rate: 100 mL/hr (03/11/24 7463)      Patient continues to be critically ill, remains at risk of clinical deterioration or death and needed high complexity decision making. I have spent a total of 40 minutes providing critical care services to this patient including but not limited to: review of labs/ microbiology/imaging/medications, serial monitoring of vital signs, adjusting ventilator settings as needed, review of other consultant's notes, review of events in the last 24 hrs, monitoring input/output, review of treatment plan with bedside nurse, RT and other treatment team, management of life support and nutrition needs. Patient family not at bedside at time of assessment.    Time spent in performing separately billable procedures and updating family is not included in the critical care time.        ROSA MARIA Sigala   Critical Care  03/11/24   21:41 EDT

## 2024-03-12 NOTE — NURSING NOTE
Patient's anterior pleural chest tube had an intermittent leak on arrival to ICU, but over next couple hours, developed into a more continuous air leak. Notified ROSA MARIA Hinds, who assessed patient and ordered stat cxr. Upon review of worsening cxr results, call was placed to CT md, Dr. Jacobsen, to notify of CT leak and cxr results. Dr. Jacobsen inquired if there was anyone overnight that could bronch the patient, and when answered in the negative, MD stated that as long as the patient was stable, they would assess him in the morning. Sats have maintained in the upper 90s all night. ABG ok, per NP. Will continue to monitor.

## 2024-03-12 NOTE — SIGNIFICANT NOTE
03/12/24 0804   OTHER   Discipline occupational therapist   Rehab Time/Intention   Session Not Performed other (see comments)  (pt intubated and sedation, will follow up as appropriate)   Recommendation   OT - Next Appointment 03/13/24

## 2024-03-12 NOTE — BRIEF OP NOTE
THORACOSCOPY VIDEO ASSISTED WITH DECORTICATION WITH DAVINCI ROBOT  Progress Note    Prashant Zhou  3/11/2024    Pre-op Diagnosis:   Pneumonia due to infectious organism, unspecified laterality, unspecified part of lung [J18.9]       Post-Op Diagnosis Codes:     * Pneumonia due to infectious organism, unspecified laterality, unspecified part of lung [J18.9]    Procedure/CPT® Codes:        Procedure(s):  THORACOSCOPY VIDEO ASSISTED WITH DECORTICATION WITH DAVINCI ROBOT, with partial parietal and visceral pleurectomy, intercostal nerve block.    Surgeon(s):  Saqib Keller MD    Anesthesia: General with Block    Staff:   Circulator: Gato Lester RN; Ani Rhodes RN  Scrub Person: Ekaterina Reardon  Assistant: Susannah Beltran RN CSA  Assistant: Susannah Beltran RN CSA      Estimated Blood Loss: 100ml    Urine Voided: * No values recorded between 3/11/2024  6:04 PM and 3/11/2024  8:13 PM *    Specimens:                Specimens       ID Source Type Tests Collected By Collected At Frozen?    1 Pleura Tissue ANAEROBIC CULTURE  FUNGAL CULTURE  AFB CULTURE   Saqib Keller MD 3/11/24 1839     Comment: Afb, fungal , aerobic and anerobic cultures    A Pleura Tissue TISSUE PATHOLOGY EXAM   Saqib Keller MD 3/11/24 1947 No    This specimen was not marked as sent.                  Drains:   Chest Tube Left Pleural (Active)       Chest Tube Left Pleural (Active)       Findings:   Left lower lobe and lingula covered with fibrinous plaque.  500 cc alma purulent fluid drained and sent for culture.  Multiple pockets of alma pus in the left lower pleural cavity.  Fibrinous rind covering the lower half of the chest.  Complete decortication and partial parietal and visceral pleurectomy performed.      Complications: None    Assistant: Susannah Beltran RN CSA  was responsible for performing the following activities: Retraction, Suction, Irrigation, Suturing, Closing, Placing Dressing, and Held/Positioned Camera and their skilled  assistance was necessary for the success of this case.    Saqib Keller MD     Date: 3/11/2024  Time: 20:13 EDT

## 2024-03-12 NOTE — PROGRESS NOTES
"Critical Care Progress Note   Prashant Zhou : 1960 MRN:[id number] LOS:3     Principal Problem: Sepsis     Reason for follow up: All the medical problems listed below    Summary     Prashant Zhou is a 63 y.o. male with a PMH of rectal cancer, status post partial colectomy, currently undergoing chemotherapy, with next course of chemotherapy due on  3/12/2024, Type 2 diabetes mellitus, essential hypertension, hyperlipidemia, thoracic aortic aneurysm secondary to hypertension, who presented to [facility] on 3/9/2024 with plaints of shortness of breath, cough and congestion x1 day.  Information for HPI taken from chart review as patient is intubated at arrival to ICU.      Per documentation:  H&P on 3/9/2024:  He denies any fevers chills or diaphoresis.  Cough productive of yellow sputum.  Air saturation was 81% in ED and was placed on 2 L oxygen high flow nasal cannula.  He does not wear home oxygen.  Abs today showed a sodium of 130, chloride 88, glucose 291, albumin 3.1, lactate was 3.8, WBC not elevated, hemoglobin 11.3, CTA per radiology shows: \"Groundglass opacities and tree-in-bud nodularity throughout the left upper and lower lobes as well as the right middle lobe consistent with multifocal pneumonia. There is a 17 cm loculated gas and fluid collection within the left pleural space with   associated thickening of the pleural wall, which likely represents empyema.  No evidence of pulmonary embolism. There is pulmonary artery enlargement which can be seen with pulmonary hypertension.  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 Recommend vascular surgery referral\".  He triggered sepsis for lactate, and tachycardia and fever of 100.2.  He is not hypotensive.  He was given IV fluid bolus in ED per sepsis protocol and started on IV cefepime and IV vancomycin with blood " cultures pending.  500 mg IV Flagyl has been added given empyema and pulmonary has been consulted for  empyema . Given increased eccentric noncalcified thrombus in aorta vascular surgery has been consulted.  Per RN spoke to vascular surgery no heparin drip needed will see in AM.     Pulmonology was consulted and after examining patient consulted CT surgery to evaluate for possible decortication of left-sided pleural effusion.  Vascular surgery was consulted for evaluation of thoracic aneurysm which was an incidental finding on his CT chest.  He was asymptomatic with this and vascular surgery recommended aspirin and statin therapy, as well at his follow-up in 1 to 2 months postdischarge.  CT surgery saw patient for left loculated effusion/empyema.  Patient underwent Thoracoscopy video assisted with decortication with da Alexandre robot on 3/11/2024, with 2 left chest tubes placed.  Postoperatively he was extubated, however shortly thereafter during recovery and PACU patient had respiratory distress and went into A-fib RVR.  CXR obtained showed complete opacification of the left hemothorax, and patient was emergently reintubated and emergent bronc was completed by Dr. Keller.  Repeat CXR showed improvement after.  He remained intubated and on Cardizem drip for his A-fib RVR and transferred to ICU.       Significant events     03/12/24 : Patient extubated this morning to nasal cannula without difficulty.  Chest tubes remain in place on the left.  Patient remains afebrile and all vital signs are stable and within normal limits.  Patient has had 2.95 L of urine output and 290 mL of chest tube output over the last 24 hours, net -1.2 L for the admission.  Chemistry panel completely unremarkable other than mildly elevated glucose--will adjust insulin.  CBC shows a white blood cell count has increased slightly to 12.7, which is likely reactive.  Hemoglobin is stable.  Chest x-ray with a minimal left apical residual pneumothorax and  the 2 chest tubes in place.  Micro from the fluid obtained during surgery is currently pending.  At this time, patient is stable for downgrade to PCU level and therefore we will proceed with this.    Assessment / Plan     Acute respiratory failure with hypercapnia and hypoxia  -Duoneb  -Pulmicort  -Extubated 3/12       Septic Shock; resolved  Left empyema  -Precipitated by pneumonia/left empyema   -Blood cultures still pending  -S/p VATS on 3/11/2024, cultures pending  -Left sided chest tube x2 in place; noted leak to anterior CT  -Emergent bronchoscopy by CT surgery following VATS  -Started on Javi-Synephrine for hypotension, likely secondary to A-fib RVR; will wean as tolerated  -Persistent hypotension in spite of adequate fluid resuscitation  -C/w additional fluids as needed. Avoid fluid overload  -Titrate vasopressors for a target MAP of 65  -Continue cefepime and Flagyl; escalate pending cultures       Atrial fibrillation with RVR -resolved  -Cardizem drip initiated after VATS  -Changed order to amiodarone drip with hypotension  -Patient converted to normal sinus rhythm prior to initiation of amiodarone  -On anticoagulation       Hyponatremia -improving  -Continue NS  -Continue to trend       Essential hypertension  -Hold home antihypertensives secondary to hypotension  -Resume once clinically appropriate     Type 2 diabetes mellitus, controlled  -Hold home metformin and glimepiride while in ICU  -Glucose checks every 6 hours while intubated  -SSI as needed     Hyperlipidemia  -Continue home atorvastatin         Critical Care Time:  32 mins.      Code status:   Code Status (Patient has no pulse and is not breathing): CPR (Attempt to Resuscitate)  Medical Interventions (Patient has pulse or is breathing): Full Support       Nutrition: Diet: Regular/House; Fluid Consistency: Thin (IDDSI 0)   Patient isn't on Tube Feeding    DVT prophylaxis:  Medical and mechanical DVT prophylaxis orders are present.        Subjective / Review of systems     Review of Systems   Patient states that he is feeling pretty good.  Denies any chest pain or significant shortness of breath.  Does have some pain on his left side with deep breathing.  Denies any fevers, chills, sweats, nausea, vomiting.  Objective / Physical Exam   Vital signs:  Temp: 97.8 °F (36.6 °C)  BP: 130/71  Heart Rate: 97  Resp: 20  SpO2: 96 %  Weight: 89.4 kg (197 lb)    Admission Weight: Weight: 91.2 kg (201 lb)  Current Weight: Weight: 89.4 kg (197 lb)    Input/Output in last 24 hours:    Intake/Output Summary (Last 24 hours) at 3/12/2024 1438  Last data filed at 3/12/2024 0540  Gross per 24 hour   Intake 1057 ml   Output 2565 ml   Net -1508 ml      Physical Exam       GEN:  Pleasant.  Appears appropriate for stated age.  WD/WN/WH.  Sitting up in bed on NC.  NAD.  NEURO:  Brainstem reflexes intact.  No obvious focal deficit.  Moves all 4 ext.  HEENT:  N/AT.  PERRL.  MMM.  Oropharynx non-erythematous.  No drainage from the eyes/ears/nose.  No conjunctival petechiae.  No oral thrush.  Auditory and visual acuity grossly wnl.  Good dentition.  Voice normal.  NECK:  Supple, NT, trachea midline.  No meningismus.  No ROM limitation.  No torticollis.  No JVD.  No thyromegaly.    CHEST/LUNGS:  Breath sounds are diminished and coarse on left.  Chest excursion equal bilaterally.  2 left-sided chest tubes in place, no bubbling in chamber.  CARDIOVASCULAR:  RRR w/o murmur noted.  PMI not displaced.  GI:  Abdomen soft, NT, ND, +BS.  : Deferred.  EXTREMITIES:  No deformity or amputation.  No cyanosis, edema, or asymmetry.  Pulses 2+ and equal in BLE's.    SKIN:  Warm, dry, and pink.  No rash, breakdown, or track marks noted.  LYMPHATICS/HEME:  No overt LAD or abnormal bruising.  No lymphedema.  MSK:  Normal ROM.  No joint abnormalities noted.  Strength is 5/5 and equal in BUE and BLE's.  PSYCH:  Pleasant.  A&Ox 3.  Normal mood and affect.  Responds appropriately to commands and  appears to comprehend instructions.          Radiology and Labs     Results from last 7 days   Lab Units 03/12/24  0521 03/11/24  2104 03/10/24  2236 03/09/24  2351 03/09/24  1701   WBC 10*3/mm3 12.70* 10.20 5.70 8.20 9.80   HEMATOCRIT % 28.0* 29.0* 28.9* 30.6* 34.0*   HEMATOCRIT POC %  --  28*  --   --   --    PLATELETS 10*3/mm3 469* 503* 355 396 435      Results from last 7 days   Lab Units 03/12/24  0521 03/11/24  2104 03/10/24  2236 03/09/24  2351 03/09/24  1701   SODIUM mmol/L 136 135* 134* 131* 130*   POTASSIUM mmol/L 3.8 3.5 3.4* 4.0 4.5   CHLORIDE mmol/L 100 99 95* 95* 88*   CO2 mmol/L 27.0 26.0 28.0 26.0 26.0   BUN mg/dL 9 8 7* 8 11   CREATININE mg/dL 0.46* 0.43* 0.36* 0.40* 0.56*      Current medications   Scheduled Meds: acetaminophen, 1,000 mg, Oral, TID  acetylcysteine, 3 mL, Nebulization, Q8H - RT  albuterol, 2.5 mg, Nebulization, Q8H - RT  [Held by provider] amLODIPine, 10 mg, Oral, Daily  atorvastatin, 40 mg, Oral, Daily  budesonide, 0.5 mg, Nebulization, BID - RT  cefepime, 2,000 mg, Intravenous, Q8H  [Held by provider] cloNIDine, 0.1 mg, Oral, BID  docusate sodium, 100 mg, Oral, BID  gabapentin, 300 mg, Oral, TID  guaiFENesin, 600 mg, Oral, Q12H  heparin (porcine), 5,000 Units, Subcutaneous, Q8H  [Held by provider] hydrALAZINE, 50 mg, Oral, Q8H  [Held by provider] lisinopril, 20 mg, Oral, Q24H   And  [Held by provider] hydroCHLOROthiazide, 25 mg, Oral, Q24H  insulin lispro, 2-9 Units, Subcutaneous, 4x Daily AC & at Bedtime  ipratropium-albuterol, 3 mL, Nebulization, 4x Daily - RT  ketorolac, 15 mg, Intravenous, Q8H  [Held by provider] metoprolol tartrate, 150 mg, Oral, Q12H  metroNIDAZOLE, 500 mg, Intravenous, Q8H  polyethylene glycol, 17 g, Oral, Daily  vancomycin, 1,500 mg, Intravenous, Q12H  vitamin D3, 5,000 Units, Oral, Daily      Continuous Infusions: lactated ringers, 40 mL/hr, Last Rate: 40 mL/hr (03/12/24 0023)  Pharmacy to Dose Cefepime,   Pharmacy to dose vancomycin,         Plan  discussed with RN. Reviewed all other data in the last 24 hours, including but not limited to vitals, labs, microbiology, imaging and pertinent notes from other providers.     Demetrio Garcia,    Critical Care  03/12/24   14:38 EDT

## 2024-03-12 NOTE — CONSULTS
Nutrition Services    Patient Name: Prashant Zhou  YOB: 1960  MRN: [id number]  Admission date: 3/9/2024    Comment:    RD to order Boost Glucose Control TID (Provides 570 kcals, 48 g protein if consumed).    RD to order Salvador BID to provide 90 calories, 7 grams L-Arginine, 7 grams L-Glutamine and 2.5 grams Protein (Collagen).    Diet education provided on facing nutrition obstacles (taste/texture changes) during chemotherapy. Pt very receptive to education. Will continue to monitor.    CLINICAL NUTRITION ASSESSMENT      Reason for Assessment 3/12 - UWL, MST, wound, vent     H&P      Past Medical History:   Diagnosis Date    Aortic dissection     Diabetes mellitus     Heart murmur     History of noncompliance with medical treatment     Hyperlipidemia     Hypertension     Type B hypoplasia of aortic arch        Past Surgical History:   Procedure Laterality Date    COLECTOMY PARTIAL / TOTAL  2023    COLONOSCOPY N/A 08/31/2023    Procedure: COLONOSCOPY with sigmoid mass tattooing at 35cm, sigmoid and rectal polypectomy;  Surgeon: TORIBIO Jacob MD;  Location: [facility] ENDOSCOPY;  Service: Gastroenterology;  Laterality: N/A;  sigmoid mass, colon polyps        Current Problems   Acute respiratory failure with hypoxia  - extubated 3/12    Sepsis  - likely related to pneumonia    Pneumonia    Rectal cancer  - undergoing chemotherapy   - oncology following    T2DM     Encounter Information        Trending Narrative     3/12 - Pt admitted to [facility] 3/9 with SOB, cough, and congestion. Pt has a hx of colon cancer, and is going through chemotherapy treatment currently. Pt discussed in interdisciplinary rounds - pt to downgrade to PCU level of care today. Dietetic intern visited pt at bedside for assessment, pt alert and agreeable. Pt stated he was hungry, as he had not eaten in 36 hours - pt's diet order was just recently upgraded to regular. Brought patient a Boost to the room. RD to order Boost Glucose  "Control TID (Provides 570 kcals, 48 g protein if consumed). Pt also agreeable to Salvador BID to provide 90 calories, 7 grams L-Arginine, 7 grams L-Glutamine and 2.5 grams Protein (Collagen). Pt reports having issues with taste/texture following recent chemotherapy - he has noticed recent weight loss but is unsure of lbs lost. He expressed frustration with finding food items that worked for him. Provided nutrition education materials and limited nutrition education on finding foods that work with altered taste, ways to increase caloric intake while having a diminished appetite, and obstacles he might face with nutrition during chemotherapy/how to combat them. Patient was receptive and thankful for diet education, as he had not received any prior to chemotherapy. NFPE completed and not consistent with nutrition diagnosis of malnutrition at this time using AND/ASPEN criteria. Will continue to monitor.     Anthropometrics        Current Height, Weight Height: 190.5 cm (75\")  Weight: 89.4 kg (197 lb) (03/11/24 0846)       Usual Body Weight (UBW) 205 lbs        Trending Weight Hx     This admission: 3/12 - 197 lbs             PTA: 3/12 - 11.3% wt loss x 7 mo (using wt from 8/31/23)     Wt Readings from Last 30 Encounters:   03/11/24 0846 89.4 kg (197 lb)   03/11/24 0547 89.4 kg (197 lb 1.5 oz)   03/10/24 0500 91.1 kg (200 lb 13.4 oz)   03/10/24 0401 91.1 kg (200 lb 13.4 oz)   03/09/24 1644 91.2 kg (201 lb)   03/08/24 1137 91.2 kg (201 lb)   02/27/24 0759 93.7 kg (206 lb 8 oz)   02/20/24 0809 91.8 kg (202 lb 6.4 oz)   02/16/24 1009 89.6 kg (197 lb 9.6 oz)   02/14/24 0952 89 kg (196 lb 4.8 oz)   02/06/24 0734 94.5 kg (208 lb 4.8 oz)   01/25/24 0921 98.1 kg (216 lb 3.2 oz)   01/23/24 0810 95.8 kg (211 lb 4.8 oz)   01/22/24 0839 96.4 kg (212 lb 9.6 oz)   11/14/23 1118 98 kg (216 lb)   08/31/23 1031 101 kg (222 lb 10.6 oz)   08/21/23 1638 99.8 kg (220 lb)   07/24/23 1042 105 kg (230 lb 6.4 oz)   01/23/23 1038 105 kg (231 lb) "   07/20/22 0928 108 kg (237 lb)   01/20/22 0821 109 kg (240 lb 6.4 oz)   07/21/21 0810 110 kg (243 lb)   07/14/21 1557 110 kg (242 lb)   01/20/21 0810 110 kg (243 lb)   09/28/20 0958 111 kg (245 lb)   09/09/20 0933 112 kg (247 lb)   07/29/20 0801 112 kg (246 lb)   07/20/20 0804 112 kg (246 lb)   01/20/20 0859 111 kg (244 lb)   10/21/19 0805 110 kg (242 lb)   09/04/19 1012 110 kg (243 lb 8 oz)   07/23/19 0828 111 kg (245 lb 3.2 oz)   04/05/18 1014 115 kg (254 lb 3.2 oz)   02/16/17 1054 118 kg (260 lb)   03/10/16 1111 111 kg (244 lb 9.6 oz)      BMI kg/m2 Body mass index is 24.62 kg/m².       Labs        Pertinent Labs Hyperglycemia   Management per attending   Results from last 7 days   Lab Units 03/12/24  0521 03/11/24  2104 03/10/24  2236 03/09/24  2351 03/09/24  1701   SODIUM mmol/L 136 135* 134*   < > 130*   POTASSIUM mmol/L 3.8 3.5 3.4*   < > 4.5   CHLORIDE mmol/L 100 99 95*   < > 88*   CO2 mmol/L 27.0 26.0 28.0   < > 26.0   BUN mg/dL 9 8 7*   < > 11   CREATININE mg/dL 0.46* 0.43* 0.36*   < > 0.56*   CALCIUM mg/dL 10.0 10.1 10.1   < > 11.1*   BILIRUBIN mg/dL  --   --   --   --  0.3   ALK PHOS U/L  --   --   --   --  118*   ALT (SGPT) U/L  --   --   --   --  50*   AST (SGOT) U/L  --   --   --   --  33   GLUCOSE mg/dL 249* 137* 110*   < > 291*    < > = values in this interval not displayed.     Results from last 7 days   Lab Units 03/12/24  0521 03/11/24  2104   MAGNESIUM mg/dL  --  1.9   HEMOGLOBIN g/dL 9.2* 9.5*   HEMOGLOBIN, POC g/dL  --  9.5*   HEMATOCRIT % 28.0* 29.0*   HEMATOCRIT POC %  --  28*     Lab Results   Component Value Date    HGBA1C 7.40 (H) 01/25/2024        Medications    Scheduled Medications acetaminophen, 1,000 mg, Oral, TID  acetylcysteine, 3 mL, Nebulization, Q8H - RT  albuterol, 2.5 mg, Nebulization, Q8H - RT  [Held by provider] amLODIPine, 10 mg, Oral, Daily  atorvastatin, 40 mg, Oral, Daily  budesonide, 0.5 mg, Nebulization, BID - RT  cefepime, 2,000 mg, Intravenous, Q8H  [Held by  provider] cloNIDine, 0.1 mg, Oral, BID  docusate sodium, 100 mg, Oral, BID  gabapentin, 300 mg, Oral, TID  guaiFENesin, 600 mg, Oral, Q12H  heparin (porcine), 5,000 Units, Subcutaneous, Q8H  [Held by provider] hydrALAZINE, 50 mg, Oral, Q8H  [Held by provider] lisinopril, 20 mg, Oral, Q24H   And  [Held by provider] hydroCHLOROthiazide, 25 mg, Oral, Q24H  insulin lispro, 2-9 Units, Subcutaneous, 4x Daily AC & at Bedtime  ipratropium-albuterol, 3 mL, Nebulization, 4x Daily - RT  ketorolac, 15 mg, Intravenous, Q8H  [Held by provider] metoprolol tartrate, 150 mg, Oral, Q12H  metroNIDAZOLE, 500 mg, Intravenous, Q8H  polyethylene glycol, 17 g, Oral, Daily  vancomycin, 1,500 mg, Intravenous, Q12H  vitamin D3, 5,000 Units, Oral, Daily        Infusions lactated ringers, 40 mL/hr, Last Rate: 40 mL/hr (03/12/24 0023)  Pharmacy to Dose Cefepime,   Pharmacy to dose vancomycin,         PRN Medications   benzonatate    dextrose    dextrose    glucagon (human recombinant)    HYDROmorphone    ipratropium-albuterol    nitroglycerin    ondansetron ODT **OR** ondansetron    oxyCODONE    Pharmacy to Dose Cefepime    Pharmacy to dose vancomycin    sodium chloride     Physical Findings        Trending Physical   Appearance, NFPE 3/12 - NFPE completed and not consistent with nutrition diagnosis of malnutrition at this time using AND/ASPEN criteria      --  Edema  No documented edema     Bowel Function + BM 3/10     Tubes No feeding tube in place     Chewing/Swallowing No documented chewing/swallowing issues     Skin Unstageable PI to right gluteal fold per WOCN note 3/11.     --  Current Nutrition Orders & Evaluation of Intake       Oral Nutrition     Food Allergies NKFA   Current PO Diet Diet: Regular/House; Fluid Consistency: Thin (IDDSI 0)   Supplement No supplement ordered   PO Evaluation     Trending % PO Intake 3/12 - No recorded PO intake - pt NPO most of admission   --  Nutritional Risk Screening        NRS-2002 Score           Nutrition Diagnosis         Nutrition Dx Problem 1 Inadequate oral intake related to diminished appetite in the setting of chemotherapy/colon cancer as evidenced by wt loss of 11% within the past 7 months.      Nutrition Dx Problem 2        Intervention Goal         Intervention Goal(s) Encourage PO intake >50%, with acceptance of Boost TID and Salvador BID     Nutrition Intervention        RD Action NFPE completed, diet education provided on nutrition after chemotherapy, continue regular diet as tolerated, will add Boost GC TID and Salvador BID, will continue to monitor     Nutrition Prescription          Diet Prescription Regular   Supplement Prescription Boost Glucose Control TID (Provides 570 kcals, 48 g protein if consumed)   Salvador BID to provide 90 calories, 7 grams L-Arginine, 7 grams L-Glutamine and 2.5 grams Protein (Collagen)   --  Monitor/Evaluation        Monitor Per protocol, PO intake, Supplement intake, Pertinent labs, Weight, GI status, Symptoms     Electronically signed by:  Kelsea Liang  03/12/24 14:43 EDT

## 2024-03-12 NOTE — CASE MANAGEMENT/SOCIAL WORK
Discharge Planning Assessment   Jeff     Patient Name: Prashant Zhou  MRN: [id number]  Today's Date: 3/12/2024    Admit Date: 3/9/2024    Plan: Plan to return home with wife pending clinical course and PT/OT eval.   Discharge Needs Assessment       Row Name 03/12/24 0942       Living Environment    People in Home spouse    Name(s) of People in Home Jodi - spouse    Current Living Arrangements home    Potentially Unsafe Housing Conditions none    In the past 12 months has the electric, gas, oil, or water company threatened to shut off services in your home? No    Primary Care Provided by self    Provides Primary Care For no one    Family Caregiver if Needed spouse    Family Caregiver Names Jodi - spouse    Quality of Family Relationships helpful;involved;supportive    Able to Return to Prior Arrangements yes       Resource/Environmental Concerns    Resource/Environmental Concerns none    Transportation Concerns none       Transportation Needs    In the past 12 months, has lack of transportation kept you from medical appointments or from getting medications? no    In the past 12 months, has lack of transportation kept you from meetings, work, or from getting things needed for daily living? No       Food Insecurity    Within the past 12 months, you worried that your food would run out before you got the money to buy more. Never true    Within the past 12 months, the food you bought just didn't last and you didn't have money to get more. Never true       Transition Planning    Patient/Family Anticipates Transition to home with family    Patient/Family Anticipated Services at Transition none    Transportation Anticipated car, drives self;family or friend will provide       Discharge Needs Assessment    Readmission Within the Last 30 Days no previous admission in last 30 days    Equipment Currently Used at Home walker, rolling;glucometer;shower chair;wheelchair    Concerns to be Addressed discharge planning     Anticipated Changes Related to Illness none    Equipment Needed After Discharge none    Outpatient/Agency/Support Group Needs skilled nursing facility    Discharge Facility/Level of Care Needs nursing facility, skilled                   Discharge Plan       Row Name 03/12/24 0943       Plan    Plan Plan to return home with wife pending clinical course and PT/OT eval.    Patient/Family in Agreement with Plan yes    Plan Comments Patient lives at home with spouse who will transport at discharge. Patient performs ADLs but does have a RW, WC, SC, at home.  PCP and pharmacy confirmed. Agreeable to M2B.  Denies financial assistance needs for medication and/or food. DC Barriers:  vent 50/7.5, Nebs, IV Abxs, IVF, Propofol gtt, WBC 12.7, HGB 9.7, Left Chest Tubes x2, Cardio/Thoracis Sx/Palliative/ID/Vasc Sx/HemOnc/Pulmonology following.                 Expected Discharge Date and Time       Expected Discharge Date Expected Discharge Time    Mar 18, 2024            Demographic Summary       Row Name 03/12/24 0941       General Information    Admission Type inpatient    Arrived From emergency department    Required Notices Provided Important Message from Medicare    Referral Source admission list    Reason for Consult discharge planning    Preferred Language English                   Functional Status       Row Name 03/12/24 0941       Functional Status    Usual Activity Tolerance moderate    Current Activity Tolerance poor       Functional Status, IADL    Medications independent    Meal Preparation independent    Housekeeping assistive person    Laundry assistive person    Shopping assistive equipment and person       Mental Status    General Appearance WDL WDL       Mental Status Summary    Recent Changes in Mental Status/Cognitive Functioning no changes           TICO Arthur RN  SIPS/ICU   O: [phone]  C: [phone]  [email or web address]

## 2024-03-12 NOTE — THERAPY RE-EVALUATION
Patient Name: Prashant Zhou  : 1960    MRN: [id number]                              Today's Date: 3/12/2024       Admit Date: 3/9/2024    Visit Dx:     ICD-10-CM ICD-9-CM   1. Pneumonia due to infectious organism, unspecified laterality, unspecified part of lung  J18.9 486   2. 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    Dehydration    Sepsis    Pneumonia    Empyema lung    Acute respiratory failure with hypoxia    Hyponatremia    Thoracic aortic aneurysm without rupture    Aortic thrombus    Tobacco use     Past Medical History:   Diagnosis Date    Aortic dissection     Diabetes mellitus     Heart murmur     History of noncompliance with medical treatment     Hyperlipidemia     Hypertension     Type B hypoplasia of aortic arch      Past Surgical History:   Procedure Laterality Date    COLECTOMY PARTIAL / TOTAL      COLONOSCOPY N/A 2023    Procedure: COLONOSCOPY with sigmoid mass tattooing at 35cm, sigmoid and rectal polypectomy;  Surgeon: TORIBIO Jacob MD;  Location: [facility] ENDOSCOPY;  Service: Gastroenterology;  Laterality: N/A;  sigmoid mass, colon polyps      General Information       Row Name 24 1504          Physical Therapy Time and Intention    Document Type re-evaluation  -BR     Mode of Treatment physical therapy  -BR       Row Name 24 1504          General Information    Prior Level of Function --  Pt reports he has been using a walker for the past few weeks.  -BR     Existing Precautions/Restrictions oxygen therapy device and L/min;fall  -BR     Barriers  to Rehab medically complex  -BR       Row Name 03/12/24 1504          Cognition    Orientation Status (Cognition) oriented x 4  -BR       Row Name 03/12/24 1504          Safety Issues, Functional Mobility    Impairments Affecting Function (Mobility) balance;endurance/activity tolerance;shortness of breath;strength  -BR               User Key  (r) = Recorded By, (t) = Taken By, (c) = Cosigned By      Initials Name Provider Type    Adriane Mosquera PT Physical Therapist                   Mobility       Row Name 03/12/24 1506          Bed Mobility    Bed Mobility supine-sit  -BR     Supine-Sit [place] (Bed Mobility) minimum assist (75% patient effort);1 person assist;1 person to manage equipment;verbal cues;moderate assist (50% patient effort)  -BR       Row Name 03/12/24 1506          Sit-Stand Transfer    Sit-Stand [place] (Transfers) minimum assist (75% patient effort);2 person assist;verbal cues  -BR     Assistive Device (Sit-Stand Transfers) walker, front-wheeled  -BR       Row Name 03/12/24 1506          Gait/Stairs (Locomotion)    [place] Level (Gait) moderate assist (50% patient effort);2 person assist;verbal cues  -BR     Assistive Device (Gait) walker, front-wheeled  -BR     Distance in Feet (Gait) 6  gait distance limited by air-vo and 2 chest tubes on wall suction  -BR     Deviations/Abnormal Patterns (Gait) ulises decreased;weight shifting decreased;stride length decreased  -BR     Bilateral Gait Deviations heel strike decreased  -BR               User Key  (r) = Recorded By, (t) = Taken By, (c) = Cosigned By      Initials Name Provider Type    Adriane Mosquera PT Physical Therapist                   Obj/Interventions       Row Name 03/12/24 1508          Range of Motion Comprehensive    General Range of Motion bilateral lower extremity ROM WFL  -BR       Row Name 03/12/24 1508          Strength Comprehensive (MMT)    Comment, General Manual Muscle Testing (MMT) Assessment BLE  strength grossly3+/5  -BR       Row Name 03/12/24 1508          Balance    Balance Assessment sitting static balance;sitting dynamic balance;standing static balance  -BR     Static Sitting Balance supervision  -BR     Dynamic Sitting Balance contact guard  -BR     Position, Sitting Balance sitting edge of bed  -BR     Static Standing Balance minimal assist;2-person assist  -BR     Position/Device Used, Standing Balance walker, rolling  -BR               User Key  (r) = Recorded By, (t) = Taken By, (c) = Cosigned By      Initials Name Provider Type    BR Adriane Dickson, PT Physical Therapist                   Goals/Plan       Row Name 03/12/24 1517          Bed Mobility Goal 1 (PT)    Activity/Assistive Device (Bed Mobility Goal 1, PT) bed mobility activities, all  -BR     [place] Level/Cues Needed (Bed Mobility Goal 1, PT) supervision required  -BR     Time Frame (Bed Mobility Goal 1, PT) long term goal (LTG);2 weeks  -BR     Progress/Outcomes (Bed Mobility Goal 1, PT) goal revised this date  -BR       Row Name 03/12/24 1517          Transfer Goal 1 (PT)    Activity/Assistive Device (Transfer Goal 1, PT) transfers, all;walker, rolling  -BR     [place] Level/Cues Needed (Transfer Goal 1, PT) contact guard required  -BR     Time Frame (Transfer Goal 1, PT) long term goal (LTG);2 weeks  -BR     Progress/Outcome (Transfer Goal 1, PT) goal revised this date  -BR       Row Name 03/12/24 1517          Gait Training Goal 1 (PT)    Activity/Assistive Device (Gait Training Goal 1, PT) gait (walking locomotion);walker, rolling  -BR     [place] Level (Gait Training Goal 1, PT) supervision required  -BR     Distance (Gait Training Goal 1, PT) 75  -BR     Time Frame (Gait Training Goal 1, PT) long term goal (LTG);2 weeks  -BR     Progress/Outcome (Gait Training Goal 1, PT) goal revised this date  -BR       Row Name 03/12/24 1517          Stairs Goal 1 (PT)    [place] Level/Cues Needed (Stairs Goal 1, PT)  contact guard required  -BR     Progress/Outcome (Stairs Goal 1, PT) goal revised this date  -BR       Row Name 03/12/24 1517          Therapy Assessment/Plan (PT)    Planned Therapy Interventions (PT) balance training;bed mobility training;gait training;neuromuscular re-education;strengthening;ROM (range of motion);patient/family education;transfer training  -BR               User Key  (r) = Recorded By, (t) = Taken By, (c) = Cosigned By      Initials Name Provider Type    BR Adriane Dickson, PT Physical Therapist                   Clinical Impression       Row Name 03/12/24 1508          Pain    Pretreatment Pain Rating 0/10 - no pain  -BR     Posttreatment Pain Rating 0/10 - no pain  -BR       Row Name 03/12/24 1516 03/12/24 1508       Plan of Care Review    Plan of Care Reviewed With -- patient;son  -BR    Outcome Evaluation Physical Therapy Re-evaluation due to change of medical status.   Patient underwent Thoracoscopy video assisted with decortication with da Alexandre robot on 3/11/2024, with 2 left chest tubes placed.  Postoperatively he was extubated, however shortly thereafter during recovery and PACU patient had respiratory distress and went into A-fib RVR.  CXR obtained showed complete opacification of the left hemothorax, and patient was emergently reintubated and emergent bronc was completed by Dr. Keller.  Repeat CXR showed improvement after.  He remained intubated and on Cardizem drip for his A-fib RVR and transferred to ICU. Pt A and O x 4. This date, pt requiring min assist for supine>sit and min assist of 2 for sit<>stand. Pt did ambulatory transfer from bed to recliner with mod assist of 2 with gait tolerance limited by Cts on wall suction. All goals were reviewed and updated as needed. Pt has had a decline in functional mobility since PT eval but he is expected to regain his mobility well as his medical condition improves. PT continues to recommend Home Health Physical therapy but will change home  assist to 24/7 care.  -BR --      Row Name 03/12/24 1508          Therapy Assessment/Plan (PT)    Rehab Potential (PT) good, to achieve stated therapy goals  -BR     Criteria for Skilled Interventions Met (PT) yes;meets criteria;skilled treatment is necessary  -BR     Therapy Frequency (PT) 5 times/wk  -BR     Predicted Duration of Therapy Intervention (PT) until D/C  -BR       Row Name 03/12/24 1509 03/12/24 1508       Vital Signs    Pre Systolic BP Rehab 117  -BR --    Pre Treatment Diastolic BP 67  -BR --    Intra Systolic BP Rehab 128  -BR --    Intra Treatment Diastolic BP 77  -BR --    Pretreatment Heart Rate (beats/min) 101  -BR --    Pretreatment Resp Rate (breaths/min) 21  -BR --    Pre SpO2 (%) -- 94  -BR    O2 Delivery Pre Treatment nasal cannula  6L  -BR --    Post SpO2 (%) 95  -BR --    O2 Delivery Post Treatment nasal cannula  6 L  -BR --    Pre Patient Position Supine  -BR --    Intra Patient Position Standing  -BR --    Post Patient Position Sitting  -BR --      Row Name 03/12/24 1509          Positioning and Restraints    Pre-Treatment Position in bed  -BR     Post Treatment Position chair  -BR     In Chair notified nsg;reclined;call light within reach;encouraged to call for assist;with family/caregiver  -BR               User Key  (r) = Recorded By, (t) = Taken By, (c) = Cosigned By      Initials Name Provider Type    BR Adriane Dickson, PT Physical Therapist                   Outcome Measures       Row Name 03/12/24 1518          How much help from another person do you currently need...    Turning from your back to your side while in flat bed without using bedrails? 2  -BR     Moving from lying on back to sitting on the side of a flat bed without bedrails? 2  -BR     Moving to and from a bed to a chair (including a wheelchair)? 2  -BR     Standing up from a chair using your arms (e.g., wheelchair, bedside chair)? 2  -BR     Climbing 3-5 steps with a railing? 2  -BR     To walk in hospital room?  2  -BR     AM-PAC 6 Clicks Score (PT) 12  -BR     Highest Level of Mobility Goal 4 --> Transfer to chair/commode  -BR       Row Name 03/12/24 1518          Functional Assessment    Outcome Measure Options AM-PAC 6 Clicks Basic Mobility (PT)  -BR               User Key  (r) = Recorded By, (t) = Taken By, (c) = Cosigned By      Initials Name Provider Type    BR Adriane Dickson, PAOLA Physical Therapist                                 Physical Therapy Education       Title: PT OT SLP Therapies (Done)       Topic: Physical Therapy (Done)       Point: Mobility training (Done)       Learning Progress Summary             Patient Acceptance, E,D, VU,DU,NR by BR at 3/12/2024 1519    Acceptance, E,TB, VU by EL at 3/10/2024 1524   Family Acceptance, E,D, VU,DU,NR by BR at 3/12/2024 1519                         Point: Body mechanics (Done)       Learning Progress Summary             Patient Acceptance, E,D, VU,DU,NR by BR at 3/12/2024 1519   Family Acceptance, E,D, VU,DU,NR by BR at 3/12/2024 1519                         Point: Precautions (Done)       Learning Progress Summary             Patient Acceptance, E,D, VU,DU,NR by BR at 3/12/2024 1519    Acceptance, E,TB, VU by EL at 3/10/2024 1524   Family Acceptance, E,D, VU,DU,NR by BR at 3/12/2024 1519                                         User Key       Initials Effective Dates Name Provider Type Discipline     06/23/20 -  Prabhjot Tobias, PT Physical Therapist PT    BR 02/01/22 -  Adriane Dickson PT Physical Therapist PT                  PT Recommendation and Plan  Planned Therapy Interventions (PT): balance training, bed mobility training, gait training, neuromuscular re-education, strengthening, ROM (range of motion), patient/family education, transfer training  Plan of Care Reviewed With: patient, son  Outcome Evaluation: Physical Therapy Re-evaluation due to change of medical status.   Patient underwent Thoracoscopy video assisted with decortication with da Alexandre robot on  3/11/2024, with 2 left chest tubes placed.  Postoperatively he was extubated, however shortly thereafter during recovery and PACU patient had respiratory distress and went into A-fib RVR.  CXR obtained showed complete opacification of the left hemothorax, and patient was emergently reintubated and emergent bronc was completed by Dr. Keller.  Repeat CXR showed improvement after.  He remained intubated and on Cardizem drip for his A-fib RVR and transferred to ICU. Pt A and O x 4. This date, pt requiring min assist for supine>sit and min assist of 2 for sit<>stand. Pt did ambulatory transfer from bed to recliner with mod assist of 2 with gait tolerance limited by Cts on wall suction. All goals were reviewed and updated as needed. Pt has had a decline in functional mobility since PT eval but he is expected to regain his mobility well as his medical condition improves. PT continues to recommend Home Health Physical therapy but will change home assist to 24/7 care.     Time Calculation:         PT Charges       Row Name 03/12/24 1520 03/12/24 0849          Time Calculation    Start Time 1354  -BR --     Stop Time 1434  -BR --     Time Calculation (min) 40 min  -BR --     PT Received On 03/12/24  -BR --     PT - Next Appointment 03/13/24  -BR 03/13/24  -BR     PT Goal Re-Cert Due Date 03/26/24  -BR --        Time Calculation- PT    Total Timed Code Minutes- PT 20 minute(s)  -BR --               User Key  (r) = Recorded By, (t) = Taken By, (c) = Cosigned By      Initials Name Provider Type    BR Adriane Dickson PT Physical Therapist                  Therapy Charges for Today       Code Description Service Date Service Provider Modifiers Qty    07506776794 HC PT RE-EVAL ESTABLISHED PLAN 2 3/12/2024 Adriane Dickson, PT GP 1    01474859253 HC PT THERAPEUTIC ACT EA 15 MIN 3/12/2024 Adriane Dickson, PT GP 1    93673302517 HC PT NEUROMUSC RE EDUCATION EA 15 MIN 3/12/2024 Adriane Dickson, PT GP 1            PT  G-Codes  Outcome Measure Options: AM-PAC 6 Clicks Basic Mobility (PT)  AM-PAC 6 Clicks Score (PT): 12  PT Discharge Summary  Anticipated Discharge Disposition (PT): home with 24/7 care, home with home health    Adriane Dickson, PT  3/12/2024

## 2024-03-12 NOTE — PROGRESS NOTES
Infectious Diseases Progress Note      LOS: 3 days   Patient Care Team:  Lazaro Paulino MD as PCP - General  Steve Mckeon MD as Consulting Physician (Cardiology)  Lars Wagner MD as Consulting Physician (Hematology and Oncology)    Chief Complaint shortness of breath and chest pain    Subjective       The patient has been afebrile for the last 24 hours.  He is s/p decortication yesterday.  Was intubated and currently extubated on nasal cannula.  Currently off vasopressors      Review of Systems:   Review of Systems   Constitutional:  Positive for fatigue.   HENT: Negative.     Eyes: Negative.    Respiratory:  Positive for cough and shortness of breath.    Cardiovascular: Negative.    Gastrointestinal: Negative.    Endocrine: Negative.    Genitourinary: Negative.    Musculoskeletal: Negative.    Skin: Negative.    Neurological: Negative.    Psychiatric/Behavioral: Negative.     All other systems reviewed and are negative.       Objective     Vital Signs  Temp:  [94 °F (34.4 °C)-98.9 °F (37.2 °C)] 98.4 °F (36.9 °C)  Heart Rate:  [] 87  Resp:  [15-32] 25  BP: ()/(22-73) 130/71  FiO2 (%):  [40 %-70 %] 40 %    Physical Exam:  Physical Exam  Vitals and nursing note reviewed.   Constitutional:       General: He is not in acute distress.     Appearance: He is well-developed and normal weight. He is ill-appearing. He is not diaphoretic.   HENT:      Head: Normocephalic and atraumatic.   Eyes:      Conjunctiva/sclera: Conjunctivae normal.      Pupils: Pupils are equal, round, and reactive to light.   Cardiovascular:      Rate and Rhythm: Normal rate and regular rhythm.      Heart sounds: Normal heart sounds, S1 normal and S2 normal.   Pulmonary:      Effort: Pulmonary effort is normal. No respiratory distress.      Breath sounds: No stridor. Wheezing present. No rales.      Comments: Decreased breathing sounds at the left base    Presence of chest tubes on the left side  Abdominal:      General:  Bowel sounds are normal. There is no distension.      Palpations: Abdomen is soft. There is no mass.      Tenderness: There is no abdominal tenderness. There is no guarding.   Musculoskeletal:         General: No deformity. Normal range of motion.      Cervical back: Neck supple.   Skin:     General: Skin is warm and dry.      Coloration: Skin is not pale.      Findings: No erythema or rash.      Comments: Port   Neurological:      Mental Status: He is alert and oriented to person, place, and time.      Cranial Nerves: No cranial nerve deficit.   Psychiatric:         Mood and Affect: Mood normal.          Results Review:    I have reviewed all clinical data, test, lab, and imaging results.     Radiology  XR Chest 1 View    Result Date: 3/12/2024  XR CHEST 1 VW Date of Exam: 3/12/2024 8:45 AM EDT Indication: Post Op Lung Surgery Comparison: March 12, 2024 Findings: There are 2 thoracotomy tubes on the left. A pneumothorax not definitely confirmed. There is airspace disease in left basilar that could be secondary to pneumonia. Underlying effusion suspected. There is increasing density at the left base as compared to  the earlier study. Subpulmonic air noted on the earlier chest x-ray is not seen on the current exam. There is a port catheter noted with its tip in the superior vena cava. ET tube is present with its tip above the kristyn. Right lung seems relatively clear.     Impression: 1.Increasing density at the left lung base that could relate to some underlying consolidation and possibly effusion. There is additional airspace disease noted within the lingular area more interstitial in nature. Findings of pneumonia were noted on the more recent CT involving the left lung as well changes of probable empyema. Electronically Signed: Santiago Tariq MD  3/12/2024 9:21 AM EDT  Workstation ID: [id number]    XR Chest 1 View    Result Date: 3/12/2024  XR CHEST 1 VW Date of Exam: 3/12/2024 12:38 AM EDT Indication: chest tube leak  Comparison: 3/11/2024. Findings: The heart appears mildly enlarged, stable as compared to the previous study. Right internal jugular Mediport with the tip in the proximal SVC. There are 2 left-sided chest tubes present. Suspected tiny left apical pneumothorax present with degree of pleural separation measuring up to 8 mm. No additional pneumothorax identified. Patchy airspace disease is seen within the left lung base which appears to have improved as compared to the previous study. Probable small left-sided pleural effusion present. Endotracheal tube present with the tip in the mid trachea. No significant air along the left chest wall.     Impression: Suspected tiny left apical pneumothorax with degree of pleural separation measuring up to 8 mm. 2 left-sided chest tubes present. No significant air along the left chest wall. Improving left basilar airspace disease. Electronically Signed: Jenny Olmedo MD  3/12/2024 12:57 AM EDT  Workstation ID: [id number]    XR Chest 1 View    Result Date: 3/11/2024  XR CHEST 1 VW Date of Exam: 3/11/2024 9:57 PM EDT Indication: et tube placement Comparison: Chest radiograph same date. Findings: Endotracheal tube tip overlies the midthoracic trachea with tip 6.3 cm above the krsityn. Unchanged position of right chest port and 2 left chest tubes. Cardiomediastinal silhouette is unchanged. Patchy airspace disease within the left mid/lower lung. Right lung is clear. There may be a trace left pneumothorax.     Impression: Endotracheal tube tip overlies the midthoracic trachea. Otherwise, similar postsurgical changes of the left chest with patchy airspace disease in the left mid/lower lung and suspected trace left pneumothorax, with 2 left chest tubes in place. Electronically Signed: Darrion Neal MD  3/11/2024 10:15 PM EDT  Workstation ID: [id number]    XR Chest 1 View    Result Date: 3/11/2024  XR CHEST 1 VW Date of Exam: 3/11/2024 9:16 PM EDT Indication: Post Op Lung Surgery Comparison: Chest  radiograph same day. Findings: Postsurgical changes of the left chest with 2 left chest tubes in place. Right chest port tip unchanged in position. Cardiomediastinal silhouette is obscured. Partial opacification of the left hemithorax with some aerated lung in the apex. Right lung is grossly clear. No significant pneumothorax is evident on this exam.      Impression: Postsurgical changes of the left chest with partial opacification of the left hemithorax and 2 left chest tubes in place. Electronically Signed: Darrion Neal MD  3/11/2024 9:36 PM EDT  Workstation ID: [id number]    Arterial Line    Result Date: 3/11/2024  Sidney Estrella MD     3/11/2024  7:34 PM Arterial Line Pre-sedation assessment completed: 3/11/2024 6:12 PM Patient reassessed immediately prior to procedure Patient location during procedure: OR Start time: 3/11/2024 6:12 PM Stop Time:3/11/2024 6:14 PM  Line placed for hemodynamic monitoring, MD/Surgeon request and ABGs/Labs/ISTAT. Performed By Anesthesiologist: Sidney Estrella MD Preanesthetic Checklist Completed: patient identified, IV checked, site marked, risks and benefits discussed, surgical consent, monitors and equipment checked, pre-op evaluation and timeout performed Arterial Line Prep  Sterile Tech: cap, gloves, mask and sterile barriers Prep: ChloraPrep Patient monitoring: blood pressure monitoring, continuous pulse oximetry and EKG Arterial Line Procedure Laterality:left Location:  radial artery Catheter size: 20 G Guidance: palpation technique Number of attempts: 1 Successful placement: yes Heparin (Porcine) in NaCl 1000-0.9 UT/500ML-% for arterial line pressure bag - Intra-arterial  1,000 Units - 3/11/2024 6:12:00 PM Post Assessment Dressing Type: occlusive dressing applied, secured with tape and wrist guard applied. Complications no Circ/Move/Sens Assessment: normal and unchanged. Patient Tolerance: patient tolerated the procedure well with no apparent complications     Peripheral  Block    Result Date: 3/11/2024  Domingo Durand MD     3/11/2024  5:28 PM Peripheral Block Pre-sedation assessment completed: 3/11/2024 4:30 PM Patient reassessed immediately prior to procedure Patient location during procedure: pre-op Reason for block: procedure for pain, at surgeon's request, post-op pain management and secondary anesthetic Performed by Anesthesiologist: Domingo Durand MD Preanesthetic Checklist Completed: patient identified, IV checked, site marked, risks and benefits discussed, surgical consent, monitors and equipment checked, pre-op evaluation and timeout performed Prep: Pt Position: sitting Sterile barriers:cap, gloves, mask and washed/disinfected hands Prep: ChloraPrep Patient monitoring: blood pressure monitoring, continuous pulse oximetry and EKG Procedure Sedation: yes Performed under: local infiltration Guidance:ultrasound guided and landmark technique ULTRASOUND INTERPRETATION.  Using ultrasound guidance a 21 G gauge needle was placed in close proximity to the nerve, at which point, under ultrasound guidance anesthetic was injected in the area of the nerve and spread of the anesthesia was seen on ultrasound in close proximity thereto.  There were no abnormalities seen on ultrasound; a digital image was taken; and the patient tolerated the procedure with no complications. Images:still images obtained, printed/placed on chart Laterality:left Block Type:erector spinae block Injection Technique:single-shot Needle Type:echogenic Needle Gauge:21 G Resistance on Injection: less than 15 psi Medications Used: bupivacaine liposome (EXPAREL) injection 1.3% - Perineural  10 mL - 3/11/2024 5:05:00 PM bupivacaine PF (MARCAINE) injection 0.5% - Perineural  10 mL - 3/11/2024 5:05:00 PM Medications Preservative Free Saline:10ml Post Assessment Injection Assessment: negative aspiration for heme, no paresthesia on injection and incremental injection Patient Tolerance:comfortable throughout  block Complications:no Additional Notes Pre-procedure: PARESH block performed preoperatively prior to the start of anesthesia time at the request of the patient and the surgeon for the management of postoperative acute surgical pain as well as for a secondary intraoperative anesthetic (general anesthesia is the primary intraoperative anesthetic); patient identified; pre-procedure vital signs, all relevant labs/studies, complete medical/surgical/anesthetic history, full medication list, full allergy list, and NPO status obtained/reviewed; physical assessment performed; anesthetic options, side effects, potential complications, risks, and benefits discussed; questions answered; patient wishes to proceed with the procedure; written anesthesia procedure consent obtained; patient cleared for procedure; time out performed; IV access in situ Procedure: ASA monitor placed; supplemental oxygen provided; patient positioned; hand hygiene performed; sterile technique maintained throughout the procedure; sterile prep applied; insertion site determined by anatomical landmarks, palpation, and ultrasound imaging; live ultrasound guidance throughout the procedure; target nerves/landmarks identified on live ultrasound; skin and subcutaneous tissues numbed by injection of 1% lidocaine; 110 mm 21G Sono TAP Cannula Needle used; realtime needle advancement and placement in the target anatomic plane witnessed on live ultrasound; negative aspiration prior to injection; correct needle placement confirmed on live ultrasound by local anesthetic spread in the correct anatomic plane; local anesthetic mixture injected with negative aspiration prior to each injection and after each 1-5 mL injected; needle withdrawn; dressing applied; ultrasound image printed and placed in the patient's permanent medical record Post-procedure: PARESH block placed successfully; good block; no apparent complications; minimal estimated blood loss; vital signs stable  throughout; see nurse's notes for vitals; transported to the OR, general anesthesia induced, and surgery started      Cardiology    Laboratory    Results from last 7 days   Lab Units 03/12/24  0521 03/11/24  2104 03/10/24  2236 03/09/24  2351 03/09/24  1701 03/08/24  1139   WBC 10*3/mm3 12.70* 10.20 5.70 8.20 9.80 12.09*   HEMOGLOBIN g/dL 9.2* 9.5* 9.4* 10.1* 11.3* 11.0*   HEMOGLOBIN, POC g/dL  --  9.5*  --   --   --   --    HEMATOCRIT % 28.0* 29.0* 28.9* 30.6* 34.0* 34.7*   HEMATOCRIT POC %  --  28*  --   --   --   --    PLATELETS 10*3/mm3 469* 503* 355 396 435 347     Results from last 7 days   Lab Units 03/12/24  0521 03/11/24  2104 03/10/24  2236 03/09/24  2351 03/09/24  1701   SODIUM mmol/L 136 135* 134* 131* 130*   POTASSIUM mmol/L 3.8 3.5 3.4* 4.0 4.5   CHLORIDE mmol/L 100 99 95* 95* 88*   CO2 mmol/L 27.0 26.0 28.0 26.0 26.0   BUN mg/dL 9 8 7* 8 11   CREATININE mg/dL 0.46* 0.43* 0.36* 0.40* 0.56*   GLUCOSE mg/dL 249* 137* 110* 182* 291*   ALBUMIN g/dL  --   --   --   --  3.1*   BILIRUBIN mg/dL  --   --   --   --  0.3   ALK PHOS U/L  --   --   --   --  118*   AST (SGOT) U/L  --   --   --   --  33   ALT (SGPT) U/L  --   --   --   --  50*   CALCIUM mg/dL 10.0 10.1 10.1 9.8 11.1*                 Microbiology   Microbiology Results (last 10 days)       Procedure Component Value - Date/Time    Body Fluid Culture - Body Fluid, Pleural Cavity [[id number]] Collected: 03/11/24 1840    Lab Status: Preliminary result Specimen: Body Fluid from Pleural Cavity Updated: 03/12/24 0649     Gram Stain Many (4+) WBCs per low power field      No organisms seen    AFB Culture - Tissue, Pleura [[id number]] Collected: 03/11/24 1839    Lab Status: Preliminary result Specimen: Tissue from Pleura Updated: 03/12/24 0836     AFB Stain No acid fast bacilli seen on concentrated smear    S. Pneumo Ag Urine or CSF - Urine, Urine, Clean Catch [[id number]]  (Normal) Collected: 03/10/24 2111    Lab Status: Final result Specimen: Urine, Clean Catch  Updated: 03/10/24 2256     Strep Pneumo Ag Negative    Respiratory Culture - Sputum, Cough [[id number]] Collected: 03/10/24 2102    Lab Status: Final result Specimen: Sputum from Cough Updated: 03/12/24 0941     Respiratory Culture Scant growth (1+) Normal respiratory dejon. No S. aureus or Pseudomonas aeruginosa detected. Final report.     Gram Stain Many (4+) WBCs per low power field      No Epithelial cells seen      Few (2+) Gram positive cocci in chains    MRSA Screen, PCR (Inpatient) - Swab, Nares [[id number]]  (Normal) Collected: 03/10/24 2056    Lab Status: Final result Specimen: Swab from Nares Updated: 03/10/24 2248     MRSA PCR No MRSA Detected    Narrative:      The negative predictive value of this diagnostic test is high and should only be used to consider de-escalating anti-MRSA therapy. A positive result may indicate colonization with MRSA and must be correlated clinically.    MRSA Screen, PCR (Inpatient) - Swab, Nares [[id number]]  (Normal) Collected: 03/09/24 2351    Lab Status: Final result Specimen: Swab from Nares Updated: 03/10/24 0103     MRSA PCR No MRSA Detected    Narrative:      The negative predictive value of this diagnostic test is high and should only be used to consider de-escalating anti-MRSA therapy. A positive result may indicate colonization with MRSA and must be correlated clinically.    Blood Culture - Blood, Arm, Right [[id number]]  (Normal) Collected: 03/09/24 1745    Lab Status: Preliminary result Specimen: Blood from Arm, Right Updated: 03/11/24 1900     Blood Culture No growth at 2 days    Blood Culture - Blood, Arm, Left [[id number]]  (Normal) Collected: 03/09/24 1701    Lab Status: Preliminary result Specimen: Blood from Arm, Left Updated: 03/11/24 1815     Blood Culture No growth at 2 days    Respiratory Panel PCR w/COVID-19(SARS-CoV-2) [facility] In-House, NP Swab in UTM/VTM, 2 HR TAT - Swab, Nasopharynx [[id number]]  (Normal) Collected: 03/09/24 1701    Lab  Status: Final result Specimen: Swab from Nasopharynx Updated: 03/09/24 1751     ADENOVIRUS, PCR Not Detected     Coronavirus 229E Not Detected     Coronavirus HKU1 Not Detected     Coronavirus NL63 Not Detected     Coronavirus OC43 Not Detected     COVID19 Not Detected     Human Metapneumovirus Not Detected     Human Rhinovirus/Enterovirus Not Detected     Influenza A PCR Not Detected     Influenza B PCR Not Detected     Parainfluenza Virus 1 Not Detected     Parainfluenza Virus 2 Not Detected     Parainfluenza Virus 3 Not Detected     Parainfluenza Virus 4 Not Detected     RSV, PCR Not Detected     Bordetella pertussis pcr Not Detected     Bordetella parapertussis PCR Not Detected     Chlamydophila pneumoniae PCR Not Detected     Mycoplasma pneumo by PCR Not Detected    Narrative:      In the setting of a positive respiratory panel with a viral infection PLUS a negative procalcitonin without other underlying concern for bacterial infection, consider observing off antibiotics or discontinuation of antibiotics and continue supportive care. If the respiratory panel is positive for atypical bacterial infection (Bordetella pertussis, Chlamydophila pneumoniae, or Mycoplasma pneumoniae), consider antibiotic de-escalation to target atypical bacterial infection.            Medication Review:       Schedule Meds  acetaminophen, 1,000 mg, Oral, TID  acetylcysteine, 3 mL, Nebulization, Q8H - RT  albuterol, 2.5 mg, Nebulization, Q8H - RT  [Held by provider] amLODIPine, 10 mg, Oral, Daily  atorvastatin, 40 mg, Oral, Daily  budesonide, 0.5 mg, Nebulization, BID - RT  cefepime, 2,000 mg, Intravenous, Q8H  [Held by provider] cloNIDine, 0.1 mg, Oral, BID  docusate sodium, 100 mg, Oral, BID  gabapentin, 300 mg, Oral, TID  guaiFENesin, 600 mg, Oral, Q12H  heparin (porcine), 5,000 Units, Subcutaneous, Q8H  [Held by provider] hydrALAZINE, 50 mg, Oral, Q8H  [Held by provider] lisinopril, 20 mg, Oral, Q24H   And  [Held by provider]  hydroCHLOROthiazide, 25 mg, Oral, Q24H  insulin lispro, 2-9 Units, Subcutaneous, 4x Daily AC & at Bedtime  ipratropium-albuterol, 3 mL, Nebulization, 4x Daily - RT  ketorolac, 15 mg, Intravenous, Q8H  [Held by provider] metoprolol tartrate, 150 mg, Oral, Q12H  metroNIDAZOLE, 500 mg, Intravenous, Q8H  polyethylene glycol, 17 g, Oral, Daily  vancomycin, 1,500 mg, Intravenous, Q12H  vitamin D3, 5,000 Units, Oral, Daily        Infusion Meds  dilTIAZem, 5-15 mg/hr, Last Rate: Stopped (03/12/24 0025)  lactated ringers, 40 mL/hr, Last Rate: 40 mL/hr (03/12/24 0023)  Pharmacy to Dose Cefepime,   Pharmacy to dose vancomycin,   phenylephrine, 0.5-3 mcg/kg/min, Last Rate: Stopped (03/12/24 0537)  propofol, 5-50 mcg/kg/min, Last Rate: 20 mcg/kg/min (03/12/24 0620)  sodium chloride, 100 mL/hr, Last Rate: 100 mL/hr (03/11/24 0929)        PRN Meds    benzonatate    dextrose    dextrose    glucagon (human recombinant)    HYDROmorphone    ipratropium-albuterol    nitroglycerin    ondansetron ODT **OR** ondansetron    oxyCODONE    Pharmacy to Dose Cefepime    Pharmacy to dose vancomycin    sodium chloride        Assessment & Plan       Antimicrobial Therapy   1.  IV vancomycin        2.  IV cefepime        3.  IV Flagyl        4.        5.            Assessment    Multifocal pneumonia with loculated fluid collection on the left pleural space concerning for empyema.  Patient is s/p decortication on March 11, 2024 and intraoperative cultures are pending.  The patient is currently on nasal cannula  Urine was negative for pneumococcal antigen     Rectosigmoid cancer-was recently on chemotherapy.  Oncology following patient does have a port     Type 2 diabetes-recent A1c is 7.4     Tobacco abuse     Plan     Continue IV vancomycin asked pharmacy to monitor dose  Continue IV cefepime 2 g every 8 hours  Continue IV Flagyl 500 mg every 8 hours  Waiting on intraoperative cultures including anaerobic cultures  Continue supportive care  A.m.  labs  Case was discussed with family at bedside    Luz Marina King MD  03/12/24  11:48 EDT    Note is dictated utilizing voice recognition software/Dragon

## 2024-03-12 NOTE — CONSULTS
[facility] Medicine Services  Consult Note    Inpatient Hospitalist Consult  Consult performed by: Hayley Mann MD  Consult ordered by: Demetrio Garcia DO          Date of Admission: 3/9/2024  Date of Consult: 03/12/24    Primary Care Physician: Lazaro Paulino MD  Chief Complaint/Reason for Consultation: medical management    Subjective   History of Present Illness  Patient is a 62 y/o male with a history of rectal cancer s/p partial colectomy and current chemotherapy, who was admitted on 3/9 for sepsis secondary to pneumonia and respiratory failure secondary to an empyema.  He was subsequently evaluated by pulmonary and then CT surgery, and had a decortication procedure done on 3/11 with two chest tubes placed.  Post procedure, the patient went into A-fib with RVR and a CXR showed complete opacification of the left hemithorax.  The patient was emergently intubated, and evaluated by cardiology9.  The patient was subsequently placed on his home metoprolol and recommended to start on anticoagulation if vascular and his oncologist cleared him.  The patient was evaluated by vascular surgery for incidental findings of a thoracic aneurysm, and recommended ASA and statin therapy.  ID was consulted for his empyema and recommended vancomycin, maxipime and flagyl.  The patient has shown improvement and a hospitalist consult was called for medical management.  Currently the patient is states he is doing well.  His breathing has improved.  He has not yet had a BM or passed gas.  No other complaints.    Review of Systems   Otherwise complete ROS is negative except as mentioned above.    Past Medical History:   Past Medical History:   Diagnosis Date    Aortic dissection     Diabetes mellitus     Heart murmur     History of noncompliance with medical treatment     Hyperlipidemia     Hypertension     Type B hypoplasia of aortic arch      Past Surgical History:  Past Surgical History:   Procedure  Laterality Date    COLECTOMY PARTIAL / TOTAL  2023    COLONOSCOPY N/A 08/31/2023    Procedure: COLONOSCOPY with sigmoid mass tattooing at 35cm, sigmoid and rectal polypectomy;  Surgeon: TORIBIO Jacob MD;  Location: [facility] ENDOSCOPY;  Service: Gastroenterology;  Laterality: N/A;  sigmoid mass, colon polyps     Social History:  reports that he has been smoking cigarettes. He started smoking about 53 years ago. He has a 106.4 pack-year smoking history. He has never used smokeless tobacco. He reports that he does not currently use drugs after having used the following drugs: Marijuana. He reports that he does not drink alcohol.    Family History: family history includes Dementia in his mother; Diabetes in his mother; Sudden death in his father.     Allergies: No Known Allergies  Medications: Scheduled Meds:acetaminophen, 1,000 mg, Oral, TID  acetylcysteine, 3 mL, Nebulization, Q8H - RT  [Held by provider] amLODIPine, 10 mg, Oral, Daily  atorvastatin, 40 mg, Oral, Daily  budesonide, 0.5 mg, Nebulization, BID - RT  cefepime, 2,000 mg, Intravenous, Q8H  [Held by provider] cloNIDine, 0.1 mg, Oral, BID  docusate sodium, 100 mg, Oral, BID  gabapentin, 300 mg, Oral, TID  guaiFENesin, 600 mg, Oral, Q12H  heparin (porcine), 5,000 Units, Subcutaneous, Q8H  [Held by provider] hydrALAZINE, 50 mg, Oral, Q8H  [Held by provider] lisinopril, 20 mg, Oral, Q24H   And  [Held by provider] hydroCHLOROthiazide, 25 mg, Oral, Q24H  insulin lispro, 2-9 Units, Subcutaneous, 4x Daily AC & at Bedtime  ipratropium-albuterol, 3 mL, Nebulization, 4x Daily - RT  Salvador, 1 packet, Oral, BID  ketorolac, 15 mg, Intravenous, Q8H  [Held by provider] metoprolol tartrate, 150 mg, Oral, Q12H  metroNIDAZOLE, 500 mg, Intravenous, Q8H  polyethylene glycol, 17 g, Oral, Daily  vancomycin, 1,500 mg, Intravenous, Q12H  vitamin D3, 5,000 Units, Oral, Daily      Continuous Infusions:lactated ringers, 40 mL/hr, Last Rate: 40 mL/hr (03/12/24 0023)  Pharmacy to Dose  Cefepime,   Pharmacy to dose vancomycin,       PRN Meds:.  benzonatate    dextrose    dextrose    glucagon (human recombinant)    HYDROmorphone    ipratropium-albuterol    nitroglycerin    ondansetron ODT **OR** ondansetron    oxyCODONE    Pharmacy to Dose Cefepime    Pharmacy to dose vancomycin    sodium chloride    Objective   Objective    Physical Exam:   Temp:  [94 °F (34.4 °C)-98.9 °F (37.2 °C)] 97.4 °F (36.3 °C)  Heart Rate:  [] 105  Resp:  [15-29] 20  BP: ()/(22-73) 122/57  FiO2 (%):  [40 %-70 %] 40 %  Physical Exam  General: NAD  HEENT: AT NC, EOMI  Neck: No JVD  Chest: chest tube x 2 on left side with sanguinous drainage  CVS: S1/S2 present, RRR, no M/R/G  Lungs: diminished on left side  Abdomen: soft, NT, ND, BS+  Ext: LUE in splint  Skin: no rashes, bruises or discolorations  Neuro: no focal deficits  Psych: Not agitated       Results Reviewed:  I have personally reviewed current lab, radiology, and data and agree with results.  Lab Results (last 24 hours)       Procedure Component Value Units Date/Time    POC Glucose Once [[id number]]  (Abnormal) Collected: 03/12/24 1655    Specimen: Blood Updated: 03/12/24 1656     Glucose 235 mg/dL      Comment: Serial Number: [id number]:  [id number]       Body Fluid Culture - Body Fluid, Pleural Cavity [[id number]] Collected: 03/11/24 1840    Specimen: Body Fluid from Pleural Cavity Updated: 03/12/24 1545     Body Fluid Culture No growth     Gram Stain Many (4+) WBCs per low power field      No organisms seen    Tissue Pathology Exam [[id number]] Collected: 03/11/24 1947    Specimen: Tissue from Pleura Updated: 03/12/24 1115    POC Glucose 4x Daily Before Meals & at Bedtime [[id number]]  (Abnormal) Collected: 03/12/24 1100    Specimen: Blood Updated: 03/12/24 1102     Glucose 245 mg/dL      Comment: Serial Number: [id number]:  [id number]       Blood Gas, Arterial - [[id number]] Collected: 03/12/24 1012    Specimen: Arterial Blood Updated: 03/12/24  1025     Site Arterial Line     Brennon's Test N/A     pH, Arterial 7.420 pH units      pCO2, Arterial 41.6 mm Hg      pO2, Arterial 85.4 mm Hg      HCO3, Arterial 27.0 mmol/L      Base Excess, Arterial 2.3 mmol/L      Comment: Serial Number: [id number]:  [id number]        O2 Saturation, Arterial 96.6 %      CO2 Content 28.3 mmol/L      Barometric Pressure for Blood Gas --     Comment: N/A        Modality Adult Vent     FIO2 40 %      Ventilator Mode CPAP/PS     PEEP 5     PSV 7 cmH2O      Hemodilution No    Respiratory Culture - Sputum, Cough [[id number]] Collected: 03/10/24 2102    Specimen: Sputum from Cough Updated: 03/12/24 0941     Respiratory Culture Scant growth (1+) Normal respiratory dejon. No S. aureus or Pseudomonas aeruginosa detected. Final report.     Gram Stain Many (4+) WBCs per low power field      No Epithelial cells seen      Few (2+) Gram positive cocci in chains    AFB Culture - Tissue, Pleura [[id number]] Collected: 03/11/24 1839    Specimen: Tissue from Pleura Updated: 03/12/24 0836     AFB Stain No acid fast bacilli seen on concentrated smear    POC Glucose 4x Daily Before Meals & at Bedtime [[id number]]  (Abnormal) Collected: 03/12/24 0722    Specimen: Blood Updated: 03/12/24 0725     Glucose 227 mg/dL      Comment: Serial Number: [id number]:  [id number]       Basic Metabolic Panel [[id number]]  (Abnormal) Collected: 03/12/24 0521    Specimen: Blood, Arterial Line Updated: 03/12/24 0557     Glucose 249 mg/dL      BUN 9 mg/dL      Creatinine 0.46 mg/dL      Sodium 136 mmol/L      Potassium 3.8 mmol/L      Chloride 100 mmol/L      CO2 27.0 mmol/L      Calcium 10.0 mg/dL      BUN/Creatinine Ratio 19.6     Anion Gap 9.0 mmol/L      eGFR 118.3 mL/min/1.73     Narrative:      GFR Normal >60  Chronic Kidney Disease <60  Kidney Failure <15      CBC & Differential [[id number]]  (Abnormal) Collected: 03/12/24 0521    Specimen: Blood, Arterial Line Updated: 03/12/24 0530    Narrative:      The following  orders were created for panel order CBC & Differential.  Procedure                               Abnormality         Status                     ---------                               -----------         ------                     CBC Auto Differential[[id number]]        Abnormal            Final result                 Please view results for these tests on the individual orders.    CBC Auto Differential [[id number]]  (Abnormal) Collected: 03/12/24 0521    Specimen: Blood, Arterial Line Updated: 03/12/24 0530     WBC 12.70 10*3/mm3      RBC 3.07 10*6/mm3      Hemoglobin 9.2 g/dL      Hematocrit 28.0 %      MCV 91.3 fL      MCH 30.1 pg      MCHC 32.9 g/dL      RDW 18.8 %      RDW-SD 63.9 fl      MPV 7.1 fL      Platelets 469 10*3/mm3      Neutrophil % 85.0 %      Lymphocyte % 3.4 %      Monocyte % 11.5 %      Eosinophil % 0.0 %      Basophil % 0.1 %      Neutrophils, Absolute 10.80 10*3/mm3      Lymphocytes, Absolute 0.40 10*3/mm3      Monocytes, Absolute 1.50 10*3/mm3      Eosinophils, Absolute 0.00 10*3/mm3      Basophils, Absolute 0.00 10*3/mm3      nRBC 0.1 /100 WBC     Blood Gas, Arterial - [[id number]]  (Abnormal) Collected: 03/11/24 2331    Specimen: Arterial Blood Updated: 03/11/24 2336     Site Arterial Line     Brennon's Test N/A     pH, Arterial 7.310 pH units      pCO2, Arterial 49.2 mm Hg      pO2, Arterial 175.1 mm Hg      HCO3, Arterial 24.8 mmol/L      Base Excess, Arterial -1.7 mmol/L      Comment: Serial Number: [id number]:  [id number]        O2 Saturation, Arterial 99.4 %      CO2 Content 26.3 mmol/L      Barometric Pressure for Blood Gas --     Comment: N/A        Modality Adult Vent     FIO2 70 %      Ventilator Mode AC     Set Tidal Volume 550     PEEP 8     Hemodilution No     Respiratory Rate 15    Blood Gas, Arterial - [[id number]]  (Abnormal) Collected: 03/11/24 2134    Specimen: Arterial Blood Updated: 03/11/24 2155     Site Arterial Line     Brennon's Test N/A     pH, Arterial 7.375 pH units       pCO2, Arterial 41.0 mm Hg      pO2, Arterial 46.5 mm Hg      HCO3, Arterial 23.9 mmol/L      Base Excess, Arterial -1.2 mmol/L      Comment: Serial Number: [id number]:  [id number]        O2 Saturation, Arterial 81.1 %      CO2 Content 25.2 mmol/L      Barometric Pressure for Blood Gas --     Comment: N/A        Modality BiPap     FIO2 100 %      PEEP 10     PSV 20 cmH2O      Hemodilution No    Basic Metabolic Panel [[id number]]  (Abnormal) Collected: 03/11/24 2104    Specimen: Blood Updated: 03/11/24 2134     Glucose 137 mg/dL      BUN 8 mg/dL      Creatinine 0.43 mg/dL      Sodium 135 mmol/L      Potassium 3.5 mmol/L      Comment: Slight hemolysis detected by analyzer. Result may be falsely elevated.        Chloride 99 mmol/L      CO2 26.0 mmol/L      Calcium 10.1 mg/dL      BUN/Creatinine Ratio 18.6     Anion Gap 10.0 mmol/L      eGFR 120.7 mL/min/1.73     Narrative:      GFR Normal >60  Chronic Kidney Disease <60  Kidney Failure <15      Magnesium [[id number]]  (Normal) Collected: 03/11/24 2104    Specimen: Blood Updated: 03/11/24 2134     Magnesium 1.9 mg/dL     CBC (No Diff) [[id number]]  (Abnormal) Collected: 03/11/24 2104    Specimen: Blood Updated: 03/11/24 2124     WBC 10.20 10*3/mm3      RBC 3.23 10*6/mm3      Hemoglobin 9.5 g/dL      Hematocrit 29.0 %      MCV 89.6 fL      MCH 29.3 pg      MCHC 32.6 g/dL      RDW 18.8 %      RDW-SD 62.1 fl      MPV 7.5 fL      Platelets 503 10*3/mm3     POC Chem 8, arterial (ISTAT) [[id number]]  (Abnormal) Collected: 03/11/24 2104    Specimen: Arterial Blood Updated: 03/11/24 2112     Ionized Calcium 1.46 mmol/L      pH, Arterial 7.330 pH units      pCO2, Arterial 47.7 mm Hg      pO2, Arterial 48.0 mm Hg      HCO3, Arterial 25.1 mmol/L      Base Excess, Arterial <0.0 mmol/L      Base Deficit --     O2 Saturation, Arterial 80.0 %      Glucose 144 mg/dL      Comment: Serial Number: [id number]:  [id number]        Sodium 135 mmol/L      POC Potassium 3.4 mmol/L      Hematocrit 28  %      Hemoglobin 9.5 g/dL      CO2 Content 27 mmol/L     POC Glucose Once [[id number]]  (Abnormal) Collected: 03/11/24 2103    Specimen: Blood Updated: 03/11/24 2105     Glucose 151 mg/dL      Comment: Serial Number: [id number]:  [id number]       Blood Culture - Blood, Arm, Right [[id number]]  (Normal) Collected: 03/09/24 1745    Specimen: Blood from Arm, Right Updated: 03/11/24 1900     Blood Culture No growth at 2 days    Anaerobic Culture - Tissue, Pleura [[id number]] Collected: 03/11/24 1839    Specimen: Tissue from Pleura Updated: 03/11/24 1855    Fungus Culture - Tissue, Pleura [[id number]] Collected: 03/11/24 1839    Specimen: Tissue from Pleura Updated: 03/11/24 1855    Blood Culture - Blood, Arm, Left [[id number]]  (Normal) Collected: 03/09/24 1701    Specimen: Blood from Arm, Left Updated: 03/11/24 1815     Blood Culture No growth at 2 days          Imaging Results (Last 24 Hours)       Procedure Component Value Units Date/Time    XR Chest 1 View [[id number]] Collected: 03/12/24 0916     Updated: 03/12/24 0923    Narrative:      XR CHEST 1 VW    Date of Exam: 3/12/2024 8:45 AM EDT    Indication: Post Op Lung Surgery    Comparison: March 12, 2024    Findings:  There are 2 thoracotomy tubes on the left. A pneumothorax not definitely confirmed. There is airspace disease in left basilar that could be secondary to pneumonia. Underlying effusion suspected. There is increasing density at the left base as compared to   the earlier study. Subpulmonic air noted on the earlier chest x-ray is not seen on the current exam. There is a port catheter noted with its tip in the superior vena cava. ET tube is present with its tip above the kristyn. Right lung seems relatively   clear.      Impression:      Impression:  1.Increasing density at the left lung base that could relate to some underlying consolidation and possibly effusion. There is additional airspace disease noted within the lingular area more interstitial in  nature. Findings of pneumonia were noted on the   more recent CT involving the left lung as well changes of probable empyema.      Electronically Signed: Santiago Tariq MD    3/12/2024 9:21 AM EDT    Workstation ID: [id number]    XR Chest 1 View [[id number]] Collected: 03/12/24 0055     Updated: 03/12/24 0059    Narrative:      XR CHEST 1 VW    Date of Exam: 3/12/2024 12:38 AM EDT    Indication: chest tube leak    Comparison: 3/11/2024.    Findings:  The heart appears mildly enlarged, stable as compared to the previous study. Right internal jugular Mediport with the tip in the proximal SVC. There are 2 left-sided chest tubes present. Suspected tiny left apical pneumothorax present with degree of   pleural separation measuring up to 8 mm. No additional pneumothorax identified. Patchy airspace disease is seen within the left lung base which appears to have improved as compared to the previous study. Probable small left-sided pleural effusion   present. Endotracheal tube present with the tip in the mid trachea. No significant air along the left chest wall.      Impression:      Impression:  Suspected tiny left apical pneumothorax with degree of pleural separation measuring up to 8 mm. 2 left-sided chest tubes present. No significant air along the left chest wall. Improving left basilar airspace disease.    Electronically Signed: Jenny Olmedo MD    3/12/2024 12:57 AM EDT    Workstation ID: [id number]    XR Chest 1 View [[id number]] Collected: 03/11/24 2208     Updated: 03/11/24 2217    Narrative:      XR CHEST 1 VW    Date of Exam: 3/11/2024 9:57 PM EDT    Indication: et tube placement    Comparison: Chest radiograph same date.    Findings:  Endotracheal tube tip overlies the midthoracic trachea with tip 6.3 cm above the kristyn. Unchanged position of right chest port and 2 left chest tubes. Cardiomediastinal silhouette is unchanged. Patchy airspace disease within the left mid/lower lung.   Right lung is clear. There may be a  trace left pneumothorax.      Impression:      Impression:  Endotracheal tube tip overlies the midthoracic trachea. Otherwise, similar postsurgical changes of the left chest with patchy airspace disease in the left mid/lower lung and suspected trace left pneumothorax, with 2 left chest tubes in place.      Electronically Signed: Darrion Neal MD    3/11/2024 10:15 PM EDT    Workstation ID: [id number]    XR Chest 1 View [[id number]] Collected: 03/11/24 2133     Updated: 03/11/24 2138    Narrative:      XR CHEST 1 VW    Date of Exam: 3/11/2024 9:16 PM EDT    Indication: Post Op Lung Surgery    Comparison: Chest radiograph same day.    Findings:  Postsurgical changes of the left chest with 2 left chest tubes in place. Right chest port tip unchanged in position. Cardiomediastinal silhouette is obscured. Partial opacification of the left hemithorax with some aerated lung in the apex. Right lung is   grossly clear. No significant pneumothorax is evident on this exam.       Impression:      Impression:  Postsurgical changes of the left chest with partial opacification of the left hemithorax and 2 left chest tubes in place.      Electronically Signed: Darrion Neal MD    3/11/2024 9:36 PM EDT    Workstation ID: [id number]            Assessment / Plan     1) acute respiratory failure with hypercapnia and hypoxia  - improved  - extubated 3/12, currently on bipap  - continue pulmicort, duonebs    2) left empyema  - ID on board, appreciate recs  - continue vancomycin, flagyl, maxipime  - CT surgery on board, appreciate recs  - s/p chest tube placement and VATS on 3/11    3) A-fib with RVR  - resolved  - cardiology on board, appreciate recs  - metoprolol  - start anticoagulation pending specialist clearance    4) hyponatremia  - resolved  - monitor with labs    5) HTN  - hold home meds for borderline BP    6) DM  - ISS, accuchecks, diet    7) HLD  - lipitor    8) GI/DVT ppx  - none/SCDs        Electronically signed by Hayley Mann  MD, 03/12/24, 17:01 EDT.

## 2024-03-13 ENCOUNTER — APPOINTMENT (OUTPATIENT)
Dept: GENERAL RADIOLOGY | Facility: HOSPITAL | Age: 64
DRG: 853 | End: 2024-03-13
Payer: MEDICARE

## 2024-03-13 LAB
ALBUMIN SERPL-MCNC: 2.6 G/DL (ref 3.5–5.2)
ALBUMIN/GLOB SERPL: 0.7 G/DL
ALP SERPL-CCNC: 125 U/L (ref 39–117)
ALT SERPL W P-5'-P-CCNC: 23 U/L (ref 1–41)
ANION GAP SERPL CALCULATED.3IONS-SCNC: 10 MMOL/L (ref 5–15)
AST SERPL-CCNC: 24 U/L (ref 1–40)
BASOPHILS # BLD AUTO: 0.08 10*3/MM3 (ref 0–0.2)
BASOPHILS NFR BLD AUTO: 0.6 % (ref 0–1.5)
BILIRUB SERPL-MCNC: 0.2 MG/DL (ref 0–1.2)
BUN SERPL-MCNC: 11 MG/DL (ref 8–23)
BUN/CREAT SERPL: 28.9 (ref 7–25)
CALCIUM SPEC-SCNC: 8.9 MG/DL (ref 8.6–10.5)
CHLORIDE SERPL-SCNC: 99 MMOL/L (ref 98–107)
CO2 SERPL-SCNC: 29 MMOL/L (ref 22–29)
CREAT SERPL-MCNC: 0.38 MG/DL (ref 0.76–1.27)
DEPRECATED RDW RBC AUTO: 59.6 FL (ref 37–54)
EGFRCR SERPLBLD CKD-EPI 2021: 125.3 ML/MIN/1.73
EOSINOPHIL # BLD AUTO: 0.01 10*3/MM3 (ref 0–0.4)
EOSINOPHIL NFR BLD AUTO: 0.1 % (ref 0.3–6.2)
ERYTHROCYTE [DISTWIDTH] IN BLOOD BY AUTOMATED COUNT: 17.1 % (ref 12.3–15.4)
GLOBULIN UR ELPH-MCNC: 3.5 GM/DL
GLUCOSE BLDC GLUCOMTR-MCNC: 232 MG/DL (ref 70–105)
GLUCOSE BLDC GLUCOMTR-MCNC: 257 MG/DL (ref 70–105)
GLUCOSE BLDC GLUCOMTR-MCNC: 314 MG/DL (ref 70–105)
GLUCOSE BLDC GLUCOMTR-MCNC: 354 MG/DL (ref 70–105)
GLUCOSE SERPL-MCNC: 307 MG/DL (ref 65–99)
HCT VFR BLD AUTO: 29.2 % (ref 37.5–51)
HGB BLD-MCNC: 8.9 G/DL (ref 13–17.7)
IMM GRANULOCYTES # BLD AUTO: 0.43 10*3/MM3 (ref 0–0.05)
IMM GRANULOCYTES NFR BLD AUTO: 3.4 % (ref 0–0.5)
LYMPHOCYTES # BLD AUTO: 1.86 10*3/MM3 (ref 0.7–3.1)
LYMPHOCYTES NFR BLD AUTO: 14.9 % (ref 19.6–45.3)
MCH RBC QN AUTO: 29.3 PG (ref 26.6–33)
MCHC RBC AUTO-ENTMCNC: 30.5 G/DL (ref 31.5–35.7)
MCV RBC AUTO: 96.1 FL (ref 79–97)
MONOCYTES # BLD AUTO: 1.34 10*3/MM3 (ref 0.1–0.9)
MONOCYTES NFR BLD AUTO: 10.7 % (ref 5–12)
NEUTROPHILS NFR BLD AUTO: 70.3 % (ref 42.7–76)
NEUTROPHILS NFR BLD AUTO: 8.75 10*3/MM3 (ref 1.7–7)
NRBC BLD AUTO-RTO: 0.2 /100 WBC (ref 0–0.2)
PLAT MORPH BLD: NORMAL
PLATELET # BLD AUTO: 440 10*3/MM3 (ref 140–450)
PMV BLD AUTO: 9.7 FL (ref 6–12)
POTASSIUM SERPL-SCNC: 2.9 MMOL/L (ref 3.5–5.2)
POTASSIUM SERPL-SCNC: 3.7 MMOL/L (ref 3.5–5.2)
PROT SERPL-MCNC: 6.1 G/DL (ref 6–8.5)
QT INTERVAL: 348 MS
QTC INTERVAL: 515 MS
RBC # BLD AUTO: 3.04 10*6/MM3 (ref 4.14–5.8)
RBC MORPH BLD: NORMAL
SODIUM SERPL-SCNC: 138 MMOL/L (ref 136–145)
VANCOMYCIN PEAK SERPL-MCNC: 21.9 MCG/ML (ref 20–40)
VANCOMYCIN SERPL-MCNC: 21.4 MCG/ML (ref 5–40)
VANCOMYCIN SERPL-MCNC: 6.2 MCG/ML (ref 5–40)
WBC MORPH BLD: NORMAL
WBC NRBC COR # BLD AUTO: 12.47 10*3/MM3 (ref 3.4–10.8)

## 2024-03-13 PROCEDURE — 97530 THERAPEUTIC ACTIVITIES: CPT

## 2024-03-13 PROCEDURE — 71045 X-RAY EXAM CHEST 1 VIEW: CPT

## 2024-03-13 PROCEDURE — 25010000002 AMIODARONE IN DEXTROSE 5% 360-4.14 MG/200ML-% SOLUTION: Performed by: INTERNAL MEDICINE

## 2024-03-13 PROCEDURE — 82948 REAGENT STRIP/BLOOD GLUCOSE: CPT

## 2024-03-13 PROCEDURE — 85007 BL SMEAR W/DIFF WBC COUNT: CPT | Performed by: SURGERY

## 2024-03-13 PROCEDURE — 97112 NEUROMUSCULAR REEDUCATION: CPT

## 2024-03-13 PROCEDURE — 80202 ASSAY OF VANCOMYCIN: CPT | Performed by: INTERNAL MEDICINE

## 2024-03-13 PROCEDURE — 25010000002 METRONIDAZOLE 500 MG/100ML SOLUTION: Performed by: INTERNAL MEDICINE

## 2024-03-13 PROCEDURE — 94799 UNLISTED PULMONARY SVC/PX: CPT

## 2024-03-13 PROCEDURE — 99024 POSTOP FOLLOW-UP VISIT: CPT | Performed by: NURSE PRACTITIONER

## 2024-03-13 PROCEDURE — 63710000001 INSULIN LISPRO (HUMAN) PER 5 UNITS: Performed by: INTERNAL MEDICINE

## 2024-03-13 PROCEDURE — 99233 SBSQ HOSP IP/OBS HIGH 50: CPT | Performed by: INTERNAL MEDICINE

## 2024-03-13 PROCEDURE — 25010000002 HEPARIN (PORCINE) PER 1000 UNITS: Performed by: SURGERY

## 2024-03-13 PROCEDURE — 93010 ELECTROCARDIOGRAM REPORT: CPT | Performed by: INTERNAL MEDICINE

## 2024-03-13 PROCEDURE — 25010000002 AMIODARONE IN DEXTROSE 5% 150-4.21 MG/100ML-% SOLUTION: Performed by: INTERNAL MEDICINE

## 2024-03-13 PROCEDURE — 25810000003 SODIUM CHLORIDE 0.9 % SOLUTION: Performed by: SURGERY

## 2024-03-13 PROCEDURE — 25010000002 CEFEPIME PER 500 MG: Performed by: SURGERY

## 2024-03-13 PROCEDURE — 97167 OT EVAL HIGH COMPLEX 60 MIN: CPT

## 2024-03-13 PROCEDURE — 85025 COMPLETE CBC W/AUTO DIFF WBC: CPT | Performed by: SURGERY

## 2024-03-13 PROCEDURE — 97110 THERAPEUTIC EXERCISES: CPT

## 2024-03-13 PROCEDURE — 97116 GAIT TRAINING THERAPY: CPT

## 2024-03-13 PROCEDURE — 25010000002 KETOROLAC TROMETHAMINE PER 15 MG: Performed by: SURGERY

## 2024-03-13 PROCEDURE — 63710000001 INSULIN LISPRO (HUMAN) PER 5 UNITS: Performed by: SURGERY

## 2024-03-13 PROCEDURE — 80053 COMPREHEN METABOLIC PANEL: CPT | Performed by: INTERNAL MEDICINE

## 2024-03-13 PROCEDURE — 84132 ASSAY OF SERUM POTASSIUM: CPT | Performed by: INTERNAL MEDICINE

## 2024-03-13 PROCEDURE — 80202 ASSAY OF VANCOMYCIN: CPT | Performed by: SURGERY

## 2024-03-13 PROCEDURE — 25010000002 VANCOMYCIN 10 G RECONSTITUTED SOLUTION: Performed by: SURGERY

## 2024-03-13 PROCEDURE — 63710000001 INSULIN GLARGINE PER 5 UNITS: Performed by: INTERNAL MEDICINE

## 2024-03-13 PROCEDURE — 82948 REAGENT STRIP/BLOOD GLUCOSE: CPT | Performed by: SURGERY

## 2024-03-13 RX ORDER — ACETYLCYSTEINE 200 MG/ML
3 SOLUTION ORAL; RESPIRATORY (INHALATION)
Status: COMPLETED | OUTPATIENT
Start: 2024-03-13 | End: 2024-03-15

## 2024-03-13 RX ORDER — GUAIFENESIN 600 MG/1
1200 TABLET, EXTENDED RELEASE ORAL EVERY 12 HOURS SCHEDULED
Status: DISCONTINUED | OUTPATIENT
Start: 2024-03-13 | End: 2024-03-17 | Stop reason: HOSPADM

## 2024-03-13 RX ORDER — POTASSIUM CHLORIDE 20 MEQ/1
40 TABLET, EXTENDED RELEASE ORAL EVERY 4 HOURS
Status: COMPLETED | OUTPATIENT
Start: 2024-03-13 | End: 2024-03-13

## 2024-03-13 RX ORDER — INSULIN LISPRO 100 [IU]/ML
7 INJECTION, SOLUTION INTRAVENOUS; SUBCUTANEOUS
Status: DISCONTINUED | OUTPATIENT
Start: 2024-03-13 | End: 2024-03-17 | Stop reason: HOSPADM

## 2024-03-13 RX ADMIN — GABAPENTIN 300 MG: 300 CAPSULE ORAL at 21:08

## 2024-03-13 RX ADMIN — Medication 1 PACKET: at 12:39

## 2024-03-13 RX ADMIN — OXYCODONE 5 MG: 5 TABLET ORAL at 17:16

## 2024-03-13 RX ADMIN — INSULIN GLARGINE 10 UNITS: 100 INJECTION, SOLUTION SUBCUTANEOUS at 21:07

## 2024-03-13 RX ADMIN — BUDESONIDE 0.5 MG: 0.5 INHALANT RESPIRATORY (INHALATION) at 08:50

## 2024-03-13 RX ADMIN — INSULIN LISPRO 8 UNITS: 100 INJECTION, SOLUTION INTRAVENOUS; SUBCUTANEOUS at 12:26

## 2024-03-13 RX ADMIN — OXYCODONE 5 MG: 5 TABLET ORAL at 08:30

## 2024-03-13 RX ADMIN — CEFEPIME 2000 MG: 2 INJECTION, POWDER, FOR SOLUTION INTRAVENOUS at 18:59

## 2024-03-13 RX ADMIN — KETOROLAC TROMETHAMINE 15 MG: 30 INJECTION INTRAMUSCULAR; INTRAVENOUS at 21:08

## 2024-03-13 RX ADMIN — AMIODARONE HYDROCHLORIDE 0.5 MG/MIN: 1.8 INJECTION, SOLUTION INTRAVENOUS at 17:55

## 2024-03-13 RX ADMIN — IPRATROPIUM BROMIDE 0.5 MG: 0.5 SOLUTION RESPIRATORY (INHALATION) at 14:37

## 2024-03-13 RX ADMIN — ATORVASTATIN CALCIUM 40 MG: 40 TABLET, FILM COATED ORAL at 08:30

## 2024-03-13 RX ADMIN — DOCUSATE SODIUM 100 MG: 100 CAPSULE, LIQUID FILLED ORAL at 08:30

## 2024-03-13 RX ADMIN — POTASSIUM CHLORIDE 40 MEQ: 1500 TABLET, EXTENDED RELEASE ORAL at 17:54

## 2024-03-13 RX ADMIN — HEPARIN SODIUM 5000 UNITS: 5000 INJECTION, SOLUTION INTRAVENOUS; SUBCUTANEOUS at 21:08

## 2024-03-13 RX ADMIN — IPRATROPIUM BROMIDE 0.5 MG: 0.5 SOLUTION RESPIRATORY (INHALATION) at 19:03

## 2024-03-13 RX ADMIN — AMIODARONE HYDROCHLORIDE 1 MG/MIN: 1.8 INJECTION, SOLUTION INTRAVENOUS at 05:12

## 2024-03-13 RX ADMIN — BUDESONIDE 0.5 MG: 0.5 INHALANT RESPIRATORY (INHALATION) at 19:08

## 2024-03-13 RX ADMIN — INSULIN LISPRO 7 UNITS: 100 INJECTION, SOLUTION INTRAVENOUS; SUBCUTANEOUS at 17:54

## 2024-03-13 RX ADMIN — CEFEPIME 2000 MG: 2 INJECTION, POWDER, FOR SOLUTION INTRAVENOUS at 00:25

## 2024-03-13 RX ADMIN — HEPARIN SODIUM 5000 UNITS: 5000 INJECTION, SOLUTION INTRAVENOUS; SUBCUTANEOUS at 13:59

## 2024-03-13 RX ADMIN — INSULIN LISPRO 4 UNITS: 100 INJECTION, SOLUTION INTRAVENOUS; SUBCUTANEOUS at 21:07

## 2024-03-13 RX ADMIN — INSULIN LISPRO 6 UNITS: 100 INJECTION, SOLUTION INTRAVENOUS; SUBCUTANEOUS at 17:11

## 2024-03-13 RX ADMIN — ACETAMINOPHEN 1000 MG: 500 TABLET, FILM COATED ORAL at 21:08

## 2024-03-13 RX ADMIN — METRONIDAZOLE 500 MG: 500 INJECTION, SOLUTION INTRAVENOUS at 00:25

## 2024-03-13 RX ADMIN — KETOROLAC TROMETHAMINE 15 MG: 30 INJECTION INTRAMUSCULAR; INTRAVENOUS at 12:25

## 2024-03-13 RX ADMIN — METRONIDAZOLE 500 MG: 500 INJECTION, SOLUTION INTRAVENOUS at 19:00

## 2024-03-13 RX ADMIN — POLYETHYLENE GLYCOL 3350 17 G: 17 POWDER, FOR SOLUTION ORAL at 08:30

## 2024-03-13 RX ADMIN — KETOROLAC TROMETHAMINE 15 MG: 30 INJECTION INTRAMUSCULAR; INTRAVENOUS at 04:51

## 2024-03-13 RX ADMIN — POTASSIUM CHLORIDE 40 MEQ: 1500 TABLET, EXTENDED RELEASE ORAL at 10:02

## 2024-03-13 RX ADMIN — ACETYLCYSTEINE 3 ML: 200 SOLUTION ORAL; RESPIRATORY (INHALATION) at 14:36

## 2024-03-13 RX ADMIN — INSULIN LISPRO 7 UNITS: 100 INJECTION, SOLUTION INTRAVENOUS; SUBCUTANEOUS at 08:30

## 2024-03-13 RX ADMIN — Medication 1500 MG: at 04:51

## 2024-03-13 RX ADMIN — ACETYLCYSTEINE 3 ML: 200 SOLUTION ORAL; RESPIRATORY (INHALATION) at 08:55

## 2024-03-13 RX ADMIN — GUAIFENESIN 1200 MG: 600 TABLET, EXTENDED RELEASE ORAL at 08:30

## 2024-03-13 RX ADMIN — GUAIFENESIN 1200 MG: 600 TABLET, EXTENDED RELEASE ORAL at 21:08

## 2024-03-13 RX ADMIN — Medication 5000 UNITS: at 08:30

## 2024-03-13 RX ADMIN — CEFEPIME 2000 MG: 2 INJECTION, POWDER, FOR SOLUTION INTRAVENOUS at 10:32

## 2024-03-13 RX ADMIN — DOCUSATE SODIUM 100 MG: 100 CAPSULE, LIQUID FILLED ORAL at 21:08

## 2024-03-13 RX ADMIN — INSULIN LISPRO 7 UNITS: 100 INJECTION, SOLUTION INTRAVENOUS; SUBCUTANEOUS at 13:59

## 2024-03-13 RX ADMIN — GABAPENTIN 300 MG: 300 CAPSULE ORAL at 17:10

## 2024-03-13 RX ADMIN — ACETYLCYSTEINE 3 ML: 200 SOLUTION ORAL; RESPIRATORY (INHALATION) at 19:03

## 2024-03-13 RX ADMIN — POTASSIUM CHLORIDE 40 MEQ: 1500 TABLET, EXTENDED RELEASE ORAL at 12:30

## 2024-03-13 RX ADMIN — IPRATROPIUM BROMIDE AND ALBUTEROL SULFATE 3 ML: .5; 3 SOLUTION RESPIRATORY (INHALATION) at 08:47

## 2024-03-13 RX ADMIN — AMIODARONE HYDROCHLORIDE 150 MG: 1.5 INJECTION, SOLUTION INTRAVENOUS at 04:51

## 2024-03-13 RX ADMIN — ACETAMINOPHEN 1000 MG: 500 TABLET, FILM COATED ORAL at 08:30

## 2024-03-13 RX ADMIN — HEPARIN SODIUM 5000 UNITS: 5000 INJECTION, SOLUTION INTRAVENOUS; SUBCUTANEOUS at 04:51

## 2024-03-13 RX ADMIN — METRONIDAZOLE 500 MG: 500 INJECTION, SOLUTION INTRAVENOUS at 10:32

## 2024-03-13 RX ADMIN — Medication 10 ML: at 21:10

## 2024-03-13 RX ADMIN — ACETAMINOPHEN 1000 MG: 500 TABLET, FILM COATED ORAL at 17:10

## 2024-03-13 RX ADMIN — GABAPENTIN 300 MG: 300 CAPSULE ORAL at 08:30

## 2024-03-13 RX ADMIN — Medication 1500 MG: at 17:20

## 2024-03-13 RX ADMIN — Medication 1 PACKET: at 21:10

## 2024-03-13 RX ADMIN — AMIODARONE HYDROCHLORIDE 1 MG/MIN: 1.8 INJECTION, SOLUTION INTRAVENOUS at 10:31

## 2024-03-13 NOTE — PLAN OF CARE
Goal Outcome Evaluation:  Plan of Care Reviewed With: patient, spouse        Progress: declining  Outcome Evaluation: Pt is a 64 y/o M admitted to [facility] 3/11/24 with c/o Hospital dx sepsis, empyema, PNA. PMHx significant for HTN, hx rectal cancer s/p surgery prior to starting his current chemo program, and DMII. Pt currently taking chemotherapy for the past 6 months and is down to his final 4 treatments. Pt with severe SOA upon admission and O2 saturations at 81% in ER. Pt states he lives at home with spouse and was independent until about 3 weeks ago when he began using a RW in the home and his spouse was needing to assist him to the shower up a flight of stairs. There are 6 steps to enter home with a rail and a flight of steps to shower. Pt has had decortication and now has 4 chest tubes and his O2 requirements have increased from 6 to 12L since yesterday. His HR is aleo very high due to his uncontroleld Afib. Pt is needing some more asssit at this time to come to stand, for LB ADl and toileting. Pt does not appear to be functioning well enough to go home with his sposue. Pt reports if he could mobilize short distances and take himself to the toilet as well as get himself in and out of bed without assist he would feel more comfortable going home. Spouse agrees and has concerns for her ability to keep him safe at home at this time. OT recommends IP pulmonary rehab at d/c.      Anticipated Discharge Disposition (OT): inpatient rehabilitation facility

## 2024-03-13 NOTE — CONSULTS
THORACIC SURGERY INPATIENT CONSULT NOTE    REASON FOR CONSULT: Left loculated pleural effusion, trapped lung    REFERRING PROVIDER: Dr. Chen    Subjective   HISTORY OF PRESENTING ILLNESS:   Prashant Zohu is a 63-year-old male with a medical history of rectal cance s/p partial colectomy, , currently undergoing chemotherapy, diabetes mellitus type 2, hypertension, hyperlipidemia, thoracic aortic aneurysm secondary to hypertension.  He was seen by Dr. Wagner in February 2024 after CT scan of the chest which reported left lower lobe lung mass/ consolidation.  He was treated on outpatient basis with oral antibiotics.      He presented to [facility] on 3/9/2024 with complaints of shortness of breath, cough and congestion. He denied any fevers chills or diaphoresis.  He reported cough productive of yellow sputum.  His oxygen saturation was 81% in ED and was placed on 2 L oxygen high flow nasal cannula.  He denied using supplemental home oxygen.  CT scan of the chest was done which reported loculated gas and fluid collection within the left pleural space with associated thickening of the pleural wall, which likely represents empyema. Thoracic surgery was consulted for further evaluation.      Past Medical History:   Diagnosis Date    Aortic dissection     Diabetes mellitus     Heart murmur     History of noncompliance with medical treatment     Hyperlipidemia     Hypertension     Type B hypoplasia of aortic arch        Past Surgical History:   Procedure Laterality Date    COLECTOMY PARTIAL / TOTAL  2023    COLONOSCOPY N/A 08/31/2023    Procedure: COLONOSCOPY with sigmoid mass tattooing at 35cm, sigmoid and rectal polypectomy;  Surgeon: TORIBIO Jacob MD;  Location: [facility] ENDOSCOPY;  Service: Gastroenterology;  Laterality: N/A;  sigmoid mass, colon polyps       Family History   Problem Relation Age of Onset    Diabetes Mother     Dementia Mother     Sudden death Father        Social History      Socioeconomic History    Marital status:    Tobacco Use    Smoking status: Every Day     Current packs/day: 2.00     Average packs/day: 2.0 packs/day for 53.2 years (106.4 ttl pk-yrs)     Types: Cigarettes     Start date: 1971    Smokeless tobacco: Never   Vaping Use    Vaping status: Never Used   Substance and Sexual Activity    Alcohol use: No     Comment: Abstinent since around 2008    Drug use: Not Currently     Types: Marijuana     Comment: Abstinent since at least the 1980's    Sexual activity: Defer         Current Facility-Administered Medications:     acetaminophen (TYLENOL) tablet 1,000 mg, 1,000 mg, Oral, TID, Saqib Keller MD, 1,000 mg at 03/13/24 0830    acetylcysteine (MUCOMYST) 20 % nebulizer solution 3 mL, 3 mL, Nebulization, Q8H - RT, Saqib Keller MD, 3 mL at 03/13/24 0855    [COMPLETED] amiodarone 150 mg in 100 mL D5W (loading dose), 150 mg, Intravenous, Once, 150 mg at 03/13/24 0451 **FOLLOWED BY** amiodarone 360 mg in 200 mL D5W infusion, 1 mg/min, Intravenous, Continuous, Last Rate: 33.3 mL/hr at 03/13/24 0536, 1 mg/min at 03/13/24 0536 **FOLLOWED BY** amiodarone 360 mg in 200 mL D5W infusion, 0.5 mg/min, Intravenous, Continuous, Dixon Chen MD    [Held by provider] amLODIPine (NORVASC) tablet 10 mg, 10 mg, Oral, Daily, Saqib Keller MD, 10 mg at 03/11/24 0929    atorvastatin (LIPITOR) tablet 40 mg, 40 mg, Oral, Daily, Saqib Keller MD, 40 mg at 03/13/24 0830    benzonatate (TESSALON) capsule 100 mg, 100 mg, Oral, TID PRN, Saqib Keller MD, 100 mg at 03/09/24 2350    budesonide (PULMICORT) nebulizer solution 0.5 mg, 0.5 mg, Nebulization, BID - RT, Saqib Keller MD, 0.5 mg at 03/13/24 0850    Calcium Replacement - Follow Nurse / BPA Driven Protocol, , Does not apply, PRN, Michael Banegas MD    cefepime 2000 mg IVPB in 100 mL NS (MBP), 2,000 mg, Intravenous, Q8H, GulSaqib MD, 2,000 mg at 03/13/24 0025    [Held by provider] cloNIDine (CATAPRES) tablet 0.1 mg, 0.1 mg, Oral,  BID, Saqib Keller MD, 0.1 mg at 03/11/24 0930    dextrose (D50W) (25 g/50 mL) IV injection 25 g, 25 g, Intravenous, Q15 Min PRN, Saqib Keller MD    dextrose (GLUTOSE) oral gel 15 g, 15 g, Oral, Q15 Min PRN, Saqib Keller MD    docusate sodium (COLACE) capsule 100 mg, 100 mg, Oral, BID, Saqib Keller MD, 100 mg at 03/13/24 0830    gabapentin (NEURONTIN) capsule 300 mg, 300 mg, Oral, TID, Saqib Keller MD, 300 mg at 03/13/24 0830    glucagon (GLUCAGEN) injection 1 mg, 1 mg, Intramuscular, Q15 Min PRN, Saqib Keller MD    guaiFENesin (MUCINEX) 12 hr tablet 1,200 mg, 1,200 mg, Oral, Q12H, Veto Barraza MD, 1,200 mg at 03/13/24 0830    heparin (porcine) 5000 UNIT/ML injection 5,000 Units, 5,000 Units, Subcutaneous, Q8H, Saqib Keller MD, 5,000 Units at 03/13/24 0451    [Held by provider] hydrALAZINE (APRESOLINE) tablet 50 mg, 50 mg, Oral, Q8H, Saqib Keller MD, 50 mg at 03/11/24 0519    [Held by provider] lisinopril (PRINIVIL,ZESTRIL) tablet 20 mg, 20 mg, Oral, Q24H, 20 mg at 03/11/24 0930 **AND** [Held by provider] hydroCHLOROthiazide tablet 25 mg, 25 mg, Oral, Q24H, Saqib Keller MD, 25 mg at 03/11/24 0930    HYDROmorphone (DILAUDID) injection 0.5 mg, 0.5 mg, Intravenous, Q2H PRN, Saqib Keller MD, 0.5 mg at 03/12/24 2038    insulin lispro (HUMALOG/ADMELOG) injection 2-9 Units, 2-9 Units, Subcutaneous, 4x Daily AC & at Bedtime, Saqib Keller MD, 7 Units at 03/13/24 0830    ipratropium-albuterol (DUO-NEB) nebulizer solution 3 mL, 3 mL, Nebulization, Q4H PRN, Saqib Keller MD, 3 mL at 03/12/24 2349    ipratropium-albuterol (DUO-NEB) nebulizer solution 3 mL, 3 mL, Nebulization, 4x Daily - RT, Saqib Keller MD, 3 mL at 03/13/24 0847    Salvador pack 1 packet, 1 packet, Oral, BID, Amisha Zhou RD, 1 packet at 03/12/24 2029    ketorolac (TORADOL) injection 15 mg, 15 mg, Intravenous, Q8H, Saqib Keller MD, 15 mg at 03/13/24 0451    lactated ringers infusion, 40 mL/hr, Intravenous, Continuous, Saqib Keller MD, Last Rate: 40 mL/hr at  "03/12/24 0023, 40 mL/hr at 03/12/24 0023    Magnesium Standard Dose Replacement - Follow Nurse / BPA Driven Protocol, , Does not apply, Bassam SEALS Bassem, MD    [Held by provider] metoprolol tartrate (LOPRESSOR) tablet 150 mg, 150 mg, Oral, Q12H, Saqib Keller MD, 150 mg at 03/11/24 0703    metroNIDAZOLE (FLAGYL) IVPB 500 mg, 500 mg, Intravenous, Q8H, Luz Marina King MD, Last Rate: 200 mL/hr at 03/13/24 0025, 500 mg at 03/13/24 0025    nitroglycerin (NITROSTAT) SL tablet 0.4 mg, 0.4 mg, Sublingual, Q5 Min PRN, Saqib Keller MD    ondansetron ODT (ZOFRAN-ODT) disintegrating tablet 4 mg, 4 mg, Oral, Q6H PRN **OR** ondansetron (ZOFRAN) injection 4 mg, 4 mg, Intravenous, Q6H PRN, Saqib Keller MD    oxyCODONE (ROXICODONE) immediate release tablet 5 mg, 5 mg, Oral, Q4H PRN, Saqib Keller MD, 5 mg at 03/13/24 0830    Pharmacy to dose vancomycin, , Does not apply, Continuous PRNArianna Nabeel, MD    Phosphorus Replacement - Follow Nurse / BPA Driven Protocol, , Does not apply, PRBassam FITZPATRICK Bassem, MD    polyethylene glycol (MIRALAX) packet 17 g, 17 g, Oral, Daily, Saqib Keller MD, 17 g at 03/13/24 0830    potassium chloride (KLOR-CON M20) CR tablet 40 mEq, 40 mEq, Oral, Q4H, Michael Banegas MD    Potassium Replacement - Follow Nurse / BPA Driven Protocol, , Does not apply, Bassam SEALS Bassem, MD    sodium chloride 0.9 % flush 10 mL, 10 mL, Intravenous, PRN, Saqib Keller MD, 10 mL at 03/12/24 2029    vancomycin IVPB 1500 mg in 0.9% NaCl (Premix) 500 mL, 1,500 mg, Intravenous, Q12H, Saqib Keller MD, Last Rate: 333.3 mL/hr at 03/13/24 0451, 1,500 mg at 03/13/24 0451    vitamin D3 capsule 5,000 Units, 5,000 Units, Oral, Daily, Saqib Keller MD, 5,000 Units at 03/13/24 0830     No Known Allergies          Objective    OBJECTIVE:     VITAL SIGNS:  /68   Pulse (!) 147   Temp 97.7 °F (36.5 °C) (Oral)   Resp 24   Ht 190.5 cm (75\")   Wt 89.4 kg (197 lb)   SpO2 99%   BMI 24.62 kg/m²     PHYSICAL EXAM:  Normal " appearance.   Head is normocephalic.   Nose appears normal.   No obvious deformity of the mouth and throat.  Conjunctivae normal.   Heart rate and rhythm is normal.  Pulmonary effort is normal.   Moving all 4 extremities.  Extremities warm.  No focal deficit present.   He is alert and oriented to person, place, and time.     LAB RESULTS:  I have reviewed all the available laboratory results in the chart.    RESULTS REVIEW:  I have reviewed the patient's all relevant laboratory and imaging findings.           ASSESSMENT & PLAN:  Prashant Zhou is a 63 y.o. male with significant medical conditions as mentioned above presented to the hospital with problem mentioned in history of presenting illness.    His clinical presentation is concerning for left empyema thoracis.  I discussed with him the option of conservative management versus surgery.  He has a thick rind around the left lower lobe and I believe that conservative medical management with chest tube and tPA will not expand the lung.  He agreed to proceed with surgery.    I discussed the patients findings and my recommendations with the patient. The patient was given adequate time to ask questions and all questions were answered to patient satisfaction.     Saqib Keller MD  Thoracic Surgeon  [facility]        Dictated utilizing Dragon dictation    I spent 60 minutes caring for Prashant on this date of service. This time includes time spent by me in the following activities: reviewing tests, obtaining and/or reviewing a separately obtained history, performing a medically appropriate examination and/or evaluation , counseling and educating the patient/family/caregiver, ordering medications, tests, or procedures, referring and communicating with other health care professionals , documenting information in the medical record, independently interpreting results and communicating that information with the patient/family/caregiver, and care  coordination and more than half the time was spent in direct face to face evaluation and decision making.

## 2024-03-13 NOTE — THERAPY EVALUATION
Patient Name: Prashant Zhou  : 1960    MRN: [id number]                              Today's Date: 3/13/2024       Admit Date: 3/9/2024    Visit Dx:     ICD-10-CM ICD-9-CM   1. Pneumonia due to infectious organism, unspecified laterality, unspecified part of lung  J18.9 486   2. 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    Dehydration    Sepsis    Pneumonia    Empyema lung    Acute respiratory failure with hypoxia    Hyponatremia    Thoracic aortic aneurysm without rupture    Aortic thrombus    Tobacco use     Past Medical History:   Diagnosis Date    Aortic dissection     Diabetes mellitus     Heart murmur     History of noncompliance with medical treatment     Hyperlipidemia     Hypertension     Type B hypoplasia of aortic arch      Past Surgical History:   Procedure Laterality Date    COLECTOMY PARTIAL / TOTAL      COLONOSCOPY N/A 2023    Procedure: COLONOSCOPY with sigmoid mass tattooing at 35cm, sigmoid and rectal polypectomy;  Surgeon: TORIBIO Jacob MD;  Location: [facility] ENDOSCOPY;  Service: Gastroenterology;  Laterality: N/A;  sigmoid mass, colon polyps    THORACOSCOPY WITH DECORTICATION Left 3/11/2024    Procedure: THORACOSCOPY VIDEO ASSISTED WITH DECORTICATION WITH DAVINCI ROBOT;  Surgeon: Saqib Keller MD;  Location: [facility] MAIN OR;  Service: Robotics - DaVinci;  Laterality: Left;      General Information       Row Name 24 1310          General Information    Prior Level of Function independent:;ADL's;all household mobility  pt states he uses RW the past few weeks  "and spouse has assisted him to/from the shower but he was still dressing, toileting self & prior to this he was (I) except for driving and many IADL. Had surgery then 6 mos of chemo. is down to last 4 treatments.  -     Existing Precautions/Restrictions oxygen therapy device and L/min  is now on 11-12L O2 via NC. Afib is uncontrolled this am. HR in the 150's at reat & 160's in standing.  -     Barriers to Rehab medically complex  -       Row Name 03/13/24 1310          Living Environment    People in Home spouse  -       Row Name 03/13/24 1310          Home Main Entrance    Number of Stairs, Main Entrance six  -     Stair Railings, Main Entrance railings safe and in good condition  -       Row Name 03/13/24 1310          Stairs Within Home, Primary    Number of Stairs, Within Home, Primary twelve  upstairs shower  -       Row Name 03/13/24 1310          Cognition    Orientation Status (Cognition) oriented x 4  -       Row Name 03/13/24 1310          Safety Issues, Functional Mobility    Impairments Affecting Function (Mobility) endurance/activity tolerance;strength;balance;shortness of breath  -               User Key  (r) = Recorded By, (t) = Taken By, (c) = Cosigned By      Initials Name Provider Type     Marlene Vieira, OT Occupational Therapist                     Mobility/ADL's       Row Name 03/13/24 1315          Bed Mobility    Bed Mobility sit-supine;scooting/bridging  -     Scooting/Bridging [place] (Bed Mobility) minimum assist (75% patient effort)  -     Sit-Supine [place] (Bed Mobility) minimum assist (75% patient effort)  -     Comment, (Bed Mobility) hips weak. Pt states, \"If I could just get to where I can swing my legs up into the bed on my own...\"  -       Row Name 03/13/24 1315          Transfers    Transfers sit-stand transfer;bed-chair transfer  -       Row Name 03/13/24 1315          Bed-Chair Transfer    Bed-Chair [place] (Transfers) minimum assist " (75% patient effort)  -     Assistive Device (Bed-Chair Transfers) walker, front-wheeled  -       Row Name 03/13/24 1315          Sit-Stand Transfer    Sit-Stand [place] (Transfers) 2 person assist;minimum assist (75% patient effort);1 person to manage equipment;moderate assist (50% patient effort)  -     Assistive Device (Sit-Stand Transfers) walker, front-wheeled  -       Row Name 03/13/24 1315          Functional Mobility    Functional Mobility- Comment took steps several feet from chair then to HOB side-stepping. Pt really wanted to walk but HR was concerning.  -       Row Name 03/13/24 1315          Activities of Daily Living    BADL Assessment/Intervention lower body dressing  -       Row Name 03/13/24 1315          Lower Body Dressing Assessment/Training    [place] Level (Lower Body Dressing) lower body dressing skills;maximum assist (25% patient effort)  -     Comment, (Lower Body Dressing) pt reports it is new that kim get to his feet or dress or toilet himself.  -               User Key  (r) = Recorded By, (t) = Taken By, (c) = Cosigned By      Initials Name Provider Type     Marlene Vieira OT Occupational Therapist                   Obj/Interventions       Row Name 03/13/24 1318          Sensory Assessment (Somatosensory)    Sensory Assessment (Somatosensory) sensation intact  -[facility] Name 03/13/24 1318          Vision Assessment/Intervention    Visual Impairment/Limitations WFL  -       Row Name 03/13/24 1318          Range of Motion Comprehensive    Comment, General Range of Motion trunk flex 50%  -[facility] Name 03/13/24 1318          Strength Comprehensive (MMT)    Comment, General Manual Muscle Testing (MMT) Assessment BLE hips 2+/5, BUE 4-/5  -               User Key  (r) = Recorded By, (t) = Taken By, (c) = Cosigned By      Initials Name Provider Type     Marlene Vieira, MURIEL Occupational Therapist                   Goals/Plan       Row Name 03/13/24  1343          Bed Mobility Goal 1 (OT)    Activity/Assistive Device (Bed Mobility Goal 1, OT) bed mobility activities, all  -     [place] Level/Cues Needed (Bed Mobility Goal 1, OT) independent  -     Time Frame (Bed Mobility Goal 1, OT) 2 weeks  -       Row Name 03/13/24 1343          Transfer Goal 1 (OT)    Activity/Assistive Device (Transfer Goal 1, OT) transfers, all  -     [place] Level/Cues Needed (Transfer Goal 1, OT) modified independence  -     Time Frame (Transfer Goal 1, OT) 2 weeks  -       Row Name 03/13/24 1343          Bathing Goal 1 (OT)    Activity/Device (Bathing Goal 1, OT) bathing skills, all  -     [place] Level/Cues Needed (Bathing Goal 1, OT) set-up required  -     Time Frame (Bathing Goal 1, OT) 2 weeks  -       Row Name 03/13/24 1343          Toileting Goal 1 (OT)    Activity/Device (Toileting Goal 1, OT) toileting skills, all  -     [place] Level/Cues Needed (Toileting Goal 1, OT) modified independence  -     Time Frame (Toileting Goal 1, OT) 2 weeks  -       Row Name 03/13/24 1343          Therapy Assessment/Plan (OT)    Planned Therapy Interventions (OT) activity tolerance training;adaptive equipment training;BADL retraining;cognitive/visual perception retraining;ROM/therapeutic exercise;transfer/mobility retraining;patient/caregiver education/training;occupation/activity based interventions  -               User Key  (r) = Recorded By, (t) = Taken By, (c) = Cosigned By      Initials Name Provider Type     Marlene Vieira, OT Occupational Therapist                   Clinical Impression       Row Name 03/13/24 1702          Pain Scale: FACES Pre/Post-Treatment    Pain: FACES Scale, Pretreatment 2-->hurts little bit  -     Posttreatment Pain Rating 2-->hurts little bit  chest tubes X4  -       Row Name 03/13/24 6109          Plan of Care Review    Plan of Care Reviewed With patient;spouse  -     Progress declining  -     Outcome  Evaluation Pt is a 62 y/o M admitted to [facility] 3/11/24 with c/o Hospital dx sepsis, empyema, PNA. PMHx significant for HTN, hx rectal cancer s/p surgery prior to starting his current chemo program, and DMII. Pt currently taking chemotherapy for the past 6 months and is down to his final 4 treatments. Pt with severe SOA upon admission and O2 saturations at 81% in ER. Pt states he lives at home with spouse and was independent until about 3 weeks ago when he began using a RW in the home and his spouse was needing to assist him to the shower up a flight of stairs. There are 6 steps to enter home with a rail and a flight of steps to shower. Pt has had decortication and now has 4 chest tubes and his O2 requirements have increased from 6 to 12L since yesterday. His HR is aleo very high due to his uncontroleld Afib. Pt is needing some more asssit at this time to come to stand, for LB ADl and toileting. Pt does not appear to be functioning well enough to go home with his sposue. Pt reports if he could mobilize short distances and take himself to the toilet as well as get himself in and out of bed without assist he would feel more comfortable going home. Spouse agrees and has concerns for her ability to keep him safe at home at this time. OT recommends IP pulmonary rehab at d/c.  -       Row Name 03/13/24 2098          Therapy Assessment/Plan (OT)    Rehab Potential (OT) good, to achieve stated therapy goals  -     Criteria for Skilled Therapeutic Interventions Met (OT) skilled treatment is necessary  -     Therapy Frequency (OT) 5 times/wk  -     Predicted Duration of Therapy Intervention (OT) until d/c  -       Row Name 03/13/24 0432          Therapy Plan Review/Discharge Plan (OT)    Anticipated Discharge Disposition (OT) inpatient rehabilitation facility  -       Row Name 03/13/24 2658          Vital Signs    O2 Delivery Pre Treatment hi-flow  -     Intra SpO2 (%) 92  -     O2 Delivery Intra Treatment hi-flow   -MH     Post SpO2 (%) 96  -MH     O2 Delivery Post Treatment hi-flow  -MH     Pre Patient Position Supine  -MH     Intra Patient Position Standing  -MH     Post Patient Position Supine  -MH       Row Name 03/13/24 1337          Positioning and Restraints    Pre-Treatment Position in bed  -MH     Post Treatment Position bed  -MH     In Bed notified nsg;fowlers;with family/caregiver;encouraged to call for assist;call light within reach  -MH               User Key  (r) = Recorded By, (t) = Taken By, (c) = Cosigned By      Initials Name Provider Type    Marlene Riggins OT Occupational Therapist                   Outcome Measures       Row Name 03/13/24 1344 03/13/24 0800       How much help from another person do you currently need...    Turning from your back to your side while in flat bed without using bedrails? 3  -MH 4  -DS    Moving from lying on back to sitting on the side of a flat bed without bedrails? 3  -MH 3  -DS    Moving to and from a bed to a chair (including a wheelchair)? 2  -MH 2  -DS    Standing up from a chair using your arms (e.g., wheelchair, bedside chair)? 2  -MH 3  -DS    Climbing 3-5 steps with a railing? 2  -MH 2  -DS    To walk in hospital room? 2  -MH 2  -DS    AM-PAC 6 Clicks Score (PT) 14  -MH 16  -DS    Highest Level of Mobility Goal 4 --> Transfer to chair/commode  -MH 5 --> Static standing  -DS              User Key  (r) = Recorded By, (t) = Taken By, (c) = Cosigned By      Initials Name Provider Type    Marlene Riggins OT Occupational Therapist    Kellie Krause, RN Registered Nurse                    Occupational Therapy Education       Title: PT OT SLP Therapies (In Progress)       Topic: Occupational Therapy (In Progress)       Point: ADL training (Done)       Description:   Instruct learner(s) on proper safety adaptation and remediation techniques during self care or transfers.   Instruct in proper use of assistive devices.                  Learning Progress Summary              Patient Acceptance, E,TB, VU,NR by  at 3/13/2024 1346                         Point: Home exercise program (Not Started)       Description:   Instruct learner(s) on appropriate technique for monitoring, assisting and/or progressing therapeutic exercises/activities.                  Learner Progress:  Not documented in this visit.              Point: Precautions (Done)       Description:   Instruct learner(s) on prescribed precautions during self-care and functional transfers.                  Learning Progress Summary             Patient Acceptance, E,TB, VU,NR by  at 3/13/2024 1346                         Point: Body mechanics (Done)       Description:   Instruct learner(s) on proper positioning and spine alignment during self-care, functional mobility activities and/or exercises.                  Learning Progress Summary             Patient Acceptance, E,TB, VU,NR by  at 3/13/2024 1346                                         User Key       Initials Effective Dates Name Provider Type Discipline     06/16/21 -  Marlene Vieira, MURIEL Occupational Therapist OT                  OT Recommendation and Plan  Planned Therapy Interventions (OT): activity tolerance training, adaptive equipment training, BADL retraining, cognitive/visual perception retraining, ROM/therapeutic exercise, transfer/mobility retraining, patient/caregiver education/training, occupation/activity based interventions  Therapy Frequency (OT): 5 times/wk  Plan of Care Review  Plan of Care Reviewed With: patient, spouse  Progress: declining  Outcome Evaluation: Pt is a 64 y/o M admitted to [facility] 3/11/24 with c/o Hospital dx sepsis, empyema, PNA. PMHx significant for HTN, hx rectal cancer s/p surgery prior to starting his current chemo program, and DMII. Pt currently taking chemotherapy for the past 6 months and is down to his final 4 treatments. Pt with severe SOA upon admission and O2 saturations at 81% in ER. Pt states he lives at home  with spouse and was independent until about 3 weeks ago when he began using a RW in the home and his spouse was needing to assist him to the shower up a flight of stairs. There are 6 steps to enter home with a rail and a flight of steps to shower. Pt has had decortication and now has 4 chest tubes and his O2 requirements have increased from 6 to 12L since yesterday. His HR is aleo very high due to his uncontroleld Afib. Pt is needing some more asssit at this time to come to stand, for LB ADl and toileting. Pt does not appear to be functioning well enough to go home with his sposue. Pt reports if he could mobilize short distances and take himself to the toilet as well as get himself in and out of bed without assist he would feel more comfortable going home. Spouse agrees and has concerns for her ability to keep him safe at home at this time. OT recommends IP pulmonary rehab at d/c.     Time Calculation:         Time Calculation- OT       Row Name 03/13/24 1346             Time Calculation-     OT Start Time 0928  -      OT Stop Time 0950  -      OT Time Calculation (min) 22 min  -      Total Timed Code Minutes- OT 10 minute(s)  -      OT Received On 03/13/24  -      OT - Next Appointment 03/14/24  -      OT Goal Re-Cert Due Date 03/27/24  -                User Key  (r) = Recorded By, (t) = Taken By, (c) = Cosigned By      Initials Name Provider Type     Marlene Vieira OT Occupational Therapist                  Therapy Charges for Today       Code Description Service Date Service Provider Modifiers Qty    [id number]  OT THERAPEUTIC ACT EA 15 MIN 3/13/2024 Marlene Vieira OT GO 1    [id number]  OT EVAL HIGH COMPLEXITY 3 3/13/2024 Marlene Vieira OT GO 1                 Marlene Vieira OT  3/13/2024

## 2024-03-13 NOTE — DISCHARGE PLACEMENT REQUEST
"Lilian Lawler (63 y.o. Male)       Date of Birth   1960    Social Security Number       Address   [address] [place] [address] [place] [facility]    Home Phone   [phone]    MRN   [id number]       Orthodoxy   None    Marital Status                               Admission Date   3/9/24    Admission Type   Emergency    Admitting Provider   Marah Naqvi DO    Attending Provider   Michael Banegas MD    Department, Room/Bed   [facility] INTENSIVE CARE UNIT, 2309/1       Discharge Date       Discharge Disposition       Discharge Destination                                 Attending Provider: Michael Banegas MD    Allergies: No Known Allergies    Isolation: None   Infection: None   Code Status: CPR    Ht: 190.5 cm (75\")   Wt: 89.4 kg (197 lb)    Admission Cmt: None   Principal Problem: Sepsis [A41.9]                   Active Insurance as of 3/9/2024       Primary Coverage       Payor Plan Insurance Group Employer/Plan Group    MEDICARE MEDICARE A & B        Payor Plan Address Payor Plan Phone Number Payor Plan Fax Number Effective Dates    [address] [phone]  3/1/2018 - None Entered    [place] [address]         Subscriber Name Subscriber Birth Date Member ID       LILINA LAWLER 1960 [id number]                     Emergency Contacts        (Rel.) Home Phone Work Phone Mobile Phone    WinnieJodi gan (Spouse) [phone] -- [phone]                "

## 2024-03-13 NOTE — PROGRESS NOTES
Infectious Diseases Progress Note      LOS: 4 days   Patient Care Team:  Lazaro Paulino MD as PCP - General  Steve Mckeon MD as Consulting Physician (Cardiology)  Lars Wagner MD as Consulting Physician (Hematology and Oncology)    Chief Complaint shortness of breath and chest pain    Subjective       The patient has been afebrile for the last 24 hours.  The patient is currently awake and alert and s/p extubation and currently on 11 L of oxygen via nasal cannula.  He is hemodynamically stable      Review of Systems:   Review of Systems   Constitutional:  Positive for fatigue.   HENT: Negative.     Eyes: Negative.    Respiratory:  Positive for cough and shortness of breath.    Cardiovascular: Negative.    Gastrointestinal: Negative.    Endocrine: Negative.    Genitourinary: Negative.    Musculoskeletal: Negative.    Skin: Negative.    Neurological: Negative.    Psychiatric/Behavioral: Negative.     All other systems reviewed and are negative.       Objective     Vital Signs  Temp:  [97.3 °F (36.3 °C)-97.9 °F (36.6 °C)] 97.7 °F (36.5 °C)  Heart Rate:  [] 147  Resp:  [17-24] 24  BP: ()/(42-77) 133/68    Physical Exam:  Physical Exam  Vitals and nursing note reviewed.   Constitutional:       General: He is not in acute distress.     Appearance: He is well-developed and normal weight. He is ill-appearing. He is not diaphoretic.   HENT:      Head: Normocephalic and atraumatic.   Eyes:      Conjunctiva/sclera: Conjunctivae normal.      Pupils: Pupils are equal, round, and reactive to light.   Cardiovascular:      Rate and Rhythm: Normal rate and regular rhythm.      Heart sounds: Normal heart sounds, S1 normal and S2 normal.   Pulmonary:      Effort: Pulmonary effort is normal. No respiratory distress.      Breath sounds: No stridor. Wheezing present. No rales.      Comments: Decreased breathing sounds at the left base    Presence of chest tubes on the left side  Abdominal:      General: Bowel  sounds are normal. There is no distension.      Palpations: Abdomen is soft. There is no mass.      Tenderness: There is no abdominal tenderness. There is no guarding.   Musculoskeletal:         General: No deformity. Normal range of motion.      Cervical back: Neck supple.   Skin:     General: Skin is warm and dry.      Coloration: Skin is not pale.      Findings: No erythema or rash.      Comments: Port   Neurological:      Mental Status: He is alert and oriented to person, place, and time.      Cranial Nerves: No cranial nerve deficit.   Psychiatric:         Mood and Affect: Mood normal.          Results Review:    I have reviewed all clinical data, test, lab, and imaging results.     Radiology  XR Chest 1 View    Result Date: 3/13/2024  XR CHEST 1 VW Date of Exam: 3/13/2024 9:08 AM EDT Indication: increased soa Comparison: Earlier today. Findings: 2 left chest tubes are unchanged in position. There is no identifiable pneumothorax. Right chest port is unchanged in position. There is continued pulmonary vascular congestion with suggestion of mild perihilar pulmonary edema. There is continued partial  opacification of the left lower lobe by infiltrate and small effusion. There is mild atelectasis of the right lung base.     Impression: No significant interval change. Electronically Signed: Hodan Allen MD  3/13/2024 9:37 AM EDT  Workstation ID: [id number]    XR Chest 1 View    Result Date: 3/13/2024  XR CHEST 1 VW Date of Exam: 3/13/2024 1:19 AM EDT Indication: Post Op Lung Surgery Comparison: 3/12/2024 2 FINDINGS: Thoracostomy tubes on the left are again seen. There is no pneumothorax. ET tube is no longer identified. Right chest port is unchanged in position. There is new pulmonary vascular congestion. There is new perihilar pulmonary edema. There is mild cardiomegaly. There is increasing infiltrate in the lung bases, greatest on the left. There is an associated left effusion..     Impression: Interval  extubation. Worsening appearance of the lung fields which may represent pulmonary edema and/or pneumonia with atelectasis. Small effusion. Electronically Signed: Hodan Allen MD  3/13/2024 7:18 AM EDT  Workstation ID: [id number]     Cardiology    Laboratory    Results from last 7 days   Lab Units 03/13/24  0730 03/12/24  0521 03/11/24  2104 03/10/24  2236 03/09/24  2351 03/09/24  1701 03/08/24  1139   WBC 10*3/mm3 12.47* 12.70* 10.20 5.70 8.20 9.80 12.09*   HEMOGLOBIN g/dL 8.9* 9.2* 9.5* 9.4* 10.1* 11.3* 11.0*   HEMOGLOBIN, POC g/dL  --   --  9.5*  --   --   --   --    HEMATOCRIT % 29.2* 28.0* 29.0* 28.9* 30.6* 34.0* 34.7*   HEMATOCRIT POC %  --   --  28*  --   --   --   --    PLATELETS 10*3/mm3 440 469* 503* 355 396 435 347     Results from last 7 days   Lab Units 03/13/24  0730 03/12/24  0521 03/11/24  2104 03/10/24  2236 03/09/24  2351 03/09/24  1701   SODIUM mmol/L 138 136 135* 134* 131* 130*   POTASSIUM mmol/L 2.9* 3.8 3.5 3.4* 4.0 4.5   CHLORIDE mmol/L 99 100 99 95* 95* 88*   CO2 mmol/L 29.0 27.0 26.0 28.0 26.0 26.0   BUN mg/dL 11 9 8 7* 8 11   CREATININE mg/dL 0.38* 0.46* 0.43* 0.36* 0.40* 0.56*   GLUCOSE mg/dL 307* 249* 137* 110* 182* 291*   ALBUMIN g/dL 2.6*  --   --   --   --  3.1*   BILIRUBIN mg/dL 0.2  --   --   --   --  0.3   ALK PHOS U/L 125*  --   --   --   --  118*   AST (SGOT) U/L 24  --   --   --   --  33   ALT (SGPT) U/L 23  --   --   --   --  50*   CALCIUM mg/dL 8.9 10.0 10.1 10.1 9.8 11.1*                 Microbiology   Microbiology Results (last 10 days)       Procedure Component Value - Date/Time    Body Fluid Culture - Body Fluid, Pleural Cavity [[id number]] Collected: 03/11/24 1840    Lab Status: Preliminary result Specimen: Body Fluid from Pleural Cavity Updated: 03/13/24 0742     Body Fluid Culture No growth at 2 days     Gram Stain Many (4+) WBCs per low power field      No organisms seen    AFB Culture - Tissue, Pleura [[id number]] Collected: 03/11/24 1839    Lab Status: Preliminary  result Specimen: Tissue from Pleura Updated: 03/12/24 0836     AFB Stain No acid fast bacilli seen on concentrated smear    S. Pneumo Ag Urine or CSF - Urine, Urine, Clean Catch [[id number]]  (Normal) Collected: 03/10/24 2111    Lab Status: Final result Specimen: Urine, Clean Catch Updated: 03/10/24 2256     Strep Pneumo Ag Negative    Respiratory Culture - Sputum, Cough [[id number]] Collected: 03/10/24 2102    Lab Status: Final result Specimen: Sputum from Cough Updated: 03/12/24 0941     Respiratory Culture Scant growth (1+) Normal respiratory dejon. No S. aureus or Pseudomonas aeruginosa detected. Final report.     Gram Stain Many (4+) WBCs per low power field      No Epithelial cells seen      Few (2+) Gram positive cocci in chains    MRSA Screen, PCR (Inpatient) - Swab, Nares [[id number]]  (Normal) Collected: 03/10/24 2056    Lab Status: Final result Specimen: Swab from Nares Updated: 03/10/24 2248     MRSA PCR No MRSA Detected    Narrative:      The negative predictive value of this diagnostic test is high and should only be used to consider de-escalating anti-MRSA therapy. A positive result may indicate colonization with MRSA and must be correlated clinically.    MRSA Screen, PCR (Inpatient) - Swab, Nares [[id number]]  (Normal) Collected: 03/09/24 2351    Lab Status: Final result Specimen: Swab from Nares Updated: 03/10/24 0103     MRSA PCR No MRSA Detected    Narrative:      The negative predictive value of this diagnostic test is high and should only be used to consider de-escalating anti-MRSA therapy. A positive result may indicate colonization with MRSA and must be correlated clinically.    Blood Culture - Blood, Arm, Right [[id number]]  (Normal) Collected: 03/09/24 1745    Lab Status: Preliminary result Specimen: Blood from Arm, Right Updated: 03/12/24 1900     Blood Culture No growth at 3 days    Blood Culture - Blood, Arm, Left [[id number]]  (Normal) Collected: 03/09/24 1701    Lab Status: Preliminary result  Specimen: Blood from Arm, Left Updated: 03/12/24 1815     Blood Culture No growth at 3 days    Respiratory Panel PCR w/COVID-19(SARS-CoV-2) [facility] In-House, NP Swab in UTM/VTM, 2 HR TAT - Swab, Nasopharynx [[id number]]  (Normal) Collected: 03/09/24 1701    Lab Status: Final result Specimen: Swab from Nasopharynx Updated: 03/09/24 1756     ADENOVIRUS, PCR Not Detected     Coronavirus 229E Not Detected     Coronavirus HKU1 Not Detected     Coronavirus NL63 Not Detected     Coronavirus OC43 Not Detected     COVID19 Not Detected     Human Metapneumovirus Not Detected     Human Rhinovirus/Enterovirus Not Detected     Influenza A PCR Not Detected     Influenza B PCR Not Detected     Parainfluenza Virus 1 Not Detected     Parainfluenza Virus 2 Not Detected     Parainfluenza Virus 3 Not Detected     Parainfluenza Virus 4 Not Detected     RSV, PCR Not Detected     Bordetella pertussis pcr Not Detected     Bordetella parapertussis PCR Not Detected     Chlamydophila pneumoniae PCR Not Detected     Mycoplasma pneumo by PCR Not Detected    Narrative:      In the setting of a positive respiratory panel with a viral infection PLUS a negative procalcitonin without other underlying concern for bacterial infection, consider observing off antibiotics or discontinuation of antibiotics and continue supportive care. If the respiratory panel is positive for atypical bacterial infection (Bordetella pertussis, Chlamydophila pneumoniae, or Mycoplasma pneumoniae), consider antibiotic de-escalation to target atypical bacterial infection.            Medication Review:       Schedule Meds  acetaminophen, 1,000 mg, Oral, TID  acetylcysteine, 3 mL, Nebulization, Q8H - RT  [Held by provider] amLODIPine, 10 mg, Oral, Daily  atorvastatin, 40 mg, Oral, Daily  budesonide, 0.5 mg, Nebulization, BID - RT  cefepime, 2,000 mg, Intravenous, Q8H  [Held by provider] cloNIDine, 0.1 mg, Oral, BID  docusate sodium, 100 mg, Oral, BID  gabapentin,  300 mg, Oral, TID  guaiFENesin, 1,200 mg, Oral, Q12H  heparin (porcine), 5,000 Units, Subcutaneous, Q8H  [Held by provider] hydrALAZINE, 50 mg, Oral, Q8H  [Held by provider] lisinopril, 20 mg, Oral, Q24H   And  [Held by provider] hydroCHLOROthiazide, 25 mg, Oral, Q24H  insulin lispro, 2-9 Units, Subcutaneous, 4x Daily AC & at Bedtime  ipratropium-albuterol, 3 mL, Nebulization, 4x Daily - RT  Salvador, 1 packet, Oral, BID  ketorolac, 15 mg, Intravenous, Q8H  [Held by provider] metoprolol tartrate, 150 mg, Oral, Q12H  metroNIDAZOLE, 500 mg, Intravenous, Q8H  polyethylene glycol, 17 g, Oral, Daily  potassium chloride ER, 40 mEq, Oral, Q4H  vancomycin, 1,500 mg, Intravenous, Q12H  vitamin D3, 5,000 Units, Oral, Daily        Infusion Meds  amiodarone, 0.5 mg/min  lactated ringers, 40 mL/hr, Last Rate: 40 mL/hr (03/12/24 0023)  Pharmacy to dose vancomycin,         PRN Meds    benzonatate    Calcium Replacement - Follow Nurse / BPA Driven Protocol    dextrose    dextrose    glucagon (human recombinant)    HYDROmorphone    ipratropium-albuterol    Magnesium Standard Dose Replacement - Follow Nurse / BPA Driven Protocol    nitroglycerin    ondansetron ODT **OR** ondansetron    oxyCODONE    Pharmacy to dose vancomycin    Phosphorus Replacement - Follow Nurse / BPA Driven Protocol    Potassium Replacement - Follow Nurse / BPA Driven Protocol    sodium chloride        Assessment & Plan       Antimicrobial Therapy   1.  IV vancomycin        2.  IV cefepime        3.  IV Flagyl        4.        5.            Assessment    Multifocal pneumonia with loculated fluid collection on the left pleural space concerning for empyema.  Patient is s/p decortication on March 11, 2024 and intraoperative cultures are pending.  The patient is currently on nasal cannula  Urine was negative for pneumococcal antigen     Rectosigmoid cancer-was recently on chemotherapy.  Oncology following patient does have a port     Type 2 diabetes-recent A1c is 7.4      Tobacco abuse     Plan     Continue IV vancomycin asked pharmacy to monitor dose  Continue IV cefepime 2 g every 8 hours  Continue IV Flagyl 500 mg every 8 hours  Waiting on intraoperative cultures including anaerobic cultures  Continue supportive care  A.m. labs  Case was discussed with family at bedside    Luz Marina King MD  03/13/24  11:49 EDT    Note is dictated utilizing voice recognition software/Dragon

## 2024-03-13 NOTE — PROGRESS NOTES
Daily Progress Note          Assessment    Acute hypoxic respiratory failure   Pneumonia possible aspiration  Empyema: Status post video-assisted decortication 3/11/2024  Sepsis  HTN  DM II  Hyperlipidemia  Rectal cancer adenocarcinoma  Hyponatremia  AAA  Tobacco abuse   A-fib with RVR           PLAN:  Patient is more congested today with tachycardia     Mucomyst nebulized    oxygen supplement and titration to maintain saturation 90-95%: Currently requiring 11 L per nasal cannula  Chest tube management as per CTS  Broad-spectrum antibiotics   Encouraged use I-S flutter valve  Bronchodilators  Inhaled corticosteroids  Incentive spirometer/Flutter valve  Electrolytes/ glycemic control  Mucinex  HR/blood pressure control as per cardiology  Glucose control  DVT  prophylaxis     I personally reviewed the radiological images               LOS: 4 days     Subjective     Patient reports cough and shortness of breath, discomfort on left side of the chest wall    Objective     Vital signs for last 24 hours:  Vitals:    03/13/24 0850 03/13/24 0854 03/13/24 0855 03/13/24 0859   BP:       BP Location:       Patient Position:       Pulse: (!) 144 (!) 139 (!) 141 (!) 147   Resp: 20 17 24 24   Temp:       TempSrc:       SpO2: 98% 99% 94% 99%   Weight:       Height:           Intake/Output last 3 shifts:  I/O last 3 completed shifts:  In: 5161 [P.O.:2160; I.V.:3001]  Out: 5994 [Urine:5550; Chest Tube:444]  Intake/Output this shift:  No intake/output data recorded.      Radiology  Imaging Results (Last 24 Hours)       Procedure Component Value Units Date/Time    XR Chest 1 View [[id number]] Collected: 03/13/24 0935     Updated: 03/13/24 0939    Narrative:      XR CHEST 1 VW    Date of Exam: 3/13/2024 9:08 AM EDT    Indication: increased soa    Comparison: Earlier today.    Findings:  2 left chest tubes are unchanged in position. There is no identifiable pneumothorax. Right chest port is unchanged in position. There is continued  pulmonary vascular congestion with suggestion of mild perihilar pulmonary edema. There is continued partial   opacification of the left lower lobe by infiltrate and small effusion. There is mild atelectasis of the right lung base.      Impression:      Impression:  No significant interval change.      Electronically Signed: Hodan Allen MD    3/13/2024 9:37 AM EDT    Workstation ID: [id number]    XR Chest 1 View [[id number]] Collected: 03/13/24 0715     Updated: 03/13/24 0720    Narrative:      XR CHEST 1 VW    Date of Exam: 3/13/2024 1:19 AM EDT    Indication: Post Op Lung Surgery    Comparison: 3/12/2024 2    FINDINGS: Thoracostomy tubes on the left are again seen. There is no pneumothorax. ET tube is no longer identified. Right chest port is unchanged in position. There is new pulmonary vascular congestion. There is new perihilar pulmonary edema. There is   mild cardiomegaly. There is increasing infiltrate in the lung bases, greatest on the left. There is an associated left effusion..      Impression:      Impression:  Interval extubation. Worsening appearance of the lung fields which may represent pulmonary edema and/or pneumonia with atelectasis. Small effusion.      Electronically Signed: Hodan Allen MD    3/13/2024 7:18 AM EDT    Workstation ID: [id number]            Labs:  Results from last 7 days   Lab Units 03/13/24  0730   WBC 10*3/mm3 12.47*   HEMOGLOBIN g/dL 8.9*   HEMATOCRIT % 29.2*   PLATELETS 10*3/mm3 440     Results from last 7 days   Lab Units 03/13/24  0730   SODIUM mmol/L 138   POTASSIUM mmol/L 2.9*   CHLORIDE mmol/L 99   CO2 mmol/L 29.0   BUN mg/dL 11   CREATININE mg/dL 0.38*   CALCIUM mg/dL 8.9   BILIRUBIN mg/dL 0.2   ALK PHOS U/L 125*   ALT (SGPT) U/L 23   AST (SGOT) U/L 24   GLUCOSE mg/dL 307*     Results from last 7 days   Lab Units 03/12/24  1012   PH, ARTERIAL pH units 7.420   PO2 ART mm Hg 85.4   PCO2, ARTERIAL mm Hg 41.6   HCO3 ART mmol/L 27.0     Results from last 7 days   Lab Units  03/13/24  0730 03/09/24  1701   ALBUMIN g/dL 2.6* 3.1*     Results from last 7 days   Lab Units 03/09/24  1701   HSTROP T ng/L 10         Results from last 7 days   Lab Units 03/11/24  2104   MAGNESIUM mg/dL 1.9     Results from last 7 days   Lab Units 03/10/24  2236   INR  1.21*   APTT seconds 31.2*               Meds:   SCHEDULE  acetaminophen, 1,000 mg, Oral, TID  acetylcysteine, 3 mL, Nebulization, Q8H - RT  [Held by provider] amLODIPine, 10 mg, Oral, Daily  atorvastatin, 40 mg, Oral, Daily  budesonide, 0.5 mg, Nebulization, BID - RT  cefepime, 2,000 mg, Intravenous, Q8H  [Held by provider] cloNIDine, 0.1 mg, Oral, BID  docusate sodium, 100 mg, Oral, BID  gabapentin, 300 mg, Oral, TID  guaiFENesin, 1,200 mg, Oral, Q12H  heparin (porcine), 5,000 Units, Subcutaneous, Q8H  [Held by provider] hydrALAZINE, 50 mg, Oral, Q8H  [Held by provider] lisinopril, 20 mg, Oral, Q24H   And  [Held by provider] hydroCHLOROthiazide, 25 mg, Oral, Q24H  insulin lispro, 2-9 Units, Subcutaneous, 4x Daily AC & at Bedtime  ipratropium-albuterol, 3 mL, Nebulization, 4x Daily - RT  Salvador, 1 packet, Oral, BID  ketorolac, 15 mg, Intravenous, Q8H  [Held by provider] metoprolol tartrate, 150 mg, Oral, Q12H  metroNIDAZOLE, 500 mg, Intravenous, Q8H  polyethylene glycol, 17 g, Oral, Daily  potassium chloride ER, 40 mEq, Oral, Q4H  vancomycin, 1,500 mg, Intravenous, Q12H  vitamin D3, 5,000 Units, Oral, Daily      Infusions  amiodarone, 0.5 mg/min  lactated ringers, 40 mL/hr, Last Rate: 40 mL/hr (03/12/24 0023)  Pharmacy to dose vancomycin,       PRNs    benzonatate    Calcium Replacement - Follow Nurse / BPA Driven Protocol    dextrose    dextrose    glucagon (human recombinant)    HYDROmorphone    ipratropium-albuterol    Magnesium Standard Dose Replacement - Follow Nurse / BPA Driven Protocol    nitroglycerin    ondansetron ODT **OR** ondansetron    oxyCODONE    Pharmacy to dose vancomycin    Phosphorus Replacement - Follow Nurse / BPA Driven  Protocol    Potassium Replacement - Follow Nurse / BPA Driven Protocol    sodium chloride    Physical Exam:  General Appearance:  Alert   HEENT:  Normocephalic, without obvious abnormality, Conjunctiva/corneas clear,.   Nares normal, no drainage     Neck:  Supple, symmetrical, trachea midline.   Lungs /Chest wall:   Bilateral rhonchi with decreased breath sounds in left base, respirations unlabored, symmetrical wall movement.     Heart:  Regular rate and rhythm, S1 S2 normal  Abdomen: Soft, non-tender, no masses, no organomegaly.    Extremities: No edema, no clubbing or cyanosis     ROS  Constitutional: Negative for chills, fever and malaise/fatigue.   HENT: Negative.    Eyes: Negative.    Cardiovascular: Negative.    Respiratory: Positive for cough and shortness of breath.    Skin: Negative.    Musculoskeletal: Negative.    Gastrointestinal: Negative.    Genitourinary: Negative.    Neurological: Generalized weakness    I reviewed the recent clinical results  I personally reviewed the latest radiological studies    Part of this note may be an electronic transcription/translation of spoken language to printed text using the Dragon Dictation System.

## 2024-03-13 NOTE — PLAN OF CARE
Problem: Adult Inpatient Plan of Care  Goal: Absence of Hospital-Acquired Illness or Injury  Intervention: Identify and Manage Fall Risk  Recent Flowsheet Documentation  Taken 3/13/2024 1800 by Kellie Griffin RN  Safety Promotion/Fall Prevention: safety round/check completed  Taken 3/13/2024 1716 by Kellie Griffin RN  Safety Promotion/Fall Prevention: safety round/check completed  Taken 3/13/2024 1617 by Kellie Griffin RN  Safety Promotion/Fall Prevention: safety round/check completed  Taken 3/13/2024 1500 by Kellie Griffin RN  Safety Promotion/Fall Prevention: safety round/check completed  Taken 3/13/2024 1400 by Kellie Griffin RN  Safety Promotion/Fall Prevention: safety round/check completed  Taken 3/13/2024 1300 by Kellie Griffin RN  Safety Promotion/Fall Prevention: safety round/check completed  Taken 3/13/2024 1223 by Kellie Griffin RN  Safety Promotion/Fall Prevention: safety round/check completed  Taken 3/13/2024 1100 by Kellie Griffin RN  Safety Promotion/Fall Prevention: safety round/check completed  Taken 3/13/2024 1000 by Kellie Griffin RN  Safety Promotion/Fall Prevention: safety round/check completed  Taken 3/13/2024 0900 by Kellie Griffin RN  Safety Promotion/Fall Prevention: safety round/check completed  Taken 3/13/2024 0800 by Kellie Griffin RN  Safety Promotion/Fall Prevention: safety round/check completed  Intervention: Prevent Skin Injury  Recent Flowsheet Documentation  Taken 3/13/2024 1816 by Kellie Griffin RN  Body Position:   turned   right  Taken 3/13/2024 1617 by Kellie Griffin RN  Body Position: position changed independently  Taken 3/13/2024 1223 by Kellie Griffin RN  Body Position: position changed independently  Taken 3/13/2024 0800 by Kellie Griffin RN  Body Position: (in chair) --  Skin Protection: adhesive use limited  Intervention: Prevent and Manage VTE (Venous Thromboembolism) Risk  Recent Flowsheet Documentation  Taken 3/13/2024 1617  by Kellie Griffin RN  Activity Management: back to bed  Taken 3/13/2024 1300 by Kellie Griffin RN  Activity Management:   ambulated in room   up in chair  Taken 3/13/2024 1223 by Kellie Griffin RN  Activity Management: back to bed  Taken 3/13/2024 0800 by Kellie Griffin RN  Activity Management: up in chair  VTE Prevention/Management:   bilateral   sequential compression devices on  Intervention: Prevent Infection  Recent Flowsheet Documentation  Taken 3/13/2024 1617 by Kellie Griffin RN  Infection Prevention:   rest/sleep promoted   single patient room provided  Taken 3/13/2024 1223 by Kellie Griffin RN  Infection Prevention:   rest/sleep promoted   single patient room provided  Taken 3/13/2024 0800 by Kellie Griffin RN  Infection Prevention:   single patient room provided   rest/sleep promoted  Goal: Optimal Comfort and Wellbeing  Intervention: Provide Person-Centered Care  Recent Flowsheet Documentation  Taken 3/13/2024 1617 by Kellie Griffin RN  Trust Relationship/Rapport:   care explained   choices provided   emotional support provided   questions answered   empathic listening provided   questions encouraged   reassurance provided   thoughts/feelings acknowledged  Taken 3/13/2024 0800 by Kellie Griffin RN  Trust Relationship/Rapport:   care explained   choices provided   emotional support provided   empathic listening provided   questions answered   questions encouraged   reassurance provided   thoughts/feelings acknowledged     Problem: Skin Injury Risk Increased  Goal: Skin Health and Integrity  Intervention: Optimize Skin Protection  Recent Flowsheet Documentation  Taken 3/13/2024 1816 by Kellie Griffin RN  Head of Bed (HOB) Positioning: HOB at 15 degrees  Taken 3/13/2024 1617 by Kellie Griffin RN  Head of Bed (HOB) Positioning: HOB elevated  Taken 3/13/2024 1223 by Kellie Griffin RN  Head of Bed (HOB) Positioning: HOB elevated  Taken 3/13/2024 0800 by Kellie Griffin  RN  Pressure Reduction Techniques: frequent weight shift encouraged  Pressure Reduction Devices: pressure-redistributing mattress utilized  Skin Protection: adhesive use limited     Problem: Fall Injury Risk  Goal: Absence of Fall and Fall-Related Injury  Intervention: Identify and Manage Contributors  Recent Flowsheet Documentation  Taken 3/13/2024 1617 by Kellie Griffin RN  Medication Review/Management: medications reviewed  Taken 3/13/2024 1223 by Kellie Griffin RN  Medication Review/Management: medications reviewed  Taken 3/13/2024 0800 by Kellie Griffin RN  Medication Review/Management: medications reviewed  Intervention: Promote Injury-Free Environment  Recent Flowsheet Documentation  Taken 3/13/2024 1800 by Kellie Griffin RN  Safety Promotion/Fall Prevention: safety round/check completed  Taken 3/13/2024 1716 by Kellie Griffin RN  Safety Promotion/Fall Prevention: safety round/check completed  Taken 3/13/2024 1617 by Kellie Griffin RN  Safety Promotion/Fall Prevention: safety round/check completed  Taken 3/13/2024 1500 by Kellie Griffin RN  Safety Promotion/Fall Prevention: safety round/check completed  Taken 3/13/2024 1400 by Kellie Griffin RN  Safety Promotion/Fall Prevention: safety round/check completed  Taken 3/13/2024 1300 by Kellie Griffin RN  Safety Promotion/Fall Prevention: safety round/check completed  Taken 3/13/2024 1223 by Kellie Griffin RN  Safety Promotion/Fall Prevention: safety round/check completed  Taken 3/13/2024 1100 by Kellie Griffin RN  Safety Promotion/Fall Prevention: safety round/check completed  Taken 3/13/2024 1000 by Kellie Griffin RN  Safety Promotion/Fall Prevention: safety round/check completed  Taken 3/13/2024 0900 by Kellie Griffin RN  Safety Promotion/Fall Prevention: safety round/check completed  Taken 3/13/2024 0800 by Kellie Griffin RN  Safety Promotion/Fall Prevention: safety round/check completed     Problem: Restraint,  Nonviolent  Goal: Absence of Harm or Injury  Intervention: Implement Least Restrictive Safety Strategies  Recent Flowsheet Documentation  Taken 3/13/2024 1617 by Kellie Griffin RN  Medical Device Protection:   IV pole/bag removed from visual field   tubing secured  Diversional Activities: television  Taken 3/13/2024 1223 by Kellie Griffin RN  Medical Device Protection:   tubing secured   IV pole/bag removed from visual field  Taken 3/13/2024 0800 by Kellie Griffin RN  Medical Device Protection: IV pole/bag removed from visual field  Diversional Activities:   television   smartphone  Intervention: Protect Dignity, Rights, and Personal Wellbeing  Recent Flowsheet Documentation  Taken 3/13/2024 1617 by Kellie Griffin RN  Trust Relationship/Rapport:   care explained   choices provided   emotional support provided   questions answered   empathic listening provided   questions encouraged   reassurance provided   thoughts/feelings acknowledged  Taken 3/13/2024 0800 by Kellie Griffin RN  Trust Relationship/Rapport:   care explained   choices provided   emotional support provided   empathic listening provided   questions answered   questions encouraged   reassurance provided   thoughts/feelings acknowledged  Intervention: Protect Skin and Joint Integrity  Recent Flowsheet Documentation  Taken 3/13/2024 1816 by Kellie Griffin RN  Body Position:   turned   right  Taken 3/13/2024 1617 by Kellie Grififn RN  Body Position: position changed independently  Taken 3/13/2024 1223 by Kellie Griffin RN  Body Position: position changed independently  Taken 3/13/2024 0800 by Kellie Griffin RN  Body Position: (in chair) --   Goal Outcome Evaluation:               Port accessed. Amio now running through port.   Pt converted to nsr at approx 1223. Pt is ging in and out of afib.   Pulm consulted. Ivabx, mucinex, and breathing tx.

## 2024-03-13 NOTE — CASE MANAGEMENT/SOCIAL WORK
Continued Stay Note   Jeff     Patient Name: Prashant Zhou  MRN: [id number]  Today's Date: 3/13/2024    Admit Date: 3/9/2024    Plan: Plan to return home with wife 24/7 care.   Discharge Plan       Row Name 03/13/24 1003       Plan    Plan Plan to return home with wife 24/7 care.    Plan Comments DC Barriers: 13HHF NC, Nebs, IV Abxs, IVF, Amiodarone gtt, Left Chest Tubes x2, Cardio/Thoracis Sx/Palliative/ID/Vasc Sx/HemOnc/Pulmonology following.               Expected Discharge Date and Time       Expected Discharge Date Expected Discharge Time    Mar 18, 2024               TICO Arthur RN  SIPS/ICU   O: [phone]  C: [phone]  [email or web address]

## 2024-03-13 NOTE — OP NOTE
Operative Note     Date of procedure: 3/13/2024     Patient name: Prashant Zhou  MRN: [id number]    Pre OP diagnosis:  Left loculated pleural effusion.  Left empyema.  Sepsis.  Community-acquired pneumonia.  Stage IV rectal cancer with peritoneal metastasis.  Hypertension.  Thoracoabdominal aortic dissection.  Hyperlipidemia.  Type 2 diabetes mellitus.  Myopia.  History of tobacco use.  Hyponatremia.    Post OP diagnosis:  Left loculated pleural effusion s/p robot-assisted left decortication, partial parietal visceral pleurectomy.  Left empyema.  Sepsis.  Community-acquired pneumonia.  Stage IV rectal cancer with peritoneal metastasis.  Hypertension.  Thoracoabdominal aortic dissection.  Hyperlipidemia.  Type 2 diabetes mellitus.  Myopia.  History of tobacco use.  Hyponatremia.    Procedure performed:   Flexible bronchoscopy.  Left robot-assisted thoracoscopic surgery.  Total decortication.  Partial parietal and partial visceral pleurectomy.  Intercostal nerve block.    Indications:   Prashant Zhou is a 63-year-old male with a medical history of rectal cance s/p partial colectomy, , currently undergoing chemotherapy, diabetes mellitus type 2, hypertension, hyperlipidemia, thoracic aortic aneurysm secondary to hypertension.  He was seen by Dr. Wagner in February 2024 after CT scan of the chest which reported left lower lobe lung mass/ consolidation.  He was treated on outpatient basis with oral antibiotics.      He presented to [facility] on 3/9/2024 with complaints of shortness of breath, cough and congestion. He denied any fevers chills or diaphoresis.  He reported cough productive of yellow sputum.  His oxygen saturation was 81% in ED and was placed on 2 L oxygen high flow nasal cannula.  He denied using supplemental home oxygen.  CT scan of the chest was done which reported loculated gas and fluid collection within the left pleural space with associated thickening of the pleural wall, which  likely represents empyema. Thoracic surgery was consulted for further evaluation.    His clinical presentation was concerning for left empyema thoracis. I discussed with him the option of conservative management versus surgery. He had a thick rind around the left lower lobe and I believed that conservative medical management with chest tube and tPA would not expand the lung. He agreed to proceed with surgery.  The risk and benefit of the surgery were discussed in detail.       Surgeon: Saqib Keller MD     Assistants: Susannah Beltran RN CSA was responsible for performing the following activities: Retraction, Suction, Irrigation, Suturing, Closing, Placing Dressing, and Held/Positioned Camera and their skilled assistance was necessary for the success of this case.    Anesthesia: General endotracheal - Double lumen tube    ASA Class: 4    Procedure Details   On 3/13/2024, the patient was brought to the operating room and placed in the supine position on the operating room table. Following an uneventful induction of general anesthesia, the patient was intubated with a double-lumen endotracheal tube without incident. A radial arterial line was placed by the Anesthesia team. A Fonseca catheter was inserted. Pneumatic compression devices were placed on his lower extremities.  He received 2 gram of Cefazolin for intravenous antibiotic prophylaxis .  Patient was positioned in the right lateral decubitus position.  The table was jackknifed.  All bony prominences were well padded.  The left chest was prepped and draped in the usual sterile fashion. Prior to beginning the operation, a time-out was conducted with all members of surgical team present. The patient was identified as Prashant Zhou, the procedure and the correct site were verified.      An incision was made in the seventh intercostal space anteriorly along the posterior axillary line.  An 8 mm robotic port was placed.  Upon entry to the thoracic cavity, there  was dense adhesions noted.  The camera was used as a blunt dissector to create enough space to place additional ports safely.  I then placed two additional ports in the ninth and sixth intercostal space along the costal margin.  The robot was docked.  The entire lung was adherent to the chest wall.  Complete decortication was performed.  There were multiple pockets of alma pus in the lower chest.  All the purulent drainage was drained.  There was fibrinous plaque covering the posterolateral chest wall.  There was thick rind over the lower chest wall and lingula and left lower lobe.  A partial pleural and visceral pleurectomy was performed.  This resulted in adequate lung expansion.  The robot was undocked.  The sixth intercostal space port was enlarged enough to place an extra extra small Adama wound protector.  Through this port site, all the fibrinous plaque and parietal pleura was extracted.  The chest was irrigated with 3 L warm normal saline and suctioned till clear return.    A combination of 1.3% liposomal bupivacaine and 0.25% Marcaine was injected into the intercostal space 4th to 10th intercostal spaces  Hemostasis was achieved.  Two straight 24 Ukrainian chest tubes were placed anterior and posterior to the hilum.  The chest tube were secured with #1 Ethibond suture to the skin.  All the chest incisions were inspected for hemostasis.  The lung was reinflated completely under direct visualization and double lung ventilation was reestablished.  The scope was removed.  The incisions were closed in layers with 2-0 Vicryl deep dermal layer and 4-0 Monocryl for subcuticular closure.  The incisions were cleaned and dried and Dermabond was applied.  A sterile dressing was applied to the chest tube.     All instruments, sponges and needle counts were correct at the end of the operation.  The patient was extubated and taken to the PACU in stable condition.  There was small air leak noted at the end of the  procedure.    Findings:  Left lower lobe and lingula was covered with fibrinous plaque.  500 cc alma purulent fluid drained and sent for culture.  Multiple pockets of alma pus in the left lower pleural cavity.  Fibrinous rind covering the lower half of the chest.  Complete decortication and partial parietal and visceral pleurectomy performed.  The left lung was not compliant and required recruitment maneuver to establish two lungs ventilation.  There is small air leak at the end of procedure.    Estimated Blood Loss:  100ml           Drains: Two 24 Vincentian chest tubes placed anterior and posterior to the hilum                 Specimens:   ID Type Source Tests Collected by Time   1 :  Tissue Pleura ANAEROBIC CULTURE, FUNGAL CULTURE, AFB CULTURE Saqib Keller MD 3/11/2024 1839   A :  Tissue Pleura TISSUE PATHOLOGY EXAM Saqib Keller MD 3/11/2024 1947              Implants: None           Complications: None           Disposition: PACU           Condition: Stable     Saqib Keller MD   Thoracic Surgeon  [facility]

## 2024-03-13 NOTE — PLAN OF CARE
Goal Outcome Evaluation:  Plan of Care Reviewed With: patient, son     Assessment: Prashant Zhou presents with functional mobility impairments which indicate the need for skilled intervention. Pt was tachycardic today- Nsg aware. Pt wanted to walk more but elevated HR limited that possibility. Pt gives excellent effort and is very motivated to regain his maximal level of function. PT changing recommendation to In-patient rehabilitation Facility-CM aware. Pt has increased O2 demand today as well. Tolerating session today without incident. Will continue to follow and progress as tolerated.     Plan/Recommendations:   If medically appropriate, High Intensity Therapy recommended post-acute care. This is recommended as therapy feels the patient would require 5-6 days per week, 2-3 hours per day. At this time, inpatient rehabilitation (acute rehab) would be the first choice and SNF would be second. Pt requires no DME at discharge.     Pt desires Inpatient Rehabilitation placement at discharge. Pt cooperative; agreeable to therapeutic recommendations and plan of care.             Anticipated Discharge Disposition (PT): inpatient rehabilitation facility

## 2024-03-13 NOTE — PROGRESS NOTES
Daily Progress Note          Assessment    Acute hypoxic respiratory failure   Pneumonia possible aspiration  Empyema: Status post video-assisted decortication 3/11/2024  Sepsis  HTN  DM II  Hyperlipidemia  Rectal cancer adenocarcinoma  Hyponatremia  AAA  Tobacco abuse   A-fib with RVR           PLAN:  Oxygen supplement and titration to maintain saturation 90-95%: Currently requiring 13 L per nasal cannula  Chest tube management as per CTS  Broad-spectrum antibiotics   Encouraged use I-S flutter valve  Bronchodilators  Inhaled corticosteroids  Incentive spirometer/Flutter valve  Electrolytes/ glycemic control  Mucinex  HR/blood pressure control as per cardiology  Glucose control  DVT  prophylaxis     I personally reviewed the radiological images               LOS: 4 days     Subjective     Patient reports cough and shortness of breath, discomfort on left side of the chest wall    Objective     Vital signs for last 24 hours:  Vitals:    03/13/24 0645 03/13/24 0700 03/13/24 0704 03/13/24 0745   BP: 121/77  133/68    BP Location:       Patient Position:       Pulse: (!) 153 (!) 152 (!) 167    Resp:       Temp:    97.7 °F (36.5 °C)   TempSrc:    Oral   SpO2: 93% 92% 91%    Weight:       Height:           Intake/Output last 3 shifts:  I/O last 3 completed shifts:  In: 5161 [P.O.:2160; I.V.:3001]  Out: 5994 [Urine:5550; Chest Tube:444]  Intake/Output this shift:  No intake/output data recorded.      Radiology  Imaging Results (Last 24 Hours)       Procedure Component Value Units Date/Time    XR Chest 1 View [[id number]] Collected: 03/13/24 0715     Updated: 03/13/24 0720    Narrative:      XR CHEST 1 VW    Date of Exam: 3/13/2024 1:19 AM EDT    Indication: Post Op Lung Surgery    Comparison: 3/12/2024 2    FINDINGS: Thoracostomy tubes on the left are again seen. There is no pneumothorax. ET tube is no longer identified. Right chest port is unchanged in position. There is new pulmonary vascular congestion. There is new  perihilar pulmonary edema. There is   mild cardiomegaly. There is increasing infiltrate in the lung bases, greatest on the left. There is an associated left effusion..      Impression:      Impression:  Interval extubation. Worsening appearance of the lung fields which may represent pulmonary edema and/or pneumonia with atelectasis. Small effusion.      Electronically Signed: Hodan Allen MD    3/13/2024 7:18 AM EDT    Workstation ID: [id number]    XR Chest 1 View [[id number]] Collected: 03/12/24 0916     Updated: 03/12/24 0923    Narrative:      XR CHEST 1 VW    Date of Exam: 3/12/2024 8:45 AM EDT    Indication: Post Op Lung Surgery    Comparison: March 12, 2024    Findings:  There are 2 thoracotomy tubes on the left. A pneumothorax not definitely confirmed. There is airspace disease in left basilar that could be secondary to pneumonia. Underlying effusion suspected. There is increasing density at the left base as compared to   the earlier study. Subpulmonic air noted on the earlier chest x-ray is not seen on the current exam. There is a port catheter noted with its tip in the superior vena cava. ET tube is present with its tip above the kristyn. Right lung seems relatively   clear.      Impression:      Impression:  1.Increasing density at the left lung base that could relate to some underlying consolidation and possibly effusion. There is additional airspace disease noted within the lingular area more interstitial in nature. Findings of pneumonia were noted on the   more recent CT involving the left lung as well changes of probable empyema.      Electronically Signed: Santiago Tariq MD    3/12/2024 9:21 AM EDT    Workstation ID: [id number]            Labs:  Results from last 7 days   Lab Units 03/12/24  0521   WBC 10*3/mm3 12.70*   HEMOGLOBIN g/dL 9.2*   HEMATOCRIT % 28.0*   PLATELETS 10*3/mm3 469*     Results from last 7 days   Lab Units 03/12/24  0521 03/09/24  2351 03/09/24  1701   SODIUM mmol/L 136   < > 130*    POTASSIUM mmol/L 3.8   < > 4.5   CHLORIDE mmol/L 100   < > 88*   CO2 mmol/L 27.0   < > 26.0   BUN mg/dL 9   < > 11   CREATININE mg/dL 0.46*   < > 0.56*   CALCIUM mg/dL 10.0   < > 11.1*   BILIRUBIN mg/dL  --   --  0.3   ALK PHOS U/L  --   --  118*   ALT (SGPT) U/L  --   --  50*   AST (SGOT) U/L  --   --  33   GLUCOSE mg/dL 249*   < > 291*    < > = values in this interval not displayed.     Results from last 7 days   Lab Units 03/12/24  1012   PH, ARTERIAL pH units 7.420   PO2 ART mm Hg 85.4   PCO2, ARTERIAL mm Hg 41.6   HCO3 ART mmol/L 27.0     Results from last 7 days   Lab Units 03/09/24  1701   ALBUMIN g/dL 3.1*     Results from last 7 days   Lab Units 03/09/24  1701   HSTROP T ng/L 10         Results from last 7 days   Lab Units 03/11/24  2104   MAGNESIUM mg/dL 1.9     Results from last 7 days   Lab Units 03/10/24  2236   INR  1.21*   APTT seconds 31.2*               Meds:   SCHEDULE  acetaminophen, 1,000 mg, Oral, TID  acetylcysteine, 3 mL, Nebulization, Q8H - RT  [Held by provider] amLODIPine, 10 mg, Oral, Daily  atorvastatin, 40 mg, Oral, Daily  budesonide, 0.5 mg, Nebulization, BID - RT  cefepime, 2,000 mg, Intravenous, Q8H  [Held by provider] cloNIDine, 0.1 mg, Oral, BID  docusate sodium, 100 mg, Oral, BID  gabapentin, 300 mg, Oral, TID  guaiFENesin, 600 mg, Oral, Q12H  heparin (porcine), 5,000 Units, Subcutaneous, Q8H  [Held by provider] hydrALAZINE, 50 mg, Oral, Q8H  [Held by provider] lisinopril, 20 mg, Oral, Q24H   And  [Held by provider] hydroCHLOROthiazide, 25 mg, Oral, Q24H  insulin lispro, 2-9 Units, Subcutaneous, 4x Daily AC & at Bedtime  ipratropium-albuterol, 3 mL, Nebulization, 4x Daily - RT  Salvador, 1 packet, Oral, BID  ketorolac, 15 mg, Intravenous, Q8H  [Held by provider] metoprolol tartrate, 150 mg, Oral, Q12H  metroNIDAZOLE, 500 mg, Intravenous, Q8H  polyethylene glycol, 17 g, Oral, Daily  vancomycin, 1,500 mg, Intravenous, Q12H  vitamin D3, 5,000 Units, Oral,  Daily      Infusions  amiodarone, 1 mg/min, Last Rate: 1 mg/min (03/13/24 0536)   Followed by  amiodarone, 0.5 mg/min  lactated ringers, 40 mL/hr, Last Rate: 40 mL/hr (03/12/24 0023)  Pharmacy to dose vancomycin,       PRNs    benzonatate    dextrose    dextrose    glucagon (human recombinant)    HYDROmorphone    ipratropium-albuterol    nitroglycerin    ondansetron ODT **OR** ondansetron    oxyCODONE    Pharmacy to dose vancomycin    sodium chloride    Physical Exam:  General Appearance:  Alert   HEENT:  Normocephalic, without obvious abnormality, Conjunctiva/corneas clear,.   Nares normal, no drainage     Neck:  Supple, symmetrical, trachea midline.   Lungs /Chest wall:   Left-sided rhonchi with decreased breath sounds, respirations unlabored, symmetrical wall movement.     Heart:  Regular rate and rhythm, S1 S2 normal  Abdomen: Soft, non-tender, no masses, no organomegaly.    Extremities: No edema, no clubbing or cyanosis     ROS  Constitutional: Negative for chills, fever and malaise/fatigue.   HENT: Negative.    Eyes: Negative.    Cardiovascular: Negative.    Respiratory: Positive for cough and shortness of breath.    Skin: Negative.    Musculoskeletal: Negative.    Gastrointestinal: Negative.    Genitourinary: Negative.    Neurological: Generalized weakness    I reviewed the recent clinical results  I personally reviewed the latest radiological studies    Part of this note may be an electronic transcription/translation of spoken language to printed text using the Dragon Dictation System.

## 2024-03-13 NOTE — PROGRESS NOTES
CARDIOLOGY PROGRESS NOTE:    Prashant Zhou  63 y.o.  male  1960  [id number]      Referring Provider: Intensivist    Reason for follow-up: Shortness of breath and A-fib     Patient Care Team:  Lazaro Paulino MD as PCP - General  Steve Mckeon MD as Consulting Physician (Cardiology)  Lars Wagner MD as Consulting Physician (Hematology and Oncology)    Subjective .  Doing well without any chest pain or shortness of breath but still has high heart rates      Objective lying in bed comfortably     Review of Systems   Constitutional: Negative for fever and malaise/fatigue.   HENT:  Negative for ear pain and nosebleeds.    Eyes:  Negative for blurred vision and double vision.   Cardiovascular:  Negative for chest pain, dyspnea on exertion and palpitations.   Respiratory:  Negative for cough and shortness of breath.    Skin:  Negative for rash.   Musculoskeletal:  Negative for joint pain.   Gastrointestinal:  Negative for abdominal pain, nausea and vomiting.   Neurological:  Negative for focal weakness and headaches.   Psychiatric/Behavioral:  Negative for depression. The patient is not nervous/anxious.    All other systems reviewed and are negative.      Allergies: Patient has no known allergies.    Scheduled Meds:acetaminophen, 1,000 mg, Oral, TID  acetylcysteine, 3 mL, Nebulization, Q8H - RT  [Held by provider] amLODIPine, 10 mg, Oral, Daily  atorvastatin, 40 mg, Oral, Daily  budesonide, 0.5 mg, Nebulization, BID - RT  cefepime, 2,000 mg, Intravenous, Q8H  [Held by provider] cloNIDine, 0.1 mg, Oral, BID  docusate sodium, 100 mg, Oral, BID  gabapentin, 300 mg, Oral, TID  guaiFENesin, 1,200 mg, Oral, Q12H  heparin (porcine), 5,000 Units, Subcutaneous, Q8H  [Held by provider] hydrALAZINE, 50 mg, Oral, Q8H  [Held by provider] lisinopril, 20 mg, Oral, Q24H   And  [Held by provider] hydroCHLOROthiazide, 25 mg, Oral, Q24H  insulin glargine, 10 Units, Subcutaneous, Nightly  insulin lispro, 2-9 Units,  "Subcutaneous, 4x Daily AC & at Bedtime  insulin lispro, 7 Units, Subcutaneous, TID With Meals  ipratropium, 0.5 mg, Nebulization, 4x Daily - RT  Salvador, 1 packet, Oral, BID  ketorolac, 15 mg, Intravenous, Q8H  [Held by provider] metoprolol tartrate, 150 mg, Oral, Q12H  metroNIDAZOLE, 500 mg, Intravenous, Q8H  polyethylene glycol, 17 g, Oral, Daily  potassium chloride ER, 40 mEq, Oral, Q4H  vancomycin, 1,500 mg, Intravenous, Q12H  vitamin D3, 5,000 Units, Oral, Daily      Continuous Infusions:amiodarone, 0.5 mg/min, Last Rate: 0.5 mg/min (03/13/24 1226)  lactated ringers, 40 mL/hr, Last Rate: 40 mL/hr (03/12/24 0023)  Pharmacy to dose vancomycin,       PRN Meds:.  benzonatate    Calcium Replacement - Follow Nurse / BPA Driven Protocol    dextrose    dextrose    glucagon (human recombinant)    HYDROmorphone    ipratropium-albuterol    Magnesium Standard Dose Replacement - Follow Nurse / BPA Driven Protocol    nitroglycerin    ondansetron ODT **OR** ondansetron    oxyCODONE    Pharmacy to dose vancomycin    Phosphorus Replacement - Follow Nurse / BPA Driven Protocol    Potassium Replacement - Follow Nurse / BPA Driven Protocol    sodium chloride        VITAL SIGNS  Vitals:    03/13/24 0854 03/13/24 0855 03/13/24 0859 03/13/24 1226   BP:       BP Location:       Patient Position:       Pulse: (!) 139 (!) 141 (!) 147    Resp: 17 24 24    Temp:    97.1 °F (36.2 °C)   TempSrc:       SpO2: 99% 94% 99%    Weight:       Height:           Flowsheet Rows      Flowsheet Row First Filed Value   Admission Height 190.5 cm (75\") Documented at 03/09/2024 1644   Admission Weight 91.2 kg (201 lb) Documented at 03/09/2024 1644             TELEMETRY: Atrial fibrillation with rapid response    Physical Exam:  Constitutional:       Appearance: Well-developed.   Eyes:      General: No scleral icterus.     Conjunctiva/sclera: Conjunctivae normal.      Pupils: Pupils are equal, round, and reactive to light.   HENT:      Head: Normocephalic and " atraumatic.   Neck:      Vascular: No carotid bruit or JVD.   Pulmonary:      Effort: Pulmonary effort is normal.      Breath sounds: Decreased breath sounds present. No wheezing. No rales.   Cardiovascular:      Normal rate. Regular rhythm.   Pulses:     Intact distal pulses.   Abdominal:      General: Bowel sounds are normal.      Palpations: Abdomen is soft.   Musculoskeletal: Normal range of motion.      Cervical back: Normal range of motion and neck supple. Skin:     General: Skin is warm and dry.      Findings: No rash.   Neurological:      Mental Status: Alert.      Comments: No focal deficits          Results Review:   I reviewed the patient's new clinical results.  Lab Results (last 24 hours)       Procedure Component Value Units Date/Time    CBC & Differential [[id number]]  (Abnormal) Collected: 03/13/24 0730    Specimen: Blood Updated: 03/13/24 1134    Narrative:      The following orders were created for panel order CBC & Differential.  Procedure                               Abnormality         Status                     ---------                               -----------         ------                     CBC Auto Differential[[id number]]        Abnormal            Final result               Scan Slide[[id number]]                   Normal              Final result                 Please view results for these tests on the individual orders.    Scan Slide [[id number]]  (Normal) Collected: 03/13/24 0730    Specimen: Blood Updated: 03/13/24 1134     RBC Morphology Normal     WBC Morphology Normal     Platelet Morphology Normal    Narrative:      Slide Reviewed     POC Glucose 4x Daily Before Meals & at Bedtime [[id number]]  (Abnormal) Collected: 03/13/24 1103    Specimen: Blood Updated: 03/13/24 1104     Glucose 354 mg/dL      Comment: Serial Number: [id number]:  [id number]       CBC Auto Differential [[id number]]  (Abnormal) Collected: 03/13/24 0730    Specimen: Blood Updated: 03/13/24 1055     WBC 12.47  10*3/mm3      RBC 3.04 10*6/mm3      Hemoglobin 8.9 g/dL      Hematocrit 29.2 %      MCV 96.1 fL      MCH 29.3 pg      MCHC 30.5 g/dL      RDW 17.1 %      RDW-SD 59.6 fl      MPV 9.7 fL      Platelets 440 10*3/mm3      Neutrophil % 70.3 %      Lymphocyte % 14.9 %      Monocyte % 10.7 %      Eosinophil % 0.1 %      Basophil % 0.6 %      Immature Grans % 3.4 %      Neutrophils, Absolute 8.75 10*3/mm3      Lymphocytes, Absolute 1.86 10*3/mm3      Monocytes, Absolute 1.34 10*3/mm3      Eosinophils, Absolute 0.01 10*3/mm3      Basophils, Absolute 0.08 10*3/mm3      Immature Grans, Absolute 0.43 10*3/mm3      nRBC 0.2 /100 WBC     Comprehensive Metabolic Panel [[id number]]  (Abnormal) Collected: 03/13/24 0730    Specimen: Blood Updated: 03/13/24 0808     Glucose 307 mg/dL      BUN 11 mg/dL      Creatinine 0.38 mg/dL      Sodium 138 mmol/L      Potassium 2.9 mmol/L      Chloride 99 mmol/L      CO2 29.0 mmol/L      Calcium 8.9 mg/dL      Total Protein 6.1 g/dL      Albumin 2.6 g/dL      ALT (SGPT) 23 U/L      AST (SGOT) 24 U/L      Alkaline Phosphatase 125 U/L      Total Bilirubin 0.2 mg/dL      Globulin 3.5 gm/dL      A/G Ratio 0.7 g/dL      BUN/Creatinine Ratio 28.9     Anion Gap 10.0 mmol/L      eGFR 125.3 mL/min/1.73     Narrative:      GFR Normal >60  Chronic Kidney Disease <60  Kidney Failure <15      Vancomycin, Peak [[id number]]  (Normal) Collected: 03/13/24 0730    Specimen: Blood Updated: 03/13/24 0808     Vancomycin Peak 21.90 mcg/mL     Narrative:      Therapeutic Ranges for Vancomycin    Vancomycin Random   5.0-40.0 mcg/mL  Vancomycin Trough   5.0-20.0 mcg/mL  Vancomycin Peak     20.0-40.0 mcg/mL    Vancomycin, Random [[id number]]  (Normal) Collected: 03/13/24 0730    Specimen: Blood Updated: 03/13/24 0808     Vancomycin Random 21.40 mcg/mL     Narrative:      Therapeutic Ranges for Vancomycin    Vancomycin Random   5.0-40.0 mcg/mL  Vancomycin Trough   5.0-20.0 mcg/mL  Vancomycin Peak     20.0-40.0 mcg/mL    Body  Fluid Culture - Body Fluid, Pleural Cavity [[id number]] Collected: 03/11/24 1840    Specimen: Body Fluid from Pleural Cavity Updated: 03/13/24 0742     Body Fluid Culture No growth at 2 days     Gram Stain Many (4+) WBCs per low power field      No organisms seen    POC Glucose 4x Daily Before Meals & at Bedtime [[id number]]  (Abnormal) Collected: 03/13/24 0739    Specimen: Blood Updated: 03/13/24 0740     Glucose 314 mg/dL      Comment: Serial Number: [id number]:  [id number]       POC Glucose Once [[id number]]  (Abnormal) Collected: 03/12/24 2014    Specimen: Blood Updated: 03/12/24 2016     Glucose 281 mg/dL      Comment: Serial Number: [id number]:  [id number]       Blood Culture - Blood, Arm, Right [[id number]]  (Normal) Collected: 03/09/24 1745    Specimen: Blood from Arm, Right Updated: 03/12/24 1900     Blood Culture No growth at 3 days    Blood Culture - Blood, Arm, Left [[id number]]  (Normal) Collected: 03/09/24 1701    Specimen: Blood from Arm, Left Updated: 03/12/24 1815     Blood Culture No growth at 3 days    POC Glucose Once [[id number]]  (Abnormal) Collected: 03/12/24 1655    Specimen: Blood Updated: 03/12/24 1656     Glucose 235 mg/dL      Comment: Serial Number: [id number]:  [id number]               Imaging Results (Last 24 Hours)       Procedure Component Value Units Date/Time    XR Chest 1 View [[id number]] Collected: 03/13/24 0935     Updated: 03/13/24 0939    Narrative:      XR CHEST 1 VW    Date of Exam: 3/13/2024 9:08 AM EDT    Indication: increased soa    Comparison: Earlier today.    Findings:  2 left chest tubes are unchanged in position. There is no identifiable pneumothorax. Right chest port is unchanged in position. There is continued pulmonary vascular congestion with suggestion of mild perihilar pulmonary edema. There is continued partial   opacification of the left lower lobe by infiltrate and small effusion. There is mild atelectasis of the right lung base.      Impression:       Impression:  No significant interval change.      Electronically Signed: Hodan Allen MD    3/13/2024 9:37 AM EDT    Workstation ID: [id number]    XR Chest 1 View [[id number]] Collected: 03/13/24 0715     Updated: 03/13/24 0720    Narrative:      XR CHEST 1 VW    Date of Exam: 3/13/2024 1:19 AM EDT    Indication: Post Op Lung Surgery    Comparison: 3/12/2024 2    FINDINGS: Thoracostomy tubes on the left are again seen. There is no pneumothorax. ET tube is no longer identified. Right chest port is unchanged in position. There is new pulmonary vascular congestion. There is new perihilar pulmonary edema. There is   mild cardiomegaly. There is increasing infiltrate in the lung bases, greatest on the left. There is an associated left effusion..      Impression:      Impression:  Interval extubation. Worsening appearance of the lung fields which may represent pulmonary edema and/or pneumonia with atelectasis. Small effusion.      Electronically Signed: Hodan Allen MD    3/13/2024 7:18 AM EDT    Workstation ID: [id number]            EKG      I personally viewed and interpreted the patient's EKG/Telemetry data:    ECHOCARDIOGRAM:  Results for orders placed during the hospital encounter of 03/09/24    Adult Transthoracic Echo Complete W/ Cont if Necessary Per Protocol    Interpretation Summary    Left ventricular systolic function is normal. Calculated left ventricular EF = 65% Left ventricular ejection fraction appears to be 61 - 65%.    Left ventricular diastolic function is consistent with (grade I) impaired relaxation.    There is calcification of the aortic valve mainly affecting the non-coronary cusp(s).    Estimated right ventricular systolic pressure from tricuspid regurgitation is normal (<35 mmHg).    No significant valvular abnormalities noted.       STRESS MYOVIEW:       CARDIAC CATHETERIZATION:  No results found for this or any previous visit.       OTHER:         Assessment & Plan     1 shortness of  breath  Patient presented with shortness of breath and had significant pneumonia and is on IV antibiotics  Patient will have an echocardiogram performed  Patient had multifocal pneumonia concerning for empyema and hence a chest tube placed.  Patient thoracoscopy video-assisted with decortication with da Alexandre robot.    2.  History of aortic dissection  Patient had history of aortic dissection in the past but had medical treatment only at that time and no surgery and is followed by the vascular surgeons  3.  Atrial fibrillation  Patient has new onset atrial fibrillation and is on metoprolol at home which we will restart but he may need anticoagulation once all the tests are done and if required with vascular and oncologist.  Patient is ruled out for MI  Patient is having an echocardiogram performed for LV function valvular abnormalities  Further treatment based on echocardiogram findings  Patient had normal LV systolic function and is currently in sinus rhythm with beta-blockers and hence no further cardiac workup    Patient is having recurrent episodes of atrial fibrillation and is not tolerating beta-blockers because of low blood pressure and hence he is on IV amiodarone.  Will most likely need anticoagulation also for persistent atrial fibrillation will watch his symptoms.  4.  Hypertension  Patient blood pressure currently stable on beta-blockers along with hydralazine and amlodipine  5.  Diabetes  Patient is on oral medicines and followed by the primary care doctor  6.  Hyperlipidemia  Patient is on statins and the lipid levels are followed by the primary care doctor  7.  Rectal cancer  Patient received chemotherapy and after that he developed a esophagitis which was treated with Diflucan but he is also followed by the oncologist    I discussed the patients findings and my recommendations with patient and nurse    Steve Mckeon MD  03/13/24  13:21 EDT

## 2024-03-13 NOTE — THERAPY TREATMENT NOTE
Subjective: Pt agreeable to therapeutic plan of care. Pt's spouse is in room and expresses that she feels pt needs a rehab stay.    Objective:   Nurse reported that CTs could be removed from wall suction for mobility.     Bed mobility - Min-A for sit > supine  Transfers - Mod-A and with rolling walker from recliner  Ambulation - 5 feet Min-A and with rolling walker- pt wanted to walk further but pt was tachy and thus gait was restricted    Therapeutic Exercise - Pt was instructed on AROM exercises for BLE    Vitals: Tachycardic HR up to 159               Respirations were 34               Pt on 11L HFNC with sats at 95%    Pain: 2 VAS   Location: generalized  Intervention for pain: Repositioned    Education: Provided education on the importance of mobility in the acute care setting, Verbal/Tactile Cues, Transfer Training, Gait Training, Energy conservation strategies, and Post-Op Precautions    Assessment: Prashant Zhou presents with functional mobility impairments which indicate the need for skilled intervention. Pt was tachycardic today- Nsg aware. Pt wanted to walk more but elevated HR limited that possibility. Pt gives excellent effort and is very motivated to regain his maximal level of function. PT changing recommendation to In-patient rehabilitation Facility-CM aware. Pt has increased O2 demand today as well. Tolerating session today without incident. Will continue to follow and progress as tolerated.     Plan/Recommendations:   If medically appropriate, High Intensity Therapy recommended post-acute care. This is recommended as therapy feels the patient would require 5-6 days per week, 2-3 hours per day. At this time, inpatient rehabilitation (acute rehab) would be the first choice and SNF would be second. Pt requires no DME at discharge.     Pt desires Inpatient Rehabilitation placement at discharge. Pt cooperative; agreeable to therapeutic recommendations and plan of care.         Basic Mobility  6-click:  Rollin = Total, A lot = 2, A little = 3; 4 = None  Supine>Sit:   1 = Total, A lot = 2, A little = 3; 4 = None   Sit>Stand with arms:  1 = Total, A lot = 2, A little = 3; 4 = None  Bed>Chair:   1 = Total, A lot = 2, A little = 3; 4 = None  Ambulate in room:  1 = Total, A lot = 2, A little = 3; 4 = None  3-5 Steps with railin = Total, A lot = 2, A little = 3; 4 = None  Score: 11    Modified Dale: N/A = No pre-op stroke/TIA    Post-Tx Position: Supine with HOB Elevated, Staff Present, Alarms activated, and Call light and personal items within reach  PPE: gloves

## 2024-03-13 NOTE — CASE MANAGEMENT/SOCIAL WORK
Continued Stay Note  NAZARIO Aguilar     Patient Name: Prashant Zhou  MRN: [id number]  Today's Date: 3/13/2024    Admit Date: 3/9/2024    Plan: Plan to dc to [facility], pending acceptance. No Precert required.  No PASRR required.   Discharge Plan       Row Name 03/13/24 1423       Plan    Plan Plan to dc to [facility], pending acceptance. No Precert required.  No PASRR required.    Plan Comments CM spoke with patient, agreeable to [facility], referral in [facility], liaison notified, pending acceptance.               Expected Discharge Date and Time       Expected Discharge Date Expected Discharge Time    Mar 18, 2024               TICO Arhtur RN  SIPS/ICU   O: [phone]  C: [phone]  [email or web address]

## 2024-03-13 NOTE — PLAN OF CARE
Goal Outcome Evaluation:  Plan of Care Reviewed With: patient        Progress: improving  Outcome Evaluation: Patient's O2 requirement's have increased overnight from 6L to 12L HF NC. Pt is using his IS with encouragement, reaching approx 750. No complaint of pain. VSS. Chest tubes x2 to -40cm sx, both having serosanguineous colored drainage. No air leak in either chest tube. patient up in chair on initial assessment, and ambulated back to bed x2 assist. patient turning and repositioning on his own. will continue to monitor.

## 2024-03-13 NOTE — PROGRESS NOTES
"  POST-OPERATIVE NOTE     Chief Complaint: left empyema  S/P: Left robot assisted decortication  POD # 2    Subjective    Extubated this morning. Feeling well. Soreness to chest tube sites. Sat up in chair this morning    Objective:  General Appearance:  Comfortable and well-appearing.    Vital signs: (most recent): Blood pressure 133/68, pulse (!) 147, temperature 97.7 °F (36.5 °C), temperature source Oral, resp. rate 24, height 190.5 cm (75\"), weight 89.4 kg (197 lb), SpO2 99%.    HEENT: Normal HEENT exam.  (Nasal cannula)    Lungs:  Normal effort.  He is not in respiratory distress.    Heart: Normal rate.    Chest: Chest wall tenderness present.    Abdomen: Abdomen is distended.    Extremities: Decreased range of motion.    Pulses: Distal pulses are intact.    Neurological: Patient is alert.    Skin:  Warm and dry.                Chest tube:   Site: Left, Clean, Dry, Intact, and Securement device intact  Suction: -40cm  Air Leak: negative  24 Hour Total: 54/100ml    Results Review:     I reviewed the patient's new clinical results.  I reviewed the patient's new imaging results and agree with the interpretation.  Discussed with patient, RN and Dr. Keller    Assessment & Plan     Mr. Zhou is POD 2 s/p robot assisted left decortication. Stable course. Extubated yesterday, now on nasal cannula. CXR without pneumothorax, large gastric bubble.  Will get a follow-up chest x-ray this afternoon.  Chest tubes to -20cm suction and re-check CXR in the morning. Remainder of care per primary service.     Andree Wisdom DNP, APRN  [facility]  03/13/24  12:01 EDT    Patient was seen and assessed while wearing personal protective equipment including facemask, protective eyewear and gloves.  Hand hygiene performed prior to entering the room and upon exiting with doffing of gloves.       "

## 2024-03-14 ENCOUNTER — APPOINTMENT (OUTPATIENT)
Dept: GENERAL RADIOLOGY | Facility: HOSPITAL | Age: 64
DRG: 853 | End: 2024-03-14
Payer: MEDICARE

## 2024-03-14 LAB
ALBUMIN SERPL-MCNC: 2.7 G/DL (ref 3.5–5.2)
ALBUMIN/GLOB SERPL: 0.7 G/DL
ALP SERPL-CCNC: 131 U/L (ref 39–117)
ALT SERPL W P-5'-P-CCNC: 20 U/L (ref 1–41)
ANION GAP SERPL CALCULATED.3IONS-SCNC: 7 MMOL/L (ref 5–15)
ANISOCYTOSIS BLD QL: ABNORMAL
AST SERPL-CCNC: 24 U/L (ref 1–40)
BACTERIA FLD CULT: NORMAL
BACTERIA SPEC AEROBE CULT: NORMAL
BACTERIA SPEC AEROBE CULT: NORMAL
BILIRUB SERPL-MCNC: 0.2 MG/DL (ref 0–1.2)
BUN SERPL-MCNC: 10 MG/DL (ref 8–23)
BUN/CREAT SERPL: 29.4 (ref 7–25)
CALCIUM SPEC-SCNC: 8.9 MG/DL (ref 8.6–10.5)
CHLORIDE SERPL-SCNC: 95 MMOL/L (ref 98–107)
CO2 SERPL-SCNC: 34 MMOL/L (ref 22–29)
CREAT SERPL-MCNC: 0.34 MG/DL (ref 0.76–1.27)
DEPRECATED RDW RBC AUTO: 59.2 FL (ref 37–54)
EGFRCR SERPLBLD CKD-EPI 2021: 129.6 ML/MIN/1.73
ERYTHROCYTE [DISTWIDTH] IN BLOOD BY AUTOMATED COUNT: 17.2 % (ref 12.3–15.4)
GLOBULIN UR ELPH-MCNC: 3.7 GM/DL
GLUCOSE BLDC GLUCOMTR-MCNC: 280 MG/DL (ref 70–105)
GLUCOSE BLDC GLUCOMTR-MCNC: 281 MG/DL (ref 70–105)
GLUCOSE BLDC GLUCOMTR-MCNC: 311 MG/DL (ref 70–105)
GLUCOSE BLDC GLUCOMTR-MCNC: 331 MG/DL (ref 70–105)
GLUCOSE SERPL-MCNC: 215 MG/DL (ref 65–99)
GRAM STN SPEC: NORMAL
GRAM STN SPEC: NORMAL
HCT VFR BLD AUTO: 30.4 % (ref 37.5–51)
HGB BLD-MCNC: 9.3 G/DL (ref 13–17.7)
HYPOCHROMIA BLD QL: ABNORMAL
LAB AP CASE REPORT: NORMAL
LAB AP CASE REPORT: NORMAL
LARGE PLATELETS: ABNORMAL
LYMPHOCYTES # BLD MANUAL: 1.8 10*3/MM3 (ref 0.7–3.1)
LYMPHOCYTES NFR BLD MANUAL: 7 % (ref 5–12)
MCH RBC QN AUTO: 29.2 PG (ref 26.6–33)
MCHC RBC AUTO-ENTMCNC: 30.6 G/DL (ref 31.5–35.7)
MCV RBC AUTO: 95.3 FL (ref 79–97)
METAMYELOCYTES NFR BLD MANUAL: 6 % (ref 0–0)
MONOCYTES # BLD: 1.05 10*3/MM3 (ref 0.1–0.9)
MYELOCYTES NFR BLD MANUAL: 4 % (ref 0–0)
NEUTROPHILS # BLD AUTO: 10.34 10*3/MM3 (ref 1.7–7)
NEUTROPHILS NFR BLD MANUAL: 31 % (ref 42.7–76)
NEUTS BAND NFR BLD MANUAL: 38 % (ref 0–5)
PATH REPORT.FINAL DX SPEC: NORMAL
PATH REPORT.FINAL DX SPEC: NORMAL
PATH REPORT.GROSS SPEC: NORMAL
PATHOLOGY REVIEW: YES
PLATELET # BLD AUTO: 429 10*3/MM3 (ref 140–450)
PMV BLD AUTO: 9.3 FL (ref 6–12)
POIKILOCYTOSIS BLD QL SMEAR: ABNORMAL
POLYCHROMASIA BLD QL SMEAR: ABNORMAL
POTASSIUM SERPL-SCNC: 3.8 MMOL/L (ref 3.5–5.2)
PROMYELOCYTES NFR BLD MANUAL: 2 % (ref 0–0)
PROT SERPL-MCNC: 6.4 G/DL (ref 6–8.5)
RBC # BLD AUTO: 3.19 10*6/MM3 (ref 4.14–5.8)
SCAN SLIDE: NORMAL
SODIUM SERPL-SCNC: 136 MMOL/L (ref 136–145)
TOXIC GRANULATION: ABNORMAL
VARIANT LYMPHS NFR BLD MANUAL: 12 % (ref 19.6–45.3)
WBC NRBC COR # BLD AUTO: 14.99 10*3/MM3 (ref 3.4–10.8)

## 2024-03-14 PROCEDURE — 25010000002 AMIODARONE IN DEXTROSE 5% 360-4.14 MG/200ML-% SOLUTION: Performed by: INTERNAL MEDICINE

## 2024-03-14 PROCEDURE — 25010000002 HEPARIN (PORCINE) PER 1000 UNITS: Performed by: SURGERY

## 2024-03-14 PROCEDURE — 82948 REAGENT STRIP/BLOOD GLUCOSE: CPT | Performed by: SURGERY

## 2024-03-14 PROCEDURE — 94664 DEMO&/EVAL PT USE INHALER: CPT

## 2024-03-14 PROCEDURE — 25010000002 FUROSEMIDE PER 20 MG: Performed by: INTERNAL MEDICINE

## 2024-03-14 PROCEDURE — 63710000001 INSULIN GLARGINE PER 5 UNITS: Performed by: INTERNAL MEDICINE

## 2024-03-14 PROCEDURE — 63710000001 INSULIN LISPRO (HUMAN) PER 5 UNITS: Performed by: SURGERY

## 2024-03-14 PROCEDURE — 25010000002 KETOROLAC TROMETHAMINE PER 15 MG: Performed by: SURGERY

## 2024-03-14 PROCEDURE — 82948 REAGENT STRIP/BLOOD GLUCOSE: CPT

## 2024-03-14 PROCEDURE — 25010000002 CEFEPIME PER 500 MG: Performed by: SURGERY

## 2024-03-14 PROCEDURE — 99233 SBSQ HOSP IP/OBS HIGH 50: CPT | Performed by: INTERNAL MEDICINE

## 2024-03-14 PROCEDURE — 85007 BL SMEAR W/DIFF WBC COUNT: CPT | Performed by: SURGERY

## 2024-03-14 PROCEDURE — 25010000002 HYDROMORPHONE 1 MG/ML SOLUTION: Performed by: SURGERY

## 2024-03-14 PROCEDURE — 94761 N-INVAS EAR/PLS OXIMETRY MLT: CPT

## 2024-03-14 PROCEDURE — 80053 COMPREHEN METABOLIC PANEL: CPT | Performed by: INTERNAL MEDICINE

## 2024-03-14 PROCEDURE — 63710000001 INSULIN LISPRO (HUMAN) PER 5 UNITS: Performed by: INTERNAL MEDICINE

## 2024-03-14 PROCEDURE — 97110 THERAPEUTIC EXERCISES: CPT

## 2024-03-14 PROCEDURE — 71045 X-RAY EXAM CHEST 1 VIEW: CPT

## 2024-03-14 PROCEDURE — 85025 COMPLETE CBC W/AUTO DIFF WBC: CPT | Performed by: SURGERY

## 2024-03-14 PROCEDURE — 25010000002 METRONIDAZOLE 500 MG/100ML SOLUTION: Performed by: INTERNAL MEDICINE

## 2024-03-14 PROCEDURE — 97116 GAIT TRAINING THERAPY: CPT

## 2024-03-14 PROCEDURE — 94799 UNLISTED PULMONARY SVC/PX: CPT

## 2024-03-14 PROCEDURE — 25010000002 VANCOMYCIN HCL IN NACL 1.5-0.9 GM/500ML-% SOLUTION: Performed by: SURGERY

## 2024-03-14 PROCEDURE — 99024 POSTOP FOLLOW-UP VISIT: CPT | Performed by: SURGERY

## 2024-03-14 RX ORDER — FUROSEMIDE 10 MG/ML
40 INJECTION INTRAMUSCULAR; INTRAVENOUS DAILY
Status: DISCONTINUED | OUTPATIENT
Start: 2024-03-14 | End: 2024-03-17 | Stop reason: HOSPADM

## 2024-03-14 RX ADMIN — HEPARIN SODIUM 5000 UNITS: 5000 INJECTION, SOLUTION INTRAVENOUS; SUBCUTANEOUS at 05:53

## 2024-03-14 RX ADMIN — METRONIDAZOLE 500 MG: 500 INJECTION, SOLUTION INTRAVENOUS at 17:29

## 2024-03-14 RX ADMIN — IPRATROPIUM BROMIDE 0.5 MG: 0.5 SOLUTION RESPIRATORY (INHALATION) at 06:57

## 2024-03-14 RX ADMIN — INSULIN LISPRO 6 UNITS: 100 INJECTION, SOLUTION INTRAVENOUS; SUBCUTANEOUS at 17:46

## 2024-03-14 RX ADMIN — KETOROLAC TROMETHAMINE 15 MG: 30 INJECTION INTRAMUSCULAR; INTRAVENOUS at 05:52

## 2024-03-14 RX ADMIN — HEPARIN SODIUM 5000 UNITS: 5000 INJECTION, SOLUTION INTRAVENOUS; SUBCUTANEOUS at 14:48

## 2024-03-14 RX ADMIN — IPRATROPIUM BROMIDE 0.5 MG: 0.5 SOLUTION RESPIRATORY (INHALATION) at 15:03

## 2024-03-14 RX ADMIN — POLYETHYLENE GLYCOL 3350 17 G: 17 POWDER, FOR SOLUTION ORAL at 08:00

## 2024-03-14 RX ADMIN — HEPARIN SODIUM 5000 UNITS: 5000 INJECTION, SOLUTION INTRAVENOUS; SUBCUTANEOUS at 20:01

## 2024-03-14 RX ADMIN — Medication 1 PACKET: at 20:01

## 2024-03-14 RX ADMIN — GUAIFENESIN 1200 MG: 600 TABLET, EXTENDED RELEASE ORAL at 20:01

## 2024-03-14 RX ADMIN — AMIODARONE HYDROCHLORIDE 0.5 MG/MIN: 1.8 INJECTION, SOLUTION INTRAVENOUS at 05:53

## 2024-03-14 RX ADMIN — DOCUSATE SODIUM 100 MG: 100 CAPSULE, LIQUID FILLED ORAL at 20:00

## 2024-03-14 RX ADMIN — METRONIDAZOLE 500 MG: 500 INJECTION, SOLUTION INTRAVENOUS at 01:00

## 2024-03-14 RX ADMIN — HYDROMORPHONE HYDROCHLORIDE 0.5 MG: 1 INJECTION, SOLUTION INTRAMUSCULAR; INTRAVENOUS; SUBCUTANEOUS at 07:59

## 2024-03-14 RX ADMIN — IPRATROPIUM BROMIDE 0.5 MG: 0.5 SOLUTION RESPIRATORY (INHALATION) at 11:30

## 2024-03-14 RX ADMIN — INSULIN GLARGINE 20 UNITS: 100 INJECTION, SOLUTION SUBCUTANEOUS at 20:00

## 2024-03-14 RX ADMIN — FUROSEMIDE 40 MG: 10 INJECTION, SOLUTION INTRAMUSCULAR; INTRAVENOUS at 09:47

## 2024-03-14 RX ADMIN — INSULIN LISPRO 7 UNITS: 100 INJECTION, SOLUTION INTRAVENOUS; SUBCUTANEOUS at 17:47

## 2024-03-14 RX ADMIN — IPRATROPIUM BROMIDE 0.5 MG: 0.5 SOLUTION RESPIRATORY (INHALATION) at 20:06

## 2024-03-14 RX ADMIN — GABAPENTIN 300 MG: 300 CAPSULE ORAL at 20:00

## 2024-03-14 RX ADMIN — ACETAMINOPHEN 1000 MG: 500 TABLET, FILM COATED ORAL at 08:00

## 2024-03-14 RX ADMIN — Medication 10 ML: at 08:04

## 2024-03-14 RX ADMIN — ATORVASTATIN CALCIUM 40 MG: 40 TABLET, FILM COATED ORAL at 08:00

## 2024-03-14 RX ADMIN — AMIODARONE HYDROCHLORIDE 0.5 MG/MIN: 1.8 INJECTION, SOLUTION INTRAVENOUS at 20:04

## 2024-03-14 RX ADMIN — BUDESONIDE 0.5 MG: 0.5 INHALANT RESPIRATORY (INHALATION) at 07:01

## 2024-03-14 RX ADMIN — ACETAMINOPHEN 1000 MG: 500 TABLET, FILM COATED ORAL at 17:23

## 2024-03-14 RX ADMIN — INSULIN LISPRO 7 UNITS: 100 INJECTION, SOLUTION INTRAVENOUS; SUBCUTANEOUS at 14:48

## 2024-03-14 RX ADMIN — ACETYLCYSTEINE 3 ML: 200 SOLUTION ORAL; RESPIRATORY (INHALATION) at 06:57

## 2024-03-14 RX ADMIN — HYDROMORPHONE HYDROCHLORIDE 0.5 MG: 1 INJECTION, SOLUTION INTRAMUSCULAR; INTRAVENOUS; SUBCUTANEOUS at 01:00

## 2024-03-14 RX ADMIN — Medication 5000 UNITS: at 08:00

## 2024-03-14 RX ADMIN — ACETYLCYSTEINE 3 ML: 200 SOLUTION ORAL; RESPIRATORY (INHALATION) at 20:06

## 2024-03-14 RX ADMIN — CEFEPIME 2000 MG: 2 INJECTION, POWDER, FOR SOLUTION INTRAVENOUS at 10:56

## 2024-03-14 RX ADMIN — INSULIN LISPRO 6 UNITS: 100 INJECTION, SOLUTION INTRAVENOUS; SUBCUTANEOUS at 20:00

## 2024-03-14 RX ADMIN — GABAPENTIN 300 MG: 300 CAPSULE ORAL at 17:23

## 2024-03-14 RX ADMIN — INSULIN LISPRO 7 UNITS: 100 INJECTION, SOLUTION INTRAVENOUS; SUBCUTANEOUS at 07:58

## 2024-03-14 RX ADMIN — Medication 1500 MG: at 05:52

## 2024-03-14 RX ADMIN — AMIODARONE HYDROCHLORIDE 0.5 MG/MIN: 1.8 INJECTION, SOLUTION INTRAVENOUS at 17:51

## 2024-03-14 RX ADMIN — GUAIFENESIN 1200 MG: 600 TABLET, EXTENDED RELEASE ORAL at 08:00

## 2024-03-14 RX ADMIN — GABAPENTIN 300 MG: 300 CAPSULE ORAL at 08:00

## 2024-03-14 RX ADMIN — Medication 1 PACKET: at 08:00

## 2024-03-14 RX ADMIN — INSULIN LISPRO 7 UNITS: 100 INJECTION, SOLUTION INTRAVENOUS; SUBCUTANEOUS at 14:47

## 2024-03-14 RX ADMIN — HYDROMORPHONE HYDROCHLORIDE 0.5 MG: 1 INJECTION, SOLUTION INTRAMUSCULAR; INTRAVENOUS; SUBCUTANEOUS at 20:44

## 2024-03-14 RX ADMIN — METRONIDAZOLE 500 MG: 500 INJECTION, SOLUTION INTRAVENOUS at 09:47

## 2024-03-14 RX ADMIN — OXYCODONE 5 MG: 5 TABLET ORAL at 00:03

## 2024-03-14 RX ADMIN — KETOROLAC TROMETHAMINE 15 MG: 30 INJECTION INTRAMUSCULAR; INTRAVENOUS at 19:59

## 2024-03-14 RX ADMIN — BUDESONIDE 0.5 MG: 0.5 INHALANT RESPIRATORY (INHALATION) at 20:11

## 2024-03-14 RX ADMIN — CEFEPIME 2000 MG: 2 INJECTION, POWDER, FOR SOLUTION INTRAVENOUS at 01:00

## 2024-03-14 RX ADMIN — CEFEPIME 2000 MG: 2 INJECTION, POWDER, FOR SOLUTION INTRAVENOUS at 19:04

## 2024-03-14 RX ADMIN — DOCUSATE SODIUM 100 MG: 100 CAPSULE, LIQUID FILLED ORAL at 08:00

## 2024-03-14 RX ADMIN — KETOROLAC TROMETHAMINE 15 MG: 30 INJECTION INTRAMUSCULAR; INTRAVENOUS at 14:48

## 2024-03-14 RX ADMIN — ACETAMINOPHEN 1000 MG: 500 TABLET, FILM COATED ORAL at 20:01

## 2024-03-14 NOTE — PROGRESS NOTES
CARDIOLOGY PROGRESS NOTE:    Prashant Zhou  63 y.o.  male  1960  [id number]      Referring Provider: Intensivist    Reason for follow-up: Shortness of breath and A-fib     Patient Care Team:  Lazaro Paulino MD as PCP - General  Steve Mckeon MD as Consulting Physician (Cardiology)  Lars Wagner MD as Consulting Physician (Hematology and Oncology)    Subjective .  Doing well without any chest pain or shortness of breath but still has high heart rates      Objective lying in bed comfortably.  Still has chest tubes draining     Review of Systems   Constitutional: Negative for fever and malaise/fatigue.   HENT:  Negative for ear pain and nosebleeds.    Eyes:  Negative for blurred vision and double vision.   Cardiovascular:  Negative for chest pain, dyspnea on exertion and palpitations.   Respiratory:  Negative for cough and shortness of breath.    Skin:  Negative for rash.   Musculoskeletal:  Negative for joint pain.   Gastrointestinal:  Negative for abdominal pain, nausea and vomiting.   Neurological:  Negative for focal weakness and headaches.   Psychiatric/Behavioral:  Negative for depression. The patient is not nervous/anxious.    All other systems reviewed and are negative.      Allergies: Patient has no known allergies.    Scheduled Meds:acetaminophen, 1,000 mg, Oral, TID  acetylcysteine, 3 mL, Nebulization, BID - RT  [Held by provider] amLODIPine, 10 mg, Oral, Daily  atorvastatin, 40 mg, Oral, Daily  budesonide, 0.5 mg, Nebulization, BID - RT  cefepime, 2,000 mg, Intravenous, Q8H  [Held by provider] cloNIDine, 0.1 mg, Oral, BID  docusate sodium, 100 mg, Oral, BID  furosemide, 40 mg, Intravenous, Daily  gabapentin, 300 mg, Oral, TID  guaiFENesin, 1,200 mg, Oral, Q12H  heparin (porcine), 5,000 Units, Subcutaneous, Q8H  [Held by provider] hydrALAZINE, 50 mg, Oral, Q8H  [Held by provider] lisinopril, 20 mg, Oral, Q24H   And  [Held by provider] hydroCHLOROthiazide, 25 mg, Oral, Q24H  insulin  "glargine, 20 Units, Subcutaneous, Nightly  insulin lispro, 2-9 Units, Subcutaneous, 4x Daily AC & at Bedtime  insulin lispro, 7 Units, Subcutaneous, TID With Meals  ipratropium, 0.5 mg, Nebulization, 4x Daily - RT  Salvador, 1 packet, Oral, BID  ketorolac, 15 mg, Intravenous, Q8H  [Held by provider] metoprolol tartrate, 150 mg, Oral, Q12H  metroNIDAZOLE, 500 mg, Intravenous, Q8H  polyethylene glycol, 17 g, Oral, Daily  vitamin D3, 5,000 Units, Oral, Daily      Continuous Infusions:amiodarone, 0.5 mg/min, Last Rate: 0.5 mg/min (03/14/24 1751)   Followed by  amiodarone, 0.5 mg/min      PRN Meds:.  benzonatate    Calcium Replacement - Follow Nurse / BPA Driven Protocol    dextrose    dextrose    glucagon (human recombinant)    HYDROmorphone    ipratropium-albuterol    Magnesium Standard Dose Replacement - Follow Nurse / BPA Driven Protocol    nitroglycerin    ondansetron ODT **OR** ondansetron    oxyCODONE    Phosphorus Replacement - Follow Nurse / BPA Driven Protocol    Potassium Replacement - Follow Nurse / BPA Driven Protocol    sodium chloride        VITAL SIGNS  Vitals:    03/14/24 1300 03/14/24 1503 03/14/24 1506 03/14/24 1600   BP: 157/95      Patient Position:  Lying     Pulse: 101 110 111    Resp:  21 24    Temp:    97.9 °F (36.6 °C)   TempSrc:    Oral   SpO2: 93% 91% 98%    Weight:       Height:           Flowsheet Rows      Flowsheet Row First Filed Value   Admission Height 190.5 cm (75\") Documented at 03/09/2024 1644   Admission Weight 91.2 kg (201 lb) Documented at 03/09/2024 1644             TELEMETRY: Atrial fibrillation with rapid response    Physical Exam:  Constitutional:       Appearance: Well-developed.   Eyes:      General: No scleral icterus.     Conjunctiva/sclera: Conjunctivae normal.      Pupils: Pupils are equal, round, and reactive to light.   HENT:      Head: Normocephalic and atraumatic.   Neck:      Vascular: No carotid bruit or JVD.   Pulmonary:      Effort: Pulmonary effort is normal.      " Breath sounds: Decreased breath sounds present. No wheezing. No rales.   Cardiovascular:      Normal rate. Regular rhythm.   Pulses:     Intact distal pulses.   Abdominal:      General: Bowel sounds are normal.      Palpations: Abdomen is soft.   Musculoskeletal: Normal range of motion.      Cervical back: Normal range of motion and neck supple. Skin:     General: Skin is warm and dry.      Findings: No rash.   Neurological:      Mental Status: Alert.      Comments: No focal deficits          Results Review:   I reviewed the patient's new clinical results.  Lab Results (last 24 hours)       Procedure Component Value Units Date/Time    Blood Culture - Blood, Arm, Left [[id number]]  (Normal) Collected: 03/09/24 1701    Specimen: Blood from Arm, Left Updated: 03/14/24 1815     Blood Culture No growth at 5 days    POC Glucose 4x Daily Before Meals & at Bedtime [[id number]]  (Abnormal) Collected: 03/14/24 1721    Specimen: Blood Updated: 03/14/24 1722     Glucose 280 mg/dL      Comment: Serial Number: [id number]:  [id number]       Tissue Pathology Exam [[id number]] Collected: 03/11/24 1947    Specimen: Tissue from Pleura Updated: 03/14/24 1516     Case Report --     Surgical Pathology Report                         Case: [id number]                                  Authorizing Provider:  Saqib Keller MD            Collected:           03/11/2024 07:47 PM          Ordering Location:     [facility] MAIN  Received:            03/12/2024 11:15 AM                                 OR                                                                           Pathologist:           Cuate Kong MD                                                             Specimen:    Pleura                                                                                      Final Diagnosis --     Pleura, pleurectomy:    Marked reactive fibrosis with acute and chronic inflammation and focal mesothelial cell hyperplasia     Necroinflammatory tissue suggestive of empyema    AFB, GMS and PAS stains negative for acid-fast and fungal organisms    No evidence of malignancy    JPR       Gross Description --     1. Pleura.  Received in formalin labeled pleura are several white-pink tan irregular shaped like fragments of soft tissue, 2.8 x 2.4 x 0.5 cm in aggregate, compatible with pleural peel.  There are also few fragments of reddish-brown blood clot material present.  The fragments are sectioned as needed and entirely submitted in 3 cassettes.    RADHA      POC Glucose 4x Daily Before Meals & at Bedtime [[id number]]  (Abnormal) Collected: 03/14/24 1152    Specimen: Blood Updated: 03/14/24 1154     Glucose 331 mg/dL      Comment: Serial Number: [id number]:  [id number]       Pathology Consultation [[id number]] Collected: 03/14/24 0603    Specimen: Blood, Central Line Updated: 03/14/24 1034     Final Diagnosis --     Leukocytosis with left shifted granulocytes  Anemia  Thrombocytopenia  No blasts identified       Case Report --     Surgical Pathology Report                         Case: [id number]                                  Authorizing Provider:  Saqib Keller MD            Collected:           03/14/2024 06:03 AM          Ordering Location:     [facility]       Received:            03/14/2024 07:31 AM                                 [facility]ARE [facility]IT                                                          Pathologist:           Efraín Sterling MD                                                            Specimen:    Blood, Central Line                                                                        POC Glucose Once [[id number]]  (Abnormal) Collected: 03/14/24 0742    Specimen: Blood Updated: 03/14/24 0744     Glucose 311 mg/dL      Comment: Serial Number: [id number]:  [id number]       CBC & Differential [[id number]]  (Abnormal) Collected: 03/14/24 0603    Specimen: Blood, Central Line Updated: 03/14/24 0723     Narrative:      The following orders were created for panel order CBC & Differential.  Procedure                               Abnormality         Status                     ---------                               -----------         ------                     CBC Auto Differential[[id number]]        Abnormal            Final result               Scan Slide[[id number]]                                       Final result                 Please view results for these tests on the individual orders.    CBC Auto Differential [[id number]]  (Abnormal) Collected: 03/14/24 0603    Specimen: Blood, Central Line Updated: 03/14/24 0723     WBC 14.99 10*3/mm3      RBC 3.19 10*6/mm3      Hemoglobin 9.3 g/dL      Hematocrit 30.4 %      MCV 95.3 fL      MCH 29.2 pg      MCHC 30.6 g/dL      RDW 17.2 %      RDW-SD 59.2 fl      MPV 9.3 fL      Platelets 429 10*3/mm3     Narrative:      The previously reported component NRBC is no longer being reported. Previous result was 0.4 /100 WBC (Reference Range: 0.0-0.2 /100 WBC) on 3/14/2024 at [facility] EDT.    Scan Slide [[id number]] Collected: 03/14/24 0603    Specimen: Blood, Central Line Updated: 03/14/24 0723     Scan Slide --     Comment: See Manual Differential Results       Manual Differential [[id number]]  (Abnormal) Collected: 03/14/24 0603    Specimen: Blood, Central Line Updated: 03/14/24 0723     Neutrophil % 31.0 %      Lymphocyte % 12.0 %      Monocyte % 7.0 %      Bands %  38.0 %      Metamyelocyte % 6.0 %      Myelocyte % 4.0 %      Promyelocyte % 2.0 %      Neutrophils Absolute 10.34 10*3/mm3      Lymphocytes Absolute 1.80 10*3/mm3      Monocytes Absolute 1.05 10*3/mm3      Anisocytosis Slight/1+     Hypochromia Slight/1+     Poikilocytes Slight/1+     Polychromasia Slight/1+     Toxic Granulation Slight/1+     Large Platelets Slight/1+    Path Consult Reflex [[id number]] Collected: 03/14/24 0603    Specimen: Blood, Central Line Updated: 03/14/24 0723     Pathology Review Yes     Anaerobic Culture - Tissue, Pleura [[id number]]  (Normal) Collected: 03/11/24 1839    Specimen: Tissue from Pleura Updated: 03/14/24 0711     Anaerobic Culture No anaerobes isolated at 3 days    Comprehensive Metabolic Panel [[id number]]  (Abnormal) Collected: 03/14/24 0603    Specimen: Blood, Central Line Updated: 03/14/24 0638     Glucose 215 mg/dL      BUN 10 mg/dL      Creatinine 0.34 mg/dL      Sodium 136 mmol/L      Potassium 3.8 mmol/L      Chloride 95 mmol/L      CO2 34.0 mmol/L      Calcium 8.9 mg/dL      Total Protein 6.4 g/dL      Albumin 2.7 g/dL      ALT (SGPT) 20 U/L      AST (SGOT) 24 U/L      Alkaline Phosphatase 131 U/L      Total Bilirubin 0.2 mg/dL      Globulin 3.7 gm/dL      A/G Ratio 0.7 g/dL      BUN/Creatinine Ratio 29.4     Anion Gap 7.0 mmol/L      eGFR 129.6 mL/min/1.73     Narrative:      GFR Normal >60  Chronic Kidney Disease <60  Kidney Failure <15      Body Fluid Culture - Body Fluid, Pleural Cavity [[id number]] Collected: 03/11/24 1840    Specimen: Body Fluid from Pleural Cavity Updated: 03/14/24 0631     Body Fluid Culture No growth at 3 days     Gram Stain Many (4+) WBCs per low power field      No organisms seen    Potassium [[id number]]  (Normal) Collected: 03/13/24 2131    Specimen: Blood, Central Line Updated: 03/13/24 2151     Potassium 3.7 mmol/L     POC Glucose Once [[id number]]  (Abnormal) Collected: 03/13/24 2056    Specimen: Blood Updated: 03/13/24 2058     Glucose 232 mg/dL      Comment: Serial Number: [id number]:  [id number]       Blood Culture - Blood, Arm, Right [[id number]]  (Normal) Collected: 03/09/24 1745    Specimen: Blood from Arm, Right Updated: 03/13/24 1900     Blood Culture No growth at 4 days            Imaging Results (Last 24 Hours)       Procedure Component Value Units Date/Time    XR Chest 1 View [[id number]] Collected: 03/14/24 0743     Updated: 03/14/24 0748    Narrative:      XR CHEST 1 VW    Date of Exam: 3/14/2024 12:36 AM EDT    Indication: Post Op  Lung Surgery    Comparison: 3/13/2024.    Findings:  2 left chest tubes are unchanged in position. There is no identifiable pneumothorax. Right chest wall Port-A-Cath remains in place with tip in the lower SVC. There is worsening infiltrate of the left lower lobe with associated effusion. There is slight   atelectasis of the right lung base and there may be a minimal right effusion. Heart size is normal. Mild gastric distention is present.    Impression:      Impression:  Worsening left lower lobe infiltrate may represent combination of atelectasis and/or pneumonia with small left effusion. Mild gastric distention.      Electronically Signed: Hodan Allen MD    3/14/2024 7:46 AM EDT    Workstation ID: [id number]            EKG      I personally viewed and interpreted the patient's EKG/Telemetry data:    ECHOCARDIOGRAM:  Results for orders placed during the hospital encounter of 03/09/24    Adult Transthoracic Echo Complete W/ Cont if Necessary Per Protocol    Interpretation Summary    Left ventricular systolic function is normal. Calculated left ventricular EF = 65% Left ventricular ejection fraction appears to be 61 - 65%.    Left ventricular diastolic function is consistent with (grade I) impaired relaxation.    There is calcification of the aortic valve mainly affecting the non-coronary cusp(s).    Estimated right ventricular systolic pressure from tricuspid regurgitation is normal (<35 mmHg).    No significant valvular abnormalities noted.       STRESS MYOVIEW:       CARDIAC CATHETERIZATION:  No results found for this or any previous visit.       OTHER:         Assessment & Plan     1 shortness of breath  Patient presented with shortness of breath and had significant pneumonia and is on IV antibiotics  Patient will have an echocardiogram performed  Patient had multifocal pneumonia concerning for empyema and hence a chest tube placed.  Patient thoracoscopy video-assisted with decortication with da Alexandre  robot.  Patient has chest tubes draining well and will anticipate removal tomorrow     2.  History of aortic dissection  Patient had history of aortic dissection in the past but had medical treatment only at that time and no surgery and is followed by the vascular surgeons    3.  Atrial fibrillation  Patient has new onset atrial fibrillation and is on metoprolol at home which we will restart but he may need anticoagulation once all the tests are done and if required with vascular and oncologist.  Patient is ruled out for MI  Patient is having an echocardiogram performed for LV function valvular abnormalities  Further treatment based on echocardiogram findings  Patient had normal LV systolic function and is currently in sinus rhythm with beta-blockers and hence no further cardiac workup    Patient is having recurrent episodes of atrial fibrillation and is not tolerating beta-blockers because of low blood pressure and hence he is on IV amiodarone.  Will most likely need anticoagulation also for persistent atrial fibrillation will watch his symptoms.  Patient is receiving subcu heparin for now and will switch to oral anticoagulation once his chest tubes are removed and okay with thoracic surgeons.    4.  Hypertension  Patient blood pressure currently stable on beta-blockers along with hydralazine and amlodipine  Patient's blood pressure actually lower side and hence his blood pressure medicines have been held.    5.  Diabetes  Patient is on oral medicines and followed by the primary care doctor    6.  Hyperlipidemia  Patient is on statins and the lipid levels are followed by the primary care doctor    7.  Rectal cancer  Patient received chemotherapy and after that he developed a esophagitis which was treated with Diflucan but he is also followed by the oncologist    8.  Acute respiratory failure  Patient is s/p chest tube placement and is currently on Pulmicort and DuoNebs.  Initially was intubated but he was extubated  when his oxygenation improved.  Patient also had left empyema and is on antibiotics and CT surgery is seeing the patient.    I discussed the patients findings and my recommendations with patient and nurse    Steve Mckeon MD  03/14/24  18:23 EDT

## 2024-03-14 NOTE — PROGRESS NOTES
Daily Progress Note          Assessment    Acute hypoxic respiratory failure   Pneumonia possible aspiration  Empyema: Status post video-assisted decortication 3/11/2024  Sepsis  HTN  DM II  Hyperlipidemia  Rectal cancer adenocarcinoma  Hyponatremia  AAA  Tobacco abuse   A-fib with RVR           PLAN:  Patient is gradually improving, tachycardia is better    Mucomyst nebulized to finish 3/15/2024  Diuresis    oxygen supplement and titration to maintain saturation 90-95%: Currently requiring 8 L per nasal cannula  Chest tube management as per CTS  Broad-spectrum antibiotics   Encouraged use I-S flutter valve  Bronchodilators  Inhaled corticosteroids  Incentive spirometer/Flutter valve  Electrolytes/ glycemic control  Mucinex  HR/blood pressure control as per cardiology  Glucose control  DVT  prophylaxis     I personally reviewed the radiological images               LOS: 5 days     Subjective     Patient reports cough and shortness of breath, discomfort on left side of the chest wall    Objective     Vital signs for last 24 hours:  Vitals:    03/14/24 0657 03/14/24 0700 03/14/24 0701 03/14/24 0704   BP:  153/94     Pulse: 110 107  108   Resp: 22 22 23 23   Temp:       TempSrc:       SpO2: 93% 92%  94%   Weight:       Height:           Intake/Output last 3 shifts:  I/O last 3 completed shifts:  In: 7319 [P.O.:3120; I.V.:3699; IV Piggyback:500]  Out: 7294 [Urine:7100; Chest Tube:194]  Intake/Output this shift:  I/O this shift:  In: 482 [I.V.:482]  Out: 420 [Urine:420]      Radiology  Imaging Results (Last 24 Hours)       Procedure Component Value Units Date/Time    XR Chest 1 View [[id number]] Collected: 03/14/24 0743     Updated: 03/14/24 0748    Narrative:      XR CHEST 1 VW    Date of Exam: 3/14/2024 12:36 AM EDT    Indication: Post Op Lung Surgery    Comparison: 3/13/2024.    Findings:  2 left chest tubes are unchanged in position. There is no identifiable pneumothorax. Right chest wall Port-A-Cath remains in place  with tip in the lower SVC. There is worsening infiltrate of the left lower lobe with associated effusion. There is slight   atelectasis of the right lung base and there may be a minimal right effusion. Heart size is normal. Mild gastric distention is present.    Impression:      Impression:  Worsening left lower lobe infiltrate may represent combination of atelectasis and/or pneumonia with small left effusion. Mild gastric distention.      Electronically Signed: Hodan Allen MD    3/14/2024 7:46 AM EDT    Workstation ID: [id number]    XR Chest 1 View [[id number]] Collected: 03/13/24 1403     Updated: 03/13/24 1406    Narrative:      XR CHEST 1 VW    Date of Exam: 3/13/2024 12:58 PM EDT    Indication: Gastric bubble follow up    Comparison: AP portable chest 3/13/2024.    Findings:  2 left chest tubes appear unchanged. No pneumothorax is visible. Persistent dense left basilar opacity thought to represent a combination of pleural effusion and atelectasis/pneumonia. Faint right basilar atelectasis is unchanged. Heart size stable.   Right chest wall db catheter remains at the lower SVC level. Mild interstitial edema is again thought to be present. The gaseous distention of the proximal stomach appears diminished.      Impression:      Impression:    1. Gaseous distention of the proximal stomach seen on today's earlier examination appears diminished on the current study.  2. Remainder of the findings appear unchanged. Features suggestive of interstitial edema, small left basilar pleural effusion with left basilar atelectasis/pneumonia, and mild right basilar atelectasis, appear stable      Electronically Signed: Liyah Belle MD    3/13/2024 2:04 PM EDT    Workstation ID: [id number]    XR Chest 1 View [[id number]] Collected: 03/13/24 0935     Updated: 03/13/24 0939    Narrative:      XR CHEST 1 VW    Date of Exam: 3/13/2024 9:08 AM EDT    Indication: increased soa    Comparison: Earlier today.    Findings:  2 left chest  tubes are unchanged in position. There is no identifiable pneumothorax. Right chest port is unchanged in position. There is continued pulmonary vascular congestion with suggestion of mild perihilar pulmonary edema. There is continued partial   opacification of the left lower lobe by infiltrate and small effusion. There is mild atelectasis of the right lung base.      Impression:      Impression:  No significant interval change.      Electronically Signed: Hodan Allen MD    3/13/2024 9:37 AM EDT    Workstation ID: [id number]            Labs:  Results from last 7 days   Lab Units 03/14/24  0603   WBC 10*3/mm3 14.99*   HEMOGLOBIN g/dL 9.3*   HEMATOCRIT % 30.4*   PLATELETS 10*3/mm3 429     Results from last 7 days   Lab Units 03/14/24  0603   SODIUM mmol/L 136   POTASSIUM mmol/L 3.8   CHLORIDE mmol/L 95*   CO2 mmol/L 34.0*   BUN mg/dL 10   CREATININE mg/dL 0.34*   CALCIUM mg/dL 8.9   BILIRUBIN mg/dL 0.2   ALK PHOS U/L 131*   ALT (SGPT) U/L 20   AST (SGOT) U/L 24   GLUCOSE mg/dL 215*     Results from last 7 days   Lab Units 03/12/24  1012   PH, ARTERIAL pH units 7.420   PO2 ART mm Hg 85.4   PCO2, ARTERIAL mm Hg 41.6   HCO3 ART mmol/L 27.0     Results from last 7 days   Lab Units 03/14/24  0603 03/13/24  0730 03/09/24  1701   ALBUMIN g/dL 2.7* 2.6* 3.1*     Results from last 7 days   Lab Units 03/09/24  1701   HSTROP T ng/L 10         Results from last 7 days   Lab Units 03/11/24  2104   MAGNESIUM mg/dL 1.9     Results from last 7 days   Lab Units 03/10/24  2236   INR  1.21*   APTT seconds 31.2*               Meds:   SCHEDULE  acetaminophen, 1,000 mg, Oral, TID  acetylcysteine, 3 mL, Nebulization, BID - RT  [Held by provider] amLODIPine, 10 mg, Oral, Daily  atorvastatin, 40 mg, Oral, Daily  budesonide, 0.5 mg, Nebulization, BID - RT  cefepime, 2,000 mg, Intravenous, Q8H  [Held by provider] cloNIDine, 0.1 mg, Oral, BID  docusate sodium, 100 mg, Oral, BID  gabapentin, 300 mg, Oral, TID  guaiFENesin, 1,200 mg, Oral,  Q12H  heparin (porcine), 5,000 Units, Subcutaneous, Q8H  [Held by provider] hydrALAZINE, 50 mg, Oral, Q8H  [Held by provider] lisinopril, 20 mg, Oral, Q24H   And  [Held by provider] hydroCHLOROthiazide, 25 mg, Oral, Q24H  insulin glargine, 10 Units, Subcutaneous, Nightly  insulin lispro, 2-9 Units, Subcutaneous, 4x Daily AC & at Bedtime  insulin lispro, 7 Units, Subcutaneous, TID With Meals  ipratropium, 0.5 mg, Nebulization, 4x Daily - RT  Salvador, 1 packet, Oral, BID  ketorolac, 15 mg, Intravenous, Q8H  [Held by provider] metoprolol tartrate, 150 mg, Oral, Q12H  metroNIDAZOLE, 500 mg, Intravenous, Q8H  polyethylene glycol, 17 g, Oral, Daily  vancomycin, 1,250 mg, Intravenous, Q8H  vitamin D3, 5,000 Units, Oral, Daily      Infusions  amiodarone, 0.5 mg/min, Last Rate: 0.5 mg/min (03/14/24 0553)  lactated ringers, 40 mL/hr, Last Rate: 40 mL/hr (03/12/24 0023)  Pharmacy to dose vancomycin,       PRNs    benzonatate    Calcium Replacement - Follow Nurse / BPA Driven Protocol    dextrose    dextrose    glucagon (human recombinant)    HYDROmorphone    ipratropium-albuterol    Magnesium Standard Dose Replacement - Follow Nurse / BPA Driven Protocol    nitroglycerin    ondansetron ODT **OR** ondansetron    oxyCODONE    Pharmacy to dose vancomycin    Phosphorus Replacement - Follow Nurse / BPA Driven Protocol    Potassium Replacement - Follow Nurse / BPA Driven Protocol    sodium chloride    Physical Exam:  General Appearance:  Alert   HEENT:  Normocephalic, without obvious abnormality, Conjunctiva/corneas clear,.   Nares normal, no drainage     Neck:  Supple, symmetrical, trachea midline.   Lungs /Chest wall:   Bilateral rhonchi with decreased breath sounds in left base, respirations unlabored, symmetrical wall movement.     Heart:  Regular rate and rhythm, S1 S2 normal  Abdomen: Soft, non-tender, no masses, no organomegaly.    Extremities: No edema, no clubbing or cyanosis     ROS  Constitutional: Negative for chills,  fever and malaise/fatigue.   HENT: Negative.    Eyes: Negative.    Cardiovascular: Negative.    Respiratory: Positive for cough and shortness of breath.    Skin: Negative.    Musculoskeletal: Negative.    Gastrointestinal: Negative.    Genitourinary: Negative.    Neurological: Generalized weakness    I reviewed the recent clinical results  I personally reviewed the latest radiological studies    Part of this note may be an electronic transcription/translation of spoken language to printed text using the Dragon Dictation System.

## 2024-03-14 NOTE — CASE MANAGEMENT/SOCIAL WORK
Continued Stay Note   Jeff     Patient Name: Prashant Zhou  MRN: [id number]  Today's Date: 3/14/2024    Admit Date: 3/9/2024    Plan: Plan to dc to [facility], accepted. No Precert required. No PASRR required.   Discharge Plan       Row Name 03/14/24 0909       Plan    Plan Plan to dc to [facility], accepted. No Precert required. No PASRR required.    Final Note DC Barriers: 8L/70% HHF NC, Nebs, IV Abxs, IVF, Amiodarone gtt, Left Chest Tubes x2, Cardio/Thoracis Sx/Palliative/ID/Vasc Sx/HemOnc/Pulmonology following.                 Expected Discharge Date and Time       Expected Discharge Date Expected Discharge Time    Mar 18, 2024               TICO Arthur RN  SIPS/ICU   O: [phone]  C: [phone]  [email or web address]

## 2024-03-14 NOTE — PLAN OF CARE
Goal Outcome Evaluation:                                      Patient remains on 7L HFNC. Amio gtt. AOx4, follows commands. Urinals collected around 2L, no BM on shift. One chest tube removed by Dr. Keller, the other to be removed tomorrow if patient tolerates. Amio gtt to be maintained until Dr. Mckeon visits patient tomorrow.     Remains PCU OF patient.

## 2024-03-14 NOTE — PROGRESS NOTES
"Enter Query Response Below      Query Response: Septic shock supported based on ID note \"Multifocal pneumonia with loculated fluid collection on the left pleural space concerning for empyema\"             If applicable, please update the problem list.     Patient: Prashant Zhou        : 1960  Account: [id number]           Admit Date:         How to Respond to this query:       a. Click New Note     b. Answer query within the yellow box.                c. Update the Problem List, if applicable.      If you have any questions about this query contact me at:  [email or web address]     Dr. Garcia,     63-year-old male presented 3/9/24 with shortness of breath, cough, and congestion admitted with sepsis, pneumonia, and lung empyema per H&P. Intensivist Consult note on 3/11 includes \"Septic shock\" and \"Started on Javi-Synephrine for hypotension, likely secondary to A-fib RVR; will wean as tolerated; Persistent hypotension in spite of adequate fluid resuscitation.\"  Lowest blood pressure reading on 3/11 was 70/22 with a MAP of 38 at 2150. Blood pressure improved on 3/12 at 0000 to 125/56 with a MAP of 79. HR as high as 149 noted on 3/11.   Treatment included: IV Albumin (3/11 at 2201), Javi-Synephrine drip (3/11-12), Propofol drip (3/11-12), LR infusion, Cardizem drip (3/11), IV Cefepime, 1L NS bolus, 2,736 mL NS bolus, IV Vancomycin, and IV Flagyl.     After study, was septic shock clinically supported during this admission?    -Septic shock was supported with additional clinical indicators: ____________  -Septic shock was not supported  -Other- specify_____________  -Unable to determine    By submitting this query, we are merely seeking further clarification of documentation to accurately reflect all conditions that you are monitoring, evaluating, treating or that extend the hospitalization or utilize additional resources of care. Please utilize your independent clinical judgment when addressing the " question(s) above.     This query and your response, once completed, will be entered into the legal medical record.    Sincerely,  Martin LITTLEN, RN   Clinical Documentation Integrity Program

## 2024-03-14 NOTE — PROGRESS NOTES
Psychiatric     Progress Note    Patient Name: Prashant Zhou  : 1960  MRN: [id number]  Primary Care Physician:  Lazaro Paulino MD  Date of admission: 3/9/2024  Service date and time: 24 16:14 EDT  Subjective   Subjective     Chief Complaint: shortness of breath     HPI:  Patient feels better today, denies any shortness of breath, no chest pain      Objective   Objective     Vitals:   Temp:  [97.1 °F (36.2 °C)-98.1 °F (36.7 °C)] 97.7 °F (36.5 °C)  Heart Rate:  [] 111  Resp:  [18-24] 24  BP: (140-163)/(86-95) 144/93  Flow (L/min):  [7-8] 7  Physical Exam    Constitutional: Awake, alert   Eyes: PERRLA, sclerae anicteric, no conjunctival injection   HENT: NCAT, mucous membranes moist   Neck: Supple, no thyromegaly, no lymphadenopathy, trachea midline   Respiratory: Clear to auscultation bilaterally, nonlabored respirations    Cardiovascular: Irregular irregular rhythm. Gastrointestinal: Positive bowel sounds, soft, nontender, nondistended   Musculoskeletal: No bilateral ankle edema, no clubbing or cyanosis to extremities   Psychiatric: Appropriate affect, cooperative   Neurologic: Oriented x 3, strength symmetric in all extremities, Cranial Nerves grossly intact to confrontation, speech clear   Skin: No rashes     Result Review    Result Review:  I have personally reviewed the results from the time of this admission to 3/14/2024 16:14 EDT and agree with these findings:  [x]  Laboratory list / accordion  []  Microbiology  []  Radiology  []  EKG/Telemetry   []  Cardiology/Vascular   []  Pathology  []  Old records  []  Other:  Most notable findings include: Glucose 215, BUN 10, creatinine 0.34, sodium 136, potassium 3.8, chloride 95, CO2 34, calcium 8.9, total protein 6.4, albumin 2.7, alkaline phosphatase 131, WBC count 14.99, hemoglobin 9.3, hematocrit 30.4, platelet count 429      Assessment & Plan   Assessment / Plan    1) acute respiratory failure with hypercapnia and hypoxia  -  improved  - extubated 3/12, currently on room air  - continue pulmicort, duonebs     2) left empyema  - ID on board, appreciate recs  - continue vancomycin, flagyl, maxipime  - CT surgery on board, appreciate recs  - s/p chest tube placement and VATS on 3/11  -Repeat chest x-ray showed no change.     3) A-fib with RVR  - resolved  - cardiology on board, appreciate recs  - metoprolol  - start anticoagulation pending specialist clearance     4) hyponatremia  - resolved  - monitor with labs     5) HTN  - hold home meds for borderline BP     6) DM  - ISS, accuchecks, diet     7) HLD  - lipitor     8) GI/DVT ppx  - none/SCDs    Overall improving, requiring less oxygen, currently on 8 L/min via nasal cannula, chest tube management per cardiothoracic surgeon    Active Hospital Problems:  Active Hospital Problems    Diagnosis     **Sepsis     Pneumonia     Empyema lung     Acute respiratory failure with hypoxia     Hyponatremia     Thoracic aortic aneurysm without rupture     Aortic thrombus     Tobacco use     Rectal cancer     Hypertension     Type 2 diabetes mellitus with hyperglycemia     Hyperlipidemia      Plan:        DVT prophylaxis:  Medical and mechanical DVT prophylaxis orders are present.        CODE STATUS:   Code Status (Patient has no pulse and is not breathing): CPR (Attempt to Resuscitate)  Medical Interventions (Patient has pulse or is breathing): Full Support    Disposition:  I expect patient to be discharged in 3 days .    Michael Banegas MD

## 2024-03-14 NOTE — PROGRESS NOTES
Enter Query Response Below      Query Response: Lactic acidosis-clinically significant              If applicable, please update the problem list.     Patient: Prashant Zhou        : 1960  Account: [id number]           Admit Date:         How to Respond to this query:       a. Click New Note     b. Answer query within the yellow box.                c. Update the Problem List, if applicable.      If you have any questions about this query contact me at: [email or web address]     Dr. Rodriguez,     63-year-old immunocompromised male presented 3/9/2024 with complaints of shortness of breath, cough, and congestion who was admitted with sepsis. Per H&P additional diagnoses included acute respiratory failure, pneumonia, and empyema. Lactate levels on 3/9 were 3.8, 4.3, and 2.5. Treatment on 3/9 included IV Cefepime, 1L NS bolus, IV Vancomycin, IV Flagyl, and IV fluids at 100mL/hr.    Please clarify the following:    -Lactic acidosis-clinically significant  -Lactic acidosis-clinically insignificant  -Other- specify ___  -Unable to determine      By submitting this query, we are merely seeking further clarification of documentation to accurately reflect all conditions that you are monitoring, evaluating, treating or that extend the hospitalization or utilize additional resources of care. Please utilize your independent clinical judgment when addressing the question(s) above.     This query and your response, once completed, will be entered into the legal medical record.    Sincerely,  Martin PEPPER, RN   Clinical Documentation Integrity Program

## 2024-03-14 NOTE — PROGRESS NOTES
Progress note    Reason for visit: Postop care s/p left robot-assisted decortication, pleurectomy.    No acute issues overnight.  Pain well manageable.  Feeling better today.  Breathing improved.    No acute distress.  Alert, oriented x 3.  Atrial fibrillation with RVR.  Nonlabored breathing.  On supplemental oxygen.  Chest tube to -20 suction.  No air leak demonstrated.  Extremities warm.  Moving all 4 extremities.    Labs reviewed.  Imaging..    POD #3 s/p left robot-assisted decortication, pleurectomy.    Ensure adequate pain control.  Chest tube to waterseal.  Continue chest tube removed.  Anticipate removing posterior chest tube tomorrow if no airleak noted.  Continue antibiotics.  Chemical DVT prophylaxis.  Continue to hold therapeutic anticoagulation to the chest tube are in place.    Saqib Keller MD  Thoracic surgeon    Spent 35 minutes taking care of the patient

## 2024-03-14 NOTE — THERAPY TREATMENT NOTE
Subjective: Pt agreeable to therapeutic plan of care.    Objective:     Bed mobility - CGA  Transfers - Min-A  Ambulation -15 feet Min-A and with rolling walker plus one person to manage lines    Therapeutic Exercise - 10 Reps B LE AROM lying supine    Vitals: WNL on 7 L HFNC    Pain: 0 VAS    Intervention for pain: N/A    Education: Provided education on the importance of mobility in the acute care setting, Verbal/Tactile Cues, Transfer Training, and Gait Training    Assessment: Prashant Zhou presents with functional mobility impairments which indicate the need for skilled intervention. Pt requiring less assist with mobility today. HR was WNL today. Pt progressing well. Tolerating session today without incident. Will continue to follow and progress as tolerated.     Plan/Recommendations:   If medically appropriate, High Intensity Therapy recommended post-acute care. This is recommended as therapy feels the patient would require 5-6 days per week, 2-3 hours per day. At this time, inpatient rehabilitation (acute rehab) would be the first choice and SNF would be second. Pt requires no DME at discharge.     Pt desires Inpatient Rehabilitation placement at discharge. Pt cooperative; agreeable to therapeutic recommendations and plan of care.         Basic Mobility 6-click:  Rollin = Total, A lot = 2, A little = 3; 4 = None  Supine>Sit:   1 = Total, A lot = 2, A little = 3; 4 = None   Sit>Stand with arms:  1 = Total, A lot = 2, A little = 3; 4 = None  Bed>Chair:   1 = Total, A lot = 2, A little = 3; 4 = None  Ambulate in room:  1 = Total, A lot = 2, A little = 3; 4 = None  3-5 Steps with railin = Total, A lot = 2, A little = 3; 4 = None  Score: 14    Modified Asher: N/A = No pre-op stroke/TIA    Post-Tx Position: Up in Chair, Staff Present, Alarms activated, and Call light and personal items within reach  PPE: gloves

## 2024-03-14 NOTE — PLAN OF CARE
Goal Outcome Evaluation:  Plan of Care Reviewed With: patient, son     Assessment: Prashant Zhou presents with functional mobility impairments which indicate the need for skilled intervention. Pt requiring less assist with mobility today. HR was WNL today. Pt progressing well. Tolerating session today without incident. Will continue to follow and progress as tolerated.     Plan/Recommendations:   If medically appropriate, High Intensity Therapy recommended post-acute care. This is recommended as therapy feels the patient would require 5-6 days per week, 2-3 hours per day. At this time, inpatient rehabilitation (acute rehab) would be the first choice and SNF would be second. Pt requires no DME at discharge.     Pt desires Inpatient Rehabilitation placement at discharge. Pt cooperative; agreeable to therapeutic recommendations and plan of care.      Anticipated Discharge Disposition (PT): inpatient rehabilitation facility

## 2024-03-14 NOTE — PROGRESS NOTES
Infectious Diseases Progress Note      LOS: 5 days   Patient Care Team:  Lazaro Paulino MD as PCP - General  Steve Mckeon MD as Consulting Physician (Cardiology)  Lars Wagner MD as Consulting Physician (Hematology and Oncology)    Chief Complaint shortness of breath and chest pain    Subjective       The patient has been afebrile for the last 24 hours.  The patient is currently awake and alert and s/p extubation and currently on 7 L of oxygen via nasal cannula.  He is hemodynamically stable      Review of Systems:   Review of Systems   Constitutional:  Positive for fatigue.   HENT: Negative.     Eyes: Negative.    Respiratory:  Positive for cough and shortness of breath.    Cardiovascular: Negative.    Gastrointestinal: Negative.    Endocrine: Negative.    Genitourinary: Negative.    Musculoskeletal: Negative.    Skin: Negative.    Neurological: Negative.    Psychiatric/Behavioral: Negative.     All other systems reviewed and are negative.       Objective     Vital Signs  Temp:  [97.1 °F (36.2 °C)-98.1 °F (36.7 °C)] 97.1 °F (36.2 °C)  Heart Rate:  [] 103  Resp:  [18-23] 18  BP: (140-163)/(87-94) 153/94    Physical Exam:  Physical Exam  Vitals and nursing note reviewed.   Constitutional:       General: He is not in acute distress.     Appearance: He is well-developed and normal weight. He is ill-appearing. He is not diaphoretic.   HENT:      Head: Normocephalic and atraumatic.   Eyes:      Conjunctiva/sclera: Conjunctivae normal.      Pupils: Pupils are equal, round, and reactive to light.   Cardiovascular:      Rate and Rhythm: Normal rate and regular rhythm.      Heart sounds: Normal heart sounds, S1 normal and S2 normal.   Pulmonary:      Effort: Pulmonary effort is normal. No respiratory distress.      Breath sounds: No stridor. Wheezing present. No rales.      Comments: Decreased breathing sounds at the left base    Presence of chest tubes on the left side  Abdominal:      General: Bowel  sounds are normal. There is no distension.      Palpations: Abdomen is soft. There is no mass.      Tenderness: There is no abdominal tenderness. There is no guarding.   Musculoskeletal:         General: No deformity. Normal range of motion.      Cervical back: Neck supple.   Skin:     General: Skin is warm and dry.      Coloration: Skin is not pale.      Findings: No erythema or rash.      Comments: Port   Neurological:      Mental Status: He is alert and oriented to person, place, and time.      Cranial Nerves: No cranial nerve deficit.   Psychiatric:         Mood and Affect: Mood normal.          Results Review:    I have reviewed all clinical data, test, lab, and imaging results.     Radiology  XR Chest 1 View    Result Date: 3/14/2024  XR CHEST 1 VW Date of Exam: 3/14/2024 12:36 AM EDT Indication: Post Op Lung Surgery Comparison: 3/13/2024. Findings: 2 left chest tubes are unchanged in position. There is no identifiable pneumothorax. Right chest wall Port-A-Cath remains in place with tip in the lower SVC. There is worsening infiltrate of the left lower lobe with associated effusion. There is slight atelectasis of the right lung base and there may be a minimal right effusion. Heart size is normal. Mild gastric distention is present.    Impression: Worsening left lower lobe infiltrate may represent combination of atelectasis and/or pneumonia with small left effusion. Mild gastric distention. Electronically Signed: Hodan Allen MD  3/14/2024 7:46 AM EDT  Workstation ID: [id number]    XR Chest 1 View    Result Date: 3/13/2024  XR CHEST 1 VW Date of Exam: 3/13/2024 12:58 PM EDT Indication: Gastric bubble follow up Comparison: AP portable chest 3/13/2024. Findings: 2 left chest tubes appear unchanged. No pneumothorax is visible. Persistent dense left basilar opacity thought to represent a combination of pleural effusion and atelectasis/pneumonia. Faint right basilar atelectasis is unchanged. Heart size stable. Right  chest wall db catheter remains at the lower SVC level. Mild interstitial edema is again thought to be present. The gaseous distention of the proximal stomach appears diminished.     Impression: 1. Gaseous distention of the proximal stomach seen on today's earlier examination appears diminished on the current study. 2. Remainder of the findings appear unchanged. Features suggestive of interstitial edema, small left basilar pleural effusion with left basilar atelectasis/pneumonia, and mild right basilar atelectasis, appear stable Electronically Signed: Liyah Belle MD  3/13/2024 2:04 PM EDT  Workstation ID: [id number]     Cardiology    Laboratory    Results from last 7 days   Lab Units 03/14/24  0603 03/13/24  0730 03/12/24  0521 03/11/24  2104 03/10/24  2236 03/09/24  2351 03/09/24  1701   WBC 10*3/mm3 14.99* 12.47* 12.70* 10.20 5.70 8.20 9.80   HEMOGLOBIN g/dL 9.3* 8.9* 9.2* 9.5* 9.4* 10.1* 11.3*   HEMOGLOBIN, POC g/dL  --   --   --  9.5*  --   --   --    HEMATOCRIT % 30.4* 29.2* 28.0* 29.0* 28.9* 30.6* 34.0*   HEMATOCRIT POC %  --   --   --  28*  --   --   --    PLATELETS 10*3/mm3 429 440 469* 503* 355 396 435     Results from last 7 days   Lab Units 03/14/24  0603 03/13/24  2131 03/13/24  0730 03/12/24  0521 03/11/24  2104 03/10/24  2236 03/09/24  2351 03/09/24  1701   SODIUM mmol/L 136  --  138 136 135* 134* 131* 130*   POTASSIUM mmol/L 3.8 3.7 2.9* 3.8 3.5 3.4* 4.0 4.5   CHLORIDE mmol/L 95*  --  99 100 99 95* 95* 88*   CO2 mmol/L 34.0*  --  29.0 27.0 26.0 28.0 26.0 26.0   BUN mg/dL 10  --  11 9 8 7* 8 11   CREATININE mg/dL 0.34*  --  0.38* 0.46* 0.43* 0.36* 0.40* 0.56*   GLUCOSE mg/dL 215*  --  307* 249* 137* 110* 182* 291*   ALBUMIN g/dL 2.7*  --  2.6*  --   --   --   --  3.1*   BILIRUBIN mg/dL 0.2  --  0.2  --   --   --   --  0.3   ALK PHOS U/L 131*  --  125*  --   --   --   --  118*   AST (SGOT) U/L 24  --  24  --   --   --   --  33   ALT (SGPT) U/L 20  --  23  --   --   --   --  50*   CALCIUM mg/dL 8.9   --  8.9 10.0 10.1 10.1 9.8 11.1*                 Microbiology   Microbiology Results (last 10 days)       Procedure Component Value - Date/Time    Body Fluid Culture - Body Fluid, Pleural Cavity [[id number]] Collected: 03/11/24 1840    Lab Status: Final result Specimen: Body Fluid from Pleural Cavity Updated: 03/14/24 0631     Body Fluid Culture No growth at 3 days     Gram Stain Many (4+) WBCs per low power field      No organisms seen    Anaerobic Culture - Tissue, Pleura [[id number]]  (Normal) Collected: 03/11/24 1839    Lab Status: Preliminary result Specimen: Tissue from Pleura Updated: 03/14/24 0711     Anaerobic Culture No anaerobes isolated at 3 days    AFB Culture - Tissue, Pleura [[id number]] Collected: 03/11/24 1839    Lab Status: Preliminary result Specimen: Tissue from Pleura Updated: 03/12/24 0836     AFB Stain No acid fast bacilli seen on concentrated smear    S. Pneumo Ag Urine or CSF - Urine, Urine, Clean Catch [[id number]]  (Normal) Collected: 03/10/24 2111    Lab Status: Final result Specimen: Urine, Clean Catch Updated: 03/10/24 2256     Strep Pneumo Ag Negative    Respiratory Culture - Sputum, Cough [[id number]] Collected: 03/10/24 2102    Lab Status: Final result Specimen: Sputum from Cough Updated: 03/12/24 0941     Respiratory Culture Scant growth (1+) Normal respiratory dejon. No S. aureus or Pseudomonas aeruginosa detected. Final report.     Gram Stain Many (4+) WBCs per low power field      No Epithelial cells seen      Few (2+) Gram positive cocci in chains    MRSA Screen, PCR (Inpatient) - Swab, Nares [[id number]]  (Normal) Collected: 03/10/24 2056    Lab Status: Final result Specimen: Swab from Nares Updated: 03/10/24 2248     MRSA PCR No MRSA Detected    Narrative:      The negative predictive value of this diagnostic test is high and should only be used to consider de-escalating anti-MRSA therapy. A positive result may indicate colonization with MRSA and must be correlated clinically.     MRSA Screen, PCR (Inpatient) - Swab, Nares [[id number]]  (Normal) Collected: 03/09/24 2351    Lab Status: Final result Specimen: Swab from Nares Updated: 03/10/24 0103     MRSA PCR No MRSA Detected    Narrative:      The negative predictive value of this diagnostic test is high and should only be used to consider de-escalating anti-MRSA therapy. A positive result may indicate colonization with MRSA and must be correlated clinically.    Blood Culture - Blood, Arm, Right [[id number]]  (Normal) Collected: 03/09/24 1745    Lab Status: Preliminary result Specimen: Blood from Arm, Right Updated: 03/13/24 1900     Blood Culture No growth at 4 days    Blood Culture - Blood, Arm, Left [[id number]]  (Normal) Collected: 03/09/24 1701    Lab Status: Preliminary result Specimen: Blood from Arm, Left Updated: 03/13/24 1815     Blood Culture No growth at 4 days    Respiratory Panel PCR w/COVID-19(SARS-CoV-2) [facility] In-House, NP Swab in UTM/VTM, 2 HR TAT - Swab, Nasopharynx [[id number]]  (Normal) Collected: 03/09/24 1701    Lab Status: Final result Specimen: Swab from Nasopharynx Updated: 03/09/24 1756     ADENOVIRUS, PCR Not Detected     Coronavirus 229E Not Detected     Coronavirus HKU1 Not Detected     Coronavirus NL63 Not Detected     Coronavirus OC43 Not Detected     COVID19 Not Detected     Human Metapneumovirus Not Detected     Human Rhinovirus/Enterovirus Not Detected     Influenza A PCR Not Detected     Influenza B PCR Not Detected     Parainfluenza Virus 1 Not Detected     Parainfluenza Virus 2 Not Detected     Parainfluenza Virus 3 Not Detected     Parainfluenza Virus 4 Not Detected     RSV, PCR Not Detected     Bordetella pertussis pcr Not Detected     Bordetella parapertussis PCR Not Detected     Chlamydophila pneumoniae PCR Not Detected     Mycoplasma pneumo by PCR Not Detected    Narrative:      In the setting of a positive respiratory panel with a viral infection PLUS a negative procalcitonin without  other underlying concern for bacterial infection, consider observing off antibiotics or discontinuation of antibiotics and continue supportive care. If the respiratory panel is positive for atypical bacterial infection (Bordetella pertussis, Chlamydophila pneumoniae, or Mycoplasma pneumoniae), consider antibiotic de-escalation to target atypical bacterial infection.            Medication Review:       Schedule Meds  acetaminophen, 1,000 mg, Oral, TID  acetylcysteine, 3 mL, Nebulization, BID - RT  [Held by provider] amLODIPine, 10 mg, Oral, Daily  atorvastatin, 40 mg, Oral, Daily  budesonide, 0.5 mg, Nebulization, BID - RT  cefepime, 2,000 mg, Intravenous, Q8H  [Held by provider] cloNIDine, 0.1 mg, Oral, BID  docusate sodium, 100 mg, Oral, BID  furosemide, 40 mg, Intravenous, Daily  gabapentin, 300 mg, Oral, TID  guaiFENesin, 1,200 mg, Oral, Q12H  heparin (porcine), 5,000 Units, Subcutaneous, Q8H  [Held by provider] hydrALAZINE, 50 mg, Oral, Q8H  [Held by provider] lisinopril, 20 mg, Oral, Q24H   And  [Held by provider] hydroCHLOROthiazide, 25 mg, Oral, Q24H  insulin glargine, 10 Units, Subcutaneous, Nightly  insulin lispro, 2-9 Units, Subcutaneous, 4x Daily AC & at Bedtime  insulin lispro, 7 Units, Subcutaneous, TID With Meals  ipratropium, 0.5 mg, Nebulization, 4x Daily - RT  Salvador, 1 packet, Oral, BID  ketorolac, 15 mg, Intravenous, Q8H  [Held by provider] metoprolol tartrate, 150 mg, Oral, Q12H  metroNIDAZOLE, 500 mg, Intravenous, Q8H  polyethylene glycol, 17 g, Oral, Daily  vitamin D3, 5,000 Units, Oral, Daily        Infusion Meds  amiodarone, 0.5 mg/min, Last Rate: 0.5 mg/min (03/14/24 0553)  lactated ringers, 40 mL/hr, Last Rate: 40 mL/hr (03/12/24 0023)        PRN Meds    benzonatate    Calcium Replacement - Follow Nurse / BPA Driven Protocol    dextrose    dextrose    glucagon (human recombinant)    HYDROmorphone    ipratropium-albuterol    Magnesium Standard Dose Replacement - Follow Nurse / BPA Driven  Protocol    nitroglycerin    ondansetron ODT **OR** ondansetron    oxyCODONE    Phosphorus Replacement - Follow Nurse / BPA Driven Protocol    Potassium Replacement - Follow Nurse / BPA Driven Protocol    sodium chloride        Assessment & Plan       Antimicrobial Therapy   1.  IV vancomycin        2.  IV cefepime        3.  IV Flagyl        4.        5.            Assessment    Multifocal pneumonia with loculated fluid collection on the left pleural space concerning for empyema.  Patient is s/p decortication on March 11, 2024 and intraoperative cultures are negative so far.  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  Oncology following patient does have a port     Type 2 diabetes-recent A1c is 7.4     Tobacco abuse     Plan     Discontinue IV vancomycin  Continue IV cefepime 2 g every 8 hours  Continue IV Flagyl 500 mg every 8 hours, can switch to p.o. on discharge  Recommend 2 weeks of the above antibiotics from the surgical date.    Continue supportive care  A.m. labs      Luz Marina King MD  03/14/24  11:59 EDT    Note is dictated utilizing voice recognition software/Dragon

## 2024-03-15 ENCOUNTER — APPOINTMENT (OUTPATIENT)
Dept: GENERAL RADIOLOGY | Facility: HOSPITAL | Age: 64
DRG: 853 | End: 2024-03-15
Payer: MEDICARE

## 2024-03-15 LAB
ALBUMIN SERPL-MCNC: 2.8 G/DL (ref 3.5–5.2)
ALBUMIN/GLOB SERPL: 0.7 G/DL
ALP SERPL-CCNC: 141 U/L (ref 39–117)
ALT SERPL W P-5'-P-CCNC: 19 U/L (ref 1–41)
ANION GAP SERPL CALCULATED.3IONS-SCNC: 9 MMOL/L (ref 5–15)
ANISOCYTOSIS BLD QL: ABNORMAL
AST SERPL-CCNC: 34 U/L (ref 1–40)
BASOPHILS # BLD MANUAL: 0.14 10*3/MM3 (ref 0–0.2)
BASOPHILS NFR BLD MANUAL: 1 % (ref 0–1.5)
BILIRUB SERPL-MCNC: 0.2 MG/DL (ref 0–1.2)
BUN SERPL-MCNC: 14 MG/DL (ref 8–23)
BUN/CREAT SERPL: 36.8 (ref 7–25)
CALCIUM SPEC-SCNC: 9.4 MG/DL (ref 8.6–10.5)
CHLORIDE SERPL-SCNC: 93 MMOL/L (ref 98–107)
CO2 SERPL-SCNC: 34 MMOL/L (ref 22–29)
CREAT SERPL-MCNC: 0.38 MG/DL (ref 0.76–1.27)
DEPRECATED RDW RBC AUTO: 59.1 FL (ref 37–54)
EGFRCR SERPLBLD CKD-EPI 2021: 125.3 ML/MIN/1.73
ERYTHROCYTE [DISTWIDTH] IN BLOOD BY AUTOMATED COUNT: 17.3 % (ref 12.3–15.4)
GLOBULIN UR ELPH-MCNC: 3.9 GM/DL
GLUCOSE BLDC GLUCOMTR-MCNC: 223 MG/DL (ref 70–105)
GLUCOSE BLDC GLUCOMTR-MCNC: 238 MG/DL (ref 70–105)
GLUCOSE BLDC GLUCOMTR-MCNC: 240 MG/DL (ref 70–105)
GLUCOSE BLDC GLUCOMTR-MCNC: 275 MG/DL (ref 70–105)
GLUCOSE SERPL-MCNC: 252 MG/DL (ref 65–99)
HCT VFR BLD AUTO: 30.3 % (ref 37.5–51)
HGB BLD-MCNC: 9.3 G/DL (ref 13–17.7)
LARGE PLATELETS: ABNORMAL
LYMPHOCYTES # BLD MANUAL: 2.84 10*3/MM3 (ref 0.7–3.1)
LYMPHOCYTES NFR BLD MANUAL: 9 % (ref 5–12)
MCH RBC QN AUTO: 29.2 PG (ref 26.6–33)
MCHC RBC AUTO-ENTMCNC: 30.7 G/DL (ref 31.5–35.7)
MCV RBC AUTO: 95.3 FL (ref 79–97)
METAMYELOCYTES NFR BLD MANUAL: 1 % (ref 0–0)
MONOCYTES # BLD: 1.28 10*3/MM3 (ref 0.1–0.9)
MYELOCYTES NFR BLD MANUAL: 5 % (ref 0–0)
NEUTROPHILS # BLD AUTO: 8.95 10*3/MM3 (ref 1.7–7)
NEUTROPHILS NFR BLD MANUAL: 56 % (ref 42.7–76)
NEUTS BAND NFR BLD MANUAL: 7 % (ref 0–5)
PLATELET # BLD AUTO: 405 10*3/MM3 (ref 140–450)
PMV BLD AUTO: 9.2 FL (ref 6–12)
POIKILOCYTOSIS BLD QL SMEAR: ABNORMAL
POLYCHROMASIA BLD QL SMEAR: ABNORMAL
POTASSIUM SERPL-SCNC: 3.6 MMOL/L (ref 3.5–5.2)
POTASSIUM SERPL-SCNC: 4.3 MMOL/L (ref 3.5–5.2)
PROMYELOCYTES NFR BLD MANUAL: 1 % (ref 0–0)
PROT SERPL-MCNC: 6.7 G/DL (ref 6–8.5)
QT INTERVAL: 374 MS
QTC INTERVAL: 491 MS
RBC # BLD AUTO: 3.18 10*6/MM3 (ref 4.14–5.8)
SCAN SLIDE: NORMAL
SODIUM SERPL-SCNC: 136 MMOL/L (ref 136–145)
TOXIC GRANULATION: ABNORMAL
VARIANT LYMPHS NFR BLD MANUAL: 20 % (ref 19.6–45.3)
WBC NRBC COR # BLD AUTO: 14.2 10*3/MM3 (ref 3.4–10.8)

## 2024-03-15 PROCEDURE — 85025 COMPLETE CBC W/AUTO DIFF WBC: CPT | Performed by: SURGERY

## 2024-03-15 PROCEDURE — 97530 THERAPEUTIC ACTIVITIES: CPT

## 2024-03-15 PROCEDURE — 94664 DEMO&/EVAL PT USE INHALER: CPT

## 2024-03-15 PROCEDURE — 99233 SBSQ HOSP IP/OBS HIGH 50: CPT | Performed by: INTERNAL MEDICINE

## 2024-03-15 PROCEDURE — 93010 ELECTROCARDIOGRAM REPORT: CPT | Performed by: INTERNAL MEDICINE

## 2024-03-15 PROCEDURE — 25010000002 HYDROMORPHONE 1 MG/ML SOLUTION: Performed by: SURGERY

## 2024-03-15 PROCEDURE — 82948 REAGENT STRIP/BLOOD GLUCOSE: CPT | Performed by: SURGERY

## 2024-03-15 PROCEDURE — 84132 ASSAY OF SERUM POTASSIUM: CPT | Performed by: INTERNAL MEDICINE

## 2024-03-15 PROCEDURE — 80053 COMPREHEN METABOLIC PANEL: CPT | Performed by: INTERNAL MEDICINE

## 2024-03-15 PROCEDURE — 94799 UNLISTED PULMONARY SVC/PX: CPT

## 2024-03-15 PROCEDURE — 63710000001 INSULIN LISPRO (HUMAN) PER 5 UNITS: Performed by: INTERNAL MEDICINE

## 2024-03-15 PROCEDURE — 99024 POSTOP FOLLOW-UP VISIT: CPT | Performed by: SURGERY

## 2024-03-15 PROCEDURE — 25010000002 CEFEPIME PER 500 MG: Performed by: SURGERY

## 2024-03-15 PROCEDURE — 25010000002 CEFEPIME PER 500 MG: Performed by: INTERNAL MEDICINE

## 2024-03-15 PROCEDURE — 25010000002 KETOROLAC TROMETHAMINE PER 15 MG: Performed by: SURGERY

## 2024-03-15 PROCEDURE — 63710000001 INSULIN GLARGINE PER 5 UNITS: Performed by: INTERNAL MEDICINE

## 2024-03-15 PROCEDURE — 82948 REAGENT STRIP/BLOOD GLUCOSE: CPT

## 2024-03-15 PROCEDURE — 97110 THERAPEUTIC EXERCISES: CPT

## 2024-03-15 PROCEDURE — 25010000002 FUROSEMIDE PER 20 MG: Performed by: INTERNAL MEDICINE

## 2024-03-15 PROCEDURE — 94761 N-INVAS EAR/PLS OXIMETRY MLT: CPT

## 2024-03-15 PROCEDURE — 63710000001 INSULIN LISPRO (HUMAN) PER 5 UNITS: Performed by: SURGERY

## 2024-03-15 PROCEDURE — 25010000002 METRONIDAZOLE 500 MG/100ML SOLUTION: Performed by: INTERNAL MEDICINE

## 2024-03-15 PROCEDURE — 97116 GAIT TRAINING THERAPY: CPT

## 2024-03-15 PROCEDURE — 97535 SELF CARE MNGMENT TRAINING: CPT

## 2024-03-15 PROCEDURE — 93005 ELECTROCARDIOGRAM TRACING: CPT | Performed by: INTERNAL MEDICINE

## 2024-03-15 PROCEDURE — 71045 X-RAY EXAM CHEST 1 VIEW: CPT

## 2024-03-15 PROCEDURE — 85007 BL SMEAR W/DIFF WBC COUNT: CPT | Performed by: SURGERY

## 2024-03-15 PROCEDURE — 25010000002 HEPARIN (PORCINE) PER 1000 UNITS: Performed by: SURGERY

## 2024-03-15 RX ORDER — POTASSIUM CHLORIDE 20 MEQ/1
40 TABLET, EXTENDED RELEASE ORAL EVERY 4 HOURS
Status: COMPLETED | OUTPATIENT
Start: 2024-03-15 | End: 2024-03-15

## 2024-03-15 RX ORDER — METRONIDAZOLE 500 MG/1
500 TABLET ORAL EVERY 8 HOURS SCHEDULED
Qty: 33 TABLET | Refills: 0 | Status: DISCONTINUED | OUTPATIENT
Start: 2024-03-15 | End: 2024-03-17 | Stop reason: HOSPADM

## 2024-03-15 RX ORDER — METOPROLOL SUCCINATE 25 MG/1
25 TABLET, EXTENDED RELEASE ORAL
Status: DISCONTINUED | OUTPATIENT
Start: 2024-03-15 | End: 2024-03-16

## 2024-03-15 RX ORDER — AMIODARONE HYDROCHLORIDE 200 MG/1
200 TABLET ORAL EVERY 12 HOURS SCHEDULED
Status: DISCONTINUED | OUTPATIENT
Start: 2024-03-15 | End: 2024-03-17 | Stop reason: HOSPADM

## 2024-03-15 RX ADMIN — METOPROLOL SUCCINATE 25 MG: 25 TABLET, FILM COATED, EXTENDED RELEASE ORAL at 12:26

## 2024-03-15 RX ADMIN — ACETAMINOPHEN 1000 MG: 500 TABLET, FILM COATED ORAL at 09:01

## 2024-03-15 RX ADMIN — DOCUSATE SODIUM 100 MG: 100 CAPSULE, LIQUID FILLED ORAL at 09:01

## 2024-03-15 RX ADMIN — INSULIN LISPRO 4 UNITS: 100 INJECTION, SOLUTION INTRAVENOUS; SUBCUTANEOUS at 17:03

## 2024-03-15 RX ADMIN — AMIODARONE HYDROCHLORIDE 200 MG: 200 TABLET ORAL at 20:35

## 2024-03-15 RX ADMIN — Medication 1 PACKET: at 20:35

## 2024-03-15 RX ADMIN — GABAPENTIN 300 MG: 300 CAPSULE ORAL at 16:24

## 2024-03-15 RX ADMIN — OXYCODONE 5 MG: 5 TABLET ORAL at 01:53

## 2024-03-15 RX ADMIN — INSULIN LISPRO 4 UNITS: 100 INJECTION, SOLUTION INTRAVENOUS; SUBCUTANEOUS at 21:03

## 2024-03-15 RX ADMIN — INSULIN LISPRO 7 UNITS: 100 INJECTION, SOLUTION INTRAVENOUS; SUBCUTANEOUS at 08:59

## 2024-03-15 RX ADMIN — HEPARIN SODIUM 5000 UNITS: 5000 INJECTION, SOLUTION INTRAVENOUS; SUBCUTANEOUS at 05:06

## 2024-03-15 RX ADMIN — BUDESONIDE 0.5 MG: 0.5 INHALANT RESPIRATORY (INHALATION) at 06:35

## 2024-03-15 RX ADMIN — ACETAMINOPHEN 1000 MG: 500 TABLET, FILM COATED ORAL at 20:37

## 2024-03-15 RX ADMIN — IPRATROPIUM BROMIDE 0.5 MG: 0.5 SOLUTION RESPIRATORY (INHALATION) at 18:40

## 2024-03-15 RX ADMIN — GUAIFENESIN 1200 MG: 600 TABLET, EXTENDED RELEASE ORAL at 20:37

## 2024-03-15 RX ADMIN — POTASSIUM CHLORIDE 40 MEQ: 1500 TABLET, EXTENDED RELEASE ORAL at 12:30

## 2024-03-15 RX ADMIN — CEFEPIME 2000 MG: 2 INJECTION, POWDER, FOR SOLUTION INTRAVENOUS at 01:54

## 2024-03-15 RX ADMIN — ATORVASTATIN CALCIUM 40 MG: 40 TABLET, FILM COATED ORAL at 09:01

## 2024-03-15 RX ADMIN — INSULIN LISPRO 6 UNITS: 100 INJECTION, SOLUTION INTRAVENOUS; SUBCUTANEOUS at 12:23

## 2024-03-15 RX ADMIN — POTASSIUM CHLORIDE 40 MEQ: 1500 TABLET, EXTENDED RELEASE ORAL at 09:01

## 2024-03-15 RX ADMIN — CEFEPIME 2000 MG: 2 INJECTION, POWDER, FOR SOLUTION INTRAVENOUS at 10:22

## 2024-03-15 RX ADMIN — INSULIN LISPRO 7 UNITS: 100 INJECTION, SOLUTION INTRAVENOUS; SUBCUTANEOUS at 12:23

## 2024-03-15 RX ADMIN — BUDESONIDE 0.5 MG: 0.5 INHALANT RESPIRATORY (INHALATION) at 18:44

## 2024-03-15 RX ADMIN — INSULIN GLARGINE 20 UNITS: 100 INJECTION, SOLUTION SUBCUTANEOUS at 20:35

## 2024-03-15 RX ADMIN — GABAPENTIN 300 MG: 300 CAPSULE ORAL at 09:01

## 2024-03-15 RX ADMIN — METRONIDAZOLE 500 MG: 500 INJECTION, SOLUTION INTRAVENOUS at 01:54

## 2024-03-15 RX ADMIN — POLYETHYLENE GLYCOL 3350 17 G: 17 POWDER, FOR SOLUTION ORAL at 09:01

## 2024-03-15 RX ADMIN — GABAPENTIN 300 MG: 300 CAPSULE ORAL at 20:35

## 2024-03-15 RX ADMIN — CEFEPIME 2000 MG: 2 INJECTION, POWDER, FOR SOLUTION INTRAVENOUS at 17:03

## 2024-03-15 RX ADMIN — Medication 1 PACKET: at 09:12

## 2024-03-15 RX ADMIN — INSULIN LISPRO 4 UNITS: 100 INJECTION, SOLUTION INTRAVENOUS; SUBCUTANEOUS at 09:00

## 2024-03-15 RX ADMIN — HEPARIN SODIUM 5000 UNITS: 5000 INJECTION, SOLUTION INTRAVENOUS; SUBCUTANEOUS at 22:09

## 2024-03-15 RX ADMIN — Medication 10 ML: at 20:41

## 2024-03-15 RX ADMIN — KETOROLAC TROMETHAMINE 15 MG: 30 INJECTION INTRAMUSCULAR; INTRAVENOUS at 20:37

## 2024-03-15 RX ADMIN — ACETAMINOPHEN 1000 MG: 500 TABLET, FILM COATED ORAL at 16:24

## 2024-03-15 RX ADMIN — FUROSEMIDE 40 MG: 10 INJECTION, SOLUTION INTRAMUSCULAR; INTRAVENOUS at 09:01

## 2024-03-15 RX ADMIN — GUAIFENESIN 1200 MG: 600 TABLET, EXTENDED RELEASE ORAL at 09:01

## 2024-03-15 RX ADMIN — AMIODARONE HYDROCHLORIDE 200 MG: 200 TABLET ORAL at 12:26

## 2024-03-15 RX ADMIN — DOCUSATE SODIUM 100 MG: 100 CAPSULE, LIQUID FILLED ORAL at 20:37

## 2024-03-15 RX ADMIN — METRONIDAZOLE 500 MG: 500 INJECTION, SOLUTION INTRAVENOUS at 09:02

## 2024-03-15 RX ADMIN — HYDROMORPHONE HYDROCHLORIDE 0.5 MG: 1 INJECTION, SOLUTION INTRAMUSCULAR; INTRAVENOUS; SUBCUTANEOUS at 22:10

## 2024-03-15 RX ADMIN — IPRATROPIUM BROMIDE 0.5 MG: 0.5 SOLUTION RESPIRATORY (INHALATION) at 15:05

## 2024-03-15 RX ADMIN — IPRATROPIUM BROMIDE 0.5 MG: 0.5 SOLUTION RESPIRATORY (INHALATION) at 10:40

## 2024-03-15 RX ADMIN — METRONIDAZOLE 500 MG: 500 TABLET ORAL at 14:30

## 2024-03-15 RX ADMIN — KETOROLAC TROMETHAMINE 15 MG: 30 INJECTION INTRAMUSCULAR; INTRAVENOUS at 12:23

## 2024-03-15 RX ADMIN — INSULIN LISPRO 7 UNITS: 100 INJECTION, SOLUTION INTRAVENOUS; SUBCUTANEOUS at 17:03

## 2024-03-15 RX ADMIN — ACETYLCYSTEINE 3 ML: 200 SOLUTION ORAL; RESPIRATORY (INHALATION) at 06:35

## 2024-03-15 RX ADMIN — IPRATROPIUM BROMIDE 0.5 MG: 0.5 SOLUTION RESPIRATORY (INHALATION) at 06:35

## 2024-03-15 RX ADMIN — METRONIDAZOLE 500 MG: 500 TABLET ORAL at 22:09

## 2024-03-15 RX ADMIN — KETOROLAC TROMETHAMINE 15 MG: 30 INJECTION INTRAMUSCULAR; INTRAVENOUS at 05:06

## 2024-03-15 RX ADMIN — Medication 5000 UNITS: at 09:12

## 2024-03-15 RX ADMIN — HEPARIN SODIUM 5000 UNITS: 5000 INJECTION, SOLUTION INTRAVENOUS; SUBCUTANEOUS at 14:30

## 2024-03-15 NOTE — PROGRESS NOTES
Daily Progress Note          Assessment    Acute hypoxic respiratory failure   Pneumonia possible aspiration  Empyema: Status post video-assisted decortication 3/11/2024  Sepsis  HTN  DM II  Hyperlipidemia  Rectal cancer adenocarcinoma  Hyponatremia  AAA  Tobacco abuse   A-fib with RVR           PLAN:  Patient is gradually improving, heart rate 100-1 10  He is feeling better after removal of the chest tubes by CTS    Mucomyst nebulized to finish 3/15/2024  Diuresis    oxygen supplement and titration to maintain saturation 90-95%: Currently requiring 7 L per nasal cannula    Broad-spectrum antibiotics   Encouraged use I-S flutter valve  Bronchodilators  Inhaled corticosteroids  Incentive spirometer/Flutter valve  Electrolytes/ glycemic control  Mucinex  HR/blood pressure control as per cardiology  Glucose control  DVT  prophylaxis     I personally reviewed the radiological images               LOS: 6 days     Subjective     Patient reports cough and shortness of breath    Objective     Vital signs for last 24 hours:  Vitals:    03/15/24 1000 03/15/24 1040 03/15/24 1044 03/15/24 1100   BP:    129/80   Patient Position:       Pulse: 111 116 110 111   Resp:  20 20    Temp:       TempSrc:       SpO2: 95% 94% 97% 97%   Weight:       Height:           Intake/Output last 3 shifts:  I/O last 3 completed shifts:  In: 4050 [P.O.:2160; I.V.:1390; IV Piggyback:500]  Out: 8365 [Urine:8215; Chest Tube:150]  Intake/Output this shift:  I/O this shift:  In: 200 [P.O.:200]  Out: 1025 [Urine:1025]      Radiology  Imaging Results (Last 24 Hours)       Procedure Component Value Units Date/Time    XR Chest 1 View [[id number]] Collected: 03/15/24 0740     Updated: 03/15/24 0745    Narrative:      XR CHEST 1 VW    Date of Exam: 3/15/2024 6:13 AM EDT    Indication: Post Op Lung Surgery    Comparison: 3/14/2024.    Findings:  The more lateral of the 2 left chest tubes has been removed. The medial chest tube remains in place. There is no  identifiable pneumothorax. There is continued infiltrate of the left lower lobe with small left effusion. There is slight atelectasis of the   right lung base with probable minimal right effusion. Right chest wall port is unchanged in position.      Impression:      Impression:  Interval removal of one of the chest tube. No evidence of left pneumothorax. Continued left lower lobe infiltrate with small left effusion.      Electronically Signed: Hodan Allen MD    3/15/2024 7:42 AM EDT    Workstation ID: [id number]            Labs:  Results from last 7 days   Lab Units 03/15/24  0506   WBC 10*3/mm3 14.20*   HEMOGLOBIN g/dL 9.3*   HEMATOCRIT % 30.3*   PLATELETS 10*3/mm3 405     Results from last 7 days   Lab Units 03/15/24  0506   SODIUM mmol/L 136   POTASSIUM mmol/L 3.6   CHLORIDE mmol/L 93*   CO2 mmol/L 34.0*   BUN mg/dL 14   CREATININE mg/dL 0.38*   CALCIUM mg/dL 9.4   BILIRUBIN mg/dL 0.2   ALK PHOS U/L 141*   ALT (SGPT) U/L 19   AST (SGOT) U/L 34   GLUCOSE mg/dL 252*     Results from last 7 days   Lab Units 03/12/24  1012   PH, ARTERIAL pH units 7.420   PO2 ART mm Hg 85.4   PCO2, ARTERIAL mm Hg 41.6   HCO3 ART mmol/L 27.0     Results from last 7 days   Lab Units 03/15/24  0506 03/14/24  0603 03/13/24  0730   ALBUMIN g/dL 2.8* 2.7* 2.6*     Results from last 7 days   Lab Units 03/09/24  1701   HSTROP T ng/L 10         Results from last 7 days   Lab Units 03/11/24  2104   MAGNESIUM mg/dL 1.9     Results from last 7 days   Lab Units 03/10/24  2236   INR  1.21*   APTT seconds 31.2*               Meds:   SCHEDULE  acetaminophen, 1,000 mg, Oral, TID  amiodarone, 200 mg, Oral, Q12H  [Held by provider] amLODIPine, 10 mg, Oral, Daily  atorvastatin, 40 mg, Oral, Daily  budesonide, 0.5 mg, Nebulization, BID - RT  cefepime, 2,000 mg, Intravenous, Q8H  [Held by provider] cloNIDine, 0.1 mg, Oral, BID  docusate sodium, 100 mg, Oral, BID  furosemide, 40 mg, Intravenous, Daily  gabapentin, 300 mg, Oral, TID  guaiFENesin, 1,200 mg,  Oral, Q12H  heparin (porcine), 5,000 Units, Subcutaneous, Q8H  [Held by provider] hydrALAZINE, 50 mg, Oral, Q8H  [Held by provider] lisinopril, 20 mg, Oral, Q24H   And  [Held by provider] hydroCHLOROthiazide, 25 mg, Oral, Q24H  insulin glargine, 20 Units, Subcutaneous, Nightly  insulin lispro, 2-9 Units, Subcutaneous, 4x Daily AC & at Bedtime  insulin lispro, 7 Units, Subcutaneous, TID With Meals  ipratropium, 0.5 mg, Nebulization, 4x Daily - RT  Salvador, 1 packet, Oral, BID  ketorolac, 15 mg, Intravenous, Q8H  metoprolol succinate XL, 25 mg, Oral, Q24H  metroNIDAZOLE, 500 mg, Intravenous, Q8H  polyethylene glycol, 17 g, Oral, Daily  potassium chloride ER, 40 mEq, Oral, Q4H  vitamin D3, 5,000 Units, Oral, Daily      Infusions       PRNs    benzonatate    Calcium Replacement - Follow Nurse / BPA Driven Protocol    dextrose    dextrose    glucagon (human recombinant)    HYDROmorphone    ipratropium-albuterol    Magnesium Standard Dose Replacement - Follow Nurse / BPA Driven Protocol    nitroglycerin    ondansetron ODT **OR** ondansetron    oxyCODONE    Phosphorus Replacement - Follow Nurse / BPA Driven Protocol    Potassium Replacement - Follow Nurse / BPA Driven Protocol    sodium chloride    Physical Exam:  General Appearance:  Alert   HEENT:  Normocephalic, without obvious abnormality, Conjunctiva/corneas clear,.   Nares normal, no drainage     Neck:  Supple, symmetrical, trachea midline.   Lungs /Chest wall:   Bilateral rhonchi with decreased breath sounds in left base, respirations unlabored, symmetrical wall movement.     Heart:  Regular rate and rhythm, S1 S2 normal  Abdomen: Soft, non-tender, no masses, no organomegaly.    Extremities: No edema, no clubbing or cyanosis     ROS  Constitutional: Negative for chills, fever and malaise/fatigue.   HENT: Negative.    Eyes: Negative.    Cardiovascular: Negative.    Respiratory: Positive for cough and shortness of breath.    Skin: Negative.    Musculoskeletal: Negative.     Gastrointestinal: Negative.    Genitourinary: Negative.    Neurological: Generalized weakness    I reviewed the recent clinical results  I personally reviewed the latest radiological studies    Part of this note may be an electronic transcription/translation of spoken language to printed text using the Dragon Dictation System.

## 2024-03-15 NOTE — PROGRESS NOTES
Infectious Diseases Progress Note      LOS: 6 days   Patient Care Team:  Lazaro Paulino MD as PCP - General  Steve Mckeon MD as Consulting Physician (Cardiology)  Lars Wagner MD as Consulting Physician (Hematology and Oncology)    Chief Complaint shortness of breath and chest pain    Subjective       The patient has been afebrile for the last 24 hours.  The patient is currently awake and alert.  Currently on 7 L of oxygen via nasal cannula.  Chest tube has been removed      Review of Systems:   Review of Systems   Constitutional:  Positive for fatigue.   HENT: Negative.     Eyes: Negative.    Respiratory:  Positive for cough and shortness of breath.    Cardiovascular: Negative.    Gastrointestinal: Negative.    Endocrine: Negative.    Genitourinary: Negative.    Musculoskeletal: Negative.    Skin: Negative.    Neurological: Negative.    Psychiatric/Behavioral: Negative.     All other systems reviewed and are negative.       Objective     Vital Signs  Temp:  [97.7 °F (36.5 °C)-97.9 °F (36.6 °C)] 97.8 °F (36.6 °C)  Heart Rate:  [104-121] 111  Resp:  [20-24] 20  BP: (129-163)/(77-98) 129/80    Physical Exam:  Physical Exam  Vitals and nursing note reviewed.   Constitutional:       General: He is not in acute distress.     Appearance: He is well-developed and normal weight. He is ill-appearing. He is not diaphoretic.   HENT:      Head: Normocephalic and atraumatic.   Eyes:      Conjunctiva/sclera: Conjunctivae normal.      Pupils: Pupils are equal, round, and reactive to light.   Cardiovascular:      Rate and Rhythm: Normal rate and regular rhythm.      Heart sounds: Normal heart sounds, S1 normal and S2 normal.   Pulmonary:      Effort: Pulmonary effort is normal. No respiratory distress.      Breath sounds: No stridor. Wheezing present. No rales.      Comments: Decreased breathing sounds at the left base      Abdominal:      General: Bowel sounds are normal. There is no distension.      Palpations:  Abdomen is soft. There is no mass.      Tenderness: There is no abdominal tenderness. There is no guarding.   Musculoskeletal:         General: No deformity. Normal range of motion.      Cervical back: Neck supple.   Skin:     General: Skin is warm and dry.      Coloration: Skin is not pale.      Findings: No erythema or rash.      Comments: Port   Neurological:      Mental Status: He is alert and oriented to person, place, and time.      Cranial Nerves: No cranial nerve deficit.   Psychiatric:         Mood and Affect: Mood normal.          Results Review:    I have reviewed all clinical data, test, lab, and imaging results.     Radiology  XR Chest 1 View    Result Date: 3/15/2024  XR CHEST 1 VW Date of Exam: 3/15/2024 6:13 AM EDT Indication: Post Op Lung Surgery Comparison: 3/14/2024. Findings: The more lateral of the 2 left chest tubes has been removed. The medial chest tube remains in place. There is no identifiable pneumothorax. There is continued infiltrate of the left lower lobe with small left effusion. There is slight atelectasis of the right lung base with probable minimal right effusion. Right chest wall port is unchanged in position.     Impression: Interval removal of one of the chest tube. No evidence of left pneumothorax. Continued left lower lobe infiltrate with small left effusion. Electronically Signed: Hodan Allen MD  3/15/2024 7:42 AM EDT  Workstation ID: [id number]     Cardiology    Laboratory    Results from last 7 days   Lab Units 03/15/24  0506 03/14/24  0603 03/13/24  0730 03/12/24  0521 03/11/24  2104 03/10/24  2236 03/09/24  2351   WBC 10*3/mm3 14.20* 14.99* 12.47* 12.70* 10.20 5.70 8.20   HEMOGLOBIN g/dL 9.3* 9.3* 8.9* 9.2* 9.5* 9.4* 10.1*   HEMOGLOBIN, POC g/dL  --   --   --   --  9.5*  --   --    HEMATOCRIT % 30.3* 30.4* 29.2* 28.0* 29.0* 28.9* 30.6*   HEMATOCRIT POC %  --   --   --   --  28*  --   --    PLATELETS 10*3/mm3 405 429 440 469* 503* 355 396     Results from last 7 days    Lab Units 03/15/24  0506 03/14/24  0603 03/13/24  2131 03/13/24  0730 03/12/24  0521 03/11/24  2104 03/10/24  2236 03/09/24  2351 03/09/24  1701   SODIUM mmol/L 136 136  --  138 136 135* 134* 131* 130*   POTASSIUM mmol/L 3.6 3.8 3.7 2.9* 3.8 3.5 3.4* 4.0 4.5   CHLORIDE mmol/L 93* 95*  --  99 100 99 95* 95* 88*   CO2 mmol/L 34.0* 34.0*  --  29.0 27.0 26.0 28.0 26.0 26.0   BUN mg/dL 14 10  --  11 9 8 7* 8 11   CREATININE mg/dL 0.38* 0.34*  --  0.38* 0.46* 0.43* 0.36* 0.40* 0.56*   GLUCOSE mg/dL 252* 215*  --  307* 249* 137* 110* 182* 291*   ALBUMIN g/dL 2.8* 2.7*  --  2.6*  --   --   --   --  3.1*   BILIRUBIN mg/dL 0.2 0.2  --  0.2  --   --   --   --  0.3   ALK PHOS U/L 141* 131*  --  125*  --   --   --   --  118*   AST (SGOT) U/L 34 24  --  24  --   --   --   --  33   ALT (SGPT) U/L 19 20  --  23  --   --   --   --  50*   CALCIUM mg/dL 9.4 8.9  --  8.9 10.0 10.1 10.1 9.8 11.1*                 Microbiology   Microbiology Results (last 10 days)       Procedure Component Value - Date/Time    Body Fluid Culture - Body Fluid, Pleural Cavity [[id number]] Collected: 03/11/24 1840    Lab Status: Final result Specimen: Body Fluid from Pleural Cavity Updated: 03/14/24 0631     Body Fluid Culture No growth at 3 days     Gram Stain Many (4+) WBCs per low power field      No organisms seen    Anaerobic Culture - Tissue, Pleura [[id number]]  (Normal) Collected: 03/11/24 1839    Lab Status: Preliminary result Specimen: Tissue from Pleura Updated: 03/14/24 0711     Anaerobic Culture No anaerobes isolated at 3 days    AFB Culture - Tissue, Pleura [[id number]] Collected: 03/11/24 1839    Lab Status: Preliminary result Specimen: Tissue from Pleura Updated: 03/12/24 0836     AFB Stain No acid fast bacilli seen on concentrated smear    S. Pneumo Ag Urine or CSF - Urine, Urine, Clean Catch [[id number]]  (Normal) Collected: 03/10/24 2111    Lab Status: Final result Specimen: Urine, Clean Catch Updated: 03/10/24 2256     Strep Pneumo Ag  Negative    Respiratory Culture - Sputum, Cough [[id number]] Collected: 03/10/24 2102    Lab Status: Final result Specimen: Sputum from Cough Updated: 03/12/24 0941     Respiratory Culture Scant growth (1+) Normal respiratory dejon. No S. aureus or Pseudomonas aeruginosa detected. Final report.     Gram Stain Many (4+) WBCs per low power field      No Epithelial cells seen      Few (2+) Gram positive cocci in chains    MRSA Screen, PCR (Inpatient) - Swab, Nares [[id number]]  (Normal) Collected: 03/10/24 2056    Lab Status: Final result Specimen: Swab from Nares Updated: 03/10/24 2248     MRSA PCR No MRSA Detected    Narrative:      The negative predictive value of this diagnostic test is high and should only be used to consider de-escalating anti-MRSA therapy. A positive result may indicate colonization with MRSA and must be correlated clinically.    MRSA Screen, PCR (Inpatient) - Swab, Nares [[id number]]  (Normal) Collected: 03/09/24 2351    Lab Status: Final result Specimen: Swab from Nares Updated: 03/10/24 0103     MRSA PCR No MRSA Detected    Narrative:      The negative predictive value of this diagnostic test is high and should only be used to consider de-escalating anti-MRSA therapy. A positive result may indicate colonization with MRSA and must be correlated clinically.    Blood Culture - Blood, Arm, Right [[id number]]  (Normal) Collected: 03/09/24 1745    Lab Status: Final result Specimen: Blood from Arm, Right Updated: 03/14/24 1900     Blood Culture No growth at 5 days    Blood Culture - Blood, Arm, Left [[id number]]  (Normal) Collected: 03/09/24 1701    Lab Status: Final result Specimen: Blood from Arm, Left Updated: 03/14/24 1815     Blood Culture No growth at 5 days    Respiratory Panel PCR w/COVID-19(SARS-CoV-2) [facility] In-House, NP Swab in UTM/VTM, 2 HR TAT - Swab, Nasopharynx [[id number]]  (Normal) Collected: 03/09/24 1701    Lab Status: Final result Specimen: Swab from Nasopharynx  Updated: 03/09/24 1756     ADENOVIRUS, PCR Not Detected     Coronavirus 229E Not Detected     Coronavirus HKU1 Not Detected     Coronavirus NL63 Not Detected     Coronavirus OC43 Not Detected     COVID19 Not Detected     Human Metapneumovirus Not Detected     Human Rhinovirus/Enterovirus Not Detected     Influenza A PCR Not Detected     Influenza B PCR Not Detected     Parainfluenza Virus 1 Not Detected     Parainfluenza Virus 2 Not Detected     Parainfluenza Virus 3 Not Detected     Parainfluenza Virus 4 Not Detected     RSV, PCR Not Detected     Bordetella pertussis pcr Not Detected     Bordetella parapertussis PCR Not Detected     Chlamydophila pneumoniae PCR Not Detected     Mycoplasma pneumo by PCR Not Detected    Narrative:      In the setting of a positive respiratory panel with a viral infection PLUS a negative procalcitonin without other underlying concern for bacterial infection, consider observing off antibiotics or discontinuation of antibiotics and continue supportive care. If the respiratory panel is positive for atypical bacterial infection (Bordetella pertussis, Chlamydophila pneumoniae, or Mycoplasma pneumoniae), consider antibiotic de-escalation to target atypical bacterial infection.            Medication Review:       Schedule Meds  acetaminophen, 1,000 mg, Oral, TID  amiodarone, 200 mg, Oral, Q12H  [Held by provider] amLODIPine, 10 mg, Oral, Daily  atorvastatin, 40 mg, Oral, Daily  budesonide, 0.5 mg, Nebulization, BID - RT  cefepime, 2,000 mg, Intravenous, Q8H  [Held by provider] cloNIDine, 0.1 mg, Oral, BID  docusate sodium, 100 mg, Oral, BID  furosemide, 40 mg, Intravenous, Daily  gabapentin, 300 mg, Oral, TID  guaiFENesin, 1,200 mg, Oral, Q12H  heparin (porcine), 5,000 Units, Subcutaneous, Q8H  [Held by provider] hydrALAZINE, 50 mg, Oral, Q8H  [Held by provider] lisinopril, 20 mg, Oral, Q24H   And  [Held by provider] hydroCHLOROthiazide, 25 mg, Oral, Q24H  insulin glargine, 20 Units,  Subcutaneous, Nightly  insulin lispro, 2-9 Units, Subcutaneous, 4x Daily AC & at Bedtime  insulin lispro, 7 Units, Subcutaneous, TID With Meals  ipratropium, 0.5 mg, Nebulization, 4x Daily - RT  Salvador, 1 packet, Oral, BID  ketorolac, 15 mg, Intravenous, Q8H  metoprolol succinate XL, 25 mg, Oral, Q24H  metroNIDAZOLE, 500 mg, Intravenous, Q8H  polyethylene glycol, 17 g, Oral, Daily  vitamin D3, 5,000 Units, Oral, Daily        Infusion Meds         PRN Meds    benzonatate    Calcium Replacement - Follow Nurse / BPA Driven Protocol    dextrose    dextrose    glucagon (human recombinant)    HYDROmorphone    ipratropium-albuterol    Magnesium Standard Dose Replacement - Follow Nurse / BPA Driven Protocol    nitroglycerin    ondansetron ODT **OR** ondansetron    oxyCODONE    Phosphorus Replacement - Follow Nurse / BPA Driven Protocol    Potassium Replacement - Follow Nurse / BPA Driven Protocol    sodium chloride        Assessment & Plan       Antimicrobial Therapy   1.  IV Flagyl        2.  IV cefepime        3.         4.        5.            Assessment    Multifocal pneumonia with loculated fluid collection on the left pleural space concerning for empyema.  Patient is s/p decortication on March 11, 2024 and intraoperative cultures are negative so far.  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  Oncology following patient does have a port     Type 2 diabetes-recent A1c is 7.4     Tobacco abuse     Plan       Continue IV cefepime 2 g every 8 hours  Continue Flagyl 500 mg every 8 hours but switch to p.o.  Recommend 2 weeks of the above antibiotics from the surgical date.  Last day of antibiotics will be March 25, 2024  The patient has a port which can be used for the remaining course of IV cefepime after discharge  Continue supportive care  A.m. labs  Try to wean down oxygen    Luz Marina King  MD  03/15/24  13:06 EDT    Note is dictated utilizing voice recognition software/Dragon

## 2024-03-15 NOTE — PLAN OF CARE
Goal Outcome Evaluation:   Assessment: Prashant Zhou is POD #4 left decortation, pleurectomy. Pt presents with functional mobility impairments which indicate the need for skilled intervention. Pt is min-A with ambulation using RW. Pt tends to put more weight on LLE throughout gait, some lateral leaning to the L noted. Pt became tachycardic with light activity, increasing into the high 130s. Pt reported RPE of 5/10 with standing therapeutic exercises. Pt is currently on 7 L HF. Titrated to 8L HF with activity, sat remaining >90%. Tolerating session today without incident. Will continue to follow and progress as tolerated.     Plan/Recommendations:   If medically appropriate, High Intensity Therapy recommended post-acute care. This is recommended as therapy feels the patient would require 5-6 days per week, 2-3 hours per day. At this time, inpatient rehabilitation (acute rehab) would be the first choice and SNF would be second. Pt requires no DME at discharge.

## 2024-03-15 NOTE — PLAN OF CARE
"Goal Outcome Evaluation:    Chest tube was removed this morning by Dr. Keller at bedside. Patient has been up walking with PT and sat in the chair for several hours this morning. Dr. Mckeon rounded and said, \"Order 200 BID PO amio and lopressor. Two hours after you give the PO amio then turn the amio gtt off.\" V/S WNL.  "

## 2024-03-15 NOTE — THERAPY TREATMENT NOTE
Subjective: Pt agreeable to therapeutic plan of care.  Cognition: oriented to Person, Place, Time, and Situation    Objective:     Bed Mobility: N/A or Not attempted. Pt sitting in chair on arrival   Functional Transfers: CGA-min A and with rolling walker for a total of 5 STS from chair      Balance: with UE support and standing CGA and Min-A  Functional Ambulation: CGA and with rolling walker short household distance     Grooming: CGA  ADL Position: unsupported standing  ADL Comments: Standing at sink for oral hygiene     Vitals: WNL    Pain: 0 VAS  Location:   Interventions for pain: N/A  Education: Provided education on the importance of mobility in the acute care setting, Verbal/Tactile Cues, ADL training, and Transfer Training      Assessment: Prashant Zhou presents with ADL impairments affecting function including balance, endurance / activity tolerance, and strength. Pt able to stand from chair and walk to the sink for grooming with CGA-min A. Pt returned to chair and able to perform 4 STS with CGA-min A and rest breaks between. Demonstrated functioning below baseline abilities indicate the need for continued skilled intervention while inpatient. Tolerating session today without incident. Will continue to follow and progress as tolerated.     Plan/Recommendations:   High Intensity Therapy recommended post-acute care. This is recommended as therapy feels the patient would require 5-6 days per week, 2-3 hours per day. At this time, inpatient rehabilitation (acute rehab) would be the first choice and SNF would be second.. Pt requires no DME at discharge.     Pt desires Inpatient Rehabilitation placement at discharge. Pt cooperative; agreeable to therapeutic recommendations and plan of care.     Modified Wilcox: N/A = No pre-op stroke/TIA    Post-Tx Position: Up in Chair, Alarms activated, and Call light and personal items within reach  PPE: gloves

## 2024-03-15 NOTE — CASE MANAGEMENT/SOCIAL WORK
Continued Stay Note  NAZARIO Aguilar     Patient Name: Prashant Zhou  MRN: [id number]  Today's Date: 3/15/2024    Admit Date: 3/9/2024    Plan: DC PLAN: [facility] pulmonary Rehab accepted. No precert required. No PASRR required       Discharge Plan       Row Name 03/15/24 1355       Plan    Plan DC PLAN: [facility] pulmonary Rehab accepted. No precert required. No PASRR required    Plan Comments DC BARRIERS: Decreased Blood Pressure. IV Albumin. BIPAP                      Expected Discharge Date and Time       Expected Discharge Date Expected Discharge Time    Mar 16, 2024           Rosa Frederick RN  Case Management

## 2024-03-15 NOTE — PLAN OF CARE
Assessment: Prashant Zhou presents with ADL impairments affecting function including balance, endurance / activity tolerance, and strength. Pt able to stand from chair and walk to the sink for grooming with CGA-min A. Pt returned to chair and able to perform 4 STS with CGA-min A and rest breaks between. Demonstrated functioning below baseline abilities indicate the need for continued skilled intervention while inpatient. Tolerating session today without incident. Will continue to follow and progress as tolerated.     Plan/Recommendations:   High Intensity Therapy recommended post-acute care. This is recommended as therapy feels the patient would require 5-6 days per week, 2-3 hours per day. At this time, inpatient rehabilitation (acute rehab) would be the first choice and SNF would be second.. Pt requires no DME at discharge.

## 2024-03-15 NOTE — PROGRESS NOTES
CARDIOLOGY PROGRESS NOTE:    Prashant Zhou  63 y.o.  male  1960  [id number]      Referring Provider: Intensivist    Reason for follow-up: Shortness of breath and A-fib     Patient Care Team:  Lazaro Paulino MD as PCP - General  Steve Mckeon MD as Consulting Physician (Cardiology)  Lars Wagner MD as Consulting Physician (Hematology and Oncology)    Subjective .  Doing well without any chest pain or shortness of breath but still has high heart rates      Objective sitting in chair comfortably     Review of Systems   Constitutional: Negative for fever and malaise/fatigue.   HENT:  Negative for ear pain and nosebleeds.    Eyes:  Negative for blurred vision and double vision.   Cardiovascular:  Negative for chest pain, dyspnea on exertion and palpitations.   Respiratory:  Negative for cough and shortness of breath.    Skin:  Negative for rash.   Musculoskeletal:  Negative for joint pain.   Gastrointestinal:  Negative for abdominal pain, nausea and vomiting.   Neurological:  Negative for focal weakness and headaches.   Psychiatric/Behavioral:  Negative for depression. The patient is not nervous/anxious.    All other systems reviewed and are negative.      Allergies: Patient has no known allergies.    Scheduled Meds:acetaminophen, 1,000 mg, Oral, TID  amiodarone, 200 mg, Oral, Q12H  [Held by provider] amLODIPine, 10 mg, Oral, Daily  atorvastatin, 40 mg, Oral, Daily  budesonide, 0.5 mg, Nebulization, BID - RT  cefepime, 2,000 mg, Intravenous, Q8H  [Held by provider] cloNIDine, 0.1 mg, Oral, BID  docusate sodium, 100 mg, Oral, BID  furosemide, 40 mg, Intravenous, Daily  gabapentin, 300 mg, Oral, TID  guaiFENesin, 1,200 mg, Oral, Q12H  heparin (porcine), 5,000 Units, Subcutaneous, Q8H  [Held by provider] hydrALAZINE, 50 mg, Oral, Q8H  [Held by provider] lisinopril, 20 mg, Oral, Q24H   And  [Held by provider] hydroCHLOROthiazide, 25 mg, Oral, Q24H  insulin glargine, 20 Units, Subcutaneous,  "Nightly  insulin lispro, 2-9 Units, Subcutaneous, 4x Daily AC & at Bedtime  insulin lispro, 7 Units, Subcutaneous, TID With Meals  ipratropium, 0.5 mg, Nebulization, 4x Daily - RT  Salvador, 1 packet, Oral, BID  ketorolac, 15 mg, Intravenous, Q8H  metoprolol succinate XL, 25 mg, Oral, Q24H  metroNIDAZOLE, 500 mg, Oral, Q8H  polyethylene glycol, 17 g, Oral, Daily  vitamin D3, 5,000 Units, Oral, Daily      Continuous Infusions:     PRN Meds:.  benzonatate    Calcium Replacement - Follow Nurse / BPA Driven Protocol    dextrose    dextrose    glucagon (human recombinant)    HYDROmorphone    ipratropium-albuterol    Magnesium Standard Dose Replacement - Follow Nurse / BPA Driven Protocol    nitroglycerin    ondansetron ODT **OR** ondansetron    oxyCODONE    Phosphorus Replacement - Follow Nurse / BPA Driven Protocol    Potassium Replacement - Follow Nurse / BPA Driven Protocol    sodium chloride        VITAL SIGNS  Vitals:    03/15/24 1200 03/15/24 1300 03/15/24 1505 03/15/24 1509   BP:  133/90     Pulse: 102 101 94 93   Resp:   20 20   Temp:       TempSrc:       SpO2: 98% 96% 95% 98%   Weight:       Height:           Flowsheet Rows      Flowsheet Row First Filed Value   Admission Height 190.5 cm (75\") Documented at 03/09/2024 1644   Admission Weight 91.2 kg (201 lb) Documented at 03/09/2024 1644             TELEMETRY: Atrial fibrillation with controlled ventricular response    Physical Exam:  Constitutional:       Appearance: Well-developed.   Eyes:      General: No scleral icterus.     Conjunctiva/sclera: Conjunctivae normal.      Pupils: Pupils are equal, round, and reactive to light.   HENT:      Head: Normocephalic and atraumatic.   Neck:      Vascular: No carotid bruit or JVD.   Pulmonary:      Effort: Pulmonary effort is normal.      Breath sounds: Decreased breath sounds present. No wheezing. No rales.   Cardiovascular:      Normal rate. Regular rhythm.   Pulses:     Intact distal pulses.   Abdominal:      General: " Bowel sounds are normal.      Palpations: Abdomen is soft.   Musculoskeletal: Normal range of motion.      Cervical back: Normal range of motion and neck supple. Skin:     General: Skin is warm and dry.      Findings: No rash.   Neurological:      Mental Status: Alert.      Comments: No focal deficits          Results Review:   I reviewed the patient's new clinical results.  Lab Results (last 24 hours)       Procedure Component Value Units Date/Time    POC Glucose 4x Daily Before Meals & at Bedtime [[id number]]  (Abnormal) Collected: 03/15/24 1158    Specimen: Blood Updated: 03/15/24 1203     Glucose 275 mg/dL      Comment: Serial Number: [id number]:  [id number]       POC Glucose 4x Daily Before Meals & at Bedtime [[id number]]  (Abnormal) Collected: 03/15/24 0826    Specimen: Blood Updated: 03/15/24 0828     Glucose 240 mg/dL      Comment: Serial Number: [id number]:  [id number]       CBC & Differential [[id number]]  (Abnormal) Collected: 03/15/24 0506    Specimen: Blood Updated: 03/15/24 0616    Narrative:      The following orders were created for panel order CBC & Differential.  Procedure                               Abnormality         Status                     ---------                               -----------         ------                     CBC Auto Differential[[id number]]        Abnormal            Final result               Scan Slide[[id number]]                                       Final result                 Please view results for these tests on the individual orders.    CBC Auto Differential [[id number]]  (Abnormal) Collected: 03/15/24 0506    Specimen: Blood Updated: 03/15/24 0616     WBC 14.20 10*3/mm3      RBC 3.18 10*6/mm3      Hemoglobin 9.3 g/dL      Hematocrit 30.3 %      MCV 95.3 fL      MCH 29.2 pg      MCHC 30.7 g/dL      RDW 17.3 %      RDW-SD 59.1 fl      MPV 9.2 fL      Platelets 405 10*3/mm3     Narrative:      The previously reported component NRBC is no longer being  reported. Previous result was 0.4 /100 WBC (Reference Range: 0.0-0.2 /100 WBC) on 3/15/2024 at 0536 EDT.    Scan Slide [[id number]] Collected: 03/15/24 0506    Specimen: Blood Updated: 03/15/24 0616     Scan Slide --     Comment: See Manual Differential Results       Manual Differential [[id number]]  (Abnormal) Collected: 03/15/24 0506    Specimen: Blood Updated: 03/15/24 0616     Neutrophil % 56.0 %      Lymphocyte % 20.0 %      Monocyte % 9.0 %      Basophil % 1.0 %      Bands %  7.0 %      Metamyelocyte % 1.0 %      Myelocyte % 5.0 %      Promyelocyte % 1.0 %      Neutrophils Absolute 8.95 10*3/mm3      Lymphocytes Absolute 2.84 10*3/mm3      Monocytes Absolute 1.28 10*3/mm3      Basophils Absolute 0.14 10*3/mm3      Anisocytosis Slight/1+     Poikilocytes Slight/1+     Polychromasia Slight/1+     Toxic Granulation Slight/1+     Large Platelets Slight/1+    Narrative:      Reviewed by Pathologist within the past 30 days on 03.14.2024.      Comprehensive Metabolic Panel [[id number]]  (Abnormal) Collected: 03/15/24 0506    Specimen: Blood Updated: 03/15/24 0552     Glucose 252 mg/dL      BUN 14 mg/dL      Creatinine 0.38 mg/dL      Sodium 136 mmol/L      Potassium 3.6 mmol/L      Chloride 93 mmol/L      CO2 34.0 mmol/L      Calcium 9.4 mg/dL      Total Protein 6.7 g/dL      Albumin 2.8 g/dL      ALT (SGPT) 19 U/L      AST (SGOT) 34 U/L      Alkaline Phosphatase 141 U/L      Total Bilirubin 0.2 mg/dL      Globulin 3.9 gm/dL      A/G Ratio 0.7 g/dL      BUN/Creatinine Ratio 36.8     Anion Gap 9.0 mmol/L      eGFR 125.3 mL/min/1.73     Narrative:      GFR Normal >60  Chronic Kidney Disease <60  Kidney Failure <15      POC Glucose Once [[id number]]  (Abnormal) Collected: 03/14/24 1950    Specimen: Blood Updated: 03/14/24 1952     Glucose 281 mg/dL      Comment: Serial Number: [id number]:  [id number]       Blood Culture - Blood, Arm, Right [[id number]]  (Normal) Collected: 03/09/24 1745    Specimen: Blood from Arm,  Right Updated: 03/14/24 1900     Blood Culture No growth at 5 days    Blood Culture - Blood, Arm, Left [[id number]]  (Normal) Collected: 03/09/24 1701    Specimen: Blood from Arm, Left Updated: 03/14/24 1815     Blood Culture No growth at 5 days    POC Glucose 4x Daily Before Meals & at Bedtime [[id number]]  (Abnormal) Collected: 03/14/24 1721    Specimen: Blood Updated: 03/14/24 1722     Glucose 280 mg/dL      Comment: Serial Number: [id number]:  [id number]               Imaging Results (Last 24 Hours)       Procedure Component Value Units Date/Time    XR Chest 1 View [[id number]] Collected: 03/15/24 0740     Updated: 03/15/24 0745    Narrative:      XR CHEST 1 VW    Date of Exam: 3/15/2024 6:13 AM EDT    Indication: Post Op Lung Surgery    Comparison: 3/14/2024.    Findings:  The more lateral of the 2 left chest tubes has been removed. The medial chest tube remains in place. There is no identifiable pneumothorax. There is continued infiltrate of the left lower lobe with small left effusion. There is slight atelectasis of the   right lung base with probable minimal right effusion. Right chest wall port is unchanged in position.      Impression:      Impression:  Interval removal of one of the chest tube. No evidence of left pneumothorax. Continued left lower lobe infiltrate with small left effusion.      Electronically Signed: Hodan Allen MD    3/15/2024 7:42 AM EDT    Workstation ID: [id number]            EKG      I personally viewed and interpreted the patient's EKG/Telemetry data:    ECHOCARDIOGRAM:  Results for orders placed during the hospital encounter of 03/09/24    Adult Transthoracic Echo Complete W/ Cont if Necessary Per Protocol    Interpretation Summary    Left ventricular systolic function is normal. Calculated left ventricular EF = 65% Left ventricular ejection fraction appears to be 61 - 65%.    Left ventricular diastolic function is consistent with (grade I) impaired relaxation.    There is  calcification of the aortic valve mainly affecting the non-coronary cusp(s).    Estimated right ventricular systolic pressure from tricuspid regurgitation is normal (<35 mmHg).    No significant valvular abnormalities noted.       STRESS MYOVIEW:       CARDIAC CATHETERIZATION:  No results found for this or any previous visit.       OTHER:         Assessment & Plan     1 shortness of breath  Patient presented with shortness of breath and had significant pneumonia and is on IV antibiotics  Patient will have an echocardiogram performed  Patient had multifocal pneumonia concerning for empyema and hence a chest tube placed.  Patient thoracoscopy video-assisted with decortication with da Alexandre robot.  Patient has chest tubes draining well and will anticipate removal tomorrow  Patient's chest tubes are removed and is sitting in chair comfortably and doing well     2.  History of aortic dissection  Patient had history of aortic dissection in the past but had medical treatment only at that time and no surgery and is followed by the vascular surgeons    3.  Atrial fibrillation  Patient has new onset atrial fibrillation and is on metoprolol at home which we will restart but he may need anticoagulation once all the tests are done and if required with vascular and oncologist.  Patient is ruled out for MI  Patient is having an echocardiogram performed for LV function valvular abnormalities  Further treatment based on echocardiogram findings  Patient had normal LV systolic function and is currently in sinus rhythm with beta-blockers and hence no further cardiac workup    Patient is having recurrent episodes of atrial fibrillation and is not tolerating beta-blockers because of low blood pressure and hence he is on IV amiodarone.  Will most likely need anticoagulation also for persistent atrial fibrillation will watch his symptoms.  Patient is receiving subcu heparin for now and will switch to oral anticoagulation once his chest  tubes are removed and okay with thoracic surgeons.  Patient is on oral amiodarone and oral beta-blockers now and his IV amiodarone has been stopped.  Patient can be started on anticoagulation by mouth once he is cleared by the thoracic surgeons.    4.  Hypertension  Patient blood pressure currently stable on beta-blockers along with hydralazine and amlodipine  Patient's blood pressure actually lower side and hence his blood pressure medicines have been held.    5.  Diabetes  Patient is on oral medicines and followed by the primary care doctor    6.  Hyperlipidemia  Patient is on statins and the lipid levels are followed by the primary care doctor    7.  Rectal cancer  Patient received chemotherapy and after that he developed a esophagitis which was treated with Diflucan but he is also followed by the oncologist    8.  Acute respiratory failure  Patient is s/p chest tube placement and is currently on Pulmicort and DuoNebs.  Initially was intubated but he was extubated when his oxygenation improved.  Patient also had left empyema and is on antibiotics and CT surgery is seeing the patient.    I discussed the patients findings and my recommendations with patient and nurse    Steve Mckeon MD  03/15/24  15:30 EDT

## 2024-03-15 NOTE — PROGRESS NOTES
[facility]     Progress Note    Patient Name: Prashant Zhou  : 1960  MRN: [id number]  Primary Care Physician:  Lazaro Paulino MD  Date of admission: 3/9/2024  Service date and time: 03/15/24 15:55 EDT  Subjective   Subjective     Chief Complaint: shortness of breath      HPI:  Patient feels better today , denies any shortness of breath, chest tubes were discontinued earlier.      Objective   Objective     Vitals:   Temp:  [97.7 °F (36.5 °C)-97.9 °F (36.6 °C)] 97.8 °F (36.6 °C)  Heart Rate:  [] 93  Resp:  [20-22] 20  BP: (129-163)/(77-98) 133/90  Flow (L/min):  [5-7] 5  Physical Exam    Constitutional: Awake, alert in no distress    Eyes: PERRLA, sclerae anicteric, no conjunctival injection   HENT: NCAT, mucous membranes moist   Neck: Supple, no thyromegaly, no lymphadenopathy, trachea midline   Respiratory: Diminished breath sounds at the bases.   Cardiovascular: RRR, no murmurs, rubs, or gallops, palpable pedal pulses bilaterally   Gastrointestinal: Positive bowel sounds, soft, nontender, nondistended   Musculoskeletal: No bilateral ankle edema, no clubbing or cyanosis to extremities   Psychiatric: Appropriate affect, cooperative   Neurologic: Oriented x 3, strength symmetric in all extremities, Cranial Nerves grossly intact to confrontation, speech clear   Skin: No rashes     Result Review    Result Review:  I have personally reviewed the results from the time of this admission to 3/15/2024 15:55 EDT and agree with these findings:  [x]  Laboratory list / accordion  []  Microbiology  []  Radiology  []  EKG/Telemetry   []  Cardiology/Vascular   []  Pathology  []  Old records  []  Other:  Most notable findings include: +52, BUN 14, creatinine 0.38, sodium 136, potassium 3.6, chloride 93 CO2 34, alkaline phosphatase 141, AST 34, ALT 19, WBC count 14.2, hemoglobin 9.3, hematocrit 20.3      Assessment & Plan   Assessment / Plan       Active Hospital Problems:  Active Hospital Problems     Diagnosis     **Sepsis     Pneumonia     Empyema lung     Acute respiratory failure with hypoxia     Hyponatremia     Thoracic aortic aneurysm without rupture     Aortic thrombus     Tobacco use     Rectal cancer     Hypertension     Type 2 diabetes mellitus with hyperglycemia     Hyperlipidemia      Plan:     1) acute respiratory failure with hypercapnia and hypoxia  - improved  - extubated 3/12, currently on room air  - continue pulmicort, duonebs     2) left empyema  - ID on board, appreciate recs  - continue vancomycin, flagyl, maxipime  - CT surgery on board, appreciate recs  - s/p chest tube placement and VATS on 3/11  -chest tube were discontinued   -Repeat chest x-ray showed no change.     3) A-fib with RVR  - resolved  - cardiology on board, appreciate recs  - metoprolol  - start anticoagulation pending specialist clearance     4) hyponatremia  - resolved  - monitor with labs     5) HTN  - hold home meds for borderline BP     6) DM  - ISS, accuchecks, diet     7) HLD  - lipitor     8) GI/DVT ppx  - none/SCDs    DVT prophylaxis:  Medical and mechanical DVT prophylaxis orders are present.        CODE STATUS:   Code Status (Patient has no pulse and is not breathing): CPR (Attempt to Resuscitate)  Medical Interventions (Patient has pulse or is breathing): Full Support    Disposition:  I expect patient to be discharged in 2 to 3 days .    Michael Banegas MD

## 2024-03-15 NOTE — THERAPY TREATMENT NOTE
Subjective: Pt agreeable to therapeutic plan of care.    Objective:     Bed mobility - CGA  Transfers - Min-A  Ambulation - 2 x 25 feet Min-A with rolling walker    Therapeutic Exercise - 10 Reps B LE AROM standing    Vitals: Tachycardic Rest 122, ambulation and standing LE ex 130s. All other vitals WNL.    Pain: 0 VAS   Location: N/A  Intervention for pain: N/A    Education: Provided education on the importance of mobility in the acute care setting, Verbal/Tactile Cues, Transfer Training, and Gait Training    Assessment: Prashant Zhou is POD #4 left decortation, pleurectomy. Pt presents with functional mobility impairments which indicate the need for skilled intervention. Pt is min-A with ambulation using RW. Pt tends to put more weight on LLE throughout gait, some lateral leaning to the L noted. Pt became tachycardic with light activity, increasing into the high 130s. Pt reported RPE of 5/10 with standing therapeutic exercises. Pt is currently on 7 L HF. Titrated to 8L HF with activity, sat remaining >90%. Tolerating session today without incident. Will continue to follow and progress as tolerated.     Plan/Recommendations:   If medically appropriate, High Intensity Therapy recommended post-acute care. This is recommended as therapy feels the patient would require 5-6 days per week, 2-3 hours per day. At this time, inpatient rehabilitation (acute rehab) would be the first choice and SNF would be second. Pt requires no DME at discharge.     Pt desires Inpatient Rehabilitation placement at discharge. Pt cooperative; agreeable to therapeutic recommendations and plan of care.     Post-Tx Position: Up in Chair, Alarms activated, and Call light and personal items within reach  PPE: gloves

## 2024-03-15 NOTE — PROGRESS NOTES
Nutrition Services    Patient Name: Prashant Zhou  YOB: 1960  MRN: [id number]  Admission date: 3/9/2024    PROGRESS NOTE      Encounter Information: Check on for PO intakes.         PO Diet: Diet: Regular/House; Fluid Consistency: Thin (IDDSI 0)   PO Supplements: Salvador BID  Boost Glucose Control BID   PO Intake:  100%       Current nutrition support:    Nutrition support review:        Labs (reviewed below): Reviewed, management per attending         GI Function:  Last documented BM 3/10 (x 5 days ago) - bowel regimen noted        Nutrition Intervention Updates: Continue current diet, supplements and encourage good PO intakes.         Results from last 7 days   Lab Units 03/15/24  0506 03/14/24  0603 03/13/24  2131 03/13/24  0730   SODIUM mmol/L 136 136  --  138   POTASSIUM mmol/L 3.6 3.8 3.7 2.9*   CHLORIDE mmol/L 93* 95*  --  99   CO2 mmol/L 34.0* 34.0*  --  29.0   BUN mg/dL 14 10  --  11   CREATININE mg/dL 0.38* 0.34*  --  0.38*   CALCIUM mg/dL 9.4 8.9  --  8.9   BILIRUBIN mg/dL 0.2 0.2  --  0.2   ALK PHOS U/L 141* 131*  --  125*   ALT (SGPT) U/L 19 20  --  23   AST (SGOT) U/L 34 24  --  24   GLUCOSE mg/dL 252* 215*  --  307*     Results from last 7 days   Lab Units 03/15/24  0506 03/12/24  0521 03/11/24  2104   MAGNESIUM mg/dL  --   --  1.9   HEMOGLOBIN g/dL 9.3*   < > 9.5*   HEMOGLOBIN, POC g/dL  --   --  9.5*   HEMATOCRIT % 30.3*   < > 29.0*   HEMATOCRIT POC %  --   --  28*    < > = values in this interval not displayed.     COVID19   Date Value Ref Range Status   03/09/2024 Not Detected Not Detected - Ref. Range Final     Lab Results   Component Value Date    HGBA1C 7.40 (H) 01/25/2024       RD to follow up per protocol.    Electronically signed by:  Jackie Yoon RD  03/15/24 10:47 EDT

## 2024-03-15 NOTE — PROGRESS NOTES
Progress note    Reason for visit: Postop care s/p left robot-assisted decortication, pleurectomy.    No acute issues overnight.  Pain well manageable.  Feeling better today.  Breathing improved.    No acute distress.  Alert, oriented x 3.  Atrial fibrillation with rate controlled  Nonlabored breathing.  On supplemental oxygen.  Chest tube to waterseal  No air leak demonstrated.  Extremities warm.  Moving all 4 extremities.    Labs reviewed.  Imaging..    POD # 4 s/p left robot-assisted decortication, pleurectomy.    Ensure adequate pain control.  Encourage incentive spirometer, flutter valve.  Please ensure that he sits in the recliner most of the day and walk with assistance.  Both the chest tubes have been removed.  Continue antibiotics.  Anticipate he will require antibiotics as an outpatient for empyema.  Will defer to ID for antibiotic decision.  Chemical DVT prophylaxis.  He can resume therapeutic anticoagulation on 3/17/2024.  Thoracic surgery will follow along peripherally.  Please call thoracic surgery with questions/concerns.    Saqib Keller MD  Thoracic surgeon    Spent 35 minutes taking care of the patient

## 2024-03-15 NOTE — PROGRESS NOTES
"Enter Query Response Below      Query Response:     Lactic acidosis and acute respiratory failure due to severe sepsis          If applicable, please update the problem list.     Patient: Prashant Zhou        : 1960  Account: [id number]           Admit Date:         How to Respond to this query:       a. Click New Note     b. Answer query within the yellow box.                c. Update the Problem List, if applicable.      If you have any questions about this query contact me at:  [email or web address]     Dr. Rodriguez,     63-year-old male admitted 3/9/24 with sepsis, acute respiratory failure with hypoxia, pneumonia, and lung empyema. Patient also had new onset atrial fibrillation, hypotension, and lactic acidosis. H&P and Hospitalist progress notes on 3/10 and 3/11 include \"Acute respiratory failure with hypoxia, likely secondary to above\" with \"above\" being sepsis secondary to pneumonia and lung empyema. Hospitalist consult note on 3/12 includes \"sepsis secondary to pneumonia and respiratory failure secondary to an empyema.\" Lactate levels included 3.8, 4.3, and 2.5 on 3/9.  Treatment has included supplemental O2 up to 15L, Bipap, Mechanical ventilation, IV Cefepime, 1L NS fluid bolus, IV Vancomycin, IV Flagyl, IV fluids, Javi-Synephrine drip, Cardizem drip, IV Albumin, Propofol drip, Amiodarone drip, Left decortication with pleurectomy on 3/11, and Infectious Disease Consult.    Can the lactic acidosis and acute respiratory failure be further clarified as:    -Lactic acidosis and acute respiratory failure due to severe sepsis  -Lactic acidosis and acute respiratory failure not due to sepsis  -Other- please specify__________  -Unable to determine    By submitting this query, we are merely seeking further clarification of documentation to accurately reflect all conditions that you are monitoring, evaluating, treating or that extend the hospitalization or utilize additional resources of care. Please " utilize your independent clinical judgment when addressing the question(s) above.     This query and your response, once completed, will be entered into the legal medical record.    Sincerely,  Martin LITTLEN, RN   Clinical Documentation Integrity Program

## 2024-03-16 ENCOUNTER — APPOINTMENT (OUTPATIENT)
Dept: GENERAL RADIOLOGY | Facility: HOSPITAL | Age: 64
DRG: 853 | End: 2024-03-16
Payer: MEDICARE

## 2024-03-16 PROBLEM — A41.9 SEPSIS: Status: RESOLVED | Noted: 2024-03-09 | Resolved: 2024-03-16

## 2024-03-16 LAB
ALBUMIN SERPL-MCNC: 2.7 G/DL (ref 3.5–5.2)
ALBUMIN/GLOB SERPL: 0.7 G/DL
ALP SERPL-CCNC: 97 U/L (ref 39–117)
ALT SERPL W P-5'-P-CCNC: 19 U/L (ref 1–41)
ANION GAP SERPL CALCULATED.3IONS-SCNC: 7 MMOL/L (ref 5–15)
AST SERPL-CCNC: 20 U/L (ref 1–40)
BACTERIA SPEC ANAEROBE CULT: NORMAL
BASOPHILS # BLD AUTO: 0.12 10*3/MM3 (ref 0–0.2)
BASOPHILS NFR BLD AUTO: 0.9 % (ref 0–1.5)
BILIRUB SERPL-MCNC: 0.2 MG/DL (ref 0–1.2)
BUN SERPL-MCNC: 12 MG/DL (ref 8–23)
BUN/CREAT SERPL: 35.3 (ref 7–25)
CALCIUM SPEC-SCNC: 9.4 MG/DL (ref 8.6–10.5)
CHLORIDE SERPL-SCNC: 95 MMOL/L (ref 98–107)
CO2 SERPL-SCNC: 33 MMOL/L (ref 22–29)
CREAT SERPL-MCNC: 0.34 MG/DL (ref 0.76–1.27)
DEPRECATED RDW RBC AUTO: 61.4 FL (ref 37–54)
EGFRCR SERPLBLD CKD-EPI 2021: 129.6 ML/MIN/1.73
EOSINOPHIL # BLD AUTO: 0.04 10*3/MM3 (ref 0–0.4)
EOSINOPHIL NFR BLD AUTO: 0.3 % (ref 0.3–6.2)
ERYTHROCYTE [DISTWIDTH] IN BLOOD BY AUTOMATED COUNT: 17.7 % (ref 12.3–15.4)
GLOBULIN UR ELPH-MCNC: 3.7 GM/DL
GLUCOSE BLDC GLUCOMTR-MCNC: 227 MG/DL (ref 70–105)
GLUCOSE BLDC GLUCOMTR-MCNC: 288 MG/DL (ref 70–105)
GLUCOSE BLDC GLUCOMTR-MCNC: 289 MG/DL (ref 70–105)
GLUCOSE BLDC GLUCOMTR-MCNC: 299 MG/DL (ref 70–105)
GLUCOSE SERPL-MCNC: 224 MG/DL (ref 65–99)
HCT VFR BLD AUTO: 29.1 % (ref 37.5–51)
HGB BLD-MCNC: 9 G/DL (ref 13–17.7)
IMM GRANULOCYTES # BLD AUTO: 1.1 10*3/MM3 (ref 0–0.05)
IMM GRANULOCYTES NFR BLD AUTO: 7.9 % (ref 0–0.5)
LYMPHOCYTES # BLD AUTO: 1.89 10*3/MM3 (ref 0.7–3.1)
LYMPHOCYTES NFR BLD AUTO: 13.6 % (ref 19.6–45.3)
MCH RBC QN AUTO: 29.7 PG (ref 26.6–33)
MCHC RBC AUTO-ENTMCNC: 30.9 G/DL (ref 31.5–35.7)
MCV RBC AUTO: 96 FL (ref 79–97)
MONOCYTES # BLD AUTO: 1.46 10*3/MM3 (ref 0.1–0.9)
MONOCYTES NFR BLD AUTO: 10.5 % (ref 5–12)
NEUTROPHILS NFR BLD AUTO: 66.8 % (ref 42.7–76)
NEUTROPHILS NFR BLD AUTO: 9.24 10*3/MM3 (ref 1.7–7)
NRBC BLD AUTO-RTO: 0.2 /100 WBC (ref 0–0.2)
PLATELET # BLD AUTO: 360 10*3/MM3 (ref 140–450)
PMV BLD AUTO: 9 FL (ref 6–12)
POTASSIUM SERPL-SCNC: 4.3 MMOL/L (ref 3.5–5.2)
PROT SERPL-MCNC: 6.4 G/DL (ref 6–8.5)
RBC # BLD AUTO: 3.03 10*6/MM3 (ref 4.14–5.8)
SODIUM SERPL-SCNC: 135 MMOL/L (ref 136–145)
WBC NRBC COR # BLD AUTO: 13.85 10*3/MM3 (ref 3.4–10.8)

## 2024-03-16 PROCEDURE — 93005 ELECTROCARDIOGRAM TRACING: CPT | Performed by: INTERNAL MEDICINE

## 2024-03-16 PROCEDURE — 99233 SBSQ HOSP IP/OBS HIGH 50: CPT | Performed by: INTERNAL MEDICINE

## 2024-03-16 PROCEDURE — 63710000001 INSULIN LISPRO (HUMAN) PER 5 UNITS: Performed by: SURGERY

## 2024-03-16 PROCEDURE — 94664 DEMO&/EVAL PT USE INHALER: CPT

## 2024-03-16 PROCEDURE — 63710000001 INSULIN LISPRO (HUMAN) PER 5 UNITS: Performed by: INTERNAL MEDICINE

## 2024-03-16 PROCEDURE — 94761 N-INVAS EAR/PLS OXIMETRY MLT: CPT

## 2024-03-16 PROCEDURE — 25010000002 FUROSEMIDE PER 20 MG: Performed by: INTERNAL MEDICINE

## 2024-03-16 PROCEDURE — 25010000002 HEPARIN (PORCINE) PER 1000 UNITS: Performed by: SURGERY

## 2024-03-16 PROCEDURE — 94799 UNLISTED PULMONARY SVC/PX: CPT

## 2024-03-16 PROCEDURE — 80053 COMPREHEN METABOLIC PANEL: CPT | Performed by: INTERNAL MEDICINE

## 2024-03-16 PROCEDURE — 71045 X-RAY EXAM CHEST 1 VIEW: CPT

## 2024-03-16 PROCEDURE — 85025 COMPLETE CBC W/AUTO DIFF WBC: CPT | Performed by: SURGERY

## 2024-03-16 PROCEDURE — 82948 REAGENT STRIP/BLOOD GLUCOSE: CPT

## 2024-03-16 PROCEDURE — 63710000001 INSULIN GLARGINE PER 5 UNITS: Performed by: INTERNAL MEDICINE

## 2024-03-16 PROCEDURE — 97530 THERAPEUTIC ACTIVITIES: CPT

## 2024-03-16 PROCEDURE — 25010000002 KETOROLAC TROMETHAMINE PER 15 MG: Performed by: SURGERY

## 2024-03-16 PROCEDURE — 82948 REAGENT STRIP/BLOOD GLUCOSE: CPT | Performed by: SURGERY

## 2024-03-16 PROCEDURE — 25010000002 CEFEPIME PER 500 MG: Performed by: INTERNAL MEDICINE

## 2024-03-16 PROCEDURE — 97116 GAIT TRAINING THERAPY: CPT

## 2024-03-16 PROCEDURE — 25010000002 HYDROMORPHONE 1 MG/ML SOLUTION: Performed by: SURGERY

## 2024-03-16 RX ORDER — OXYCODONE HYDROCHLORIDE 5 MG/1
10 TABLET ORAL EVERY 4 HOURS PRN
Status: DISCONTINUED | OUTPATIENT
Start: 2024-03-16 | End: 2024-03-17 | Stop reason: HOSPADM

## 2024-03-16 RX ORDER — METOPROLOL SUCCINATE 50 MG/1
50 TABLET, EXTENDED RELEASE ORAL
Status: DISCONTINUED | OUTPATIENT
Start: 2024-03-17 | End: 2024-03-17 | Stop reason: HOSPADM

## 2024-03-16 RX ADMIN — INSULIN LISPRO 4 UNITS: 100 INJECTION, SOLUTION INTRAVENOUS; SUBCUTANEOUS at 08:55

## 2024-03-16 RX ADMIN — CEFEPIME 2000 MG: 2 INJECTION, POWDER, FOR SOLUTION INTRAVENOUS at 02:15

## 2024-03-16 RX ADMIN — OXYCODONE 5 MG: 5 TABLET ORAL at 15:13

## 2024-03-16 RX ADMIN — GUAIFENESIN 1200 MG: 600 TABLET, EXTENDED RELEASE ORAL at 08:54

## 2024-03-16 RX ADMIN — GABAPENTIN 300 MG: 300 CAPSULE ORAL at 20:19

## 2024-03-16 RX ADMIN — Medication 5000 UNITS: at 08:54

## 2024-03-16 RX ADMIN — IPRATROPIUM BROMIDE 0.5 MG: 0.5 SOLUTION RESPIRATORY (INHALATION) at 15:16

## 2024-03-16 RX ADMIN — IPRATROPIUM BROMIDE 0.5 MG: 0.5 SOLUTION RESPIRATORY (INHALATION) at 19:15

## 2024-03-16 RX ADMIN — KETOROLAC TROMETHAMINE 15 MG: 30 INJECTION INTRAMUSCULAR; INTRAVENOUS at 04:27

## 2024-03-16 RX ADMIN — METRONIDAZOLE 500 MG: 500 TABLET ORAL at 16:55

## 2024-03-16 RX ADMIN — CEFEPIME 2000 MG: 2 INJECTION, POWDER, FOR SOLUTION INTRAVENOUS at 11:56

## 2024-03-16 RX ADMIN — METRONIDAZOLE 500 MG: 500 TABLET ORAL at 06:19

## 2024-03-16 RX ADMIN — GABAPENTIN 300 MG: 300 CAPSULE ORAL at 16:55

## 2024-03-16 RX ADMIN — INSULIN LISPRO 7 UNITS: 100 INJECTION, SOLUTION INTRAVENOUS; SUBCUTANEOUS at 18:01

## 2024-03-16 RX ADMIN — BUDESONIDE 0.5 MG: 0.5 INHALANT RESPIRATORY (INHALATION) at 06:55

## 2024-03-16 RX ADMIN — FUROSEMIDE 40 MG: 10 INJECTION, SOLUTION INTRAMUSCULAR; INTRAVENOUS at 08:54

## 2024-03-16 RX ADMIN — HEPARIN SODIUM 5000 UNITS: 5000 INJECTION, SOLUTION INTRAVENOUS; SUBCUTANEOUS at 16:55

## 2024-03-16 RX ADMIN — HEPARIN SODIUM 5000 UNITS: 5000 INJECTION, SOLUTION INTRAVENOUS; SUBCUTANEOUS at 06:18

## 2024-03-16 RX ADMIN — INSULIN LISPRO 7 UNITS: 100 INJECTION, SOLUTION INTRAVENOUS; SUBCUTANEOUS at 13:00

## 2024-03-16 RX ADMIN — AMIODARONE HYDROCHLORIDE 200 MG: 200 TABLET ORAL at 08:54

## 2024-03-16 RX ADMIN — CEFEPIME 2000 MG: 2 INJECTION, POWDER, FOR SOLUTION INTRAVENOUS at 18:03

## 2024-03-16 RX ADMIN — HEPARIN SODIUM 5000 UNITS: 5000 INJECTION, SOLUTION INTRAVENOUS; SUBCUTANEOUS at 22:39

## 2024-03-16 RX ADMIN — INSULIN LISPRO 7 UNITS: 100 INJECTION, SOLUTION INTRAVENOUS; SUBCUTANEOUS at 08:55

## 2024-03-16 RX ADMIN — ACETAMINOPHEN 1000 MG: 500 TABLET, FILM COATED ORAL at 16:55

## 2024-03-16 RX ADMIN — ACETAMINOPHEN 1000 MG: 500 TABLET, FILM COATED ORAL at 20:19

## 2024-03-16 RX ADMIN — POLYETHYLENE GLYCOL 3350 17 G: 17 POWDER, FOR SOLUTION ORAL at 08:54

## 2024-03-16 RX ADMIN — GABAPENTIN 300 MG: 300 CAPSULE ORAL at 08:54

## 2024-03-16 RX ADMIN — ACETAMINOPHEN 1000 MG: 500 TABLET, FILM COATED ORAL at 08:54

## 2024-03-16 RX ADMIN — OXYCODONE 10 MG: 5 TABLET ORAL at 20:19

## 2024-03-16 RX ADMIN — INSULIN LISPRO 6 UNITS: 100 INJECTION, SOLUTION INTRAVENOUS; SUBCUTANEOUS at 20:18

## 2024-03-16 RX ADMIN — BUDESONIDE 0.5 MG: 0.5 INHALANT RESPIRATORY (INHALATION) at 19:19

## 2024-03-16 RX ADMIN — AMIODARONE HYDROCHLORIDE 200 MG: 200 TABLET ORAL at 20:19

## 2024-03-16 RX ADMIN — ATORVASTATIN CALCIUM 40 MG: 40 TABLET, FILM COATED ORAL at 08:54

## 2024-03-16 RX ADMIN — IPRATROPIUM BROMIDE 0.5 MG: 0.5 SOLUTION RESPIRATORY (INHALATION) at 06:55

## 2024-03-16 RX ADMIN — INSULIN LISPRO 6 UNITS: 100 INJECTION, SOLUTION INTRAVENOUS; SUBCUTANEOUS at 12:59

## 2024-03-16 RX ADMIN — METOPROLOL SUCCINATE 25 MG: 25 TABLET, FILM COATED, EXTENDED RELEASE ORAL at 08:54

## 2024-03-16 RX ADMIN — DOCUSATE SODIUM 100 MG: 100 CAPSULE, LIQUID FILLED ORAL at 08:54

## 2024-03-16 RX ADMIN — HYDROMORPHONE HYDROCHLORIDE 0.5 MG: 1 INJECTION, SOLUTION INTRAMUSCULAR; INTRAVENOUS; SUBCUTANEOUS at 04:28

## 2024-03-16 RX ADMIN — DOCUSATE SODIUM 100 MG: 100 CAPSULE, LIQUID FILLED ORAL at 20:19

## 2024-03-16 RX ADMIN — INSULIN GLARGINE 35 UNITS: 100 INJECTION, SOLUTION SUBCUTANEOUS at 20:18

## 2024-03-16 RX ADMIN — GUAIFENESIN 1200 MG: 600 TABLET, EXTENDED RELEASE ORAL at 20:19

## 2024-03-16 RX ADMIN — METRONIDAZOLE 500 MG: 500 TABLET ORAL at 22:39

## 2024-03-16 RX ADMIN — IPRATROPIUM BROMIDE 0.5 MG: 0.5 SOLUTION RESPIRATORY (INHALATION) at 10:35

## 2024-03-16 RX ADMIN — INSULIN LISPRO 6 UNITS: 100 INJECTION, SOLUTION INTRAVENOUS; SUBCUTANEOUS at 18:01

## 2024-03-16 NOTE — CASE MANAGEMENT/SOCIAL WORK
Continued Stay Note   Jeff     Patient Name: Prashant Zhou  MRN: [id number]  Today's Date: 3/16/2024    Admit Date: 3/9/2024    Plan: [facility] accepted.  Bed avail after 3pm 3/16.  no precert or PASRR required.   Discharge Plan       Row Name 03/16/24 1232       Plan    Plan Comments Patient received dose of IV pain medication early this am.  [facility] requires 24 hours without IV pain medication.  Nursing, Md and Bed control updated.      Row Name 03/16/24 1217       Plan    Plan Comments Wheelchair Van request submitted and notified Bed control.  pt on 4L NC      Row Name 03/16/24 1153       Plan    Plan [facility] accepted.  Bed avail after 3pm 3/16.  no precert or PASRR required.    Plan Comments discussed with  in rounds.  stated that pt was ready for d/c.  Text sent to liasion who verified that pt can admit after 3pm today.  Secure chat sent to nursing and md with update.                      Expected Discharge Date and Time       Expected Discharge Date Expected Discharge Time    Mar 16, 2024               Gladys Mendoza RN

## 2024-03-16 NOTE — PROGRESS NOTES
Daily Progress Note          Assessment    Acute hypoxic respiratory failure   Pneumonia possible aspiration  Empyema: Status post video-assisted decortication 3/11/2024  Sepsis  HTN  DM II  Hyperlipidemia  Rectal cancer adenocarcinoma  Hyponatremia  AAA  Tobacco abuse   A-fib with RVR           PLAN:  Patient is gradually improving, heart rate 100-1 10  He is feeling better after removal of the chest tubes by CTS    Mucomyst nebulized to finish 3/15/2024  Diuresis    oxygen supplement and titration to maintain saturation 90-95%: Currently requiring 5 L per nasal cannula    Broad-spectrum antibiotics   Encouraged use I-S flutter valve  Bronchodilators  Inhaled corticosteroids  Incentive spirometer/Flutter valve  Electrolytes/ glycemic control  Mucinex  HR/blood pressure control as per cardiology  Glucose control  DVT  prophylaxis     I personally reviewed the radiological images               LOS: 7 days     Subjective     Patient reports cough and shortness of breath    Objective     Vital signs for last 24 hours:  Vitals:    03/16/24 0703 03/16/24 0800 03/16/24 1035 03/16/24 1039   BP:       Pulse: 95  102 99   Resp: 16 16 16   Temp:  97.9 °F (36.6 °C)     TempSrc:  Oral     SpO2: 99%  97% 99%   Weight:       Height:           Intake/Output last 3 shifts:  I/O last 3 completed shifts:  In: 1881.3 [P.O.:620; I.V.:1261.3]  Out: 5695 [Urine:5695]  Intake/Output this shift:  No intake/output data recorded.      Radiology  Imaging Results (Last 24 Hours)       Procedure Component Value Units Date/Time    XR Chest 1 View [[id number]] Collected: 03/16/24 0900     Updated: 03/16/24 0903    Narrative:      XR CHEST 1 VW    Date of Exam: 3/16/2024 5:40 AM EDT    Indication: Post Op Lung Surgery    Comparison: 3/15/2024    Findings:  Right-sided Port-A-Cath is noted. Heart is upper limits of normal size. Vasculature is cephalized. Patchy left basilar disease and pleural thickening or pleural effusion appears stable. Minimal  right effusion and atelectasis appears stable. No   pneumothorax is seen following left chest drain removal.      Impression:      Impression:  No evidence of pneumothorax or other new chest disease..      Electronically Signed: Prasad Yusuf MD    3/16/2024 9:01 AM EDT    Workstation ID: [id number]            Labs:  Results from last 7 days   Lab Units 03/16/24  0649   WBC 10*3/mm3 13.85*   HEMOGLOBIN g/dL 9.0*   HEMATOCRIT % 29.1*   PLATELETS 10*3/mm3 360     Results from last 7 days   Lab Units 03/16/24  0649   SODIUM mmol/L 135*   POTASSIUM mmol/L 4.3   CHLORIDE mmol/L 95*   CO2 mmol/L 33.0*   BUN mg/dL 12   CREATININE mg/dL 0.34*   CALCIUM mg/dL 9.4   BILIRUBIN mg/dL 0.2   ALK PHOS U/L 97   ALT (SGPT) U/L 19   AST (SGOT) U/L 20   GLUCOSE mg/dL 224*     Results from last 7 days   Lab Units 03/12/24  1012   PH, ARTERIAL pH units 7.420   PO2 ART mm Hg 85.4   PCO2, ARTERIAL mm Hg 41.6   HCO3 ART mmol/L 27.0     Results from last 7 days   Lab Units 03/16/24  0649 03/15/24  0506 03/14/24  0603   ALBUMIN g/dL 2.7* 2.8* 2.7*     Results from last 7 days   Lab Units 03/09/24  1701   HSTROP T ng/L 10         Results from last 7 days   Lab Units 03/11/24  2104   MAGNESIUM mg/dL 1.9     Results from last 7 days   Lab Units 03/10/24  2236   INR  1.21*   APTT seconds 31.2*               Meds:   SCHEDULE  acetaminophen, 1,000 mg, Oral, TID  amiodarone, 200 mg, Oral, Q12H  [Held by provider] amLODIPine, 10 mg, Oral, Daily  atorvastatin, 40 mg, Oral, Daily  budesonide, 0.5 mg, Nebulization, BID - RT  cefepime, 2,000 mg, Intravenous, Q8H  [Held by provider] cloNIDine, 0.1 mg, Oral, BID  docusate sodium, 100 mg, Oral, BID  furosemide, 40 mg, Intravenous, Daily  gabapentin, 300 mg, Oral, TID  guaiFENesin, 1,200 mg, Oral, Q12H  heparin (porcine), 5,000 Units, Subcutaneous, Q8H  [Held by provider] hydrALAZINE, 50 mg, Oral, Q8H  [Held by provider] lisinopril, 20 mg, Oral, Q24H   And  [Held by provider] hydroCHLOROthiazide, 25 mg, Oral,  Q24H  insulin glargine, 20 Units, Subcutaneous, Nightly  insulin lispro, 2-9 Units, Subcutaneous, 4x Daily AC & at Bedtime  insulin lispro, 7 Units, Subcutaneous, TID With Meals  ipratropium, 0.5 mg, Nebulization, 4x Daily - RT  Salvador, 1 packet, Oral, BID  ketorolac, 15 mg, Intravenous, Q8H  [START ON 3/17/2024] metoprolol succinate XL, 50 mg, Oral, Q24H  metroNIDAZOLE, 500 mg, Oral, Q8H  polyethylene glycol, 17 g, Oral, Daily  vitamin D3, 5,000 Units, Oral, Daily      Infusions       PRNs    benzonatate    Calcium Replacement - Follow Nurse / BPA Driven Protocol    dextrose    dextrose    glucagon (human recombinant)    HYDROmorphone    ipratropium-albuterol    Magnesium Standard Dose Replacement - Follow Nurse / BPA Driven Protocol    nitroglycerin    ondansetron ODT **OR** ondansetron    oxyCODONE    Phosphorus Replacement - Follow Nurse / BPA Driven Protocol    Potassium Replacement - Follow Nurse / BPA Driven Protocol    sodium chloride    Physical Exam:  General Appearance:  Alert   HEENT:  Normocephalic, without obvious abnormality, Conjunctiva/corneas clear,.   Nares normal, no drainage     Neck:  Supple, symmetrical, trachea midline.   Lungs /Chest wall:   Left basilar rhonchi with decreased breath sounds in left base, respirations unlabored, symmetrical wall movement.     Heart:  Regular rate and rhythm, S1 S2 normal  Abdomen: Soft, non-tender, no masses, no organomegaly.    Extremities: No edema, no clubbing or cyanosis     ROS  Constitutional: Negative for chills, fever and malaise/fatigue.   HENT: Negative.    Eyes: Negative.    Cardiovascular: Negative.    Respiratory: Positive for cough and shortness of breath.    Skin: Negative.    Musculoskeletal: Negative.    Gastrointestinal: Negative.    Genitourinary: Negative.    Neurological: Generalized weakness    I reviewed the recent clinical results  I personally reviewed the latest radiological studies    Part of this note may be an electronic  transcription/translation of spoken language to printed text using the Dragon Dictation System.

## 2024-03-16 NOTE — PROGRESS NOTES
Infectious Diseases Progress Note      LOS: 7 days   Patient Care Team:  Lazaro Paulino MD as PCP - General  Steve Mckeon MD as Consulting Physician (Cardiology)  Lars Wagner MD as Consulting Physician (Hematology and Oncology)    Chief Complaint shortness of breath and chest pain    Subjective       The patient has been afebrile for the last 24 hours.  The patient is currently awake and alert.  Currently on 5 L of oxygen via nasal cannula.  No new complaints plaints-still having some chest pain      Review of Systems:   Review of Systems   Constitutional:  Positive for fatigue.   HENT: Negative.     Eyes: Negative.    Respiratory:  Positive for cough and shortness of breath.    Cardiovascular: Negative.  Positive for chest pain.   Gastrointestinal: Negative.    Endocrine: Negative.    Genitourinary: Negative.    Musculoskeletal: Negative.    Skin: Negative.    Neurological: Negative.    Psychiatric/Behavioral: Negative.     All other systems reviewed and are negative.       Objective     Vital Signs  Temp:  [97.9 °F (36.6 °C)-98.7 °F (37.1 °C)] 98.3 °F (36.8 °C)  Heart Rate:  [] 99  Resp:  [16-17] 16  BP: (121-156)/(77-97) 147/91    Physical Exam:  Physical Exam  Vitals and nursing note reviewed.   Constitutional:       General: He is not in acute distress.     Appearance: He is well-developed and normal weight. He is ill-appearing. He is not diaphoretic.   HENT:      Head: Normocephalic and atraumatic.   Eyes:      Conjunctiva/sclera: Conjunctivae normal.      Pupils: Pupils are equal, round, and reactive to light.   Cardiovascular:      Rate and Rhythm: Normal rate and regular rhythm.      Heart sounds: Normal heart sounds, S1 normal and S2 normal.   Pulmonary:      Effort: Pulmonary effort is normal. No respiratory distress.      Breath sounds: No stridor. Wheezing present. No rales.      Comments: Decreased breathing sounds at the left base      Abdominal:      General: Bowel sounds are  normal. There is no distension.      Palpations: Abdomen is soft. There is no mass.      Tenderness: There is no abdominal tenderness. There is no guarding.   Musculoskeletal:         General: No deformity. Normal range of motion.      Cervical back: Neck supple.   Skin:     General: Skin is warm and dry.      Coloration: Skin is not pale.      Findings: No erythema or rash.      Comments: Port   Neurological:      Mental Status: He is alert and oriented to person, place, and time.      Cranial Nerves: No cranial nerve deficit.   Psychiatric:         Mood and Affect: Mood normal.          Results Review:    I have reviewed all clinical data, test, lab, and imaging results.     Radiology  XR Chest 1 View    Result Date: 3/16/2024  XR CHEST 1 VW Date of Exam: 3/16/2024 5:40 AM EDT Indication: Post Op Lung Surgery Comparison: 3/15/2024 Findings: Right-sided Port-A-Cath is noted. Heart is upper limits of normal size. Vasculature is cephalized. Patchy left basilar disease and pleural thickening or pleural effusion appears stable. Minimal right effusion and atelectasis appears stable. No pneumothorax is seen following left chest drain removal.     Impression: No evidence of pneumothorax or other new chest disease.. Electronically Signed: Prasad Yusuf MD  3/16/2024 9:01 AM EDT  Workstation ID: [id number]     Cardiology    Laboratory    Results from last 7 days   Lab Units 03/16/24  0649 03/15/24  0506 03/14/24  0603 03/13/24  0730 03/12/24  0521 03/11/24  2104 03/10/24  2236   WBC 10*3/mm3 13.85* 14.20* 14.99* 12.47* 12.70* 10.20 5.70   HEMOGLOBIN g/dL 9.0* 9.3* 9.3* 8.9* 9.2* 9.5* 9.4*   HEMOGLOBIN, POC g/dL  --   --   --   --   --  9.5*  --    HEMATOCRIT % 29.1* 30.3* 30.4* 29.2* 28.0* 29.0* 28.9*   HEMATOCRIT POC %  --   --   --   --   --  28*  --    PLATELETS 10*3/mm3 360 405 429 440 469* 503* 355     Results from last 7 days   Lab Units 03/16/24  0649 03/15/24  1719 03/15/24  0506 03/14/24  0603 03/13/24  2139  03/13/24  0730 03/12/24  0521 03/11/24  2104 03/10/24  2236 03/09/24  2351 03/09/24  1701   SODIUM mmol/L 135*  --  136 136  --  138 136 135* 134*   < > 130*   POTASSIUM mmol/L 4.3 4.3 3.6 3.8 3.7 2.9* 3.8 3.5 3.4*   < > 4.5   CHLORIDE mmol/L 95*  --  93* 95*  --  99 100 99 95*   < > 88*   CO2 mmol/L 33.0*  --  34.0* 34.0*  --  29.0 27.0 26.0 28.0   < > 26.0   BUN mg/dL 12  --  14 10  --  11 9 8 7*   < > 11   CREATININE mg/dL 0.34*  --  0.38* 0.34*  --  0.38* 0.46* 0.43* 0.36*   < > 0.56*   GLUCOSE mg/dL 224*  --  252* 215*  --  307* 249* 137* 110*   < > 291*   ALBUMIN g/dL 2.7*  --  2.8* 2.7*  --  2.6*  --   --   --   --  3.1*   BILIRUBIN mg/dL 0.2  --  0.2 0.2  --  0.2  --   --   --   --  0.3   ALK PHOS U/L 97  --  141* 131*  --  125*  --   --   --   --  118*   AST (SGOT) U/L 20  --  34 24  --  24  --   --   --   --  33   ALT (SGPT) U/L 19  --  19 20  --  23  --   --   --   --  50*   CALCIUM mg/dL 9.4  --  9.4 8.9  --  8.9 10.0 10.1 10.1   < > 11.1*    < > = values in this interval not displayed.                 Microbiology   Microbiology Results (last 10 days)       Procedure Component Value - Date/Time    Body Fluid Culture - Body Fluid, Pleural Cavity [[id number]] Collected: 03/11/24 1840    Lab Status: Final result Specimen: Body Fluid from Pleural Cavity Updated: 03/14/24 0631     Body Fluid Culture No growth at 3 days     Gram Stain Many (4+) WBCs per low power field      No organisms seen    Anaerobic Culture - Tissue, Pleura [[id number]]  (Normal) Collected: 03/11/24 1839    Lab Status: Final result Specimen: Tissue from Pleura Updated: 03/16/24 1013     Anaerobic Culture No anaerobes isolated at 5 days    AFB Culture - Tissue, Pleura [[id number]] Collected: 03/11/24 1839    Lab Status: Preliminary result Specimen: Tissue from Pleura Updated: 03/12/24 0836     AFB Stain No acid fast bacilli seen on concentrated smear    S. Pneumo Ag Urine or CSF - Urine, Urine, Clean Catch [[id number]]  (Normal) Collected:  03/10/24 2111    Lab Status: Final result Specimen: Urine, Clean Catch Updated: 03/10/24 2256     Strep Pneumo Ag Negative    Respiratory Culture - Sputum, Cough [[id number]] Collected: 03/10/24 2102    Lab Status: Final result Specimen: Sputum from Cough Updated: 03/12/24 0941     Respiratory Culture Scant growth (1+) Normal respiratory dejon. No S. aureus or Pseudomonas aeruginosa detected. Final report.     Gram Stain Many (4+) WBCs per low power field      No Epithelial cells seen      Few (2+) Gram positive cocci in chains    MRSA Screen, PCR (Inpatient) - Swab, Nares [[id number]]  (Normal) Collected: 03/10/24 2056    Lab Status: Final result Specimen: Swab from Nares Updated: 03/10/24 2248     MRSA PCR No MRSA Detected    Narrative:      The negative predictive value of this diagnostic test is high and should only be used to consider de-escalating anti-MRSA therapy. A positive result may indicate colonization with MRSA and must be correlated clinically.    MRSA Screen, PCR (Inpatient) - Swab, Nares [[id number]]  (Normal) Collected: 03/09/24 2351    Lab Status: Final result Specimen: Swab from Nares Updated: 03/10/24 0103     MRSA PCR No MRSA Detected    Narrative:      The negative predictive value of this diagnostic test is high and should only be used to consider de-escalating anti-MRSA therapy. A positive result may indicate colonization with MRSA and must be correlated clinically.    Blood Culture - Blood, Arm, Right [[id number]]  (Normal) Collected: 03/09/24 1745    Lab Status: Final result Specimen: Blood from Arm, Right Updated: 03/14/24 1900     Blood Culture No growth at 5 days    Blood Culture - Blood, Arm, Left [[id number]]  (Normal) Collected: 03/09/24 1701    Lab Status: Final result Specimen: Blood from Arm, Left Updated: 03/14/24 1815     Blood Culture No growth at 5 days    Respiratory Panel PCR w/COVID-19(SARS-CoV-2) [facility] In-House, NP Swab in UTM/VTM, 2 HR TAT - Swab, Nasopharynx  [[id number]]  (Normal) Collected: 03/09/24 1701    Lab Status: Final result Specimen: Swab from Nasopharynx Updated: 03/09/24 9376     ADENOVIRUS, PCR Not Detected     Coronavirus 229E Not Detected     Coronavirus HKU1 Not Detected     Coronavirus NL63 Not Detected     Coronavirus OC43 Not Detected     COVID19 Not Detected     Human Metapneumovirus Not Detected     Human Rhinovirus/Enterovirus Not Detected     Influenza A PCR Not Detected     Influenza B PCR Not Detected     Parainfluenza Virus 1 Not Detected     Parainfluenza Virus 2 Not Detected     Parainfluenza Virus 3 Not Detected     Parainfluenza Virus 4 Not Detected     RSV, PCR Not Detected     Bordetella pertussis pcr Not Detected     Bordetella parapertussis PCR Not Detected     Chlamydophila pneumoniae PCR Not Detected     Mycoplasma pneumo by PCR Not Detected    Narrative:      In the setting of a positive respiratory panel with a viral infection PLUS a negative procalcitonin without other underlying concern for bacterial infection, consider observing off antibiotics or discontinuation of antibiotics and continue supportive care. If the respiratory panel is positive for atypical bacterial infection (Bordetella pertussis, Chlamydophila pneumoniae, or Mycoplasma pneumoniae), consider antibiotic de-escalation to target atypical bacterial infection.            Medication Review:       Schedule Meds  acetaminophen, 1,000 mg, Oral, TID  amiodarone, 200 mg, Oral, Q12H  [Held by provider] amLODIPine, 10 mg, Oral, Daily  atorvastatin, 40 mg, Oral, Daily  budesonide, 0.5 mg, Nebulization, BID - RT  cefepime, 2,000 mg, Intravenous, Q8H  [Held by provider] cloNIDine, 0.1 mg, Oral, BID  docusate sodium, 100 mg, Oral, BID  furosemide, 40 mg, Intravenous, Daily  gabapentin, 300 mg, Oral, TID  guaiFENesin, 1,200 mg, Oral, Q12H  heparin (porcine), 5,000 Units, Subcutaneous, Q8H  [Held by provider] hydrALAZINE, 50 mg, Oral, Q8H  [Held by provider] lisinopril, 20 mg, Oral,  Q24H   And  [Held by provider] hydroCHLOROthiazide, 25 mg, Oral, Q24H  insulin glargine, 35 Units, Subcutaneous, Nightly  insulin lispro, 2-9 Units, Subcutaneous, 4x Daily AC & at Bedtime  insulin lispro, 7 Units, Subcutaneous, TID With Meals  ipratropium, 0.5 mg, Nebulization, 4x Daily - RT  Salvador, 1 packet, Oral, BID  [START ON 3/17/2024] metoprolol succinate XL, 50 mg, Oral, Q24H  metroNIDAZOLE, 500 mg, Oral, Q8H  polyethylene glycol, 17 g, Oral, Daily  vitamin D3, 5,000 Units, Oral, Daily        Infusion Meds         PRN Meds    benzonatate    Calcium Replacement - Follow Nurse / BPA Driven Protocol    dextrose    dextrose    glucagon (human recombinant)    HYDROmorphone    ipratropium-albuterol    Magnesium Standard Dose Replacement - Follow Nurse / BPA Driven Protocol    nitroglycerin    ondansetron ODT **OR** ondansetron    oxyCODONE    Phosphorus Replacement - Follow Nurse / BPA Driven Protocol    Potassium Replacement - Follow Nurse / BPA Driven Protocol    sodium chloride        Assessment & Plan       Antimicrobial Therapy   1.  IV Flagyl        2.  IV cefepime        3.         4.        5.            Assessment    Multifocal pneumonia with loculated fluid collection on the left pleural space concerning for empyema.  Patient is s/p decortication on March 11, 2024 and intraoperative cultures are negative so far.  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  Oncology following patient does have a port     Type 2 diabetes-recent A1c is 7.4     Tobacco abuse     Plan       Continue IV cefepime 2 g every 8 hours  Continue Flagyl 500 mg every 8 hours but switch to p.o.  Recommend 2 weeks of the above antibiotics from the surgical date.  Last day of antibiotics will be March 25, 2024  The patient has a port which can be used for the remaining  course of IV cefepime after discharge  Continue supportive care  Meliza Ramos, APRN  03/16/24  16:50 EDT    Note is dictated utilizing voice recognition software/Dragon

## 2024-03-16 NOTE — THERAPY TREATMENT NOTE
Subjective: Pt agreeable to therapeutic plan of care. RN asked to incr O2 from 5-6L Hf to amb    Objective:     Bed mobility - SBA  Transfers - CGA and with rolling walker  Ambulation - 30x2 feet Min-A, Assist x 2, and with rolling walker    Vitals: HR incr form 96>103, sats WNL on 6L hf    Pain: 0 VAS       Education: Provided education on the importance of mobility in the acute care setting, Verbal/Tactile Cues, Transfer Training, and Gait Training    Assessment: Prashant Zhou presents with functional mobility impairments which indicate the need for skilled intervention. Tolerating session today without incident. Pt still fatigues easily and weak, unable to take on challenges. Plans on acute rehab at [facility].Will continue to follow and progress as tolerated.     Plan/Recommendations:   If medically appropriate, High Intensity Therapy recommended post-acute care. This is recommended as therapy feels the patient would require 5-6 days per week, 2-3 hours per day. At this time, inpatient rehabilitation (acute rehab) would be the first choice and SNF would be second. Pt requires no DME at discharge.     Pt desires Inpatient Rehabilitation placement at discharge. Pt cooperative; agreeable to therapeutic recommendations and plan of care.         Basic Mobility 6-click:  Rollin = Total, A lot = 2, A little = 3; 4 = None  Supine>Sit:   1 = Total, A lot = 2, A little = 3; 4 = None   Sit>Stand with arms:  1 = Total, A lot = 2, A little = 3; 4 = None  Bed>Chair:   1 = Total, A lot = 2, A little = 3; 4 = None  Ambulate in room:  1 = Total, A lot = 2, A little = 3; 4 = None  3-5 Steps with railin = Total, A lot = 2, A little = 3; 4 = None  Score: 18      Post-Tx Position: Supine with HOB Elevated and Call light and personal items within reach  PPE: gloves

## 2024-03-16 NOTE — PLAN OF CARE
Assessment: Prashant Zhou presents with functional mobility impairments which indicate the need for skilled intervention. Tolerating session today without incident. Pt still fatigues easily and weak, unable to take on challenges. Plans on acute rehab at [facility].Will continue to follow and progress as tolerated.

## 2024-03-16 NOTE — CASE MANAGEMENT/SOCIAL WORK
Continued Stay Note   Jeff     Patient Name: Prashant Zhou  MRN: [id number]  Today's Date: 3/16/2024    Admit Date: 3/9/2024    Plan: [facility] accepted.  Bed avail after 3pm 3/16.  no precert or PASRR required.   Discharge Plan       Row Name 03/16/24 1217       Plan    Plan Comments Wheelchair Van request submitted and notified Bed control.  pt on 4L NC      Row Name 03/16/24 1153       Plan    Plan [facility] accepted.  Bed avail after 3pm 3/16.  no precert or PASRR required.    Plan Comments discussed with Dr in rounds.  stated that pt was ready for d/c.  Text sent to liasion who verified that pt can admit after 3pm today.  Secure chat sent to nursing and md with update.                      Expected Discharge Date and Time       Expected Discharge Date Expected Discharge Time    Mar 16, 2024               Gladys Mendoza RN

## 2024-03-16 NOTE — PROGRESS NOTES
[facility]     Progress Note    Patient Name: Prashant Zhou  : 1960  MRN: [id number]  Primary Care Physician:  Lazaro Paulino MD  Date of admission: 3/9/2024  Service date and time: 24 15:15 EDT  Subjective   Subjective     Chief Complaint: shortness of breath     HPI:  Patient feels much better today, denies any shortness of breath.      Objective   Objective     Vitals:   Temp:  [97.9 °F (36.6 °C)-98.7 °F (37.1 °C)] 98.3 °F (36.8 °C)  Heart Rate:  [] 99  Resp:  [16-17] 16  BP: (121-156)/(77-97) 147/91  Flow (L/min):  [5] 5  Physical Exam    Constitutional: Awake, alert in no distress at the time of examination.      Eyes: PERRLA, sclerae anicteric, no conjunctival injection   HENT: NCAT, mucous membranes moist   Neck: Supple, no thyromegaly, no lymphadenopathy, trachea midline   Respiratory: Better airway control today.    Cardiovascular: RRR, no murmurs, rubs, or gallops, palpable pedal pulses bilaterally   Gastrointestinal: Positive bowel sounds, soft, nontender, nondistended   Musculoskeletal: No bilateral ankle edema, no clubbing or cyanosis to extremities   Psychiatric: Appropriate affect, cooperative   Neurologic: Oriented x 3, strength symmetric in all extremities, Cranial Nerves grossly intact to confrontation, speech clear   Skin: No rashes     Result Review    Result Review:  I have personally reviewed the results from the time of this admission to 3/16/2024 15:15 EDT and agree with these findings:  [x]  Laboratory list / accordion  []  Microbiology  []  Radiology  []  EKG/Telemetry   []  Cardiology/Vascular   []  Pathology  []  Old records  []  Other:  Most notable findings include:   Glucose 224, BUN 12, creatinine 0.34, sodium 135, potassium 4.3, chloride 95, CO2 33, calcium 9.4, albumin 2.7, AST 19, AST 20 WBC count 13.85, hemoglobin 9, hematocrit 29.1.  Assessment & Plan   Assessment / Plan       Active Hospital Problems:  Active Hospital Problems    Diagnosis  Anesthesia Post-op Note    Patient: Maame Rod  Procedure(s) Performed: COLONOSCOPY  Anesthesia type: MAC    Vitals Value Taken Time   Temp afeb 08/08/23 1446   Pulse 73 08/08/23 1428   Resp 16 08/08/23 1428   SpO2 100 % 08/08/23 1428   /62 08/08/23 1428         Patient Location: Phase II  Post-op Vital Signs:stable  Level of Consciousness: awake and alert  Respiratory Status: spontaneous ventilation and room air  Cardiovascular stable  Hydration: euvolemic  Pain Management: adequately controlled  Handoff: Handoff to receiving clinician was performed and questions were answered  Vomiting: none  Nausea: None  Airway Patency:patent  Post-op Assessment: no complications, patient tolerated procedure well, dentition within defined limits, moving all extremities and No Corneal Abrasion      No notable events documented.       Pneumonia     Empyema lung     Acute respiratory failure with hypoxia     Hyponatremia     Thoracic aortic aneurysm without rupture     Aortic thrombus     Tobacco use     Rectal cancer     Hypertension     Type 2 diabetes mellitus with hyperglycemia     Hyperlipidemia      Plan:      1) acute respiratory failure with hypercapnia and hypoxia  - improved  - extubated 3/12, currently on room air  - continue pulmicort, duonebs     2) left empyema  - ID on board, appreciate recs  - continue vancomycin, flagyl, maxipime  - CT surgery on board, appreciate recs  - s/p chest tube placement and VATS on 3/11  -chest tube were discontinued   -Repeat chest x-ray showed no change.     3) A-fib with RVR  - resolved  - cardiology on board, appreciate recs  - metoprolol  - start anticoagulation pending specialist clearance     4) hyponatremia  - resolved  - monitor with labs     5) HTN  - hold home meds for borderline BP     6) DM  - ISS, accuchecks, diet     7) HLD  - lipitor     8) GI/DVT ppx  - none/SCDs    Plan to start IV pain medication today, possible discharge in the morning to skilled nursing facility.    DVT prophylaxis:  Medical and mechanical DVT prophylaxis orders are present.        CODE STATUS:   Code Status (Patient has no pulse and is not breathing): CPR (Attempt to Resuscitate)  Medical Interventions (Patient has pulse or is breathing): Full Support    Disposition:  I expect patient to be discharged in 1 to 2 days .    Michael Banegas MD

## 2024-03-16 NOTE — PLAN OF CARE
Goal Outcome Evaluation:         5L NC, no gtts. VSS. Plan was to transfer to [facility], but was delayed 24 hrs until tomorrow. Pt updated.

## 2024-03-16 NOTE — CASE MANAGEMENT/SOCIAL WORK
Continued Stay Note  NAZARIO Jeff     Patient Name: Prashant Zhou  MRN: [id number]  Today's Date: 3/16/2024    Admit Date: 3/9/2024    Plan: SIR accepted.  Bed avail after 3pm 3/16.  no precert or PASRR required.   Discharge Plan       Row Name 03/16/24 1153       Plan    Plan SIR accepted.  Bed avail after 3pm 3/16.  no precert or PASRR required.    Plan Comments discussed with Dr in rounds.  stated that pt was ready for d/c.  Text sent to liasion who verified that pt can admit after 3pm today.  Secure chat sent to nursing and md with update.                      Expected Discharge Date and Time       Expected Discharge Date Expected Discharge Time    Mar 16, 2024               Gladys Mendoza RN

## 2024-03-16 NOTE — PROGRESS NOTES
CARDIOLOGY PROGRESS NOTE:    Prashant Zhou  63 y.o.  male  1960  [id number]      Referring Provider: Intensivist    Reason for follow-up: Shortness of breath and A-fib     Patient Care Team:  Lazaro Paulino MD as PCP - General  Steve Mckeon MD as Consulting Physician (Cardiology)  Lars Wagner MD as Consulting Physician (Hematology and Oncology)    Subjective .  No chest pain or shortness of breath      Objective sitting in chair comfortably     Review of Systems   Constitutional: Negative for fever and malaise/fatigue.   HENT:  Negative for ear pain and nosebleeds.    Eyes:  Negative for blurred vision and double vision.   Cardiovascular:  Negative for chest pain, dyspnea on exertion and palpitations.   Respiratory:  Negative for cough and shortness of breath.    Skin:  Negative for rash.   Musculoskeletal:  Negative for joint pain.   Gastrointestinal:  Negative for abdominal pain, nausea and vomiting.   Neurological:  Negative for focal weakness and headaches.   Psychiatric/Behavioral:  Negative for depression. The patient is not nervous/anxious.    All other systems reviewed and are negative.      Allergies: Patient has no known allergies.    Scheduled Meds:acetaminophen, 1,000 mg, Oral, TID  amiodarone, 200 mg, Oral, Q12H  [Held by provider] amLODIPine, 10 mg, Oral, Daily  atorvastatin, 40 mg, Oral, Daily  budesonide, 0.5 mg, Nebulization, BID - RT  cefepime, 2,000 mg, Intravenous, Q8H  [Held by provider] cloNIDine, 0.1 mg, Oral, BID  docusate sodium, 100 mg, Oral, BID  furosemide, 40 mg, Intravenous, Daily  gabapentin, 300 mg, Oral, TID  guaiFENesin, 1,200 mg, Oral, Q12H  heparin (porcine), 5,000 Units, Subcutaneous, Q8H  [Held by provider] hydrALAZINE, 50 mg, Oral, Q8H  [Held by provider] lisinopril, 20 mg, Oral, Q24H   And  [Held by provider] hydroCHLOROthiazide, 25 mg, Oral, Q24H  insulin glargine, 20 Units, Subcutaneous, Nightly  insulin lispro, 2-9 Units, Subcutaneous, 4x Daily  "AC & at Bedtime  insulin lispro, 7 Units, Subcutaneous, TID With Meals  ipratropium, 0.5 mg, Nebulization, 4x Daily - RT  Salvador, 1 packet, Oral, BID  ketorolac, 15 mg, Intravenous, Q8H  metoprolol succinate XL, 25 mg, Oral, Q24H  metroNIDAZOLE, 500 mg, Oral, Q8H  polyethylene glycol, 17 g, Oral, Daily  vitamin D3, 5,000 Units, Oral, Daily      Continuous Infusions:     PRN Meds:.  benzonatate    Calcium Replacement - Follow Nurse / BPA Driven Protocol    dextrose    dextrose    glucagon (human recombinant)    HYDROmorphone    ipratropium-albuterol    Magnesium Standard Dose Replacement - Follow Nurse / BPA Driven Protocol    nitroglycerin    ondansetron ODT **OR** ondansetron    oxyCODONE    Phosphorus Replacement - Follow Nurse / BPA Driven Protocol    Potassium Replacement - Follow Nurse / BPA Driven Protocol    sodium chloride        VITAL SIGNS  Vitals:    03/16/24 0658 03/16/24 0659 03/16/24 0703 03/16/24 0800   BP:       Pulse: 98 96 95    Resp: 16 16 16    Temp:    97.9 °F (36.6 °C)   TempSrc:    Oral   SpO2: 94% 96% 99%    Weight:       Height:           Flowsheet Rows      Flowsheet Row First Filed Value   Admission Height 190.5 cm (75\") Documented at 03/09/2024 1644   Admission Weight 91.2 kg (201 lb) Documented at 03/09/2024 1644             TELEMETRY: Atrial fibrillation with controlled ventricular response    Physical Exam:  Constitutional:       Appearance: Well-developed.   Eyes:      General: No scleral icterus.     Conjunctiva/sclera: Conjunctivae normal.      Pupils: Pupils are equal, round, and reactive to light.   HENT:      Head: Normocephalic and atraumatic.   Neck:      Vascular: No carotid bruit or JVD.   Pulmonary:      Effort: Pulmonary effort is normal.      Breath sounds: Decreased breath sounds present. No wheezing. No rales.   Cardiovascular:      Normal rate. Regular rhythm.   Pulses:     Intact distal pulses.   Abdominal:      General: Bowel sounds are normal.      Palpations: Abdomen " is soft.   Musculoskeletal: Normal range of motion.      Cervical back: Normal range of motion and neck supple. Skin:     General: Skin is warm and dry.      Findings: No rash.   Neurological:      Mental Status: Alert.      Comments: No focal deficits          Results Review:   I reviewed the patient's new clinical results.  Lab Results (last 24 hours)       Procedure Component Value Units Date/Time    Anaerobic Culture - Tissue, Pleura [[id number]]  (Normal) Collected: 03/11/24 1839    Specimen: Tissue from Pleura Updated: 03/16/24 1013     Anaerobic Culture No anaerobes isolated at 5 days    POC Glucose 4x Daily Before Meals & at Bedtime [[id number]]  (Abnormal) Collected: 03/16/24 0826    Specimen: Blood Updated: 03/16/24 0830     Glucose 227 mg/dL      Comment: Serial Number: [id number]:  [id number]       Comprehensive Metabolic Panel [[id number]]  (Abnormal) Collected: 03/16/24 0649    Specimen: Blood Updated: 03/16/24 0713     Glucose 224 mg/dL      BUN 12 mg/dL      Creatinine 0.34 mg/dL      Sodium 135 mmol/L      Potassium 4.3 mmol/L      Chloride 95 mmol/L      CO2 33.0 mmol/L      Calcium 9.4 mg/dL      Total Protein 6.4 g/dL      Albumin 2.7 g/dL      ALT (SGPT) 19 U/L      AST (SGOT) 20 U/L      Alkaline Phosphatase 97 U/L      Total Bilirubin 0.2 mg/dL      Globulin 3.7 gm/dL      A/G Ratio 0.7 g/dL      BUN/Creatinine Ratio 35.3     Anion Gap 7.0 mmol/L      eGFR 129.6 mL/min/1.73     Narrative:      GFR Normal >60  Chronic Kidney Disease <60  Kidney Failure <15      CBC & Differential [[id number]]  (Abnormal) Collected: 03/16/24 0649    Specimen: Blood Updated: 03/16/24 0705    Narrative:      The following orders were created for panel order CBC & Differential.  Procedure                               Abnormality         Status                     ---------                               -----------         ------                     CBC Auto Differential[[id number]]        Abnormal             Final result               Scan Slide[[id number]]                                                                    Please view results for these tests on the individual orders.    CBC Auto Differential [[id number]]  (Abnormal) Collected: 03/16/24 0649    Specimen: Blood Updated: 03/16/24 0705     WBC 13.85 10*3/mm3      RBC 3.03 10*6/mm3      Hemoglobin 9.0 g/dL      Hematocrit 29.1 %      MCV 96.0 fL      MCH 29.7 pg      MCHC 30.9 g/dL      RDW 17.7 %      RDW-SD 61.4 fl      MPV 9.0 fL      Platelets 360 10*3/mm3      Neutrophil % 66.8 %      Lymphocyte % 13.6 %      Monocyte % 10.5 %      Eosinophil % 0.3 %      Basophil % 0.9 %      Immature Grans % 7.9 %      Neutrophils, Absolute 9.24 10*3/mm3      Lymphocytes, Absolute 1.89 10*3/mm3      Monocytes, Absolute 1.46 10*3/mm3      Eosinophils, Absolute 0.04 10*3/mm3      Basophils, Absolute 0.12 10*3/mm3      Immature Grans, Absolute 1.10 10*3/mm3      nRBC 0.2 /100 WBC     POC Glucose Once [[id number]]  (Abnormal) Collected: 03/15/24 2032    Specimen: Blood Updated: 03/15/24 2036     Glucose 223 mg/dL      Comment: Serial Number: [id number]:  [id number]       Potassium [[id number]]  (Normal) Collected: 03/15/24 1719    Specimen: Blood Updated: 03/15/24 1743     Potassium 4.3 mmol/L     POC Glucose Once [[id number]]  (Abnormal) Collected: 03/15/24 1651    Specimen: Blood Updated: 03/15/24 1653     Glucose 238 mg/dL      Comment: Serial Number: [id number]:  [id number]       POC Glucose 4x Daily Before Meals & at Bedtime [[id number]]  (Abnormal) Collected: 03/15/24 1158    Specimen: Blood Updated: 03/15/24 1203     Glucose 275 mg/dL      Comment: Serial Number: [id number]:  [id number]               Imaging Results (Last 24 Hours)       Procedure Component Value Units Date/Time    XR Chest 1 View [[id number]] Collected: 03/16/24 0900     Updated: 03/16/24 0903    Narrative:      XR CHEST 1 VW    Date of Exam: 3/16/2024 5:40 AM EDT    Indication: Post Op  Lung Surgery    Comparison: 3/15/2024    Findings:  Right-sided Port-A-Cath is noted. Heart is upper limits of normal size. Vasculature is cephalized. Patchy left basilar disease and pleural thickening or pleural effusion appears stable. Minimal right effusion and atelectasis appears stable. No   pneumothorax is seen following left chest drain removal.      Impression:      Impression:  No evidence of pneumothorax or other new chest disease..      Electronically Signed: Prasad Yusuf MD    3/16/2024 9:01 AM EDT    Workstation ID: [id number]            EKG      I personally viewed and interpreted the patient's EKG/Telemetry data:    ECHOCARDIOGRAM:  Results for orders placed during the hospital encounter of 03/09/24    Adult Transthoracic Echo Complete W/ Cont if Necessary Per Protocol    Interpretation Summary    Left ventricular systolic function is normal. Calculated left ventricular EF = 65% Left ventricular ejection fraction appears to be 61 - 65%.    Left ventricular diastolic function is consistent with (grade I) impaired relaxation.    There is calcification of the aortic valve mainly affecting the non-coronary cusp(s).    Estimated right ventricular systolic pressure from tricuspid regurgitation is normal (<35 mmHg).    No significant valvular abnormalities noted.       STRESS MYOVIEW:       CARDIAC CATHETERIZATION:  No results found for this or any previous visit.       OTHER:         Assessment & Plan     1 shortness of breath  Patient presented with shortness of breath and had significant pneumonia and is on IV antibiotics  Patient will have an echocardiogram performed  Patient had multifocal pneumonia concerning for empyema and hence a chest tube placed.  Patient thoracoscopy video-assisted with decortication with da Alexandre robot.  Patient has chest tubes draining well and will anticipate removal tomorrow  Patient's chest tubes are removed and is sitting in chair comfortably and doing well     2.  History of  aortic dissection  Patient had history of aortic dissection in the past but had medical treatment only at that time and no surgery and is followed by the vascular surgeons    3.  Atrial fibrillation  Patient has new onset atrial fibrillation and is on metoprolol at home which we will restart but he may need anticoagulation once all the tests are done and if required with vascular and oncologist.  Patient is ruled out for MI  Patient is having an echocardiogram performed for LV function valvular abnormalities  Further treatment based on echocardiogram findings  Patient had normal LV systolic function and is currently in sinus rhythm with beta-blockers and hence no further cardiac workup    Patient is having recurrent episodes of atrial fibrillation and is not tolerating beta-blockers because of low blood pressure and hence he is on IV amiodarone.  Will most likely need anticoagulation also for persistent atrial fibrillation will watch his symptoms.  Patient is receiving subcu heparin for now and will switch to oral anticoagulation once his chest tubes are removed and okay with thoracic surgeons.  Patient is on oral amiodarone and oral beta-blockers now and his IV amiodarone has been stopped.  Patient can be started on anticoagulation by mouth once he is cleared by the thoracic surgeons.  Patient is on the higher heart rates occasionally and hence I will increase the Toprol-XL to 50 mg once a day and continue the amiodarone  Still waiting on thoracic surgeons to give the clearance for starting anticoagulation.    4.  Hypertension  Patient blood pressure currently stable on beta-blockers along with hydralazine and amlodipine  Patient's blood pressure actually lower side and hence his blood pressure medicines have been held.  Patient blood pressure is now better but increased and hence I will adjust the dose of beta-blockers but not start on hydralazine or amlodipine still.    5.  Diabetes  Patient is on oral medicines  and followed by the primary care doctor    6.  Hyperlipidemia  Patient is on statins and the lipid levels are followed by the primary care doctor    7.  Rectal cancer  Patient received chemotherapy and after that he developed a esophagitis which was treated with Diflucan but he is also followed by the oncologist    8.  Acute respiratory failure  Patient is s/p chest tube placement and is currently on Pulmicort and DuoNebs.  Initially was intubated but he was extubated when his oxygenation improved.  Patient also had left empyema and is on antibiotics and CT surgery is seeing the patient.  Patient's symptoms are much better without any oxygen or chest tubes.    I discussed the patients findings and my recommendations with patient and nurse    Steve Mckeon MD  03/16/24  10:23 EDT

## 2024-03-17 ENCOUNTER — APPOINTMENT (OUTPATIENT)
Dept: GENERAL RADIOLOGY | Facility: HOSPITAL | Age: 64
DRG: 853 | End: 2024-03-17
Payer: MEDICARE

## 2024-03-17 VITALS
HEART RATE: 97 BPM | TEMPERATURE: 98.3 F | SYSTOLIC BLOOD PRESSURE: 134 MMHG | WEIGHT: 197 LBS | OXYGEN SATURATION: 96 % | RESPIRATION RATE: 20 BRPM | HEIGHT: 75 IN | BODY MASS INDEX: 24.49 KG/M2 | DIASTOLIC BLOOD PRESSURE: 84 MMHG

## 2024-03-17 LAB
ANION GAP SERPL CALCULATED.3IONS-SCNC: 7 MMOL/L (ref 5–15)
BASOPHILS # BLD AUTO: 0.09 10*3/MM3 (ref 0–0.2)
BASOPHILS NFR BLD AUTO: 0.5 % (ref 0–1.5)
BUN SERPL-MCNC: 13 MG/DL (ref 8–23)
BUN/CREAT SERPL: 35.1 (ref 7–25)
CALCIUM SPEC-SCNC: 9.7 MG/DL (ref 8.6–10.5)
CHLORIDE SERPL-SCNC: 95 MMOL/L (ref 98–107)
CO2 SERPL-SCNC: 34 MMOL/L (ref 22–29)
CREAT SERPL-MCNC: 0.37 MG/DL (ref 0.76–1.27)
DEPRECATED RDW RBC AUTO: 62.4 FL (ref 37–54)
EGFRCR SERPLBLD CKD-EPI 2021: 126.3 ML/MIN/1.73
EOSINOPHIL # BLD AUTO: 0.04 10*3/MM3 (ref 0–0.4)
EOSINOPHIL NFR BLD AUTO: 0.2 % (ref 0.3–6.2)
ERYTHROCYTE [DISTWIDTH] IN BLOOD BY AUTOMATED COUNT: 18.4 % (ref 12.3–15.4)
GLUCOSE BLDC GLUCOMTR-MCNC: 154 MG/DL (ref 70–105)
GLUCOSE BLDC GLUCOMTR-MCNC: 321 MG/DL (ref 70–105)
GLUCOSE SERPL-MCNC: 165 MG/DL (ref 65–99)
HCT VFR BLD AUTO: 29.5 % (ref 37.5–51)
HGB BLD-MCNC: 9 G/DL (ref 13–17.7)
IMM GRANULOCYTES # BLD AUTO: 0.99 10*3/MM3 (ref 0–0.05)
IMM GRANULOCYTES NFR BLD AUTO: 5.9 % (ref 0–0.5)
LYMPHOCYTES # BLD AUTO: 1.9 10*3/MM3 (ref 0.7–3.1)
LYMPHOCYTES NFR BLD AUTO: 11.3 % (ref 19.6–45.3)
MCH RBC QN AUTO: 29 PG (ref 26.6–33)
MCHC RBC AUTO-ENTMCNC: 30.5 G/DL (ref 31.5–35.7)
MCV RBC AUTO: 95.2 FL (ref 79–97)
MONOCYTES # BLD AUTO: 1.53 10*3/MM3 (ref 0.1–0.9)
MONOCYTES NFR BLD AUTO: 9.1 % (ref 5–12)
NEUTROPHILS NFR BLD AUTO: 12.26 10*3/MM3 (ref 1.7–7)
NEUTROPHILS NFR BLD AUTO: 73 % (ref 42.7–76)
NRBC BLD AUTO-RTO: 0.1 /100 WBC (ref 0–0.2)
PLATELET # BLD AUTO: 349 10*3/MM3 (ref 140–450)
PMV BLD AUTO: 9.2 FL (ref 6–12)
POTASSIUM SERPL-SCNC: 4.2 MMOL/L (ref 3.5–5.2)
RBC # BLD AUTO: 3.1 10*6/MM3 (ref 4.14–5.8)
SODIUM SERPL-SCNC: 136 MMOL/L (ref 136–145)
WBC NRBC COR # BLD AUTO: 16.81 10*3/MM3 (ref 3.4–10.8)

## 2024-03-17 PROCEDURE — 99232 SBSQ HOSP IP/OBS MODERATE 35: CPT | Performed by: INTERNAL MEDICINE

## 2024-03-17 PROCEDURE — 25010000002 HEPARIN (PORCINE) PER 1000 UNITS: Performed by: SURGERY

## 2024-03-17 PROCEDURE — 94664 DEMO&/EVAL PT USE INHALER: CPT

## 2024-03-17 PROCEDURE — 80048 BASIC METABOLIC PNL TOTAL CA: CPT | Performed by: INTERNAL MEDICINE

## 2024-03-17 PROCEDURE — 94799 UNLISTED PULMONARY SVC/PX: CPT

## 2024-03-17 PROCEDURE — 25010000002 CEFEPIME PER 500 MG: Performed by: INTERNAL MEDICINE

## 2024-03-17 PROCEDURE — 82948 REAGENT STRIP/BLOOD GLUCOSE: CPT

## 2024-03-17 PROCEDURE — 71045 X-RAY EXAM CHEST 1 VIEW: CPT

## 2024-03-17 PROCEDURE — 63710000001 INSULIN LISPRO (HUMAN) PER 5 UNITS: Performed by: SURGERY

## 2024-03-17 PROCEDURE — 25010000002 FUROSEMIDE PER 20 MG: Performed by: INTERNAL MEDICINE

## 2024-03-17 PROCEDURE — 85025 COMPLETE CBC W/AUTO DIFF WBC: CPT | Performed by: SURGERY

## 2024-03-17 PROCEDURE — 82948 REAGENT STRIP/BLOOD GLUCOSE: CPT | Performed by: SURGERY

## 2024-03-17 PROCEDURE — 63710000001 INSULIN LISPRO (HUMAN) PER 5 UNITS: Performed by: INTERNAL MEDICINE

## 2024-03-17 PROCEDURE — 94761 N-INVAS EAR/PLS OXIMETRY MLT: CPT

## 2024-03-17 RX ORDER — GABAPENTIN 300 MG/1
300 CAPSULE ORAL 3 TIMES DAILY
Qty: 90 CAPSULE | Refills: 0 | Status: SHIPPED | OUTPATIENT
Start: 2024-03-17

## 2024-03-17 RX ORDER — AMIODARONE HYDROCHLORIDE 200 MG/1
200 TABLET ORAL EVERY 12 HOURS SCHEDULED
Qty: 60 TABLET | Refills: 0 | Status: SHIPPED | OUTPATIENT
Start: 2024-03-17

## 2024-03-17 RX ORDER — METOPROLOL SUCCINATE 50 MG/1
50 TABLET, EXTENDED RELEASE ORAL
Qty: 30 TABLET | Refills: 0 | Status: SHIPPED | OUTPATIENT
Start: 2024-03-18

## 2024-03-17 RX ORDER — GUAIFENESIN 600 MG/1
1200 TABLET, EXTENDED RELEASE ORAL EVERY 12 HOURS SCHEDULED
Qty: 14 TABLET | Refills: 0 | Status: SHIPPED | OUTPATIENT
Start: 2024-03-17

## 2024-03-17 RX ORDER — BENZONATATE 100 MG/1
100 CAPSULE ORAL 3 TIMES DAILY PRN
Qty: 21 CAPSULE | Refills: 0 | Status: SHIPPED | OUTPATIENT
Start: 2024-03-17

## 2024-03-17 RX ORDER — METRONIDAZOLE 500 MG/1
500 TABLET ORAL EVERY 8 HOURS SCHEDULED
Qty: 33 TABLET | Refills: 0 | Status: SHIPPED | OUTPATIENT
Start: 2024-03-17 | End: 2024-03-29

## 2024-03-17 RX ORDER — OXYCODONE HYDROCHLORIDE 10 MG/1
10 TABLET ORAL EVERY 4 HOURS PRN
Qty: 18 TABLET | Refills: 0 | Status: SHIPPED | OUTPATIENT
Start: 2024-03-17 | End: 2024-03-21

## 2024-03-17 RX ADMIN — FUROSEMIDE 40 MG: 10 INJECTION, SOLUTION INTRAMUSCULAR; INTRAVENOUS at 08:18

## 2024-03-17 RX ADMIN — METRONIDAZOLE 500 MG: 500 TABLET ORAL at 13:34

## 2024-03-17 RX ADMIN — HEPARIN SODIUM 5000 UNITS: 5000 INJECTION, SOLUTION INTRAVENOUS; SUBCUTANEOUS at 13:34

## 2024-03-17 RX ADMIN — INSULIN LISPRO 7 UNITS: 100 INJECTION, SOLUTION INTRAVENOUS; SUBCUTANEOUS at 11:04

## 2024-03-17 RX ADMIN — IPRATROPIUM BROMIDE 0.5 MG: 0.5 SOLUTION RESPIRATORY (INHALATION) at 11:35

## 2024-03-17 RX ADMIN — CEFEPIME 2000 MG: 2 INJECTION, POWDER, FOR SOLUTION INTRAVENOUS at 10:59

## 2024-03-17 RX ADMIN — DOCUSATE SODIUM 100 MG: 100 CAPSULE, LIQUID FILLED ORAL at 08:19

## 2024-03-17 RX ADMIN — ACETAMINOPHEN 1000 MG: 500 TABLET, FILM COATED ORAL at 08:19

## 2024-03-17 RX ADMIN — AMIODARONE HYDROCHLORIDE 200 MG: 200 TABLET ORAL at 08:19

## 2024-03-17 RX ADMIN — ATORVASTATIN CALCIUM 40 MG: 40 TABLET, FILM COATED ORAL at 08:19

## 2024-03-17 RX ADMIN — CEFEPIME 2000 MG: 2 INJECTION, POWDER, FOR SOLUTION INTRAVENOUS at 02:29

## 2024-03-17 RX ADMIN — OXYCODONE 10 MG: 5 TABLET ORAL at 00:02

## 2024-03-17 RX ADMIN — IPRATROPIUM BROMIDE 0.5 MG: 0.5 SOLUTION RESPIRATORY (INHALATION) at 06:46

## 2024-03-17 RX ADMIN — METOPROLOL SUCCINATE 50 MG: 50 TABLET, EXTENDED RELEASE ORAL at 08:19

## 2024-03-17 RX ADMIN — BUDESONIDE 0.5 MG: 0.5 INHALANT RESPIRATORY (INHALATION) at 06:46

## 2024-03-17 RX ADMIN — Medication 10 ML: at 08:19

## 2024-03-17 RX ADMIN — INSULIN LISPRO 2 UNITS: 100 INJECTION, SOLUTION INTRAVENOUS; SUBCUTANEOUS at 08:19

## 2024-03-17 RX ADMIN — Medication 5000 UNITS: at 08:19

## 2024-03-17 RX ADMIN — HEPARIN SODIUM 5000 UNITS: 5000 INJECTION, SOLUTION INTRAVENOUS; SUBCUTANEOUS at 05:17

## 2024-03-17 RX ADMIN — OXYCODONE 10 MG: 5 TABLET ORAL at 03:47

## 2024-03-17 RX ADMIN — GUAIFENESIN 1200 MG: 600 TABLET, EXTENDED RELEASE ORAL at 08:19

## 2024-03-17 RX ADMIN — METRONIDAZOLE 500 MG: 500 TABLET ORAL at 05:18

## 2024-03-17 RX ADMIN — GABAPENTIN 300 MG: 300 CAPSULE ORAL at 08:19

## 2024-03-17 RX ADMIN — POLYETHYLENE GLYCOL 3350 17 G: 17 POWDER, FOR SOLUTION ORAL at 08:19

## 2024-03-17 RX ADMIN — Medication 1 PACKET: at 08:18

## 2024-03-17 NOTE — CASE MANAGEMENT/SOCIAL WORK
Case Management Discharge Note      Final Note: [facility]         Selected Continued Care - Discharged on 3/17/2024 Admission date: 3/9/2024 - Discharge disposition: Rehab Facility or Unit (DC - External)      Destination Coordination complete.      Service Provider Selected Services Address Phone Fax Patient Preferred    [facility] [address] [place] [address] [place] [address] [phone] --                 Transportation Services  W/C Van: Tim Vera    Final Discharge Disposition Code: 62 - inpatient rehab facility

## 2024-03-17 NOTE — SIGNIFICANT NOTE
Patient d/c to rehab with wheelchair van. 2L n/c patient vital signs stable. Appearing in no distress with spouse by side. Education given.

## 2024-03-17 NOTE — PLAN OF CARE
Goal Outcome Evaluation:                 Patient d/c to Rehab facility.

## 2024-03-17 NOTE — PROGRESS NOTES
Infectious Diseases Progress Note      LOS: 8 days   Patient Care Team:  Lazaro Paulino MD as PCP - General  Steve Mckeon MD as Consulting Physician (Cardiology)  Lars Wagner MD as Consulting Physician (Hematology and Oncology)    Chief Complaint currently with no complaints    Subjective       The patient has been afebrile for the last 24 hours.  The patient is currently awake and alert.  Currently on 2 L oxygen via nasal cannula      Review of Systems:   Review of Systems   Constitutional:  Positive for fatigue.   HENT: Negative.     Eyes: Negative.    Gastrointestinal: Negative.    Endocrine: Negative.    Genitourinary: Negative.    Musculoskeletal: Negative.    Skin: Negative.    Neurological: Negative.    Psychiatric/Behavioral: Negative.     All other systems reviewed and are negative.       Objective     Vital Signs  Temp:  [98.3 °F (36.8 °C)] 98.3 °F (36.8 °C)  Heart Rate:  [] 90  Resp:  [16-20] 20  BP: (121-161)/() 138/102    Physical Exam:  Physical Exam  Vitals and nursing note reviewed.   Constitutional:       General: He is not in acute distress.     Appearance: He is well-developed and normal weight. He is not diaphoretic.   HENT:      Head: Normocephalic and atraumatic.   Eyes:      Conjunctiva/sclera: Conjunctivae normal.      Pupils: Pupils are equal, round, and reactive to light.   Cardiovascular:      Rate and Rhythm: Normal rate and regular rhythm.      Heart sounds: Normal heart sounds, S1 normal and S2 normal.   Pulmonary:      Effort: Pulmonary effort is normal. No respiratory distress.      Breath sounds: No stridor. Wheezing present. No rales.      Comments: Decreased breathing sounds at the left base      Abdominal:      General: Bowel sounds are normal. There is no distension.      Palpations: Abdomen is soft. There is no mass.      Tenderness: There is no abdominal tenderness. There is no guarding.   Musculoskeletal:         General: No deformity. Normal  range of motion.      Cervical back: Neck supple.   Skin:     General: Skin is warm and dry.      Coloration: Skin is not pale.      Findings: No erythema or rash.      Comments: Port   Neurological:      Mental Status: He is alert and oriented to person, place, and time.      Cranial Nerves: No cranial nerve deficit.   Psychiatric:         Mood and Affect: Mood normal.          Results Review:    I have reviewed all clinical data, test, lab, and imaging results.     Radiology  XR Chest 1 View    Result Date: 3/17/2024  XR CHEST 1 VW Date of Exam: 3/17/2024 4:56 AM EDT Indication: Post Op Lung Surgery Comparison: 3/16/2024 at 0545 hours Findings: Residual atelectasis versus infiltrates are noted within the left mid to lower lung with small left pleural effusion. Mild atelectasis is seen within the right lower lobe. The cardiac silhouette and mediastinum are stable. The right portacatheter is stable in position.     Impression: 1.Stable atelectasis versus infiltrates within the left mid to lower lung with small left pleural effusion. 2.Mild atelectasis within the right lower lobe. Electronically Signed: Willy Ugalde MD  3/17/2024 8:39 AM EDT  Workstation ID: [id number]     Cardiology    Laboratory    Results from last 7 days   Lab Units 03/17/24  0525 03/16/24  0649 03/15/24  0506 03/14/24  0603 03/13/24  0730 03/12/24  0521 03/11/24  2104   WBC 10*3/mm3 16.81* 13.85* 14.20* 14.99* 12.47* 12.70* 10.20   HEMOGLOBIN g/dL 9.0* 9.0* 9.3* 9.3* 8.9* 9.2* 9.5*   HEMOGLOBIN, POC g/dL  --   --   --   --   --   --  9.5*   HEMATOCRIT % 29.5* 29.1* 30.3* 30.4* 29.2* 28.0* 29.0*   HEMATOCRIT POC %  --   --   --   --   --   --  28*   PLATELETS 10*3/mm3 349 360 405 429 440 469* 503*     Results from last 7 days   Lab Units 03/17/24  0525 03/16/24  0649 03/15/24  1719 03/15/24  0506 03/14/24  0603 03/13/24  2131 03/13/24  0730 03/12/24  0521 03/11/24  2104   SODIUM mmol/L 136 135*  --  136 136  --  138 136 135*   POTASSIUM mmol/L  4.2 4.3 4.3 3.6 3.8 3.7 2.9* 3.8 3.5   CHLORIDE mmol/L 95* 95*  --  93* 95*  --  99 100 99   CO2 mmol/L 34.0* 33.0*  --  34.0* 34.0*  --  29.0 27.0 26.0   BUN mg/dL 13 12  --  14 10  --  11 9 8   CREATININE mg/dL 0.37* 0.34*  --  0.38* 0.34*  --  0.38* 0.46* 0.43*   GLUCOSE mg/dL 165* 224*  --  252* 215*  --  307* 249* 137*   ALBUMIN g/dL  --  2.7*  --  2.8* 2.7*  --  2.6*  --   --    BILIRUBIN mg/dL  --  0.2  --  0.2 0.2  --  0.2  --   --    ALK PHOS U/L  --  97  --  141* 131*  --  125*  --   --    AST (SGOT) U/L  --  20  --  34 24  --  24  --   --    ALT (SGPT) U/L  --  19  --  19 20  --  23  --   --    CALCIUM mg/dL 9.7 9.4  --  9.4 8.9  --  8.9 10.0 10.1                 Microbiology   Microbiology Results (last 10 days)       Procedure Component Value - Date/Time    Body Fluid Culture - Body Fluid, Pleural Cavity [[id number]] Collected: 03/11/24 1840    Lab Status: Final result Specimen: Body Fluid from Pleural Cavity Updated: 03/14/24 0631     Body Fluid Culture No growth at 3 days     Gram Stain Many (4+) WBCs per low power field      No organisms seen    Anaerobic Culture - Tissue, Pleura [[id number]]  (Normal) Collected: 03/11/24 1839    Lab Status: Final result Specimen: Tissue from Pleura Updated: 03/16/24 1013     Anaerobic Culture No anaerobes isolated at 5 days    Fungus Culture - Tissue, Pleura [[id number]] Collected: 03/11/24 1839    Lab Status: Preliminary result Specimen: Tissue from Pleura Updated: 03/16/24 1900     Fungus Culture No fungus isolated at less than 1 week    AFB Culture - Tissue, Pleura [[id number]] Collected: 03/11/24 1839    Lab Status: Preliminary result Specimen: Tissue from Pleura Updated: 03/16/24 1900     AFB Culture No AFB isolated at less than 1 week     AFB Stain No acid fast bacilli seen on concentrated smear    S. Pneumo Ag Urine or CSF - Urine, Urine, Clean Catch [[id number]]  (Normal) Collected: 03/10/24 2111    Lab Status: Final result Specimen: Urine, Clean Catch Updated:  03/10/24 2256     Strep Pneumo Ag Negative    Respiratory Culture - Sputum, Cough [[id number]] Collected: 03/10/24 2102    Lab Status: Final result Specimen: Sputum from Cough Updated: 03/12/24 0941     Respiratory Culture Scant growth (1+) Normal respiratory dejon. No S. aureus or Pseudomonas aeruginosa detected. Final report.     Gram Stain Many (4+) WBCs per low power field      No Epithelial cells seen      Few (2+) Gram positive cocci in chains    MRSA Screen, PCR (Inpatient) - Swab, Nares [[id number]]  (Normal) Collected: 03/10/24 2056    Lab Status: Final result Specimen: Swab from Nares Updated: 03/10/24 2248     MRSA PCR No MRSA Detected    Narrative:      The negative predictive value of this diagnostic test is high and should only be used to consider de-escalating anti-MRSA therapy. A positive result may indicate colonization with MRSA and must be correlated clinically.    MRSA Screen, PCR (Inpatient) - Swab, Nares [[id number]]  (Normal) Collected: 03/09/24 2351    Lab Status: Final result Specimen: Swab from Nares Updated: 03/10/24 0103     MRSA PCR No MRSA Detected    Narrative:      The negative predictive value of this diagnostic test is high and should only be used to consider de-escalating anti-MRSA therapy. A positive result may indicate colonization with MRSA and must be correlated clinically.    Blood Culture - Blood, Arm, Right [[id number]]  (Normal) Collected: 03/09/24 1745    Lab Status: Final result Specimen: Blood from Arm, Right Updated: 03/14/24 1900     Blood Culture No growth at 5 days    Blood Culture - Blood, Arm, Left [[id number]]  (Normal) Collected: 03/09/24 1701    Lab Status: Final result Specimen: Blood from Arm, Left Updated: 03/14/24 1815     Blood Culture No growth at 5 days    Respiratory Panel PCR w/COVID-19(SARS-CoV-2) [facility] In-House, NP Swab in UTM/VTM, 2 HR TAT - Swab, Nasopharynx [[id number]]  (Normal) Collected: 03/09/24 1701    Lab Status: Final result  Specimen: Swab from Nasopharynx Updated: 03/09/24 1756     ADENOVIRUS, PCR Not Detected     Coronavirus 229E Not Detected     Coronavirus HKU1 Not Detected     Coronavirus NL63 Not Detected     Coronavirus OC43 Not Detected     COVID19 Not Detected     Human Metapneumovirus Not Detected     Human Rhinovirus/Enterovirus Not Detected     Influenza A PCR Not Detected     Influenza B PCR Not Detected     Parainfluenza Virus 1 Not Detected     Parainfluenza Virus 2 Not Detected     Parainfluenza Virus 3 Not Detected     Parainfluenza Virus 4 Not Detected     RSV, PCR Not Detected     Bordetella pertussis pcr Not Detected     Bordetella parapertussis PCR Not Detected     Chlamydophila pneumoniae PCR Not Detected     Mycoplasma pneumo by PCR Not Detected    Narrative:      In the setting of a positive respiratory panel with a viral infection PLUS a negative procalcitonin without other underlying concern for bacterial infection, consider observing off antibiotics or discontinuation of antibiotics and continue supportive care. If the respiratory panel is positive for atypical bacterial infection (Bordetella pertussis, Chlamydophila pneumoniae, or Mycoplasma pneumoniae), consider antibiotic de-escalation to target atypical bacterial infection.            Medication Review:       Schedule Meds  acetaminophen, 1,000 mg, Oral, TID  amiodarone, 200 mg, Oral, Q12H  [Held by provider] amLODIPine, 10 mg, Oral, Daily  atorvastatin, 40 mg, Oral, Daily  budesonide, 0.5 mg, Nebulization, BID - RT  cefepime, 2,000 mg, Intravenous, Q8H  [Held by provider] cloNIDine, 0.1 mg, Oral, BID  docusate sodium, 100 mg, Oral, BID  furosemide, 40 mg, Intravenous, Daily  gabapentin, 300 mg, Oral, TID  guaiFENesin, 1,200 mg, Oral, Q12H  heparin (porcine), 5,000 Units, Subcutaneous, Q8H  [Held by provider] hydrALAZINE, 50 mg, Oral, Q8H  [Held by provider] lisinopril, 20 mg, Oral, Q24H   And  [Held by provider] hydroCHLOROthiazide, 25 mg, Oral,  Q24H  insulin glargine, 35 Units, Subcutaneous, Nightly  insulin lispro, 2-9 Units, Subcutaneous, 4x Daily AC & at Bedtime  insulin lispro, 7 Units, Subcutaneous, TID With Meals  ipratropium, 0.5 mg, Nebulization, 4x Daily - RT  Salvador, 1 packet, Oral, BID  metoprolol succinate XL, 50 mg, Oral, Q24H  metroNIDAZOLE, 500 mg, Oral, Q8H  polyethylene glycol, 17 g, Oral, Daily  vitamin D3, 5,000 Units, Oral, Daily        Infusion Meds         PRN Meds    benzonatate    Calcium Replacement - Follow Nurse / BPA Driven Protocol    dextrose    dextrose    glucagon (human recombinant)    ipratropium-albuterol    Magnesium Standard Dose Replacement - Follow Nurse / BPA Driven Protocol    nitroglycerin    ondansetron ODT **OR** ondansetron    oxyCODONE    Phosphorus Replacement - Follow Nurse / BPA Driven Protocol    Potassium Replacement - Follow Nurse / BPA Driven Protocol    sodium chloride        Assessment & Plan       Antimicrobial Therapy   1.  P.o. Flagyl        2.  IV cefepime        3.         4.        5.            Assessment    Multifocal pneumonia with loculated fluid collection on the left pleural space concerning for empyema.  Patient is s/p decortication on March 11, 2024 and intraoperative cultures are negative so far.  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  Oncology following patient does have a port     Type 2 diabetes-recent A1c is 7.4     Tobacco abuse     Plan       Continue IV cefepime 2 g every 8 hours  Continue p.o. Flagyl 500 mg every 8 hours.  Recommend 2 weeks of the above antibiotics from the surgical date.  Last day of antibiotics will be March 25, 2024  The patient has a port which can be used for the remaining course of IV cefepime after discharge  Okay to discharge to rehab facility at any time  Case was discussed with the  patient's family at bedside      Luz Marina King MD  03/17/24  12:20 EDT    Note is dictated utilizing voice recognition software/Dragon

## 2024-03-17 NOTE — DISCHARGE SUMMARY
[facility]         DISCHARGE SUMMARY    Patient Name: Prashant Zhou  : 1960  MRN: [id number]    Date of Admission: 3/9/2024  Date of Discharge:  3/17/2024  Primary Care Physician: Lazaro Paulino MD    Consults       Date and Time Order Name Status Description    3/13/2024  8:32 AM Inpatient Pulmonology Consult      3/12/2024  2:39 PM Inpatient Hospitalist Consult Completed     3/11/2024  9:34 PM Inpatient Consult to Intensivist Completed     3/11/2024 10:12 AM Inpatient Cardiology Consult Completed     3/10/2024  1:38 PM Inpatient Thoracic Surgery Consult Completed     3/10/2024  9:23 AM Inpatient Infectious Diseases Consult Completed     3/9/2024  9:35 PM Inpatient Vascular Surgery Consult Completed     3/9/2024  9:21 PM Hematology & Oncology Inpatient Consult Completed     3/9/2024  9:19 PM Inpatient Pulmonology Consult Completed     3/9/2024  9:03 PM Hospitalist (on-call MD unless specified)              Presenting Problem:   Empyema [J86.9]  Sepsis [A41.9]  Pneumonia due to infectious organism, unspecified laterality, unspecified part of lung [J18.9]    Active and Resolved Hospital Problems:  Active Hospital Problems    Diagnosis POA    Pneumonia [J18.9] Yes    Empyema lung [J86.9] Yes    Acute respiratory failure with hypoxia [J96.01] Yes    Hyponatremia [E87.1] Yes    Thoracic aortic aneurysm without rupture [I71.20] Yes    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 male with a CMH of rectal cance status post partial colectomy, , currently undergoing chemotherapy next course of chemotherapy this Tuesday, diabetes mellitus type 2, hypertension, hyperlipidemia, thoracic  "aortic aneurysm secondary to hypertension, who presented to [facility] on 3/9/2024 with plaints of shortness of breath, cough and congestion since last night it worsened today.  He denies any fevers chills or diaphoresis.  Cough productive of yellow sputum.  Air saturation was 81% in ED and was placed on 2 L oxygen high flow nasal cannula.  He does not wear home oxygen.  Abs today showed a sodium of 130, chloride 88, glucose 291, albumin 3.1, lactate was 3.8, WBC not elevated, hemoglobin 11.3, CTA per radiology shows:     \"Groundglass opacities and tree-in-bud nodularity throughout the left upper and lower lobes as well as the right middle lobe consistent with multifocal pneumonia. There is a 17 cm loculated gas and fluid collection within the left pleural space with   associated thickening of the pleural wall, which likely represents empyema.     No evidence of pulmonary embolism. There is pulmonary artery enlargement which can be seen with pulmonary hypertension.     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 Recommend vascular surgery referral.  \"     He triggered sepsis for lactate, and tachycardia and fever of 100.2.  He is not hypotensive.  He was given IV fluid bolus in ED per sepsis protocol and started on IV cefepime and IV vancomycin with blood cultures pending.  500 mg IV Flagyl has been added given empyema and pulmonary has been consulted for  empyema . Given increased eccentric noncalcified thrombus in aorta vascular surgery has been consulted.  Per RN spoke to vascular surgery no heparin drip needed will see in AM.  Patient was evaluated by pulmonary specialist, patient had pleural effusion localized in multiple pocket with free fluid, CT surgery consult was placed, patient had    Flexible bronchoscopy.  Left robot-assisted thoracoscopic surgery.  Total " decortication.  Partial parietal and partial visceral pleurectomy.  Intercostal nerve block.      Patient was transferred to the intensive care unit, and downgraded on 3/12/2024 factious disease was consulted for empyema initially was started on vancomycin Maxipime and Flagyl, patient does have a history of rectosigmoid cancer, patient was kept on IV Flagyl and cefepime, switched to p.o. Flagyl, did receive physical therapy and Occupational Therapy, his chest tubes were discontinued, patient was evaluated by cardiology for atrial fibrillation, he will be started on p.o. Xarelto today, he will be discharged to inpatient rehab today.  To complete IV antibiotic as scheduled.      Day of Discharge     Vital Signs:  Temp:  [98.3 °F (36.8 °C)] 98.3 °F (36.8 °C)  Heart Rate:  [] 90  Resp:  [16-20] 20  BP: (121-161)/() 138/102  Flow (L/min):  [4-6] 4  Physical Exam:  Constitutional: Awake, alert   Eyes: PERRLA, sclerae anicteric, no conjunctival injection   HENT: NCAT, mucous membranes moist   Neck: Supple, no thyromegaly, no lymphadenopathy, trachea midline   Respiratory: Clear to auscultation bilaterally, nonlabored respirations    Cardiovascular: RRR, no murmurs, rubs, or gallops, palpable pedal pulses bilaterally   Gastrointestinal: Positive bowel sounds, soft, nontender, nondistended   Musculoskeletal: No bilateral ankle edema, no clubbing or cyanosis to extremities   Psychiatric: Appropriate affect, cooperative   Neurologic: Oriented x 3, strength symmetric in all extremities, Cranial Nerves grossly intact to confrontation, speech clear   Skin: No rashes     Pertinent  and/or Most Recent Results     LAB RESULTS:      Lab 03/17/24  0525 03/16/24  0649 03/15/24  0506 03/14/24  0603 03/13/24  0730 03/12/24  0521 03/11/24  2104 03/10/24  2236   WBC 16.81* 13.85* 14.20* 14.99* 12.47* 12.70*   < > 5.70   HEMOGLOBIN 9.0* 9.0* 9.3* 9.3* 8.9* 9.2*   < > 9.4*   HEMOGLOBIN, POC  --   --   --   --   --   --    < >  --     HEMATOCRIT 29.5* 29.1* 30.3* 30.4* 29.2* 28.0*   < > 28.9*   HEMATOCRIT POC  --   --   --   --   --   --    < >  --    PLATELETS 349 360 405 429 440 469*   < > 355   NEUTROS ABS 12.26* 9.24* 8.95* 10.34* 8.75* 10.80*  --  3.60   IMMATURE GRANS (ABS) 0.99* 1.10*  --   --  0.43*  --   --   --    LYMPHS ABS 1.90 1.89  --   --  1.86 0.40*  --  0.80   MONOS ABS 1.53* 1.46*  --   --  1.34* 1.50*  --  1.20*   EOS ABS 0.04 0.04  --   --  0.01 0.00  --  0.00   MCV 95.2 96.0 95.3 95.3 96.1 91.3   < > 90.6   PROTIME  --   --   --   --   --   --   --  13.0*   APTT  --   --   --   --   --   --   --  31.2*    < > = values in this interval not displayed.         Lab 03/17/24  0525 03/16/24  0649 03/15/24  1719 03/15/24  0506 03/14/24  0603 03/13/24  2131 03/13/24  0730 03/12/24  0521 03/11/24  2104   SODIUM 136 135*  --  136 136  --  138   < > 135*   POTASSIUM 4.2 4.3 4.3 3.6 3.8   < > 2.9*   < > 3.5   CHLORIDE 95* 95*  --  93* 95*  --  99   < > 99   CO2 34.0* 33.0*  --  34.0* 34.0*  --  29.0   < > 26.0   ANION GAP 7.0 7.0  --  9.0 7.0  --  10.0   < > 10.0   BUN 13 12  --  14 10  --  11   < > 8   CREATININE 0.37* 0.34*  --  0.38* 0.34*  --  0.38*   < > 0.43*   EGFR 126.3 129.6  --  125.3 129.6  --  125.3   < > 120.7   GLUCOSE 165* 224*  --  252* 215*  --  307*   < > 137*   CALCIUM 9.7 9.4  --  9.4 8.9  --  8.9   < > 10.1   MAGNESIUM  --   --   --   --   --   --   --   --  1.9    < > = values in this interval not displayed.         Lab 03/16/24  0649 03/15/24  0506 03/14/24  0603 03/13/24  0730   TOTAL PROTEIN 6.4 6.7 6.4 6.1   ALBUMIN 2.7* 2.8* 2.7* 2.6*   GLOBULIN 3.7 3.9 3.7 3.5   ALT (SGPT) 19 19 20 23   AST (SGOT) 20 34 24 24   BILIRUBIN 0.2 0.2 0.2 0.2   ALK PHOS 97 141* 131* 125*         Lab 03/10/24  2236   PROTIME 13.0*   INR 1.21*             Lab 03/10/24  2236   ABO TYPING O   RH TYPING Positive   ANTIBODY SCREEN Negative         Lab 03/12/24  1012 03/11/24  2331 03/11/24  2134   PH, ARTERIAL 7.420 7.310* 7.375    PCO2, ARTERIAL 41.6 49.2* 41.0   PO2 ART 85.4 175.1* 46.5*   O2 SATURATION ART 96.6 99.4* 81.1*   FIO2 40 70 100   HCO3 ART 27.0 24.8 23.9   BASE EXCESS ART 2.3 -1.7* -1.2*     Brief Urine Lab Results  (Last result in the past 365 days)        Color   Clarity   Blood   Leuk Est   Nitrite   Protein   CREAT   Urine HCG        02/27/24 0827 Yellow   Clear   Negative   Trace   Negative   Trace                 Microbiology Results (last 10 days)       Procedure Component Value - Date/Time    Body Fluid Culture - Body Fluid, Pleural Cavity [[id number]] Collected: 03/11/24 1840    Lab Status: Final result Specimen: Body Fluid from Pleural Cavity Updated: 03/14/24 0631     Body Fluid Culture No growth at 3 days     Gram Stain Many (4+) WBCs per low power field      No organisms seen    Anaerobic Culture - Tissue, Pleura [[id number]]  (Normal) Collected: 03/11/24 1839    Lab Status: Final result Specimen: Tissue from Pleura Updated: 03/16/24 1013     Anaerobic Culture No anaerobes isolated at 5 days    Fungus Culture - Tissue, Pleura [[id number]] Collected: 03/11/24 1839    Lab Status: Preliminary result Specimen: Tissue from Pleura Updated: 03/16/24 1900     Fungus Culture No fungus isolated at less than 1 week    AFB Culture - Tissue, Pleura [[id number]] Collected: 03/11/24 1839    Lab Status: Preliminary result Specimen: Tissue from Pleura Updated: 03/16/24 1900     AFB Culture No AFB isolated at less than 1 week     AFB Stain No acid fast bacilli seen on concentrated smear    S. Pneumo Ag Urine or CSF - Urine, Urine, Clean Catch [[id number]]  (Normal) Collected: 03/10/24 2111    Lab Status: Final result Specimen: Urine, Clean Catch Updated: 03/10/24 2256     Strep Pneumo Ag Negative    Respiratory Culture - Sputum, Cough [[id number]] Collected: 03/10/24 2102    Lab Status: Final result Specimen: Sputum from Cough Updated: 03/12/24 0941     Respiratory Culture Scant growth (1+) Normal respiratory dejon. No S. aureus or  Pseudomonas aeruginosa detected. Final report.     Gram Stain Many (4+) WBCs per low power field      No Epithelial cells seen      Few (2+) Gram positive cocci in chains    MRSA Screen, PCR (Inpatient) - Swab, Nares [[id number]]  (Normal) Collected: 03/10/24 2056    Lab Status: Final result Specimen: Swab from Nares Updated: 03/10/24 2248     MRSA PCR No MRSA Detected    Narrative:      The negative predictive value of this diagnostic test is high and should only be used to consider de-escalating anti-MRSA therapy. A positive result may indicate colonization with MRSA and must be correlated clinically.    MRSA Screen, PCR (Inpatient) - Swab, Nares [[id number]]  (Normal) Collected: 03/09/24 2351    Lab Status: Final result Specimen: Swab from Nares Updated: 03/10/24 0103     MRSA PCR No MRSA Detected    Narrative:      The negative predictive value of this diagnostic test is high and should only be used to consider de-escalating anti-MRSA therapy. A positive result may indicate colonization with MRSA and must be correlated clinically.    Blood Culture - Blood, Arm, Right [[id number]]  (Normal) Collected: 03/09/24 1745    Lab Status: Final result Specimen: Blood from Arm, Right Updated: 03/14/24 1900     Blood Culture No growth at 5 days    Blood Culture - Blood, Arm, Left [[id number]]  (Normal) Collected: 03/09/24 1701    Lab Status: Final result Specimen: Blood from Arm, Left Updated: 03/14/24 1815     Blood Culture No growth at 5 days    Respiratory Panel PCR w/COVID-19(SARS-CoV-2) [facility] In-House, NP Swab in UTM/VTM, 2 HR TAT - Swab, Nasopharynx [[id number]]  (Normal) Collected: 03/09/24 1701    Lab Status: Final result Specimen: Swab from Nasopharynx Updated: 03/09/24 1756     ADENOVIRUS, PCR Not Detected     Coronavirus 229E Not Detected     Coronavirus HKU1 Not Detected     Coronavirus NL63 Not Detected     Coronavirus OC43 Not Detected     COVID19 Not Detected     Human Metapneumovirus Not  Detected     Human Rhinovirus/Enterovirus Not Detected     Influenza A PCR Not Detected     Influenza B PCR Not Detected     Parainfluenza Virus 1 Not Detected     Parainfluenza Virus 2 Not Detected     Parainfluenza Virus 3 Not Detected     Parainfluenza Virus 4 Not Detected     RSV, PCR Not Detected     Bordetella pertussis pcr Not Detected     Bordetella parapertussis PCR Not Detected     Chlamydophila pneumoniae PCR Not Detected     Mycoplasma pneumo by PCR Not Detected    Narrative:      In the setting of a positive respiratory panel with a viral infection PLUS a negative procalcitonin without other underlying concern for bacterial infection, consider observing off antibiotics or discontinuation of antibiotics and continue supportive care. If the respiratory panel is positive for atypical bacterial infection (Bordetella pertussis, Chlamydophila pneumoniae, or Mycoplasma pneumoniae), consider antibiotic de-escalation to target atypical bacterial infection.            XR Chest 1 View    Result Date: 3/17/2024  Impression: Impression: 1.Stable atelectasis versus infiltrates within the left mid to lower lung with small left pleural effusion. 2.Mild atelectasis within the right lower lobe. Electronically Signed: Willy Ugalde MD  3/17/2024 8:39 AM EDT  Workstation ID: [id number]    XR Chest 1 View    Result Date: 3/16/2024  Impression: Impression: No evidence of pneumothorax or other new chest disease.. Electronically Signed: Prasad Yusuf MD  3/16/2024 9:01 AM EDT  Workstation ID: [id number]    XR Chest 1 View    Result Date: 3/15/2024  Impression: Impression: Interval removal of one of the chest tube. No evidence of left pneumothorax. Continued left lower lobe infiltrate with small left effusion. Electronically Signed: Hodan Allen MD  3/15/2024 7:42 AM EDT  Workstation ID: [id number]    XR Chest 1 View    Result Date: 3/14/2024  Impression: Impression: Worsening left lower lobe infiltrate may represent combination of  atelectasis and/or pneumonia with small left effusion. Mild gastric distention. Electronically Signed: Hodan Allen MD  3/14/2024 7:46 AM EDT  Workstation ID: [id number]    XR Chest 1 View    Result Date: 3/13/2024  Impression: Impression: 1. Gaseous distention of the proximal stomach seen on today's earlier examination appears diminished on the current study. 2. Remainder of the findings appear unchanged. Features suggestive of interstitial edema, small left basilar pleural effusion with left basilar atelectasis/pneumonia, and mild right basilar atelectasis, appear stable Electronically Signed: Liyah Belle MD  3/13/2024 2:04 PM EDT  Workstation ID: [id number]    XR Chest 1 View    Result Date: 3/13/2024  Impression: Impression: No significant interval change. Electronically Signed: Hodan Allen MD  3/13/2024 9:37 AM EDT  Workstation ID: [id number]    XR Chest 1 View    Result Date: 3/13/2024  Impression: Impression: Interval extubation. Worsening appearance of the lung fields which may represent pulmonary edema and/or pneumonia with atelectasis. Small effusion. Electronically Signed: Hodan Allen MD  3/13/2024 7:18 AM EDT  Workstation ID: [id number]    XR Chest 1 View    Result Date: 3/12/2024  Impression: Impression: 1.Increasing density at the left lung base that could relate to some underlying consolidation and possibly effusion. There is additional airspace disease noted within the lingular area more interstitial in nature. Findings of pneumonia were noted on the more recent CT involving the left lung as well changes of probable empyema. Electronically Signed: Santiago Tariq MD  3/12/2024 9:21 AM EDT  Workstation ID: [id number]    XR Chest 1 View    Result Date: 3/12/2024  Impression: Impression: Suspected tiny left apical pneumothorax with degree of pleural separation measuring up to 8 mm. 2 left-sided chest tubes present. No significant air along the left chest wall. Improving left basilar airspace disease.  Electronically Signed: Jenny Olmedo MD  3/12/2024 12:57 AM EDT  Workstation ID: [id number]    XR Chest 1 View    Result Date: 3/11/2024  Impression: Impression: Endotracheal tube tip overlies the midthoracic trachea. Otherwise, similar postsurgical changes of the left chest with patchy airspace disease in the left mid/lower lung and suspected trace left pneumothorax, with 2 left chest tubes in place. Electronically Signed: Darrion Neal MD  3/11/2024 10:15 PM EDT  Workstation ID: [id number]    XR Chest 1 View    Result Date: 3/11/2024  Impression: Impression: Postsurgical changes of the left chest with partial opacification of the left hemithorax and 2 left chest tubes in place. Electronically Signed: Darrion Neal MD  3/11/2024 9:36 PM EDT  Workstation ID: [id number]    XR Chest 1 View    Result Date: 3/11/2024  Impression: Impression: Stable chest x-ray. Electronically Signed: Willy Ugalde MD  3/11/2024 7:53 AM EDT  Workstation ID: [id number]    CT Angiogram Chest Pulmonary Embolism    Result Date: 3/9/2024  Impression: Impression: Groundglass opacities and tree-in-bud nodularity throughout the left upper and lower lobes as well as the right middle lobe consistent with multifocal pneumonia. There is a 17 cm loculated gas and fluid collection within the left pleural space with associated thickening of the pleural wall, which likely represents empyema. No evidence of pulmonary embolism. There is pulmonary artery enlargement which can be seen with pulmonary hypertension. 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 Recommend vascular surgery referral. Electronically Signed: Camacho Vee MD  3/9/2024 7:48 PM EST  Workstation ID: [id number]    XR Chest 1 View    Result Date: 3/9/2024  Impression: Impression: 1.Tenting left hemidiaphragm with underlying airspace disease which could be secondary to  pneumonia. Given the more masslike appearance on prior CT a follow-up CT chest suggested to reassess. 2.Possible left parapneumonic effusion. 3.There is air shadow in the left upper quadrant of the abdomen. This could relate to bowel as opposed to air within the left basilar area that might relate to cavitary area or loculated pneumothorax. This again could be assessed by follow-up CT. Electronically Signed: Santiago Tariq MD  3/9/2024 5:34 PM EST  Workstation ID: [id number]             Results for orders placed during the hospital encounter of 03/09/24    Adult Transthoracic Echo Complete W/ Cont if Necessary Per Protocol    Interpretation Summary    Left ventricular systolic function is normal. Calculated left ventricular EF = 65% Left ventricular ejection fraction appears to be 61 - 65%.    Left ventricular diastolic function is consistent with (grade I) impaired relaxation.    There is calcification of the aortic valve mainly affecting the non-coronary cusp(s).    Estimated right ventricular systolic pressure from tricuspid regurgitation is normal (<35 mmHg).    No significant valvular abnormalities noted.      Labs Pending at Discharge:  Pending Labs       Order Current Status    AFB Culture - Tissue, Pleura Preliminary result    Fungus Culture - Tissue, Pleura Preliminary result              Discharge Details        Discharge Medications        New Medications        Instructions Start Date   amiodarone 200 MG tablet  Commonly known as: PACERONE   200 mg, Oral, Every 12 Hours Scheduled      benzonatate 100 MG capsule  Commonly known as: TESSALON   100 mg, Oral, 3 Times Daily PRN      cefepime 2 G/ ML solution  Commonly known as: MAXIPIME   2,000 mg, Intravenous, Every 8 Hours      gabapentin 300 MG capsule  Commonly known as: NEURONTIN   300 mg, Oral, 3 Times Daily      guaiFENesin 600 MG 12 hr tablet  Commonly known as: MUCINEX   1,200 mg, Oral, Every 12 Hours Scheduled      metoprolol succinate XL 50 MG 24  hr tablet  Commonly known as: TOPROL-XL   50 mg, Oral, Every 24 Hours Scheduled   Start Date: March 18, 2024     metroNIDAZOLE 500 MG tablet  Commonly known as: FLAGYL   500 mg, Oral, Every 8 Hours Scheduled      oxyCODONE 10 MG tablet  Commonly known as: ROXICODONE   10 mg, Oral, Every 4 Hours PRN      rivaroxaban 20 MG tablet  Commonly known as: XARELTO   20 mg, Oral, Daily             Continue These Medications        Instructions Start Date   Accu-Chek Guide w/Device kit   1 Device, Does not apply, Daily      amLODIPine 10 MG tablet  Commonly known as: NORVASC   10 mg, Oral, Daily      atorvastatin 40 MG tablet  Commonly known as: LIPITOR   TAKE ONE TABLET BY MOUTH EVERY NIGHT AT BEDTIME      cloNIDine 0.1 MG tablet  Commonly known as: CATAPRES   0.1 mg, Oral, 2 Times Daily      glimepiride 4 MG tablet  Commonly known as: AMARYL   TAKE ONE TABLET BY MOUTH TWICE A DAY      glucose blood test strip  Commonly known as: Accu-Chek Guide   Test daily e11.9      hydrALAZINE 50 MG tablet  Commonly known as: APRESOLINE   50 mg, Oral, Every 8 Hours      lisinopril-hydrochlorothiazide 20-25 MG per tablet  Commonly known as: PRINZIDE,ZESTORETIC   1 tablet, Oral, Daily      metFORMIN 850 MG tablet  Commonly known as: GLUCOPHAGE   850 mg, Oral, 2 Times Daily With Meals      potassium chloride 10 MEQ CR tablet   Take 1 tablet by mouth Daily.      Stahist AD 25-60 MG tablet  Generic drug: Chlorcyclizine-Pseudoephed   0.5 tablets, Oral, 2 Times Daily PRN      traMADol 50 MG tablet  Commonly known as: ULTRAM   50 mg, Oral, Every 6 Hours PRN      vitamin D3 125 MCG (5000 UT) capsule capsule   5,000 Units, Oral, Daily             Stop These Medications      metoprolol tartrate 50 MG tablet  Commonly known as: LOPRESSOR              No Known Allergies      Discharge Disposition:   Rehab Facility or Unit (DC - External)    Discharge Condition: .cond    Diet:  Hospital:  Diet Order   Procedures    Diet: Regular/House; Fluid  Consistency: Thin (IDDSI 0)         Discharge Activity:   Activity Instructions       Activity as Tolerated                CODE STATUS:  Code Status and Medical Interventions:   Ordered at: 03/11/24 2307     Code Status (Patient has no pulse and is not breathing):    CPR (Attempt to Resuscitate)     Medical Interventions (Patient has pulse or is breathing):    Full Support         Future Appointments   Date Time Provider Department Center   3/26/2024 10:15 AM Saqib Keller MD [facility]   4/9/2024  8:15 AM INF ROOM 24 - [facility]   4/10/2024  8:45 AM [facility] PORT [facility]   4/10/2024  9:00 AM Lars Wagner MD [facility]   4/11/2024  1:15 PM INF ROOM 22 - [facility]   4/23/2024  8:15 AM INF ROOM 24 - [facility]   4/25/2024  1:15 PM INF ROOM 25 - [facility]   5/13/2024 12:45 PM Dax Dowling MD [facility]   7/25/2024  9:30 AM Lazaro Paulino MD [facility]       Additional Instructions for the Follow-ups that You Need to Schedule       Discharge Follow-up with PCP   As directed       Currently Documented PCP:    Lazaro Paulino MD    PCP Phone Number:    [phone]     Follow Up Details: in 3 to 5 days                Time spent on Discharge including face to face service:  30 minutes    Michael Banegas MD

## 2024-03-17 NOTE — CASE MANAGEMENT/SOCIAL WORK
Continued Stay Note  [facility]     Patient Name: Prashant Zhou  MRN: [id number]  Today's Date: 3/17/2024    Admit Date: 3/9/2024    Plan: [facility].  bed avial after 2pm on 3/17  No precert or PASRR required.  [facility] van to transport.   Discharge Plan       Row Name 03/17/24 1144       Plan    Plan [facility].  bed avial after 2pm on 3/17  No precert or PASRR required.  [facility] van to transport.    Plan Comments verified with LATESHA Waldrop that pt's bed is avail this afternoon.  Notified Bed control to reactivate need for this afternoon.                      Expected Discharge Date and Time       Expected Discharge Date Expected Discharge Time    Mar 17, 2024               Gladys Mendoza RN

## 2024-03-18 NOTE — PROGRESS NOTES
"CARDIOLOGY PROGRESS NOTE:    Prashant Zhou  63 y.o.  male  1960  [id number]      Referring Provider: Intensivist    Reason for follow-up: Shortness of breath and A-fib     Patient Care Team:  Lazaro Paulino MD as PCP - General  Steve Mckeon MD as Consulting Physician (Cardiology)  Lars Wagner MD as Consulting Physician (Hematology and Oncology)    Subjective .  No chest pain or shortness of breath      Objective sitting in chair comfortably     Review of Systems   Constitutional: Negative for fever and malaise/fatigue.   HENT:  Negative for ear pain and nosebleeds.    Eyes:  Negative for blurred vision and double vision.   Cardiovascular:  Negative for chest pain, dyspnea on exertion and palpitations.   Respiratory:  Negative for cough and shortness of breath.    Skin:  Negative for rash.   Musculoskeletal:  Negative for joint pain.   Gastrointestinal:  Negative for abdominal pain, nausea and vomiting.   Neurological:  Negative for focal weakness and headaches.   Psychiatric/Behavioral:  Negative for depression. The patient is not nervous/anxious.    All other systems reviewed and are negative.      Allergies: Patient has no known allergies.    Scheduled Meds:    Continuous Infusions:No current facility-administered medications for this encounter.      PRN Meds:.        VITAL SIGNS  Vitals:    03/17/24 1059 03/17/24 1100 03/17/24 1135 03/17/24 1300   BP: (!) 138/102 128/90  134/84   Pulse: 93 92 90 97   Resp: 20  20    Temp:       TempSrc:       SpO2: 93% 93% 94% 96%   Weight:       Height:           Flowsheet Rows      Flowsheet Row First Filed Value   Admission Height 190.5 cm (75\") Documented at 03/09/2024 1644   Admission Weight 91.2 kg (201 lb) Documented at 03/09/2024 1644             TELEMETRY: Atrial fibrillation with controlled ventricular response    Physical Exam:  Constitutional:       Appearance: Well-developed.   Eyes:      General: No scleral icterus.     " Conjunctiva/sclera: Conjunctivae normal.      Pupils: Pupils are equal, round, and reactive to light.   HENT:      Head: Normocephalic and atraumatic.   Neck:      Vascular: No carotid bruit or JVD.   Pulmonary:      Effort: Pulmonary effort is normal.      Breath sounds: Decreased breath sounds present. No wheezing. No rales.   Cardiovascular:      Normal rate. Regular rhythm.   Pulses:     Intact distal pulses.   Abdominal:      General: Bowel sounds are normal.      Palpations: Abdomen is soft.   Musculoskeletal: Normal range of motion.      Cervical back: Normal range of motion and neck supple. Skin:     General: Skin is warm and dry.      Findings: No rash.   Neurological:      Mental Status: Alert.      Comments: No focal deficits          Results Review:   I reviewed the patient's new clinical results.  Lab Results (last 24 hours)       Procedure Component Value Units Date/Time    POC Glucose Once [[id number]]  (Abnormal) Collected: 03/17/24 1101    Specimen: Blood Updated: 03/17/24 1102     Glucose 321 mg/dL      Comment: Serial Number: [id number]:  [id number]       POC Glucose 4x Daily Before Meals & at Bedtime [[id number]]  (Abnormal) Collected: 03/17/24 0803    Specimen: Blood Updated: 03/17/24 0804     Glucose 154 mg/dL      Comment: Serial Number: [id number]:  [id number]       Basic Metabolic Panel [[id number]]  (Abnormal) Collected: 03/17/24 0525    Specimen: Blood Updated: 03/17/24 0607     Glucose 165 mg/dL      BUN 13 mg/dL      Creatinine 0.37 mg/dL      Sodium 136 mmol/L      Potassium 4.2 mmol/L      Chloride 95 mmol/L      CO2 34.0 mmol/L      Calcium 9.7 mg/dL      BUN/Creatinine Ratio 35.1     Anion Gap 7.0 mmol/L      eGFR 126.3 mL/min/1.73     Narrative:      GFR Normal >60  Chronic Kidney Disease <60  Kidney Failure <15      CBC & Differential [[id number]]  (Abnormal) Collected: 03/17/24 0525    Specimen: Blood Updated: 03/17/24 0538    Narrative:      The following orders were  created for panel order CBC & Differential.  Procedure                               Abnormality         Status                     ---------                               -----------         ------                     CBC Auto Differential[[id number]]        Abnormal            Final result               Scan Slide[[id number]]                                                                    Please view results for these tests on the individual orders.    CBC Auto Differential [[id number]]  (Abnormal) Collected: 03/17/24 0525    Specimen: Blood Updated: 03/17/24 0538     WBC 16.81 10*3/mm3      RBC 3.10 10*6/mm3      Hemoglobin 9.0 g/dL      Hematocrit 29.5 %      MCV 95.2 fL      MCH 29.0 pg      MCHC 30.5 g/dL      RDW 18.4 %      RDW-SD 62.4 fl      MPV 9.2 fL      Platelets 349 10*3/mm3      Neutrophil % 73.0 %      Lymphocyte % 11.3 %      Monocyte % 9.1 %      Eosinophil % 0.2 %      Basophil % 0.5 %      Immature Grans % 5.9 %      Neutrophils, Absolute 12.26 10*3/mm3      Lymphocytes, Absolute 1.90 10*3/mm3      Monocytes, Absolute 1.53 10*3/mm3      Eosinophils, Absolute 0.04 10*3/mm3      Basophils, Absolute 0.09 10*3/mm3      Immature Grans, Absolute 0.99 10*3/mm3      nRBC 0.1 /100 WBC     POC Glucose Once [[id number]]  (Abnormal) Collected: 03/16/24 2015    Specimen: Blood Updated: 03/16/24 2018     Glucose 299 mg/dL      Comment: Serial Number: [id number]:  [id number]       Fungus Culture - Tissue, Pleura [[id number]] Collected: 03/11/24 1839    Specimen: Tissue from Pleura Updated: 03/16/24 1900     Fungus Culture No fungus isolated at less than 1 week    AFB Culture - Tissue, Pleura [[id number]] Collected: 03/11/24 1839    Specimen: Tissue from Pleura Updated: 03/16/24 1900     AFB Culture No AFB isolated at less than 1 week     AFB Stain No acid fast bacilli seen on concentrated smear    POC Glucose 4x Daily Before Meals & at Bedtime [[id number]]  (Abnormal) Collected: 03/16/24 1704    Specimen:  Blood Updated: 03/16/24 1706     Glucose 288 mg/dL      Comment: Serial Number: [id number]:  [id number]               Imaging Results (Last 24 Hours)       Procedure Component Value Units Date/Time    XR Chest 1 View [[id number]] Collected: 03/17/24 0838     Updated: 03/17/24 0841    Narrative:      XR CHEST 1 VW    Date of Exam: 3/17/2024 4:56 AM EDT    Indication: Post Op Lung Surgery    Comparison: 3/16/2024 at 0545 hours    Findings:  Residual atelectasis versus infiltrates are noted within the left mid to lower lung with small left pleural effusion. Mild atelectasis is seen within the right lower lobe. The cardiac silhouette and mediastinum are stable. The right portacatheter is   stable in position.      Impression:      Impression:  1.Stable atelectasis versus infiltrates within the left mid to lower lung with small left pleural effusion.  2.Mild atelectasis within the right lower lobe.      Electronically Signed: Willy Ugalde MD    3/17/2024 8:39 AM EDT    Workstation ID: [id number]            EKG      I personally viewed and interpreted the patient's EKG/Telemetry data:    ECHOCARDIOGRAM:  Results for orders placed during the hospital encounter of 03/09/24    Adult Transthoracic Echo Complete W/ Cont if Necessary Per Protocol    Interpretation Summary    Left ventricular systolic function is normal. Calculated left ventricular EF = 65% Left ventricular ejection fraction appears to be 61 - 65%.    Left ventricular diastolic function is consistent with (grade I) impaired relaxation.    There is calcification of the aortic valve mainly affecting the non-coronary cusp(s).    Estimated right ventricular systolic pressure from tricuspid regurgitation is normal (<35 mmHg).    No significant valvular abnormalities noted.       STRESS MYOVIEW:       CARDIAC CATHETERIZATION:  No results found for this or any previous visit.       OTHER:         Assessment & Plan     1 shortness of breath  Patient presented with  shortness of breath and had significant pneumonia and is on IV antibiotics  Patient will have an echocardiogram performed  Patient had multifocal pneumonia concerning for empyema and hence a chest tube placed.  Patient thoracoscopy video-assisted with decortication with da Alexandre robot.  Patient has chest tubes draining well and will anticipate removal tomorrow  Patient's chest tubes are removed and is sitting in chair comfortably and doing well     2.  History of aortic dissection  Patient had history of aortic dissection in the past but had medical treatment only at that time and no surgery and is followed by the vascular surgeons    3.  Atrial fibrillation  Patient has new onset atrial fibrillation and is on metoprolol at home which we will restart but he may need anticoagulation once all the tests are done and if required with vascular and oncologist.  Patient is ruled out for MI  Patient is having an echocardiogram performed for LV function valvular abnormalities  Further treatment based on echocardiogram findings  Patient had normal LV systolic function and is currently in sinus rhythm with beta-blockers and hence no further cardiac workup    Patient is having recurrent episodes of atrial fibrillation and is not tolerating beta-blockers because of low blood pressure and hence he is on IV amiodarone.  Will most likely need anticoagulation also for persistent atrial fibrillation will watch his symptoms.  Patient is receiving subcu heparin for now and will switch to oral anticoagulation once his chest tubes are removed and okay with thoracic surgeons.  Patient is on oral amiodarone and oral beta-blockers now and his IV amiodarone has been stopped.  Patient can be started on anticoagulation by mouth once he is cleared by the thoracic surgeons.  Patient is on the higher heart rates occasionally and hence I will increase the Toprol-XL to 50 mg once a day and continue the amiodarone  Still waiting on thoracic surgeons  to give the clearance for starting anticoagulation.  Patient is started on Xarelto 20 mg once a day along with metoprolol and amiodarone and is currently in sinus rhythm.    4.  Hypertension  Patient blood pressure currently stable on beta-blockers along with hydralazine and amlodipine  Patient's blood pressure actually lower side and hence his blood pressure medicines have been held.  Patient blood pressure is now better but increased and hence I will adjust the dose of beta-blockers but not start on hydralazine or amlodipine still.    5.  Diabetes  Patient is on oral medicines and followed by the primary care doctor    6.  Hyperlipidemia  Patient is on statins and the lipid levels are followed by the primary care doctor    7.  Rectal cancer  Patient received chemotherapy and after that he developed a esophagitis which was treated with Diflucan but he is also followed by the oncologist    8.  Acute respiratory failure  Patient is s/p chest tube placement and is currently on Pulmicort and DuoNebs.  Initially was intubated but he was extubated when his oxygenation improved.  Patient also had left empyema and is on antibiotics and CT surgery is seeing the patient.  Patient's symptoms are much better without any oxygen or chest tubes.    I discussed the patients findings and my recommendations with patient and nurse    Steve Mckeon MD  03/17/24  23:57 EDT

## 2024-03-19 DIAGNOSIS — J86.9 EMPYEMA LUNG: Primary | ICD-10-CM

## 2024-03-20 ENCOUNTER — TELEPHONE (OUTPATIENT)
Dept: SURGERY | Facility: CLINIC | Age: 64
End: 2024-03-20
Payer: MEDICARE

## 2024-03-20 LAB
QT INTERVAL: 376 MS
QTC INTERVAL: 490 MS

## 2024-03-20 NOTE — TELEPHONE ENCOUNTER
Caller: Prashant Zhou    Relationship: Self    Best call back number: [phone] (Mobile)     What is the best time to reach you: ANY - LEAVE VOICEMAIL IF NEEDED    Who are you requesting to speak with (clinical staff, provider,  specific staff member): ANY    Do you know the name of the person who called: UNKNOWN    What was the call regarding: PT SAID HE MISSED A CALL AND HE THOUGHT IT MAY BE RELATED TO GETTING [facility]    Is it okay if the provider responds through MyChart: NO CALL

## 2024-03-20 NOTE — TELEPHONE ENCOUNTER
I made pt aware that I discovered that he has a  named Karen in [facility] regarding transportation to his appointment on 3/26/2024. Pt stated that he will also follow up with  in order to ensure he meets all requirements for his appointment. Pt was agreeable and understood    DB 2:49  3/20/2024

## 2024-03-22 ENCOUNTER — TELEPHONE (OUTPATIENT)
Dept: SURGERY | Facility: CLINIC | Age: 64
End: 2024-03-22
Payer: MEDICARE

## 2024-03-22 RX ORDER — LISINOPRIL AND HYDROCHLOROTHIAZIDE 25; 20 MG/1; MG/1
1 TABLET ORAL DAILY
Qty: 90 TABLET | Refills: 0 | Status: SHIPPED | OUTPATIENT
Start: 2024-03-22 | End: 2024-03-23

## 2024-03-22 RX ORDER — HYDRALAZINE HYDROCHLORIDE 50 MG/1
50 TABLET, FILM COATED ORAL EVERY 8 HOURS
Qty: 90 TABLET | Refills: 1 | OUTPATIENT
Start: 2024-03-22

## 2024-03-22 RX ORDER — LISINOPRIL AND HYDROCHLOROTHIAZIDE 25; 20 MG/1; MG/1
1 TABLET ORAL DAILY
Qty: 90 TABLET | Refills: 0 | OUTPATIENT
Start: 2024-03-22

## 2024-03-22 RX ORDER — HYDRALAZINE HYDROCHLORIDE 50 MG/1
50 TABLET, FILM COATED ORAL EVERY 8 HOURS
Qty: 90 TABLET | Refills: 1 | Status: SHIPPED | OUTPATIENT
Start: 2024-03-22 | End: 2024-03-23

## 2024-03-22 NOTE — TELEPHONE ENCOUNTER
Pt confirmed transportation is arranged for him to attend appt on 3/26/2024 and pt is aware to receive CXR prior to arrival    DB 12:02  3/22/2024

## 2024-03-23 RX ORDER — HYDRALAZINE HYDROCHLORIDE 50 MG/1
50 TABLET, FILM COATED ORAL EVERY 8 HOURS
Qty: 90 TABLET | Refills: 1 | Status: SHIPPED | OUTPATIENT
Start: 2024-03-23

## 2024-03-23 RX ORDER — LISINOPRIL AND HYDROCHLOROTHIAZIDE 25; 20 MG/1; MG/1
1 TABLET ORAL DAILY
Qty: 90 TABLET | Refills: 0 | Status: SHIPPED | OUTPATIENT
Start: 2024-03-23

## 2024-03-26 ENCOUNTER — HOSPITAL ENCOUNTER (OUTPATIENT)
Dept: GENERAL RADIOLOGY | Facility: HOSPITAL | Age: 64
Discharge: HOME | End: 2024-03-26
Admitting: SURGERY
Payer: MEDICARE

## 2024-03-26 ENCOUNTER — OFFICE VISIT (OUTPATIENT)
Dept: SURGERY | Facility: CLINIC | Age: 64
End: 2024-03-26
Payer: MEDICARE

## 2024-03-26 VITALS
OXYGEN SATURATION: 94 % | BODY MASS INDEX: 24.49 KG/M2 | WEIGHT: 197 LBS | SYSTOLIC BLOOD PRESSURE: 118 MMHG | HEART RATE: 63 BPM | HEIGHT: 75 IN | DIASTOLIC BLOOD PRESSURE: 60 MMHG

## 2024-03-26 DIAGNOSIS — J86.9 EMPYEMA LUNG: Primary | ICD-10-CM

## 2024-03-26 DIAGNOSIS — J86.9 EMPYEMA LUNG: ICD-10-CM

## 2024-03-26 PROCEDURE — 71046 X-RAY EXAM CHEST 2 VIEWS: CPT

## 2024-03-26 NOTE — PROGRESS NOTES
THORACIC SURGERY CLINIC FOLLOW  UP NOTE    REASON FOR VISIT: Left loculated pleural effusion s/p robot-assisted left decortication, partial parietal visceral pleurectomy.     Subjective   HISTORY OF PRESENTING ILLNESS:   Prashant Zhou is a 63-year-old male with a medical history of rectal cance s/p partial colectomy, , currently undergoing chemotherapy, diabetes mellitus type 2, hypertension, hyperlipidemia, thoracic aortic aneurysm secondary to hypertension.  He was seen by Dr. Wagner in February 2024 after CT scan of the chest which reported left lower lobe lung mass/ consolidation.  He was treated on outpatient basis with oral antibiotics.       He presented to [facility] on 3/9/2024 with complaints of shortness of breath, cough and congestion. He denied any fevers chills or diaphoresis.  He reported cough productive of yellow sputum.  His oxygen saturation was 81% in ED and was placed on 2 L oxygen high flow nasal cannula.  He denied using supplemental home oxygen.  CT scan of the chest was done which reported loculated gas and fluid collection within the left pleural space with associated thickening of the pleural wall, which likely represents empyema. Thoracic surgery was consulted for further evaluation.     His clinical presentation was concerning for left empyema thoracis. I discussed with him the option of conservative management versus surgery. He had a thick rind around the left lower lobe and I believed that conservative medical management with chest tube and tPA would not expand the lung. He agreed to proceed with surgery.    On 3/11/2024, he underwent left robotic assisted total decortication, partial parietal and partial vessel pleurectomy.  The left lower lobe and lingula was covered with fibrinous plug.  He had 500 cc of alma purulent fluid drained.  There were multiple pockets of alma pus in the left lower pleural cavity.  There was a fibrinous rind covering the lower half of  the chest.  The lung better expanded after complete decortication and pleurectomy.  He recovered well from the surgery.  He was discharged to rehab after chest tubes were removed.    He came to clinic for follow-up visit.  He is in rehab and has been performing all kinds of exercises.  He has been requiring supplemental oxygen.   His incision is pruritic and tender when he sleeps on it. He takes pain medication late at night because he normally rolls over on it and that helps him sleep. Compared to 2 days ago, his breathing is improving.    Past Medical History:   Diagnosis Date    Aortic dissection     Diabetes mellitus     Heart murmur     History of noncompliance with medical treatment     Hyperlipidemia     Hypertension     Type B hypoplasia of aortic arch        Past Surgical History:   Procedure Laterality Date    COLECTOMY PARTIAL / TOTAL  2023    COLONOSCOPY N/A 08/31/2023    Procedure: COLONOSCOPY with sigmoid mass tattooing at 35cm, sigmoid and rectal polypectomy;  Surgeon: TORIBIO Jacob MD;  Location: [facility] ENDOSCOPY;  Service: Gastroenterology;  Laterality: N/A;  sigmoid mass, colon polyps    THORACOSCOPY WITH DECORTICATION Left 3/11/2024    Procedure: THORACOSCOPY VIDEO ASSISTED WITH DECORTICATION WITH DAVINCI ROBOT;  Surgeon: Saqib Keller MD;  Location: [facility] MAIN OR;  Service: Robotics - DaVinci;  Laterality: Left;       Family History   Problem Relation Age of Onset    Diabetes Mother     Dementia Mother     Sudden death Father        Social History     Socioeconomic History    Marital status:    Tobacco Use    Smoking status: Every Day     Current packs/day: 2.00     Average packs/day: 2.0 packs/day for 53.3 years (106.6 ttl pk-yrs)     Types: Cigarettes     Start date: 1971    Smokeless tobacco: Never   Vaping Use    Vaping status: Never Used   Substance and Sexual Activity    Alcohol use: No     Comment: Abstinent since around 2008    Drug use: Not Currently     Types: Marijuana      Comment: Abstinent since at least the 1980's    Sexual activity: Defer         Current Outpatient Medications:     amiodarone (PACERONE) 200 MG tablet, Take 1 tablet by mouth Every 12 (Twelve) Hours., Disp: 60 tablet, Rfl: 0    benzonatate (TESSALON) 100 MG capsule, Take 1 capsule by mouth 3 (Three) Times a Day As Needed for Cough., Disp: 21 capsule, Rfl: 0    Blood Glucose Monitoring Suppl (Accu-Chek Guide) w/Device kit, 1 Device Daily., Disp: 1 kit, Rfl: 0    Chlorcyclizine-Pseudoephed (Stahist AD) 25-60 MG tablet, Take 0.5 tablets by mouth 2 (Two) Times a Day As Needed (Congestion, drainage)., Disp: 42 tablet, Rfl: 0    cloNIDine (CATAPRES) 0.1 MG tablet, Take 1 tablet by mouth 2 (Two) Times a Day., Disp: 60 tablet, Rfl: 5    gabapentin (NEURONTIN) 300 MG capsule, Take 1 capsule by mouth 3 (Three) Times a Day., Disp: 90 capsule, Rfl: 0    glucose blood (Accu-Chek Guide) test strip, Test daily e11.9, Disp: 50 each, Rfl: 12    guaiFENesin (MUCINEX) 600 MG 12 hr tablet, Take 2 tablets by mouth Every 12 (Twelve) Hours., Disp: 14 tablet, Rfl: 0    hydrALAZINE (APRESOLINE) 50 MG tablet, TAKE ONE TABLET BY MOUTH EVERY 8 HOURS, Disp: 90 tablet, Rfl: 1    lisinopril-hydrochlorothiazide (PRINZIDE,ZESTORETIC) 20-25 MG per tablet, TAKE ONE TABLET BY MOUTH DAILY, Disp: 90 tablet, Rfl: 0    metFORMIN (GLUCOPHAGE) 850 MG tablet, TAKE 1 TABLET BY MOUTH TWICE A DAY WITH MEALS, Disp: 144 tablet, Rfl: 1    metoprolol succinate XL (TOPROL-XL) 50 MG 24 hr tablet, Take 1 tablet by mouth Daily., Disp: 30 tablet, Rfl: 0    potassium chloride 10 MEQ CR tablet, Take 1 tablet by mouth Daily., Disp: , Rfl:     rivaroxaban (XARELTO) 20 MG tablet, Take 1 tablet by mouth Daily., Disp: 30 tablet, Rfl: 0    traMADol (ULTRAM) 50 MG tablet, TAKE 1 TABLET BY MOUTH EVERY 6 HOURS AS NEEDED FOR MODERATE PAIN, Disp: 28 tablet, Rfl: 0    vitamin D3 125 MCG (5000 UT) capsule capsule, Take 1 capsule by mouth Daily., Disp: , Rfl:     amLODIPine (NORVASC)  "10 MG tablet, TAKE 1 TABLET BY MOUTH DAILY, Disp: 90 tablet, Rfl: 0    atorvastatin (LIPITOR) 40 MG tablet, TAKE 1 TABLET BY MOUTH EVERY NIGHT AT BEDTIME, Disp: 90 tablet, Rfl: 0    glimepiride (AMARYL) 4 MG tablet, TAKE 1 TABLET BY MOUTH TWICE A DAY, Disp: 180 tablet, Rfl: 1    metoprolol tartrate (LOPRESSOR) 50 MG tablet, TAKE THREE TABLETS BY MOUTH EVERY 12 HOURS, Disp: 540 tablet, Rfl: 0    oxyCODONE (ROXICODONE) 10 MG tablet, , Disp: , Rfl:      No Known Allergies          Objective    OBJECTIVE:     VITAL SIGNS:  /60 (BP Location: Left arm, Patient Position: Sitting, Cuff Size: Adult)   Pulse 63   Ht 190.5 cm (75\")   Wt 89.4 kg (197 lb)   SpO2 94% Comment: 1 liter oxygen  BMI 24.62 kg/m²     PHYSICAL EXAM:  Normal appearance.   Head is normocephalic.   Nose appears normal.   No obvious deformity of the mouth and throat.  Conjunctivae normal.   Heart rate and rhythm is normal.  Pulmonary effort is normal.  Incision clean, dry, intact.  Abdomen is soft.   Moving all 4 extremities.  Extremities warm.  No focal deficit present.   He is alert and oriented to person, place, and time.     LAB RESULTS:  I have reviewed all the available laboratory results in the chart.    IMAGING REVIEW:  I have reviewed the patient's all relevant laboratory and imaging findings.     Chest X-ray from 03/17/2024.  Impression:  1.Stable atelectasis versus infiltration within the left mid to lower lung with small left pleural effusion.  2.Mild atelectasis within the lower right lobe.        ASSESSMENT & PLAN:  Prashant Zhou is a 63 y.o. male with significant medical conditions as mentioned above presented to my clinic.    Diagnosis: Left loculated pleural effusion s/p robot-assisted left decortication, partial parietal visceral pleurectomy.     He is improving as expected.  His x-ray showed adequate lung expansion.  There is mild pleural thickening at the left lung base which will improve over time.  I recommended " follow-up in 3 months with repeat CT scan of the chest.    I discussed the patients findings and my recommendations with the patient. The patient was given adequate time to ask questions and all questions were answered to patient satisfaction.      Saqib Keller MD  Thoracic Surgeon  [facility]        Dictated utilizing Dragon dictation    I spent 40 minutes caring for Prashant on this date of service. This time includes time spent by me in the following activities: preparing for the visit, reviewing tests, obtaining and/or reviewing a separately obtained history, performing a medically appropriate examination and/or evaluation , counseling and educating the patient/family/caregiver, ordering medications, tests, or procedures, referring and communicating with other health care professionals , documenting information in the medical record, independently interpreting results and communicating that information with the patient/family/caregiver, and care coordination and more than half the time was spent in direct face to face evaluation and decision making.     Transcribed from ambient dictation for Saqib Keller MD by Ender Mckoy.  03/26/24   11:36 EDT    Patient or patient representative verbalized consent to the visit recording.  I have personally performed the services described in this document as transcribed by the above individual, and it is both accurate and complete.

## 2024-04-04 ENCOUNTER — LAB (OUTPATIENT)
Dept: FAMILY MEDICINE CLINIC | Facility: CLINIC | Age: 64
End: 2024-04-04
Payer: MEDICARE

## 2024-04-04 ENCOUNTER — OFFICE VISIT (OUTPATIENT)
Dept: FAMILY MEDICINE CLINIC | Facility: CLINIC | Age: 64
End: 2024-04-04
Payer: MEDICARE

## 2024-04-04 VITALS
HEART RATE: 60 BPM | SYSTOLIC BLOOD PRESSURE: 96 MMHG | TEMPERATURE: 96.9 F | HEIGHT: 75 IN | DIASTOLIC BLOOD PRESSURE: 64 MMHG | OXYGEN SATURATION: 98 % | BODY MASS INDEX: 25.12 KG/M2 | WEIGHT: 202 LBS

## 2024-04-04 DIAGNOSIS — I95.9 HYPOTENSION, UNSPECIFIED HYPOTENSION TYPE: ICD-10-CM

## 2024-04-04 DIAGNOSIS — E87.1 HYPONATREMIA: ICD-10-CM

## 2024-04-04 DIAGNOSIS — D64.9 ANEMIA, UNSPECIFIED TYPE: ICD-10-CM

## 2024-04-04 DIAGNOSIS — I48.0 PAROXYSMAL A-FIB: ICD-10-CM

## 2024-04-04 DIAGNOSIS — R53.1 GENERALIZED WEAKNESS: ICD-10-CM

## 2024-04-04 DIAGNOSIS — J18.9 PNEUMONIA DUE TO INFECTIOUS ORGANISM, UNSPECIFIED LATERALITY, UNSPECIFIED PART OF LUNG: ICD-10-CM

## 2024-04-04 DIAGNOSIS — J86.9 EMPYEMA LUNG: ICD-10-CM

## 2024-04-04 DIAGNOSIS — J86.9 EMPYEMA LUNG: Primary | ICD-10-CM

## 2024-04-04 PROBLEM — I48.20 CHRONIC ATRIAL FIBRILLATION: Status: ACTIVE | Noted: 2024-04-04

## 2024-04-04 PROBLEM — I48.20 CHRONIC ATRIAL FIBRILLATION: Status: RESOLVED | Noted: 2024-04-04 | Resolved: 2024-04-04

## 2024-04-04 LAB
ANION GAP SERPL CALCULATED.3IONS-SCNC: 11 MMOL/L (ref 5–15)
BASOPHILS # BLD AUTO: 0.07 10*3/MM3 (ref 0–0.2)
BASOPHILS NFR BLD AUTO: 0.5 % (ref 0–1.5)
BUN SERPL-MCNC: 20 MG/DL (ref 8–23)
BUN/CREAT SERPL: 31.7 (ref 7–25)
CALCIUM SPEC-SCNC: 11.5 MG/DL (ref 8.6–10.5)
CHLORIDE SERPL-SCNC: 97 MMOL/L (ref 98–107)
CO2 SERPL-SCNC: 30 MMOL/L (ref 22–29)
CREAT SERPL-MCNC: 0.63 MG/DL (ref 0.76–1.27)
DEPRECATED RDW RBC AUTO: 55.9 FL (ref 37–54)
EGFRCR SERPLBLD CKD-EPI 2021: 107.5 ML/MIN/1.73
EOSINOPHIL # BLD AUTO: 0.19 10*3/MM3 (ref 0–0.4)
EOSINOPHIL NFR BLD AUTO: 1.5 % (ref 0.3–6.2)
ERYTHROCYTE [DISTWIDTH] IN BLOOD BY AUTOMATED COUNT: 16.7 % (ref 12.3–15.4)
GLUCOSE SERPL-MCNC: 131 MG/DL (ref 65–99)
HCT VFR BLD AUTO: 32.7 % (ref 37.5–51)
HGB BLD-MCNC: 10.5 G/DL (ref 13–17.7)
IMM GRANULOCYTES # BLD AUTO: 0.07 10*3/MM3 (ref 0–0.05)
IMM GRANULOCYTES NFR BLD AUTO: 0.5 % (ref 0–0.5)
LYMPHOCYTES # BLD AUTO: 2.24 10*3/MM3 (ref 0.7–3.1)
LYMPHOCYTES NFR BLD AUTO: 17.3 % (ref 19.6–45.3)
MCH RBC QN AUTO: 29.2 PG (ref 26.6–33)
MCHC RBC AUTO-ENTMCNC: 32.1 G/DL (ref 31.5–35.7)
MCV RBC AUTO: 91.1 FL (ref 79–97)
MONOCYTES # BLD AUTO: 0.87 10*3/MM3 (ref 0.1–0.9)
MONOCYTES NFR BLD AUTO: 6.7 % (ref 5–12)
NEUTROPHILS NFR BLD AUTO: 73.5 % (ref 42.7–76)
NEUTROPHILS NFR BLD AUTO: 9.54 10*3/MM3 (ref 1.7–7)
NRBC BLD AUTO-RTO: 0 /100 WBC (ref 0–0.2)
PLATELET # BLD AUTO: 405 10*3/MM3 (ref 140–450)
PMV BLD AUTO: 9.5 FL (ref 6–12)
POTASSIUM SERPL-SCNC: 5.2 MMOL/L (ref 3.5–5.2)
RBC # BLD AUTO: 3.59 10*6/MM3 (ref 4.14–5.8)
SODIUM SERPL-SCNC: 138 MMOL/L (ref 136–145)
WBC NRBC COR # BLD AUTO: 12.98 10*3/MM3 (ref 3.4–10.8)

## 2024-04-04 PROCEDURE — 85025 COMPLETE CBC W/AUTO DIFF WBC: CPT | Performed by: FAMILY MEDICINE

## 2024-04-04 PROCEDURE — 80048 BASIC METABOLIC PNL TOTAL CA: CPT | Performed by: FAMILY MEDICINE

## 2024-04-04 PROCEDURE — 36415 COLL VENOUS BLD VENIPUNCTURE: CPT

## 2024-04-04 NOTE — PATIENT INSTRUCTIONS
Rest and increase fluids ismael water  Ok to do outpatient rehab  Stop the lisinopril/hydrochlorothiazide

## 2024-04-04 NOTE — PROGRESS NOTES
Subjective   Prashant Lawler is a 63 y.o. male.     History of Present Illness     PRASHANT LAWLER his YOB: 1960, he is a 63-year-old male, a patient of Dr. Chucky Paulino, who comes in for follow-up after a recent hospitalization from 03/09/2024 to 03/17/2024 with multifocal pneumonia. He is accompanied by his wife.    Following his discharge from the hospital, the patient sought rehabilitation. He reports an improvement in his condition compared to his pre-discharge state. He was discharged with a regimen of 0.5 liters of oxygen, which he uses both during the day and at night. His appetite remains robust, despite the absence of chemotherapy-induced dysgeusia and texture. He denies experiencing nausea. A follow-up appointment with his lung surgeon was conducted at the end of 03/2024. His oncologist, Dr. Wagner, identified a nickel-sized lesion on his lung, suspected to be pneumonia. Subsequently, he visited the emergency room a week later. He has completed his course of antibiotics and is contemplating resuming chemotherapy.    The patient is currently prescribed 16 different medications, including amiodarone and rivaroxaban. He experiences occasional chest pain, which he believes is cardiac in origin. Prior to his hospitalization, he was diagnosed with atrial fibrillation.    The following portions of the patient's history were reviewed and updated as appropriate: allergies, current medications, past family history, past medical history, past social history, past surgical history, and problem list.  Past Medical History:   Diagnosis Date    Aortic dissection     Diabetes mellitus     Heart murmur     History of noncompliance with medical treatment     Hyperlipidemia     Hypertension     Type B hypoplasia of aortic arch      Past Surgical History:   Procedure Laterality Date    COLECTOMY PARTIAL / TOTAL  2023    COLONOSCOPY N/A 08/31/2023    Procedure: COLONOSCOPY with sigmoid mass tattooing  at 35cm, sigmoid and rectal polypectomy;  Surgeon: TORIBIO Jacob MD;  Location: [facility] ENDOSCOPY;  Service: Gastroenterology;  Laterality: N/A;  sigmoid mass, colon polyps    THORACOSCOPY WITH DECORTICATION Left 3/11/2024    Procedure: THORACOSCOPY VIDEO ASSISTED WITH DECORTICATION WITH DAVINCI ROBOT;  Surgeon: Saqib Keller MD;  Location: [facility] MAIN OR;  Service: Robotics - DaVinci;  Laterality: Left;     Family History   Problem Relation Age of Onset    Diabetes Mother     Dementia Mother     Sudden death Father      Social History     Socioeconomic History    Marital status:    Tobacco Use    Smoking status: Every Day     Current packs/day: 2.00     Average packs/day: 2.0 packs/day for 53.3 years (106.5 ttl pk-yrs)     Types: Cigarettes     Start date: 1971    Smokeless tobacco: Never   Vaping Use    Vaping status: Never Used   Substance and Sexual Activity    Alcohol use: No     Comment: Abstinent since around 2008    Drug use: Not Currently     Types: Marijuana     Comment: Abstinent since at least the 1980's    Sexual activity: Defer         Current Outpatient Medications:     amiodarone (PACERONE) 200 MG tablet, Take 1 tablet by mouth Every 12 (Twelve) Hours., Disp: 60 tablet, Rfl: 0    amLODIPine (NORVASC) 10 MG tablet, TAKE 1 TABLET BY MOUTH DAILY, Disp: 90 tablet, Rfl: 0    atorvastatin (LIPITOR) 40 MG tablet, TAKE ONE TABLET BY MOUTH EVERY NIGHT AT BEDTIME, Disp: 90 tablet, Rfl: 0    benzonatate (TESSALON) 100 MG capsule, Take 1 capsule by mouth 3 (Three) Times a Day As Needed for Cough., Disp: 21 capsule, Rfl: 0    Blood Glucose Monitoring Suppl (Accu-Chek Guide) w/Device kit, 1 Device Daily., Disp: 1 kit, Rfl: 0    Chlorcyclizine-Pseudoephed (Stahist AD) 25-60 MG tablet, Take 0.5 tablets by mouth 2 (Two) Times a Day As Needed (Congestion, drainage)., Disp: 42 tablet, Rfl: 0    cloNIDine (CATAPRES) 0.1 MG tablet, Take 1 tablet by mouth 2 (Two) Times a Day., Disp: 60 tablet, Rfl: 5     "gabapentin (NEURONTIN) 300 MG capsule, Take 1 capsule by mouth 3 (Three) Times a Day., Disp: 90 capsule, Rfl: 0    glimepiride (AMARYL) 4 MG tablet, TAKE ONE TABLET BY MOUTH TWICE A DAY, Disp: 180 tablet, Rfl: 1    glucose blood (Accu-Chek Guide) test strip, Test daily e11.9, Disp: 50 each, Rfl: 12    guaiFENesin (MUCINEX) 600 MG 12 hr tablet, Take 2 tablets by mouth Every 12 (Twelve) Hours., Disp: 14 tablet, Rfl: 0    hydrALAZINE (APRESOLINE) 50 MG tablet, TAKE ONE TABLET BY MOUTH EVERY 8 HOURS, Disp: 90 tablet, Rfl: 1    lisinopril-hydrochlorothiazide (PRINZIDE,ZESTORETIC) 20-25 MG per tablet, TAKE ONE TABLET BY MOUTH DAILY, Disp: 90 tablet, Rfl: 0    metFORMIN (GLUCOPHAGE) 850 MG tablet, TAKE 1 TABLET BY MOUTH TWICE A DAY WITH MEALS, Disp: 144 tablet, Rfl: 1    metoprolol succinate XL (TOPROL-XL) 50 MG 24 hr tablet, Take 1 tablet by mouth Daily., Disp: 30 tablet, Rfl: 0    potassium chloride 10 MEQ CR tablet, Take 1 tablet by mouth Daily., Disp: , Rfl:     rivaroxaban (XARELTO) 20 MG tablet, Take 1 tablet by mouth Daily., Disp: 30 tablet, Rfl: 0    traMADol (ULTRAM) 50 MG tablet, TAKE 1 TABLET BY MOUTH EVERY 6 HOURS AS NEEDED FOR MODERATE PAIN, Disp: 28 tablet, Rfl: 0    vitamin D3 125 MCG (5000 UT) capsule capsule, Take 1 capsule by mouth Daily., Disp: , Rfl:     Review of Systems  A review of systems was performed, and the pertinent positives are noted in the HPI.    BP 96/64 (BP Location: Right arm, Patient Position: Sitting, Cuff Size: Large Adult)   Pulse 60   Temp 96.9 °F (36.1 °C) (Temporal)   Ht 190.5 cm (75\")   Wt 91.6 kg (202 lb)   SpO2 98%   BMI 25.25 kg/m²           Objective   Physical Exam  Vitals and nursing note reviewed.   Constitutional:       Appearance: Normal appearance.   Cardiovascular:      Rate and Rhythm: Normal rate and regular rhythm.      Heart sounds: Normal heart sounds.   Pulmonary:      Effort: Pulmonary effort is normal.      Breath sounds: Normal breath sounds. "   Musculoskeletal:      Right lower leg: No edema.      Left lower leg: No edema.   Neurological:      Mental Status: He is alert.      Comments: Using a cane  Generalized weakness           Assessment & Plan   Problems Addressed this Visit          Cardiac and Vasculature    Paroxysmal A-fib       Genitourinary and Reproductive     Hyponatremia    Relevant Orders    Basic Metabolic Panel       Pulmonary and Pneumonias    Pneumonia    Empyema lung - Primary    Relevant Orders    CBC & Differential     Other Visit Diagnoses       Anemia, unspecified type        Relevant Orders    CBC & Differential    Generalized weakness        Hypotension, unspecified hypotension type              Diagnoses         Codes Comments    Empyema lung    -  Primary ICD-10-CM: J86.9  ICD-9-CM: 510.9     Hyponatremia     ICD-10-CM: E87.1  ICD-9-CM: 276.1     Anemia, unspecified type     ICD-10-CM: D64.9  ICD-9-CM: 285.9     Pneumonia due to infectious organism, unspecified laterality, unspecified part of lung     ICD-10-CM: J18.9  ICD-9-CM: 486     Paroxysmal A-fib     ICD-10-CM: I48.0  ICD-9-CM: 427.31     Generalized weakness     ICD-10-CM: R53.1  ICD-9-CM: 780.79     Hypotension, unspecified hypotension type     ICD-10-CM: I95.9  ICD-9-CM: 458.9           1. Empyema of the lung.  The patient has already undergone follow-up consultations with thoracic surgery and has successfully completed his antibiotic regimen.    2. Hyponatremia.  A Basic Metabolic Panel (BMP) is scheduled for today.    3. Anemia.  The patient is due for a Complete Blood Count (CBC) today.    4. Pneumonia.  The patient has successfully completed his antibiotic regimen. Today, his lungs are clear. A CBC is scheduled for today.    5. Paroxysmal atrial fibrillation.  The patient is currently on amiodarone and Xarelto, and his condition is in normal sinus today.    6. Generalized weakness.  The patient will arrange his outpatient rehabilitation appointment.    7.  Hypotension: overcorrected.  Will  have him hold the lisinopril/hctz for now.               Transcribed from ambient dictation for Dary Paulino MD by aWnda Lipscomb.  04/04/24   11:56 EDT    Patient or patient representative verbalized consent to the visit recording.  I have personally performed the services described in this document as transcribed by the above individual, and it is both accurate and complete.

## 2024-04-08 LAB — FUNGUS WND CULT: NORMAL

## 2024-04-09 ENCOUNTER — HOSPITAL ENCOUNTER (OUTPATIENT)
Dept: ONCOLOGY | Facility: HOSPITAL | Age: 64
Discharge: HOME | End: 2024-04-09
Admitting: INTERNAL MEDICINE
Payer: MEDICARE

## 2024-04-09 VITALS
RESPIRATION RATE: 18 BRPM | DIASTOLIC BLOOD PRESSURE: 74 MMHG | BODY MASS INDEX: 25.24 KG/M2 | TEMPERATURE: 98.2 F | OXYGEN SATURATION: 97 % | HEIGHT: 75 IN | HEART RATE: 60 BPM | SYSTOLIC BLOOD PRESSURE: 115 MMHG | WEIGHT: 203 LBS

## 2024-04-09 DIAGNOSIS — C78.6 METASTASIS TO PERITONEAL CAVITY: ICD-10-CM

## 2024-04-09 DIAGNOSIS — C20 RECTAL CANCER: Primary | ICD-10-CM

## 2024-04-09 LAB
ALBUMIN SERPL-MCNC: 3.8 G/DL (ref 3.5–5.2)
ALBUMIN/GLOB SERPL: 1 G/DL
ALP SERPL-CCNC: 80 U/L (ref 39–117)
ALT SERPL W P-5'-P-CCNC: 13 U/L (ref 1–41)
ANION GAP SERPL CALCULATED.3IONS-SCNC: 11 MMOL/L (ref 5–15)
AST SERPL-CCNC: 13 U/L (ref 1–40)
BASOPHILS # BLD AUTO: 0.06 10*3/MM3 (ref 0–0.2)
BASOPHILS NFR BLD AUTO: 0.5 % (ref 0–1.5)
BILIRUB SERPL-MCNC: 0.2 MG/DL (ref 0–1.2)
BILIRUB UR QL STRIP: NEGATIVE
BUN SERPL-MCNC: 15 MG/DL (ref 8–23)
BUN/CREAT SERPL: 30 (ref 7–25)
CALCIUM SPEC-SCNC: 10.7 MG/DL (ref 8.6–10.5)
CHLORIDE SERPL-SCNC: 98 MMOL/L (ref 98–107)
CLARITY UR: CLEAR
CO2 SERPL-SCNC: 29 MMOL/L (ref 22–29)
COLOR UR: ABNORMAL
CREAT SERPL-MCNC: 0.5 MG/DL (ref 0.76–1.27)
DEPRECATED RDW RBC AUTO: 58.9 FL (ref 37–54)
EGFRCR SERPLBLD CKD-EPI 2021: 115.3 ML/MIN/1.73
EOSINOPHIL # BLD AUTO: 0.16 10*3/MM3 (ref 0–0.4)
EOSINOPHIL NFR BLD AUTO: 1.4 % (ref 0.3–6.2)
ERYTHROCYTE [DISTWIDTH] IN BLOOD BY AUTOMATED COUNT: 17.3 % (ref 12.3–15.4)
GLOBULIN UR ELPH-MCNC: 3.9 GM/DL
GLUCOSE SERPL-MCNC: 117 MG/DL (ref 65–99)
GLUCOSE UR STRIP-MCNC: NEGATIVE MG/DL
HCT VFR BLD AUTO: 33.7 % (ref 37.5–51)
HGB BLD-MCNC: 10.4 G/DL (ref 13–17.7)
HGB UR QL STRIP.AUTO: NEGATIVE
KETONES UR QL STRIP: ABNORMAL
LEUKOCYTE ESTERASE UR QL STRIP.AUTO: NEGATIVE
LYMPHOCYTES # BLD AUTO: 2.44 10*3/MM3 (ref 0.7–3.1)
LYMPHOCYTES NFR BLD AUTO: 21 % (ref 19.6–45.3)
MCH RBC QN AUTO: 30.1 PG (ref 26.6–33)
MCHC RBC AUTO-ENTMCNC: 30.9 G/DL (ref 31.5–35.7)
MCV RBC AUTO: 97.4 FL (ref 79–97)
MONOCYTES # BLD AUTO: 0.95 10*3/MM3 (ref 0.1–0.9)
MONOCYTES NFR BLD AUTO: 8.2 % (ref 5–12)
NEUTROPHILS NFR BLD AUTO: 68.9 % (ref 42.7–76)
NEUTROPHILS NFR BLD AUTO: 8.01 10*3/MM3 (ref 1.7–7)
NITRITE UR QL STRIP: NEGATIVE
PH UR STRIP.AUTO: 5.5 [PH] (ref 5–8)
PLATELET # BLD AUTO: 294 10*3/MM3 (ref 140–450)
PMV BLD AUTO: 9.1 FL (ref 6–12)
POTASSIUM SERPL-SCNC: 4.2 MMOL/L (ref 3.5–5.2)
PROT SERPL-MCNC: 7.7 G/DL (ref 6–8.5)
PROT UR QL STRIP: ABNORMAL
RBC # BLD AUTO: 3.46 10*6/MM3 (ref 4.14–5.8)
SODIUM SERPL-SCNC: 138 MMOL/L (ref 136–145)
SP GR UR STRIP: >=1.03 (ref 1–1.03)
UROBILINOGEN UR QL STRIP: ABNORMAL
WBC NRBC COR # BLD AUTO: 11.62 10*3/MM3 (ref 3.4–10.8)

## 2024-04-09 PROCEDURE — 96366 THER/PROPH/DIAG IV INF ADDON: CPT

## 2024-04-09 PROCEDURE — 25010000002 PALONOSETRON 0.25 MG/5ML SOLUTION PREFILLED SYRINGE: Performed by: INTERNAL MEDICINE

## 2024-04-09 PROCEDURE — G0498 CHEMO EXTEND IV INFUS W/PUMP: HCPCS

## 2024-04-09 PROCEDURE — 25010000002 LEUCOVORIN CALCIUM PER 50 MG: Performed by: INTERNAL MEDICINE

## 2024-04-09 PROCEDURE — 25010000002 OXALIPLATIN PER 0.5 MG: Performed by: INTERNAL MEDICINE

## 2024-04-09 PROCEDURE — 25010000002 DEXAMETHASONE SODIUM PHOSPHATE 120 MG/30ML SOLUTION: Performed by: INTERNAL MEDICINE

## 2024-04-09 PROCEDURE — 25010000002 LEUCOVORIN 200 MG RECONSTITUTED SOLUTION 200 MG VIAL: Performed by: INTERNAL MEDICINE

## 2024-04-09 PROCEDURE — 0 DEXTROSE 5 % SOLUTION: Performed by: INTERNAL MEDICINE

## 2024-04-09 PROCEDURE — 25810000003 SODIUM CHLORIDE 0.9 % SOLUTION 250 ML FLEX CONT: Performed by: INTERNAL MEDICINE

## 2024-04-09 PROCEDURE — 96415 CHEMO IV INFUSION ADDL HR: CPT

## 2024-04-09 PROCEDURE — 96413 CHEMO IV INFUSION 1 HR: CPT

## 2024-04-09 PROCEDURE — 0 DEXTROSE 5 % SOLUTION 250 ML FLEX CONT: Performed by: INTERNAL MEDICINE

## 2024-04-09 PROCEDURE — 80053 COMPREHEN METABOLIC PANEL: CPT | Performed by: INTERNAL MEDICINE

## 2024-04-09 PROCEDURE — 96416 CHEMO PROLONG INFUSE W/PUMP: CPT

## 2024-04-09 PROCEDURE — 25010000002 FLUOROURACIL PER 500 MG: Performed by: INTERNAL MEDICINE

## 2024-04-09 PROCEDURE — 81003 URINALYSIS AUTO W/O SCOPE: CPT | Performed by: INTERNAL MEDICINE

## 2024-04-09 PROCEDURE — 96375 TX/PRO/DX INJ NEW DRUG ADDON: CPT

## 2024-04-09 PROCEDURE — 85025 COMPLETE CBC W/AUTO DIFF WBC: CPT | Performed by: INTERNAL MEDICINE

## 2024-04-09 PROCEDURE — 96368 THER/DIAG CONCURRENT INF: CPT

## 2024-04-09 RX ORDER — SODIUM CHLORIDE 9 MG/ML
20 INJECTION, SOLUTION INTRAVENOUS ONCE
Status: DISCONTINUED | OUTPATIENT
Start: 2024-04-09 | End: 2024-04-10 | Stop reason: HOSPADM

## 2024-04-09 RX ORDER — FAMOTIDINE 10 MG/ML
20 INJECTION, SOLUTION INTRAVENOUS AS NEEDED
Status: CANCELLED | OUTPATIENT
Start: 2024-04-09

## 2024-04-09 RX ORDER — DEXTROSE MONOHYDRATE 50 MG/ML
20 INJECTION, SOLUTION INTRAVENOUS ONCE
Status: COMPLETED | OUTPATIENT
Start: 2024-04-09 | End: 2024-04-09

## 2024-04-09 RX ORDER — PALONOSETRON 0.05 MG/ML
0.25 INJECTION, SOLUTION INTRAVENOUS ONCE
Status: COMPLETED | OUTPATIENT
Start: 2024-04-09 | End: 2024-04-09

## 2024-04-09 RX ORDER — DIPHENHYDRAMINE HYDROCHLORIDE 50 MG/ML
50 INJECTION INTRAMUSCULAR; INTRAVENOUS AS NEEDED
Status: CANCELLED | OUTPATIENT
Start: 2024-04-09

## 2024-04-09 RX ORDER — DEXTROSE MONOHYDRATE 50 MG/ML
20 INJECTION, SOLUTION INTRAVENOUS ONCE
Status: CANCELLED | OUTPATIENT
Start: 2024-04-09

## 2024-04-09 RX ORDER — SODIUM CHLORIDE 9 MG/ML
20 INJECTION, SOLUTION INTRAVENOUS ONCE
Status: CANCELLED | OUTPATIENT
Start: 2024-04-09

## 2024-04-09 RX ORDER — PALONOSETRON 0.05 MG/ML
0.25 INJECTION, SOLUTION INTRAVENOUS ONCE
Status: CANCELLED | OUTPATIENT
Start: 2024-04-09

## 2024-04-09 RX ADMIN — PALONOSETRON 0.25 MG: 0.25 INJECTION, SOLUTION INTRAVENOUS at 10:06

## 2024-04-09 RX ADMIN — DEXTROSE MONOHYDRATE 20 ML/HR: 50 INJECTION, SOLUTION INTRAVENOUS at 10:05

## 2024-04-09 RX ADMIN — OXALIPLATIN 195 MG: 5 INJECTION, SOLUTION INTRAVENOUS at 10:33

## 2024-04-09 RX ADMIN — DEXTROSE MONOHYDRATE 900 MG: 50 INJECTION, SOLUTION INTRAVENOUS at 10:29

## 2024-04-09 RX ADMIN — DEXAMETHASONE SODIUM PHOSPHATE 12 MG: 4 INJECTION, SOLUTION INTRA-ARTICULAR; INTRALESIONAL; INTRAMUSCULAR; INTRAVENOUS; SOFT TISSUE at 10:07

## 2024-04-09 RX ADMIN — FLUOROURACIL 5450 MG: 50 INJECTION, SOLUTION INTRAVENOUS at 12:38

## 2024-04-09 NOTE — PROGRESS NOTES
Discharged from hospital to rehab, he was discharged from rehab approx 8 days ago and Bey needle was left in and had not been flushed during that time. Dressing and bey needle d/c'ed and new Bey needle accessed port, see flowsheet.  Had lung surgery 3/11/24 with 3 small incisions noted to left side, incisions are scabbed over with redness noted at sites, MD informed and per Dr Wagner hold Avastin today.  C8 Folfox given per orders.

## 2024-04-10 ENCOUNTER — HOSPITAL ENCOUNTER (OUTPATIENT)
Dept: ONCOLOGY | Facility: HOSPITAL | Age: 64
Discharge: HOME | End: 2024-04-10
Payer: MEDICARE

## 2024-04-10 ENCOUNTER — OFFICE VISIT (OUTPATIENT)
Dept: ONCOLOGY | Facility: CLINIC | Age: 64
End: 2024-04-10
Payer: MEDICARE

## 2024-04-10 VITALS
WEIGHT: 204 LBS | RESPIRATION RATE: 18 BRPM | OXYGEN SATURATION: 96 % | SYSTOLIC BLOOD PRESSURE: 106 MMHG | DIASTOLIC BLOOD PRESSURE: 66 MMHG | HEIGHT: 75 IN | TEMPERATURE: 98 F | HEART RATE: 66 BPM | BODY MASS INDEX: 25.36 KG/M2

## 2024-04-10 DIAGNOSIS — C20 RECTAL CANCER: Primary | ICD-10-CM

## 2024-04-10 RX ORDER — OXYCODONE HYDROCHLORIDE 10 MG/1
TABLET ORAL
COMMUNITY
Start: 2024-03-28

## 2024-04-10 NOTE — PROGRESS NOTES
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     History of Present Illness:  Patient is a 63 y.o.     2024: Mr. Zhou carried a long history of severe hypertension and of an aneurysm of the ascending aorta.  He had been receiving antihypertensive medication after correction of the aneurysm with good results.  In 2023 he underwent his first screening colonoscopy.  A large circumferential and obstructive tumor was identified at the sigmoid/rectosigmoid junction.  The tumor could not be traversed even with the smallest instrument.  Biopsies showed moderately differentiated colonic adenocarcinoma without loss of nuclear expression of the mismatch repair proteins.  Imaging studies at the time revealed the known thoracic aneurysm that has increased mildly since the previous scan.  There was a benign-appearing subpleural nodule in the right posteromedial chest wall and pulmonary nodules that have been identified since 2018 remained stable.  Some mediastinal nonspecific and stable adenopathy was reported as well.  In the abdomen the sigmoid tumor was confirmed and there was no suggestion of metastatic disease.  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  He underwent excision of the squamous cell carcinoma that proved to be a high-grade squamous intraepithelial lesion.  He also had a robotic left colectomy.  The final report of pathology was of moderately differentiated adenocarcinoma of the colon, that measured 3.5 cm.  The tumor involved the serosal surface and the antimesenteric serosa.  Lymphovascular space invasion was identified.  All margins were  negative for disease but 2 of 27 lymph nodes had metastatic involvement.  As well there were at least 5 peritoneal deposits that were excised and that were confirmed to correspond to metastatic lesions.  The tumor had intact expression of MLH1, MSH 1, MSH 6 and PMS2.  Next-generation sequencing revealed a pathogenic mutation of exon 2 of the KRAS gene. ERBB2, BRAF, NTRK, RET, and PIK3CA were negative. PD-L1 was negative, as well.  He was started on treatment with FOLFOX and bevacizumab on November 28, 2023.  He reported adequate tolerance to the treatment, with the expected cold intolerance.  He had had no nausea but since the beginning of the present illness he had lost approximately 20 pounds.  He was eating reasonably well.  He had been free of chest pains and no longer had abdominal pain.  All his surgical incisions had healed and he was having regular defecations, at times of liquid stool.  The physical exam revealed him to be a chronically ill-appearing man who did not seem in distress.  He was conversant, in good spirits and without jaundice.  Lungs diminished bilaterally.  Heart regular.  Abdomen soft and nontender.  No edema.  The laboratory exams and all the records were reviewed and discussed with him and with his wife.  To resume treatment with the idea of obtaining a new set of scans after 6 cycles of chemotherapy.  After 12 cycles of chemotherapy discuss the possibility of additional surgery if there was any residual peritoneal disease at the time of surgery.  To see me at the end of February with the scans.    2/16/2024: In the office to review scans. In the last few days has had severe glossodynia and odynophagia. Also has been coughing and expectorating some clear sputum. No fevers. Has had pain on the left side of the chest. No dyspnea. He was prescribed Hiral's magic potion and fluconazole that has resulted in relief. On exam he does not appear acutely ill. No jaundice or pallor. Lungs are  diminished bilaterally and heart irregularly irregular. Abdomen soft. No edema. Reviewed the laboratory exams. Reviewed the images of the scans. Sent prescriptions for doxycycline as the lesion in the left lower lobe is likely infectious in nature, given his leukocytosis and symptoms. Will obtain an electrocardiogram. Will follow next week.     3/8/2024: Feeling poorly. Weak and not moving much. Has continued to have diffuse pain. No fevers. His spouse believes he has an ulcer on the backside. On exam he is conversant and oriented. No jaundice. No pallor. The lungs are diminished bilaterally. Heart regular. There is indeed an ulcer in the sacral region, in the intergluteal crease. Reviewed the laboratory exams. He is to continue with the same chemotherapy. Referred to the wound clinic. To have an electrocardiogram. He is to see me in a few weeks with new scans.     4/10/2024: Was admitted to the hospital shortly after the previous visit.  He had pneumonia and was very dyspneic.  On March 11, 2024 underwent a thoracoscopic decortication with parietal and visceral pleurectomy and intercostal nerve block.  He was treated with antibiotics and eventually discharged to a subacute rehabilitation facility.  Resumed treatment on 4/9/2024.  He tells me today he is feeling progressively better.  Stronger.  The surgical incisions are healing.  He is not having as much trouble breathing.  He has had no abdominal pain or diarrhea.  On exam alert, conversant and ill.  Seems much older than the stated age but is not in distress.  Lungs are diminished bilaterally.  Heart is regular.  Abdomen is soft.  No edema.  The laboratory exams were reviewed.  I reviewed the records from the hospital and reviewed all imaging studies done during that hospitalization.  No suggestion of progressive metastatic disease.  For now continue with the same treatment.  See me early in May 2024.    Past Medical History:   Diagnosis Date    Aortic  dissection     Diabetes mellitus     Heart murmur     History of noncompliance with medical treatment     Hyperlipidemia     Hypertension     Type B hypoplasia of aortic arch      Past Surgical History:   Procedure Laterality Date    COLECTOMY PARTIAL / TOTAL  2023    COLONOSCOPY N/A 08/31/2023    Procedure: COLONOSCOPY with sigmoid mass tattooing at 35cm, sigmoid and rectal polypectomy;  Surgeon: TORIBIO Jacob MD;  Location: [facility];  Service: Gastroenterology;  Laterality: N/A;  sigmoid mass, colon polyps    THORACOSCOPY WITH DECORTICATION Left 3/11/2024    Procedure: THORACOSCOPY VIDEO ASSISTED WITH DECORTICATION WITH DAVINCI ROBOT;  Surgeon: Saqib Keller MD;  Location: [facility];  Service: Robotics - DaVinci;  Laterality: Left;       Current Outpatient Medications:     oxyCODONE (ROXICODONE) 10 MG tablet, , Disp: , Rfl:     amiodarone (PACERONE) 200 MG tablet, Take 1 tablet by mouth Every 12 (Twelve) Hours., Disp: 60 tablet, Rfl: 0    amLODIPine (NORVASC) 10 MG tablet, TAKE 1 TABLET BY MOUTH DAILY, Disp: 90 tablet, Rfl: 0    atorvastatin (LIPITOR) 40 MG tablet, TAKE ONE TABLET BY MOUTH EVERY NIGHT AT BEDTIME, Disp: 90 tablet, Rfl: 0    benzonatate (TESSALON) 100 MG capsule, Take 1 capsule by mouth 3 (Three) Times a Day As Needed for Cough., Disp: 21 capsule, Rfl: 0    Blood Glucose Monitoring Suppl (Accu-Chek Guide) w/Device kit, 1 Device Daily., Disp: 1 kit, Rfl: 0    Chlorcyclizine-Pseudoephed (Stahist AD) 25-60 MG tablet, Take 0.5 tablets by mouth 2 (Two) Times a Day As Needed (Congestion, drainage)., Disp: 42 tablet, Rfl: 0    cloNIDine (CATAPRES) 0.1 MG tablet, Take 1 tablet by mouth 2 (Two) Times a Day., Disp: 60 tablet, Rfl: 5    gabapentin (NEURONTIN) 300 MG capsule, Take 1 capsule by mouth 3 (Three) Times a Day., Disp: 90 capsule, Rfl: 0    glimepiride (AMARYL) 4 MG tablet, TAKE ONE TABLET BY MOUTH TWICE A DAY, Disp: 180 tablet, Rfl: 1    glucose blood (Accu-Chek Guide) test strip,  Test daily e11.9, Disp: 50 each, Rfl: 12    guaiFENesin (MUCINEX) 600 MG 12 hr tablet, Take 2 tablets by mouth Every 12 (Twelve) Hours., Disp: 14 tablet, Rfl: 0    hydrALAZINE (APRESOLINE) 50 MG tablet, TAKE ONE TABLET BY MOUTH EVERY 8 HOURS, Disp: 90 tablet, Rfl: 1    lisinopril-hydrochlorothiazide (PRINZIDE,ZESTORETIC) 20-25 MG per tablet, TAKE ONE TABLET BY MOUTH DAILY, Disp: 90 tablet, Rfl: 0    metFORMIN (GLUCOPHAGE) 850 MG tablet, TAKE 1 TABLET BY MOUTH TWICE A DAY WITH MEALS, Disp: 144 tablet, Rfl: 1    metoprolol succinate XL (TOPROL-XL) 50 MG 24 hr tablet, Take 1 tablet by mouth Daily., Disp: 30 tablet, Rfl: 0    potassium chloride 10 MEQ CR tablet, Take 1 tablet by mouth Daily., Disp: , Rfl:     rivaroxaban (XARELTO) 20 MG tablet, Take 1 tablet by mouth Daily., Disp: 30 tablet, Rfl: 0    traMADol (ULTRAM) 50 MG tablet, TAKE 1 TABLET BY MOUTH EVERY 6 HOURS AS NEEDED FOR MODERATE PAIN, Disp: 28 tablet, Rfl: 0    vitamin D3 125 MCG (5000 UT) capsule capsule, Take 1 capsule by mouth Daily., Disp: , Rfl:   No current facility-administered medications for this visit.    No Known Allergies    Family History   Problem Relation Age of Onset    Diabetes Mother     Dementia Mother     Sudden death Father      Cancer-related family history is not on file.    Social History     Tobacco Use    Smoking status: Every Day     Current packs/day: 2.00     Average packs/day: 2.0 packs/day for 53.3 years (106.5 ttl pk-yrs)     Types: Cigarettes     Start date: 1971    Smokeless tobacco: Never   Vaping Use    Vaping status: Never Used   Substance Use Topics    Alcohol use: No     Comment: Abstinent since around 2008    Drug use: Not Currently     Types: Marijuana     Comment: Abstinent since at least the 1980's     Social History     Social History Narrative    Not on file     ROS:   Review of Systems   Constitutional:  Positive for activity change, appetite change, fatigue and unexpected weight change. Negative for chills,  "diaphoresis and fever.   HENT:  Positive for sore throat and trouble swallowing. Negative for congestion, dental problem, drooling, ear discharge, ear pain, facial swelling, hearing loss, mouth sores, nosebleeds, postnasal drip, rhinorrhea, sinus pressure, sinus pain, sneezing, tinnitus and voice change.    Eyes:  Negative for photophobia, pain, discharge, redness, itching and visual disturbance.   Respiratory:  Positive for cough and shortness of breath. Negative for apnea, choking, chest tightness, wheezing and stridor.    Cardiovascular:  Negative for chest pain, palpitations and leg swelling.   Gastrointestinal:  Negative for abdominal distention, abdominal pain, anal bleeding, blood in stool, constipation, diarrhea, nausea, rectal pain and vomiting.   Endocrine: Negative for cold intolerance, heat intolerance, polydipsia and polyuria.   Genitourinary:  Negative for decreased urine volume, difficulty urinating, dysuria, flank pain, frequency, genital sores, hematuria and urgency.   Musculoskeletal:  Negative for arthralgias, back pain, gait problem, joint swelling, myalgias, neck pain and neck stiffness.   Skin:  Negative for color change, pallor and rash.   Neurological:  Negative for dizziness, tremors, seizures, syncope, facial asymmetry, speech difficulty, weakness, light-headedness, numbness and headaches.        Increased cold sensitivity since the commencement of treatment with oxaliplatin   Hematological:  Negative for adenopathy. Does not bruise/bleed easily.   Psychiatric/Behavioral:  Negative for agitation, behavioral problems, confusion, decreased concentration, hallucinations, self-injury, sleep disturbance and suicidal ideas. The patient is not nervous/anxious.      Objective:    Vital Signs:  Vitals:    04/10/24 0955   BP: 106/66   Pulse: 66   Resp: 18   Temp: 98 °F (36.7 °C)   TempSrc: Infrared   SpO2: 96%   Weight: 92.5 kg (204 lb)   Height: 190.5 cm (75\")   PainSc: 0-No pain     Body mass index " is 25.5 kg/m².    ECOG  (1) Restricted in physically strenuous activity, ambulatory and able to do work of light nature    Physical Exam:   Physical Exam  Constitutional:       General: He is not in acute distress.     Appearance: He is not ill-appearing, toxic-appearing or diaphoretic.      Comments: Does not appear acutely ill. Conversant and oriented. No jaundice or pallor.    HENT:      Head: Normocephalic and atraumatic.      Right Ear: External ear normal.      Left Ear: External ear normal.      Nose: Nose normal.      Mouth/Throat:      Mouth: Mucous membranes are moist.      Pharynx: Oropharynx is clear.   Eyes:      General: No scleral icterus.        Right eye: No discharge.         Left eye: No discharge.      Conjunctiva/sclera: Conjunctivae normal.      Pupils: Pupils are equal, round, and reactive to light.   Cardiovascular:      Rate and Rhythm: Normal rate.      Pulses: Normal pulses.      Heart sounds: Normal heart sounds. No murmur heard.     No friction rub. No gallop.      Comments: Irregularly irregular.   Pulmonary:      Effort: No respiratory distress.      Breath sounds: No stridor. No wheezing, rhonchi or rales.      Comments: Diminished bilaterally and with fine crackles in the bases.   Chest:      Chest wall: No tenderness.      Comments: Surgical incisions on the left side of the chest are healing well.  No evidence of infection.  Abdominal:      General: Bowel sounds are normal. There is no distension.      Palpations: Abdomen is soft. There is no mass.      Tenderness: There is no abdominal tenderness. There is no right CVA tenderness, left CVA tenderness, guarding or rebound.      Comments: Soft and nontender.   Musculoskeletal:         General: No swelling, tenderness, deformity or signs of injury.      Cervical back: No rigidity.      Right lower leg: No edema.      Left lower leg: No edema.   Lymphadenopathy:      Cervical: No cervical adenopathy.   Skin:     General: Skin is warm  and dry.      Coloration: Skin is not jaundiced.      Findings: No bruising or rash.   Neurological:      General: No focal deficit present.      Mental Status: He is alert and oriented to person, place, and time.      Gait: Gait normal.   Psychiatric:         Mood and Affect: Mood normal.         Behavior: Behavior normal.         Thought Content: Thought content normal.         Judgment: Judgment normal.     MIGUEL Wagner MD performed the physical exam on 4/10/2024 as documented above.    Lab Results - Last 18 Months   Lab Units 04/09/24  0836 04/04/24  1143 03/27/24  0508   WBC 10*3/mm3 11.62* 12.98* 10.44   HEMOGLOBIN g/dL 10.4* 10.5* 7.9*   HEMATOCRIT % 33.7* 32.7* 26.7*   PLATELETS 10*3/mm3 294 405 411   MCV fL 97.4* 91.1 97.8*     Lab Results - Last 18 Months   Lab Units 04/09/24  0836 04/04/24  1143 03/27/24  0508 03/22/24  0600 03/18/24  0500 03/17/24  0525 03/16/24  0649   SODIUM mmol/L 138 138 132*   < > 137   < > 135*   POTASSIUM mmol/L 4.2 5.2 4.3   < > 3.8   < > 4.3   CHLORIDE mmol/L 98 97* 93*   < > 96*   < > 95*   CO2 mmol/L 29.0 30.0* 32.0*   < > 32.0*   < > 33.0*   BUN mg/dL 15 20 9   < > 11   < > 12   CREATININE mg/dL 0.50* 0.63* 0.38*   < > 0.39*   < > 0.34*   CALCIUM mg/dL 10.7* 11.5* 9.9   < > 9.8   < > 9.4   BILIRUBIN mg/dL 0.2  --   --   --  0.2  --  0.2   ALK PHOS U/L 80  --   --   --  93  --  97   ALT (SGPT) U/L 13  --   --   --  16  --  19   AST (SGOT) U/L 13  --   --   --  17  --  20   GLUCOSE mg/dL 117* 131* 130*   < > 75   < > 224*    < > = values in this interval not displayed.     Lab Results   Component Value Date    GLUCOSE 117 (H) 04/09/2024    BUN 15 04/09/2024    CREATININE 0.50 (L) 04/09/2024    EGFRIFNONA 100 01/20/2022    BCR 30.0 (H) 04/09/2024    K 4.2 04/09/2024    CO2 29.0 04/09/2024    CALCIUM 10.7 (H) 04/09/2024    ALBUMIN 3.8 04/09/2024    LABIL2 1.5 01/22/2019    AST 13 04/09/2024    ALT 13 04/09/2024     Lab Results - Last 18 Months   Lab Units 03/10/24  6206  11/14/23  1103   INR  1.21* 0.99   APTT seconds 31.2* 28.2     Lab Results   Component Value Date    PTT 31.2 (H) 03/10/2024    INR 1.21 (H) 03/10/2024     Lab Results   Component Value Date    CEA 1.77 02/16/2024       Lab Results   Component Value Date    PSA 0.594 01/23/2023     Assessment & Plan     1.  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  Recent scans don't show evidence of progressive disease. To follow the left lower lobe nodule. Continue same chemotherapy.   2. Pneumonia: Admitted to the hospital.  Underwent decortication of the left lung.  2.  Cigarette smoker: Abstinent now.  3.  Aortic aneurysm reviewed the recent scans.  There is no progression.  4.  Severe hypertension: Much better controlled.  5.  Sacral decubitus ulcer: Referred to the wound clinic.   6.continue treatment.  He is to see me in early May 2024.    Lars Wagner MD on 4/10/2024 at 10:42 AM.

## 2024-04-11 ENCOUNTER — HOSPITAL ENCOUNTER (OUTPATIENT)
Dept: ONCOLOGY | Facility: HOSPITAL | Age: 64
Discharge: HOME | End: 2024-04-11
Payer: MEDICARE

## 2024-04-11 VITALS
WEIGHT: 205.1 LBS | DIASTOLIC BLOOD PRESSURE: 64 MMHG | BODY MASS INDEX: 25.64 KG/M2 | HEART RATE: 69 BPM | SYSTOLIC BLOOD PRESSURE: 105 MMHG

## 2024-04-11 DIAGNOSIS — C78.6 METASTASIS TO PERITONEAL CAVITY: ICD-10-CM

## 2024-04-11 DIAGNOSIS — C20 RECTAL CANCER: Primary | ICD-10-CM

## 2024-04-11 PROCEDURE — 25010000002 HEPARIN LOCK FLUSH PER 10 UNITS: Performed by: INTERNAL MEDICINE

## 2024-04-11 RX ORDER — HEPARIN SODIUM (PORCINE) LOCK FLUSH IV SOLN 100 UNIT/ML 100 UNIT/ML
500 SOLUTION INTRAVENOUS AS NEEDED
Status: DISCONTINUED | OUTPATIENT
Start: 2024-04-11 | End: 2024-04-12 | Stop reason: HOSPADM

## 2024-04-11 RX ORDER — SODIUM CHLORIDE 0.9 % (FLUSH) 0.9 %
20 SYRINGE (ML) INJECTION AS NEEDED
OUTPATIENT
Start: 2024-04-11

## 2024-04-11 RX ORDER — HEPARIN SODIUM (PORCINE) LOCK FLUSH IV SOLN 100 UNIT/ML 100 UNIT/ML
500 SOLUTION INTRAVENOUS AS NEEDED
OUTPATIENT
Start: 2024-04-11

## 2024-04-11 RX ORDER — SODIUM CHLORIDE 0.9 % (FLUSH) 0.9 %
20 SYRINGE (ML) INJECTION AS NEEDED
Status: DISCONTINUED | OUTPATIENT
Start: 2024-04-11 | End: 2024-04-12 | Stop reason: HOSPADM

## 2024-04-11 RX ADMIN — Medication 20 ML: at 12:57

## 2024-04-11 RX ADMIN — HEPARIN 500 UNITS: 100 SYRINGE at 12:57

## 2024-04-11 NOTE — PROGRESS NOTES
Pt here for Adrucil pump DC. Pump empty. Site clean, dry, and intact.  Pt denies having any issues. Pt states he doesn't think he needs fluids today and said he feels good. Port flushed with good blood return noted.  Port flushed with saline and heparin prior to needle removal.  Pt given AVS and d/c home accompanied by spouse.

## 2024-04-21 DIAGNOSIS — E11.65 TYPE 2 DIABETES MELLITUS WITH HYPERGLYCEMIA, WITHOUT LONG-TERM CURRENT USE OF INSULIN: ICD-10-CM

## 2024-04-21 RX ORDER — GLIMEPIRIDE 4 MG/1
4 TABLET ORAL 2 TIMES DAILY
Qty: 180 TABLET | Refills: 1 | Status: SHIPPED | OUTPATIENT
Start: 2024-04-21

## 2024-04-21 RX ORDER — ATORVASTATIN CALCIUM 40 MG/1
40 TABLET, FILM COATED ORAL
Qty: 90 TABLET | Refills: 0 | Status: SHIPPED | OUTPATIENT
Start: 2024-04-21

## 2024-04-21 RX ORDER — METOPROLOL TARTRATE 50 MG/1
TABLET, FILM COATED ORAL
Qty: 540 TABLET | Refills: 0 | Status: SHIPPED | OUTPATIENT
Start: 2024-04-21

## 2024-04-21 RX ORDER — AMLODIPINE BESYLATE 10 MG/1
10 TABLET ORAL DAILY
Qty: 90 TABLET | Refills: 0 | Status: SHIPPED | OUTPATIENT
Start: 2024-04-21

## 2024-04-22 LAB
MYCOBACTERIUM SPEC CULT: NORMAL
NIGHT BLUE STAIN TISS: NORMAL

## 2024-04-23 ENCOUNTER — HOSPITAL ENCOUNTER (OUTPATIENT)
Dept: ONCOLOGY | Facility: HOSPITAL | Age: 64
Discharge: HOME | End: 2024-04-23
Admitting: INTERNAL MEDICINE
Payer: MEDICARE

## 2024-04-23 VITALS
SYSTOLIC BLOOD PRESSURE: 106 MMHG | HEART RATE: 61 BPM | OXYGEN SATURATION: 94 % | TEMPERATURE: 97.4 F | DIASTOLIC BLOOD PRESSURE: 62 MMHG | WEIGHT: 211 LBS | HEIGHT: 75 IN | RESPIRATION RATE: 14 BRPM | BODY MASS INDEX: 26.24 KG/M2

## 2024-04-23 DIAGNOSIS — C20 RECTAL CANCER: ICD-10-CM

## 2024-04-23 DIAGNOSIS — E55.9 VITAMIN D DEFICIENCY: ICD-10-CM

## 2024-04-23 DIAGNOSIS — C20 RECTAL CANCER: Primary | ICD-10-CM

## 2024-04-23 DIAGNOSIS — R80.9 PROTEINURIA, UNSPECIFIED TYPE: Primary | ICD-10-CM

## 2024-04-23 DIAGNOSIS — C78.6 METASTASIS TO PERITONEAL CAVITY: ICD-10-CM

## 2024-04-23 LAB
25(OH)D3 SERPL-MCNC: 31.3 NG/ML (ref 30–100)
ALP BLD-CCNC: 74 U/L (ref 53–128)
BASOPHILS # BLD AUTO: 0.04 10*3/MM3 (ref 0–0.2)
BASOPHILS NFR BLD AUTO: 0.4 % (ref 0–1.5)
BILIRUB UR QL STRIP: ABNORMAL
BUN BLDA-MCNC: 13 MG/DL (ref 7–22)
CALCIUM BLD QL: 10.4 MG/DL (ref 8–10.3)
CHLORIDE BLDA-SCNC: 99 MMOL/L (ref 98–108)
CLARITY UR: ABNORMAL
CO2 BLDA-SCNC: 29 MMOL/L (ref 18–33)
COLOR UR: ABNORMAL
CREAT BLDA-MCNC: 0.5 MG/DL (ref 0.6–1.2)
DEPRECATED RDW RBC AUTO: 59.7 FL (ref 37–54)
EGFRCR SERPLBLD CKD-EPI 2021: 115.3 ML/MIN/1.73
EOSINOPHIL # BLD AUTO: 0.15 10*3/MM3 (ref 0–0.4)
EOSINOPHIL NFR BLD AUTO: 1.6 % (ref 0.3–6.2)
ERYTHROCYTE [DISTWIDTH] IN BLOOD BY AUTOMATED COUNT: 17.5 % (ref 12.3–15.4)
GLUCOSE BLDC GLUCOMTR-MCNC: 127 MG/DL (ref 73–118)
GLUCOSE UR STRIP-MCNC: NEGATIVE MG/DL
HCT VFR BLD AUTO: 36.5 % (ref 37.5–51)
HGB BLD-MCNC: 11.5 G/DL (ref 13–17.7)
HGB UR QL STRIP.AUTO: NEGATIVE
KETONES UR QL STRIP: NEGATIVE
LEUKOCYTE ESTERASE UR QL STRIP.AUTO: NEGATIVE
LYMPHOCYTES # BLD AUTO: 2.32 10*3/MM3 (ref 0.7–3.1)
LYMPHOCYTES NFR BLD AUTO: 24.7 % (ref 19.6–45.3)
MCH RBC QN AUTO: 30.7 PG (ref 26.6–33)
MCHC RBC AUTO-ENTMCNC: 31.5 G/DL (ref 31.5–35.7)
MCV RBC AUTO: 97.3 FL (ref 79–97)
MONOCYTES # BLD AUTO: 0.94 10*3/MM3 (ref 0.1–0.9)
MONOCYTES NFR BLD AUTO: 10 % (ref 5–12)
NEUTROPHILS NFR BLD AUTO: 5.95 10*3/MM3 (ref 1.7–7)
NEUTROPHILS NFR BLD AUTO: 63.3 % (ref 42.7–76)
NITRITE UR QL STRIP: NEGATIVE
PH UR STRIP.AUTO: 5.5 [PH] (ref 5–8)
PLATELET # BLD AUTO: 226 10*3/MM3 (ref 140–450)
PMV BLD AUTO: 9 FL (ref 6–12)
POC ALBUMIN: 3.4 G/L (ref 3.3–5.5)
POC ALT (SGPT): 13 U/L (ref 10–47)
POC AST (SGOT): 19 U/L (ref 11–38)
POC TOTAL BILIRUBIN: 0.6 MG/DL (ref 0.2–1.6)
POC TOTAL PROTEIN: 7.5 G/DL (ref 6.4–8.1)
POTASSIUM BLDA-SCNC: 3.5 MMOL/L (ref 3.6–5.1)
PROT UR QL STRIP: ABNORMAL
RBC # BLD AUTO: 3.75 10*6/MM3 (ref 4.14–5.8)
SODIUM BLD-SCNC: 141 MMOL/L (ref 128–145)
SP GR UR STRIP: >=1.03 (ref 1–1.03)
UROBILINOGEN UR QL STRIP: ABNORMAL
WBC NRBC COR # BLD AUTO: 9.4 10*3/MM3 (ref 3.4–10.8)

## 2024-04-23 PROCEDURE — 96368 THER/DIAG CONCURRENT INF: CPT

## 2024-04-23 PROCEDURE — 96367 TX/PROPH/DG ADDL SEQ IV INF: CPT

## 2024-04-23 PROCEDURE — 96366 THER/PROPH/DIAG IV INF ADDON: CPT

## 2024-04-23 PROCEDURE — 25010000002 BEVACIZUMAB-BVZR 400 MG/16ML SOLUTION 16 ML VIAL: Performed by: NURSE PRACTITIONER

## 2024-04-23 PROCEDURE — 25010000002 FLUOROURACIL PER 500 MG: Performed by: NURSE PRACTITIONER

## 2024-04-23 PROCEDURE — 25010000002 BEVACIZUMAB-BVZR 100 MG/4ML SOLUTION 4 ML VIAL: Performed by: NURSE PRACTITIONER

## 2024-04-23 PROCEDURE — 25010000002 DEXAMETHASONE SODIUM PHOSPHATE 120 MG/30ML SOLUTION: Performed by: NURSE PRACTITIONER

## 2024-04-23 PROCEDURE — 0 DEXTROSE 5 % SOLUTION: Performed by: NURSE PRACTITIONER

## 2024-04-23 PROCEDURE — 0 DEXTROSE 5 % SOLUTION 250 ML FLEX CONT: Performed by: NURSE PRACTITIONER

## 2024-04-23 PROCEDURE — 25810000003 SODIUM CHLORIDE 0.9 % SOLUTION 250 ML FLEX CONT: Performed by: NURSE PRACTITIONER

## 2024-04-23 PROCEDURE — 80053 COMPREHEN METABOLIC PANEL: CPT

## 2024-04-23 PROCEDURE — 85025 COMPLETE CBC W/AUTO DIFF WBC: CPT | Performed by: INTERNAL MEDICINE

## 2024-04-23 PROCEDURE — 96413 CHEMO IV INFUSION 1 HR: CPT

## 2024-04-23 PROCEDURE — 25010000002 PALONOSETRON 0.25 MG/5ML SOLUTION PREFILLED SYRINGE: Performed by: NURSE PRACTITIONER

## 2024-04-23 PROCEDURE — 25810000003 SODIUM CHLORIDE 0.9 % SOLUTION: Performed by: NURSE PRACTITIONER

## 2024-04-23 PROCEDURE — 81003 URINALYSIS AUTO W/O SCOPE: CPT | Performed by: INTERNAL MEDICINE

## 2024-04-23 PROCEDURE — 25010000002 OXALIPLATIN PER 0.5 MG: Performed by: NURSE PRACTITIONER

## 2024-04-23 PROCEDURE — 25010000002 LEUCOVORIN CALCIUM PER 50 MG: Performed by: NURSE PRACTITIONER

## 2024-04-23 PROCEDURE — G0498 CHEMO EXTEND IV INFUS W/PUMP: HCPCS

## 2024-04-23 PROCEDURE — 96417 CHEMO IV INFUS EACH ADDL SEQ: CPT

## 2024-04-23 PROCEDURE — 82306 VITAMIN D 25 HYDROXY: CPT | Performed by: NURSE PRACTITIONER

## 2024-04-23 PROCEDURE — 96415 CHEMO IV INFUSION ADDL HR: CPT

## 2024-04-23 PROCEDURE — 96375 TX/PRO/DX INJ NEW DRUG ADDON: CPT

## 2024-04-23 PROCEDURE — 25010000002 LEUCOVORIN 200 MG RECONSTITUTED SOLUTION 200 MG VIAL: Performed by: NURSE PRACTITIONER

## 2024-04-23 RX ORDER — DIPHENHYDRAMINE HYDROCHLORIDE 50 MG/ML
50 INJECTION INTRAMUSCULAR; INTRAVENOUS AS NEEDED
Status: CANCELLED | OUTPATIENT
Start: 2024-04-23

## 2024-04-23 RX ORDER — DEXTROSE MONOHYDRATE 50 MG/ML
20 INJECTION, SOLUTION INTRAVENOUS ONCE
Status: CANCELLED | OUTPATIENT
Start: 2024-04-23

## 2024-04-23 RX ORDER — PALONOSETRON 0.05 MG/ML
0.25 INJECTION, SOLUTION INTRAVENOUS ONCE
Status: CANCELLED | OUTPATIENT
Start: 2024-04-23

## 2024-04-23 RX ORDER — DEXTROSE MONOHYDRATE 50 MG/ML
20 INJECTION, SOLUTION INTRAVENOUS ONCE
Status: COMPLETED | OUTPATIENT
Start: 2024-04-23 | End: 2024-04-23

## 2024-04-23 RX ORDER — PALONOSETRON 0.05 MG/ML
0.25 INJECTION, SOLUTION INTRAVENOUS ONCE
Status: COMPLETED | OUTPATIENT
Start: 2024-04-23 | End: 2024-04-23

## 2024-04-23 RX ORDER — SODIUM CHLORIDE 9 MG/ML
20 INJECTION, SOLUTION INTRAVENOUS ONCE
Status: CANCELLED | OUTPATIENT
Start: 2024-04-23

## 2024-04-23 RX ORDER — FAMOTIDINE 10 MG/ML
20 INJECTION, SOLUTION INTRAVENOUS AS NEEDED
Status: CANCELLED | OUTPATIENT
Start: 2024-04-23

## 2024-04-23 RX ORDER — SODIUM CHLORIDE 9 MG/ML
20 INJECTION, SOLUTION INTRAVENOUS ONCE
Status: COMPLETED | OUTPATIENT
Start: 2024-04-23 | End: 2024-04-23

## 2024-04-23 RX ADMIN — FLUOROURACIL 5450 MG: 50 INJECTION, SOLUTION INTRAVENOUS at 13:05

## 2024-04-23 RX ADMIN — DEXTROSE MONOHYDRATE 900 MG: 50 INJECTION, SOLUTION INTRAVENOUS at 10:52

## 2024-04-23 RX ADMIN — DEXAMETHASONE SODIUM PHOSPHATE 12 MG: 4 INJECTION, SOLUTION INTRA-ARTICULAR; INTRALESIONAL; INTRAMUSCULAR; INTRAVENOUS; SOFT TISSUE at 09:51

## 2024-04-23 RX ADMIN — DEXTROSE MONOHYDRATE 20 ML/HR: 50 INJECTION, SOLUTION INTRAVENOUS at 10:51

## 2024-04-23 RX ADMIN — SODIUM CHLORIDE 20 ML/HR: 9 INJECTION, SOLUTION INTRAVENOUS at 09:50

## 2024-04-23 RX ADMIN — SODIUM CHLORIDE 480 MG: 9 INJECTION, SOLUTION INTRAVENOUS at 10:12

## 2024-04-23 RX ADMIN — PALONOSETRON 0.25 MG: 0.25 INJECTION, SOLUTION INTRAVENOUS at 09:50

## 2024-04-23 RX ADMIN — OXALIPLATIN 195 MG: 5 INJECTION, SOLUTION INTRAVENOUS at 10:52

## 2024-04-23 NOTE — PROGRESS NOTES
Pt here for C9D1 Folfox and Zirabev. Port accessed and flushed with good blood return noted. 10cc of blood wasted prior to specimen collection. Blood specimen obtained and sent to lab for processing per protocol. Pt has no complaints at this time. Dr. Wagner notified of pt's urine having 2+ protein in it. Dr. Wagner gave verbal order to proceed with tx today. Pt toleration infusion without any complications. Port positive for blood return and flushed with NS. 5FU pump connected to port with all clamps open. Pt given AVS and d/c accompanied by spouse.

## 2024-04-25 ENCOUNTER — HOSPITAL ENCOUNTER (OUTPATIENT)
Dept: ONCOLOGY | Facility: HOSPITAL | Age: 64
Discharge: HOME | End: 2024-04-25
Payer: MEDICARE

## 2024-04-25 DIAGNOSIS — C78.6 METASTASIS TO PERITONEAL CAVITY: Primary | ICD-10-CM

## 2024-04-25 DIAGNOSIS — C20 RECTAL CANCER: ICD-10-CM

## 2024-04-25 DIAGNOSIS — R80.9 PROTEINURIA, UNSPECIFIED TYPE: ICD-10-CM

## 2024-04-25 LAB
COLLECT DURATION TIME UR: 24 HRS
PROT 24H UR-MRATE: 646.1 MG/24HOURS (ref 0–150)
SPECIMEN VOL 24H UR: 2190 ML

## 2024-04-25 PROCEDURE — 25010000002 HEPARIN LOCK FLUSH PER 10 UNITS: Performed by: INTERNAL MEDICINE

## 2024-04-25 PROCEDURE — 84156 ASSAY OF PROTEIN URINE: CPT | Performed by: NURSE PRACTITIONER

## 2024-04-25 PROCEDURE — 81050 URINALYSIS VOLUME MEASURE: CPT | Performed by: NURSE PRACTITIONER

## 2024-04-25 RX ORDER — HEPARIN SODIUM (PORCINE) LOCK FLUSH IV SOLN 100 UNIT/ML 100 UNIT/ML
500 SOLUTION INTRAVENOUS AS NEEDED
OUTPATIENT
Start: 2024-04-25

## 2024-04-25 RX ORDER — HEPARIN SODIUM (PORCINE) LOCK FLUSH IV SOLN 100 UNIT/ML 100 UNIT/ML
500 SOLUTION INTRAVENOUS AS NEEDED
Status: DISCONTINUED | OUTPATIENT
Start: 2024-04-25 | End: 2024-04-27 | Stop reason: HOSPADM

## 2024-04-25 RX ORDER — SODIUM CHLORIDE 0.9 % (FLUSH) 0.9 %
20 SYRINGE (ML) INJECTION AS NEEDED
Status: DISCONTINUED | OUTPATIENT
Start: 2024-04-25 | End: 2024-04-27 | Stop reason: HOSPADM

## 2024-04-25 RX ORDER — SODIUM CHLORIDE 0.9 % (FLUSH) 0.9 %
20 SYRINGE (ML) INJECTION AS NEEDED
OUTPATIENT
Start: 2024-04-25

## 2024-04-25 RX ADMIN — HEPARIN 500 UNITS: 100 SYRINGE at 12:33

## 2024-04-25 RX ADMIN — Medication 20 ML: at 12:32

## 2024-05-06 ENCOUNTER — OFFICE VISIT (OUTPATIENT)
Dept: CARDIOLOGY | Facility: CLINIC | Age: 64
End: 2024-05-06
Payer: MEDICARE

## 2024-05-06 VITALS
WEIGHT: 216 LBS | OXYGEN SATURATION: 97 % | DIASTOLIC BLOOD PRESSURE: 82 MMHG | HEART RATE: 62 BPM | BODY MASS INDEX: 26.86 KG/M2 | HEIGHT: 75 IN | SYSTOLIC BLOOD PRESSURE: 134 MMHG

## 2024-05-06 DIAGNOSIS — I48.0 PAROXYSMAL ATRIAL FIBRILLATION: Primary | ICD-10-CM

## 2024-05-06 DIAGNOSIS — E11.9 TYPE 2 DIABETES MELLITUS WITHOUT COMPLICATION, WITHOUT LONG-TERM CURRENT USE OF INSULIN: ICD-10-CM

## 2024-05-06 DIAGNOSIS — E78.00 PURE HYPERCHOLESTEROLEMIA: ICD-10-CM

## 2024-05-06 DIAGNOSIS — Z86.79 HISTORY OF AORTIC DISSECTION: ICD-10-CM

## 2024-05-06 DIAGNOSIS — I10 PRIMARY HYPERTENSION: ICD-10-CM

## 2024-05-06 PROCEDURE — 1160F RVW MEDS BY RX/DR IN RCRD: CPT | Performed by: INTERNAL MEDICINE

## 2024-05-06 PROCEDURE — 99214 OFFICE O/P EST MOD 30 MIN: CPT | Performed by: INTERNAL MEDICINE

## 2024-05-06 PROCEDURE — 3079F DIAST BP 80-89 MM HG: CPT | Performed by: INTERNAL MEDICINE

## 2024-05-06 PROCEDURE — 3075F SYST BP GE 130 - 139MM HG: CPT | Performed by: INTERNAL MEDICINE

## 2024-05-06 PROCEDURE — 1159F MED LIST DOCD IN RCRD: CPT | Performed by: INTERNAL MEDICINE

## 2024-05-07 ENCOUNTER — HOSPITAL ENCOUNTER (OUTPATIENT)
Dept: ONCOLOGY | Facility: HOSPITAL | Age: 64
Discharge: HOME | End: 2024-05-07
Admitting: INTERNAL MEDICINE
Payer: MEDICARE

## 2024-05-07 VITALS
DIASTOLIC BLOOD PRESSURE: 63 MMHG | WEIGHT: 214.4 LBS | RESPIRATION RATE: 16 BRPM | BODY MASS INDEX: 26.66 KG/M2 | OXYGEN SATURATION: 95 % | HEART RATE: 58 BPM | SYSTOLIC BLOOD PRESSURE: 106 MMHG | TEMPERATURE: 97.4 F | HEIGHT: 75 IN

## 2024-05-07 DIAGNOSIS — C78.6 METASTASIS TO PERITONEAL CAVITY: ICD-10-CM

## 2024-05-07 DIAGNOSIS — C20 RECTAL CANCER: Primary | ICD-10-CM

## 2024-05-07 LAB
ALP BLD-CCNC: 68 U/L (ref 53–128)
BASOPHILS # BLD AUTO: 0.04 10*3/MM3 (ref 0–0.2)
BASOPHILS NFR BLD AUTO: 0.5 % (ref 0–1.5)
BILIRUB UR QL STRIP: ABNORMAL
BUN BLDA-MCNC: 9 MG/DL (ref 7–22)
CALCIUM BLD QL: 10.5 MG/DL (ref 8–10.3)
CHLORIDE BLDA-SCNC: 103 MMOL/L (ref 98–108)
CLARITY UR: CLEAR
CO2 BLDA-SCNC: 29 MMOL/L (ref 18–33)
COLOR UR: YELLOW
CREAT BLDA-MCNC: 0.6 MG/DL (ref 0.6–1.2)
DEPRECATED RDW RBC AUTO: 59.8 FL (ref 37–54)
EGFRCR SERPLBLD CKD-EPI 2021: 109.1 ML/MIN/1.73
EOSINOPHIL # BLD AUTO: 0.13 10*3/MM3 (ref 0–0.4)
EOSINOPHIL NFR BLD AUTO: 1.6 % (ref 0.3–6.2)
ERYTHROCYTE [DISTWIDTH] IN BLOOD BY AUTOMATED COUNT: 17.1 % (ref 12.3–15.4)
GLUCOSE BLDC GLUCOMTR-MCNC: 105 MG/DL (ref 73–118)
GLUCOSE UR STRIP-MCNC: NEGATIVE MG/DL
HCT VFR BLD AUTO: 39.1 % (ref 37.5–51)
HGB BLD-MCNC: 12.1 G/DL (ref 13–17.7)
HGB UR QL STRIP.AUTO: NEGATIVE
KETONES UR QL STRIP: ABNORMAL
LEUKOCYTE ESTERASE UR QL STRIP.AUTO: NEGATIVE
LYMPHOCYTES # BLD AUTO: 2 10*3/MM3 (ref 0.7–3.1)
LYMPHOCYTES NFR BLD AUTO: 25.2 % (ref 19.6–45.3)
MCH RBC QN AUTO: 30.6 PG (ref 26.6–33)
MCHC RBC AUTO-ENTMCNC: 30.9 G/DL (ref 31.5–35.7)
MCV RBC AUTO: 98.7 FL (ref 79–97)
MONOCYTES # BLD AUTO: 0.86 10*3/MM3 (ref 0.1–0.9)
MONOCYTES NFR BLD AUTO: 10.8 % (ref 5–12)
NEUTROPHILS NFR BLD AUTO: 4.91 10*3/MM3 (ref 1.7–7)
NEUTROPHILS NFR BLD AUTO: 61.9 % (ref 42.7–76)
NITRITE UR QL STRIP: NEGATIVE
PH UR STRIP.AUTO: 5.5 [PH] (ref 5–8)
PLATELET # BLD AUTO: 170 10*3/MM3 (ref 140–450)
PMV BLD AUTO: 9.2 FL (ref 6–12)
POC ALBUMIN: 3.5 G/L (ref 3.3–5.5)
POC ALT (SGPT): 23 U/L (ref 10–47)
POC AST (SGOT): 25 U/L (ref 11–38)
POC TOTAL BILIRUBIN: 0.6 MG/DL (ref 0.2–1.6)
POC TOTAL PROTEIN: 7 G/DL (ref 6.4–8.1)
POTASSIUM BLDA-SCNC: 3.6 MMOL/L (ref 3.6–5.1)
PROT UR QL STRIP: ABNORMAL
RBC # BLD AUTO: 3.96 10*6/MM3 (ref 4.14–5.8)
SODIUM BLD-SCNC: 144 MMOL/L (ref 128–145)
SP GR UR STRIP: >=1.03 (ref 1–1.03)
UROBILINOGEN UR QL STRIP: ABNORMAL
WBC NRBC COR # BLD AUTO: 7.94 10*3/MM3 (ref 3.4–10.8)

## 2024-05-07 PROCEDURE — 0 DEXTROSE 5 % SOLUTION: Performed by: PHYSICIAN ASSISTANT

## 2024-05-07 PROCEDURE — 25010000002 OXALIPLATIN PER 0.5 MG: Performed by: PHYSICIAN ASSISTANT

## 2024-05-07 PROCEDURE — 25810000003 SODIUM CHLORIDE 0.9 % SOLUTION: Performed by: PHYSICIAN ASSISTANT

## 2024-05-07 PROCEDURE — 25010000002 LEUCOVORIN CALCIUM PER 50 MG: Performed by: PHYSICIAN ASSISTANT

## 2024-05-07 PROCEDURE — 96413 CHEMO IV INFUSION 1 HR: CPT

## 2024-05-07 PROCEDURE — 96367 TX/PROPH/DG ADDL SEQ IV INF: CPT

## 2024-05-07 PROCEDURE — 96417 CHEMO IV INFUS EACH ADDL SEQ: CPT

## 2024-05-07 PROCEDURE — G0498 CHEMO EXTEND IV INFUS W/PUMP: HCPCS

## 2024-05-07 PROCEDURE — 25010000002 LEUCOVORIN 200 MG RECONSTITUTED SOLUTION 200 MG VIAL: Performed by: PHYSICIAN ASSISTANT

## 2024-05-07 PROCEDURE — 25010000002 DEXAMETHASONE SODIUM PHOSPHATE 120 MG/30ML SOLUTION: Performed by: PHYSICIAN ASSISTANT

## 2024-05-07 PROCEDURE — 25010000002 BEVACIZUMAB-BVZR 400 MG/16ML SOLUTION 16 ML VIAL: Performed by: PHYSICIAN ASSISTANT

## 2024-05-07 PROCEDURE — 96366 THER/PROPH/DIAG IV INF ADDON: CPT

## 2024-05-07 PROCEDURE — 80053 COMPREHEN METABOLIC PANEL: CPT

## 2024-05-07 PROCEDURE — 96375 TX/PRO/DX INJ NEW DRUG ADDON: CPT

## 2024-05-07 PROCEDURE — 81003 URINALYSIS AUTO W/O SCOPE: CPT | Performed by: INTERNAL MEDICINE

## 2024-05-07 PROCEDURE — 25010000002 PALONOSETRON 0.25 MG/5ML SOLUTION PREFILLED SYRINGE: Performed by: PHYSICIAN ASSISTANT

## 2024-05-07 PROCEDURE — 25010000002 FLUOROURACIL PER 500 MG: Performed by: PHYSICIAN ASSISTANT

## 2024-05-07 PROCEDURE — 96415 CHEMO IV INFUSION ADDL HR: CPT

## 2024-05-07 PROCEDURE — 96368 THER/DIAG CONCURRENT INF: CPT

## 2024-05-07 PROCEDURE — 85025 COMPLETE CBC W/AUTO DIFF WBC: CPT | Performed by: INTERNAL MEDICINE

## 2024-05-07 PROCEDURE — 0 DEXTROSE 5 % SOLUTION 250 ML FLEX CONT: Performed by: PHYSICIAN ASSISTANT

## 2024-05-07 PROCEDURE — 25810000003 SODIUM CHLORIDE 0.9 % SOLUTION 250 ML FLEX CONT: Performed by: PHYSICIAN ASSISTANT

## 2024-05-07 PROCEDURE — 25010000002 BEVACIZUMAB-BVZR 100 MG/4ML SOLUTION 4 ML VIAL: Performed by: PHYSICIAN ASSISTANT

## 2024-05-07 RX ORDER — SODIUM CHLORIDE 9 MG/ML
20 INJECTION, SOLUTION INTRAVENOUS ONCE
Status: COMPLETED | OUTPATIENT
Start: 2024-05-07 | End: 2024-05-07

## 2024-05-07 RX ORDER — FAMOTIDINE 10 MG/ML
20 INJECTION, SOLUTION INTRAVENOUS AS NEEDED
Status: CANCELLED | OUTPATIENT
Start: 2024-05-07

## 2024-05-07 RX ORDER — DIPHENHYDRAMINE HYDROCHLORIDE 50 MG/ML
50 INJECTION INTRAMUSCULAR; INTRAVENOUS AS NEEDED
Status: CANCELLED | OUTPATIENT
Start: 2024-05-07

## 2024-05-07 RX ORDER — DEXTROSE MONOHYDRATE 50 MG/ML
20 INJECTION, SOLUTION INTRAVENOUS ONCE
Status: COMPLETED | OUTPATIENT
Start: 2024-05-07 | End: 2024-05-07

## 2024-05-07 RX ORDER — PALONOSETRON 0.05 MG/ML
0.25 INJECTION, SOLUTION INTRAVENOUS ONCE
Status: COMPLETED | OUTPATIENT
Start: 2024-05-07 | End: 2024-05-07

## 2024-05-07 RX ADMIN — DEXAMETHASONE SODIUM PHOSPHATE 12 MG: 4 INJECTION, SOLUTION INTRA-ARTICULAR; INTRALESIONAL; INTRAMUSCULAR; INTRAVENOUS; SOFT TISSUE at 09:52

## 2024-05-07 RX ADMIN — OXALIPLATIN 195 MG: 100 INJECTION, SOLUTION, CONCENTRATE INTRAVENOUS at 10:56

## 2024-05-07 RX ADMIN — DEXTROSE MONOHYDRATE 20 ML/HR: 50 INJECTION, SOLUTION INTRAVENOUS at 10:56

## 2024-05-07 RX ADMIN — SODIUM CHLORIDE 20 ML/HR: 9 INJECTION, SOLUTION INTRAVENOUS at 09:50

## 2024-05-07 RX ADMIN — FLUOROURACIL 5450 MG: 50 INJECTION, SOLUTION INTRAVENOUS at 13:26

## 2024-05-07 RX ADMIN — DEXTROSE MONOHYDRATE 900 MG: 50 INJECTION, SOLUTION INTRAVENOUS at 10:56

## 2024-05-07 RX ADMIN — SODIUM CHLORIDE 480 MG: 9 INJECTION, SOLUTION INTRAVENOUS at 10:24

## 2024-05-07 RX ADMIN — PALONOSETRON 0.25 MG: 0.25 INJECTION, SOLUTION INTRAVENOUS at 09:50

## 2024-05-09 ENCOUNTER — OFFICE VISIT (OUTPATIENT)
Dept: ONCOLOGY | Facility: CLINIC | Age: 64
End: 2024-05-09
Payer: MEDICARE

## 2024-05-09 ENCOUNTER — APPOINTMENT (OUTPATIENT)
Dept: ONCOLOGY | Facility: HOSPITAL | Age: 64
End: 2024-05-09
Payer: MEDICARE

## 2024-05-09 ENCOUNTER — HOSPITAL ENCOUNTER (OUTPATIENT)
Dept: ONCOLOGY | Facility: HOSPITAL | Age: 64
Discharge: HOME | End: 2024-05-09
Admitting: INTERNAL MEDICINE
Payer: MEDICARE

## 2024-05-09 VITALS
BODY MASS INDEX: 26.49 KG/M2 | SYSTOLIC BLOOD PRESSURE: 115 MMHG | TEMPERATURE: 98.2 F | HEART RATE: 58 BPM | OXYGEN SATURATION: 95 % | DIASTOLIC BLOOD PRESSURE: 67 MMHG | HEIGHT: 75 IN | WEIGHT: 213 LBS

## 2024-05-09 DIAGNOSIS — C78.6 METASTASIS TO PERITONEAL CAVITY: Primary | ICD-10-CM

## 2024-05-09 DIAGNOSIS — C20 RECTAL CANCER: ICD-10-CM

## 2024-05-09 DIAGNOSIS — C20 RECTAL CANCER: Primary | ICD-10-CM

## 2024-05-09 LAB — CEA SERPL-MCNC: 2.39 NG/ML

## 2024-05-09 PROCEDURE — 36591 DRAW BLOOD OFF VENOUS DEVICE: CPT

## 2024-05-09 PROCEDURE — 82378 CARCINOEMBRYONIC ANTIGEN: CPT | Performed by: INTERNAL MEDICINE

## 2024-05-09 PROCEDURE — 25010000002 HEPARIN LOCK FLUSH PER 10 UNITS: Performed by: INTERNAL MEDICINE

## 2024-05-09 RX ORDER — HEPARIN SODIUM (PORCINE) LOCK FLUSH IV SOLN 100 UNIT/ML 100 UNIT/ML
500 SOLUTION INTRAVENOUS AS NEEDED
OUTPATIENT
Start: 2024-05-09

## 2024-05-09 RX ORDER — SODIUM CHLORIDE 0.9 % (FLUSH) 0.9 %
20 SYRINGE (ML) INJECTION AS NEEDED
OUTPATIENT
Start: 2024-05-09

## 2024-05-09 RX ORDER — HEPARIN SODIUM (PORCINE) LOCK FLUSH IV SOLN 100 UNIT/ML 100 UNIT/ML
500 SOLUTION INTRAVENOUS AS NEEDED
Status: DISCONTINUED | OUTPATIENT
Start: 2024-05-09 | End: 2024-05-10 | Stop reason: HOSPADM

## 2024-05-09 RX ORDER — SODIUM CHLORIDE 0.9 % (FLUSH) 0.9 %
20 SYRINGE (ML) INJECTION AS NEEDED
Status: DISCONTINUED | OUTPATIENT
Start: 2024-05-09 | End: 2024-05-10 | Stop reason: HOSPADM

## 2024-05-09 RX ADMIN — HEPARIN 500 UNITS: 100 SYRINGE at 13:48

## 2024-05-09 RX ADMIN — Medication 20 ML: at 13:48

## 2024-05-13 ENCOUNTER — OFFICE VISIT (OUTPATIENT)
Age: 64
End: 2024-05-13
Payer: MEDICARE

## 2024-05-13 ENCOUNTER — TELEPHONE (OUTPATIENT)
Dept: FAMILY MEDICINE CLINIC | Facility: CLINIC | Age: 64
End: 2024-05-13
Payer: MEDICARE

## 2024-05-13 VITALS
HEART RATE: 96 BPM | HEIGHT: 76 IN | DIASTOLIC BLOOD PRESSURE: 73 MMHG | WEIGHT: 212 LBS | BODY MASS INDEX: 25.82 KG/M2 | SYSTOLIC BLOOD PRESSURE: 130 MMHG

## 2024-05-13 DIAGNOSIS — Z86.79 HISTORY OF AORTIC DISSECTION: ICD-10-CM

## 2024-05-13 DIAGNOSIS — I71.23 ANEURYSM OF DESCENDING THORACIC AORTA WITHOUT RUPTURE: Primary | ICD-10-CM

## 2024-05-13 PROCEDURE — 99214 OFFICE O/P EST MOD 30 MIN: CPT | Performed by: SURGERY

## 2024-05-13 PROCEDURE — 3078F DIAST BP <80 MM HG: CPT | Performed by: SURGERY

## 2024-05-13 PROCEDURE — 3075F SYST BP GE 130 - 139MM HG: CPT | Performed by: SURGERY

## 2024-05-13 PROCEDURE — 1159F MED LIST DOCD IN RCRD: CPT | Performed by: SURGERY

## 2024-05-13 PROCEDURE — 1160F RVW MEDS BY RX/DR IN RCRD: CPT | Performed by: SURGERY

## 2024-05-13 RX ORDER — GABAPENTIN 300 MG/1
300 CAPSULE ORAL 3 TIMES DAILY
Qty: 90 CAPSULE | Refills: 0 | Status: SHIPPED | OUTPATIENT
Start: 2024-05-13

## 2024-05-13 NOTE — PROGRESS NOTES
"Chief Complaint  Follow-up (2 month f/u from hospital)    Subjective        Prashant Zhou presents to [facility]  History of Present Illness  Comes into his today to establish outpatient care.  History of thoracic aortic dissection.  Recently underwent VATS decortication as colon cancer cared for by Dr. Grace with oncology  Objective   Vital Signs:  /73 (BP Location: Left arm, Patient Position: Sitting)   Pulse 96   Ht 193 cm (76\")   Wt 96.2 kg (212 lb)   BMI 25.81 kg/m²   Estimated body mass index is 25.81 kg/m² as calculated from the following:    Height as of this encounter: 193 cm (76\").    Weight as of this encounter: 96.2 kg (212 lb).           Prashant Zhou  reports that he has been smoking cigarettes. He started smoking about 53 years ago. He has a 106.7 pack-year smoking history. He has never used smokeless tobacco. I have educated him on the risk of diseases from using tobacco products such as cancer, COPD, heart disease, and arterial disease.     I advised him to quit and he is not willing to quit.    I spent 3  minutes counseling the patient.         Physical Exam   Result Review :    The following data was reviewed by: Dax Dowling MD on 05/13/2024:  CBC          4/9/2024    08:36 4/23/2024    08:22 5/7/2024    08:25   CBC   WBC 11.62  9.40  7.94    RBC 3.46  3.75  3.96    Hemoglobin 10.4  11.5  12.1    Hematocrit 33.7  36.5  39.1    MCV 97.4  97.3  98.7    MCH 30.1  30.7  30.6    MCHC 30.9  31.5  30.9    RDW 17.3  17.5  17.1    Platelets 294  226  170       BMP          4/9/2024    08:36 4/23/2024    08:31 5/7/2024    08:45   BMP   BUN 15      Creatinine 0.50  0.50  0.60    Sodium 138      Potassium 4.2      Chloride 98      CO2 29.0      Calcium 10.7         A1C Last 3 Results          7/24/2023    11:33 1/25/2024    10:09   HGBA1C Last 3 Results   Hemoglobin A1C 8.30  7.40        Data reviewed : Radiologic studies prior CT angiogram  Vascular " Surgical History:  Prior thoracic aortic dissection managed nonoperatively in 2015  Vascular Imaging History:  CT angiogram of the chest 3/9/2021: 4.3 cm proximal descending thoracic aortic aneurysm         Assessment and Plan     Vascular surgery following for:  History of aortic dissection.  Thoracic aortic aneurysm.    Diagnoses and all orders for this visit:    1. Aneurysm of descending thoracic aorta without rupture (Primary)  -     CT Angiogram Chest; Future  -     CT Angiogram Abdomen Pelvis With & Without Contrast; Future    2. History of aortic dissection  -     CT Angiogram Chest; Future  -     CT Angiogram Abdomen Pelvis With & Without Contrast; Future    What sounds like a stable thoracic aortic aneurysm following a dissection back in 2015.  He is recently out of the hospital after having robotic assisted decortication for pneumonia.  He is also under the care of hematology oncology for what sounds like metastatic cancer.  I recommended 18-month interval CT scan follow-up.  He will call the office closer to the 18-month dameon.  I can review his current scans at that point to determine if CT angiograms are required.     Vascular medical management: Patient should continue Lipitor 40 mg daily.  He is also on Xarelto for medical indications.  If he needs to stop his Xarelto he should start taking aspirin  Follow Up     Return in about 18 months (around 11/13/2025) for CT angiogram of the chest abdomen pelvis..  Patient was given instructions and counseling regarding his condition or for health maintenance advice. Please see specific information pulled into the AVS if appropriate.

## 2024-05-13 NOTE — PATIENT INSTRUCTIONS
Smoking Cessation  You will learn methods to help you quit smoking and how quitting can help prevent a variety of health problems and improve your health.  To view the content, go to this web address:  [email or web address]    This video will  on: 2026. If you need access to this video following this date, please reach out to the healthcare provider who assigned it to you.  This information is not intended to replace advice given to you by your health care provider. Make sure you discuss any questions you have with your health care provider.  [employer] Patient Education ©  [employer]        Steps to Quit Smoking  Smoking tobacco is the leading cause of preventable death. It can affect almost every organ in the body. Smoking puts you and people around you at risk for many serious, long-lasting (chronic) diseases. Quitting smoking can be hard, but it is one of the best things that you can do for your health. It is never too late to quit.  Do not give up if you cannot quit the first time. Some people need to try many times to quit. Do your best to stick to your quit plan, and talk with your doctor if you have any questions or concerns.  How do I get ready to quit?  Pick a date to quit. Set a date within the next 2 weeks to give you time to prepare.  Write down the reasons why you are quitting. Keep this list in places where you will see it often.  Tell your family, friends, and co-workers that you are quitting. Their support is important.  Talk with your doctor about the choices that may help you quit.  Find out if your health insurance will pay for these treatments.  Know the people, places, things, and activities that make you want to smoke (triggers). Avoid them.  What first steps can I take to quit smoking?  Throw away all cigarettes at home, at work, and in your car.  Throw away the things that you use when you smoke, such as ashtrays and lighters.  Clean your car. Empty the  ashtray.  Clean your home, including curtains and carpets.  What can I do to help me quit smoking?  Talk with your doctor about taking medicines and seeing a counselor. You are more likely to succeed when you do both.  If you are pregnant or breastfeeding:  Talk with your doctor about counseling or other ways to quit smoking.  Do not take medicine to help you quit smoking unless your doctor tells you to.  Quit right away  Quit smoking completely, instead of slowly cutting back on how much you smoke over a period of time. Stopping smoking right away may be more successful than slowly quitting.  Go to counseling. In-person is best if this is an option. You are more likely to quit if you go to counseling sessions regularly.  Take medicine  You may take medicines to help you quit. Some medicines need a prescription, and some you can buy over-the-counter. Some medicines may contain a drug called nicotine to replace the nicotine in cigarettes. Medicines may:  Help you stop having the desire to smoke (cravings).  Help to stop the problems that come when you stop smoking (withdrawal symptoms).  Your doctor may ask you to use:  Nicotine patches, gum, or lozenges.  Nicotine inhalers or sprays.  Non-nicotine medicine that you take by mouth.  Find resources  Find resources and other ways to help you quit smoking and remain smoke-free after you quit. They include:  Online chats with a counselor.  Phone quitlines.  Printed self-help materials.  Support groups or group counseling.  Text messaging programs.  Mobile phone apps. Use apps on your mobile phone or tablet that can help you stick to your quit plan. Examples of free services include Quit Guide from the CDC and smokefree.gov    What can I do to make it easier to quit?    Talk to your family and friends. Ask them to support and encourage you.  Call a phone quitline, such as 1-800-QUIT-NOW, reach out to support groups, or work with a counselor.  Ask people who smoke to not  smoke around you.  Avoid places that make you want to smoke, such as:  Bars.  Parties.  Smoke-break areas at work.  Spend time with people who do not smoke.  Lower the stress in your life. Stress can make you want to smoke. Try these things to lower stress:  Getting regular exercise.  Doing deep-breathing exercises.  Doing yoga.  Meditating.  What benefits will I see if I quit smoking?  Over time, you may have:  A better sense of smell and taste.  Less coughing and sore throat.  A slower heart rate.  Lower blood pressure.  Clearer skin.  Better breathing.  Fewer sick days.  Summary  Quitting smoking can be hard, but it is one of the best things that you can do for your health.  Do not give up if you cannot quit the first time. Some people need to try many times to quit.  When you decide to quit smoking, make a plan to help you succeed.  Quit smoking right away, not slowly over a period of time.  When you start quitting, get help and support to keep you smoke-free.    This information is not intended to replace advice given to you by your health care provider. Make sure you discuss any questions you have with your health care provider.  Document Revised: 12/09/2022 Document Reviewed: 12/09/2022  Elsevier Patient Education © 2024 [employer].

## 2024-05-13 NOTE — TELEPHONE ENCOUNTER
Caller: Prashant Zhou    Relationship: Self    Best call back number: [phone]     What was the call regarding: PATIENT WAS PRESCRIBED       amiodarone (PACERONE) 200 MG tablet       gabapentin (NEURONTIN) 300 MG capsule         rivaroxaban (XARELTO) 20 MG tablet     WHEN HE WAS IN REHAB LAST MONTH. PATIENT IS ALMOST OUT OF MEDICATION AND IS REQUESTING TO KNOW IF HE NEEDS TO CONTINUE TAKING THESE MEDICATIONS. IF SO, PATIENT WOULD LIKE A REFILL SENT TO   [facility] [id number] [place], [place] - [address] - [phone]  - [phone]  [phone]     PLEASE ADVISE

## 2024-05-21 ENCOUNTER — TELEPHONE (OUTPATIENT)
Dept: ONCOLOGY | Facility: CLINIC | Age: 64
End: 2024-05-21
Payer: MEDICARE

## 2024-05-21 ENCOUNTER — HOSPITAL ENCOUNTER (OUTPATIENT)
Dept: ONCOLOGY | Facility: HOSPITAL | Age: 64
Discharge: HOME | End: 2024-05-21
Admitting: INTERNAL MEDICINE
Payer: MEDICARE

## 2024-05-21 VITALS
HEART RATE: 74 BPM | HEIGHT: 76 IN | SYSTOLIC BLOOD PRESSURE: 110 MMHG | DIASTOLIC BLOOD PRESSURE: 69 MMHG | WEIGHT: 213.7 LBS | TEMPERATURE: 97.5 F | RESPIRATION RATE: 14 BRPM | OXYGEN SATURATION: 95 % | BODY MASS INDEX: 26.02 KG/M2

## 2024-05-21 DIAGNOSIS — C78.6 METASTASIS TO PERITONEAL CAVITY: ICD-10-CM

## 2024-05-21 DIAGNOSIS — C20 RECTAL CANCER: Primary | ICD-10-CM

## 2024-05-21 LAB
ALP BLD-CCNC: 71 U/L (ref 53–128)
BASOPHILS # BLD AUTO: 0.04 10*3/MM3 (ref 0–0.2)
BASOPHILS NFR BLD AUTO: 0.5 % (ref 0–1.5)
BILIRUB UR QL STRIP: ABNORMAL
BUN BLDA-MCNC: 9 MG/DL (ref 7–22)
CALCIUM BLD QL: 9.9 MG/DL (ref 8–10.3)
CHLORIDE BLDA-SCNC: 102 MMOL/L (ref 98–108)
CLARITY UR: ABNORMAL
CO2 BLDA-SCNC: 28 MMOL/L (ref 18–33)
COLOR UR: ABNORMAL
CREAT BLDA-MCNC: 0.6 MG/DL (ref 0.6–1.2)
DEPRECATED RDW RBC AUTO: 56.2 FL (ref 37–54)
EGFRCR SERPLBLD CKD-EPI 2021: 109.1 ML/MIN/1.73
EOSINOPHIL # BLD AUTO: 0.12 10*3/MM3 (ref 0–0.4)
EOSINOPHIL NFR BLD AUTO: 1.4 % (ref 0.3–6.2)
ERYTHROCYTE [DISTWIDTH] IN BLOOD BY AUTOMATED COUNT: 16.5 % (ref 12.3–15.4)
GLUCOSE BLDC GLUCOMTR-MCNC: 249 MG/DL (ref 73–118)
GLUCOSE UR STRIP-MCNC: ABNORMAL MG/DL
HCT VFR BLD AUTO: 41.3 % (ref 37.5–51)
HGB BLD-MCNC: 13.1 G/DL (ref 13–17.7)
HGB UR QL STRIP.AUTO: NEGATIVE
KETONES UR QL STRIP: ABNORMAL
LEUKOCYTE ESTERASE UR QL STRIP.AUTO: NEGATIVE
LYMPHOCYTES # BLD AUTO: 2 10*3/MM3 (ref 0.7–3.1)
LYMPHOCYTES NFR BLD AUTO: 23.9 % (ref 19.6–45.3)
MCH RBC QN AUTO: 30.4 PG (ref 26.6–33)
MCHC RBC AUTO-ENTMCNC: 31.7 G/DL (ref 31.5–35.7)
MCV RBC AUTO: 95.8 FL (ref 79–97)
MONOCYTES # BLD AUTO: 0.91 10*3/MM3 (ref 0.1–0.9)
MONOCYTES NFR BLD AUTO: 10.9 % (ref 5–12)
NEUTROPHILS NFR BLD AUTO: 5.31 10*3/MM3 (ref 1.7–7)
NEUTROPHILS NFR BLD AUTO: 63.3 % (ref 42.7–76)
NITRITE UR QL STRIP: NEGATIVE
PH UR STRIP.AUTO: 5.5 [PH] (ref 5–8)
PLATELET # BLD AUTO: 162 10*3/MM3 (ref 140–450)
PMV BLD AUTO: 9.1 FL (ref 6–12)
POC ALBUMIN: 3.5 G/L (ref 3.3–5.5)
POC ALT (SGPT): 18 U/L (ref 10–47)
POC AST (SGOT): 21 U/L (ref 11–38)
POC TOTAL BILIRUBIN: 0.6 MG/DL (ref 0.2–1.6)
POC TOTAL PROTEIN: 7.2 G/DL (ref 6.4–8.1)
POTASSIUM BLDA-SCNC: 3.3 MMOL/L (ref 3.6–5.1)
PROT UR QL STRIP: ABNORMAL
RBC # BLD AUTO: 4.31 10*6/MM3 (ref 4.14–5.8)
SODIUM BLD-SCNC: 139 MMOL/L (ref 128–145)
SP GR UR STRIP: >=1.03 (ref 1–1.03)
UROBILINOGEN UR QL STRIP: ABNORMAL
WBC NRBC COR # BLD AUTO: 8.38 10*3/MM3 (ref 3.4–10.8)

## 2024-05-21 PROCEDURE — 25010000002 LEUCOVORIN CALCIUM PER 50 MG: Performed by: INTERNAL MEDICINE

## 2024-05-21 PROCEDURE — 25010000002 LEUCOVORIN 200 MG RECONSTITUTED SOLUTION 200 MG VIAL: Performed by: INTERNAL MEDICINE

## 2024-05-21 PROCEDURE — 81003 URINALYSIS AUTO W/O SCOPE: CPT | Performed by: INTERNAL MEDICINE

## 2024-05-21 PROCEDURE — 25010000002 OXALIPLATIN PER 0.5 MG: Performed by: INTERNAL MEDICINE

## 2024-05-21 PROCEDURE — 80053 COMPREHEN METABOLIC PANEL: CPT

## 2024-05-21 PROCEDURE — 96375 TX/PRO/DX INJ NEW DRUG ADDON: CPT

## 2024-05-21 PROCEDURE — 25010000002 DEXAMETHASONE SODIUM PHOSPHATE 120 MG/30ML SOLUTION: Performed by: INTERNAL MEDICINE

## 2024-05-21 PROCEDURE — 96417 CHEMO IV INFUS EACH ADDL SEQ: CPT

## 2024-05-21 PROCEDURE — 25010000002 BEVACIZUMAB-BVZR 400 MG/16ML SOLUTION 16 ML VIAL: Performed by: INTERNAL MEDICINE

## 2024-05-21 PROCEDURE — 96367 TX/PROPH/DG ADDL SEQ IV INF: CPT

## 2024-05-21 PROCEDURE — 85025 COMPLETE CBC W/AUTO DIFF WBC: CPT | Performed by: INTERNAL MEDICINE

## 2024-05-21 PROCEDURE — 96413 CHEMO IV INFUSION 1 HR: CPT

## 2024-05-21 PROCEDURE — 25810000003 SODIUM CHLORIDE 0.9 % SOLUTION: Performed by: INTERNAL MEDICINE

## 2024-05-21 PROCEDURE — 96415 CHEMO IV INFUSION ADDL HR: CPT

## 2024-05-21 PROCEDURE — 0 DEXTROSE 5 % SOLUTION: Performed by: INTERNAL MEDICINE

## 2024-05-21 PROCEDURE — 25010000002 PALONOSETRON 0.25 MG/5ML SOLUTION PREFILLED SYRINGE: Performed by: INTERNAL MEDICINE

## 2024-05-21 PROCEDURE — 0 DEXTROSE 5 % SOLUTION 250 ML FLEX CONT: Performed by: INTERNAL MEDICINE

## 2024-05-21 PROCEDURE — 96368 THER/DIAG CONCURRENT INF: CPT

## 2024-05-21 PROCEDURE — 25010000002 BEVACIZUMAB-BVZR 100 MG/4ML SOLUTION 4 ML VIAL: Performed by: INTERNAL MEDICINE

## 2024-05-21 PROCEDURE — 25810000003 SODIUM CHLORIDE 0.9 % SOLUTION 250 ML FLEX CONT: Performed by: INTERNAL MEDICINE

## 2024-05-21 PROCEDURE — 25010000002 FLUOROURACIL PER 500 MG: Performed by: INTERNAL MEDICINE

## 2024-05-21 PROCEDURE — G0498 CHEMO EXTEND IV INFUS W/PUMP: HCPCS

## 2024-05-21 PROCEDURE — 96366 THER/PROPH/DIAG IV INF ADDON: CPT

## 2024-05-21 RX ORDER — SODIUM CHLORIDE 9 MG/ML
20 INJECTION, SOLUTION INTRAVENOUS ONCE
Status: COMPLETED | OUTPATIENT
Start: 2024-05-21 | End: 2024-05-21

## 2024-05-21 RX ORDER — DEXTROSE MONOHYDRATE 50 MG/ML
20 INJECTION, SOLUTION INTRAVENOUS ONCE
Status: COMPLETED | OUTPATIENT
Start: 2024-05-21 | End: 2024-05-21

## 2024-05-21 RX ORDER — DIPHENHYDRAMINE HYDROCHLORIDE 50 MG/ML
50 INJECTION INTRAMUSCULAR; INTRAVENOUS AS NEEDED
Status: CANCELLED | OUTPATIENT
Start: 2024-05-21

## 2024-05-21 RX ORDER — PALONOSETRON 0.05 MG/ML
0.25 INJECTION, SOLUTION INTRAVENOUS ONCE
Status: CANCELLED | OUTPATIENT
Start: 2024-05-21

## 2024-05-21 RX ORDER — SODIUM CHLORIDE 9 MG/ML
20 INJECTION, SOLUTION INTRAVENOUS ONCE
Status: CANCELLED | OUTPATIENT
Start: 2024-05-21

## 2024-05-21 RX ORDER — DEXTROSE MONOHYDRATE 50 MG/ML
20 INJECTION, SOLUTION INTRAVENOUS ONCE
Status: CANCELLED | OUTPATIENT
Start: 2024-05-21

## 2024-05-21 RX ORDER — PALONOSETRON 0.05 MG/ML
0.25 INJECTION, SOLUTION INTRAVENOUS ONCE
Status: COMPLETED | OUTPATIENT
Start: 2024-05-21 | End: 2024-05-21

## 2024-05-21 RX ORDER — FAMOTIDINE 10 MG/ML
20 INJECTION, SOLUTION INTRAVENOUS AS NEEDED
Status: CANCELLED | OUTPATIENT
Start: 2024-05-21

## 2024-05-21 RX ADMIN — SODIUM CHLORIDE 480 MG: 9 INJECTION, SOLUTION INTRAVENOUS at 09:53

## 2024-05-21 RX ADMIN — DEXTROSE MONOHYDRATE 20 ML/HR: 50 INJECTION, SOLUTION INTRAVENOUS at 10:29

## 2024-05-21 RX ADMIN — PALONOSETRON 0.25 MG: 0.25 INJECTION, SOLUTION INTRAVENOUS at 09:25

## 2024-05-21 RX ADMIN — SODIUM CHLORIDE 20 ML/HR: 9 INJECTION, SOLUTION INTRAVENOUS at 09:25

## 2024-05-21 RX ADMIN — OXALIPLATIN 195 MG: 5 INJECTION, SOLUTION INTRAVENOUS at 10:32

## 2024-05-21 RX ADMIN — DEXAMETHASONE SODIUM PHOSPHATE 12 MG: 4 INJECTION, SOLUTION INTRA-ARTICULAR; INTRALESIONAL; INTRAMUSCULAR; INTRAVENOUS; SOFT TISSUE at 09:27

## 2024-05-21 RX ADMIN — FLUOROURACIL 5450 MG: 50 INJECTION, SOLUTION INTRAVENOUS at 12:44

## 2024-05-21 RX ADMIN — DEXTROSE MONOHYDRATE 900 MG: 50 INJECTION, SOLUTION INTRAVENOUS at 10:30

## 2024-05-21 NOTE — TELEPHONE ENCOUNTER
Called Pt to [facility] Pt f/up on 6/20 w/ Dr Wagner. Left v/m for call back to do so.

## 2024-05-23 ENCOUNTER — HOSPITAL ENCOUNTER (OUTPATIENT)
Dept: ONCOLOGY | Facility: HOSPITAL | Age: 64
Discharge: HOME | End: 2024-05-23
Payer: MEDICARE

## 2024-05-23 VITALS — HEART RATE: 68 BPM | OXYGEN SATURATION: 95 %

## 2024-05-23 DIAGNOSIS — C20 RECTAL CANCER: ICD-10-CM

## 2024-05-23 DIAGNOSIS — C78.6 METASTASIS TO PERITONEAL CAVITY: Primary | ICD-10-CM

## 2024-05-23 PROCEDURE — 25010000002 HEPARIN LOCK FLUSH PER 10 UNITS: Performed by: INTERNAL MEDICINE

## 2024-05-23 RX ORDER — SODIUM CHLORIDE 0.9 % (FLUSH) 0.9 %
20 SYRINGE (ML) INJECTION AS NEEDED
Status: DISCONTINUED | OUTPATIENT
Start: 2024-05-23 | End: 2024-05-24 | Stop reason: HOSPADM

## 2024-05-23 RX ORDER — HEPARIN SODIUM (PORCINE) LOCK FLUSH IV SOLN 100 UNIT/ML 100 UNIT/ML
500 SOLUTION INTRAVENOUS AS NEEDED
OUTPATIENT
Start: 2024-05-23

## 2024-05-23 RX ORDER — HEPARIN SODIUM (PORCINE) LOCK FLUSH IV SOLN 100 UNIT/ML 100 UNIT/ML
500 SOLUTION INTRAVENOUS AS NEEDED
Status: DISCONTINUED | OUTPATIENT
Start: 2024-05-23 | End: 2024-05-24 | Stop reason: HOSPADM

## 2024-05-23 RX ORDER — SODIUM CHLORIDE 0.9 % (FLUSH) 0.9 %
20 SYRINGE (ML) INJECTION AS NEEDED
OUTPATIENT
Start: 2024-05-23

## 2024-05-23 RX ADMIN — HEPARIN 500 UNITS: 100 SYRINGE at 14:36

## 2024-05-23 RX ADMIN — Medication 20 ML: at 14:36

## 2024-05-31 ENCOUNTER — HOSPITAL ENCOUNTER (OUTPATIENT)
Dept: PET IMAGING | Facility: HOSPITAL | Age: 64
Discharge: HOME | End: 2024-05-31
Payer: MEDICARE

## 2024-05-31 DIAGNOSIS — C20 RECTAL CANCER: ICD-10-CM

## 2024-05-31 PROCEDURE — 71260 CT THORAX DX C+: CPT

## 2024-05-31 PROCEDURE — 25510000001 IOPAMIDOL PER 1 ML: Performed by: INTERNAL MEDICINE

## 2024-05-31 PROCEDURE — 74177 CT ABD & PELVIS W/CONTRAST: CPT

## 2024-05-31 RX ADMIN — IOPAMIDOL 100 ML: 755 INJECTION, SOLUTION INTRAVENOUS at 09:12

## 2024-06-04 ENCOUNTER — HOSPITAL ENCOUNTER (OUTPATIENT)
Dept: ONCOLOGY | Facility: HOSPITAL | Age: 64
Discharge: HOME | End: 2024-06-04
Admitting: INTERNAL MEDICINE
Payer: MEDICARE

## 2024-06-04 VITALS
DIASTOLIC BLOOD PRESSURE: 73 MMHG | WEIGHT: 219.1 LBS | HEIGHT: 76 IN | HEART RATE: 67 BPM | TEMPERATURE: 97.3 F | RESPIRATION RATE: 16 BRPM | SYSTOLIC BLOOD PRESSURE: 118 MMHG | OXYGEN SATURATION: 96 % | BODY MASS INDEX: 26.68 KG/M2

## 2024-06-04 DIAGNOSIS — C20 RECTAL CANCER: Primary | ICD-10-CM

## 2024-06-04 DIAGNOSIS — C78.6 METASTASIS TO PERITONEAL CAVITY: ICD-10-CM

## 2024-06-04 LAB
ALP BLD-CCNC: 66 U/L (ref 53–128)
BASOPHILS # BLD AUTO: 0.04 10*3/MM3 (ref 0–0.2)
BASOPHILS NFR BLD AUTO: 0.6 % (ref 0–1.5)
BILIRUB UR QL STRIP: ABNORMAL
BUN BLDA-MCNC: 12 MG/DL (ref 7–22)
CALCIUM BLD QL: 10 MG/DL (ref 8–10.3)
CHLORIDE BLDA-SCNC: 105 MMOL/L (ref 98–108)
CLARITY UR: CLEAR
CO2 BLDA-SCNC: 26 MMOL/L (ref 18–33)
COLOR UR: YELLOW
CREAT BLDA-MCNC: 0.5 MG/DL (ref 0.6–1.2)
DEPRECATED RDW RBC AUTO: 53.1 FL (ref 37–54)
EGFRCR SERPLBLD CKD-EPI 2021: 115.3 ML/MIN/1.73
EOSINOPHIL # BLD AUTO: 0.13 10*3/MM3 (ref 0–0.4)
EOSINOPHIL NFR BLD AUTO: 2 % (ref 0.3–6.2)
ERYTHROCYTE [DISTWIDTH] IN BLOOD BY AUTOMATED COUNT: 15.8 % (ref 12.3–15.4)
GLUCOSE BLDC GLUCOMTR-MCNC: 302 MG/DL (ref 73–118)
GLUCOSE UR STRIP-MCNC: ABNORMAL MG/DL
HCT VFR BLD AUTO: 40.8 % (ref 37.5–51)
HGB BLD-MCNC: 13.2 G/DL (ref 13–17.7)
HGB UR QL STRIP.AUTO: NEGATIVE
KETONES UR QL STRIP: ABNORMAL
LEUKOCYTE ESTERASE UR QL STRIP.AUTO: NEGATIVE
LYMPHOCYTES # BLD AUTO: 1.91 10*3/MM3 (ref 0.7–3.1)
LYMPHOCYTES NFR BLD AUTO: 29.6 % (ref 19.6–45.3)
MCH RBC QN AUTO: 30.8 PG (ref 26.6–33)
MCHC RBC AUTO-ENTMCNC: 32.4 G/DL (ref 31.5–35.7)
MCV RBC AUTO: 95.3 FL (ref 79–97)
MONOCYTES # BLD AUTO: 0.72 10*3/MM3 (ref 0.1–0.9)
MONOCYTES NFR BLD AUTO: 11.1 % (ref 5–12)
NEUTROPHILS NFR BLD AUTO: 3.66 10*3/MM3 (ref 1.7–7)
NEUTROPHILS NFR BLD AUTO: 56.7 % (ref 42.7–76)
NITRITE UR QL STRIP: NEGATIVE
PH UR STRIP.AUTO: 5.5 [PH] (ref 5–8)
PLATELET # BLD AUTO: 137 10*3/MM3 (ref 140–450)
PMV BLD AUTO: 9.3 FL (ref 6–12)
POC ALBUMIN: 3.5 G/L (ref 3.3–5.5)
POC ALT (SGPT): 17 U/L (ref 10–47)
POC AST (SGOT): 25 U/L (ref 11–38)
POC TOTAL BILIRUBIN: 0.6 MG/DL (ref 0.2–1.6)
POC TOTAL PROTEIN: 7.2 G/DL (ref 6.4–8.1)
POTASSIUM BLDA-SCNC: 3.6 MMOL/L (ref 3.6–5.1)
PROT UR QL STRIP: ABNORMAL
RBC # BLD AUTO: 4.28 10*6/MM3 (ref 4.14–5.8)
SODIUM BLD-SCNC: 143 MMOL/L (ref 128–145)
SP GR UR STRIP: >=1.03 (ref 1–1.03)
UROBILINOGEN UR QL STRIP: ABNORMAL
WBC NRBC COR # BLD AUTO: 6.46 10*3/MM3 (ref 3.4–10.8)

## 2024-06-04 PROCEDURE — 80053 COMPREHEN METABOLIC PANEL: CPT

## 2024-06-04 PROCEDURE — 96417 CHEMO IV INFUS EACH ADDL SEQ: CPT

## 2024-06-04 PROCEDURE — 25010000002 BEVACIZUMAB-BVZR 100 MG/4ML SOLUTION 4 ML VIAL: Performed by: INTERNAL MEDICINE

## 2024-06-04 PROCEDURE — 0 DEXTROSE 5 % SOLUTION 250 ML FLEX CONT: Performed by: INTERNAL MEDICINE

## 2024-06-04 PROCEDURE — 25810000003 SODIUM CHLORIDE 0.9 % SOLUTION: Performed by: INTERNAL MEDICINE

## 2024-06-04 PROCEDURE — 0 DEXTROSE 5 % SOLUTION: Performed by: INTERNAL MEDICINE

## 2024-06-04 PROCEDURE — 25010000002 DEXAMETHASONE SODIUM PHOSPHATE 120 MG/30ML SOLUTION: Performed by: INTERNAL MEDICINE

## 2024-06-04 PROCEDURE — 25010000002 BEVACIZUMAB-BVZR 400 MG/16ML SOLUTION 16 ML VIAL: Performed by: INTERNAL MEDICINE

## 2024-06-04 PROCEDURE — 81003 URINALYSIS AUTO W/O SCOPE: CPT | Performed by: INTERNAL MEDICINE

## 2024-06-04 PROCEDURE — 25010000002 OXALIPLATIN PER 0.5 MG: Performed by: INTERNAL MEDICINE

## 2024-06-04 PROCEDURE — 25810000003 SODIUM CHLORIDE 0.9 % SOLUTION 250 ML FLEX CONT: Performed by: INTERNAL MEDICINE

## 2024-06-04 PROCEDURE — 25010000002 PALONOSETRON 0.25 MG/5ML SOLUTION PREFILLED SYRINGE: Performed by: INTERNAL MEDICINE

## 2024-06-04 PROCEDURE — 85025 COMPLETE CBC W/AUTO DIFF WBC: CPT | Performed by: INTERNAL MEDICINE

## 2024-06-04 PROCEDURE — 96415 CHEMO IV INFUSION ADDL HR: CPT

## 2024-06-04 PROCEDURE — 25010000002 LEUCOVORIN 200 MG RECONSTITUTED SOLUTION 200 MG VIAL: Performed by: INTERNAL MEDICINE

## 2024-06-04 PROCEDURE — 25010000002 FLUOROURACIL PER 500 MG: Performed by: INTERNAL MEDICINE

## 2024-06-04 PROCEDURE — 25010000002 LEUCOVORIN CALCIUM PER 50 MG: Performed by: INTERNAL MEDICINE

## 2024-06-04 PROCEDURE — 96367 TX/PROPH/DG ADDL SEQ IV INF: CPT

## 2024-06-04 PROCEDURE — 96368 THER/DIAG CONCURRENT INF: CPT

## 2024-06-04 PROCEDURE — 96375 TX/PRO/DX INJ NEW DRUG ADDON: CPT

## 2024-06-04 PROCEDURE — 96366 THER/PROPH/DIAG IV INF ADDON: CPT

## 2024-06-04 PROCEDURE — G0498 CHEMO EXTEND IV INFUS W/PUMP: HCPCS

## 2024-06-04 PROCEDURE — 96413 CHEMO IV INFUSION 1 HR: CPT

## 2024-06-04 RX ORDER — SODIUM CHLORIDE 0.9 % (FLUSH) 0.9 %
20 SYRINGE (ML) INJECTION AS NEEDED
Status: CANCELLED | OUTPATIENT
Start: 2024-06-04

## 2024-06-04 RX ORDER — DEXTROSE MONOHYDRATE 50 MG/ML
20 INJECTION, SOLUTION INTRAVENOUS ONCE
Status: COMPLETED | OUTPATIENT
Start: 2024-06-04 | End: 2024-06-04

## 2024-06-04 RX ORDER — FAMOTIDINE 10 MG/ML
20 INJECTION, SOLUTION INTRAVENOUS AS NEEDED
Status: CANCELLED | OUTPATIENT
Start: 2024-06-04

## 2024-06-04 RX ORDER — PALONOSETRON 0.05 MG/ML
0.25 INJECTION, SOLUTION INTRAVENOUS ONCE
Status: CANCELLED | OUTPATIENT
Start: 2024-06-04

## 2024-06-04 RX ORDER — SODIUM CHLORIDE 0.9 % (FLUSH) 0.9 %
20 SYRINGE (ML) INJECTION AS NEEDED
Status: DISCONTINUED | OUTPATIENT
Start: 2024-06-04 | End: 2024-06-05 | Stop reason: HOSPADM

## 2024-06-04 RX ORDER — PALONOSETRON 0.05 MG/ML
0.25 INJECTION, SOLUTION INTRAVENOUS ONCE
Status: COMPLETED | OUTPATIENT
Start: 2024-06-04 | End: 2024-06-04

## 2024-06-04 RX ORDER — SODIUM CHLORIDE 9 MG/ML
20 INJECTION, SOLUTION INTRAVENOUS ONCE
Status: COMPLETED | OUTPATIENT
Start: 2024-06-04 | End: 2024-06-04

## 2024-06-04 RX ORDER — HEPARIN SODIUM (PORCINE) LOCK FLUSH IV SOLN 100 UNIT/ML 100 UNIT/ML
500 SOLUTION INTRAVENOUS AS NEEDED
Status: CANCELLED | OUTPATIENT
Start: 2024-06-04

## 2024-06-04 RX ORDER — DEXTROSE MONOHYDRATE 50 MG/ML
20 INJECTION, SOLUTION INTRAVENOUS ONCE
Status: CANCELLED | OUTPATIENT
Start: 2024-06-04

## 2024-06-04 RX ORDER — SODIUM CHLORIDE 9 MG/ML
20 INJECTION, SOLUTION INTRAVENOUS ONCE
Status: CANCELLED | OUTPATIENT
Start: 2024-06-04

## 2024-06-04 RX ORDER — DIPHENHYDRAMINE HYDROCHLORIDE 50 MG/ML
50 INJECTION INTRAMUSCULAR; INTRAVENOUS AS NEEDED
Status: CANCELLED | OUTPATIENT
Start: 2024-06-04

## 2024-06-04 RX ORDER — HEPARIN SODIUM (PORCINE) LOCK FLUSH IV SOLN 100 UNIT/ML 100 UNIT/ML
500 SOLUTION INTRAVENOUS AS NEEDED
Status: DISCONTINUED | OUTPATIENT
Start: 2024-06-04 | End: 2024-06-05 | Stop reason: HOSPADM

## 2024-06-04 RX ADMIN — PALONOSETRON 0.25 MG: 0.25 INJECTION, SOLUTION INTRAVENOUS at 09:36

## 2024-06-04 RX ADMIN — DEXTROSE MONOHYDRATE 900 MG: 50 INJECTION, SOLUTION INTRAVENOUS at 10:40

## 2024-06-04 RX ADMIN — DEXTROSE MONOHYDRATE 20 ML/HR: 50 INJECTION, SOLUTION INTRAVENOUS at 10:38

## 2024-06-04 RX ADMIN — SODIUM CHLORIDE 20 ML/HR: 9 INJECTION, SOLUTION INTRAVENOUS at 09:34

## 2024-06-04 RX ADMIN — OXALIPLATIN 195 MG: 5 INJECTION, SOLUTION INTRAVENOUS at 10:42

## 2024-06-04 RX ADMIN — FLUOROURACIL 5450 MG: 50 INJECTION, SOLUTION INTRAVENOUS at 12:50

## 2024-06-04 RX ADMIN — SODIUM CHLORIDE 480 MG: 9 INJECTION, SOLUTION INTRAVENOUS at 10:03

## 2024-06-04 RX ADMIN — DEXAMETHASONE SODIUM PHOSPHATE 12 MG: 4 INJECTION, SOLUTION INTRA-ARTICULAR; INTRALESIONAL; INTRAMUSCULAR; INTRAVENOUS; SOFT TISSUE at 09:37

## 2024-06-04 NOTE — PROGRESS NOTES
Pt is here for C12D1 of folfox and zirabev. [facility] infusaport accessed. 10 cc wasted. CBC/CMP drawn and resulted. Chemo infused as directed and without adverse effects. Pt denied any issues or concerns. Pt rtc on 6/6/24 at 1400.

## 2024-06-06 ENCOUNTER — HOSPITAL ENCOUNTER (OUTPATIENT)
Dept: ONCOLOGY | Facility: HOSPITAL | Age: 64
Discharge: HOME | End: 2024-06-06
Payer: MEDICARE

## 2024-06-06 VITALS — DIASTOLIC BLOOD PRESSURE: 76 MMHG | HEART RATE: 65 BPM | SYSTOLIC BLOOD PRESSURE: 128 MMHG

## 2024-06-06 DIAGNOSIS — C20 RECTAL CANCER: ICD-10-CM

## 2024-06-06 DIAGNOSIS — C78.6 METASTASIS TO PERITONEAL CAVITY: Primary | ICD-10-CM

## 2024-06-06 PROCEDURE — 25010000002 HEPARIN LOCK FLUSH PER 10 UNITS: Performed by: INTERNAL MEDICINE

## 2024-06-06 RX ORDER — HEPARIN SODIUM (PORCINE) LOCK FLUSH IV SOLN 100 UNIT/ML 100 UNIT/ML
500 SOLUTION INTRAVENOUS AS NEEDED
OUTPATIENT
Start: 2024-06-06

## 2024-06-06 RX ORDER — SODIUM CHLORIDE 0.9 % (FLUSH) 0.9 %
20 SYRINGE (ML) INJECTION AS NEEDED
Status: DISCONTINUED | OUTPATIENT
Start: 2024-06-06 | End: 2024-06-07 | Stop reason: HOSPADM

## 2024-06-06 RX ORDER — HEPARIN SODIUM (PORCINE) LOCK FLUSH IV SOLN 100 UNIT/ML 100 UNIT/ML
500 SOLUTION INTRAVENOUS AS NEEDED
Status: DISCONTINUED | OUTPATIENT
Start: 2024-06-06 | End: 2024-06-07 | Stop reason: HOSPADM

## 2024-06-06 RX ORDER — SODIUM CHLORIDE 0.9 % (FLUSH) 0.9 %
20 SYRINGE (ML) INJECTION AS NEEDED
OUTPATIENT
Start: 2024-06-06

## 2024-06-06 RX ADMIN — HEPARIN 500 UNITS: 100 SYRINGE at 14:14

## 2024-06-06 RX ADMIN — Medication 20 ML: at 14:14

## 2024-06-06 NOTE — PROGRESS NOTES
Pt here for CIV DC, pt denies problems/issues.   Pump empty with blood return noted.  Pt discharged

## 2024-06-17 RX ORDER — GABAPENTIN 300 MG/1
300 CAPSULE ORAL 3 TIMES DAILY
Qty: 90 CAPSULE | Refills: 0 | Status: SHIPPED | OUTPATIENT
Start: 2024-06-17

## 2024-06-17 RX ORDER — RIVAROXABAN 20 MG/1
20 TABLET, FILM COATED ORAL DAILY
Qty: 30 TABLET | Refills: 0 | Status: SHIPPED | OUTPATIENT
Start: 2024-06-17

## 2024-06-18 NOTE — PROGRESS NOTES
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     History of Present Illness:  Patient is a 63 y.o.     2024: Mr. Zhou carried a long history of severe hypertension and of an aneurysm of the ascending aorta.  He had been receiving antihypertensive medication after correction of the aneurysm with good results.  In 2023 he underwent his first screening colonoscopy.  A large circumferential and obstructive tumor was identified at the sigmoid/rectosigmoid junction.  The tumor could not be traversed even with the smallest instrument.  Biopsies showed moderately differentiated colonic adenocarcinoma without loss of nuclear expression of the mismatch repair proteins.  Imaging studies at the time revealed the known thoracic aneurysm that has increased mildly since the previous scan.  There was a benign-appearing subpleural nodule in the right posteromedial chest wall and pulmonary nodules that have been identified since 2018 remained stable.  Some mediastinal nonspecific and stable adenopathy was reported as well.  In the abdomen the sigmoid tumor was confirmed and there was no suggestion of metastatic disease.  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  He underwent excision of the squamous cell carcinoma that proved to be a high-grade squamous intraepithelial lesion.  He also had a robotic left colectomy.  The final report of pathology was of moderately differentiated adenocarcinoma of the colon, that measured 3.5 cm.  The tumor involved the serosal surface and the antimesenteric serosa.  Lymphovascular space invasion was identified.  All margins were  negative for disease but 2 of 27 lymph nodes had metastatic involvement.  As well there were at least 5 peritoneal deposits that were excised and that were confirmed to correspond to metastatic lesions.  The tumor had intact expression of MLH1, MSH 1, MSH 6 and PMS2.  Next-generation sequencing revealed a pathogenic mutation of exon 2 of the KRAS gene. ERBB2, BRAF, NTRK, RET, and PIK3CA were negative. PD-L1 was negative, as well.  He was started on treatment with FOLFOX and bevacizumab on November 28, 2023.  He reported adequate tolerance to the treatment, with the expected cold intolerance.  He had had no nausea but since the beginning of the present illness he had lost approximately 20 pounds.  He was eating reasonably well.  He had been free of chest pains and no longer had abdominal pain.  All his surgical incisions had healed and he was having regular defecations, at times of liquid stool.  The physical exam revealed him to be a chronically ill-appearing man who did not seem in distress.  He was conversant, in good spirits and without jaundice.  Lungs diminished bilaterally.  Heart regular.  Abdomen soft and nontender.  No edema.  The laboratory exams and all the records were reviewed and discussed with him and with his wife.  To resume treatment with the idea of obtaining a new set of scans after 6 cycles of chemotherapy.  After 12 cycles of chemotherapy discuss the possibility of additional surgery if there was any residual peritoneal disease at the time of surgery.  To see me at the end of February with the scans.    2/16/2024: In the office to review scans. In the last few days has had severe glossodynia and odynophagia. Also has been coughing and expectorating some clear sputum. No fevers. Has had pain on the left side of the chest. No dyspnea. He was prescribed Hiral's magic potion and fluconazole that has resulted in relief. On exam he does not appear acutely ill. No jaundice or pallor. Lungs are  diminished bilaterally and heart irregularly irregular. Abdomen soft. No edema. Reviewed the laboratory exams. Reviewed the images of the scans. Sent prescriptions for doxycycline as the lesion in the left lower lobe is likely infectious in nature, given his leukocytosis and symptoms. Will obtain an electrocardiogram. Will follow next week.     3/8/2024: Feeling poorly. Weak and not moving much. Has continued to have diffuse pain. No fevers. His spouse believes he has an ulcer on the backside. On exam he is conversant and oriented. No jaundice. No pallor. The lungs are diminished bilaterally. Heart regular. There is indeed an ulcer in the sacral region, in the intergluteal crease. Reviewed the laboratory exams. He is to continue with the same chemotherapy. Referred to the wound clinic. To have an electrocardiogram. He is to see me in a few weeks with new scans.     4/10/2024: Was admitted to the hospital shortly after the previous visit.  He had pneumonia and was very dyspneic.  On March 11, 2024 underwent a thoracoscopic decortication with parietal and visceral pleurectomy and intercostal nerve block.  He was treated with antibiotics and eventually discharged to a subacute rehabilitation facility.  Resumed treatment on 4/9/2024.  He tells me today he is feeling progressively better.  Stronger.  The surgical incisions are healing.  He is not having as much trouble breathing.  He has had no abdominal pain or diarrhea.  On exam alert, conversant and ill.  Seems much older than the stated age but is not in distress.  Lungs are diminished bilaterally.  Heart is regular.  Abdomen is soft.  No edema.  The laboratory exams were reviewed.  I reviewed the records from the hospital and reviewed all imaging studies done during that hospitalization.  No suggestion of progressive metastatic disease.  For now continue with the same treatment.  See me early in May 2024.    5/9/2024: Feeling progressively better.  Able to take the  chemotherapy without much difficulty.  Eating more.  Also more mobile.  Using a cane only when outside of his house.  On exam alert, chronically ill-appearing but in no distress.  No jaundice.  Conversant and oriented.  Lungs diminished bilaterally.  Heart regular.  Abdomen soft.  No edema.  Reviewed the laboratory exams and discussed with him.  He will see me again in approximately 6 weeks with new scans.  Continue with the same chemotherapy.    6/19/2024: Feeling reasonably well.  No weakness or chest pains and no cough.  On exam alert and conversant.  Not jaundiced or pale.  Lungs diminished bilaterally with bilateral wheezes and heart regular.  Abdomen soft.  No edema.  Reviewed the images of the recent scans with him and explained the finding of new metastatic deposits in the liver.  Explained to him that given the length of time that elapsed without chemotherapy because of his respiratory and cardiovascular issues, I do not know if the problem is that the chemotherapy was not working at all or if that Is what led to the development of metastatic disease.  I like to try the FOLFOX with bevacizumab in the way that he had been getting it to see if there is response with regular treatment.  For that reason we will proceed with treatment today.  He is to see me in approximately 4 weeks from today.    Past Medical History:   Diagnosis Date    Aortic dissection     Diabetes mellitus     Heart murmur     History of noncompliance with medical treatment     Hyperlipidemia     Hypertension     Type B hypoplasia of aortic arch      Past Surgical History:   Procedure Laterality Date    COLECTOMY PARTIAL / TOTAL  2023    COLONOSCOPY N/A 08/31/2023    Procedure: COLONOSCOPY with sigmoid mass tattooing at 35cm, sigmoid and rectal polypectomy;  Surgeon: TORIBIO Jacob MD;  Location: [facility] ENDOSCOPY;  Service: Gastroenterology;  Laterality: N/A;  sigmoid mass, colon polyps    THORACOSCOPY WITH DECORTICATION Left 3/11/2024     Procedure: THORACOSCOPY VIDEO ASSISTED WITH DECORTICATION WITH DAVINCI ROBOT;  Surgeon: Saqib Keller MD;  Location: [facility] MAIN OR;  Service: Robotics - DaVinci;  Laterality: Left;       Current Outpatient Medications:     amiodarone (PACERONE) 200 MG tablet, Take 1 tablet by mouth Every 12 (Twelve) Hours., Disp: 60 tablet, Rfl: 0    amLODIPine (NORVASC) 10 MG tablet, TAKE 1 TABLET BY MOUTH DAILY, Disp: 90 tablet, Rfl: 0    atorvastatin (LIPITOR) 40 MG tablet, TAKE 1 TABLET BY MOUTH EVERY NIGHT AT BEDTIME, Disp: 90 tablet, Rfl: 0    benzonatate (TESSALON) 100 MG capsule, Take 1 capsule by mouth 3 (Three) Times a Day As Needed for Cough., Disp: 21 capsule, Rfl: 0    Blood Glucose Monitoring Suppl (Accu-Chek Guide) w/Device kit, 1 Device Daily., Disp: 1 kit, Rfl: 0    Chlorcyclizine-Pseudoephed (Stahist AD) 25-60 MG tablet, Take 0.5 tablets by mouth 2 (Two) Times a Day As Needed (Congestion, drainage)., Disp: 42 tablet, Rfl: 0    cloNIDine (CATAPRES) 0.1 MG tablet, Take 1 tablet by mouth 2 (Two) Times a Day., Disp: 60 tablet, Rfl: 5    gabapentin (NEURONTIN) 300 MG capsule, TAKE 1 CAPSULE BY MOUTH 3 TIMES A DAY, Disp: 90 capsule, Rfl: 0    glimepiride (AMARYL) 4 MG tablet, TAKE 1 TABLET BY MOUTH TWICE A DAY, Disp: 180 tablet, Rfl: 1    glucose blood (Accu-Chek Guide) test strip, Test daily e11.9, Disp: 50 each, Rfl: 12    guaiFENesin (MUCINEX) 600 MG 12 hr tablet, Take 2 tablets by mouth Every 12 (Twelve) Hours., Disp: 14 tablet, Rfl: 0    hydrALAZINE (APRESOLINE) 50 MG tablet, TAKE ONE TABLET BY MOUTH EVERY 8 HOURS, Disp: 90 tablet, Rfl: 1    lisinopril-hydrochlorothiazide (PRINZIDE,ZESTORETIC) 20-25 MG per tablet, TAKE ONE TABLET BY MOUTH DAILY, Disp: 90 tablet, Rfl: 0    metFORMIN (GLUCOPHAGE) 850 MG tablet, TAKE 1 TABLET BY MOUTH TWICE A DAY WITH MEALS, Disp: 144 tablet, Rfl: 1    metoprolol succinate XL (TOPROL-XL) 50 MG 24 hr tablet, Take 1 tablet by mouth Daily., Disp: 30 tablet, Rfl: 0    metoprolol  tartrate (LOPRESSOR) 50 MG tablet, TAKE THREE TABLETS BY MOUTH EVERY 12 HOURS, Disp: 540 tablet, Rfl: 0    oxyCODONE (ROXICODONE) 10 MG tablet, , Disp: , Rfl:     potassium chloride 10 MEQ CR tablet, Take 1 tablet by mouth Daily., Disp: , Rfl:     traMADol (ULTRAM) 50 MG tablet, TAKE 1 TABLET BY MOUTH EVERY 6 HOURS AS NEEDED FOR MODERATE PAIN, Disp: 28 tablet, Rfl: 0    vitamin D3 125 MCG (5000 UT) capsule capsule, Take 1 capsule by mouth Daily., Disp: , Rfl:     Xarelto 20 MG tablet, TAKE 1 TABLET BY MOUTH DAILY, Disp: 30 tablet, Rfl: 0    No Known Allergies    Family History   Problem Relation Age of Onset    Diabetes Mother     Dementia Mother     Sudden death Father      Cancer-related family history is not on file.    Social History     Tobacco Use    Smoking status: Every Day     Current packs/day: 2.00     Average packs/day: 2.0 packs/day for 53.5 years (106.9 ttl pk-yrs)     Types: Cigarettes     Start date: 1971    Smokeless tobacco: Never   Vaping Use    Vaping status: Never Used   Substance Use Topics    Alcohol use: No     Comment: Abstinent since around 2008    Drug use: Not Currently     Types: Marijuana     Comment: Abstinent since at least the 1980's     Social History     Social History Narrative    Not on file     ROS:   Review of Systems   Constitutional:  Positive for fatigue. Negative for activity change, appetite change, chills, diaphoresis, fever and unexpected weight change.   HENT:  Negative for congestion, dental problem, drooling, ear discharge, ear pain, facial swelling, hearing loss, mouth sores, nosebleeds, postnasal drip, rhinorrhea, sinus pressure, sinus pain, sneezing, sore throat, tinnitus, trouble swallowing and voice change.    Eyes:  Negative for photophobia, pain, discharge, redness, itching and visual disturbance.   Respiratory:  Positive for cough and shortness of breath. Negative for apnea, choking, chest tightness, wheezing and stridor.    Cardiovascular:  Negative for chest  "pain, palpitations and leg swelling.   Gastrointestinal:  Negative for abdominal distention, abdominal pain, anal bleeding, blood in stool, constipation, diarrhea, nausea, rectal pain and vomiting.   Endocrine: Negative for cold intolerance, heat intolerance, polydipsia and polyuria.   Genitourinary:  Negative for decreased urine volume, difficulty urinating, dysuria, flank pain, frequency, genital sores, hematuria and urgency.   Musculoskeletal:  Negative for arthralgias, back pain, gait problem, joint swelling, myalgias, neck pain and neck stiffness.   Skin:  Negative for color change, pallor and rash.   Neurological:  Negative for dizziness, tremors, seizures, syncope, facial asymmetry, speech difficulty, weakness, light-headedness, numbness and headaches.        Increased cold sensitivity since the commencement of treatment with oxaliplatin   Hematological:  Negative for adenopathy. Does not bruise/bleed easily.   Psychiatric/Behavioral:  Negative for agitation, behavioral problems, confusion, decreased concentration, hallucinations, self-injury, sleep disturbance and suicidal ideas. The patient is not nervous/anxious.      Objective:    Vital Signs:  Vitals:    06/19/24 0858   BP: 118/72   Pulse: 63   Temp: 97.7 °F (36.5 °C)   TempSrc: Oral   SpO2: 96%   Weight: 98 kg (216 lb)   Height: 193 cm (76\")   PainSc: 0-No pain     Body mass index is 26.29 kg/m².    ECOG  (1) Restricted in physically strenuous activity, ambulatory and able to do work of light nature    Physical Exam:   Physical Exam  Constitutional:       General: He is not in acute distress.     Appearance: He is not ill-appearing, toxic-appearing or diaphoretic.      Comments: Does not appear acutely ill. Conversant and oriented. No jaundice or pallor.    HENT:      Head: Normocephalic and atraumatic.      Right Ear: External ear normal.      Left Ear: External ear normal.      Nose: Nose normal.      Mouth/Throat:      Mouth: Mucous membranes are " moist.      Pharynx: Oropharynx is clear.   Eyes:      General: No scleral icterus.        Right eye: No discharge.         Left eye: No discharge.      Conjunctiva/sclera: Conjunctivae normal.      Pupils: Pupils are equal, round, and reactive to light.   Cardiovascular:      Rate and Rhythm: Normal rate.      Pulses: Normal pulses.      Heart sounds: Normal heart sounds. No murmur heard.     No friction rub. No gallop.   Pulmonary:      Effort: No respiratory distress.      Breath sounds: No stridor. No wheezing, rhonchi or rales.      Comments: Diminished bilaterally and with fine crackles in the bases.   Chest:      Chest wall: No tenderness.      Comments: Surgical incisions on the left side of the chest are healing well.  No evidence of infection.  Abdominal:      General: Bowel sounds are normal. There is no distension.      Palpations: Abdomen is soft. There is no mass.      Tenderness: There is no abdominal tenderness. There is no right CVA tenderness, left CVA tenderness, guarding or rebound.      Comments: Soft and nontender.   Musculoskeletal:         General: No swelling, tenderness, deformity or signs of injury.      Cervical back: No rigidity.      Right lower leg: No edema.      Left lower leg: No edema.   Lymphadenopathy:      Cervical: No cervical adenopathy.   Skin:     General: Skin is warm and dry.      Coloration: Skin is not jaundiced.      Findings: No bruising or rash.   Neurological:      General: No focal deficit present.      Mental Status: He is alert and oriented to person, place, and time.      Gait: Gait normal.   Psychiatric:         Mood and Affect: Mood normal.         Behavior: Behavior normal.         Thought Content: Thought content normal.         Judgment: Judgment normal.     MIGUEL Wagner MD performed the physical exam on 6/19/2024 as documented above.    Lab Results - Last 18 Months   Lab Units 06/19/24  0855 06/04/24  0824 05/21/24  0827   WBC 10*3/mm3 6.71 6.46 8.38    HEMOGLOBIN g/dL 14.0 13.2 13.1   HEMATOCRIT % 43.7 40.8 41.3   PLATELETS 10*3/mm3 160 137* 162   MCV fL 94.8 95.3 95.8     Lab Results - Last 18 Months   Lab Units 06/04/24  0839 05/21/24  0853 05/07/24  0845 04/23/24  0831 04/09/24  0836 04/04/24  1143 03/27/24  0508 03/22/24  0600 03/18/24  0500 03/17/24  0525 03/16/24  0649   SODIUM mmol/L  --   --   --   --  138 138 132*   < > 137   < > 135*   POTASSIUM mmol/L  --   --   --   --  4.2 5.2 4.3   < > 3.8   < > 4.3   CHLORIDE mmol/L  --   --   --   --  98 97* 93*   < > 96*   < > 95*   CO2 mmol/L  --   --   --   --  29.0 30.0* 32.0*   < > 32.0*   < > 33.0*   BUN mg/dL  --   --   --   --  15 20 9   < > 11   < > 12   CREATININE mg/dL 0.50* 0.60 0.60   < > 0.50* 0.63* 0.38*   < > 0.39*   < > 0.34*   CALCIUM mg/dL  --   --   --   --  10.7* 11.5* 9.9   < > 9.8   < > 9.4   BILIRUBIN mg/dL  --   --   --   --  0.2  --   --   --  0.2  --  0.2   ALK PHOS U/L  --   --   --   --  80  --   --   --  93  --  97   ALT (SGPT) U/L  --   --   --   --  13  --   --   --  16  --  19   AST (SGOT) U/L  --   --   --   --  13  --   --   --  17  --  20   GLUCOSE mg/dL  --   --   --   --  117* 131* 130*   < > 75   < > 224*    < > = values in this interval not displayed.     Lab Results   Component Value Date    GLUCOSE 117 (H) 04/09/2024    BUN 15 04/09/2024    CREATININE 0.50 (L) 06/04/2024    EGFRIFNONA 100 01/20/2022    BCR 30.0 (H) 04/09/2024    K 4.2 04/09/2024    CO2 29.0 04/09/2024    CALCIUM 10.7 (H) 04/09/2024    ALBUMIN 3.8 04/09/2024    LABIL2 1.5 01/22/2019    AST 13 04/09/2024    ALT 13 04/09/2024     Lab Results - Last 18 Months   Lab Units 03/10/24  2236 11/14/23  1103   INR  1.21* 0.99   APTT seconds 31.2* 28.2     Lab Results   Component Value Date    PTT 31.2 (H) 03/10/2024    INR 1.21 (H) 03/10/2024     Lab Results   Component Value Date    CEA 2.39 05/09/2024       Lab Results   Component Value Date    PSA 0.594 01/23/2023     Assessment & Plan     1.  [id number] invasive  moderately differentiated adenocarcinoma of the sigmoid with metastatic involvement of the peritoneum and KRAS mutated.  No suggestion of progressive disease.  Reviewed the images of the recent scans.  There is new evidence of metastatic disease to the liver that arose while he was off of the chemotherapy for several weeks.  On this basis he is to continue with the same chemotherapy and follow-up with short-term scans.  I will see him again in approximately 4 weeks.  2.  Recovered completely from the decortication.  At this point no new symptoms.  2.  Cigarette smoker: Remains abstinent.  3.  Aortic aneurysm reviewed the recent scans.  Will continue to follow.  4.  Severe hypertension: Very good control.  5.  Sacral decubitus ulcer: Healing.  6.  Resumed chemotherapy.  See me in approximately 4 weeks.  Scans sometime at the end of July.    Lars Wagner MD on 6/19/2024 at 9:54 AM.

## 2024-06-19 ENCOUNTER — HOSPITAL ENCOUNTER (OUTPATIENT)
Dept: ONCOLOGY | Facility: HOSPITAL | Age: 64
Discharge: HOME | End: 2024-06-19
Payer: MEDICARE

## 2024-06-19 ENCOUNTER — LAB (OUTPATIENT)
Dept: LAB | Facility: HOSPITAL | Age: 64
End: 2024-06-19
Payer: MEDICARE

## 2024-06-19 ENCOUNTER — OFFICE VISIT (OUTPATIENT)
Dept: ONCOLOGY | Facility: CLINIC | Age: 64
End: 2024-06-19
Payer: MEDICARE

## 2024-06-19 VITALS
HEART RATE: 63 BPM | HEIGHT: 76 IN | WEIGHT: 216 LBS | DIASTOLIC BLOOD PRESSURE: 72 MMHG | TEMPERATURE: 97.7 F | OXYGEN SATURATION: 96 % | SYSTOLIC BLOOD PRESSURE: 118 MMHG | BODY MASS INDEX: 26.3 KG/M2

## 2024-06-19 DIAGNOSIS — C20 RECTAL CANCER: Primary | ICD-10-CM

## 2024-06-19 DIAGNOSIS — C20 RECTAL CANCER: ICD-10-CM

## 2024-06-19 DIAGNOSIS — C78.6 METASTASIS TO PERITONEAL CAVITY: Primary | ICD-10-CM

## 2024-06-19 DIAGNOSIS — C78.6 METASTASIS TO PERITONEAL CAVITY: ICD-10-CM

## 2024-06-19 LAB
ALP BLD-CCNC: 66 U/L (ref 53–128)
BASOPHILS # BLD AUTO: 0.04 10*3/MM3 (ref 0–0.2)
BASOPHILS NFR BLD AUTO: 0.6 % (ref 0–1.5)
BILIRUB UR QL STRIP: NEGATIVE
BUN BLDA-MCNC: 10 MG/DL (ref 7–22)
CALCIUM BLD QL: 10.3 MG/DL (ref 8–10.3)
CHLORIDE BLDA-SCNC: 103 MMOL/L (ref 98–108)
CLARITY UR: CLEAR
CO2 BLDA-SCNC: 28 MMOL/L (ref 18–33)
COLOR UR: YELLOW
CREAT BLDA-MCNC: 0.5 MG/DL (ref 0.6–1.2)
DEPRECATED RDW RBC AUTO: 54.5 FL (ref 37–54)
EGFRCR SERPLBLD CKD-EPI 2021: 115.3 ML/MIN/1.73
EOSINOPHIL # BLD AUTO: 0.1 10*3/MM3 (ref 0–0.4)
EOSINOPHIL NFR BLD AUTO: 1.5 % (ref 0.3–6.2)
ERYTHROCYTE [DISTWIDTH] IN BLOOD BY AUTOMATED COUNT: 16.3 % (ref 12.3–15.4)
GLUCOSE BLDC GLUCOMTR-MCNC: 263 MG/DL (ref 73–118)
GLUCOSE UR STRIP-MCNC: ABNORMAL MG/DL
HCT VFR BLD AUTO: 43.7 % (ref 37.5–51)
HGB BLD-MCNC: 14 G/DL (ref 13–17.7)
HGB UR QL STRIP.AUTO: NEGATIVE
HOLD SPECIMEN: NORMAL
HOLD SPECIMEN: NORMAL
KETONES UR QL STRIP: ABNORMAL
LEUKOCYTE ESTERASE UR QL STRIP.AUTO: NEGATIVE
LYMPHOCYTES # BLD AUTO: 1.6 10*3/MM3 (ref 0.7–3.1)
LYMPHOCYTES NFR BLD AUTO: 23.8 % (ref 19.6–45.3)
MCH RBC QN AUTO: 30.4 PG (ref 26.6–33)
MCHC RBC AUTO-ENTMCNC: 32 G/DL (ref 31.5–35.7)
MCV RBC AUTO: 94.8 FL (ref 79–97)
MONOCYTES # BLD AUTO: 0.79 10*3/MM3 (ref 0.1–0.9)
MONOCYTES NFR BLD AUTO: 11.8 % (ref 5–12)
NEUTROPHILS NFR BLD AUTO: 4.18 10*3/MM3 (ref 1.7–7)
NEUTROPHILS NFR BLD AUTO: 62.3 % (ref 42.7–76)
NITRITE UR QL STRIP: NEGATIVE
PH UR STRIP.AUTO: 5.5 [PH] (ref 5–8)
PLATELET # BLD AUTO: 160 10*3/MM3 (ref 140–450)
PMV BLD AUTO: 9.1 FL (ref 6–12)
POC ALBUMIN: 3.5 G/L (ref 3.3–5.5)
POC ALT (SGPT): 18 U/L (ref 10–47)
POC AST (SGOT): 21 U/L (ref 11–38)
POC TOTAL BILIRUBIN: 0.5 MG/DL (ref 0.2–1.6)
POC TOTAL PROTEIN: 7.2 G/DL (ref 6.4–8.1)
POTASSIUM BLDA-SCNC: 3.7 MMOL/L (ref 3.6–5.1)
PROT UR QL STRIP: ABNORMAL
RBC # BLD AUTO: 4.61 10*6/MM3 (ref 4.14–5.8)
SODIUM BLD-SCNC: 142 MMOL/L (ref 128–145)
SP GR UR STRIP: 1.02 (ref 1–1.03)
UROBILINOGEN UR QL STRIP: ABNORMAL
WBC NRBC COR # BLD AUTO: 6.71 10*3/MM3 (ref 3.4–10.8)

## 2024-06-19 PROCEDURE — 25010000002 BEVACIZUMAB-BVZR 400 MG/16ML SOLUTION 16 ML VIAL: Performed by: INTERNAL MEDICINE

## 2024-06-19 PROCEDURE — 96417 CHEMO IV INFUS EACH ADDL SEQ: CPT

## 2024-06-19 PROCEDURE — 85025 COMPLETE CBC W/AUTO DIFF WBC: CPT

## 2024-06-19 PROCEDURE — 25010000002 LEUCOVORIN 200 MG RECONSTITUTED SOLUTION 200 MG VIAL: Performed by: INTERNAL MEDICINE

## 2024-06-19 PROCEDURE — 25010000002 OXALIPLATIN PER 0.5 MG: Performed by: INTERNAL MEDICINE

## 2024-06-19 PROCEDURE — 25810000003 SODIUM CHLORIDE 0.9 % SOLUTION 500 ML FLEX CONT: Performed by: INTERNAL MEDICINE

## 2024-06-19 PROCEDURE — 25010000002 BEVACIZUMAB-BVZR 100 MG/4ML SOLUTION 4 ML VIAL: Performed by: INTERNAL MEDICINE

## 2024-06-19 PROCEDURE — 25010000002 DEXAMETHASONE SODIUM PHOSPHATE 120 MG/30ML SOLUTION: Performed by: INTERNAL MEDICINE

## 2024-06-19 PROCEDURE — 96366 THER/PROPH/DIAG IV INF ADDON: CPT

## 2024-06-19 PROCEDURE — 81003 URINALYSIS AUTO W/O SCOPE: CPT | Performed by: INTERNAL MEDICINE

## 2024-06-19 PROCEDURE — 96368 THER/DIAG CONCURRENT INF: CPT

## 2024-06-19 PROCEDURE — 36415 COLL VENOUS BLD VENIPUNCTURE: CPT

## 2024-06-19 PROCEDURE — 80053 COMPREHEN METABOLIC PANEL: CPT

## 2024-06-19 PROCEDURE — 0 DEXTROSE 5 % SOLUTION 250 ML FLEX CONT: Performed by: INTERNAL MEDICINE

## 2024-06-19 PROCEDURE — 25010000002 FLUOROURACIL PER 500 MG: Performed by: INTERNAL MEDICINE

## 2024-06-19 PROCEDURE — 96413 CHEMO IV INFUSION 1 HR: CPT

## 2024-06-19 PROCEDURE — G0498 CHEMO EXTEND IV INFUS W/PUMP: HCPCS

## 2024-06-19 PROCEDURE — 96367 TX/PROPH/DG ADDL SEQ IV INF: CPT

## 2024-06-19 PROCEDURE — 0 DEXTROSE 5 % SOLUTION: Performed by: INTERNAL MEDICINE

## 2024-06-19 PROCEDURE — 96375 TX/PRO/DX INJ NEW DRUG ADDON: CPT

## 2024-06-19 PROCEDURE — 25010000002 LEUCOVORIN CALCIUM PER 50 MG: Performed by: INTERNAL MEDICINE

## 2024-06-19 PROCEDURE — 96415 CHEMO IV INFUSION ADDL HR: CPT

## 2024-06-19 PROCEDURE — 25810000003 SODIUM CHLORIDE 0.9 % SOLUTION: Performed by: INTERNAL MEDICINE

## 2024-06-19 PROCEDURE — 25010000002 PALONOSETRON 0.25 MG/5ML SOLUTION PREFILLED SYRINGE: Performed by: INTERNAL MEDICINE

## 2024-06-19 RX ORDER — SODIUM CHLORIDE 9 MG/ML
20 INJECTION, SOLUTION INTRAVENOUS ONCE
Status: COMPLETED | OUTPATIENT
Start: 2024-06-19 | End: 2024-06-19

## 2024-06-19 RX ORDER — FAMOTIDINE 10 MG/ML
20 INJECTION, SOLUTION INTRAVENOUS AS NEEDED
Status: CANCELLED | OUTPATIENT
Start: 2024-06-19

## 2024-06-19 RX ORDER — SODIUM CHLORIDE 9 MG/ML
20 INJECTION, SOLUTION INTRAVENOUS ONCE
Status: CANCELLED | OUTPATIENT
Start: 2024-06-19

## 2024-06-19 RX ORDER — DEXTROSE MONOHYDRATE 50 MG/ML
20 INJECTION, SOLUTION INTRAVENOUS ONCE
Status: CANCELLED | OUTPATIENT
Start: 2024-06-19

## 2024-06-19 RX ORDER — DIPHENHYDRAMINE HYDROCHLORIDE 50 MG/ML
50 INJECTION INTRAMUSCULAR; INTRAVENOUS AS NEEDED
Status: CANCELLED | OUTPATIENT
Start: 2024-06-19

## 2024-06-19 RX ORDER — DEXTROSE MONOHYDRATE 50 MG/ML
20 INJECTION, SOLUTION INTRAVENOUS ONCE
Status: COMPLETED | OUTPATIENT
Start: 2024-06-19 | End: 2024-06-19

## 2024-06-19 RX ORDER — PALONOSETRON 0.05 MG/ML
0.25 INJECTION, SOLUTION INTRAVENOUS ONCE
Status: CANCELLED | OUTPATIENT
Start: 2024-06-19

## 2024-06-19 RX ORDER — PALONOSETRON 0.05 MG/ML
0.25 INJECTION, SOLUTION INTRAVENOUS ONCE
Status: COMPLETED | OUTPATIENT
Start: 2024-06-19 | End: 2024-06-19

## 2024-06-19 RX ADMIN — DEXTROSE MONOHYDRATE 900 MG: 50 INJECTION, SOLUTION INTRAVENOUS at 12:22

## 2024-06-19 RX ADMIN — FLUOROURACIL 5450 MG: 50 INJECTION, SOLUTION INTRAVENOUS at 14:33

## 2024-06-19 RX ADMIN — OXALIPLATIN 195 MG: 5 INJECTION, SOLUTION INTRAVENOUS at 12:22

## 2024-06-19 RX ADMIN — SODIUM CHLORIDE 20 ML/HR: 9 INJECTION, SOLUTION INTRAVENOUS at 11:45

## 2024-06-19 RX ADMIN — DEXAMETHASONE SODIUM PHOSPHATE 12 MG: 4 INJECTION, SOLUTION INTRA-ARTICULAR; INTRALESIONAL; INTRAMUSCULAR; INTRAVENOUS; SOFT TISSUE at 11:06

## 2024-06-19 RX ADMIN — DEXTROSE MONOHYDRATE 20 ML/HR: 50 INJECTION, SOLUTION INTRAVENOUS at 12:22

## 2024-06-19 RX ADMIN — SODIUM CHLORIDE 480 MG: 9 INJECTION, SOLUTION INTRAVENOUS at 11:47

## 2024-06-19 RX ADMIN — PALONOSETRON 0.25 MG: 0.25 INJECTION, SOLUTION INTRAVENOUS at 11:03

## 2024-06-21 ENCOUNTER — HOSPITAL ENCOUNTER (OUTPATIENT)
Dept: ONCOLOGY | Facility: HOSPITAL | Age: 64
Discharge: HOME | End: 2024-06-21
Payer: MEDICARE

## 2024-06-21 VITALS — SYSTOLIC BLOOD PRESSURE: 129 MMHG | DIASTOLIC BLOOD PRESSURE: 66 MMHG | HEART RATE: 69 BPM

## 2024-06-21 DIAGNOSIS — C78.6 METASTASIS TO PERITONEAL CAVITY: Primary | ICD-10-CM

## 2024-06-21 DIAGNOSIS — C20 RECTAL CANCER: ICD-10-CM

## 2024-06-21 PROCEDURE — 25010000002 HEPARIN LOCK FLUSH PER 10 UNITS: Performed by: INTERNAL MEDICINE

## 2024-06-21 RX ORDER — HEPARIN SODIUM (PORCINE) LOCK FLUSH IV SOLN 100 UNIT/ML 100 UNIT/ML
500 SOLUTION INTRAVENOUS AS NEEDED
OUTPATIENT
Start: 2024-06-21

## 2024-06-21 RX ORDER — SODIUM CHLORIDE 0.9 % (FLUSH) 0.9 %
20 SYRINGE (ML) INJECTION AS NEEDED
OUTPATIENT
Start: 2024-06-21

## 2024-06-21 RX ORDER — HEPARIN SODIUM (PORCINE) LOCK FLUSH IV SOLN 100 UNIT/ML 100 UNIT/ML
500 SOLUTION INTRAVENOUS AS NEEDED
Status: DISCONTINUED | OUTPATIENT
Start: 2024-06-21 | End: 2024-06-22 | Stop reason: HOSPADM

## 2024-06-21 RX ORDER — SODIUM CHLORIDE 0.9 % (FLUSH) 0.9 %
20 SYRINGE (ML) INJECTION AS NEEDED
Status: DISCONTINUED | OUTPATIENT
Start: 2024-06-21 | End: 2024-06-22 | Stop reason: HOSPADM

## 2024-06-21 RX ADMIN — Medication 20 ML: at 13:20

## 2024-06-21 RX ADMIN — HEPARIN 500 UNITS: 100 SYRINGE at 13:20

## 2024-06-25 ENCOUNTER — OFFICE VISIT (OUTPATIENT)
Dept: SURGERY | Facility: CLINIC | Age: 64
End: 2024-06-25
Payer: MEDICARE

## 2024-06-25 VITALS
BODY MASS INDEX: 26.23 KG/M2 | WEIGHT: 215.4 LBS | SYSTOLIC BLOOD PRESSURE: 116 MMHG | HEART RATE: 69 BPM | DIASTOLIC BLOOD PRESSURE: 60 MMHG | HEIGHT: 76 IN | OXYGEN SATURATION: 96 %

## 2024-06-25 DIAGNOSIS — J86.9 EMPYEMA LUNG: Primary | ICD-10-CM

## 2024-06-25 PROCEDURE — 3078F DIAST BP <80 MM HG: CPT | Performed by: SURGERY

## 2024-06-25 PROCEDURE — 3074F SYST BP LT 130 MM HG: CPT | Performed by: SURGERY

## 2024-06-25 PROCEDURE — 99214 OFFICE O/P EST MOD 30 MIN: CPT | Performed by: SURGERY

## 2024-07-03 ENCOUNTER — HOSPITAL ENCOUNTER (OUTPATIENT)
Dept: ONCOLOGY | Facility: HOSPITAL | Age: 64
Discharge: HOME | End: 2024-07-03
Admitting: INTERNAL MEDICINE
Payer: MEDICARE

## 2024-07-03 VITALS
HEIGHT: 76 IN | RESPIRATION RATE: 14 BRPM | DIASTOLIC BLOOD PRESSURE: 67 MMHG | TEMPERATURE: 97.7 F | OXYGEN SATURATION: 92 % | WEIGHT: 219.8 LBS | SYSTOLIC BLOOD PRESSURE: 108 MMHG | BODY MASS INDEX: 26.77 KG/M2 | HEART RATE: 71 BPM

## 2024-07-03 DIAGNOSIS — C78.6 METASTASIS TO PERITONEAL CAVITY: ICD-10-CM

## 2024-07-03 DIAGNOSIS — C20 RECTAL CANCER: Primary | ICD-10-CM

## 2024-07-03 LAB
ALP BLD-CCNC: 71 U/L (ref 53–128)
BASOPHILS # BLD AUTO: 0.04 10*3/MM3 (ref 0–0.2)
BASOPHILS NFR BLD AUTO: 0.5 % (ref 0–1.5)
BILIRUB UR QL STRIP: ABNORMAL
BUN BLDA-MCNC: 9 MG/DL (ref 7–22)
CALCIUM BLD QL: 10.1 MG/DL (ref 8–10.3)
CHLORIDE BLDA-SCNC: 102 MMOL/L (ref 98–108)
CLARITY UR: ABNORMAL
CO2 BLDA-SCNC: 25 MMOL/L (ref 18–33)
COLOR UR: ABNORMAL
CREAT BLDA-MCNC: 0.4 MG/DL (ref 0.6–1.2)
DEPRECATED RDW RBC AUTO: 55 FL (ref 37–54)
EGFRCR SERPLBLD CKD-EPI 2021: 123.4 ML/MIN/1.73
EOSINOPHIL # BLD AUTO: 0.13 10*3/MM3 (ref 0–0.4)
EOSINOPHIL NFR BLD AUTO: 1.7 % (ref 0.3–6.2)
ERYTHROCYTE [DISTWIDTH] IN BLOOD BY AUTOMATED COUNT: 16.5 % (ref 12.3–15.4)
GLUCOSE BLDC GLUCOMTR-MCNC: 277 MG/DL (ref 73–118)
GLUCOSE UR STRIP-MCNC: ABNORMAL MG/DL
HCT VFR BLD AUTO: 40.9 % (ref 37.5–51)
HGB BLD-MCNC: 13.2 G/DL (ref 13–17.7)
HGB UR QL STRIP.AUTO: NEGATIVE
KETONES UR QL STRIP: NEGATIVE
LEUKOCYTE ESTERASE UR QL STRIP.AUTO: NEGATIVE
LYMPHOCYTES # BLD AUTO: 1.99 10*3/MM3 (ref 0.7–3.1)
LYMPHOCYTES NFR BLD AUTO: 25.4 % (ref 19.6–45.3)
MCH RBC QN AUTO: 30.6 PG (ref 26.6–33)
MCHC RBC AUTO-ENTMCNC: 32.3 G/DL (ref 31.5–35.7)
MCV RBC AUTO: 94.7 FL (ref 79–97)
MONOCYTES # BLD AUTO: 0.77 10*3/MM3 (ref 0.1–0.9)
MONOCYTES NFR BLD AUTO: 9.8 % (ref 5–12)
NEUTROPHILS NFR BLD AUTO: 4.89 10*3/MM3 (ref 1.7–7)
NEUTROPHILS NFR BLD AUTO: 62.6 % (ref 42.7–76)
NITRITE UR QL STRIP: NEGATIVE
PH UR STRIP.AUTO: 5.5 [PH] (ref 5–8)
PLATELET # BLD AUTO: 124 10*3/MM3 (ref 140–450)
PMV BLD AUTO: 9.7 FL (ref 6–12)
POC ALBUMIN: 3.4 G/L (ref 3.3–5.5)
POC ALT (SGPT): 25 U/L (ref 10–47)
POC AST (SGOT): 26 U/L (ref 11–38)
POC TOTAL BILIRUBIN: 0.6 MG/DL (ref 0.2–1.6)
POC TOTAL PROTEIN: 7.1 G/DL (ref 6.4–8.1)
POTASSIUM BLDA-SCNC: 3.6 MMOL/L (ref 3.6–5.1)
PROT UR QL STRIP: ABNORMAL
RBC # BLD AUTO: 4.32 10*6/MM3 (ref 4.14–5.8)
SODIUM BLD-SCNC: 143 MMOL/L (ref 128–145)
SP GR UR STRIP: >=1.03 (ref 1–1.03)
UROBILINOGEN UR QL STRIP: ABNORMAL
WBC NRBC COR # BLD AUTO: 7.82 10*3/MM3 (ref 3.4–10.8)

## 2024-07-03 PROCEDURE — 81003 URINALYSIS AUTO W/O SCOPE: CPT | Performed by: INTERNAL MEDICINE

## 2024-07-03 PROCEDURE — 25010000002 DEXAMETHASONE SODIUM PHOSPHATE 120 MG/30ML SOLUTION: Performed by: INTERNAL MEDICINE

## 2024-07-03 PROCEDURE — 25010000002 BEVACIZUMAB-BVZR 100 MG/4ML SOLUTION 4 ML VIAL: Performed by: INTERNAL MEDICINE

## 2024-07-03 PROCEDURE — 25010000002 PALONOSETRON 0.25 MG/5ML SOLUTION PREFILLED SYRINGE: Performed by: INTERNAL MEDICINE

## 2024-07-03 PROCEDURE — 85025 COMPLETE CBC W/AUTO DIFF WBC: CPT | Performed by: INTERNAL MEDICINE

## 2024-07-03 PROCEDURE — 80053 COMPREHEN METABOLIC PANEL: CPT

## 2024-07-03 PROCEDURE — 25810000003 SODIUM CHLORIDE 0.9 % SOLUTION 250 ML FLEX CONT: Performed by: INTERNAL MEDICINE

## 2024-07-03 PROCEDURE — 25010000002 FLUOROURACIL PER 500 MG: Performed by: INTERNAL MEDICINE

## 2024-07-03 PROCEDURE — 0 DEXTROSE 5 % SOLUTION 250 ML FLEX CONT: Performed by: INTERNAL MEDICINE

## 2024-07-03 PROCEDURE — 25010000002 BEVACIZUMAB-BVZR 400 MG/16ML SOLUTION 16 ML VIAL: Performed by: INTERNAL MEDICINE

## 2024-07-03 PROCEDURE — 0 DEXTROSE 5 % SOLUTION: Performed by: INTERNAL MEDICINE

## 2024-07-03 PROCEDURE — 25010000002 LEUCOVORIN CALCIUM PER 50 MG: Performed by: INTERNAL MEDICINE

## 2024-07-03 PROCEDURE — 25010000002 OXALIPLATIN PER 0.5 MG: Performed by: INTERNAL MEDICINE

## 2024-07-03 PROCEDURE — 25810000003 SODIUM CHLORIDE 0.9 % SOLUTION: Performed by: INTERNAL MEDICINE

## 2024-07-03 RX ORDER — DIPHENHYDRAMINE HYDROCHLORIDE 50 MG/ML
50 INJECTION INTRAMUSCULAR; INTRAVENOUS AS NEEDED
Status: CANCELLED | OUTPATIENT
Start: 2024-07-03

## 2024-07-03 RX ORDER — PALONOSETRON 0.05 MG/ML
0.25 INJECTION, SOLUTION INTRAVENOUS ONCE
Status: COMPLETED | OUTPATIENT
Start: 2024-07-03 | End: 2024-07-03

## 2024-07-03 RX ORDER — FAMOTIDINE 10 MG/ML
20 INJECTION, SOLUTION INTRAVENOUS AS NEEDED
Status: CANCELLED | OUTPATIENT
Start: 2024-07-03

## 2024-07-03 RX ORDER — DEXTROSE MONOHYDRATE 50 MG/ML
20 INJECTION, SOLUTION INTRAVENOUS ONCE
Status: CANCELLED | OUTPATIENT
Start: 2024-07-03

## 2024-07-03 RX ORDER — SODIUM CHLORIDE 9 MG/ML
20 INJECTION, SOLUTION INTRAVENOUS ONCE
Status: COMPLETED | OUTPATIENT
Start: 2024-07-03 | End: 2024-07-03

## 2024-07-03 RX ORDER — SODIUM CHLORIDE 9 MG/ML
20 INJECTION, SOLUTION INTRAVENOUS ONCE
Status: CANCELLED | OUTPATIENT
Start: 2024-07-03

## 2024-07-03 RX ORDER — PALONOSETRON 0.05 MG/ML
0.25 INJECTION, SOLUTION INTRAVENOUS ONCE
Status: CANCELLED | OUTPATIENT
Start: 2024-07-03

## 2024-07-03 RX ORDER — DEXTROSE MONOHYDRATE 50 MG/ML
20 INJECTION, SOLUTION INTRAVENOUS ONCE
Status: COMPLETED | OUTPATIENT
Start: 2024-07-03 | End: 2024-07-03

## 2024-07-03 RX ADMIN — DEXAMETHASONE SODIUM PHOSPHATE 12 MG: 4 INJECTION, SOLUTION INTRA-ARTICULAR; INTRALESIONAL; INTRAMUSCULAR; INTRAVENOUS; SOFT TISSUE at 09:19

## 2024-07-03 RX ADMIN — SODIUM CHLORIDE 20 ML/HR: 9 INJECTION, SOLUTION INTRAVENOUS at 09:17

## 2024-07-03 RX ADMIN — OXALIPLATIN 195 MG: 5 INJECTION, SOLUTION INTRAVENOUS at 10:18

## 2024-07-03 RX ADMIN — SODIUM CHLORIDE 480 MG: 9 INJECTION, SOLUTION INTRAVENOUS at 09:44

## 2024-07-03 RX ADMIN — DEXTROSE MONOHYDRATE 900 MG: 50 INJECTION, SOLUTION INTRAVENOUS at 10:18

## 2024-07-03 RX ADMIN — FLUOROURACIL 5450 MG: 50 INJECTION, SOLUTION INTRAVENOUS at 12:36

## 2024-07-03 RX ADMIN — DEXTROSE MONOHYDRATE 20 ML/HR: 50 INJECTION, SOLUTION INTRAVENOUS at 10:18

## 2024-07-03 RX ADMIN — PALONOSETRON 0.25 MG: 0.25 INJECTION, SOLUTION INTRAVENOUS at 09:17

## 2024-07-03 NOTE — PROGRESS NOTES
Pt here for C14D1 Zirabev and Folfox. Port accessed and flushed with good blood return noted. 10cc of blood wasted prior to specimen collection. Blood specimen obtained and sent to lab for processing per protocol. Pt has no new complaints at this time. Dr. Wagner notified about pt's urine having 2+ protein and other lab values. Dr. Wagner gave verbal orders to proceed with tx. Pt tolerated tx without any complications. Port flushed with saline and heparin prior to needle removal. Pt given AVS and d/c accompanied by spouse.

## 2024-07-05 ENCOUNTER — HOSPITAL ENCOUNTER (OUTPATIENT)
Dept: ONCOLOGY | Facility: HOSPITAL | Age: 64
Discharge: HOME | End: 2024-07-05
Admitting: INTERNAL MEDICINE
Payer: MEDICARE

## 2024-07-05 VITALS — HEART RATE: 88 BPM | DIASTOLIC BLOOD PRESSURE: 72 MMHG | SYSTOLIC BLOOD PRESSURE: 118 MMHG

## 2024-07-05 DIAGNOSIS — C78.6 METASTASIS TO PERITONEAL CAVITY: Primary | ICD-10-CM

## 2024-07-05 DIAGNOSIS — C20 RECTAL CANCER: ICD-10-CM

## 2024-07-05 PROCEDURE — 25010000002 HEPARIN LOCK FLUSH PER 10 UNITS: Performed by: INTERNAL MEDICINE

## 2024-07-05 RX ORDER — HEPARIN SODIUM (PORCINE) LOCK FLUSH IV SOLN 100 UNIT/ML 100 UNIT/ML
500 SOLUTION INTRAVENOUS AS NEEDED
Status: DISCONTINUED | OUTPATIENT
Start: 2024-07-05 | End: 2024-07-06 | Stop reason: HOSPADM

## 2024-07-05 RX ORDER — HEPARIN SODIUM (PORCINE) LOCK FLUSH IV SOLN 100 UNIT/ML 100 UNIT/ML
500 SOLUTION INTRAVENOUS AS NEEDED
OUTPATIENT
Start: 2024-07-05

## 2024-07-05 RX ORDER — SODIUM CHLORIDE 0.9 % (FLUSH) 0.9 %
20 SYRINGE (ML) INJECTION AS NEEDED
OUTPATIENT
Start: 2024-07-05

## 2024-07-05 RX ORDER — SODIUM CHLORIDE 0.9 % (FLUSH) 0.9 %
20 SYRINGE (ML) INJECTION AS NEEDED
Status: DISCONTINUED | OUTPATIENT
Start: 2024-07-05 | End: 2024-07-06 | Stop reason: HOSPADM

## 2024-07-05 RX ADMIN — Medication 20 ML: at 13:04

## 2024-07-05 RX ADMIN — HEPARIN 500 UNITS: 100 SYRINGE at 13:05

## 2024-07-17 ENCOUNTER — HOSPITAL ENCOUNTER (OUTPATIENT)
Dept: ONCOLOGY | Facility: HOSPITAL | Age: 64
Discharge: HOME | End: 2024-07-17
Admitting: INTERNAL MEDICINE
Payer: MEDICARE

## 2024-07-17 VITALS
DIASTOLIC BLOOD PRESSURE: 76 MMHG | WEIGHT: 220 LBS | HEIGHT: 76 IN | BODY MASS INDEX: 26.79 KG/M2 | OXYGEN SATURATION: 90 % | TEMPERATURE: 98 F | RESPIRATION RATE: 16 BRPM | HEART RATE: 80 BPM | SYSTOLIC BLOOD PRESSURE: 117 MMHG

## 2024-07-17 DIAGNOSIS — C20 RECTAL CANCER: Primary | ICD-10-CM

## 2024-07-17 DIAGNOSIS — C78.6 METASTASIS TO PERITONEAL CAVITY: ICD-10-CM

## 2024-07-17 LAB
ALP BLD-CCNC: 95 U/L (ref 53–128)
BASOPHILS # BLD AUTO: 0.06 10*3/MM3 (ref 0–0.2)
BASOPHILS NFR BLD AUTO: 1 % (ref 0–1.5)
BILIRUB UR QL STRIP: ABNORMAL
BUN BLDA-MCNC: 9 MG/DL (ref 7–22)
CALCIUM BLD QL: 9.6 MG/DL (ref 8–10.3)
CHLORIDE BLDA-SCNC: 104 MMOL/L (ref 98–108)
CLARITY UR: CLEAR
CO2 BLDA-SCNC: 28 MMOL/L (ref 18–33)
COLOR UR: YELLOW
CREAT BLDA-MCNC: 0.7 MG/DL (ref 0.6–1.2)
DEPRECATED RDW RBC AUTO: 53 FL (ref 37–54)
EGFRCR SERPLBLD CKD-EPI 2021: 104.2 ML/MIN/1.73
EOSINOPHIL # BLD AUTO: 0.12 10*3/MM3 (ref 0–0.4)
EOSINOPHIL NFR BLD AUTO: 1.9 % (ref 0.3–6.2)
ERYTHROCYTE [DISTWIDTH] IN BLOOD BY AUTOMATED COUNT: 16.3 % (ref 12.3–15.4)
GLUCOSE BLDC GLUCOMTR-MCNC: 263 MG/DL (ref 73–118)
GLUCOSE UR STRIP-MCNC: ABNORMAL MG/DL
HCT VFR BLD AUTO: 41.6 % (ref 37.5–51)
HGB BLD-MCNC: 13.9 G/DL (ref 13–17.7)
HGB UR QL STRIP.AUTO: NEGATIVE
KETONES UR QL STRIP: NEGATIVE
LEUKOCYTE ESTERASE UR QL STRIP.AUTO: NEGATIVE
LYMPHOCYTES # BLD AUTO: 1.92 10*3/MM3 (ref 0.7–3.1)
LYMPHOCYTES NFR BLD AUTO: 31.1 % (ref 19.6–45.3)
MCH RBC QN AUTO: 31.2 PG (ref 26.6–33)
MCHC RBC AUTO-ENTMCNC: 33.4 G/DL (ref 31.5–35.7)
MCV RBC AUTO: 93.5 FL (ref 79–97)
MONOCYTES # BLD AUTO: 0.76 10*3/MM3 (ref 0.1–0.9)
MONOCYTES NFR BLD AUTO: 12.3 % (ref 5–12)
NEUTROPHILS NFR BLD AUTO: 3.31 10*3/MM3 (ref 1.7–7)
NEUTROPHILS NFR BLD AUTO: 53.7 % (ref 42.7–76)
NITRITE UR QL STRIP: NEGATIVE
PH UR STRIP.AUTO: 5.5 [PH] (ref 5–8)
PLATELET # BLD AUTO: 137 10*3/MM3 (ref 140–450)
PMV BLD AUTO: 9.2 FL (ref 6–12)
POC ALBUMIN: 3.4 G/L (ref 3.3–5.5)
POC ALT (SGPT): 41 U/L (ref 10–47)
POC AST (SGOT): 43 U/L (ref 11–38)
POC TOTAL BILIRUBIN: 0.5 MG/DL (ref 0.2–1.6)
POC TOTAL PROTEIN: 6.9 G/DL (ref 6.4–8.1)
POTASSIUM BLDA-SCNC: 3.7 MMOL/L (ref 3.6–5.1)
PROT UR QL STRIP: ABNORMAL
RBC # BLD AUTO: 4.45 10*6/MM3 (ref 4.14–5.8)
SODIUM BLD-SCNC: 138 MMOL/L (ref 128–145)
SP GR UR STRIP: >=1.03 (ref 1–1.03)
UROBILINOGEN UR QL STRIP: ABNORMAL
WBC NRBC COR # BLD AUTO: 6.17 10*3/MM3 (ref 3.4–10.8)

## 2024-07-17 PROCEDURE — 85025 COMPLETE CBC W/AUTO DIFF WBC: CPT | Performed by: INTERNAL MEDICINE

## 2024-07-17 PROCEDURE — 25010000002 OXALIPLATIN PER 0.5 MG: Performed by: INTERNAL MEDICINE

## 2024-07-17 PROCEDURE — 96368 THER/DIAG CONCURRENT INF: CPT

## 2024-07-17 PROCEDURE — 0 DEXTROSE 5 % SOLUTION: Performed by: INTERNAL MEDICINE

## 2024-07-17 PROCEDURE — 96367 TX/PROPH/DG ADDL SEQ IV INF: CPT

## 2024-07-17 PROCEDURE — 25010000002 DEXAMETHASONE SODIUM PHOSPHATE 120 MG/30ML SOLUTION: Performed by: INTERNAL MEDICINE

## 2024-07-17 PROCEDURE — 25810000003 SODIUM CHLORIDE 0.9 % SOLUTION: Performed by: INTERNAL MEDICINE

## 2024-07-17 PROCEDURE — 96417 CHEMO IV INFUS EACH ADDL SEQ: CPT

## 2024-07-17 PROCEDURE — 25010000002 FLUOROURACIL PER 500 MG: Performed by: INTERNAL MEDICINE

## 2024-07-17 PROCEDURE — 81003 URINALYSIS AUTO W/O SCOPE: CPT | Performed by: INTERNAL MEDICINE

## 2024-07-17 PROCEDURE — 96416 CHEMO PROLONG INFUSE W/PUMP: CPT

## 2024-07-17 PROCEDURE — 25010000002 BEVACIZUMAB-BVZR 100 MG/4ML SOLUTION 4 ML VIAL: Performed by: INTERNAL MEDICINE

## 2024-07-17 PROCEDURE — 96375 TX/PRO/DX INJ NEW DRUG ADDON: CPT

## 2024-07-17 PROCEDURE — 25010000002 LEUCOVORIN CALCIUM PER 50 MG: Performed by: INTERNAL MEDICINE

## 2024-07-17 PROCEDURE — 25810000003 SODIUM CHLORIDE 0.9 % SOLUTION 250 ML FLEX CONT: Performed by: INTERNAL MEDICINE

## 2024-07-17 PROCEDURE — 25010000002 PALONOSETRON 0.25 MG/5ML SOLUTION PREFILLED SYRINGE: Performed by: INTERNAL MEDICINE

## 2024-07-17 PROCEDURE — 0 DEXTROSE 5 % SOLUTION 250 ML FLEX CONT: Performed by: INTERNAL MEDICINE

## 2024-07-17 PROCEDURE — 80053 COMPREHEN METABOLIC PANEL: CPT

## 2024-07-17 PROCEDURE — G0498 CHEMO EXTEND IV INFUS W/PUMP: HCPCS

## 2024-07-17 PROCEDURE — 96415 CHEMO IV INFUSION ADDL HR: CPT

## 2024-07-17 PROCEDURE — 96413 CHEMO IV INFUSION 1 HR: CPT

## 2024-07-17 PROCEDURE — 25010000002 BEVACIZUMAB-BVZR 400 MG/16ML SOLUTION 16 ML VIAL: Performed by: INTERNAL MEDICINE

## 2024-07-17 RX ORDER — DEXTROSE MONOHYDRATE 50 MG/ML
20 INJECTION, SOLUTION INTRAVENOUS ONCE
Status: COMPLETED | OUTPATIENT
Start: 2024-07-17 | End: 2024-07-17

## 2024-07-17 RX ORDER — FAMOTIDINE 10 MG/ML
20 INJECTION, SOLUTION INTRAVENOUS AS NEEDED
Status: CANCELLED | OUTPATIENT
Start: 2024-07-17

## 2024-07-17 RX ORDER — ATORVASTATIN CALCIUM 40 MG/1
40 TABLET, FILM COATED ORAL
Qty: 90 TABLET | Refills: 0 | Status: SHIPPED | OUTPATIENT
Start: 2024-07-17

## 2024-07-17 RX ORDER — DEXTROSE MONOHYDRATE 50 MG/ML
20 INJECTION, SOLUTION INTRAVENOUS ONCE
Status: CANCELLED | OUTPATIENT
Start: 2024-07-17

## 2024-07-17 RX ORDER — SODIUM CHLORIDE 9 MG/ML
20 INJECTION, SOLUTION INTRAVENOUS ONCE
Status: COMPLETED | OUTPATIENT
Start: 2024-07-17 | End: 2024-07-17

## 2024-07-17 RX ORDER — AMLODIPINE BESYLATE 10 MG/1
10 TABLET ORAL DAILY
Qty: 90 TABLET | Refills: 0 | Status: SHIPPED | OUTPATIENT
Start: 2024-07-17

## 2024-07-17 RX ORDER — METOPROLOL TARTRATE 50 MG/1
TABLET, FILM COATED ORAL
Qty: 540 TABLET | Refills: 0 | Status: SHIPPED | OUTPATIENT
Start: 2024-07-17

## 2024-07-17 RX ORDER — PALONOSETRON 0.05 MG/ML
0.25 INJECTION, SOLUTION INTRAVENOUS ONCE
Status: COMPLETED | OUTPATIENT
Start: 2024-07-17 | End: 2024-07-17

## 2024-07-17 RX ORDER — DIPHENHYDRAMINE HYDROCHLORIDE 50 MG/ML
50 INJECTION INTRAMUSCULAR; INTRAVENOUS AS NEEDED
Status: CANCELLED | OUTPATIENT
Start: 2024-07-17

## 2024-07-17 RX ADMIN — SODIUM CHLORIDE 20 ML/HR: 9 INJECTION, SOLUTION INTRAVENOUS at 08:50

## 2024-07-17 RX ADMIN — DEXAMETHASONE SODIUM PHOSPHATE 12 MG: 4 INJECTION, SOLUTION INTRA-ARTICULAR; INTRALESIONAL; INTRAMUSCULAR; INTRAVENOUS; SOFT TISSUE at 08:53

## 2024-07-17 RX ADMIN — FLUOROURACIL 5450 MG: 50 INJECTION, SOLUTION INTRAVENOUS at 12:36

## 2024-07-17 RX ADMIN — PALONOSETRON 0.25 MG: 0.25 INJECTION, SOLUTION INTRAVENOUS at 08:50

## 2024-07-17 RX ADMIN — LEUCOVORIN CALCIUM 900 MG: 350 INJECTION, POWDER, LYOPHILIZED, FOR SOLUTION INTRAMUSCULAR; INTRAVENOUS at 10:30

## 2024-07-17 RX ADMIN — OXALIPLATIN 195 MG: 5 INJECTION, SOLUTION INTRAVENOUS at 10:32

## 2024-07-17 RX ADMIN — DEXTROSE MONOHYDRATE 20 ML/HR: 50 INJECTION, SOLUTION INTRAVENOUS at 10:28

## 2024-07-17 RX ADMIN — SODIUM CHLORIDE 500 MG: 9 INJECTION, SOLUTION INTRAVENOUS at 09:55

## 2024-07-18 NOTE — PROGRESS NOTES
THORACIC SURGERY CLINIC FOLLOW  UP NOTE    REASON FOR VISIT: Left loculated pleural effusion s/p robot-assisted left decortication, partial parietal visceral pleurectomy.     Subjective   HISTORY OF PRESENTING ILLNESS:   Prashant Zhou is a 63-year-old male with a medical history of rectal cance s/p partial colectomy, , currently undergoing chemotherapy, diabetes mellitus type 2, hypertension, hyperlipidemia, thoracic aortic aneurysm secondary to hypertension.  He was seen by Dr. Wagner in February 2024 after CT scan of the chest which reported left lower lobe lung mass/ consolidation.  He was treated on outpatient basis with oral antibiotics.       He presented to [facility] on 3/9/2024 with complaints of shortness of breath, cough and congestion. He denied any fevers chills or diaphoresis.  He reported cough productive of yellow sputum.  His oxygen saturation was 81% in ED and was placed on 2 L oxygen high flow nasal cannula.  He denied using supplemental home oxygen.  CT scan of the chest was done which reported loculated gas and fluid collection within the left pleural space with associated thickening of the pleural wall, which likely represents empyema. Thoracic surgery was consulted for further evaluation.     His clinical presentation was concerning for left empyema thoracis. I discussed with him the option of conservative management versus surgery. He had a thick rind around the left lower lobe and I believed that conservative medical management with chest tube and tPA would not expand the lung. He agreed to proceed with surgery.    On 3/11/2024, he underwent left robotic assisted total decortication, partial parietal and partial vessel pleurectomy.  The left lower lobe and lingula was covered with fibrinous plug.  He had 500 cc of alma purulent fluid drained.  There were multiple pockets of alma pus in the left lower pleural cavity.  There was a fibrinous rind covering the lower half of  the chest.  The lung better expanded after complete decortication and pleurectomy.  He recovered well from the surgery.  He was discharged to rehab after chest tubes were removed.    He came to clinic for postop visit.  He was slowly improving.  I recommended follow-up with repeat CT scan of the chest in 3 months.  Repeat CT scan of the chest on 5/31/2022 for reported finding consistent with development of hepatic metastatic disease.  There was residual pleural parenchymal thickening in the posterolateral left mid to lower thorax likely related to scarring.  He came to clinic for follow-up visit.  He was in good spirits.  He reported exertional shortness of breath.  He denied fever, chills, rigors.    Past Medical History:   Diagnosis Date    Aortic dissection     Diabetes mellitus     Heart murmur     History of noncompliance with medical treatment     Hyperlipidemia     Hypertension     Type B hypoplasia of aortic arch        Past Surgical History:   Procedure Laterality Date    COLECTOMY PARTIAL / TOTAL  2023    COLONOSCOPY N/A 08/31/2023    Procedure: COLONOSCOPY with sigmoid mass tattooing at 35cm, sigmoid and rectal polypectomy;  Surgeon: TORIBIO Jacob MD;  Location: [facility] ENDOSCOPY;  Service: Gastroenterology;  Laterality: N/A;  sigmoid mass, colon polyps    THORACOSCOPY WITH DECORTICATION Left 3/11/2024    Procedure: THORACOSCOPY VIDEO ASSISTED WITH DECORTICATION WITH DAVINCI ROBOT;  Surgeon: Saqib Keller MD;  Location: [facility] MAIN OR;  Service: Robotics - DaVinci;  Laterality: Left;       Family History   Problem Relation Age of Onset    Diabetes Mother     Dementia Mother     Sudden death Father        Social History     Socioeconomic History    Marital status:    Tobacco Use    Smoking status: Every Day     Current packs/day: 2.00     Average packs/day: 2.0 packs/day for 53.5 years (107.1 ttl pk-yrs)     Types: Cigarettes     Start date: 1971    Smokeless tobacco: Never   Vaping Use    Vaping  status: Never Used   Substance and Sexual Activity    Alcohol use: No     Comment: Abstinent since around 2008    Drug use: Not Currently     Types: Marijuana     Comment: Abstinent since at least the 1980's    Sexual activity: Defer         Current Outpatient Medications:     Blood Glucose Monitoring Suppl (Accu-Chek Guide) w/Device kit, 1 Device Daily., Disp: 1 kit, Rfl: 0    Chlorcyclizine-Pseudoephed (Stahist AD) 25-60 MG tablet, Take 0.5 tablets by mouth 2 (Two) Times a Day As Needed (Congestion, drainage)., Disp: 42 tablet, Rfl: 0    cloNIDine (CATAPRES) 0.1 MG tablet, Take 1 tablet by mouth 2 (Two) Times a Day., Disp: 60 tablet, Rfl: 5    gabapentin (NEURONTIN) 300 MG capsule, TAKE 1 CAPSULE BY MOUTH 3 TIMES A DAY, Disp: 90 capsule, Rfl: 0    glimepiride (AMARYL) 4 MG tablet, TAKE 1 TABLET BY MOUTH TWICE A DAY, Disp: 180 tablet, Rfl: 1    glucose blood (Accu-Chek Guide) test strip, Test daily e11.9, Disp: 50 each, Rfl: 12    hydrALAZINE (APRESOLINE) 50 MG tablet, TAKE ONE TABLET BY MOUTH EVERY 8 HOURS, Disp: 90 tablet, Rfl: 1    lisinopril-hydrochlorothiazide (PRINZIDE,ZESTORETIC) 20-25 MG per tablet, TAKE ONE TABLET BY MOUTH DAILY, Disp: 90 tablet, Rfl: 0    metFORMIN (GLUCOPHAGE) 850 MG tablet, TAKE 1 TABLET BY MOUTH TWICE A DAY WITH MEALS, Disp: 144 tablet, Rfl: 1    oxyCODONE (ROXICODONE) 10 MG tablet, , Disp: , Rfl:     potassium chloride 10 MEQ CR tablet, Take 1 tablet by mouth Daily., Disp: , Rfl:     traMADol (ULTRAM) 50 MG tablet, TAKE 1 TABLET BY MOUTH EVERY 6 HOURS AS NEEDED FOR MODERATE PAIN, Disp: 28 tablet, Rfl: 0    vitamin D3 125 MCG (5000 UT) capsule capsule, Take 1 capsule by mouth Daily., Disp: , Rfl:     Xarelto 20 MG tablet, TAKE 1 TABLET BY MOUTH DAILY, Disp: 30 tablet, Rfl: 0    amLODIPine (NORVASC) 10 MG tablet, TAKE 1 TABLET BY MOUTH DAILY, Disp: 90 tablet, Rfl: 0    atorvastatin (LIPITOR) 40 MG tablet, TAKE 1 TABLET BY MOUTH EVERY NIGHT AT BEDTIME, Disp: 90 tablet, Rfl: 0     "metoprolol tartrate (LOPRESSOR) 50 MG tablet, TAKE THREE TABLETS BY MOUTH EVERY 12 HOURS, Disp: 540 tablet, Rfl: 0  No current facility-administered medications for this visit.    Facility-Administered Medications Ordered in Other Visits:     fluorouracil (ADRUCIL) 5,450 mg in sodium chloride 0.9 % 240 mL chemo infusion - FOR HOME USE, 2,400 mg/m2 (Treatment Plan Recorded), Intravenous, Once, Lars Wagner MD, 5,450 mg at 07/17/24 1236     No Known Allergies          Objective    OBJECTIVE:     VITAL SIGNS:  /60 (BP Location: Left arm, Patient Position: Sitting, Cuff Size: Adult)   Pulse 69   Ht 193 cm (75.98\")   Wt 97.7 kg (215 lb 6.4 oz)   SpO2 96%   BMI 26.23 kg/m²     PHYSICAL EXAM:  Normal appearance.   Head is normocephalic.   Nose appears normal.   No obvious deformity of the mouth and throat.  Conjunctivae normal.   Heart rate and rhythm is normal.  Pulmonary effort is normal.  Incision clean, dry, intact.  Abdomen is soft.   Moving all 4 extremities.  Extremities warm.  No focal deficit present.   He is alert and oriented to person, place, and time.     LAB RESULTS:  I have reviewed all the available laboratory results in the chart.    IMAGING REVIEW:  I have reviewed the patient's all relevant laboratory and imaging findings.     Chest X-ray from 03/17/2024.  Impression:  1.Stable atelectasis versus infiltration within the left mid to lower lung with small left pleural effusion.  2.Mild atelectasis within the lower right lobe.        ASSESSMENT & PLAN:  Prashant Zhou is a 63 y.o. male with significant medical conditions as mentioned above presented to my clinic.    Diagnosis: Left loculated pleural effusion s/p robot-assisted left decortication, partial parietal visceral pleurectomy.     His CT scan showed no significant finding concerning for recurrent effusion or empyema.  He has developed hepatic metastatic disease and will require systemic treatment.  He will follow-up with thoracic " surgery as needed in future.    I discussed the patients findings and my recommendations with the patient. The patient was given adequate time to ask questions and all questions were answered to patient satisfaction.      Saqib Keller MD  Thoracic Surgeon  [facility] [place]        Dictated utilizing Dragon dictation    I spent 30 minutes caring for Prashant on this date of service. This time includes time spent by me in the following activities: preparing for the visit, reviewing tests, obtaining and/or reviewing a separately obtained history, performing a medically appropriate examination and/or evaluation , counseling and educating the patient/family/caregiver, ordering medications, tests, or procedures, referring and communicating with other health care professionals , documenting information in the medical record, independently interpreting results and communicating that information with the patient/family/caregiver, and care coordination and more than half the time was spent in direct face to face evaluation and decision making.

## 2024-07-19 ENCOUNTER — HOSPITAL ENCOUNTER (OUTPATIENT)
Dept: ONCOLOGY | Facility: HOSPITAL | Age: 64
Discharge: HOME | End: 2024-07-19
Payer: MEDICARE

## 2024-07-19 VITALS — DIASTOLIC BLOOD PRESSURE: 82 MMHG | SYSTOLIC BLOOD PRESSURE: 127 MMHG | TEMPERATURE: 97.9 F | HEART RATE: 65 BPM

## 2024-07-19 DIAGNOSIS — C78.6 METASTASIS TO PERITONEAL CAVITY: Primary | ICD-10-CM

## 2024-07-19 DIAGNOSIS — C20 RECTAL CANCER: ICD-10-CM

## 2024-07-19 PROCEDURE — 25010000002 HEPARIN LOCK FLUSH PER 10 UNITS: Performed by: INTERNAL MEDICINE

## 2024-07-19 RX ORDER — HEPARIN SODIUM (PORCINE) LOCK FLUSH IV SOLN 100 UNIT/ML 100 UNIT/ML
500 SOLUTION INTRAVENOUS AS NEEDED
OUTPATIENT
Start: 2024-07-19

## 2024-07-19 RX ORDER — SODIUM CHLORIDE 0.9 % (FLUSH) 0.9 %
20 SYRINGE (ML) INJECTION AS NEEDED
OUTPATIENT
Start: 2024-07-19

## 2024-07-19 RX ORDER — HEPARIN SODIUM (PORCINE) LOCK FLUSH IV SOLN 100 UNIT/ML 100 UNIT/ML
500 SOLUTION INTRAVENOUS AS NEEDED
Status: DISCONTINUED | OUTPATIENT
Start: 2024-07-19 | End: 2024-07-20 | Stop reason: HOSPADM

## 2024-07-19 RX ORDER — SODIUM CHLORIDE 0.9 % (FLUSH) 0.9 %
20 SYRINGE (ML) INJECTION AS NEEDED
Status: DISCONTINUED | OUTPATIENT
Start: 2024-07-19 | End: 2024-07-20 | Stop reason: HOSPADM

## 2024-07-19 RX ADMIN — HEPARIN 500 UNITS: 100 SYRINGE at 10:40

## 2024-07-19 RX ADMIN — Medication 20 ML: at 10:40

## 2024-07-21 RX ORDER — GABAPENTIN 300 MG/1
300 CAPSULE ORAL 3 TIMES DAILY
Qty: 90 CAPSULE | Refills: 5 | Status: SHIPPED | OUTPATIENT
Start: 2024-07-21

## 2024-07-21 RX ORDER — RIVAROXABAN 20 MG/1
20 TABLET, FILM COATED ORAL DAILY
Qty: 90 TABLET | Refills: 1 | Status: SHIPPED | OUTPATIENT
Start: 2024-07-21

## 2024-07-24 ENCOUNTER — TELEPHONE (OUTPATIENT)
Dept: ONCOLOGY | Facility: CLINIC | Age: 64
End: 2024-07-24
Payer: MEDICARE

## 2024-07-24 NOTE — TELEPHONE ENCOUNTER
Called Pt to [facility] appt from 7/19, if Pt was not able to see Dr Wagner. Left v/m for call back to do so.

## 2024-07-25 ENCOUNTER — OFFICE VISIT (OUTPATIENT)
Dept: FAMILY MEDICINE CLINIC | Facility: CLINIC | Age: 64
End: 2024-07-25
Payer: MEDICARE

## 2024-07-25 ENCOUNTER — LAB (OUTPATIENT)
Dept: FAMILY MEDICINE CLINIC | Facility: CLINIC | Age: 64
End: 2024-07-25
Payer: MEDICARE

## 2024-07-25 VITALS
HEIGHT: 76 IN | DIASTOLIC BLOOD PRESSURE: 84 MMHG | BODY MASS INDEX: 26.35 KG/M2 | SYSTOLIC BLOOD PRESSURE: 139 MMHG | OXYGEN SATURATION: 95 % | HEART RATE: 79 BPM | WEIGHT: 216.4 LBS | TEMPERATURE: 98 F

## 2024-07-25 DIAGNOSIS — E11.65 TYPE 2 DIABETES MELLITUS WITH HYPERGLYCEMIA, WITHOUT LONG-TERM CURRENT USE OF INSULIN: Primary | ICD-10-CM

## 2024-07-25 DIAGNOSIS — I10 ESSENTIAL HYPERTENSION: ICD-10-CM

## 2024-07-25 LAB
ALBUMIN SERPL-MCNC: 4.5 G/DL (ref 3.5–5.2)
ALBUMIN UR-MCNC: 85.1 MG/DL
ALBUMIN/GLOB SERPL: 1.3 G/DL
ALP SERPL-CCNC: 110 U/L (ref 39–117)
ALT SERPL W P-5'-P-CCNC: 34 U/L (ref 1–41)
ANION GAP SERPL CALCULATED.3IONS-SCNC: 12 MMOL/L (ref 5–15)
AST SERPL-CCNC: 29 U/L (ref 1–40)
BILIRUB SERPL-MCNC: 0.4 MG/DL (ref 0–1.2)
BUN SERPL-MCNC: 11 MG/DL (ref 8–23)
BUN/CREAT SERPL: 18.3 (ref 7–25)
CALCIUM SPEC-SCNC: 10.5 MG/DL (ref 8.6–10.5)
CHLORIDE SERPL-SCNC: 100 MMOL/L (ref 98–107)
CHOLEST SERPL-MCNC: 121 MG/DL (ref 0–200)
CO2 SERPL-SCNC: 27 MMOL/L (ref 22–29)
CREAT SERPL-MCNC: 0.6 MG/DL (ref 0.76–1.27)
EGFRCR SERPLBLD CKD-EPI 2021: 109.1 ML/MIN/1.73
GLOBULIN UR ELPH-MCNC: 3.6 GM/DL
GLUCOSE SERPL-MCNC: 97 MG/DL (ref 65–99)
HBA1C MFR BLD: 7.9 % (ref 4.8–5.6)
HCV AB SER QL: NORMAL
HDLC SERPL-MCNC: 32 MG/DL (ref 40–60)
LDLC SERPL CALC-MCNC: 57 MG/DL (ref 0–100)
LDLC/HDLC SERPL: 1.58 {RATIO}
POTASSIUM SERPL-SCNC: 4.1 MMOL/L (ref 3.5–5.2)
PROT SERPL-MCNC: 8.1 G/DL (ref 6–8.5)
SODIUM SERPL-SCNC: 139 MMOL/L (ref 136–145)
TRIGL SERPL-MCNC: 193 MG/DL (ref 0–150)
VLDLC SERPL-MCNC: 32 MG/DL (ref 5–40)

## 2024-07-25 PROCEDURE — 3075F SYST BP GE 130 - 139MM HG: CPT | Performed by: FAMILY MEDICINE

## 2024-07-25 PROCEDURE — 80053 COMPREHEN METABOLIC PANEL: CPT | Performed by: FAMILY MEDICINE

## 2024-07-25 PROCEDURE — 36415 COLL VENOUS BLD VENIPUNCTURE: CPT | Performed by: FAMILY MEDICINE

## 2024-07-25 PROCEDURE — 3051F HG A1C>EQUAL 7.0%<8.0%: CPT | Performed by: FAMILY MEDICINE

## 2024-07-25 PROCEDURE — 80061 LIPID PANEL: CPT | Performed by: FAMILY MEDICINE

## 2024-07-25 PROCEDURE — 82043 UR ALBUMIN QUANTITATIVE: CPT | Performed by: FAMILY MEDICINE

## 2024-07-25 PROCEDURE — G0009 ADMIN PNEUMOCOCCAL VACCINE: HCPCS | Performed by: FAMILY MEDICINE

## 2024-07-25 PROCEDURE — 90677 PCV20 VACCINE IM: CPT | Performed by: FAMILY MEDICINE

## 2024-07-25 PROCEDURE — 3079F DIAST BP 80-89 MM HG: CPT | Performed by: FAMILY MEDICINE

## 2024-07-25 PROCEDURE — 83036 HEMOGLOBIN GLYCOSYLATED A1C: CPT | Performed by: FAMILY MEDICINE

## 2024-07-25 PROCEDURE — 1126F AMNT PAIN NOTED NONE PRSNT: CPT | Performed by: FAMILY MEDICINE

## 2024-07-25 PROCEDURE — 99214 OFFICE O/P EST MOD 30 MIN: CPT | Performed by: FAMILY MEDICINE

## 2024-07-25 PROCEDURE — 86803 HEPATITIS C AB TEST: CPT | Performed by: FAMILY MEDICINE

## 2024-07-25 RX ORDER — CLONIDINE HYDROCHLORIDE 0.1 MG/1
0.1 TABLET ORAL 2 TIMES DAILY
Qty: 180 TABLET | Refills: 1 | Status: SHIPPED | OUTPATIENT
Start: 2024-07-25

## 2024-07-25 NOTE — PROGRESS NOTES
"Subjective   Prashant Zhou is a 63 y.o. male.     History of Present Illness  Prashant Zhou is in for follow up on his diabetes and high blood pressure. There is no history of chest pain or dyspnea. There is no history of issue with bowel or bladder dysfunction. There is no history of dizziness or lightheadedness. There is no history of issue with sleep or mood. There is no history of issue with present medication.       Diabetes  Pertinent negatives for hypoglycemia include no dizziness or nervousness/anxiousness. Pertinent negatives for diabetes include no chest pain, no fatigue and no weakness.          /84 (BP Location: Left arm, Patient Position: Sitting, Cuff Size: Large Adult)   Pulse 79   Temp 98 °F (36.7 °C) (Oral)   Ht 193 cm (75.98\")   Wt 98.2 kg (216 lb 6.4 oz)   SpO2 95%   BMI 26.35 kg/m²       Chief Complaint   Patient presents with    Diabetes     6 month f/u & Medicare Wellness due after 01/26/25 - nothing to eat since 1 am protein shake and forgot to take pills this morning     Med Refill     Last refill was only given 4 days worth clondine ? Is he supposed to be off of it ? He has been off for a month now.            Current Outpatient Medications:     amLODIPine (NORVASC) 10 MG tablet, TAKE 1 TABLET BY MOUTH DAILY, Disp: 90 tablet, Rfl: 0    atorvastatin (LIPITOR) 40 MG tablet, TAKE 1 TABLET BY MOUTH EVERY NIGHT AT BEDTIME, Disp: 90 tablet, Rfl: 0    Blood Glucose Monitoring Suppl (Accu-Chek Guide) w/Device kit, 1 Device Daily., Disp: 1 kit, Rfl: 0    Chlorcyclizine-Pseudoephed (Stahist AD) 25-60 MG tablet, Take 0.5 tablets by mouth 2 (Two) Times a Day As Needed (Congestion, drainage)., Disp: 42 tablet, Rfl: 0    cloNIDine (CATAPRES) 0.1 MG tablet, Take 1 tablet by mouth 2 (Two) Times a Day., Disp: 180 tablet, Rfl: 1    gabapentin (NEURONTIN) 300 MG capsule, TAKE 1 CAPSULE BY MOUTH 3 TIMES A DAY, Disp: 90 capsule, Rfl: 5    glimepiride (AMARYL) 4 MG tablet, TAKE 1 TABLET BY " MOUTH TWICE A DAY, Disp: 180 tablet, Rfl: 1    glucose blood (Accu-Chek Guide) test strip, Test daily e11.9, Disp: 50 each, Rfl: 12    hydrALAZINE (APRESOLINE) 50 MG tablet, TAKE ONE TABLET BY MOUTH EVERY 8 HOURS, Disp: 90 tablet, Rfl: 1    lisinopril-hydrochlorothiazide (PRINZIDE,ZESTORETIC) 20-25 MG per tablet, TAKE ONE TABLET BY MOUTH DAILY, Disp: 90 tablet, Rfl: 0    metFORMIN (GLUCOPHAGE) 850 MG tablet, TAKE 1 TABLET BY MOUTH TWICE A DAY WITH MEALS, Disp: 144 tablet, Rfl: 1    metoprolol tartrate (LOPRESSOR) 50 MG tablet, TAKE THREE TABLETS BY MOUTH EVERY 12 HOURS, Disp: 540 tablet, Rfl: 0    oxyCODONE (ROXICODONE) 10 MG tablet, , Disp: , Rfl:     potassium chloride 10 MEQ CR tablet, Take 1 tablet by mouth Daily., Disp: , Rfl:     rivaroxaban (Xarelto) 20 MG tablet, TAKE 1 TABLET BY MOUTH DAILY, Disp: 90 tablet, Rfl: 1    traMADol (ULTRAM) 50 MG tablet, TAKE 1 TABLET BY MOUTH EVERY 6 HOURS AS NEEDED FOR MODERATE PAIN, Disp: 28 tablet, Rfl: 0    vitamin D3 125 MCG (5000 UT) capsule capsule, Take 1 capsule by mouth Daily., Disp: , Rfl:             The following portions of the patient's history were reviewed and updated as appropriate: allergies, current medications, past family history, past medical history, past social history, past surgical history, and problem list.    Review of Systems   Constitutional:  Negative for activity change, fatigue and fever.   HENT:  Negative for congestion, sinus pressure, sinus pain, sore throat and trouble swallowing.    Eyes:  Negative for visual disturbance.   Respiratory:  Negative for chest tightness, shortness of breath and wheezing.    Cardiovascular:  Negative for chest pain.   Gastrointestinal:  Negative for abdominal distention, abdominal pain, constipation, diarrhea, nausea and vomiting.   Genitourinary:  Negative for difficulty urinating, dysuria, frequency and urgency.   Musculoskeletal:  Positive for arthralgias. Negative for back pain and neck pain.    Neurological:  Negative for dizziness, weakness, light-headedness and numbness.   Psychiatric/Behavioral:  Positive for sleep disturbance. Negative for agitation, hallucinations and suicidal ideas. The patient is not nervous/anxious.        Objective   Physical Exam  Vitals and nursing note reviewed.   Constitutional:       Appearance: Normal appearance.   HENT:      Right Ear: Tympanic membrane and ear canal normal.      Left Ear: Tympanic membrane and ear canal normal.   Cardiovascular:      Rate and Rhythm: Normal rate and regular rhythm.      Heart sounds: Normal heart sounds. No murmur heard.  Pulmonary:      Effort: Pulmonary effort is normal.      Breath sounds: No wheezing or rales.   Abdominal:      General: Bowel sounds are normal.      Palpations: Abdomen is soft.      Tenderness: There is no abdominal tenderness. There is no guarding or rebound.      Hernia: No hernia is present.   Musculoskeletal:      Cervical back: Neck supple.      Right lower leg: No edema.      Left lower leg: No edema.   Lymphadenopathy:      Cervical: No cervical adenopathy.   Neurological:      Mental Status: He is alert and oriented to person, place, and time. Mental status is at baseline.   Psychiatric:         Mood and Affect: Mood normal.           Assessment & Plan   Problems Addressed this Visit          Endocrine and Metabolic    Type 2 diabetes mellitus with hyperglycemia - Primary    Relevant Orders    Hepatitis C Antibody (Completed)    Lipid Panel (Completed)    MicroAlbumin, Urine, Random - Urine, Clean Catch (Completed)    Comprehensive Metabolic Panel (Completed)    Hemoglobin A1c (Completed)     Other Visit Diagnoses       Essential hypertension        Relevant Medications    cloNIDine (CATAPRES) 0.1 MG tablet          Diagnoses         Codes Comments    Type 2 diabetes mellitus with hyperglycemia, without long-term current use of insulin    -  Primary ICD-10-CM: E11.65  ICD-9-CM: 250.00, 790.29     Essential  hypertension     ICD-10-CM: I10  ICD-9-CM: 401.9           I will update his pneumonia vaccine today  I will update labs and adjust his plan as indicated  We discussed diet and exercise changes  I asked him to see me again in a few months

## 2024-07-31 ENCOUNTER — HOSPITAL ENCOUNTER (OUTPATIENT)
Dept: ONCOLOGY | Facility: HOSPITAL | Age: 64
Discharge: HOME | End: 2024-07-31
Admitting: INTERNAL MEDICINE
Payer: MEDICARE

## 2024-07-31 VITALS
BODY MASS INDEX: 26.67 KG/M2 | HEART RATE: 70 BPM | WEIGHT: 219 LBS | DIASTOLIC BLOOD PRESSURE: 76 MMHG | HEIGHT: 76 IN | SYSTOLIC BLOOD PRESSURE: 124 MMHG | OXYGEN SATURATION: 94 % | TEMPERATURE: 98 F | RESPIRATION RATE: 16 BRPM

## 2024-07-31 DIAGNOSIS — C78.6 METASTASIS TO PERITONEAL CAVITY: ICD-10-CM

## 2024-07-31 DIAGNOSIS — C20 RECTAL CANCER: Primary | ICD-10-CM

## 2024-07-31 DIAGNOSIS — R80.9 PROTEINURIA, UNSPECIFIED TYPE: ICD-10-CM

## 2024-07-31 LAB
ALBUMIN SERPL-MCNC: 4.2 G/DL (ref 3.5–5.2)
ALBUMIN/GLOB SERPL: 1.3 G/DL
ALP BLD-CCNC: 105 U/L (ref 53–128)
ALP SERPL-CCNC: 114 U/L (ref 39–117)
ALT SERPL W P-5'-P-CCNC: 32 U/L (ref 1–41)
ANION GAP SERPL CALCULATED.3IONS-SCNC: 11.4 MMOL/L (ref 5–15)
AST SERPL-CCNC: 32 U/L (ref 1–40)
BASOPHILS # BLD AUTO: 0.05 10*3/MM3 (ref 0–0.2)
BASOPHILS NFR BLD AUTO: 0.7 % (ref 0–1.5)
BILIRUB SERPL-MCNC: 0.4 MG/DL (ref 0–1.2)
BILIRUB UR QL STRIP: ABNORMAL
BUN BLDA-MCNC: 8 MG/DL (ref 7–22)
BUN SERPL-MCNC: 8 MG/DL (ref 8–23)
BUN/CREAT SERPL: 15.1 (ref 7–25)
CALCIUM BLD QL: 9.8 MG/DL (ref 8–10.3)
CALCIUM SPEC-SCNC: 9.8 MG/DL (ref 8.6–10.5)
CEA SERPL-MCNC: 3.24 NG/ML
CHLORIDE BLDA-SCNC: 104 MMOL/L (ref 98–108)
CHLORIDE SERPL-SCNC: 102 MMOL/L (ref 98–107)
CLARITY UR: ABNORMAL
CO2 BLDA-SCNC: 26 MMOL/L (ref 18–33)
CO2 SERPL-SCNC: 25.6 MMOL/L (ref 22–29)
COLOR UR: ABNORMAL
CREAT BLDA-MCNC: 0.5 MG/DL (ref 0.6–1.2)
CREAT SERPL-MCNC: 0.53 MG/DL (ref 0.76–1.27)
DEPRECATED RDW RBC AUTO: 57.6 FL (ref 37–54)
EGFRCR SERPLBLD CKD-EPI 2021: 113.3 ML/MIN/1.73
EGFRCR SERPLBLD CKD-EPI 2021: 115.3 ML/MIN/1.73
EOSINOPHIL # BLD AUTO: 0.12 10*3/MM3 (ref 0–0.4)
EOSINOPHIL NFR BLD AUTO: 1.7 % (ref 0.3–6.2)
ERYTHROCYTE [DISTWIDTH] IN BLOOD BY AUTOMATED COUNT: 16.9 % (ref 12.3–15.4)
GLOBULIN UR ELPH-MCNC: 3.2 GM/DL
GLUCOSE BLDC GLUCOMTR-MCNC: 216 MG/DL (ref 73–118)
GLUCOSE SERPL-MCNC: 192 MG/DL (ref 65–99)
GLUCOSE UR STRIP-MCNC: ABNORMAL MG/DL
HCT VFR BLD AUTO: 40.4 % (ref 37.5–51)
HGB BLD-MCNC: 13.1 G/DL (ref 13–17.7)
HGB UR QL STRIP.AUTO: NEGATIVE
KETONES UR QL STRIP: ABNORMAL
LEUKOCYTE ESTERASE UR QL STRIP.AUTO: NEGATIVE
LYMPHOCYTES # BLD AUTO: 1.88 10*3/MM3 (ref 0.7–3.1)
LYMPHOCYTES NFR BLD AUTO: 26.4 % (ref 19.6–45.3)
MCH RBC QN AUTO: 31.2 PG (ref 26.6–33)
MCHC RBC AUTO-ENTMCNC: 32.4 G/DL (ref 31.5–35.7)
MCV RBC AUTO: 96.2 FL (ref 79–97)
MONOCYTES # BLD AUTO: 0.82 10*3/MM3 (ref 0.1–0.9)
MONOCYTES NFR BLD AUTO: 11.5 % (ref 5–12)
NEUTROPHILS NFR BLD AUTO: 4.24 10*3/MM3 (ref 1.7–7)
NEUTROPHILS NFR BLD AUTO: 59.7 % (ref 42.7–76)
NITRITE UR QL STRIP: NEGATIVE
PH UR STRIP.AUTO: 5.5 [PH] (ref 5–8)
PLATELET # BLD AUTO: 118 10*3/MM3 (ref 140–450)
PMV BLD AUTO: 9.5 FL (ref 6–12)
POC ALBUMIN: 3.4 G/L (ref 3.3–5.5)
POC ALT (SGPT): 31 U/L (ref 10–47)
POC AST (SGOT): 33 U/L (ref 11–38)
POC TOTAL BILIRUBIN: 0.7 MG/DL (ref 0.2–1.6)
POC TOTAL PROTEIN: 7.2 G/DL (ref 6.4–8.1)
POTASSIUM BLDA-SCNC: 3.2 MMOL/L (ref 3.6–5.1)
POTASSIUM SERPL-SCNC: 3.4 MMOL/L (ref 3.5–5.2)
PROT SERPL-MCNC: 7.4 G/DL (ref 6–8.5)
PROT UR QL STRIP: ABNORMAL
RBC # BLD AUTO: 4.2 10*6/MM3 (ref 4.14–5.8)
SODIUM BLD-SCNC: 145 MMOL/L (ref 128–145)
SODIUM SERPL-SCNC: 139 MMOL/L (ref 136–145)
SP GR UR STRIP: >=1.03 (ref 1–1.03)
UROBILINOGEN UR QL STRIP: ABNORMAL
WBC NRBC COR # BLD AUTO: 7.11 10*3/MM3 (ref 3.4–10.8)

## 2024-07-31 PROCEDURE — G0498 CHEMO EXTEND IV INFUS W/PUMP: HCPCS

## 2024-07-31 PROCEDURE — 82378 CARCINOEMBRYONIC ANTIGEN: CPT | Performed by: INTERNAL MEDICINE

## 2024-07-31 PROCEDURE — 25010000002 LEUCOVORIN 200 MG RECONSTITUTED SOLUTION 200 MG VIAL: Performed by: INTERNAL MEDICINE

## 2024-07-31 PROCEDURE — 81003 URINALYSIS AUTO W/O SCOPE: CPT | Performed by: INTERNAL MEDICINE

## 2024-07-31 PROCEDURE — 96375 TX/PRO/DX INJ NEW DRUG ADDON: CPT

## 2024-07-31 PROCEDURE — 96415 CHEMO IV INFUSION ADDL HR: CPT

## 2024-07-31 PROCEDURE — 25010000002 BEVACIZUMAB-BVZR 400 MG/16ML SOLUTION 16 ML VIAL: Performed by: INTERNAL MEDICINE

## 2024-07-31 PROCEDURE — 0 DEXTROSE 5 % SOLUTION: Performed by: INTERNAL MEDICINE

## 2024-07-31 PROCEDURE — 96366 THER/PROPH/DIAG IV INF ADDON: CPT

## 2024-07-31 PROCEDURE — 80053 COMPREHEN METABOLIC PANEL: CPT

## 2024-07-31 PROCEDURE — 85025 COMPLETE CBC W/AUTO DIFF WBC: CPT | Performed by: INTERNAL MEDICINE

## 2024-07-31 PROCEDURE — 25810000003 SODIUM CHLORIDE 0.9 % SOLUTION: Performed by: INTERNAL MEDICINE

## 2024-07-31 PROCEDURE — 96417 CHEMO IV INFUS EACH ADDL SEQ: CPT

## 2024-07-31 PROCEDURE — 80053 COMPREHEN METABOLIC PANEL: CPT | Performed by: INTERNAL MEDICINE

## 2024-07-31 PROCEDURE — 25010000002 OXALIPLATIN PER 0.5 MG: Performed by: INTERNAL MEDICINE

## 2024-07-31 PROCEDURE — 25010000002 DEXAMETHASONE SODIUM PHOSPHATE 120 MG/30ML SOLUTION: Performed by: INTERNAL MEDICINE

## 2024-07-31 PROCEDURE — 96367 TX/PROPH/DG ADDL SEQ IV INF: CPT

## 2024-07-31 PROCEDURE — 25010000002 PALONOSETRON 0.25 MG/5ML SOLUTION PREFILLED SYRINGE: Performed by: INTERNAL MEDICINE

## 2024-07-31 PROCEDURE — 25810000003 SODIUM CHLORIDE 0.9 % SOLUTION 250 ML FLEX CONT: Performed by: INTERNAL MEDICINE

## 2024-07-31 PROCEDURE — 96413 CHEMO IV INFUSION 1 HR: CPT

## 2024-07-31 PROCEDURE — 0 DEXTROSE 5 % SOLUTION 250 ML FLEX CONT: Performed by: INTERNAL MEDICINE

## 2024-07-31 PROCEDURE — 25010000002 BEVACIZUMAB-BVZR 100 MG/4ML SOLUTION 4 ML VIAL: Performed by: INTERNAL MEDICINE

## 2024-07-31 PROCEDURE — 96368 THER/DIAG CONCURRENT INF: CPT

## 2024-07-31 PROCEDURE — 25010000002 FLUOROURACIL PER 500 MG: Performed by: INTERNAL MEDICINE

## 2024-07-31 PROCEDURE — 25010000002 LEUCOVORIN 500 MG RECONSTITUTED SOLUTION 1 EACH VIAL: Performed by: INTERNAL MEDICINE

## 2024-07-31 RX ORDER — DEXTROSE MONOHYDRATE 50 MG/ML
20 INJECTION, SOLUTION INTRAVENOUS ONCE
Status: CANCELLED | OUTPATIENT
Start: 2024-07-31

## 2024-07-31 RX ORDER — SODIUM CHLORIDE 9 MG/ML
20 INJECTION, SOLUTION INTRAVENOUS ONCE
Status: COMPLETED | OUTPATIENT
Start: 2024-07-31 | End: 2024-07-31

## 2024-07-31 RX ORDER — PALONOSETRON 0.05 MG/ML
0.25 INJECTION, SOLUTION INTRAVENOUS ONCE
Status: CANCELLED | OUTPATIENT
Start: 2024-07-31

## 2024-07-31 RX ORDER — FAMOTIDINE 10 MG/ML
20 INJECTION, SOLUTION INTRAVENOUS AS NEEDED
Status: CANCELLED | OUTPATIENT
Start: 2024-07-31

## 2024-07-31 RX ORDER — DEXTROSE MONOHYDRATE 50 MG/ML
20 INJECTION, SOLUTION INTRAVENOUS ONCE
Status: COMPLETED | OUTPATIENT
Start: 2024-07-31 | End: 2024-07-31

## 2024-07-31 RX ORDER — DIPHENHYDRAMINE HYDROCHLORIDE 50 MG/ML
50 INJECTION INTRAMUSCULAR; INTRAVENOUS AS NEEDED
Status: CANCELLED | OUTPATIENT
Start: 2024-07-31

## 2024-07-31 RX ORDER — PALONOSETRON 0.05 MG/ML
0.25 INJECTION, SOLUTION INTRAVENOUS ONCE
Status: COMPLETED | OUTPATIENT
Start: 2024-07-31 | End: 2024-07-31

## 2024-07-31 RX ORDER — SODIUM CHLORIDE 9 MG/ML
20 INJECTION, SOLUTION INTRAVENOUS ONCE
Status: CANCELLED | OUTPATIENT
Start: 2024-07-31

## 2024-07-31 RX ADMIN — FLUOROURACIL 5450 MG: 50 INJECTION, SOLUTION INTRAVENOUS at 12:28

## 2024-07-31 RX ADMIN — DEXTROSE MONOHYDRATE 20 ML/HR: 50 INJECTION, SOLUTION INTRAVENOUS at 10:07

## 2024-07-31 RX ADMIN — LEUCOVORIN CALCIUM 900 MG: 500 INJECTION, POWDER, LYOPHILIZED, FOR SOLUTION INTRAMUSCULAR; INTRAVENOUS at 10:09

## 2024-07-31 RX ADMIN — DEXAMETHASONE SODIUM PHOSPHATE 12 MG: 4 INJECTION, SOLUTION INTRA-ARTICULAR; INTRALESIONAL; INTRAMUSCULAR; INTRAVENOUS; SOFT TISSUE at 09:09

## 2024-07-31 RX ADMIN — OXALIPLATIN 195 MG: 5 INJECTION, SOLUTION INTRAVENOUS at 10:13

## 2024-07-31 RX ADMIN — SODIUM CHLORIDE 20 ML/HR: 9 INJECTION, SOLUTION INTRAVENOUS at 09:04

## 2024-07-31 RX ADMIN — PALONOSETRON 0.25 MG: 0.25 INJECTION, SOLUTION INTRAVENOUS at 09:04

## 2024-07-31 RX ADMIN — SODIUM CHLORIDE 500 MG: 9 INJECTION, SOLUTION INTRAVENOUS at 09:32

## 2024-07-31 NOTE — PROGRESS NOTES
Patient is here for C16D1 Zirabev and FOLFOX. Port accessed and flushed with good blood return noted. 10cc of blood wasted prior to specimen collection. Blood specimen obtained and sent to lab for processing per protocol.  Dr. Wagenr sent: Patient is here for C16D1 zirabev and FOLFOX. Patient stated he has no new complaints today. Patients CBC is in parameters. Patient's urine is NOT in parameters- his urine protein went up to 3+(>300mg/dl) in protein today where he as been running 2+ (>100mg/dl). His JOSE labs are in parameters but his potassium came back low at 3.2 so was going to double check that at the hospital to make sure. Is he ok for treatment today with his urine protein? If he is ok for treatment can you please sign the treatment plan? CBC, JOSE and urine results sent via secure chat. Dr. Wagner responded: please proceed. we need to send a 24 hour urine collection to measure protein please. Patient verbalized understanding for the 24 hour urine. Patient tolerated treatment and was discharged with AVS.

## 2024-08-01 ENCOUNTER — TELEPHONE (OUTPATIENT)
Dept: FAMILY MEDICINE CLINIC | Facility: CLINIC | Age: 64
End: 2024-08-01

## 2024-08-01 NOTE — TELEPHONE ENCOUNTER
Caller: Prashant Zhou    Relationship: Self    Best call back number: [phone]     What is the best time to reach you: ANYTIME     Who are you requesting to speak with (clinical staff, provider,  specific staff member): CLINICAL OR PROVIDER     What was the call regarding: PATIENT NEEDS A PHONE CALL BACK AS SOON AS POSSIBLE. TRYING TO SCHEDULE AN APPOINTMENT.     Is it okay if the provider responds through MyChart: NO, PHONE CALL

## 2024-08-02 ENCOUNTER — HOSPITAL ENCOUNTER (OUTPATIENT)
Dept: ONCOLOGY | Facility: HOSPITAL | Age: 64
Discharge: HOME | End: 2024-08-02
Payer: MEDICARE

## 2024-08-02 DIAGNOSIS — C78.6 METASTASIS TO PERITONEAL CAVITY: Primary | ICD-10-CM

## 2024-08-02 DIAGNOSIS — C20 RECTAL CANCER: ICD-10-CM

## 2024-08-02 PROCEDURE — 25010000002 HEPARIN LOCK FLUSH PER 10 UNITS: Performed by: INTERNAL MEDICINE

## 2024-08-02 RX ORDER — HEPARIN SODIUM (PORCINE) LOCK FLUSH IV SOLN 100 UNIT/ML 100 UNIT/ML
500 SOLUTION INTRAVENOUS AS NEEDED
Status: DISCONTINUED | OUTPATIENT
Start: 2024-08-02 | End: 2024-08-03 | Stop reason: HOSPADM

## 2024-08-02 RX ORDER — SODIUM CHLORIDE 0.9 % (FLUSH) 0.9 %
20 SYRINGE (ML) INJECTION AS NEEDED
Status: DISCONTINUED | OUTPATIENT
Start: 2024-08-02 | End: 2024-08-03 | Stop reason: HOSPADM

## 2024-08-02 RX ORDER — HEPARIN SODIUM (PORCINE) LOCK FLUSH IV SOLN 100 UNIT/ML 100 UNIT/ML
500 SOLUTION INTRAVENOUS AS NEEDED
OUTPATIENT
Start: 2024-08-02

## 2024-08-02 RX ORDER — SODIUM CHLORIDE 0.9 % (FLUSH) 0.9 %
20 SYRINGE (ML) INJECTION AS NEEDED
OUTPATIENT
Start: 2024-08-02

## 2024-08-02 RX ADMIN — HEPARIN 500 UNITS: 100 SYRINGE at 13:31

## 2024-08-02 RX ADMIN — Medication 20 ML: at 13:30

## 2024-08-02 NOTE — PROGRESS NOTES
Pt to clinic for CIV d/c.  5FU pump empty. Port aspirated, positive blood return noted. Port flushed with 10mL NS and Heparin 500units, then de-accessed.  Pt discharged without issue.

## 2024-08-08 ENCOUNTER — LAB (OUTPATIENT)
Dept: LAB | Facility: HOSPITAL | Age: 64
End: 2024-08-08
Payer: MEDICARE

## 2024-08-08 DIAGNOSIS — R80.9 PROTEINURIA, UNSPECIFIED TYPE: ICD-10-CM

## 2024-08-08 DIAGNOSIS — C20 RECTAL CANCER: ICD-10-CM

## 2024-08-08 LAB
COLLECT DURATION TIME UR: 24 HRS
PROT 24H UR-MRATE: 825.5 MG/24HOURS (ref 0–150)
SPECIMEN VOL 24H UR: 1300 ML

## 2024-08-08 PROCEDURE — 81050 URINALYSIS VOLUME MEASURE: CPT | Performed by: INTERNAL MEDICINE

## 2024-08-08 PROCEDURE — 84156 ASSAY OF PROTEIN URINE: CPT | Performed by: INTERNAL MEDICINE

## 2024-08-14 ENCOUNTER — HOSPITAL ENCOUNTER (OUTPATIENT)
Dept: ONCOLOGY | Facility: HOSPITAL | Age: 64
Discharge: HOME | End: 2024-08-14
Payer: MEDICARE

## 2024-08-14 VITALS
HEIGHT: 76 IN | WEIGHT: 218.4 LBS | HEART RATE: 80 BPM | OXYGEN SATURATION: 94 % | TEMPERATURE: 98.2 F | SYSTOLIC BLOOD PRESSURE: 138 MMHG | RESPIRATION RATE: 18 BRPM | BODY MASS INDEX: 26.59 KG/M2 | DIASTOLIC BLOOD PRESSURE: 79 MMHG

## 2024-08-14 DIAGNOSIS — C20 RECTAL CANCER: Primary | ICD-10-CM

## 2024-08-14 DIAGNOSIS — C78.6 METASTASIS TO PERITONEAL CAVITY: ICD-10-CM

## 2024-08-14 LAB
ALP BLD-CCNC: 113 U/L (ref 53–128)
BASOPHILS # BLD AUTO: 0.05 10*3/MM3 (ref 0–0.2)
BASOPHILS NFR BLD AUTO: 0.7 % (ref 0–1.5)
BILIRUB UR QL STRIP: NEGATIVE
BUN BLDA-MCNC: 10 MG/DL (ref 7–22)
CALCIUM BLD QL: 10.8 MG/DL (ref 8–10.3)
CHLORIDE BLDA-SCNC: 102 MMOL/L (ref 98–108)
CLARITY UR: CLEAR
CO2 BLDA-SCNC: 29 MMOL/L (ref 18–33)
COLOR UR: YELLOW
CREAT BLDA-MCNC: 0.4 MG/DL (ref 0.6–1.2)
DEPRECATED RDW RBC AUTO: 56.8 FL (ref 37–54)
EGFRCR SERPLBLD CKD-EPI 2021: 123.4 ML/MIN/1.73
EOSINOPHIL # BLD AUTO: 0.16 10*3/MM3 (ref 0–0.4)
EOSINOPHIL NFR BLD AUTO: 2.4 % (ref 0.3–6.2)
ERYTHROCYTE [DISTWIDTH] IN BLOOD BY AUTOMATED COUNT: 16.5 % (ref 12.3–15.4)
GLUCOSE BLDC GLUCOMTR-MCNC: 164 MG/DL (ref 73–118)
GLUCOSE UR STRIP-MCNC: ABNORMAL MG/DL
HCT VFR BLD AUTO: 40.6 % (ref 37.5–51)
HGB BLD-MCNC: 13.6 G/DL (ref 13–17.7)
HGB UR QL STRIP.AUTO: NEGATIVE
KETONES UR QL STRIP: NEGATIVE
LEUKOCYTE ESTERASE UR QL STRIP.AUTO: NEGATIVE
LYMPHOCYTES # BLD AUTO: 1.69 10*3/MM3 (ref 0.7–3.1)
LYMPHOCYTES NFR BLD AUTO: 25.1 % (ref 19.6–45.3)
MCH RBC QN AUTO: 32.2 PG (ref 26.6–33)
MCHC RBC AUTO-ENTMCNC: 33.5 G/DL (ref 31.5–35.7)
MCV RBC AUTO: 96.2 FL (ref 79–97)
MONOCYTES # BLD AUTO: 0.85 10*3/MM3 (ref 0.1–0.9)
MONOCYTES NFR BLD AUTO: 12.6 % (ref 5–12)
NEUTROPHILS NFR BLD AUTO: 3.98 10*3/MM3 (ref 1.7–7)
NEUTROPHILS NFR BLD AUTO: 59.2 % (ref 42.7–76)
NITRITE UR QL STRIP: NEGATIVE
PH UR STRIP.AUTO: 5.5 [PH] (ref 5–8)
PLATELET # BLD AUTO: 137 10*3/MM3 (ref 140–450)
PMV BLD AUTO: 9.8 FL (ref 6–12)
POC ALBUMIN: 3.6 G/L (ref 3.3–5.5)
POC ALT (SGPT): 28 U/L (ref 10–47)
POC AST (SGOT): 30 U/L (ref 11–38)
POC TOTAL BILIRUBIN: 0.9 MG/DL (ref 0.2–1.6)
POC TOTAL PROTEIN: 7.6 G/DL (ref 6.4–8.1)
POTASSIUM BLDA-SCNC: 3.5 MMOL/L (ref 3.6–5.1)
PROT UR QL STRIP: ABNORMAL
RBC # BLD AUTO: 4.22 10*6/MM3 (ref 4.14–5.8)
SODIUM BLD-SCNC: 146 MMOL/L (ref 128–145)
SP GR UR STRIP: >=1.03 (ref 1–1.03)
UROBILINOGEN UR QL STRIP: ABNORMAL
WBC NRBC COR # BLD AUTO: 6.73 10*3/MM3 (ref 3.4–10.8)

## 2024-08-14 PROCEDURE — 25010000002 DEXAMETHASONE SODIUM PHOSPHATE 120 MG/30ML SOLUTION: Performed by: INTERNAL MEDICINE

## 2024-08-14 PROCEDURE — 0 DEXTROSE 5 % SOLUTION: Performed by: INTERNAL MEDICINE

## 2024-08-14 PROCEDURE — 25010000002 BEVACIZUMAB-BVZR 400 MG/16ML SOLUTION 16 ML VIAL: Performed by: INTERNAL MEDICINE

## 2024-08-14 PROCEDURE — 96416 CHEMO PROLONG INFUSE W/PUMP: CPT

## 2024-08-14 PROCEDURE — 96375 TX/PRO/DX INJ NEW DRUG ADDON: CPT

## 2024-08-14 PROCEDURE — 25810000003 SODIUM CHLORIDE 0.9 % SOLUTION: Performed by: INTERNAL MEDICINE

## 2024-08-14 PROCEDURE — 25010000002 LEUCOVORIN 200 MG RECONSTITUTED SOLUTION 1 EACH VIAL: Performed by: INTERNAL MEDICINE

## 2024-08-14 PROCEDURE — 96368 THER/DIAG CONCURRENT INF: CPT

## 2024-08-14 PROCEDURE — 96415 CHEMO IV INFUSION ADDL HR: CPT

## 2024-08-14 PROCEDURE — 25810000003 SODIUM CHLORIDE 0.9 % SOLUTION 250 ML FLEX CONT: Performed by: INTERNAL MEDICINE

## 2024-08-14 PROCEDURE — 25010000002 OXALIPLATIN PER 0.5 MG: Performed by: INTERNAL MEDICINE

## 2024-08-14 PROCEDURE — 25010000002 PALONOSETRON 0.25 MG/5ML SOLUTION PREFILLED SYRINGE: Performed by: INTERNAL MEDICINE

## 2024-08-14 PROCEDURE — 96367 TX/PROPH/DG ADDL SEQ IV INF: CPT

## 2024-08-14 PROCEDURE — 0 DEXTROSE 5 % SOLUTION 250 ML FLEX CONT: Performed by: INTERNAL MEDICINE

## 2024-08-14 PROCEDURE — 96413 CHEMO IV INFUSION 1 HR: CPT

## 2024-08-14 PROCEDURE — 25010000002 LEUCOVORIN 500 MG RECONSTITUTED SOLUTION 1 EACH VIAL: Performed by: INTERNAL MEDICINE

## 2024-08-14 PROCEDURE — 25010000002 BEVACIZUMAB-BVZR 100 MG/4ML SOLUTION 4 ML VIAL: Performed by: INTERNAL MEDICINE

## 2024-08-14 PROCEDURE — 81003 URINALYSIS AUTO W/O SCOPE: CPT | Performed by: INTERNAL MEDICINE

## 2024-08-14 PROCEDURE — 96417 CHEMO IV INFUS EACH ADDL SEQ: CPT

## 2024-08-14 PROCEDURE — 85025 COMPLETE CBC W/AUTO DIFF WBC: CPT | Performed by: INTERNAL MEDICINE

## 2024-08-14 PROCEDURE — 80053 COMPREHEN METABOLIC PANEL: CPT

## 2024-08-14 PROCEDURE — 25010000002 FLUOROURACIL PER 500 MG: Performed by: INTERNAL MEDICINE

## 2024-08-14 PROCEDURE — G0498 CHEMO EXTEND IV INFUS W/PUMP: HCPCS

## 2024-08-14 RX ORDER — SODIUM CHLORIDE 9 MG/ML
20 INJECTION, SOLUTION INTRAVENOUS ONCE
Status: COMPLETED | OUTPATIENT
Start: 2024-08-14 | End: 2024-08-14

## 2024-08-14 RX ORDER — DEXTROSE MONOHYDRATE 50 MG/ML
20 INJECTION, SOLUTION INTRAVENOUS ONCE
Status: COMPLETED | OUTPATIENT
Start: 2024-08-14 | End: 2024-08-14

## 2024-08-14 RX ORDER — PALONOSETRON 0.05 MG/ML
0.25 INJECTION, SOLUTION INTRAVENOUS ONCE
Status: COMPLETED | OUTPATIENT
Start: 2024-08-14 | End: 2024-08-14

## 2024-08-14 RX ORDER — SODIUM CHLORIDE 9 MG/ML
20 INJECTION, SOLUTION INTRAVENOUS ONCE
Status: CANCELLED | OUTPATIENT
Start: 2024-08-14

## 2024-08-14 RX ORDER — DEXTROSE MONOHYDRATE 50 MG/ML
20 INJECTION, SOLUTION INTRAVENOUS ONCE
Status: CANCELLED | OUTPATIENT
Start: 2024-08-14

## 2024-08-14 RX ORDER — FAMOTIDINE 10 MG/ML
20 INJECTION, SOLUTION INTRAVENOUS AS NEEDED
Status: CANCELLED | OUTPATIENT
Start: 2024-08-14

## 2024-08-14 RX ORDER — DIPHENHYDRAMINE HYDROCHLORIDE 50 MG/ML
50 INJECTION INTRAMUSCULAR; INTRAVENOUS AS NEEDED
Status: CANCELLED | OUTPATIENT
Start: 2024-08-14

## 2024-08-14 RX ORDER — PALONOSETRON 0.05 MG/ML
0.25 INJECTION, SOLUTION INTRAVENOUS ONCE
Status: CANCELLED | OUTPATIENT
Start: 2024-08-14

## 2024-08-14 RX ADMIN — SODIUM CHLORIDE 500 MG: 9 INJECTION, SOLUTION INTRAVENOUS at 09:23

## 2024-08-14 RX ADMIN — FLUOROURACIL 5450 MG: 50 INJECTION, SOLUTION INTRAVENOUS at 12:13

## 2024-08-14 RX ADMIN — SODIUM CHLORIDE 20 ML/HR: 9 INJECTION, SOLUTION INTRAVENOUS at 08:47

## 2024-08-14 RX ADMIN — LEUCOVORIN CALCIUM 900 MG: 500 INJECTION, POWDER, LYOPHILIZED, FOR SOLUTION INTRAMUSCULAR; INTRAVENOUS at 09:59

## 2024-08-14 RX ADMIN — PALONOSETRON 0.25 MG: 0.25 INJECTION, SOLUTION INTRAVENOUS at 08:49

## 2024-08-14 RX ADMIN — DEXTROSE MONOHYDRATE 20 ML/HR: 50 INJECTION, SOLUTION INTRAVENOUS at 09:56

## 2024-08-14 RX ADMIN — OXALIPLATIN 195 MG: 5 INJECTION, SOLUTION INTRAVENOUS at 09:59

## 2024-08-14 RX ADMIN — DEXAMETHASONE SODIUM PHOSPHATE 12 MG: 4 INJECTION, SOLUTION INTRA-ARTICULAR; INTRALESIONAL; INTRAMUSCULAR; INTRAVENOUS; SOFT TISSUE at 08:51

## 2024-08-14 NOTE — PROGRESS NOTES
Message below sent to MD Dr. Wagner, Pt. is here for C17D1 Zirabev, Folfox, pt. has no complaints today. Pt's urine protein 2+today. His 24 hour urine results from 8/8/24 was high at 825.5. 08/08/24 15:42  Protein, 24H Urine: 825.5 (H)  Urine Volume: 1,300  Treatment plan needs signed if ok to treat? Ivone cmp is still running. 08/14/24 08:06 All lab results sent to MD for review.   Please proceed, treatment plan is signed per Dr. Wagner  Treatment given as ordered and pt. Tolerated well.   Pt. Discharged from clinic with no complaints and AVS was given.

## 2024-08-16 ENCOUNTER — HOSPITAL ENCOUNTER (OUTPATIENT)
Dept: ONCOLOGY | Facility: HOSPITAL | Age: 64
Discharge: HOME | End: 2024-08-16
Payer: MEDICARE

## 2024-08-16 DIAGNOSIS — C20 RECTAL CANCER: ICD-10-CM

## 2024-08-16 DIAGNOSIS — C78.6 METASTASIS TO PERITONEAL CAVITY: Primary | ICD-10-CM

## 2024-08-16 PROCEDURE — 25010000002 HEPARIN LOCK FLUSH PER 10 UNITS: Performed by: INTERNAL MEDICINE

## 2024-08-16 RX ORDER — HEPARIN SODIUM (PORCINE) LOCK FLUSH IV SOLN 100 UNIT/ML 100 UNIT/ML
500 SOLUTION INTRAVENOUS AS NEEDED
Status: DISCONTINUED | OUTPATIENT
Start: 2024-08-16 | End: 2024-08-17 | Stop reason: HOSPADM

## 2024-08-16 RX ORDER — HYDRALAZINE HYDROCHLORIDE 50 MG/1
50 TABLET, FILM COATED ORAL EVERY 8 HOURS
Qty: 90 TABLET | Refills: 1 | Status: SHIPPED | OUTPATIENT
Start: 2024-08-16

## 2024-08-16 RX ORDER — SODIUM CHLORIDE 0.9 % (FLUSH) 0.9 %
20 SYRINGE (ML) INJECTION AS NEEDED
Status: DISCONTINUED | OUTPATIENT
Start: 2024-08-16 | End: 2024-08-17 | Stop reason: HOSPADM

## 2024-08-16 RX ORDER — SODIUM CHLORIDE 0.9 % (FLUSH) 0.9 %
20 SYRINGE (ML) INJECTION AS NEEDED
OUTPATIENT
Start: 2024-08-16

## 2024-08-16 RX ORDER — HEPARIN SODIUM (PORCINE) LOCK FLUSH IV SOLN 100 UNIT/ML 100 UNIT/ML
500 SOLUTION INTRAVENOUS AS NEEDED
OUTPATIENT
Start: 2024-08-16

## 2024-08-16 RX ADMIN — Medication 20 ML: at 13:15

## 2024-08-16 RX ADMIN — HEPARIN 500 UNITS: 100 SYRINGE at 13:15

## 2024-08-23 NOTE — PROGRESS NOTES
CMP BLOOD DRAW TO BE SENT TO [facility] AT PATIENT APPT ON 8/28/24 PER ROSA MARIA REYNOLDS.

## 2024-08-27 RX ORDER — TIRZEPATIDE 2.5 MG/.5ML
INJECTION, SOLUTION SUBCUTANEOUS
Qty: 2 ML | Refills: 0 | Status: SHIPPED | OUTPATIENT
Start: 2024-08-27

## 2024-08-28 ENCOUNTER — HOSPITAL ENCOUNTER (OUTPATIENT)
Dept: ONCOLOGY | Facility: HOSPITAL | Age: 64
Discharge: HOME | End: 2024-08-28
Admitting: INTERNAL MEDICINE
Payer: MEDICARE

## 2024-08-28 VITALS
DIASTOLIC BLOOD PRESSURE: 83 MMHG | HEIGHT: 76 IN | TEMPERATURE: 97.5 F | SYSTOLIC BLOOD PRESSURE: 135 MMHG | HEART RATE: 75 BPM | BODY MASS INDEX: 26.35 KG/M2 | OXYGEN SATURATION: 93 % | WEIGHT: 216.4 LBS | RESPIRATION RATE: 20 BRPM

## 2024-08-28 DIAGNOSIS — C78.6 METASTASIS TO PERITONEAL CAVITY: ICD-10-CM

## 2024-08-28 DIAGNOSIS — C20 RECTAL CANCER: Primary | ICD-10-CM

## 2024-08-28 LAB
ALBUMIN SERPL-MCNC: 4.1 G/DL (ref 3.5–5.2)
ALBUMIN/GLOB SERPL: 1.2 G/DL
ALP SERPL-CCNC: 125 U/L (ref 39–117)
ALT SERPL W P-5'-P-CCNC: 17 U/L (ref 1–41)
ANION GAP SERPL CALCULATED.3IONS-SCNC: 10.4 MMOL/L (ref 5–15)
AST SERPL-CCNC: 27 U/L (ref 1–40)
BASOPHILS # BLD AUTO: 0.04 10*3/MM3 (ref 0–0.2)
BASOPHILS NFR BLD AUTO: 0.6 % (ref 0–1.5)
BILIRUB SERPL-MCNC: 0.6 MG/DL (ref 0–1.2)
BILIRUB UR QL STRIP: ABNORMAL
BUN SERPL-MCNC: 10 MG/DL (ref 8–23)
BUN/CREAT SERPL: 16.9 (ref 7–25)
CALCIUM SPEC-SCNC: 10.1 MG/DL (ref 8.6–10.5)
CHLORIDE SERPL-SCNC: 104 MMOL/L (ref 98–107)
CLARITY UR: CLEAR
CO2 SERPL-SCNC: 26.6 MMOL/L (ref 22–29)
COLOR UR: YELLOW
CREAT SERPL-MCNC: 0.59 MG/DL (ref 0.76–1.27)
DEPRECATED RDW RBC AUTO: 57 FL (ref 37–54)
EGFRCR SERPLBLD CKD-EPI 2021: 109.7 ML/MIN/1.73
EOSINOPHIL # BLD AUTO: 0.13 10*3/MM3 (ref 0–0.4)
EOSINOPHIL NFR BLD AUTO: 2 % (ref 0.3–6.2)
ERYTHROCYTE [DISTWIDTH] IN BLOOD BY AUTOMATED COUNT: 16.1 % (ref 12.3–15.4)
GLOBULIN UR ELPH-MCNC: 3.3 GM/DL
GLUCOSE SERPL-MCNC: 150 MG/DL (ref 65–99)
GLUCOSE UR STRIP-MCNC: NEGATIVE MG/DL
HCT VFR BLD AUTO: 40.1 % (ref 37.5–51)
HGB BLD-MCNC: 13 G/DL (ref 13–17.7)
HGB UR QL STRIP.AUTO: NEGATIVE
KETONES UR QL STRIP: ABNORMAL
LEUKOCYTE ESTERASE UR QL STRIP.AUTO: NEGATIVE
LYMPHOCYTES # BLD AUTO: 1.63 10*3/MM3 (ref 0.7–3.1)
LYMPHOCYTES NFR BLD AUTO: 24.6 % (ref 19.6–45.3)
MCH RBC QN AUTO: 31.9 PG (ref 26.6–33)
MCHC RBC AUTO-ENTMCNC: 32.4 G/DL (ref 31.5–35.7)
MCV RBC AUTO: 98.5 FL (ref 79–97)
MONOCYTES # BLD AUTO: 0.82 10*3/MM3 (ref 0.1–0.9)
MONOCYTES NFR BLD AUTO: 12.4 % (ref 5–12)
NEUTROPHILS NFR BLD AUTO: 4 10*3/MM3 (ref 1.7–7)
NEUTROPHILS NFR BLD AUTO: 60.4 % (ref 42.7–76)
NITRITE UR QL STRIP: NEGATIVE
PH UR STRIP.AUTO: 5.5 [PH] (ref 5–8)
PLATELET # BLD AUTO: 128 10*3/MM3 (ref 140–450)
PMV BLD AUTO: 9.6 FL (ref 6–12)
POTASSIUM SERPL-SCNC: 3.8 MMOL/L (ref 3.5–5.2)
PROT SERPL-MCNC: 7.4 G/DL (ref 6–8.5)
PROT UR QL STRIP: ABNORMAL
RBC # BLD AUTO: 4.07 10*6/MM3 (ref 4.14–5.8)
SODIUM SERPL-SCNC: 141 MMOL/L (ref 136–145)
SP GR UR STRIP: >1.03 (ref 1–1.03)
UROBILINOGEN UR QL STRIP: ABNORMAL
WBC NRBC COR # BLD AUTO: 6.62 10*3/MM3 (ref 3.4–10.8)

## 2024-08-28 PROCEDURE — 96375 TX/PRO/DX INJ NEW DRUG ADDON: CPT

## 2024-08-28 PROCEDURE — 25010000002 BEVACIZUMAB-BVZR 100 MG/4ML SOLUTION 4 ML VIAL: Performed by: INTERNAL MEDICINE

## 2024-08-28 PROCEDURE — 96367 TX/PROPH/DG ADDL SEQ IV INF: CPT

## 2024-08-28 PROCEDURE — 0 DEXTROSE 5 % SOLUTION 250 ML FLEX CONT: Performed by: INTERNAL MEDICINE

## 2024-08-28 PROCEDURE — 25010000002 BEVACIZUMAB-BVZR 400 MG/16ML SOLUTION 16 ML VIAL: Performed by: INTERNAL MEDICINE

## 2024-08-28 PROCEDURE — 25010000002 LEUCOVORIN 200 MG RECONSTITUTED SOLUTION 1 EACH VIAL: Performed by: INTERNAL MEDICINE

## 2024-08-28 PROCEDURE — 96368 THER/DIAG CONCURRENT INF: CPT

## 2024-08-28 PROCEDURE — 25010000002 DEXAMETHASONE SODIUM PHOSPHATE 120 MG/30ML SOLUTION: Performed by: INTERNAL MEDICINE

## 2024-08-28 PROCEDURE — 25010000002 PALONOSETRON 0.25 MG/5ML SOLUTION PREFILLED SYRINGE: Performed by: INTERNAL MEDICINE

## 2024-08-28 PROCEDURE — 80053 COMPREHEN METABOLIC PANEL: CPT | Performed by: INTERNAL MEDICINE

## 2024-08-28 PROCEDURE — 96417 CHEMO IV INFUS EACH ADDL SEQ: CPT

## 2024-08-28 PROCEDURE — 25810000003 SODIUM CHLORIDE 0.9 % SOLUTION 250 ML FLEX CONT: Performed by: INTERNAL MEDICINE

## 2024-08-28 PROCEDURE — 96366 THER/PROPH/DIAG IV INF ADDON: CPT

## 2024-08-28 PROCEDURE — 25010000002 FLUOROURACIL PER 500 MG: Performed by: INTERNAL MEDICINE

## 2024-08-28 PROCEDURE — 25810000003 SODIUM CHLORIDE 0.9 % SOLUTION: Performed by: INTERNAL MEDICINE

## 2024-08-28 PROCEDURE — 0 DEXTROSE 5 % SOLUTION: Performed by: INTERNAL MEDICINE

## 2024-08-28 PROCEDURE — 25010000002 OXALIPLATIN PER 0.5 MG: Performed by: INTERNAL MEDICINE

## 2024-08-28 PROCEDURE — G0498 CHEMO EXTEND IV INFUS W/PUMP: HCPCS

## 2024-08-28 PROCEDURE — 96415 CHEMO IV INFUSION ADDL HR: CPT

## 2024-08-28 PROCEDURE — 96413 CHEMO IV INFUSION 1 HR: CPT

## 2024-08-28 PROCEDURE — 81003 URINALYSIS AUTO W/O SCOPE: CPT | Performed by: INTERNAL MEDICINE

## 2024-08-28 PROCEDURE — 85025 COMPLETE CBC W/AUTO DIFF WBC: CPT | Performed by: INTERNAL MEDICINE

## 2024-08-28 PROCEDURE — 25010000002 LEUCOVORIN 500 MG RECONSTITUTED SOLUTION 1 EACH VIAL: Performed by: INTERNAL MEDICINE

## 2024-08-28 RX ORDER — DIPHENHYDRAMINE HYDROCHLORIDE 50 MG/ML
50 INJECTION INTRAMUSCULAR; INTRAVENOUS AS NEEDED
Status: CANCELLED | OUTPATIENT
Start: 2024-08-28

## 2024-08-28 RX ORDER — DEXTROSE MONOHYDRATE 50 MG/ML
20 INJECTION, SOLUTION INTRAVENOUS ONCE
Status: CANCELLED | OUTPATIENT
Start: 2024-08-28

## 2024-08-28 RX ORDER — PALONOSETRON 0.05 MG/ML
0.25 INJECTION, SOLUTION INTRAVENOUS ONCE
Status: CANCELLED | OUTPATIENT
Start: 2024-08-28

## 2024-08-28 RX ORDER — PALONOSETRON 0.05 MG/ML
0.25 INJECTION, SOLUTION INTRAVENOUS ONCE
Status: COMPLETED | OUTPATIENT
Start: 2024-08-28 | End: 2024-08-28

## 2024-08-28 RX ORDER — SODIUM CHLORIDE 9 MG/ML
20 INJECTION, SOLUTION INTRAVENOUS ONCE
Status: CANCELLED | OUTPATIENT
Start: 2024-08-28

## 2024-08-28 RX ORDER — DEXTROSE MONOHYDRATE 50 MG/ML
20 INJECTION, SOLUTION INTRAVENOUS ONCE
Status: COMPLETED | OUTPATIENT
Start: 2024-08-28 | End: 2024-08-28

## 2024-08-28 RX ORDER — SODIUM CHLORIDE 9 MG/ML
20 INJECTION, SOLUTION INTRAVENOUS ONCE
Status: COMPLETED | OUTPATIENT
Start: 2024-08-28 | End: 2024-08-28

## 2024-08-28 RX ORDER — FAMOTIDINE 10 MG/ML
20 INJECTION, SOLUTION INTRAVENOUS AS NEEDED
Status: CANCELLED | OUTPATIENT
Start: 2024-08-28

## 2024-08-28 RX ADMIN — SODIUM CHLORIDE 20 ML/HR: 9 INJECTION, SOLUTION INTRAVENOUS at 09:37

## 2024-08-28 RX ADMIN — OXALIPLATIN 195 MG: 5 INJECTION, SOLUTION INTRAVENOUS at 11:13

## 2024-08-28 RX ADMIN — SODIUM CHLORIDE 500 MG: 9 INJECTION, SOLUTION INTRAVENOUS at 10:28

## 2024-08-28 RX ADMIN — DEXTROSE MONOHYDRATE 20 ML/HR: 50 INJECTION, SOLUTION INTRAVENOUS at 11:11

## 2024-08-28 RX ADMIN — DEXAMETHASONE SODIUM PHOSPHATE 12 MG: 4 INJECTION, SOLUTION INTRA-ARTICULAR; INTRALESIONAL; INTRAMUSCULAR; INTRAVENOUS; SOFT TISSUE at 09:39

## 2024-08-28 RX ADMIN — FLUOROURACIL 5450 MG: 50 INJECTION, SOLUTION INTRAVENOUS at 13:28

## 2024-08-28 RX ADMIN — LEUCOVORIN CALCIUM 900 MG: 500 INJECTION, POWDER, LYOPHILIZED, FOR SOLUTION INTRAMUSCULAR; INTRAVENOUS at 11:13

## 2024-08-28 RX ADMIN — PALONOSETRON 0.25 MG: 0.25 INJECTION, SOLUTION INTRAVENOUS at 09:37

## 2024-08-28 NOTE — PROGRESS NOTES
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     History of Present Illness:  Patient is a 63 y.o.     2024: Mr. Zhou carried a long history of severe hypertension and of an aneurysm of the ascending aorta.  He had been receiving antihypertensive medication after correction of the aneurysm with good results.  In 2023 he underwent his first screening colonoscopy.  A large circumferential and obstructive tumor was identified at the sigmoid/rectosigmoid junction.  The tumor could not be traversed even with the smallest instrument.  Biopsies showed moderately differentiated colonic adenocarcinoma without loss of nuclear expression of the mismatch repair proteins.  Imaging studies at the time revealed the known thoracic aneurysm that has increased mildly since the previous scan.  There was a benign-appearing subpleural nodule in the right posteromedial chest wall and pulmonary nodules that have been identified since 2018 remained stable.  Some mediastinal nonspecific and stable adenopathy was reported as well.  In the abdomen the sigmoid tumor was confirmed and there was no suggestion of metastatic disease.  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  He underwent excision of the squamous cell carcinoma that proved to be a high-grade squamous intraepithelial lesion.  He also had a robotic left colectomy.  The final report of pathology was of moderately differentiated adenocarcinoma of the colon, that measured 3.5 cm.  The tumor involved the serosal surface and the antimesenteric serosa.  Lymphovascular space invasion was identified.  All margins were  negative for disease but 2 of 27 lymph nodes had metastatic involvement.  As well there were at least 5 peritoneal deposits that were excised and that were confirmed to correspond to metastatic lesions.  The tumor had intact expression of MLH1, MSH 1, MSH 6 and PMS2.  Next-generation sequencing revealed a pathogenic mutation of exon 2 of the KRAS gene. ERBB2, BRAF, NTRK, RET, and PIK3CA were negative. PD-L1 was negative, as well.  He was started on treatment with FOLFOX and bevacizumab on November 28, 2023.  He reported adequate tolerance to the treatment, with the expected cold intolerance.  He had had no nausea but since the beginning of the present illness he had lost approximately 20 pounds.  He was eating reasonably well.  He had been free of chest pains and no longer had abdominal pain.  All his surgical incisions had healed and he was having regular defecations, at times of liquid stool.  The physical exam revealed him to be a chronically ill-appearing man who did not seem in distress.  He was conversant, in good spirits and without jaundice.  Lungs diminished bilaterally.  Heart regular.  Abdomen soft and nontender.  No edema.  The laboratory exams and all the records were reviewed and discussed with him and with his wife.  To resume treatment with the idea of obtaining a new set of scans after 6 cycles of chemotherapy.  After 12 cycles of chemotherapy discuss the possibility of additional surgery if there was any residual peritoneal disease at the time of surgery.  To see me at the end of February with the scans.    2/16/2024: In the office to review scans. In the last few days has had severe glossodynia and odynophagia. Also has been coughing and expectorating some clear sputum. No fevers. Has had pain on the left side of the chest. No dyspnea. He was prescribed Hiral's magic potion and fluconazole that has resulted in relief. On exam he does not appear acutely ill. No jaundice or pallor. Lungs are  diminished bilaterally and heart irregularly irregular. Abdomen soft. No edema. Reviewed the laboratory exams. Reviewed the images of the scans. Sent prescriptions for doxycycline as the lesion in the left lower lobe is likely infectious in nature, given his leukocytosis and symptoms. Will obtain an electrocardiogram. Will follow next week.     3/8/2024: Feeling poorly. Weak and not moving much. Has continued to have diffuse pain. No fevers. His spouse believes he has an ulcer on the backside. On exam he is conversant and oriented. No jaundice. No pallor. The lungs are diminished bilaterally. Heart regular. There is indeed an ulcer in the sacral region, in the intergluteal crease. Reviewed the laboratory exams. He is to continue with the same chemotherapy. Referred to the wound clinic. To have an electrocardiogram. He is to see me in a few weeks with new scans.     4/10/2024: Was admitted to the hospital shortly after the previous visit.  He had pneumonia and was very dyspneic.  On March 11, 2024 underwent a thoracoscopic decortication with parietal and visceral pleurectomy and intercostal nerve block.  He was treated with antibiotics and eventually discharged to a subacute rehabilitation facility.  Resumed treatment on 4/9/2024.  He tells me today he is feeling progressively better.  Stronger.  The surgical incisions are healing.  He is not having as much trouble breathing.  He has had no abdominal pain or diarrhea.  On exam alert, conversant and ill.  Seems much older than the stated age but is not in distress.  Lungs are diminished bilaterally.  Heart is regular.  Abdomen is soft.  No edema.  The laboratory exams were reviewed.  I reviewed the records from the hospital and reviewed all imaging studies done during that hospitalization.  No suggestion of progressive metastatic disease.  For now continue with the same treatment.  See me early in May 2024.    5/9/2024: Feeling progressively better.  Able to take the  chemotherapy without much difficulty.  Eating more.  Also more mobile.  Using a cane only when outside of his house.  On exam alert, chronically ill-appearing but in no distress.  No jaundice.  Conversant and oriented.  Lungs diminished bilaterally.  Heart regular.  Abdomen soft.  No edema.  Reviewed the laboratory exams and discussed with him.  He will see me again in approximately 6 weeks with new scans.  Continue with the same chemotherapy.    6/19/2024: Feeling reasonably well.  No weakness or chest pains and no cough.  On exam alert and conversant.  Not jaundiced or pale.  Lungs diminished bilaterally with bilateral wheezes and heart regular.  Abdomen soft.  No edema.  Reviewed the images of the recent scans with him and explained the finding of new metastatic deposits in the liver.  Explained to him that given the length of time that elapsed without chemotherapy because of his respiratory and cardiovascular issues, I do not know if the problem is that the chemotherapy was not working at all or if that Is what led to the development of metastatic disease.  I like to try the FOLFOX with bevacizumab in the way that he had been getting it to see if there is response with regular treatment.  For that reason we will proceed with treatment today.  He is to see me in approximately 4 weeks from today.    8/30/2024: Progressively better and stronger.  Eating well and more energetic.  Was able to get out of the house and do several different things 6 days in a row, which has been a change.  Continues to have some dyspnea with exertion but he also continues to smoke.  He has had no chest pains and is not coughing as much.  He denies abdominal pain and has maintained regular bowel activity.  On exam he appears chronically ill and much older than the stated age.  No distress.  No jaundice.  The lungs are clear bilaterally and the heart is regular.  The abdomen is soft.  The epigastric space seems taylor and the edge of the  liver can be palpated, at the level of the midsternal line, approximately 3 cm below the costal margin.  There is no edema.  Laboratory exams reviewed.  To obtain a scan and see me in 3 months.  Continue with the same chemotherapy for now.    Past Medical History:   Diagnosis Date    Aortic dissection     Diabetes mellitus     Heart murmur     History of noncompliance with medical treatment     Hyperlipidemia     Hypertension     Type B hypoplasia of aortic arch      Past Surgical History:   Procedure Laterality Date    COLECTOMY PARTIAL / TOTAL  2023    COLONOSCOPY N/A 08/31/2023    Procedure: COLONOSCOPY with sigmoid mass tattooing at 35cm, sigmoid and rectal polypectomy;  Surgeon: TORIBIO Jacob MD;  Location: [facility] ENDOSCOPY;  Service: Gastroenterology;  Laterality: N/A;  sigmoid mass, colon polyps    THORACOSCOPY WITH DECORTICATION Left 3/11/2024    Procedure: THORACOSCOPY VIDEO ASSISTED WITH DECORTICATION WITH DAVINCI ROBOT;  Surgeon: Saqib Keller MD;  Location: [facility] MAIN OR;  Service: Robotics - DaVinci;  Laterality: Left;       Current Outpatient Medications:     amLODIPine (NORVASC) 10 MG tablet, TAKE 1 TABLET BY MOUTH DAILY, Disp: 90 tablet, Rfl: 0    atorvastatin (LIPITOR) 40 MG tablet, TAKE 1 TABLET BY MOUTH EVERY NIGHT AT BEDTIME, Disp: 90 tablet, Rfl: 0    Blood Glucose Monitoring Suppl (Accu-Chek Guide) w/Device kit, 1 Device Daily., Disp: 1 kit, Rfl: 0    Chlorcyclizine-Pseudoephed (Stahist AD) 25-60 MG tablet, Take 0.5 tablets by mouth 2 (Two) Times a Day As Needed (Congestion, drainage)., Disp: 42 tablet, Rfl: 0    cloNIDine (CATAPRES) 0.1 MG tablet, Take 1 tablet by mouth 2 (Two) Times a Day., Disp: 180 tablet, Rfl: 1    gabapentin (NEURONTIN) 300 MG capsule, TAKE 1 CAPSULE BY MOUTH 3 TIMES A DAY, Disp: 90 capsule, Rfl: 5    glucose blood (Accu-Chek Guide) test strip, Test daily e11.9, Disp: 50 each, Rfl: 12    hydrALAZINE (APRESOLINE) 50 MG tablet, TAKE ONE TABLET BY MOUTH EVERY 8 HOURS,  Disp: 90 tablet, Rfl: 1    lisinopril-hydrochlorothiazide (PRINZIDE,ZESTORETIC) 20-25 MG per tablet, TAKE ONE TABLET BY MOUTH DAILY, Disp: 90 tablet, Rfl: 0    metFORMIN (GLUCOPHAGE) 850 MG tablet, TAKE 1 TABLET BY MOUTH TWICE A DAY WITH MEALS, Disp: 144 tablet, Rfl: 1    metoprolol tartrate (LOPRESSOR) 50 MG tablet, TAKE THREE TABLETS BY MOUTH EVERY 12 HOURS, Disp: 540 tablet, Rfl: 0    Mounjaro 2.5 MG/0.5ML solution pen-injector pen, INJECT 2.5 MG UNDER THE SKIN ONCE WEEKLY, Disp: 2 mL, Rfl: 0    oxyCODONE (ROXICODONE) 10 MG tablet, , Disp: , Rfl:     potassium chloride 10 MEQ CR tablet, Take 1 tablet by mouth Daily., Disp: , Rfl:     rivaroxaban (Xarelto) 20 MG tablet, TAKE 1 TABLET BY MOUTH DAILY, Disp: 90 tablet, Rfl: 1    traMADol (ULTRAM) 50 MG tablet, TAKE 1 TABLET BY MOUTH EVERY 6 HOURS AS NEEDED FOR MODERATE PAIN, Disp: 28 tablet, Rfl: 0    vitamin D3 125 MCG (5000 UT) capsule capsule, Take 1 capsule by mouth Daily., Disp: , Rfl:   No current facility-administered medications for this visit.    Facility-Administered Medications Ordered in Other Visits:     heparin injection 500 Units, 500 Units, Intravenous, Kristy SEALS Alfonso, MD, 500 Units at 08/30/24 1255    sodium chloride 0.9 % flush 20 mL, 20 mL, Intravenous, Kristy SEALS Alfonso, MD, 20 mL at 08/30/24 1255    No Known Allergies    Family History   Problem Relation Age of Onset    Diabetes Mother     Dementia Mother     Sudden death Father      Cancer-related family history is not on file.    Social History     Tobacco Use    Smoking status: Every Day     Current packs/day: 2.00     Average packs/day: 2.0 packs/day for 53.7 years (107.3 ttl pk-yrs)     Types: Cigarettes     Start date: 1971     Passive exposure: Current    Smokeless tobacco: Never   Vaping Use    Vaping status: Never Used   Substance Use Topics    Alcohol use: No     Comment: Abstinent since around 2008    Drug use: Not Currently     Types: Marijuana     Comment: Abstinent since  at least the 1980's     Social History     Social History Narrative    Not on file     ROS:   Review of Systems   Constitutional:  Positive for fatigue. Negative for activity change, appetite change, chills, diaphoresis, fever and unexpected weight change.   HENT:  Negative for congestion, dental problem, drooling, ear discharge, ear pain, facial swelling, hearing loss, mouth sores, nosebleeds, postnasal drip, rhinorrhea, sinus pressure, sinus pain, sneezing, sore throat, tinnitus, trouble swallowing and voice change.    Eyes:  Negative for photophobia, pain, discharge, redness, itching and visual disturbance.   Respiratory:  Positive for cough and shortness of breath. Negative for apnea, choking, chest tightness, wheezing and stridor.    Cardiovascular:  Negative for chest pain, palpitations and leg swelling.   Gastrointestinal:  Negative for abdominal distention, abdominal pain, anal bleeding, blood in stool, constipation, diarrhea, nausea, rectal pain and vomiting.   Endocrine: Negative for cold intolerance, heat intolerance, polydipsia and polyuria.   Genitourinary:  Negative for decreased urine volume, difficulty urinating, dysuria, flank pain, frequency, genital sores, hematuria and urgency.   Musculoskeletal:  Negative for arthralgias, back pain, gait problem, joint swelling, myalgias, neck pain and neck stiffness.   Skin:  Negative for color change, pallor and rash.   Neurological:  Negative for dizziness, tremors, seizures, syncope, facial asymmetry, speech difficulty, weakness, light-headedness, numbness and headaches.        Increased cold sensitivity since the commencement of treatment with oxaliplatin   Hematological:  Negative for adenopathy. Does not bruise/bleed easily.   Psychiatric/Behavioral:  Negative for agitation, behavioral problems, confusion, decreased concentration, hallucinations, self-injury, sleep disturbance and suicidal ideas. The patient is not nervous/anxious.   "    Objective:    Vital Signs:  Vitals:    08/30/24 1306   BP: 133/77   Pulse: 78   Resp: 14   Temp: 98.3 °F (36.8 °C)   SpO2: 97%   Weight: 96.7 kg (213 lb 3.2 oz)   Height: 193 cm (76\")   PainSc: 0-No pain     Body mass index is 25.95 kg/m².    ECOG  (1) Restricted in physically strenuous activity, ambulatory and able to do work of light nature    Physical Exam:   Physical Exam  Constitutional:       General: He is not in acute distress.     Appearance: He is not ill-appearing, toxic-appearing or diaphoretic.      Comments: Does not appear acutely ill. Conversant and oriented. No jaundice or pallor.    HENT:      Head: Normocephalic and atraumatic.      Right Ear: External ear normal.      Left Ear: External ear normal.      Nose: Nose normal.      Mouth/Throat:      Mouth: Mucous membranes are moist.      Pharynx: Oropharynx is clear.   Eyes:      General: No scleral icterus.        Right eye: No discharge.         Left eye: No discharge.      Conjunctiva/sclera: Conjunctivae normal.      Pupils: Pupils are equal, round, and reactive to light.   Cardiovascular:      Rate and Rhythm: Normal rate.      Pulses: Normal pulses.      Heart sounds: Normal heart sounds. No murmur heard.     No friction rub. No gallop.   Pulmonary:      Effort: No respiratory distress.      Breath sounds: No stridor. No wheezing, rhonchi or rales.      Comments: Diminished bilaterally and with fine crackles in the bases.   Chest:      Chest wall: No tenderness.      Comments: Surgical incisions on the left side of the chest are healing well.  No evidence of infection.  Abdominal:      General: Bowel sounds are normal. There is no distension.      Palpations: Abdomen is soft. There is no mass.      Tenderness: There is no abdominal tenderness. There is no right CVA tenderness, left CVA tenderness, guarding or rebound.      Comments: Soft and nontender.   Musculoskeletal:         General: No swelling, tenderness, deformity or signs of " injury.      Cervical back: No rigidity.      Right lower leg: No edema.      Left lower leg: No edema.   Lymphadenopathy:      Cervical: No cervical adenopathy.   Skin:     General: Skin is warm and dry.      Coloration: Skin is not jaundiced.      Findings: No bruising or rash.   Neurological:      General: No focal deficit present.      Mental Status: He is alert and oriented to person, place, and time.      Gait: Gait normal.   Psychiatric:         Mood and Affect: Mood normal.         Behavior: Behavior normal.         Thought Content: Thought content normal.         Judgment: Judgment normal.     MIGUEL Wagner MD performed the physical exam on 8/30/2024 as documented above.    Lab Results - Last 18 Months   Lab Units 08/28/24  0808 08/14/24  0806 07/31/24  0812   WBC 10*3/mm3 6.62 6.73 7.11   HEMOGLOBIN g/dL 13.0 13.6 13.1   HEMATOCRIT % 40.1 40.6 40.4   PLATELETS 10*3/mm3 128* 137* 118*   MCV fL 98.5* 96.2 96.2     Lab Results - Last 18 Months   Lab Units 08/28/24  0808 08/14/24  0814 07/31/24  0855 07/31/24  0820 07/25/24  1047   SODIUM mmol/L 141  --  139  --  139   POTASSIUM mmol/L 3.8  --  3.4*  --  4.1   CHLORIDE mmol/L 104  --  102  --  100   CO2 mmol/L 26.6  --  25.6  --  27.0   BUN mg/dL 10  --  8  --  11   CREATININE mg/dL 0.59* 0.40* 0.53*   < > 0.60*   CALCIUM mg/dL 10.1  --  9.8  --  10.5   BILIRUBIN mg/dL 0.6  --  0.4  --  0.4   ALK PHOS U/L 125*  --  114  --  110   ALT (SGPT) U/L 17  --  32  --  34   AST (SGOT) U/L 27  --  32  --  29   GLUCOSE mg/dL 150*  --  192*  --  97    < > = values in this interval not displayed.     Lab Results   Component Value Date    GLUCOSE 150 (H) 08/28/2024    BUN 10 08/28/2024    CREATININE 0.59 (L) 08/28/2024    EGFRIFNONA 100 01/20/2022    BCR 16.9 08/28/2024    K 3.8 08/28/2024    CO2 26.6 08/28/2024    CALCIUM 10.1 08/28/2024    ALBUMIN 4.1 08/28/2024    LABIL2 1.5 01/22/2019    AST 27 08/28/2024    ALT 17 08/28/2024     Lab Results - Last 18 Months   Lab  Units 03/10/24  2236 11/14/23  1103   INR  1.21* 0.99   APTT seconds 31.2* 28.2     Lab Results   Component Value Date    PTT 31.2 (H) 03/10/2024    INR 1.21 (H) 03/10/2024     Lab Results   Component Value Date    CEA 3.24 07/31/2024       Lab Results   Component Value Date    PSA 0.594 01/23/2023     Assessment & Plan     1.  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  The clear symptomatic improvement suggests that there is response to treatment.  He needs another scan.  Will order for next week or the following.  For now continue with the same treatment, which he continues to tolerate well.  2.  Recovered completely from the decortication.  Symptoms are completely resolved.  2.  Cigarette smoker: Has started to smoke again.  3.  Aortic aneurysm: No intervention at this time.  4.  Severe hypertension: Remains under good control.  5.  Sacral decubitus ulcer: Continuously improving.  6.  Continue with the chemotherapy.  See me in approximately 6 weeks.  Scans in the near future.    Lars Wagner MD on 8/30/2024 at 1341.

## 2024-08-28 NOTE — PROGRESS NOTES
Using sterile technique, port accessed for blood collection and treatment. 10 ml blood wasted prior to collecting blood for ordered labs.

## 2024-08-30 ENCOUNTER — HOSPITAL ENCOUNTER (OUTPATIENT)
Dept: ONCOLOGY | Facility: HOSPITAL | Age: 64
Discharge: HOME | End: 2024-08-30
Payer: MEDICARE

## 2024-08-30 ENCOUNTER — OFFICE VISIT (OUTPATIENT)
Dept: ONCOLOGY | Facility: CLINIC | Age: 64
End: 2024-08-30
Payer: MEDICARE

## 2024-08-30 VITALS
BODY MASS INDEX: 25.96 KG/M2 | TEMPERATURE: 98.3 F | HEART RATE: 78 BPM | DIASTOLIC BLOOD PRESSURE: 77 MMHG | OXYGEN SATURATION: 97 % | HEIGHT: 76 IN | SYSTOLIC BLOOD PRESSURE: 133 MMHG | WEIGHT: 213.2 LBS | RESPIRATION RATE: 14 BRPM

## 2024-08-30 DIAGNOSIS — C20 RECTAL CANCER: ICD-10-CM

## 2024-08-30 DIAGNOSIS — C78.6 METASTASIS TO PERITONEAL CAVITY: Primary | ICD-10-CM

## 2024-08-30 PROCEDURE — 25010000002 HEPARIN LOCK FLUSH PER 10 UNITS: Performed by: INTERNAL MEDICINE

## 2024-08-30 RX ORDER — HEPARIN SODIUM (PORCINE) LOCK FLUSH IV SOLN 100 UNIT/ML 100 UNIT/ML
500 SOLUTION INTRAVENOUS AS NEEDED
OUTPATIENT
Start: 2024-08-30

## 2024-08-30 RX ORDER — SODIUM CHLORIDE 0.9 % (FLUSH) 0.9 %
20 SYRINGE (ML) INJECTION AS NEEDED
OUTPATIENT
Start: 2024-08-30

## 2024-08-30 RX ORDER — SODIUM CHLORIDE 0.9 % (FLUSH) 0.9 %
20 SYRINGE (ML) INJECTION AS NEEDED
Status: DISCONTINUED | OUTPATIENT
Start: 2024-08-30 | End: 2024-08-31 | Stop reason: HOSPADM

## 2024-08-30 RX ORDER — HEPARIN SODIUM (PORCINE) LOCK FLUSH IV SOLN 100 UNIT/ML 100 UNIT/ML
500 SOLUTION INTRAVENOUS AS NEEDED
Status: DISCONTINUED | OUTPATIENT
Start: 2024-08-30 | End: 2024-08-31 | Stop reason: HOSPADM

## 2024-08-30 RX ADMIN — HEPARIN 500 UNITS: 100 SYRINGE at 12:55

## 2024-08-30 RX ADMIN — Medication 20 ML: at 12:55

## 2024-08-30 NOTE — PROGRESS NOTES
Pt to injection room for CIV d/c prior to MD appt. Pump empty upon arrival and blood return noted. Port accessed and flushed with good blood return noted. 10cc of blood wasted prior to specimen collection. Blood specimen obtained and sent to lab for processing per protocol.  Port flushed with saline and heparin prior to needle removal. Pt to waiting room for appt with Dr. Wagner.

## 2024-09-11 ENCOUNTER — HOSPITAL ENCOUNTER (OUTPATIENT)
Dept: ONCOLOGY | Facility: HOSPITAL | Age: 64
Discharge: HOME | End: 2024-09-11
Admitting: INTERNAL MEDICINE
Payer: MEDICARE

## 2024-09-11 VITALS
RESPIRATION RATE: 16 BRPM | TEMPERATURE: 98.4 F | SYSTOLIC BLOOD PRESSURE: 116 MMHG | OXYGEN SATURATION: 93 % | HEART RATE: 71 BPM | HEIGHT: 76 IN | WEIGHT: 211.9 LBS | BODY MASS INDEX: 25.8 KG/M2 | DIASTOLIC BLOOD PRESSURE: 73 MMHG

## 2024-09-11 DIAGNOSIS — C20 RECTAL CANCER: Primary | ICD-10-CM

## 2024-09-11 DIAGNOSIS — C78.6 METASTASIS TO PERITONEAL CAVITY: ICD-10-CM

## 2024-09-11 LAB
ALBUMIN SERPL-MCNC: 4.2 G/DL (ref 3.5–5.2)
ALBUMIN/GLOB SERPL: 1.4 G/DL
ALP BLD-CCNC: 123 U/L (ref 53–128)
ALP SERPL-CCNC: 143 U/L (ref 39–117)
ALT SERPL W P-5'-P-CCNC: 25 U/L (ref 1–41)
ANION GAP SERPL CALCULATED.3IONS-SCNC: 11.3 MMOL/L (ref 5–15)
AST SERPL-CCNC: 29 U/L (ref 1–40)
BASOPHILS # BLD AUTO: 0.05 10*3/MM3 (ref 0–0.2)
BASOPHILS NFR BLD AUTO: 0.8 % (ref 0–1.5)
BILIRUB SERPL-MCNC: 0.6 MG/DL (ref 0–1.2)
BILIRUB UR QL STRIP: ABNORMAL
BUN BLDA-MCNC: 7 MG/DL (ref 7–22)
BUN SERPL-MCNC: 7 MG/DL (ref 8–23)
BUN/CREAT SERPL: 13 (ref 7–25)
CALCIUM BLD QL: 10.3 MG/DL (ref 8–10.3)
CALCIUM SPEC-SCNC: 9.9 MG/DL (ref 8.6–10.5)
CEA SERPL-MCNC: 4.19 NG/ML
CHLORIDE BLDA-SCNC: 101 MMOL/L (ref 98–108)
CHLORIDE SERPL-SCNC: 101 MMOL/L (ref 98–107)
CLARITY UR: CLEAR
CO2 BLDA-SCNC: 30 MMOL/L (ref 18–33)
CO2 SERPL-SCNC: 27.7 MMOL/L (ref 22–29)
COLOR UR: YELLOW
CREAT BLDA-MCNC: 0.6 MG/DL (ref 0.6–1.2)
CREAT SERPL-MCNC: 0.54 MG/DL (ref 0.76–1.27)
DEPRECATED RDW RBC AUTO: 53.8 FL (ref 37–54)
EGFRCR SERPLBLD CKD-EPI 2021: 109.1 ML/MIN/1.73
EGFRCR SERPLBLD CKD-EPI 2021: 112.7 ML/MIN/1.73
EOSINOPHIL # BLD AUTO: 0.15 10*3/MM3 (ref 0–0.4)
EOSINOPHIL NFR BLD AUTO: 2.4 % (ref 0.3–6.2)
ERYTHROCYTE [DISTWIDTH] IN BLOOD BY AUTOMATED COUNT: 15.6 % (ref 12.3–15.4)
GLOBULIN UR ELPH-MCNC: 3 GM/DL
GLUCOSE BLDC GLUCOMTR-MCNC: 171 MG/DL (ref 73–118)
GLUCOSE SERPL-MCNC: 168 MG/DL (ref 65–99)
GLUCOSE UR STRIP-MCNC: NEGATIVE MG/DL
HCT VFR BLD AUTO: 40.4 % (ref 37.5–51)
HGB BLD-MCNC: 13.1 G/DL (ref 13–17.7)
HGB UR QL STRIP.AUTO: NEGATIVE
KETONES UR QL STRIP: ABNORMAL
LEUKOCYTE ESTERASE UR QL STRIP.AUTO: NEGATIVE
LYMPHOCYTES # BLD AUTO: 1.59 10*3/MM3 (ref 0.7–3.1)
LYMPHOCYTES NFR BLD AUTO: 25.9 % (ref 19.6–45.3)
MCH RBC QN AUTO: 31.6 PG (ref 26.6–33)
MCHC RBC AUTO-ENTMCNC: 32.4 G/DL (ref 31.5–35.7)
MCV RBC AUTO: 97.3 FL (ref 79–97)
MONOCYTES # BLD AUTO: 0.81 10*3/MM3 (ref 0.1–0.9)
MONOCYTES NFR BLD AUTO: 13.2 % (ref 5–12)
NEUTROPHILS NFR BLD AUTO: 3.53 10*3/MM3 (ref 1.7–7)
NEUTROPHILS NFR BLD AUTO: 57.7 % (ref 42.7–76)
NITRITE UR QL STRIP: NEGATIVE
PH UR STRIP.AUTO: 6 [PH] (ref 5–8)
PLATELET # BLD AUTO: 135 10*3/MM3 (ref 140–450)
PMV BLD AUTO: 9.5 FL (ref 6–12)
POC ALBUMIN: 3.6 G/L (ref 3.3–5.5)
POC ALT (SGPT): 25 U/L (ref 10–47)
POC AST (SGOT): 31 U/L (ref 11–38)
POC TOTAL BILIRUBIN: 0.7 MG/DL (ref 0.2–1.6)
POC TOTAL PROTEIN: 7.3 G/DL (ref 6.4–8.1)
POTASSIUM BLDA-SCNC: 3 MMOL/L (ref 3.6–5.1)
POTASSIUM SERPL-SCNC: 3.4 MMOL/L (ref 3.5–5.2)
PROT SERPL-MCNC: 7.2 G/DL (ref 6–8.5)
PROT UR QL STRIP: ABNORMAL
RBC # BLD AUTO: 4.15 10*6/MM3 (ref 4.14–5.8)
SODIUM BLD-SCNC: 145 MMOL/L (ref 128–145)
SODIUM SERPL-SCNC: 140 MMOL/L (ref 136–145)
SP GR UR STRIP: 1.02 (ref 1–1.03)
UROBILINOGEN UR QL STRIP: ABNORMAL
WBC NRBC COR # BLD AUTO: 6.13 10*3/MM3 (ref 3.4–10.8)

## 2024-09-11 PROCEDURE — 96413 CHEMO IV INFUSION 1 HR: CPT

## 2024-09-11 PROCEDURE — 96415 CHEMO IV INFUSION ADDL HR: CPT

## 2024-09-11 PROCEDURE — 96417 CHEMO IV INFUS EACH ADDL SEQ: CPT

## 2024-09-11 PROCEDURE — 82378 CARCINOEMBRYONIC ANTIGEN: CPT | Performed by: INTERNAL MEDICINE

## 2024-09-11 PROCEDURE — 25010000002 FLUOROURACIL PER 500 MG: Performed by: INTERNAL MEDICINE

## 2024-09-11 PROCEDURE — 81003 URINALYSIS AUTO W/O SCOPE: CPT | Performed by: INTERNAL MEDICINE

## 2024-09-11 PROCEDURE — 80053 COMPREHEN METABOLIC PANEL: CPT | Performed by: INTERNAL MEDICINE

## 2024-09-11 PROCEDURE — 80053 COMPREHEN METABOLIC PANEL: CPT

## 2024-09-11 PROCEDURE — 85025 COMPLETE CBC W/AUTO DIFF WBC: CPT | Performed by: INTERNAL MEDICINE

## 2024-09-11 PROCEDURE — 96375 TX/PRO/DX INJ NEW DRUG ADDON: CPT

## 2024-09-11 PROCEDURE — 0 DEXTROSE 5 % SOLUTION 250 ML FLEX CONT: Performed by: INTERNAL MEDICINE

## 2024-09-11 PROCEDURE — 25010000002 OXALIPLATIN PER 0.5 MG: Performed by: INTERNAL MEDICINE

## 2024-09-11 PROCEDURE — 25010000002 BEVACIZUMAB-BVZR 400 MG/16ML SOLUTION 16 ML VIAL: Performed by: INTERNAL MEDICINE

## 2024-09-11 PROCEDURE — 25010000002 PALONOSETRON 0.25 MG/5ML SOLUTION PREFILLED SYRINGE: Performed by: INTERNAL MEDICINE

## 2024-09-11 PROCEDURE — G0498 CHEMO EXTEND IV INFUS W/PUMP: HCPCS

## 2024-09-11 PROCEDURE — 96366 THER/PROPH/DIAG IV INF ADDON: CPT

## 2024-09-11 PROCEDURE — 25810000003 SODIUM CHLORIDE 0.9 % SOLUTION: Performed by: INTERNAL MEDICINE

## 2024-09-11 PROCEDURE — 25010000002 LEUCOVORIN CALCIUM PER 50 MG: Performed by: INTERNAL MEDICINE

## 2024-09-11 PROCEDURE — 25010000002 BEVACIZUMAB-BVZR 100 MG/4ML SOLUTION 4 ML VIAL: Performed by: INTERNAL MEDICINE

## 2024-09-11 PROCEDURE — 25010000002 DEXAMETHASONE SODIUM PHOSPHATE 120 MG/30ML SOLUTION: Performed by: INTERNAL MEDICINE

## 2024-09-11 PROCEDURE — 0 DEXTROSE 5 % SOLUTION: Performed by: INTERNAL MEDICINE

## 2024-09-11 PROCEDURE — 25810000003 SODIUM CHLORIDE 0.9 % SOLUTION 500 ML FLEX CONT: Performed by: INTERNAL MEDICINE

## 2024-09-11 PROCEDURE — 96368 THER/DIAG CONCURRENT INF: CPT

## 2024-09-11 RX ORDER — DEXTROSE MONOHYDRATE 50 MG/ML
20 INJECTION, SOLUTION INTRAVENOUS ONCE
Status: COMPLETED | OUTPATIENT
Start: 2024-09-11 | End: 2024-09-11

## 2024-09-11 RX ORDER — SODIUM CHLORIDE 9 MG/ML
20 INJECTION, SOLUTION INTRAVENOUS ONCE
Status: CANCELLED | OUTPATIENT
Start: 2024-09-11

## 2024-09-11 RX ORDER — PALONOSETRON 0.05 MG/ML
0.25 INJECTION, SOLUTION INTRAVENOUS ONCE
Status: CANCELLED | OUTPATIENT
Start: 2024-09-11

## 2024-09-11 RX ORDER — FAMOTIDINE 10 MG/ML
20 INJECTION, SOLUTION INTRAVENOUS AS NEEDED
Status: CANCELLED | OUTPATIENT
Start: 2024-09-11

## 2024-09-11 RX ORDER — SODIUM CHLORIDE 9 MG/ML
20 INJECTION, SOLUTION INTRAVENOUS ONCE
Status: COMPLETED | OUTPATIENT
Start: 2024-09-11 | End: 2024-09-11

## 2024-09-11 RX ORDER — DEXTROSE MONOHYDRATE 50 MG/ML
20 INJECTION, SOLUTION INTRAVENOUS ONCE
Status: CANCELLED | OUTPATIENT
Start: 2024-09-11

## 2024-09-11 RX ORDER — DIPHENHYDRAMINE HYDROCHLORIDE 50 MG/ML
50 INJECTION INTRAMUSCULAR; INTRAVENOUS AS NEEDED
Status: CANCELLED | OUTPATIENT
Start: 2024-09-11

## 2024-09-11 RX ORDER — PALONOSETRON 0.05 MG/ML
0.25 INJECTION, SOLUTION INTRAVENOUS ONCE
Status: COMPLETED | OUTPATIENT
Start: 2024-09-11 | End: 2024-09-11

## 2024-09-11 RX ADMIN — OXALIPLATIN 195 MG: 5 INJECTION, SOLUTION INTRAVENOUS at 10:10

## 2024-09-11 RX ADMIN — LEUCOVORIN CALCIUM 900 MG: 350 INJECTION, POWDER, LYOPHILIZED, FOR SOLUTION INTRAMUSCULAR; INTRAVENOUS at 10:10

## 2024-09-11 RX ADMIN — PALONOSETRON 0.25 MG: 0.25 INJECTION, SOLUTION INTRAVENOUS at 09:13

## 2024-09-11 RX ADMIN — DEXAMETHASONE SODIUM PHOSPHATE 12 MG: 4 INJECTION, SOLUTION INTRA-ARTICULAR; INTRALESIONAL; INTRAMUSCULAR; INTRAVENOUS; SOFT TISSUE at 09:16

## 2024-09-11 RX ADMIN — SODIUM CHLORIDE 20 ML/HR: 9 INJECTION, SOLUTION INTRAVENOUS at 09:12

## 2024-09-11 RX ADMIN — SODIUM CHLORIDE 500 MG: 9 INJECTION, SOLUTION INTRAVENOUS at 09:33

## 2024-09-11 RX ADMIN — DEXTROSE MONOHYDRATE 20 ML/HR: 50 INJECTION, SOLUTION INTRAVENOUS at 10:08

## 2024-09-11 RX ADMIN — FLUOROURACIL 5450 MG: 50 INJECTION, SOLUTION INTRAVENOUS at 12:24

## 2024-09-11 NOTE — PROGRESS NOTES
Patient is here for C19D1 Zirabev, oxaliplatin, leucovorin and 5FU. Port accessed and flushed with good blood return noted. 10cc of blood wasted prior to specimen collection. Blood specimen obtained and sent to lab for processing per protocol. Patient has no new complaints today. Dr. Wagner sent: Patient is here for C19D1 zirabev, oxaliplatin, leucovorin and 5FU pump only. Patient has no new complaints today. Patients CBC is in parameters-JOSE is still waiting to run but did want to let you know that his urine is not in parameters. His urine protein increased to 3+ >=300mg/dl which is up from the 100mg/dl 2+. The parameter is 2+. The last time he had the 3+ urine protein was on 7/31. is he ok for treatment will his urine out of parameters and if his jose is in parameters? His BP was 116/73.Urine and CBC results sent via secure chat. Dr. Wagner replied via secure chat: please proceed. his chronic kidney disease is what explains the proteinuaria. Patient's potassium from the hospital CMP was 3.4.Patient tolerated treatment and was discharged with AVS.

## 2024-09-13 ENCOUNTER — HOSPITAL ENCOUNTER (OUTPATIENT)
Dept: ONCOLOGY | Facility: HOSPITAL | Age: 64
Discharge: HOME | End: 2024-09-13
Payer: MEDICARE

## 2024-09-13 DIAGNOSIS — C78.6 METASTASIS TO PERITONEAL CAVITY: Primary | ICD-10-CM

## 2024-09-13 DIAGNOSIS — C20 RECTAL CANCER: ICD-10-CM

## 2024-09-13 PROCEDURE — 25010000002 HEPARIN LOCK FLUSH PER 10 UNITS: Performed by: INTERNAL MEDICINE

## 2024-09-13 RX ORDER — SODIUM CHLORIDE 0.9 % (FLUSH) 0.9 %
20 SYRINGE (ML) INJECTION AS NEEDED
OUTPATIENT
Start: 2024-09-13

## 2024-09-13 RX ORDER — HEPARIN SODIUM (PORCINE) LOCK FLUSH IV SOLN 100 UNIT/ML 100 UNIT/ML
500 SOLUTION INTRAVENOUS AS NEEDED
Status: DISCONTINUED | OUTPATIENT
Start: 2024-09-13 | End: 2024-09-14 | Stop reason: HOSPADM

## 2024-09-13 RX ORDER — HEPARIN SODIUM (PORCINE) LOCK FLUSH IV SOLN 100 UNIT/ML 100 UNIT/ML
500 SOLUTION INTRAVENOUS AS NEEDED
OUTPATIENT
Start: 2024-09-13

## 2024-09-13 RX ORDER — SODIUM CHLORIDE 0.9 % (FLUSH) 0.9 %
20 SYRINGE (ML) INJECTION AS NEEDED
Status: DISCONTINUED | OUTPATIENT
Start: 2024-09-13 | End: 2024-09-14 | Stop reason: HOSPADM

## 2024-09-13 RX ADMIN — Medication 20 ML: at 12:58

## 2024-09-13 RX ADMIN — HEPARIN 500 UNITS: 100 SYRINGE at 12:59

## 2024-09-15 RX ORDER — LISINOPRIL AND HYDROCHLOROTHIAZIDE 20; 25 MG/1; MG/1
1 TABLET ORAL DAILY
Qty: 90 TABLET | Refills: 0 | Status: SHIPPED | OUTPATIENT
Start: 2024-09-15

## 2024-09-19 ENCOUNTER — HOSPITAL ENCOUNTER (OUTPATIENT)
Dept: PET IMAGING | Facility: HOSPITAL | Age: 64
Discharge: HOME | End: 2024-09-19
Admitting: INTERNAL MEDICINE
Payer: MEDICARE

## 2024-09-19 DIAGNOSIS — C78.6 METASTASIS TO PERITONEAL CAVITY: ICD-10-CM

## 2024-09-19 DIAGNOSIS — C20 RECTAL CANCER: ICD-10-CM

## 2024-09-19 PROCEDURE — 25510000001 IOPAMIDOL PER 1 ML: Performed by: INTERNAL MEDICINE

## 2024-09-19 PROCEDURE — 74177 CT ABD & PELVIS W/CONTRAST: CPT

## 2024-09-19 PROCEDURE — 71260 CT THORAX DX C+: CPT

## 2024-09-19 RX ORDER — IOPAMIDOL 755 MG/ML
100 INJECTION, SOLUTION INTRAVASCULAR
Status: COMPLETED | OUTPATIENT
Start: 2024-09-19 | End: 2024-09-19

## 2024-09-19 RX ADMIN — IOPAMIDOL 100 ML: 755 INJECTION, SOLUTION INTRAVENOUS at 10:40

## 2024-09-25 ENCOUNTER — OFFICE VISIT (OUTPATIENT)
Dept: ONCOLOGY | Facility: CLINIC | Age: 64
End: 2024-09-25
Payer: MEDICARE

## 2024-09-25 ENCOUNTER — APPOINTMENT (OUTPATIENT)
Dept: LAB | Facility: HOSPITAL | Age: 64
End: 2024-09-25
Payer: MEDICARE

## 2024-09-25 ENCOUNTER — HOSPITAL ENCOUNTER (OUTPATIENT)
Dept: ONCOLOGY | Facility: HOSPITAL | Age: 64
Discharge: HOME | End: 2024-09-25
Payer: MEDICARE

## 2024-09-25 VITALS
DIASTOLIC BLOOD PRESSURE: 80 MMHG | HEIGHT: 76 IN | BODY MASS INDEX: 25.21 KG/M2 | HEART RATE: 73 BPM | SYSTOLIC BLOOD PRESSURE: 128 MMHG | TEMPERATURE: 98 F | WEIGHT: 207 LBS | RESPIRATION RATE: 18 BRPM | OXYGEN SATURATION: 96 %

## 2024-09-25 VITALS
DIASTOLIC BLOOD PRESSURE: 78 MMHG | HEIGHT: 76 IN | RESPIRATION RATE: 18 BRPM | BODY MASS INDEX: 25.4 KG/M2 | SYSTOLIC BLOOD PRESSURE: 124 MMHG | OXYGEN SATURATION: 95 % | WEIGHT: 208.6 LBS | HEART RATE: 71 BPM | TEMPERATURE: 98.1 F

## 2024-09-25 DIAGNOSIS — C78.6 METASTASIS TO PERITONEAL CAVITY: ICD-10-CM

## 2024-09-25 DIAGNOSIS — C20 RECTAL CANCER: ICD-10-CM

## 2024-09-25 DIAGNOSIS — N13.30 HYDRONEPHROSIS, LEFT: ICD-10-CM

## 2024-09-25 DIAGNOSIS — C20 RECTAL CANCER: Primary | ICD-10-CM

## 2024-09-25 DIAGNOSIS — C78.6 METASTASIS TO PERITONEAL CAVITY: Primary | ICD-10-CM

## 2024-09-25 LAB
ALBUMIN SERPL-MCNC: 4 G/DL (ref 3.5–5.2)
ALBUMIN/GLOB SERPL: 1.3 G/DL
ALP BLD-CCNC: 128 U/L (ref 53–128)
ALP SERPL-CCNC: 163 U/L (ref 39–117)
ALT SERPL W P-5'-P-CCNC: 17 U/L (ref 1–41)
ANION GAP SERPL CALCULATED.3IONS-SCNC: 10.8 MMOL/L (ref 5–15)
AST SERPL-CCNC: 25 U/L (ref 1–40)
BASOPHILS # BLD AUTO: 0.05 10*3/MM3 (ref 0–0.2)
BASOPHILS NFR BLD AUTO: 1 % (ref 0–1.5)
BILIRUB SERPL-MCNC: 0.5 MG/DL (ref 0–1.2)
BILIRUB UR QL STRIP: NEGATIVE
BUN BLDA-MCNC: 9 MG/DL (ref 7–22)
BUN SERPL-MCNC: 10 MG/DL (ref 8–23)
BUN/CREAT SERPL: 13.9 (ref 7–25)
CALCIUM BLD QL: 10.1 MG/DL (ref 8–10.3)
CALCIUM SPEC-SCNC: 10.2 MG/DL (ref 8.6–10.5)
CEA SERPL-MCNC: 5.06 NG/ML
CHLORIDE BLDA-SCNC: 100 MMOL/L (ref 98–108)
CHLORIDE SERPL-SCNC: 99 MMOL/L (ref 98–107)
CLARITY UR: CLEAR
CO2 BLDA-SCNC: 27 MMOL/L (ref 18–33)
CO2 SERPL-SCNC: 27.2 MMOL/L (ref 22–29)
COLOR UR: YELLOW
CREAT BLDA-MCNC: 0.8 MG/DL (ref 0.6–1.2)
CREAT SERPL-MCNC: 0.72 MG/DL (ref 0.76–1.27)
DEPRECATED RDW RBC AUTO: 51.7 FL (ref 37–54)
EGFRCR SERPLBLD CKD-EPI 2021: 100.1 ML/MIN/1.73
EGFRCR SERPLBLD CKD-EPI 2021: 103.3 ML/MIN/1.73
EOSINOPHIL # BLD AUTO: 0.11 10*3/MM3 (ref 0–0.4)
EOSINOPHIL NFR BLD AUTO: 2.1 % (ref 0.3–6.2)
ERYTHROCYTE [DISTWIDTH] IN BLOOD BY AUTOMATED COUNT: 15.2 % (ref 12.3–15.4)
GLOBULIN UR ELPH-MCNC: 3.2 GM/DL
GLUCOSE BLDC GLUCOMTR-MCNC: 152 MG/DL (ref 73–118)
GLUCOSE SERPL-MCNC: 113 MG/DL (ref 65–99)
GLUCOSE UR STRIP-MCNC: NEGATIVE MG/DL
HCT VFR BLD AUTO: 39.4 % (ref 37.5–51)
HGB BLD-MCNC: 12.9 G/DL (ref 13–17.7)
HGB UR QL STRIP.AUTO: NEGATIVE
KETONES UR QL STRIP: NEGATIVE
LEUKOCYTE ESTERASE UR QL STRIP.AUTO: NEGATIVE
LYMPHOCYTES # BLD AUTO: 1.54 10*3/MM3 (ref 0.7–3.1)
LYMPHOCYTES NFR BLD AUTO: 29.5 % (ref 19.6–45.3)
MCH RBC QN AUTO: 31.6 PG (ref 26.6–33)
MCHC RBC AUTO-ENTMCNC: 32.7 G/DL (ref 31.5–35.7)
MCV RBC AUTO: 96.6 FL (ref 79–97)
MONOCYTES # BLD AUTO: 0.76 10*3/MM3 (ref 0.1–0.9)
MONOCYTES NFR BLD AUTO: 14.6 % (ref 5–12)
NEUTROPHILS NFR BLD AUTO: 2.76 10*3/MM3 (ref 1.7–7)
NEUTROPHILS NFR BLD AUTO: 52.8 % (ref 42.7–76)
NITRITE UR QL STRIP: NEGATIVE
PH UR STRIP.AUTO: 5.5 [PH] (ref 5–8)
PLATELET # BLD AUTO: 173 10*3/MM3 (ref 140–450)
PMV BLD AUTO: 9.1 FL (ref 6–12)
POC ALBUMIN: 3.4 G/L (ref 3.3–5.5)
POC ALT (SGPT): 20 U/L (ref 10–47)
POC AST (SGOT): 31 U/L (ref 11–38)
POC TOTAL BILIRUBIN: 0.7 MG/DL (ref 0.2–1.6)
POC TOTAL PROTEIN: 7.5 G/DL (ref 6.4–8.1)
POTASSIUM BLDA-SCNC: 3.4 MMOL/L (ref 3.6–5.1)
POTASSIUM SERPL-SCNC: 3.7 MMOL/L (ref 3.5–5.2)
PROT SERPL-MCNC: 7.2 G/DL (ref 6–8.5)
PROT UR QL STRIP: ABNORMAL
RBC # BLD AUTO: 4.08 10*6/MM3 (ref 4.14–5.8)
SODIUM BLD-SCNC: 137 MMOL/L (ref 128–145)
SODIUM SERPL-SCNC: 137 MMOL/L (ref 136–145)
SP GR UR STRIP: 1.02 (ref 1–1.03)
UROBILINOGEN UR QL STRIP: ABNORMAL
WBC NRBC COR # BLD AUTO: 5.22 10*3/MM3 (ref 3.4–10.8)

## 2024-09-25 PROCEDURE — 36591 DRAW BLOOD OFF VENOUS DEVICE: CPT

## 2024-09-25 PROCEDURE — 25010000002 HEPARIN LOCK FLUSH PER 10 UNITS: Performed by: INTERNAL MEDICINE

## 2024-09-25 PROCEDURE — 82378 CARCINOEMBRYONIC ANTIGEN: CPT | Performed by: INTERNAL MEDICINE

## 2024-09-25 PROCEDURE — 80053 COMPREHEN METABOLIC PANEL: CPT | Performed by: INTERNAL MEDICINE

## 2024-09-25 PROCEDURE — 81003 URINALYSIS AUTO W/O SCOPE: CPT | Performed by: INTERNAL MEDICINE

## 2024-09-25 PROCEDURE — 85025 COMPLETE CBC W/AUTO DIFF WBC: CPT | Performed by: INTERNAL MEDICINE

## 2024-09-25 PROCEDURE — 80053 COMPREHEN METABOLIC PANEL: CPT

## 2024-09-25 RX ORDER — SODIUM CHLORIDE 0.9 % (FLUSH) 0.9 %
20 SYRINGE (ML) INJECTION AS NEEDED
OUTPATIENT
Start: 2024-09-25

## 2024-09-25 RX ORDER — HEPARIN SODIUM (PORCINE) LOCK FLUSH IV SOLN 100 UNIT/ML 100 UNIT/ML
500 SOLUTION INTRAVENOUS AS NEEDED
OUTPATIENT
Start: 2024-09-25

## 2024-09-25 RX ORDER — HEPARIN SODIUM (PORCINE) LOCK FLUSH IV SOLN 100 UNIT/ML 100 UNIT/ML
500 SOLUTION INTRAVENOUS AS NEEDED
Status: DISCONTINUED | OUTPATIENT
Start: 2024-09-25 | End: 2024-09-26 | Stop reason: HOSPADM

## 2024-09-25 RX ORDER — SODIUM CHLORIDE 0.9 % (FLUSH) 0.9 %
20 SYRINGE (ML) INJECTION AS NEEDED
Status: DISCONTINUED | OUTPATIENT
Start: 2024-09-25 | End: 2024-09-26 | Stop reason: HOSPADM

## 2024-09-25 RX ADMIN — Medication 20 ML: at 09:37

## 2024-09-25 RX ADMIN — HEPARIN 500 UNITS: 100 SYRINGE at 09:37

## 2024-09-29 RX ORDER — TIRZEPATIDE 2.5 MG/.5ML
INJECTION, SOLUTION SUBCUTANEOUS
Qty: 2 ML | Refills: 0 | OUTPATIENT
Start: 2024-09-29

## 2024-09-30 ENCOUNTER — HOSPITAL ENCOUNTER (OUTPATIENT)
Dept: CARDIOLOGY | Facility: HOSPITAL | Age: 64
Discharge: HOME | End: 2024-09-30
Payer: MEDICARE

## 2024-09-30 ENCOUNTER — TRANSCRIBE ORDERS (OUTPATIENT)
Dept: ADMINISTRATIVE | Facility: HOSPITAL | Age: 64
End: 2024-09-30
Payer: MEDICARE

## 2024-09-30 ENCOUNTER — LAB (OUTPATIENT)
Dept: LAB | Facility: HOSPITAL | Age: 64
End: 2024-09-30
Payer: MEDICARE

## 2024-09-30 DIAGNOSIS — N13.30 HYDRONEPHROSIS, LEFT: ICD-10-CM

## 2024-09-30 DIAGNOSIS — N13.30 HYDRONEPHROSIS, LEFT: Primary | ICD-10-CM

## 2024-09-30 LAB
ANION GAP SERPL CALCULATED.3IONS-SCNC: 11.1 MMOL/L (ref 5–15)
BASOPHILS # BLD AUTO: 0.05 10*3/MM3 (ref 0–0.2)
BASOPHILS NFR BLD AUTO: 0.9 % (ref 0–1.5)
BUN SERPL-MCNC: 12 MG/DL (ref 8–23)
BUN/CREAT SERPL: 12.5 (ref 7–25)
CALCIUM SPEC-SCNC: 10.9 MG/DL (ref 8.6–10.5)
CHLORIDE SERPL-SCNC: 101 MMOL/L (ref 98–107)
CO2 SERPL-SCNC: 26.9 MMOL/L (ref 22–29)
CREAT SERPL-MCNC: 0.96 MG/DL (ref 0.76–1.27)
DEPRECATED RDW RBC AUTO: 49.8 FL (ref 37–54)
EGFRCR SERPLBLD CKD-EPI 2021: 89.4 ML/MIN/1.73
EOSINOPHIL # BLD AUTO: 0.13 10*3/MM3 (ref 0–0.4)
EOSINOPHIL NFR BLD AUTO: 2.3 % (ref 0.3–6.2)
ERYTHROCYTE [DISTWIDTH] IN BLOOD BY AUTOMATED COUNT: 14.4 % (ref 12.3–15.4)
GLUCOSE SERPL-MCNC: 229 MG/DL (ref 65–99)
HCT VFR BLD AUTO: 41 % (ref 37.5–51)
HGB BLD-MCNC: 13.6 G/DL (ref 13–17.7)
IMM GRANULOCYTES # BLD AUTO: 0.03 10*3/MM3 (ref 0–0.05)
IMM GRANULOCYTES NFR BLD AUTO: 0.5 % (ref 0–0.5)
LYMPHOCYTES # BLD AUTO: 2.18 10*3/MM3 (ref 0.7–3.1)
LYMPHOCYTES NFR BLD AUTO: 38.5 % (ref 19.6–45.3)
MCH RBC QN AUTO: 31.1 PG (ref 26.6–33)
MCHC RBC AUTO-ENTMCNC: 33.2 G/DL (ref 31.5–35.7)
MCV RBC AUTO: 93.6 FL (ref 79–97)
MONOCYTES # BLD AUTO: 1.04 10*3/MM3 (ref 0.1–0.9)
MONOCYTES NFR BLD AUTO: 18.4 % (ref 5–12)
NEUTROPHILS NFR BLD AUTO: 2.23 10*3/MM3 (ref 1.7–7)
NEUTROPHILS NFR BLD AUTO: 39.4 % (ref 42.7–76)
NRBC BLD AUTO-RTO: 0 /100 WBC (ref 0–0.2)
PLATELET # BLD AUTO: 309 10*3/MM3 (ref 140–450)
PMV BLD AUTO: 9.4 FL (ref 6–12)
POTASSIUM SERPL-SCNC: 3.9 MMOL/L (ref 3.5–5.2)
QT INTERVAL: 444 MS
QTC INTERVAL: 465 MS
RBC # BLD AUTO: 4.38 10*6/MM3 (ref 4.14–5.8)
SODIUM SERPL-SCNC: 139 MMOL/L (ref 136–145)
WBC NRBC COR # BLD AUTO: 5.66 10*3/MM3 (ref 3.4–10.8)

## 2024-09-30 PROCEDURE — 85025 COMPLETE CBC W/AUTO DIFF WBC: CPT

## 2024-09-30 PROCEDURE — 36415 COLL VENOUS BLD VENIPUNCTURE: CPT

## 2024-09-30 PROCEDURE — 93005 ELECTROCARDIOGRAM TRACING: CPT | Performed by: UROLOGY

## 2024-09-30 PROCEDURE — 80048 BASIC METABOLIC PNL TOTAL CA: CPT

## 2024-10-04 ENCOUNTER — HOSPITAL ENCOUNTER (OUTPATIENT)
Dept: INTERVENTIONAL RADIOLOGY/VASCULAR | Facility: HOSPITAL | Age: 64
Discharge: HOME | End: 2024-10-04
Payer: MEDICARE

## 2024-10-04 VITALS
OXYGEN SATURATION: 93 % | RESPIRATION RATE: 11 BRPM | WEIGHT: 207 LBS | BODY MASS INDEX: 25.21 KG/M2 | SYSTOLIC BLOOD PRESSURE: 103 MMHG | DIASTOLIC BLOOD PRESSURE: 73 MMHG | TEMPERATURE: 97.9 F | HEIGHT: 76 IN | HEART RATE: 57 BPM

## 2024-10-04 DIAGNOSIS — N20.1 LEFT URETERAL STONE: ICD-10-CM

## 2024-10-04 DIAGNOSIS — N13.5 URETERAL STRICTURE, LEFT: ICD-10-CM

## 2024-10-04 DIAGNOSIS — N13.30 HYDRONEPHROSIS, LEFT: ICD-10-CM

## 2024-10-04 LAB
ALBUMIN SERPL-MCNC: 4.4 G/DL (ref 3.5–5.2)
ALBUMIN/GLOB SERPL: 1.1 G/DL
ALP SERPL-CCNC: 160 U/L (ref 39–117)
ALT SERPL W P-5'-P-CCNC: 12 U/L (ref 1–41)
ANION GAP SERPL CALCULATED.3IONS-SCNC: 11.8 MMOL/L (ref 5–15)
APTT PPP: 31 SECONDS (ref 24–31)
AST SERPL-CCNC: 29 U/L (ref 1–40)
BASOPHILS # BLD AUTO: 0.06 10*3/MM3 (ref 0–0.2)
BASOPHILS NFR BLD AUTO: 0.4 % (ref 0–1.5)
BILIRUB SERPL-MCNC: 0.5 MG/DL (ref 0–1.2)
BUN SERPL-MCNC: 16 MG/DL (ref 8–23)
BUN/CREAT SERPL: 17 (ref 7–25)
CALCIUM SPEC-SCNC: 10.9 MG/DL (ref 8.6–10.5)
CHLORIDE SERPL-SCNC: 97 MMOL/L (ref 98–107)
CO2 SERPL-SCNC: 27.2 MMOL/L (ref 22–29)
CREAT SERPL-MCNC: 0.94 MG/DL (ref 0.76–1.27)
DEPRECATED RDW RBC AUTO: 52.7 FL (ref 37–54)
EGFRCR SERPLBLD CKD-EPI 2021: 91.7 ML/MIN/1.73
EOSINOPHIL # BLD AUTO: 0.12 10*3/MM3 (ref 0–0.4)
EOSINOPHIL NFR BLD AUTO: 0.9 % (ref 0.3–6.2)
ERYTHROCYTE [DISTWIDTH] IN BLOOD BY AUTOMATED COUNT: 14.9 % (ref 12.3–15.4)
GLOBULIN UR ELPH-MCNC: 4.1 GM/DL
GLUCOSE SERPL-MCNC: 107 MG/DL (ref 65–99)
HCT VFR BLD AUTO: 43.8 % (ref 37.5–51)
HGB BLD-MCNC: 14.3 G/DL (ref 13–17.7)
IMM GRANULOCYTES # BLD AUTO: 0.19 10*3/MM3 (ref 0–0.05)
IMM GRANULOCYTES NFR BLD AUTO: 1.4 % (ref 0–0.5)
INR PPP: 1.01 (ref 0.93–1.1)
LYMPHOCYTES # BLD AUTO: 2.18 10*3/MM3 (ref 0.7–3.1)
LYMPHOCYTES NFR BLD AUTO: 16.2 % (ref 19.6–45.3)
MCH RBC QN AUTO: 31.4 PG (ref 26.6–33)
MCHC RBC AUTO-ENTMCNC: 32.6 G/DL (ref 31.5–35.7)
MCV RBC AUTO: 96.1 FL (ref 79–97)
MONOCYTES # BLD AUTO: 2.09 10*3/MM3 (ref 0.1–0.9)
MONOCYTES NFR BLD AUTO: 15.5 % (ref 5–12)
NEUTROPHILS NFR BLD AUTO: 65.6 % (ref 42.7–76)
NEUTROPHILS NFR BLD AUTO: 8.84 10*3/MM3 (ref 1.7–7)
NRBC BLD AUTO-RTO: 0 /100 WBC (ref 0–0.2)
PLATELET # BLD AUTO: 231 10*3/MM3 (ref 140–450)
PMV BLD AUTO: 9.3 FL (ref 6–12)
POTASSIUM SERPL-SCNC: 4 MMOL/L (ref 3.5–5.2)
PROT SERPL-MCNC: 8.5 G/DL (ref 6–8.5)
PROTHROMBIN TIME: 11 SECONDS (ref 9.6–11.7)
RBC # BLD AUTO: 4.56 10*6/MM3 (ref 4.14–5.8)
SODIUM SERPL-SCNC: 136 MMOL/L (ref 136–145)
WBC NRBC COR # BLD AUTO: 13.48 10*3/MM3 (ref 3.4–10.8)

## 2024-10-04 PROCEDURE — 25010000002 CEFAZOLIN PER 500 MG: Performed by: RADIOLOGY

## 2024-10-04 PROCEDURE — C1769 GUIDE WIRE: HCPCS

## 2024-10-04 PROCEDURE — C1729 CATH, DRAINAGE: HCPCS

## 2024-10-04 PROCEDURE — 87205 SMEAR GRAM STAIN: CPT | Performed by: RADIOLOGY

## 2024-10-04 PROCEDURE — 85730 THROMBOPLASTIN TIME PARTIAL: CPT | Performed by: RADIOLOGY

## 2024-10-04 PROCEDURE — 87070 CULTURE OTHR SPECIMN AEROBIC: CPT | Performed by: RADIOLOGY

## 2024-10-04 PROCEDURE — 25010000002 MIDAZOLAM PER 1 MG: Performed by: RADIOLOGY

## 2024-10-04 PROCEDURE — 25010000002 ONDANSETRON PER 1 MG: Performed by: RADIOLOGY

## 2024-10-04 PROCEDURE — 80053 COMPREHEN METABOLIC PANEL: CPT | Performed by: INTERNAL MEDICINE

## 2024-10-04 PROCEDURE — 85025 COMPLETE CBC W/AUTO DIFF WBC: CPT | Performed by: INTERNAL MEDICINE

## 2024-10-04 PROCEDURE — C1894 INTRO/SHEATH, NON-LASER: HCPCS

## 2024-10-04 PROCEDURE — 99153 MOD SED SAME PHYS/QHP EA: CPT

## 2024-10-04 PROCEDURE — 76942 ECHO GUIDE FOR BIOPSY: CPT

## 2024-10-04 PROCEDURE — 99152 MOD SED SAME PHYS/QHP 5/>YRS: CPT

## 2024-10-04 PROCEDURE — 25510000001 IOPAMIDOL PER 1 ML: Performed by: UROLOGY

## 2024-10-04 PROCEDURE — 85610 PROTHROMBIN TIME: CPT | Performed by: RADIOLOGY

## 2024-10-04 PROCEDURE — 25010000002 FENTANYL CITRATE (PF) 50 MCG/ML SOLUTION: Performed by: RADIOLOGY

## 2024-10-04 RX ORDER — SODIUM CHLORIDE 0.9 % (FLUSH) 0.9 %
10 SYRINGE (ML) INJECTION AS NEEDED
Status: DISCONTINUED | OUTPATIENT
Start: 2024-10-04 | End: 2024-10-05 | Stop reason: HOSPADM

## 2024-10-04 RX ORDER — ONDANSETRON 2 MG/ML
INJECTION INTRAMUSCULAR; INTRAVENOUS AS NEEDED
Status: COMPLETED | OUTPATIENT
Start: 2024-10-04 | End: 2024-10-04

## 2024-10-04 RX ORDER — SODIUM CHLORIDE 0.9 % (FLUSH) 0.9 %
10 SYRINGE (ML) INJECTION EVERY 12 HOURS SCHEDULED
Status: DISCONTINUED | OUTPATIENT
Start: 2024-10-04 | End: 2024-10-05 | Stop reason: HOSPADM

## 2024-10-04 RX ORDER — FENTANYL CITRATE 50 UG/ML
INJECTION, SOLUTION INTRAMUSCULAR; INTRAVENOUS AS NEEDED
Status: COMPLETED | OUTPATIENT
Start: 2024-10-04 | End: 2024-10-04

## 2024-10-04 RX ORDER — MIDAZOLAM HYDROCHLORIDE 1 MG/ML
INJECTION INTRAMUSCULAR; INTRAVENOUS AS NEEDED
Status: COMPLETED | OUTPATIENT
Start: 2024-10-04 | End: 2024-10-04

## 2024-10-04 RX ORDER — IOPAMIDOL 755 MG/ML
50 INJECTION, SOLUTION INTRAVASCULAR
Status: COMPLETED | OUTPATIENT
Start: 2024-10-04 | End: 2024-10-04

## 2024-10-04 RX ADMIN — IOPAMIDOL 8 ML: 755 INJECTION, SOLUTION INTRAVENOUS at 10:36

## 2024-10-04 RX ADMIN — MIDAZOLAM 1 MG: 1 INJECTION INTRAMUSCULAR; INTRAVENOUS at 10:04

## 2024-10-04 RX ADMIN — FENTANYL CITRATE 50 MCG: 50 INJECTION, SOLUTION INTRAMUSCULAR; INTRAVENOUS at 10:26

## 2024-10-04 RX ADMIN — MIDAZOLAM 0.5 MG: 1 INJECTION INTRAMUSCULAR; INTRAVENOUS at 10:26

## 2024-10-04 RX ADMIN — ONDANSETRON 4 MG: 2 INJECTION INTRAMUSCULAR; INTRAVENOUS at 10:04

## 2024-10-04 RX ADMIN — FENTANYL CITRATE 100 MCG: 50 INJECTION, SOLUTION INTRAMUSCULAR; INTRAVENOUS at 10:04

## 2024-10-04 RX ADMIN — FENTANYL CITRATE 50 MCG: 50 INJECTION, SOLUTION INTRAMUSCULAR; INTRAVENOUS at 10:10

## 2024-10-04 RX ADMIN — Medication 10 ML: at 08:19

## 2024-10-04 RX ADMIN — CEFAZOLIN 2000 MG: 1 INJECTION, POWDER, FOR SOLUTION INTRAMUSCULAR; INTRAVENOUS at 09:54

## 2024-10-04 NOTE — NURSING NOTE
Pt discharged home with wife after instructions  given and questions answered.  Transported via wheelchair to car and assisted into car without incidence.

## 2024-10-04 NOTE — H&P
[facility]   Interventional Radiology H&P    Patient Name: Prashant Zhou  : 1960  MRN: [id number]  Primary Care Physician:  Lazaro Paulino MD  Referring Physician: Prashant Beckett MD  Date of admission: 10/4/2024    Subjective   Subjective     HPI:  Prashant Zhou is a 63 y.o. male with left hydronephrosis.    Review of Systems:   Constitutional no fever,  no weight loss       Otolaryngeal no difficulty swallowing   Cardiovascular no chest pain   Pulmonary no cough, no sputum production   Gastrointestinal no constipation, no diarrhea                         Personal History       Past Medical/Surgical History:   Past Medical History:   Diagnosis Date    Aortic dissection     Diabetes mellitus     Heart murmur     History of noncompliance with medical treatment     Hyperlipidemia     Hypertension     Type B hypoplasia of aortic arch      Past Surgical History:   Procedure Laterality Date    COLECTOMY PARTIAL / TOTAL      COLONOSCOPY N/A 2023    Procedure: COLONOSCOPY with sigmoid mass tattooing at 35cm, sigmoid and rectal polypectomy;  Surgeon: TORIBIO Jacob MD;  Location: [facility] ENDOSCOPY;  Service: Gastroenterology;  Laterality: N/A;  sigmoid mass, colon polyps    THORACOSCOPY WITH DECORTICATION Left 3/11/2024    Procedure: THORACOSCOPY VIDEO ASSISTED WITH DECORTICATION WITH DAVINCI ROBOT;  Surgeon: Saqib Keller MD;  Location: [facility] MAIN OR;  Service: Robotics - DaVinci;  Laterality: Left;       Social History:  reports that he has been smoking cigarettes. He started smoking about 53 years ago. He has a 107.5 pack-year smoking history. He has been exposed to tobacco smoke. He has never used smokeless tobacco. He reports that he does not currently use drugs after having used the following drugs: Marijuana. He reports that he does not drink alcohol.    Medications:  (Not in a hospital admission)    Current medications:  sodium chloride, 10 mL, Intravenous,  "Q12H      Current IV drips:       Allergies:  No Known Allergies    Objective    Objective     Vitals:   Temp:  [97.9 °F (36.6 °C)] 97.9 °F (36.6 °C)  Heart Rate:  [69] 69  Resp:  [12] 12  BP: (132)/(74) 132/74      Physical Exam:   Constitutional: Awake, alert, No acute distress    Respiratory: Clear to auscultation bilaterally, nonlabored respirations    Cardiovascular: RRR, no murmurs, rubs, or gallops, palpable pedal pulses bilaterally   Gastrointestinal: Positive bowel sounds, soft, nontender, nondistended        ASA SCALE ASSESSMENT:  2-Mild to moderate systemic disease, medically well controlled, with no functional limitation    MALLAMPATI CLASSIFICATION:  2-Able to visualize the soft palate, fauces, uvula. The anterior & posterior tonsilar pillars are hidden by the tongue.       Result Review        Result Review:     Sodium   Date Value Ref Range Status   10/04/2024 136 136 - 145 mmol/L Final       Potassium   Date Value Ref Range Status   10/04/2024 4.0 3.5 - 5.2 mmol/L Final     Comment:     Slight hemolysis detected by analyzer. Result may be falsely elevated.       Chloride   Date Value Ref Range Status   10/04/2024 97 (L) 98 - 107 mmol/L Final       No results found for: \"PLASMABICARB\"    BUN   Date Value Ref Range Status   10/04/2024 16 8 - 23 mg/dL Final       Creatinine   Date Value Ref Range Status   10/04/2024 0.94 0.76 - 1.27 mg/dL Final       Calcium   Date Value Ref Range Status   10/04/2024 10.9 (H) 8.6 - 10.5 mg/dL Final           No components found for: \"GLUCOSE.*\"  Results from last 7 days   Lab Units 10/04/24  0811   WBC 10*3/mm3 13.48*   HEMOGLOBIN g/dL 14.3   HEMATOCRIT % 43.8   PLATELETS 10*3/mm3 231      Results from last 7 days   Lab Units 10/04/24  0811   INR  1.01           Assessment / Plan     Assesment:   Left hydronephrosis.      Plan:   Left pcn    The risks and benefits of the procedure were discussed with the patient.    Electronically signed by Aldair Martinez MD, 10/04/24, " 9:32 AM EDT.

## 2024-10-07 LAB
BACTERIA FLD CULT: NORMAL
GRAM STN SPEC: NORMAL
GRAM STN SPEC: NORMAL

## 2024-10-08 NOTE — PROGRESS NOTES
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     History of Present Illness:  Patient is a 63 y.o.     2024: Mr. Zhou carried a long history of severe hypertension and of an aneurysm of the ascending aorta.  He had been receiving antihypertensive medication after correction of the aneurysm with good results.  In 2023 he underwent his first screening colonoscopy.  A large circumferential and obstructive tumor was identified at the sigmoid/rectosigmoid junction.  The tumor could not be traversed even with the smallest instrument.  Biopsies showed moderately differentiated colonic adenocarcinoma without loss of nuclear expression of the mismatch repair proteins.  Imaging studies at the time revealed the known thoracic aneurysm that has increased mildly since the previous scan.  There was a benign-appearing subpleural nodule in the right posteromedial chest wall and pulmonary nodules that have been identified since 2018 remained stable.  Some mediastinal nonspecific and stable adenopathy was reported as well.  In the abdomen the sigmoid tumor was confirmed and there was no suggestion of metastatic disease.  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  He underwent excision of the squamous cell carcinoma that proved to be a high-grade squamous intraepithelial lesion.  He also had a robotic left colectomy.  The final report of pathology was of moderately differentiated adenocarcinoma of the colon, that measured 3.5 cm.  The tumor involved the serosal surface and the antimesenteric serosa.  Lymphovascular space invasion was identified.  All margins were  negative for disease but 2 of 27 lymph nodes had metastatic involvement.  As well there were at least 5 peritoneal deposits that were excised and that were confirmed to correspond to metastatic lesions.  The tumor had intact expression of MLH1, MSH 1, MSH 6 and PMS2.  Next-generation sequencing revealed a pathogenic mutation of exon 2 of the KRAS gene. ERBB2, BRAF, NTRK, RET, and PIK3CA were negative. PD-L1 was negative, as well.  He was started on treatment with FOLFOX and bevacizumab on November 28, 2023.  He reported adequate tolerance to the treatment, with the expected cold intolerance.  He had had no nausea but since the beginning of the present illness he had lost approximately 20 pounds.  He was eating reasonably well.  He had been free of chest pains and no longer had abdominal pain.  All his surgical incisions had healed and he was having regular defecations, at times of liquid stool.  The physical exam revealed him to be a chronically ill-appearing man who did not seem in distress.  He was conversant, in good spirits and without jaundice.  Lungs diminished bilaterally.  Heart regular.  Abdomen soft and nontender.  No edema.  The laboratory exams and all the records were reviewed and discussed with him and with his wife.  To resume treatment with the idea of obtaining a new set of scans after 6 cycles of chemotherapy.  After 12 cycles of chemotherapy discuss the possibility of additional surgery if there was any residual peritoneal disease at the time of surgery.  To see me at the end of February with the scans.    2/16/2024: In the office to review scans. In the last few days has had severe glossodynia and odynophagia. Also has been coughing and expectorating some clear sputum. No fevers. Has had pain on the left side of the chest. No dyspnea. He was prescribed Hiral's magic potion and fluconazole that has resulted in relief. On exam he does not appear acutely ill. No jaundice or pallor. Lungs are  diminished bilaterally and heart irregularly irregular. Abdomen soft. No edema. Reviewed the laboratory exams. Reviewed the images of the scans. Sent prescriptions for doxycycline as the lesion in the left lower lobe is likely infectious in nature, given his leukocytosis and symptoms. Will obtain an electrocardiogram. Will follow next week.     3/8/2024: Feeling poorly. Weak and not moving much. Has continued to have diffuse pain. No fevers. His spouse believes he has an ulcer on the backside. On exam he is conversant and oriented. No jaundice. No pallor. The lungs are diminished bilaterally. Heart regular. There is indeed an ulcer in the sacral region, in the intergluteal crease. Reviewed the laboratory exams. He is to continue with the same chemotherapy. Referred to the wound clinic. To have an electrocardiogram. He is to see me in a few weeks with new scans.     4/10/2024: Was admitted to the hospital shortly after the previous visit.  He had pneumonia and was very dyspneic.  On March 11, 2024 underwent a thoracoscopic decortication with parietal and visceral pleurectomy and intercostal nerve block.  He was treated with antibiotics and eventually discharged to a subacute rehabilitation facility.  Resumed treatment on 4/9/2024.  He tells me today he is feeling progressively better.  Stronger.  The surgical incisions are healing.  He is not having as much trouble breathing.  He has had no abdominal pain or diarrhea.  On exam alert, conversant and ill.  Seems much older than the stated age but is not in distress.  Lungs are diminished bilaterally.  Heart is regular.  Abdomen is soft.  No edema.  The laboratory exams were reviewed.  I reviewed the records from the hospital and reviewed all imaging studies done during that hospitalization.  No suggestion of progressive metastatic disease.  For now continue with the same treatment.  See me early in May 2024.    5/9/2024: Feeling progressively better.  Able to take the  chemotherapy without much difficulty.  Eating more.  Also more mobile.  Using a cane only when outside of his house.  On exam alert, chronically ill-appearing but in no distress.  No jaundice.  Conversant and oriented.  Lungs diminished bilaterally.  Heart regular.  Abdomen soft.  No edema.  Reviewed the laboratory exams and discussed with him.  He will see me again in approximately 6 weeks with new scans.  Continue with the same chemotherapy.    6/19/2024: Feeling reasonably well.  No weakness or chest pains and no cough.  On exam alert and conversant.  Not jaundiced or pale.  Lungs diminished bilaterally with bilateral wheezes and heart regular.  Abdomen soft.  No edema.  Reviewed the images of the recent scans with him and explained the finding of new metastatic deposits in the liver.  Explained to him that given the length of time that elapsed without chemotherapy because of his respiratory and cardiovascular issues, I do not know if the problem is that the chemotherapy was not working at all or if that Is what led to the development of metastatic disease.  I like to try the FOLFOX with bevacizumab in the way that he had been getting it to see if there is response with regular treatment.  For that reason we will proceed with treatment today.  He is to see me in approximately 4 weeks from today.    8/30/2024: Progressively better and stronger.  Eating well and more energetic.  Was able to get out of the house and do several different things 6 days in a row, which has been a change.  Continues to have some dyspnea with exertion but he also continues to smoke.  He has had no chest pains and is not coughing as much.  He denies abdominal pain and has maintained regular bowel activity.  On exam he appears chronically ill and much older than the stated age.  No distress.  No jaundice.  The lungs are clear bilaterally and the heart is regular.  The abdomen is soft.  The epigastric space seems taylor and the edge of the  liver can be palpated, at the level of the midsternal line, approximately 3 cm below the costal margin.  There is no edema.  Laboratory exams reviewed.  To obtain a scan and see me in 3 months.  Continue with the same chemotherapy for now.    9/25/2024: In the office before the next scheduled appointment. He is feeling as well as he had been feeling before and on direct questioning he denies new symptoms. The scans, however, reports progression of the disease with enlargement of the hepatic lesions. There has also been development of lymphadenopathy and a lymph node conglomerate results in left hydronephrosis by causing obstruction of the mid left ureter.     10/11/2024: Received the first dose of irinotecan. Had some nausea and emesis on at least a couple of occasions. He has felt tired. He has been able to eat some. No chest pain or cough. Remains free of abdominal pain. No diarrhea, he has rather tended to be constipated. No edema. No skin rash. On exam alert and conversant. Appears chronically ill but in no distress. No jaundice. Lungs diminished bilaterally and heart regular. Abdomen soft and not tender. No edema. Reviewed the laboratory exams. I have sent a new prescription for prochlorperazine. He is to continue with the same treatment and will see me in approximately 6 weeks.     Past Medical History:   Diagnosis Date    Aortic dissection     Diabetes mellitus     Heart murmur     History of noncompliance with medical treatment     Hyperlipidemia     Hypertension     Type B hypoplasia of aortic arch      Past Surgical History:   Procedure Laterality Date    COLECTOMY PARTIAL / TOTAL  2023    COLONOSCOPY N/A 08/31/2023    Procedure: COLONOSCOPY with sigmoid mass tattooing at 35cm, sigmoid and rectal polypectomy;  Surgeon: TORIBIO Jacob MD;  Location: [facility] ENDOSCOPY;  Service: Gastroenterology;  Laterality: N/A;  sigmoid mass, colon polyps    THORACOSCOPY WITH DECORTICATION Left 3/11/2024    Procedure:  THORACOSCOPY VIDEO ASSISTED WITH DECORTICATION WITH DAVINCI ROBOT;  Surgeon: Saqib Keller MD;  Location: [facility] MAIN OR;  Service: Robotics - DaVinci;  Laterality: Left;       Current Outpatient Medications:     amLODIPine (NORVASC) 10 MG tablet, TAKE 1 TABLET BY MOUTH DAILY, Disp: 90 tablet, Rfl: 0    atorvastatin (LIPITOR) 40 MG tablet, TAKE 1 TABLET BY MOUTH EVERY NIGHT AT BEDTIME, Disp: 90 tablet, Rfl: 0    Blood Glucose Monitoring Suppl (Accu-Chek Guide) w/Device kit, 1 Device Daily., Disp: 1 kit, Rfl: 0    cloNIDine (CATAPRES) 0.1 MG tablet, Take 1 tablet by mouth 2 (Two) Times a Day., Disp: 180 tablet, Rfl: 1    gabapentin (NEURONTIN) 300 MG capsule, TAKE 1 CAPSULE BY MOUTH 3 TIMES A DAY, Disp: 90 capsule, Rfl: 5    glucose blood (Accu-Chek Guide) test strip, Test daily e11.9, Disp: 50 each, Rfl: 12    hydrALAZINE (APRESOLINE) 50 MG tablet, TAKE ONE TABLET BY MOUTH EVERY 8 HOURS, Disp: 90 tablet, Rfl: 1    lisinopril-hydrochlorothiazide (PRINZIDE,ZESTORETIC) 20-25 MG per tablet, TAKE 1 TABLET BY MOUTH DAILY, Disp: 90 tablet, Rfl: 0    metFORMIN (GLUCOPHAGE) 850 MG tablet, TAKE 1 TABLET BY MOUTH TWICE A DAY WITH MEALS, Disp: 144 tablet, Rfl: 1    metoprolol tartrate (LOPRESSOR) 50 MG tablet, TAKE THREE TABLETS BY MOUTH EVERY 12 HOURS, Disp: 540 tablet, Rfl: 0    Mounjaro 2.5 MG/0.5ML solution pen-injector pen, INJECT 2.5 MG UNDER THE SKIN ONCE WEEKLY, Disp: 2 mL, Rfl: 0    ondansetron (ZOFRAN) 8 MG tablet, Take 1 tablet by mouth 3 (Three) Times a Day As Needed for Nausea or Vomiting., Disp: 30 tablet, Rfl: 5    oxyCODONE (ROXICODONE) 10 MG tablet, , Disp: , Rfl:     potassium chloride 10 MEQ CR tablet, Take 1 tablet by mouth Daily., Disp: , Rfl:     rivaroxaban (Xarelto) 20 MG tablet, TAKE 1 TABLET BY MOUTH DAILY, Disp: 90 tablet, Rfl: 1    Tirzepatide (MOUNJARO) 5 MG/0.5ML solution pen-injector pen, Inject 0.5 mL under the skin into the appropriate area as directed 1 (One) Time Per Week., Disp: 6 mL, Rfl: 1     traMADol (ULTRAM) 50 MG tablet, TAKE 1 TABLET BY MOUTH EVERY 6 HOURS AS NEEDED FOR MODERATE PAIN, Disp: 28 tablet, Rfl: 0    vitamin D3 125 MCG (5000 UT) capsule capsule, Take 1 capsule by mouth Daily., Disp: , Rfl:     Chlorcyclizine-Pseudoephed (Stahist AD) 25-60 MG tablet, Take 0.5 tablets by mouth 2 (Two) Times a Day As Needed (Congestion, drainage). (Patient not taking: Reported on 10/10/2024), Disp: 42 tablet, Rfl: 0    prochlorperazine (COMPAZINE) 10 MG tablet, Take 1 tablet by mouth Every 6 (Six) Hours As Needed for Nausea or Vomiting., Disp: 90 tablet, Rfl: 11  No current facility-administered medications for this visit.    Facility-Administered Medications Ordered in Other Visits:     heparin injection 500 Units, 500 Units, Intravenous, PRN, Lars Wagner MD, 500 Units at 10/11/24 1438    sodium chloride 0.9 % flush 20 mL, 20 mL, Intravenous, PRNKristy Alfonso, MD, 20 mL at 10/11/24 1438    No Known Allergies    Family History   Problem Relation Age of Onset    Diabetes Mother     Dementia Mother     Sudden death Father      Cancer-related family history is not on file.    Social History     Tobacco Use    Smoking status: Every Day     Current packs/day: 2.00     Average packs/day: 2.0 packs/day for 53.8 years (107.6 ttl pk-yrs)     Types: Cigarettes     Start date: 1971     Passive exposure: Current    Smokeless tobacco: Never   Vaping Use    Vaping status: Never Used   Substance Use Topics    Alcohol use: No     Comment: Abstinent since around 2008    Drug use: Not Currently     Types: Marijuana     Comment: Abstinent since at least the 1980's     Social History     Social History Narrative    Not on file     ROS:   Review of Systems   Constitutional:  Positive for fatigue. Negative for activity change, appetite change, chills, diaphoresis, fever and unexpected weight change.   HENT:  Negative for congestion, dental problem, drooling, ear discharge, ear pain, facial swelling, hearing loss, mouth  "sores, nosebleeds, postnasal drip, rhinorrhea, sinus pressure, sinus pain, sneezing, sore throat, tinnitus, trouble swallowing and voice change.    Eyes:  Negative for photophobia, pain, discharge, redness, itching and visual disturbance.   Respiratory:  Positive for cough and shortness of breath. Negative for apnea, choking, chest tightness, wheezing and stridor.    Cardiovascular:  Negative for chest pain, palpitations and leg swelling.   Gastrointestinal:  Negative for abdominal distention, abdominal pain, anal bleeding, blood in stool, constipation, diarrhea, nausea, rectal pain and vomiting.   Endocrine: Negative for cold intolerance, heat intolerance, polydipsia and polyuria.   Genitourinary:  Negative for decreased urine volume, difficulty urinating, dysuria, flank pain, frequency, genital sores, hematuria and urgency.   Musculoskeletal:  Negative for arthralgias, back pain, gait problem, joint swelling, myalgias, neck pain and neck stiffness.   Skin:  Negative for color change, pallor and rash.   Neurological:  Negative for dizziness, tremors, seizures, syncope, facial asymmetry, speech difficulty, weakness, light-headedness, numbness and headaches.        Increased cold sensitivity since the commencement of treatment with oxaliplatin   Hematological:  Negative for adenopathy. Does not bruise/bleed easily.   Psychiatric/Behavioral:  Negative for agitation, behavioral problems, confusion, decreased concentration, hallucinations, self-injury, sleep disturbance and suicidal ideas. The patient is not nervous/anxious.      Objective:    Vital Signs:  Vitals:    10/11/24 1451   BP: 117/74   Pulse: 67   Resp: 17   Temp: 98 °F (36.7 °C)   SpO2: 96%   Weight: 92.5 kg (204 lb)   Height: 193 cm (76\")   PainSc: 0-No pain     Body mass index is 24.83 kg/m².    ECOG  (1) Restricted in physically strenuous activity, ambulatory and able to do work of light nature    Physical Exam:   Physical Exam  Constitutional:       " General: He is not in acute distress.     Appearance: He is not ill-appearing, toxic-appearing or diaphoretic.      Comments: Does not appear acutely ill. Conversant and oriented. No jaundice or pallor.    HENT:      Head: Normocephalic and atraumatic.      Right Ear: External ear normal.      Left Ear: External ear normal.      Nose: Nose normal.      Mouth/Throat:      Mouth: Mucous membranes are moist.      Pharynx: Oropharynx is clear.   Eyes:      General: No scleral icterus.        Right eye: No discharge.         Left eye: No discharge.      Conjunctiva/sclera: Conjunctivae normal.      Pupils: Pupils are equal, round, and reactive to light.   Cardiovascular:      Rate and Rhythm: Normal rate.      Pulses: Normal pulses.      Heart sounds: Normal heart sounds. No murmur heard.     No friction rub. No gallop.   Pulmonary:      Effort: No respiratory distress.      Breath sounds: No stridor. No wheezing, rhonchi or rales.      Comments: Diminished bilaterally and with fine crackles in the bases.   Chest:      Chest wall: No tenderness.      Comments: Surgical incisions on the left side of the chest are healing well.  No evidence of infection.  Abdominal:      General: Bowel sounds are normal. There is no distension.      Palpations: Abdomen is soft. There is no mass.      Tenderness: There is no abdominal tenderness. There is no right CVA tenderness, left CVA tenderness, guarding or rebound.      Comments: Soft and nontender.   Musculoskeletal:         General: No swelling, tenderness, deformity or signs of injury.      Cervical back: No rigidity.      Right lower leg: No edema.      Left lower leg: No edema.   Lymphadenopathy:      Cervical: No cervical adenopathy.   Skin:     General: Skin is warm and dry.      Coloration: Skin is not jaundiced.      Findings: No bruising or rash.   Neurological:      General: No focal deficit present.      Mental Status: He is alert and oriented to person, place, and time.       Gait: Gait normal.   Psychiatric:         Mood and Affect: Mood normal.         Behavior: Behavior normal.         Thought Content: Thought content normal.         Judgment: Judgment normal.     MIGUEL Wagner MD performed the physical exam on 10/11/2024 as documented above.     Lab Results - Last 18 Months   Lab Units 10/09/24  0846 10/04/24  0811 09/30/24  1402   WBC 10*3/mm3 7.71 13.48* 5.66   HEMOGLOBIN g/dL 12.4* 14.3 13.6   HEMATOCRIT % 38.5 43.8 41.0   PLATELETS 10*3/mm3 172 231 309   MCV fL 97.0 96.1 93.6     Lab Results - Last 18 Months   Lab Units 10/09/24  0937 10/04/24  0811 09/30/24  1402 09/25/24  0933 09/11/24  0834 09/11/24  0814   SODIUM mmol/L  --  136 139 137  --  140   POTASSIUM mmol/L  --  4.0 3.9 3.7  --  3.4*   CHLORIDE mmol/L  --  97* 101 99  --  101   CO2 mmol/L  --  27.2 26.9 27.2  --  27.7   BUN mg/dL  --  16 12 10  --  7*   CREATININE mg/dL 0.50* 0.94 0.96 0.72*   < > 0.54*   CALCIUM mg/dL  --  10.9* 10.9* 10.2  --  9.9   BILIRUBIN mg/dL  --  0.5  --  0.5  --  0.6   ALK PHOS U/L  --  160*  --  163*  --  143*   ALT (SGPT) U/L  --  12  --  17  --  25   AST (SGOT) U/L  --  29  --  25  --  29   GLUCOSE mg/dL  --  107* 229* 113*  --  168*    < > = values in this interval not displayed.     Lab Results   Component Value Date    GLUCOSE 107 (H) 10/04/2024    BUN 16 10/04/2024    CREATININE 0.50 (L) 10/09/2024    EGFRIFNONA 100 01/20/2022    BCR 17.0 10/04/2024    K 4.0 10/04/2024    CO2 27.2 10/04/2024    CALCIUM 10.9 (H) 10/04/2024    ALBUMIN 4.4 10/04/2024    LABIL2 1.5 01/22/2019    AST 29 10/04/2024    ALT 12 10/04/2024     Lab Results - Last 18 Months   Lab Units 10/04/24  0811 03/10/24  2236 11/14/23  1103   INR  1.01 1.21* 0.99   APTT seconds 31.0 31.2* 28.2     Lab Results   Component Value Date    PTT 31.0 10/04/2024    INR 1.01 10/04/2024     Lab Results   Component Value Date    CEA 5.06 09/25/2024     Lab Results   Component Value Date    PSA 0.594 01/23/2023     Assessment &  Plan     1.  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 Started FOLFIRI/bevacizumab. Has had more nausea and I've sent a prescription for prochlorperazine. To continue with the same.   2.  Recovered completely from the decortication. Asymptomatic at this time.   2.  Cigarette smoker: Continues to smoke about the same.   3.  Aortic aneurysm: To continue to observe for now.   4.  Severe hypertension: Excellent control.   5.  Sacral decubitus ulcer: Resolved at this time.   6.  Reviewed the laboratory exams. Discussed with him and his spouse.   7. Continue same chemotherapy. See me in approximately 6 weeks with new laboratory exams.     Lars Wagner MD on 10/11/2024 at 16:14

## 2024-10-09 ENCOUNTER — HOSPITAL ENCOUNTER (OUTPATIENT)
Dept: ONCOLOGY | Facility: HOSPITAL | Age: 64
Discharge: HOME | End: 2024-10-09
Payer: MEDICARE

## 2024-10-09 ENCOUNTER — OFFICE VISIT (OUTPATIENT)
Dept: PHARMACY | Facility: HOSPITAL | Age: 64
End: 2024-10-09
Payer: MEDICARE

## 2024-10-09 VITALS
HEART RATE: 67 BPM | BODY MASS INDEX: 25.39 KG/M2 | RESPIRATION RATE: 14 BRPM | DIASTOLIC BLOOD PRESSURE: 76 MMHG | HEIGHT: 76 IN | TEMPERATURE: 99.1 F | OXYGEN SATURATION: 92 % | WEIGHT: 208.5 LBS | SYSTOLIC BLOOD PRESSURE: 128 MMHG

## 2024-10-09 DIAGNOSIS — C78.6 METASTASIS TO PERITONEAL CAVITY: ICD-10-CM

## 2024-10-09 DIAGNOSIS — C78.6 METASTASIS TO PERITONEAL CAVITY: Primary | ICD-10-CM

## 2024-10-09 DIAGNOSIS — C20 RECTAL CANCER: Primary | ICD-10-CM

## 2024-10-09 DIAGNOSIS — C20 RECTAL CANCER: ICD-10-CM

## 2024-10-09 LAB
ALP BLD-CCNC: 146 U/L (ref 53–128)
BASOPHILS # BLD AUTO: 0.04 10*3/MM3 (ref 0–0.2)
BASOPHILS NFR BLD AUTO: 0.5 % (ref 0–1.5)
BUN BLDA-MCNC: 9 MG/DL (ref 7–22)
CALCIUM BLD QL: 10.4 MG/DL (ref 8–10.3)
CHLORIDE BLDA-SCNC: 101 MMOL/L (ref 98–108)
CO2 BLDA-SCNC: 29 MMOL/L (ref 18–33)
CREAT BLDA-MCNC: 0.5 MG/DL (ref 0.6–1.2)
DEPRECATED RDW RBC AUTO: 50.5 FL (ref 37–54)
EGFRCR SERPLBLD CKD-EPI 2021: 115.3 ML/MIN/1.73
EOSINOPHIL # BLD AUTO: 0.1 10*3/MM3 (ref 0–0.4)
EOSINOPHIL NFR BLD AUTO: 1.3 % (ref 0.3–6.2)
ERYTHROCYTE [DISTWIDTH] IN BLOOD BY AUTOMATED COUNT: 14.8 % (ref 12.3–15.4)
GLUCOSE BLDC GLUCOMTR-MCNC: 270 MG/DL (ref 73–118)
HCT VFR BLD AUTO: 38.5 % (ref 37.5–51)
HGB BLD-MCNC: 12.4 G/DL (ref 13–17.7)
LYMPHOCYTES # BLD AUTO: 1.86 10*3/MM3 (ref 0.7–3.1)
LYMPHOCYTES NFR BLD AUTO: 24.1 % (ref 19.6–45.3)
MCH RBC QN AUTO: 31.2 PG (ref 26.6–33)
MCHC RBC AUTO-ENTMCNC: 32.2 G/DL (ref 31.5–35.7)
MCV RBC AUTO: 97 FL (ref 79–97)
MONOCYTES # BLD AUTO: 0.93 10*3/MM3 (ref 0.1–0.9)
MONOCYTES NFR BLD AUTO: 12.1 % (ref 5–12)
NEUTROPHILS NFR BLD AUTO: 4.78 10*3/MM3 (ref 1.7–7)
NEUTROPHILS NFR BLD AUTO: 62 % (ref 42.7–76)
PLATELET # BLD AUTO: 172 10*3/MM3 (ref 140–450)
PMV BLD AUTO: 9.6 FL (ref 6–12)
POC ALBUMIN: 3.2 G/L (ref 3.3–5.5)
POC ALT (SGPT): 21 U/L (ref 10–47)
POC AST (SGOT): 34 U/L (ref 11–38)
POC TOTAL BILIRUBIN: 0.7 MG/DL (ref 0.2–1.6)
POC TOTAL PROTEIN: 7.6 G/DL (ref 6.4–8.1)
POTASSIUM BLDA-SCNC: 3 MMOL/L (ref 3.6–5.1)
RBC # BLD AUTO: 3.97 10*6/MM3 (ref 4.14–5.8)
SODIUM BLD-SCNC: 137 MMOL/L (ref 128–145)
WBC NRBC COR # BLD AUTO: 7.71 10*3/MM3 (ref 3.4–10.8)

## 2024-10-09 PROCEDURE — 25010000002 IRINOTECAN PER 20 MG: Performed by: INTERNAL MEDICINE

## 2024-10-09 PROCEDURE — 25010000002 LEUCOVORIN CALCIUM PER 50 MG: Performed by: INTERNAL MEDICINE

## 2024-10-09 PROCEDURE — 96366 THER/PROPH/DIAG IV INF ADDON: CPT

## 2024-10-09 PROCEDURE — 25010000002 ATROPINE SULFATE 0.4 MG/ML SOLUTION 1 ML VIAL: Performed by: INTERNAL MEDICINE

## 2024-10-09 PROCEDURE — 25010000002 FLUOROURACIL PER 500 MG: Performed by: INTERNAL MEDICINE

## 2024-10-09 PROCEDURE — 96411 CHEMO IV PUSH ADDL DRUG: CPT

## 2024-10-09 PROCEDURE — 25010000002 LEUCOVORIN 200 MG RECONSTITUTED SOLUTION 1 EACH VIAL: Performed by: INTERNAL MEDICINE

## 2024-10-09 PROCEDURE — 25010000002 DEXAMETHASONE SODIUM PHOSPHATE 120 MG/30ML SOLUTION: Performed by: INTERNAL MEDICINE

## 2024-10-09 PROCEDURE — 80053 COMPREHEN METABOLIC PANEL: CPT

## 2024-10-09 PROCEDURE — 96413 CHEMO IV INFUSION 1 HR: CPT

## 2024-10-09 PROCEDURE — 85025 COMPLETE CBC W/AUTO DIFF WBC: CPT | Performed by: INTERNAL MEDICINE

## 2024-10-09 PROCEDURE — 25010000002 PALONOSETRON 0.25 MG/5ML SOLUTION PREFILLED SYRINGE: Performed by: INTERNAL MEDICINE

## 2024-10-09 PROCEDURE — 96367 TX/PROPH/DG ADDL SEQ IV INF: CPT

## 2024-10-09 PROCEDURE — G0498 CHEMO EXTEND IV INFUS W/PUMP: HCPCS

## 2024-10-09 PROCEDURE — 96415 CHEMO IV INFUSION ADDL HR: CPT

## 2024-10-09 PROCEDURE — 96375 TX/PRO/DX INJ NEW DRUG ADDON: CPT

## 2024-10-09 PROCEDURE — 0 DEXTROSE 5 % SOLUTION 250 ML FLEX CONT: Performed by: INTERNAL MEDICINE

## 2024-10-09 PROCEDURE — 96368 THER/DIAG CONCURRENT INF: CPT

## 2024-10-09 PROCEDURE — 25810000003 SODIUM CHLORIDE 0.9 % SOLUTION 500 ML FLEX CONT: Performed by: INTERNAL MEDICINE

## 2024-10-09 RX ORDER — ATROPINE SULFATE 1 MG/ML
0.4 INJECTION, SOLUTION INTRAMUSCULAR; INTRAVENOUS; SUBCUTANEOUS
Status: CANCELLED | OUTPATIENT
Start: 2024-10-09

## 2024-10-09 RX ORDER — FLUOROURACIL 50 MG/ML
400 INJECTION, SOLUTION INTRAVENOUS ONCE
Status: CANCELLED | OUTPATIENT
Start: 2024-10-09

## 2024-10-09 RX ORDER — SODIUM CHLORIDE 9 MG/ML
20 INJECTION, SOLUTION INTRAVENOUS ONCE
Status: DISCONTINUED | OUTPATIENT
Start: 2024-10-09 | End: 2024-10-10 | Stop reason: HOSPADM

## 2024-10-09 RX ORDER — PALONOSETRON 0.05 MG/ML
0.25 INJECTION, SOLUTION INTRAVENOUS ONCE
Status: CANCELLED | OUTPATIENT
Start: 2024-10-09

## 2024-10-09 RX ORDER — FLUOROURACIL 50 MG/ML
400 INJECTION, SOLUTION INTRAVENOUS ONCE
Status: COMPLETED | OUTPATIENT
Start: 2024-10-09 | End: 2024-10-09

## 2024-10-09 RX ORDER — PALONOSETRON 0.05 MG/ML
0.25 INJECTION, SOLUTION INTRAVENOUS ONCE
Status: COMPLETED | OUTPATIENT
Start: 2024-10-09 | End: 2024-10-09

## 2024-10-09 RX ORDER — SODIUM CHLORIDE 9 MG/ML
20 INJECTION, SOLUTION INTRAVENOUS ONCE
Status: CANCELLED | OUTPATIENT
Start: 2024-10-09

## 2024-10-09 RX ORDER — ATROPINE SULFATE 1 MG/ML
0.4 INJECTION, SOLUTION INTRAMUSCULAR; INTRAVENOUS; SUBCUTANEOUS
Status: DISCONTINUED | OUTPATIENT
Start: 2024-10-09 | End: 2024-10-09

## 2024-10-09 RX ORDER — ONDANSETRON 8 MG/1
8 TABLET, FILM COATED ORAL 3 TIMES DAILY PRN
Qty: 30 TABLET | Refills: 5 | Status: SHIPPED | OUTPATIENT
Start: 2024-10-09

## 2024-10-09 RX ADMIN — DEXAMETHASONE SODIUM PHOSPHATE 12 MG: 4 INJECTION, SOLUTION INTRA-ARTICULAR; INTRALESIONAL; INTRAMUSCULAR; INTRAVENOUS; SOFT TISSUE at 09:50

## 2024-10-09 RX ADMIN — FLUOROURACIL 5400 MG: 50 INJECTION, SOLUTION INTRAVENOUS at 11:57

## 2024-10-09 RX ADMIN — LEUCOVORIN CALCIUM 900 MG: 350 INJECTION, POWDER, LYOPHILIZED, FOR SOLUTION INTRAMUSCULAR; INTRAVENOUS at 10:11

## 2024-10-09 RX ADMIN — IRINOTECAN HYDROCHLORIDE 400 MG: 20 INJECTION, SOLUTION INTRAVENOUS at 10:10

## 2024-10-09 RX ADMIN — PALONOSETRON 0.25 MG: 0.25 INJECTION, SOLUTION INTRAVENOUS at 09:49

## 2024-10-09 RX ADMIN — FLUOROURACIL 900 MG: 50 INJECTION, SOLUTION INTRAVENOUS at 11:51

## 2024-10-09 NOTE — PROGRESS NOTES
Pharmacy Patient Education Note          Prashant Zhou is a 63 y.o. male being seen at [facility] by Dr. Wagner for a diagnosis of Rectal Cancer with a plan to begin treatment with FOLFIRI + Bevacizumab. Patient previously on FOLFOX + Bevacizumab, discussion focused on differences between Oxaliplatin and Irinotecan. Pharmacist reviewed regimen, as detailed below, with patient.     The discussion included the dose, route, and frequency of administration. Most common side and clinically significant effects were discussed including neutropenia, thrombocytopenia, fatigue, nausea/vomiting, taste changes, loss of appetite, ototoxicity, hair loss/thinning, neuropathy, nephropathy, mucositis, flu-like symptoms, pneumonitis, and electrolyte changes.     Lengthy discussion about diarrhea and proper management with immodium AD as well as when to call the clinic.       Patient was counseled on when to notify a provider including in the event of the following:  Signs and symptoms of infection, including temperature >100.4  Signs and symptoms of serious bleeding including blood in the urine or stool  Changes in urinary output  Frequent nausea/vomiting or diarrhea or severe abdominal pain  Development of mouth sores or ulcerations  The patient was provided with general information on when to call the office for adverse events.     Patient provided with handout regarding medications, and reviewed general plan for chemotherapy administration. Patient engaged in counseling throughout. Allowed time for patient to ask questions. Patient without any further questions at this time and verbalized understanding.    Suha Green RPH  13:23 EDT

## 2024-10-10 LAB
QT INTERVAL: 444 MS
QTC INTERVAL: 465 MS

## 2024-10-11 ENCOUNTER — HOSPITAL ENCOUNTER (OUTPATIENT)
Dept: ONCOLOGY | Facility: HOSPITAL | Age: 64
Discharge: HOME | End: 2024-10-11
Payer: MEDICARE

## 2024-10-11 ENCOUNTER — OFFICE VISIT (OUTPATIENT)
Dept: ONCOLOGY | Facility: CLINIC | Age: 64
End: 2024-10-11
Payer: MEDICARE

## 2024-10-11 VITALS
TEMPERATURE: 98 F | SYSTOLIC BLOOD PRESSURE: 117 MMHG | RESPIRATION RATE: 17 BRPM | DIASTOLIC BLOOD PRESSURE: 74 MMHG | OXYGEN SATURATION: 96 % | HEART RATE: 67 BPM | WEIGHT: 204 LBS | BODY MASS INDEX: 24.84 KG/M2 | HEIGHT: 76 IN

## 2024-10-11 DIAGNOSIS — C20 RECTAL CANCER: Primary | ICD-10-CM

## 2024-10-11 DIAGNOSIS — T45.1X5A CHEMOTHERAPY INDUCED NAUSEA AND VOMITING: ICD-10-CM

## 2024-10-11 DIAGNOSIS — C78.6 METASTASIS TO PERITONEAL CAVITY: ICD-10-CM

## 2024-10-11 DIAGNOSIS — C20 RECTAL CANCER: ICD-10-CM

## 2024-10-11 DIAGNOSIS — R11.2 CHEMOTHERAPY INDUCED NAUSEA AND VOMITING: ICD-10-CM

## 2024-10-11 DIAGNOSIS — C78.6 METASTASIS TO PERITONEAL CAVITY: Primary | ICD-10-CM

## 2024-10-11 LAB — CEA SERPL-MCNC: 7.13 NG/ML

## 2024-10-11 PROCEDURE — 25010000002 HEPARIN LOCK FLUSH PER 10 UNITS: Performed by: INTERNAL MEDICINE

## 2024-10-11 PROCEDURE — 82378 CARCINOEMBRYONIC ANTIGEN: CPT | Performed by: INTERNAL MEDICINE

## 2024-10-11 RX ORDER — PROCHLORPERAZINE MALEATE 10 MG
10 TABLET ORAL EVERY 6 HOURS PRN
Qty: 90 TABLET | Refills: 11 | Status: SHIPPED | OUTPATIENT
Start: 2024-10-11

## 2024-10-11 RX ORDER — HEPARIN SODIUM (PORCINE) LOCK FLUSH IV SOLN 100 UNIT/ML 100 UNIT/ML
500 SOLUTION INTRAVENOUS AS NEEDED
Status: DISCONTINUED | OUTPATIENT
Start: 2024-10-11 | End: 2024-10-12 | Stop reason: HOSPADM

## 2024-10-11 RX ORDER — HEPARIN SODIUM (PORCINE) LOCK FLUSH IV SOLN 100 UNIT/ML 100 UNIT/ML
500 SOLUTION INTRAVENOUS AS NEEDED
OUTPATIENT
Start: 2024-10-11

## 2024-10-11 RX ORDER — SODIUM CHLORIDE 0.9 % (FLUSH) 0.9 %
20 SYRINGE (ML) INJECTION AS NEEDED
OUTPATIENT
Start: 2024-10-11

## 2024-10-11 RX ORDER — SODIUM CHLORIDE 0.9 % (FLUSH) 0.9 %
20 SYRINGE (ML) INJECTION AS NEEDED
Status: DISCONTINUED | OUTPATIENT
Start: 2024-10-11 | End: 2024-10-12 | Stop reason: HOSPADM

## 2024-10-11 RX ADMIN — Medication 20 ML: at 14:38

## 2024-10-11 RX ADMIN — HEPARIN 500 UNITS: 100 SYRINGE at 14:38

## 2024-10-11 NOTE — PROGRESS NOTES
Pt to injection chair for CIV d/c and labs prior to MD appt. Pump empty upon arrival. Port flushed with good blood return noted. 10cc of blood wasted prior to specimen collection. Blood specimen obtained and sent to lab for processing per protocol.  Port flushed with saline and heparin prior to needle removal. Pt to waiting room.

## 2024-10-16 DIAGNOSIS — E11.65 TYPE 2 DIABETES MELLITUS WITH HYPERGLYCEMIA, WITHOUT LONG-TERM CURRENT USE OF INSULIN: ICD-10-CM

## 2024-10-16 RX ORDER — GLIMEPIRIDE 4 MG/1
4 TABLET ORAL 2 TIMES DAILY
Qty: 180 TABLET | Refills: 1 | OUTPATIENT
Start: 2024-10-16

## 2024-10-16 RX ORDER — HYDRALAZINE HYDROCHLORIDE 50 MG/1
50 TABLET, FILM COATED ORAL EVERY 8 HOURS
Qty: 90 TABLET | Refills: 1 | Status: SHIPPED | OUTPATIENT
Start: 2024-10-16

## 2024-10-16 RX ORDER — METOPROLOL TARTRATE 50 MG
TABLET ORAL
Qty: 540 TABLET | Refills: 0 | Status: SHIPPED | OUTPATIENT
Start: 2024-10-16

## 2024-10-16 RX ORDER — AMLODIPINE BESYLATE 10 MG/1
10 TABLET ORAL DAILY
Qty: 90 TABLET | Refills: 0 | Status: SHIPPED | OUTPATIENT
Start: 2024-10-16

## 2024-10-16 RX ORDER — ATORVASTATIN CALCIUM 40 MG/1
40 TABLET, FILM COATED ORAL
Qty: 90 TABLET | Refills: 0 | Status: SHIPPED | OUTPATIENT
Start: 2024-10-16

## 2024-10-18 ENCOUNTER — HOSPITAL ENCOUNTER (OUTPATIENT)
Dept: INTERVENTIONAL RADIOLOGY/VASCULAR | Facility: HOSPITAL | Age: 64
Discharge: HOME | End: 2024-10-18
Payer: MEDICARE

## 2024-10-18 VITALS
DIASTOLIC BLOOD PRESSURE: 76 MMHG | SYSTOLIC BLOOD PRESSURE: 126 MMHG | RESPIRATION RATE: 12 BRPM | OXYGEN SATURATION: 98 % | TEMPERATURE: 98.3 F | WEIGHT: 204 LBS | BODY MASS INDEX: 24.84 KG/M2 | HEART RATE: 77 BPM | HEIGHT: 76 IN

## 2024-10-18 DIAGNOSIS — N13.30 HYDRONEPHROSIS, LEFT: ICD-10-CM

## 2024-10-18 LAB
ANION GAP SERPL CALCULATED.3IONS-SCNC: 10.3 MMOL/L (ref 5–15)
APTT PPP: 27.9 SECONDS (ref 24–31)
BASOPHILS # BLD AUTO: 0.03 10*3/MM3 (ref 0–0.2)
BASOPHILS NFR BLD AUTO: 0.3 % (ref 0–1.5)
BUN SERPL-MCNC: 9 MG/DL (ref 8–23)
BUN/CREAT SERPL: 15.3 (ref 7–25)
CALCIUM SPEC-SCNC: 10.2 MG/DL (ref 8.6–10.5)
CHLORIDE SERPL-SCNC: 101 MMOL/L (ref 98–107)
CO2 SERPL-SCNC: 27.7 MMOL/L (ref 22–29)
CREAT SERPL-MCNC: 0.59 MG/DL (ref 0.76–1.27)
DEPRECATED RDW RBC AUTO: 49 FL (ref 37–54)
EGFRCR SERPLBLD CKD-EPI 2021: 109.7 ML/MIN/1.73
EOSINOPHIL # BLD AUTO: 0.23 10*3/MM3 (ref 0–0.4)
EOSINOPHIL NFR BLD AUTO: 2.6 % (ref 0.3–6.2)
ERYTHROCYTE [DISTWIDTH] IN BLOOD BY AUTOMATED COUNT: 14.6 % (ref 12.3–15.4)
GLUCOSE SERPL-MCNC: 115 MG/DL (ref 65–99)
HCT VFR BLD AUTO: 38.3 % (ref 37.5–51)
HGB BLD-MCNC: 12.6 G/DL (ref 13–17.7)
IMM GRANULOCYTES # BLD AUTO: 0.03 10*3/MM3 (ref 0–0.05)
IMM GRANULOCYTES NFR BLD AUTO: 0.3 % (ref 0–0.5)
INR PPP: 1.05 (ref 0.93–1.1)
LYMPHOCYTES # BLD AUTO: 2.16 10*3/MM3 (ref 0.7–3.1)
LYMPHOCYTES NFR BLD AUTO: 24.3 % (ref 19.6–45.3)
MCH RBC QN AUTO: 30.9 PG (ref 26.6–33)
MCHC RBC AUTO-ENTMCNC: 32.9 G/DL (ref 31.5–35.7)
MCV RBC AUTO: 93.9 FL (ref 79–97)
MONOCYTES # BLD AUTO: 0.36 10*3/MM3 (ref 0.1–0.9)
MONOCYTES NFR BLD AUTO: 4 % (ref 5–12)
NEUTROPHILS NFR BLD AUTO: 6.08 10*3/MM3 (ref 1.7–7)
NEUTROPHILS NFR BLD AUTO: 68.5 % (ref 42.7–76)
NRBC BLD AUTO-RTO: 0 /100 WBC (ref 0–0.2)
PLATELET # BLD AUTO: 159 10*3/MM3 (ref 140–450)
PMV BLD AUTO: 9.1 FL (ref 6–12)
POTASSIUM SERPL-SCNC: 3.3 MMOL/L (ref 3.5–5.2)
PROTHROMBIN TIME: 11.4 SECONDS (ref 9.6–11.7)
RBC # BLD AUTO: 4.08 10*6/MM3 (ref 4.14–5.8)
SODIUM SERPL-SCNC: 139 MMOL/L (ref 136–145)
WBC NRBC COR # BLD AUTO: 8.89 10*3/MM3 (ref 3.4–10.8)

## 2024-10-18 PROCEDURE — 99152 MOD SED SAME PHYS/QHP 5/>YRS: CPT

## 2024-10-18 PROCEDURE — 85730 THROMBOPLASTIN TIME PARTIAL: CPT | Performed by: RADIOLOGY

## 2024-10-18 PROCEDURE — C1769 GUIDE WIRE: HCPCS

## 2024-10-18 PROCEDURE — C2617 STENT, NON-COR, TEM W/O DEL: HCPCS

## 2024-10-18 PROCEDURE — 25010000002 ONDANSETRON PER 1 MG: Performed by: RADIOLOGY

## 2024-10-18 PROCEDURE — C1725 CATH, TRANSLUMIN NON-LASER: HCPCS

## 2024-10-18 PROCEDURE — 25010000002 CEFAZOLIN PER 500 MG: Performed by: RADIOLOGY

## 2024-10-18 PROCEDURE — 85610 PROTHROMBIN TIME: CPT | Performed by: RADIOLOGY

## 2024-10-18 PROCEDURE — 80048 BASIC METABOLIC PNL TOTAL CA: CPT | Performed by: RADIOLOGY

## 2024-10-18 PROCEDURE — 85025 COMPLETE CBC W/AUTO DIFF WBC: CPT | Performed by: RADIOLOGY

## 2024-10-18 PROCEDURE — C1894 INTRO/SHEATH, NON-LASER: HCPCS

## 2024-10-18 PROCEDURE — C1887 CATHETER, GUIDING: HCPCS

## 2024-10-18 PROCEDURE — 99153 MOD SED SAME PHYS/QHP EA: CPT

## 2024-10-18 PROCEDURE — 25010000002 LIDOCAINE 2% SOLUTION: Performed by: RADIOLOGY

## 2024-10-18 PROCEDURE — 25010000002 MIDAZOLAM PER 1 MG: Performed by: RADIOLOGY

## 2024-10-18 PROCEDURE — 25010000002 FENTANYL CITRATE (PF) 50 MCG/ML SOLUTION: Performed by: RADIOLOGY

## 2024-10-18 RX ORDER — LIDOCAINE HYDROCHLORIDE 20 MG/ML
INJECTION, SOLUTION INFILTRATION; PERINEURAL AS NEEDED
Status: COMPLETED | OUTPATIENT
Start: 2024-10-18 | End: 2024-10-18

## 2024-10-18 RX ORDER — FENTANYL CITRATE 50 UG/ML
INJECTION, SOLUTION INTRAMUSCULAR; INTRAVENOUS AS NEEDED
Status: COMPLETED | OUTPATIENT
Start: 2024-10-18 | End: 2024-10-18

## 2024-10-18 RX ORDER — MIDAZOLAM HYDROCHLORIDE 1 MG/ML
INJECTION INTRAMUSCULAR; INTRAVENOUS AS NEEDED
Status: COMPLETED | OUTPATIENT
Start: 2024-10-18 | End: 2024-10-18

## 2024-10-18 RX ORDER — SODIUM CHLORIDE 0.9 % (FLUSH) 0.9 %
10 SYRINGE (ML) INJECTION AS NEEDED
Status: DISCONTINUED | OUTPATIENT
Start: 2024-10-18 | End: 2024-10-19 | Stop reason: HOSPADM

## 2024-10-18 RX ORDER — IOPAMIDOL 755 MG/ML
100 INJECTION, SOLUTION INTRAVASCULAR
Status: DISCONTINUED | OUTPATIENT
Start: 2024-10-18 | End: 2024-10-19 | Stop reason: HOSPADM

## 2024-10-18 RX ORDER — SODIUM CHLORIDE 0.9 % (FLUSH) 0.9 %
10 SYRINGE (ML) INJECTION EVERY 12 HOURS SCHEDULED
Status: DISCONTINUED | OUTPATIENT
Start: 2024-10-18 | End: 2024-10-19 | Stop reason: HOSPADM

## 2024-10-18 RX ORDER — ONDANSETRON 2 MG/ML
INJECTION INTRAMUSCULAR; INTRAVENOUS AS NEEDED
Status: COMPLETED | OUTPATIENT
Start: 2024-10-18 | End: 2024-10-18

## 2024-10-18 RX ADMIN — CEFAZOLIN 2000 MG: 1 INJECTION, POWDER, FOR SOLUTION INTRAMUSCULAR; INTRAVENOUS at 09:38

## 2024-10-18 RX ADMIN — Medication 10 ML: at 08:36

## 2024-10-18 RX ADMIN — FENTANYL CITRATE 100 MCG: 50 INJECTION, SOLUTION INTRAMUSCULAR; INTRAVENOUS at 10:14

## 2024-10-18 RX ADMIN — MIDAZOLAM 1 MG: 1 INJECTION INTRAMUSCULAR; INTRAVENOUS at 10:03

## 2024-10-18 RX ADMIN — FENTANYL CITRATE 100 MCG: 50 INJECTION, SOLUTION INTRAMUSCULAR; INTRAVENOUS at 10:03

## 2024-10-18 RX ADMIN — MIDAZOLAM 1 MG: 1 INJECTION INTRAMUSCULAR; INTRAVENOUS at 10:21

## 2024-10-18 RX ADMIN — LIDOCAINE HYDROCHLORIDE 7 ML: 20 INJECTION, SOLUTION INFILTRATION; PERINEURAL at 10:16

## 2024-10-18 RX ADMIN — MIDAZOLAM 1 MG: 1 INJECTION INTRAMUSCULAR; INTRAVENOUS at 10:25

## 2024-10-18 RX ADMIN — MIDAZOLAM 1 MG: 1 INJECTION INTRAMUSCULAR; INTRAVENOUS at 10:14

## 2024-10-18 RX ADMIN — ONDANSETRON 4 MG: 2 INJECTION INTRAMUSCULAR; INTRAVENOUS at 09:38

## 2024-10-18 NOTE — DISCHARGE INSTRUCTIONS
You may resume all your regular medications except XARELTO, which you can restart tomorrow.  Follow up with Dr. Beckett as instructed.  Advance your diet as tolerated. Do not drive for the remainder of the day.  You may return to your normal activities in 48 hours.  Do not lift more than 10 lbs for 48 hours.  If you experience severe pain, a racing heartbeat or increased shortness of air, seek immediate medical attention.

## 2024-10-23 ENCOUNTER — HOSPITAL ENCOUNTER (OUTPATIENT)
Dept: ONCOLOGY | Facility: HOSPITAL | Age: 64
Discharge: HOME | End: 2024-10-23
Admitting: INTERNAL MEDICINE
Payer: MEDICARE

## 2024-10-23 VITALS
OXYGEN SATURATION: 94 % | WEIGHT: 204.6 LBS | BODY MASS INDEX: 24.91 KG/M2 | HEIGHT: 76 IN | SYSTOLIC BLOOD PRESSURE: 111 MMHG | RESPIRATION RATE: 16 BRPM | HEART RATE: 65 BPM | DIASTOLIC BLOOD PRESSURE: 71 MMHG | TEMPERATURE: 98 F

## 2024-10-23 DIAGNOSIS — C20 RECTAL CANCER: ICD-10-CM

## 2024-10-23 DIAGNOSIS — C78.6 METASTASIS TO PERITONEAL CAVITY: ICD-10-CM

## 2024-10-23 DIAGNOSIS — C78.6 METASTASIS TO PERITONEAL CAVITY: Primary | ICD-10-CM

## 2024-10-23 DIAGNOSIS — Z12.5 ENCOUNTER FOR SCREENING FOR MALIGNANT NEOPLASM OF PROSTATE: ICD-10-CM

## 2024-10-23 DIAGNOSIS — C20 RECTAL CANCER: Primary | ICD-10-CM

## 2024-10-23 LAB
ALBUMIN SERPL-MCNC: 4.2 G/DL (ref 3.5–5.2)
ALBUMIN/GLOB SERPL: 1.7 G/DL
ALP BLD-CCNC: 113 U/L (ref 53–128)
ALP SERPL-CCNC: 136 U/L (ref 39–117)
ALT SERPL W P-5'-P-CCNC: 16 U/L (ref 1–41)
ANION GAP SERPL CALCULATED.3IONS-SCNC: 12.6 MMOL/L (ref 5–15)
AST SERPL-CCNC: 21 U/L (ref 1–40)
BASOPHILS # BLD AUTO: 0.03 10*3/MM3 (ref 0–0.2)
BASOPHILS NFR BLD AUTO: 0.5 % (ref 0–1.5)
BILIRUB SERPL-MCNC: 0.5 MG/DL (ref 0–1.2)
BILIRUB UR QL STRIP: NEGATIVE
BUN BLDA-MCNC: 6 MG/DL (ref 7–22)
BUN SERPL-MCNC: 6 MG/DL (ref 8–23)
BUN/CREAT SERPL: 12 (ref 7–25)
CALCIUM BLD QL: 10.8 MG/DL (ref 8–10.3)
CALCIUM SPEC-SCNC: 10.5 MG/DL (ref 8.6–10.5)
CHLORIDE BLDA-SCNC: 100 MMOL/L (ref 98–108)
CHLORIDE SERPL-SCNC: 100 MMOL/L (ref 98–107)
CLARITY UR: ABNORMAL
CO2 BLDA-SCNC: 28 MMOL/L (ref 18–33)
CO2 SERPL-SCNC: 28.4 MMOL/L (ref 22–29)
COLOR UR: ABNORMAL
CREAT BLDA-MCNC: 0.4 MG/DL (ref 0.6–1.2)
CREAT SERPL-MCNC: 0.5 MG/DL (ref 0.76–1.27)
DEPRECATED RDW RBC AUTO: 52.9 FL (ref 37–54)
EGFRCR SERPLBLD CKD-EPI 2021: 115.3 ML/MIN/1.73
EGFRCR SERPLBLD CKD-EPI 2021: 123.4 ML/MIN/1.73
EOSINOPHIL # BLD AUTO: 0.18 10*3/MM3 (ref 0–0.4)
EOSINOPHIL NFR BLD AUTO: 3.1 % (ref 0.3–6.2)
ERYTHROCYTE [DISTWIDTH] IN BLOOD BY AUTOMATED COUNT: 15.4 % (ref 12.3–15.4)
GLOBULIN UR ELPH-MCNC: 2.5 GM/DL
GLUCOSE BLDC GLUCOMTR-MCNC: 123 MG/DL (ref 73–118)
GLUCOSE SERPL-MCNC: 117 MG/DL (ref 65–99)
GLUCOSE UR STRIP-MCNC: NEGATIVE MG/DL
HCT VFR BLD AUTO: 37.7 % (ref 37.5–51)
HGB BLD-MCNC: 12.4 G/DL (ref 13–17.7)
HGB UR QL STRIP.AUTO: ABNORMAL
KETONES UR QL STRIP: NEGATIVE
LEUKOCYTE ESTERASE UR QL STRIP.AUTO: ABNORMAL
LYMPHOCYTES # BLD AUTO: 1.8 10*3/MM3 (ref 0.7–3.1)
LYMPHOCYTES NFR BLD AUTO: 30.6 % (ref 19.6–45.3)
MCH RBC QN AUTO: 31.8 PG (ref 26.6–33)
MCHC RBC AUTO-ENTMCNC: 32.9 G/DL (ref 31.5–35.7)
MCV RBC AUTO: 96.7 FL (ref 79–97)
MONOCYTES # BLD AUTO: 0.56 10*3/MM3 (ref 0.1–0.9)
MONOCYTES NFR BLD AUTO: 9.5 % (ref 5–12)
NEUTROPHILS NFR BLD AUTO: 3.31 10*3/MM3 (ref 1.7–7)
NEUTROPHILS NFR BLD AUTO: 56.3 % (ref 42.7–76)
NITRITE UR QL STRIP: NEGATIVE
PH UR STRIP.AUTO: 6 [PH] (ref 5–8)
PLATELET # BLD AUTO: 197 10*3/MM3 (ref 140–450)
PMV BLD AUTO: 9.3 FL (ref 6–12)
POC ALBUMIN: 3.6 G/L (ref 3.3–5.5)
POC ALT (SGPT): 18 U/L (ref 10–47)
POC AST (SGOT): 24 U/L (ref 11–38)
POC TOTAL BILIRUBIN: 0.8 MG/DL (ref 0.2–1.6)
POC TOTAL PROTEIN: 7.2 G/DL (ref 6.4–8.1)
POTASSIUM BLDA-SCNC: 3 MMOL/L (ref 3.6–5.1)
POTASSIUM SERPL-SCNC: 3.3 MMOL/L (ref 3.5–5.2)
PROT SERPL-MCNC: 6.7 G/DL (ref 6–8.5)
PROT UR QL STRIP: ABNORMAL
RBC # BLD AUTO: 3.9 10*6/MM3 (ref 4.14–5.8)
SODIUM BLD-SCNC: 145 MMOL/L (ref 128–145)
SODIUM SERPL-SCNC: 141 MMOL/L (ref 136–145)
SP GR UR STRIP: 1.03 (ref 1–1.03)
UROBILINOGEN UR QL STRIP: ABNORMAL
WBC NRBC COR # BLD AUTO: 5.88 10*3/MM3 (ref 3.4–10.8)

## 2024-10-23 PROCEDURE — 25010000002 BEVACIZUMAB-BVZR 100 MG/4ML SOLUTION 4 ML VIAL: Performed by: INTERNAL MEDICINE

## 2024-10-23 PROCEDURE — 85025 COMPLETE CBC W/AUTO DIFF WBC: CPT | Performed by: INTERNAL MEDICINE

## 2024-10-23 PROCEDURE — 0 DEXTROSE 5 % SOLUTION 250 ML FLEX CONT: Performed by: INTERNAL MEDICINE

## 2024-10-23 PROCEDURE — 25810000003 SODIUM CHLORIDE 0.9 % SOLUTION 500 ML FLEX CONT: Performed by: INTERNAL MEDICINE

## 2024-10-23 PROCEDURE — 25010000002 BEVACIZUMAB-BVZR 400 MG/16ML SOLUTION 16 ML VIAL: Performed by: INTERNAL MEDICINE

## 2024-10-23 PROCEDURE — 25010000002 LEUCOVORIN 200 MG RECONSTITUTED SOLUTION 1 EACH VIAL: Performed by: INTERNAL MEDICINE

## 2024-10-23 PROCEDURE — 96375 TX/PRO/DX INJ NEW DRUG ADDON: CPT

## 2024-10-23 PROCEDURE — 81003 URINALYSIS AUTO W/O SCOPE: CPT | Performed by: INTERNAL MEDICINE

## 2024-10-23 PROCEDURE — 25010000002 DEXAMETHASONE PER 1 MG: Performed by: INTERNAL MEDICINE

## 2024-10-23 PROCEDURE — 80053 COMPREHEN METABOLIC PANEL: CPT | Performed by: INTERNAL MEDICINE

## 2024-10-23 PROCEDURE — 80053 COMPREHEN METABOLIC PANEL: CPT

## 2024-10-23 PROCEDURE — 96411 CHEMO IV PUSH ADDL DRUG: CPT

## 2024-10-23 PROCEDURE — 25010000002 FLUOROURACIL PER 500 MG: Performed by: INTERNAL MEDICINE

## 2024-10-23 PROCEDURE — 25010000002 PALONOSETRON 0.25 MG/5ML SOLUTION PREFILLED SYRINGE: Performed by: INTERNAL MEDICINE

## 2024-10-23 PROCEDURE — 96415 CHEMO IV INFUSION ADDL HR: CPT

## 2024-10-23 PROCEDURE — 96413 CHEMO IV INFUSION 1 HR: CPT

## 2024-10-23 PROCEDURE — 96366 THER/PROPH/DIAG IV INF ADDON: CPT

## 2024-10-23 PROCEDURE — 96417 CHEMO IV INFUS EACH ADDL SEQ: CPT

## 2024-10-23 PROCEDURE — 96368 THER/DIAG CONCURRENT INF: CPT

## 2024-10-23 PROCEDURE — 25010000002 ATROPINE SULFATE 0.4 MG/ML SOLUTION 1 ML VIAL: Performed by: INTERNAL MEDICINE

## 2024-10-23 PROCEDURE — G0498 CHEMO EXTEND IV INFUS W/PUMP: HCPCS

## 2024-10-23 PROCEDURE — 25010000002 LEUCOVORIN CALCIUM PER 50 MG: Performed by: INTERNAL MEDICINE

## 2024-10-23 PROCEDURE — 25010000002 IRINOTECAN PER 20 MG: Performed by: INTERNAL MEDICINE

## 2024-10-23 RX ORDER — FLUOROURACIL 50 MG/ML
400 INJECTION, SOLUTION INTRAVENOUS ONCE
Status: COMPLETED | OUTPATIENT
Start: 2024-10-23 | End: 2024-10-23

## 2024-10-23 RX ORDER — DEXAMETHASONE SODIUM PHOSPHATE 4 MG/ML
12 INJECTION, SOLUTION INTRA-ARTICULAR; INTRALESIONAL; INTRAMUSCULAR; INTRAVENOUS; SOFT TISSUE ONCE
Status: COMPLETED | OUTPATIENT
Start: 2024-10-23 | End: 2024-10-23

## 2024-10-23 RX ORDER — ATROPINE SULFATE 1 MG/ML
0.4 INJECTION, SOLUTION INTRAMUSCULAR; INTRAVENOUS; SUBCUTANEOUS
Status: CANCELLED | OUTPATIENT
Start: 2024-10-23

## 2024-10-23 RX ORDER — SODIUM CHLORIDE 9 MG/ML
20 INJECTION, SOLUTION INTRAVENOUS ONCE
Status: CANCELLED | OUTPATIENT
Start: 2024-10-23

## 2024-10-23 RX ORDER — PALONOSETRON 0.05 MG/ML
0.25 INJECTION, SOLUTION INTRAVENOUS ONCE
Status: CANCELLED | OUTPATIENT
Start: 2024-10-23

## 2024-10-23 RX ORDER — ATROPINE SULFATE 1 MG/ML
0.4 INJECTION, SOLUTION INTRAMUSCULAR; INTRAVENOUS; SUBCUTANEOUS
Status: DISCONTINUED | OUTPATIENT
Start: 2024-10-23 | End: 2024-10-23

## 2024-10-23 RX ORDER — SODIUM CHLORIDE 9 MG/ML
20 INJECTION, SOLUTION INTRAVENOUS ONCE
Status: DISCONTINUED | OUTPATIENT
Start: 2024-10-23 | End: 2024-10-24 | Stop reason: HOSPADM

## 2024-10-23 RX ORDER — FLUOROURACIL 50 MG/ML
400 INJECTION, SOLUTION INTRAVENOUS ONCE
Status: CANCELLED | OUTPATIENT
Start: 2024-10-23

## 2024-10-23 RX ORDER — PALONOSETRON 0.05 MG/ML
0.25 INJECTION, SOLUTION INTRAVENOUS ONCE
Status: COMPLETED | OUTPATIENT
Start: 2024-10-23 | End: 2024-10-23

## 2024-10-23 RX ADMIN — DEXAMETHASONE SODIUM PHOSPHATE 12 MG: 4 INJECTION, SOLUTION INTRAMUSCULAR; INTRAVENOUS at 10:41

## 2024-10-23 RX ADMIN — SODIUM CHLORIDE 470 MG: 9 INJECTION, SOLUTION INTRAVENOUS at 11:54

## 2024-10-23 RX ADMIN — FLUOROURACIL 5400 MG: 50 INJECTION, SOLUTION INTRAVENOUS at 14:12

## 2024-10-23 RX ADMIN — FLUOROURACIL 900 MG: 50 INJECTION, SOLUTION INTRAVENOUS at 14:06

## 2024-10-23 RX ADMIN — PALONOSETRON 0.25 MG: 0.25 INJECTION, SOLUTION INTRAVENOUS at 10:38

## 2024-10-23 RX ADMIN — IRINOTECAN HYDROCHLORIDE 400 MG: 20 INJECTION, SOLUTION INTRAVENOUS at 12:32

## 2024-10-23 RX ADMIN — LEUCOVORIN CALCIUM 900 MG: 350 INJECTION, POWDER, LYOPHILIZED, FOR SOLUTION INTRAMUSCULAR; INTRAVENOUS at 12:31

## 2024-10-23 NOTE — PROGRESS NOTES
Patient is here for C2D1 Folfiri +zirabev. Port accessed and flushed with good blood return noted. 10cc of blood wasted prior to specimen collection. Blood specimen obtained and sent to lab for processing per protocol. Dr. Wagner sent; Patient is here for C2D1 zirabev, irinotecan, leucovorin and 5FU. Patient stated that he had a STENT placed in his left kidney to bladder on Friday last week and had his drain removed. no new complaints except a little pain when urinating. Patient's urine has to be sent to the hospital due to the amount of blood in it (it looked like very dark concentrated tea)-still waiting on that. His BP came down to parameters. STAT CMP to recheck potassium was 3.3. Patient's urine came back with 3+ protein and the parameter is 2+. Lab and U/A results sent. Dr. Wagner verbally responded ok to treat. Patient tolerated treatment and was discharged with AVS.

## 2024-10-25 ENCOUNTER — HOSPITAL ENCOUNTER (OUTPATIENT)
Dept: ONCOLOGY | Facility: HOSPITAL | Age: 64
Discharge: HOME | End: 2024-10-25
Payer: MEDICARE

## 2024-10-25 DIAGNOSIS — C20 RECTAL CANCER: ICD-10-CM

## 2024-10-25 DIAGNOSIS — C78.6 METASTASIS TO PERITONEAL CAVITY: Primary | ICD-10-CM

## 2024-10-25 PROCEDURE — 25010000002 HEPARIN LOCK FLUSH PER 10 UNITS: Performed by: INTERNAL MEDICINE

## 2024-10-25 RX ORDER — SODIUM CHLORIDE 0.9 % (FLUSH) 0.9 %
20 SYRINGE (ML) INJECTION AS NEEDED
OUTPATIENT
Start: 2024-10-25

## 2024-10-25 RX ORDER — SODIUM CHLORIDE 0.9 % (FLUSH) 0.9 %
20 SYRINGE (ML) INJECTION AS NEEDED
Status: DISCONTINUED | OUTPATIENT
Start: 2024-10-25 | End: 2024-10-26 | Stop reason: HOSPADM

## 2024-10-25 RX ORDER — HEPARIN SODIUM (PORCINE) LOCK FLUSH IV SOLN 100 UNIT/ML 100 UNIT/ML
500 SOLUTION INTRAVENOUS AS NEEDED
OUTPATIENT
Start: 2024-10-25

## 2024-10-25 RX ORDER — HEPARIN SODIUM (PORCINE) LOCK FLUSH IV SOLN 100 UNIT/ML 100 UNIT/ML
500 SOLUTION INTRAVENOUS AS NEEDED
Status: DISCONTINUED | OUTPATIENT
Start: 2024-10-25 | End: 2024-10-26 | Stop reason: HOSPADM

## 2024-10-25 RX ADMIN — HEPARIN 500 UNITS: 100 SYRINGE at 13:00

## 2024-10-25 RX ADMIN — Medication 20 ML: at 13:00

## 2024-10-25 NOTE — PROGRESS NOTES
Pt here for Adrucil pump DC. Pump empty. Site clean, dry, and intact.  Pt denies having any issues. Port flushed with good blood return noted.  Port flushed with saline and heparin prior to needle removal.  Pt declined AVS and d/c by self.

## 2024-11-06 ENCOUNTER — HOSPITAL ENCOUNTER (OUTPATIENT)
Dept: ONCOLOGY | Facility: HOSPITAL | Age: 64
Discharge: HOME | End: 2024-11-06
Admitting: INTERNAL MEDICINE
Payer: MEDICARE

## 2024-11-06 VITALS
BODY MASS INDEX: 24.28 KG/M2 | SYSTOLIC BLOOD PRESSURE: 137 MMHG | WEIGHT: 199.5 LBS | HEART RATE: 88 BPM | TEMPERATURE: 98.3 F | DIASTOLIC BLOOD PRESSURE: 83 MMHG | OXYGEN SATURATION: 95 % | RESPIRATION RATE: 16 BRPM

## 2024-11-06 DIAGNOSIS — E83.42 HYPOMAGNESEMIA: ICD-10-CM

## 2024-11-06 DIAGNOSIS — C78.6 METASTASIS TO PERITONEAL CAVITY: ICD-10-CM

## 2024-11-06 DIAGNOSIS — E87.6 HYPOKALEMIA: Primary | ICD-10-CM

## 2024-11-06 DIAGNOSIS — C20 RECTAL CANCER: Primary | ICD-10-CM

## 2024-11-06 LAB
ALBUMIN SERPL-MCNC: 4.2 G/DL (ref 3.5–5.2)
ALBUMIN/GLOB SERPL: 1.6 G/DL
ALP BLD-CCNC: 110 U/L (ref 53–128)
ALP SERPL-CCNC: 119 U/L (ref 39–117)
ALT SERPL W P-5'-P-CCNC: 13 U/L (ref 1–41)
ANION GAP SERPL CALCULATED.3IONS-SCNC: 13.4 MMOL/L (ref 5–15)
AST SERPL-CCNC: 23 U/L (ref 1–40)
BACTERIA UR QL AUTO: ABNORMAL /HPF
BASOPHILS # BLD AUTO: 0.05 10*3/MM3 (ref 0–0.2)
BASOPHILS NFR BLD AUTO: 0.7 % (ref 0–1.5)
BILIRUB SERPL-MCNC: 0.4 MG/DL (ref 0–1.2)
BILIRUB UR QL STRIP: NEGATIVE
BUN BLDA-MCNC: 7 MG/DL (ref 7–22)
BUN SERPL-MCNC: 6 MG/DL (ref 8–23)
BUN/CREAT SERPL: 10 (ref 7–25)
CALCIUM BLD QL: 10.8 MG/DL (ref 8–10.3)
CALCIUM SPEC-SCNC: 10.4 MG/DL (ref 8.6–10.5)
CHLORIDE BLDA-SCNC: 98 MMOL/L (ref 98–108)
CHLORIDE SERPL-SCNC: 99 MMOL/L (ref 98–107)
CLARITY UR: ABNORMAL
CO2 BLDA-SCNC: 28 MMOL/L (ref 18–33)
CO2 SERPL-SCNC: 27.6 MMOL/L (ref 22–29)
COLOR UR: ABNORMAL
CREAT BLDA-MCNC: 0.5 MG/DL (ref 0.6–1.2)
CREAT SERPL-MCNC: 0.6 MG/DL (ref 0.76–1.27)
DEPRECATED RDW RBC AUTO: 57.9 FL (ref 37–54)
EGFRCR SERPLBLD CKD-EPI 2021: 109.1 ML/MIN/1.73
EGFRCR SERPLBLD CKD-EPI 2021: 115.3 ML/MIN/1.73
EOSINOPHIL # BLD AUTO: 0.12 10*3/MM3 (ref 0–0.4)
EOSINOPHIL NFR BLD AUTO: 1.7 % (ref 0.3–6.2)
ERYTHROCYTE [DISTWIDTH] IN BLOOD BY AUTOMATED COUNT: 16.5 % (ref 12.3–15.4)
GLOBULIN UR ELPH-MCNC: 2.7 GM/DL
GLUCOSE BLDC GLUCOMTR-MCNC: 188 MG/DL (ref 73–118)
GLUCOSE SERPL-MCNC: 192 MG/DL (ref 65–99)
GLUCOSE UR STRIP-MCNC: NEGATIVE MG/DL
HCT VFR BLD AUTO: 38.9 % (ref 37.5–51)
HGB BLD-MCNC: 12.6 G/DL (ref 13–17.7)
HGB UR QL STRIP.AUTO: ABNORMAL
HYALINE CASTS UR QL AUTO: ABNORMAL /LPF
KETONES UR QL STRIP: NEGATIVE
LEUKOCYTE ESTERASE UR QL STRIP.AUTO: ABNORMAL
LYMPHOCYTES # BLD AUTO: 2.15 10*3/MM3 (ref 0.7–3.1)
LYMPHOCYTES NFR BLD AUTO: 30.7 % (ref 19.6–45.3)
MAGNESIUM SERPL-MCNC: 1.4 MG/DL (ref 1.6–2.4)
MCH RBC QN AUTO: 32.2 PG (ref 26.6–33)
MCHC RBC AUTO-ENTMCNC: 32.4 G/DL (ref 31.5–35.7)
MCV RBC AUTO: 99.5 FL (ref 79–97)
MONOCYTES # BLD AUTO: 0.68 10*3/MM3 (ref 0.1–0.9)
MONOCYTES NFR BLD AUTO: 9.7 % (ref 5–12)
NEUTROPHILS NFR BLD AUTO: 4.01 10*3/MM3 (ref 1.7–7)
NEUTROPHILS NFR BLD AUTO: 57.2 % (ref 42.7–76)
NITRITE UR QL STRIP: NEGATIVE
PH UR STRIP.AUTO: 6 [PH] (ref 5–8)
PLATELET # BLD AUTO: 196 10*3/MM3 (ref 140–450)
PMV BLD AUTO: 9.9 FL (ref 6–12)
POC ALBUMIN: 3.7 G/L (ref 3.3–5.5)
POC ALT (SGPT): 19 U/L (ref 10–47)
POC AST (SGOT): 27 U/L (ref 11–38)
POC TOTAL BILIRUBIN: 0.7 MG/DL (ref 0.2–1.6)
POC TOTAL PROTEIN: 7.2 G/DL (ref 6.4–8.1)
POTASSIUM BLDA-SCNC: 3.1 MMOL/L (ref 3.6–5.1)
POTASSIUM SERPL-SCNC: 3.2 MMOL/L (ref 3.5–5.2)
PROT SERPL-MCNC: 6.9 G/DL (ref 6–8.5)
PROT UR QL STRIP: ABNORMAL
RBC # BLD AUTO: 3.91 10*6/MM3 (ref 4.14–5.8)
RBC # UR STRIP: ABNORMAL /HPF
REF LAB TEST METHOD: ABNORMAL
SODIUM BLD-SCNC: 145 MMOL/L (ref 128–145)
SODIUM SERPL-SCNC: 140 MMOL/L (ref 136–145)
SP GR UR STRIP: 1.02 (ref 1–1.03)
SQUAMOUS #/AREA URNS HPF: ABNORMAL /HPF
UROBILINOGEN UR QL STRIP: ABNORMAL
WBC # UR STRIP: ABNORMAL /HPF
WBC NRBC COR # BLD AUTO: 7.01 10*3/MM3 (ref 3.4–10.8)

## 2024-11-06 PROCEDURE — 25010000002 BEVACIZUMAB-BVZR 100 MG/4ML SOLUTION 4 ML VIAL: Performed by: NURSE PRACTITIONER

## 2024-11-06 PROCEDURE — 96368 THER/DIAG CONCURRENT INF: CPT

## 2024-11-06 PROCEDURE — 25010000002 FLUOROURACIL PER 500 MG: Performed by: NURSE PRACTITIONER

## 2024-11-06 PROCEDURE — 96413 CHEMO IV INFUSION 1 HR: CPT

## 2024-11-06 PROCEDURE — 80053 COMPREHEN METABOLIC PANEL: CPT

## 2024-11-06 PROCEDURE — 25010000002 LEUCOVORIN CALCIUM PER 50 MG: Performed by: NURSE PRACTITIONER

## 2024-11-06 PROCEDURE — 83735 ASSAY OF MAGNESIUM: CPT | Performed by: NURSE PRACTITIONER

## 2024-11-06 PROCEDURE — 25010000002 ATROPINE SULFATE 0.4 MG/ML SOLUTION 1 ML VIAL: Performed by: NURSE PRACTITIONER

## 2024-11-06 PROCEDURE — 25010000002 DEXAMETHASONE PER 1 MG: Performed by: NURSE PRACTITIONER

## 2024-11-06 PROCEDURE — G0498 CHEMO EXTEND IV INFUS W/PUMP: HCPCS

## 2024-11-06 PROCEDURE — 96417 CHEMO IV INFUS EACH ADDL SEQ: CPT

## 2024-11-06 PROCEDURE — 25010000002 IRINOTECAN PER 20 MG: Performed by: NURSE PRACTITIONER

## 2024-11-06 PROCEDURE — 96415 CHEMO IV INFUSION ADDL HR: CPT

## 2024-11-06 PROCEDURE — 87086 URINE CULTURE/COLONY COUNT: CPT | Performed by: INTERNAL MEDICINE

## 2024-11-06 PROCEDURE — 25010000002 LEUCOVORIN 200 MG RECONSTITUTED SOLUTION 1 EACH VIAL: Performed by: NURSE PRACTITIONER

## 2024-11-06 PROCEDURE — 81001 URINALYSIS AUTO W/SCOPE: CPT | Performed by: INTERNAL MEDICINE

## 2024-11-06 PROCEDURE — 0 DEXTROSE 5 % SOLUTION 250 ML FLEX CONT: Performed by: NURSE PRACTITIONER

## 2024-11-06 PROCEDURE — 80053 COMPREHEN METABOLIC PANEL: CPT | Performed by: INTERNAL MEDICINE

## 2024-11-06 PROCEDURE — 96375 TX/PRO/DX INJ NEW DRUG ADDON: CPT

## 2024-11-06 PROCEDURE — 96411 CHEMO IV PUSH ADDL DRUG: CPT

## 2024-11-06 PROCEDURE — 25010000002 PALONOSETRON 0.25 MG/5ML SOLUTION PREFILLED SYRINGE: Performed by: NURSE PRACTITIONER

## 2024-11-06 PROCEDURE — 25810000003 SODIUM CHLORIDE 0.9 % SOLUTION 500 ML FLEX CONT: Performed by: NURSE PRACTITIONER

## 2024-11-06 PROCEDURE — 85025 COMPLETE CBC W/AUTO DIFF WBC: CPT | Performed by: INTERNAL MEDICINE

## 2024-11-06 PROCEDURE — 25010000002 BEVACIZUMAB-BVZR 400 MG/16ML SOLUTION 16 ML VIAL: Performed by: NURSE PRACTITIONER

## 2024-11-06 RX ORDER — PALONOSETRON 0.05 MG/ML
0.25 INJECTION, SOLUTION INTRAVENOUS ONCE
Status: CANCELLED | OUTPATIENT
Start: 2024-11-06

## 2024-11-06 RX ORDER — SODIUM CHLORIDE 9 MG/ML
20 INJECTION, SOLUTION INTRAVENOUS ONCE
Status: CANCELLED | OUTPATIENT
Start: 2024-11-06

## 2024-11-06 RX ORDER — ATROPINE SULFATE 1 MG/ML
0.4 INJECTION, SOLUTION INTRAMUSCULAR; INTRAVENOUS; SUBCUTANEOUS
Status: CANCELLED | OUTPATIENT
Start: 2024-11-06

## 2024-11-06 RX ORDER — POTASSIUM CHLORIDE 750 MG/1
TABLET, EXTENDED RELEASE ORAL
Qty: 32 TABLET | Refills: 0 | Status: SHIPPED | OUTPATIENT
Start: 2024-11-06 | End: 2024-12-07

## 2024-11-06 RX ORDER — SODIUM CHLORIDE 9 MG/ML
20 INJECTION, SOLUTION INTRAVENOUS ONCE
Status: DISCONTINUED | OUTPATIENT
Start: 2024-11-06 | End: 2024-11-07 | Stop reason: HOSPADM

## 2024-11-06 RX ORDER — PALONOSETRON 0.05 MG/ML
0.25 INJECTION, SOLUTION INTRAVENOUS ONCE
Status: COMPLETED | OUTPATIENT
Start: 2024-11-06 | End: 2024-11-06

## 2024-11-06 RX ORDER — FLUOROURACIL 50 MG/ML
400 INJECTION, SOLUTION INTRAVENOUS ONCE
Status: COMPLETED | OUTPATIENT
Start: 2024-11-06 | End: 2024-11-06

## 2024-11-06 RX ORDER — DEXAMETHASONE SODIUM PHOSPHATE 4 MG/ML
12 INJECTION, SOLUTION INTRA-ARTICULAR; INTRALESIONAL; INTRAMUSCULAR; INTRAVENOUS; SOFT TISSUE ONCE
Status: CANCELLED
Start: 2024-11-06 | End: 2024-11-06

## 2024-11-06 RX ORDER — DEXAMETHASONE SODIUM PHOSPHATE 4 MG/ML
12 INJECTION, SOLUTION INTRA-ARTICULAR; INTRALESIONAL; INTRAMUSCULAR; INTRAVENOUS; SOFT TISSUE ONCE
Status: COMPLETED | OUTPATIENT
Start: 2024-11-06 | End: 2024-11-06

## 2024-11-06 RX ORDER — MAGNESIUM OXIDE 400 MG/1
400 TABLET ORAL 2 TIMES DAILY
Qty: 60 TABLET | Refills: 0 | Status: SHIPPED | OUTPATIENT
Start: 2024-11-06

## 2024-11-06 RX ORDER — ATROPINE SULFATE 1 MG/ML
0.4 INJECTION, SOLUTION INTRAMUSCULAR; INTRAVENOUS; SUBCUTANEOUS
Status: DISCONTINUED | OUTPATIENT
Start: 2024-11-06 | End: 2024-11-06

## 2024-11-06 RX ORDER — FLUOROURACIL 50 MG/ML
400 INJECTION, SOLUTION INTRAVENOUS ONCE
Status: CANCELLED | OUTPATIENT
Start: 2024-11-06

## 2024-11-06 RX ADMIN — PALONOSETRON 0.25 MG: 0.25 INJECTION, SOLUTION INTRAVENOUS at 10:22

## 2024-11-06 RX ADMIN — FLUOROURACIL 5400 MG: 50 INJECTION, SOLUTION INTRAVENOUS at 12:55

## 2024-11-06 RX ADMIN — SODIUM CHLORIDE 450 MG: 9 INJECTION, SOLUTION INTRAVENOUS at 10:32

## 2024-11-06 RX ADMIN — FLUOROURACIL 900 MG: 50 INJECTION, SOLUTION INTRAVENOUS at 12:48

## 2024-11-06 RX ADMIN — DEXAMETHASONE SODIUM PHOSPHATE 12 MG: 4 INJECTION, SOLUTION INTRAMUSCULAR; INTRAVENOUS at 10:22

## 2024-11-06 RX ADMIN — IRINOTECAN HYDROCHLORIDE 400 MG: 20 INJECTION INTRAVENOUS at 11:10

## 2024-11-06 RX ADMIN — LEUCOVORIN CALCIUM 900 MG: 350 INJECTION, POWDER, LYOPHILIZED, FOR SOLUTION INTRAMUSCULAR; INTRAVENOUS at 11:07

## 2024-11-06 NOTE — PROGRESS NOTES
Patient came in for Folfiri with Zirabev without new complaints. Pain with urination remains unchanged as well as the amount of blood in his urine. Per Dr. Wagner okay to treat today with 3 plus protein in patients urine. Per Dr. Wagner he will send in a new script for potassium and magnesium, patient educated and verbalized understanding. Treatment given and tolerated, AVS given. Patient denies further needs today.

## 2024-11-07 ENCOUNTER — OFFICE VISIT (OUTPATIENT)
Dept: FAMILY MEDICINE CLINIC | Facility: CLINIC | Age: 64
End: 2024-11-07
Payer: MEDICARE

## 2024-11-07 VITALS
HEART RATE: 83 BPM | TEMPERATURE: 97.8 F | HEIGHT: 76 IN | DIASTOLIC BLOOD PRESSURE: 80 MMHG | WEIGHT: 200 LBS | BODY MASS INDEX: 24.36 KG/M2 | OXYGEN SATURATION: 99 % | SYSTOLIC BLOOD PRESSURE: 123 MMHG

## 2024-11-07 DIAGNOSIS — I10 ESSENTIAL HYPERTENSION: ICD-10-CM

## 2024-11-07 DIAGNOSIS — E11.65 TYPE 2 DIABETES MELLITUS WITH HYPERGLYCEMIA, WITHOUT LONG-TERM CURRENT USE OF INSULIN: Primary | ICD-10-CM

## 2024-11-07 LAB — BACTERIA SPEC AEROBE CULT: NO GROWTH

## 2024-11-07 PROCEDURE — 3051F HG A1C>EQUAL 7.0%<8.0%: CPT | Performed by: FAMILY MEDICINE

## 2024-11-07 PROCEDURE — 1160F RVW MEDS BY RX/DR IN RCRD: CPT | Performed by: FAMILY MEDICINE

## 2024-11-07 PROCEDURE — 1159F MED LIST DOCD IN RCRD: CPT | Performed by: FAMILY MEDICINE

## 2024-11-07 PROCEDURE — G2211 COMPLEX E/M VISIT ADD ON: HCPCS | Performed by: FAMILY MEDICINE

## 2024-11-07 PROCEDURE — 99213 OFFICE O/P EST LOW 20 MIN: CPT | Performed by: FAMILY MEDICINE

## 2024-11-07 PROCEDURE — 1126F AMNT PAIN NOTED NONE PRSNT: CPT | Performed by: FAMILY MEDICINE

## 2024-11-07 PROCEDURE — 3074F SYST BP LT 130 MM HG: CPT | Performed by: FAMILY MEDICINE

## 2024-11-07 PROCEDURE — 3079F DIAST BP 80-89 MM HG: CPT | Performed by: FAMILY MEDICINE

## 2024-11-07 NOTE — PROGRESS NOTES
"Subjective   Prashant Zhou is a 63 y.o. male.     History of Present Illness  Prashant Zhou is in for follow up on his issues with diabetes and high blood pressure.  He still sees urology and oncology.  He has had treatment for rectal cancer with some peritoneal spread and he is still on treatment. There is no history of chest pain or dyspnea. There is no history of issue with bowel dysfunction. There is no history of dizziness or lightheadedness. There is no history of issue with mood. Chronic pain issues still exist and affect his sleep.       Diabetes  Pertinent negatives for hypoglycemia include no dizziness or nervousness/anxiousness. Pertinent negatives for diabetes include no chest pain, no fatigue and no weakness.          /80 (BP Location: Left arm, Patient Position: Sitting, Cuff Size: Large Adult)   Pulse 83   Temp 97.8 °F (36.6 °C) (Temporal)   Ht 193 cm (75.98\")   Wt 90.7 kg (200 lb)   SpO2 99%   BMI 24.36 kg/m²       Chief Complaint   Patient presents with    Diabetes     3 month f/u     Med Refill     Wants to talk about a couple of medicine and possibly changing them            Current Outpatient Medications:     amLODIPine (NORVASC) 10 MG tablet, TAKE 1 TABLET BY MOUTH DAILY, Disp: 90 tablet, Rfl: 0    atorvastatin (LIPITOR) 40 MG tablet, TAKE 1 TABLET BY MOUTH EVERY NIGHT AT BEDTIME, Disp: 90 tablet, Rfl: 0    Blood Glucose Monitoring Suppl (Accu-Chek Guide) w/Device kit, 1 Device Daily., Disp: 1 kit, Rfl: 0    cloNIDine (CATAPRES) 0.1 MG tablet, Take 1 tablet by mouth 2 (Two) Times a Day., Disp: 180 tablet, Rfl: 1    gabapentin (NEURONTIN) 300 MG capsule, TAKE 1 CAPSULE BY MOUTH 3 TIMES A DAY, Disp: 90 capsule, Rfl: 5    glucose blood (Accu-Chek Guide) test strip, Test daily e11.9, Disp: 50 each, Rfl: 12    hydrALAZINE (APRESOLINE) 50 MG tablet, TAKE ONE TABLET BY MOUTH EVERY 8 HOURS, Disp: 90 tablet, Rfl: 1    lisinopril-hydrochlorothiazide (PRINZIDE,ZESTORETIC) 20-25 MG per " tablet, TAKE 1 TABLET BY MOUTH DAILY, Disp: 90 tablet, Rfl: 0    magnesium oxide (MAG-OX) 400 MG tablet, Take 1 tablet by mouth 2 (Two) Times a Day., Disp: 60 tablet, Rfl: 0    metFORMIN (GLUCOPHAGE) 850 MG tablet, TAKE 1 TABLET BY MOUTH TWICE A DAY WITH MEALS, Disp: 144 tablet, Rfl: 1    metoprolol tartrate (LOPRESSOR) 50 MG tablet, TAKE THREE TABLETS BY MOUTH EVERY 12 HOURS, Disp: 540 tablet, Rfl: 0    Mounjaro 2.5 MG/0.5ML solution pen-injector pen, INJECT 2.5 MG UNDER THE SKIN ONCE WEEKLY, Disp: 2 mL, Rfl: 0    ondansetron (ZOFRAN) 8 MG tablet, Take 1 tablet by mouth 3 (Three) Times a Day As Needed for Nausea or Vomiting., Disp: 30 tablet, Rfl: 5    oxyCODONE (ROXICODONE) 10 MG tablet, , Disp: , Rfl:     potassium chloride 10 MEQ CR tablet, Take 2 tablets by mouth Daily for 1 day, THEN 1 tablet Daily for 30 days., Disp: 32 tablet, Rfl: 0    prochlorperazine (COMPAZINE) 10 MG tablet, Take 1 tablet by mouth Every 6 (Six) Hours As Needed for Nausea or Vomiting., Disp: 90 tablet, Rfl: 11    rivaroxaban (Xarelto) 20 MG tablet, TAKE 1 TABLET BY MOUTH DAILY, Disp: 90 tablet, Rfl: 1    Tirzepatide (MOUNJARO) 5 MG/0.5ML solution pen-injector pen, Inject 0.5 mL under the skin into the appropriate area as directed 1 (One) Time Per Week., Disp: 6 mL, Rfl: 1    traMADol (ULTRAM) 50 MG tablet, TAKE 1 TABLET BY MOUTH EVERY 6 HOURS AS NEEDED FOR MODERATE PAIN, Disp: 28 tablet, Rfl: 0    vitamin D3 125 MCG (5000 UT) capsule capsule, Take 1 capsule by mouth Daily., Disp: , Rfl:   No current facility-administered medications for this visit.    BMI is within normal parameters. No other follow-up for BMI required.       The following portions of the patient's history were reviewed and updated as appropriate: allergies, current medications, past family history, past medical history, past social history, past surgical history, and problem list.    Review of Systems   Constitutional:  Negative for activity change, fatigue and fever.    HENT:  Negative for congestion, sinus pressure, sinus pain, sore throat and trouble swallowing.    Eyes:  Negative for visual disturbance.   Respiratory:  Negative for chest tightness, shortness of breath and wheezing.    Cardiovascular:  Negative for chest pain.   Gastrointestinal:  Positive for constipation. Negative for abdominal distention, abdominal pain, diarrhea, nausea and vomiting.   Genitourinary:  Positive for frequency. Negative for difficulty urinating, dysuria and urgency.   Musculoskeletal:  Positive for arthralgias. Negative for back pain and neck pain.   Neurological:  Negative for dizziness, weakness, light-headedness and numbness.   Psychiatric/Behavioral:  Positive for sleep disturbance. Negative for agitation, hallucinations and suicidal ideas. The patient is not nervous/anxious.        Objective   Physical Exam  Vitals and nursing note reviewed.   Constitutional:       Appearance: Normal appearance.   HENT:      Right Ear: Tympanic membrane and ear canal normal.      Left Ear: Tympanic membrane and ear canal normal.   Cardiovascular:      Rate and Rhythm: Normal rate and regular rhythm.      Heart sounds: Normal heart sounds. No murmur heard.  Pulmonary:      Effort: Pulmonary effort is normal.      Breath sounds: No wheezing or rales.   Abdominal:      General: Bowel sounds are normal.      Palpations: Abdomen is soft.      Tenderness: There is no abdominal tenderness. There is no guarding or rebound.      Hernia: No hernia is present.   Musculoskeletal:      Cervical back: Neck supple.      Right lower leg: No edema.      Left lower leg: No edema.   Lymphadenopathy:      Cervical: No cervical adenopathy.   Neurological:      Mental Status: He is alert and oriented to person, place, and time. Mental status is at baseline.   Psychiatric:         Mood and Affect: Mood normal.           Assessment & Plan   Problems Addressed this Visit          Endocrine and Metabolic    Type 2 diabetes  mellitus with hyperglycemia - Primary     Other Visit Diagnoses       Essential hypertension              Diagnoses         Codes Comments    Type 2 diabetes mellitus with hyperglycemia, without long-term current use of insulin    -  Primary ICD-10-CM: E11.65  ICD-9-CM: 250.00, 790.29     Essential hypertension     ICD-10-CM: I10  ICD-9-CM: 401.9           I will update some labs at his next visit  I did ask him to stay on the current medication as he can and gave him patient assistance applications for Ozempic and Eliquis since the current medications are getting too expensive  I did ask him to see me again in 3 to 4 months  I did ask him to call with any changes made by his other doctors  He has declined to take a flu vaccine

## 2024-11-08 ENCOUNTER — HOSPITAL ENCOUNTER (OUTPATIENT)
Dept: ONCOLOGY | Facility: HOSPITAL | Age: 64
Discharge: HOME | End: 2024-11-08
Payer: MEDICARE

## 2024-11-08 DIAGNOSIS — C78.6 METASTASIS TO PERITONEAL CAVITY: ICD-10-CM

## 2024-11-08 DIAGNOSIS — C20 RECTAL CANCER: Primary | ICD-10-CM

## 2024-11-08 PROCEDURE — 25010000002 HEPARIN LOCK FLUSH PER 10 UNITS: Performed by: INTERNAL MEDICINE

## 2024-11-08 RX ORDER — SODIUM CHLORIDE 0.9 % (FLUSH) 0.9 %
20 SYRINGE (ML) INJECTION AS NEEDED
OUTPATIENT
Start: 2024-11-08

## 2024-11-08 RX ORDER — HEPARIN SODIUM (PORCINE) LOCK FLUSH IV SOLN 100 UNIT/ML 100 UNIT/ML
500 SOLUTION INTRAVENOUS AS NEEDED
Status: DISCONTINUED | OUTPATIENT
Start: 2024-11-08 | End: 2024-11-09 | Stop reason: HOSPADM

## 2024-11-08 RX ORDER — SODIUM CHLORIDE 0.9 % (FLUSH) 0.9 %
20 SYRINGE (ML) INJECTION AS NEEDED
Status: DISCONTINUED | OUTPATIENT
Start: 2024-11-08 | End: 2024-11-09 | Stop reason: HOSPADM

## 2024-11-08 RX ORDER — HEPARIN SODIUM (PORCINE) LOCK FLUSH IV SOLN 100 UNIT/ML 100 UNIT/ML
500 SOLUTION INTRAVENOUS AS NEEDED
OUTPATIENT
Start: 2024-11-08

## 2024-11-08 RX ADMIN — HEPARIN 500 UNITS: 100 SYRINGE at 13:53

## 2024-11-08 RX ADMIN — Medication 20 ML: at 13:53

## 2024-11-13 ENCOUNTER — TELEPHONE (OUTPATIENT)
Dept: ONCOLOGY | Facility: CLINIC | Age: 64
End: 2024-11-13

## 2024-11-13 NOTE — TELEPHONE ENCOUNTER
Patient needs to reschedule infusion on 11/20 due to having a procedure. Please call to reschedule.

## 2024-11-18 ENCOUNTER — OFFICE VISIT (OUTPATIENT)
Dept: CARDIOLOGY | Facility: CLINIC | Age: 64
End: 2024-11-18
Payer: MEDICARE

## 2024-11-18 VITALS
WEIGHT: 204 LBS | SYSTOLIC BLOOD PRESSURE: 129 MMHG | HEIGHT: 76 IN | DIASTOLIC BLOOD PRESSURE: 80 MMHG | BODY MASS INDEX: 24.84 KG/M2 | HEART RATE: 83 BPM | OXYGEN SATURATION: 99 %

## 2024-11-18 DIAGNOSIS — E78.00 PURE HYPERCHOLESTEROLEMIA: ICD-10-CM

## 2024-11-18 DIAGNOSIS — E11.9 TYPE 2 DIABETES MELLITUS WITHOUT COMPLICATION, WITHOUT LONG-TERM CURRENT USE OF INSULIN: ICD-10-CM

## 2024-11-18 DIAGNOSIS — Z86.79 HISTORY OF AORTIC DISSECTION: ICD-10-CM

## 2024-11-18 DIAGNOSIS — I10 PRIMARY HYPERTENSION: ICD-10-CM

## 2024-11-18 DIAGNOSIS — I48.0 PAROXYSMAL ATRIAL FIBRILLATION: Primary | ICD-10-CM

## 2024-11-18 PROCEDURE — 3074F SYST BP LT 130 MM HG: CPT | Performed by: INTERNAL MEDICINE

## 2024-11-18 PROCEDURE — 3079F DIAST BP 80-89 MM HG: CPT | Performed by: INTERNAL MEDICINE

## 2024-11-18 PROCEDURE — 99214 OFFICE O/P EST MOD 30 MIN: CPT | Performed by: INTERNAL MEDICINE

## 2024-11-18 PROCEDURE — 1160F RVW MEDS BY RX/DR IN RCRD: CPT | Performed by: INTERNAL MEDICINE

## 2024-11-18 PROCEDURE — 1159F MED LIST DOCD IN RCRD: CPT | Performed by: INTERNAL MEDICINE

## 2024-11-18 NOTE — PROGRESS NOTES
"    Subjective:     Encounter Date:11/18/2024      Patient ID: Prashant Zhou is a 63 y.o. male.    Chief Complaint:  Atrial Fibrillation  Symptoms are negative for chest pain, dizziness, palpitations and shortness of breath. Past medical history includes atrial fibrillation.     63-year-old white male with history of type B aortic dissection hypertension hyperlipidemia diabetes and atrial fibrillation presents to my office for a follow-up.  Patient is currently stable without any symptoms of chest pain or shortness of breath at rest or exertion.  No complaint of any PND orthopnea.  No palpitations dizziness syncope or swelling of the feet.  Patient is taking all the medicines regularly.  Patient still continues to smoke  The following portions of the patient's history were reviewed and updated as appropriate: allergies, current medications, past family history, past medical history, past social history, past surgical history, and problem list.  Past Medical History:   Diagnosis Date    Aortic dissection     Diabetes mellitus     Heart murmur     History of noncompliance with medical treatment     Hyperlipidemia     Hypertension     Type B hypoplasia of aortic arch      Past Surgical History:   Procedure Laterality Date    COLECTOMY PARTIAL / TOTAL  2023    COLONOSCOPY N/A 08/31/2023    Procedure: COLONOSCOPY with sigmoid mass tattooing at 35cm, sigmoid and rectal polypectomy;  Surgeon: TORIBIO Jacob MD;  Location: [facility] ENDOSCOPY;  Service: Gastroenterology;  Laterality: N/A;  sigmoid mass, colon polyps    THORACOSCOPY WITH DECORTICATION Left 3/11/2024    Procedure: THORACOSCOPY VIDEO ASSISTED WITH DECORTICATION WITH DAVINCI ROBOT;  Surgeon: Saqib Keller MD;  Location: [facility] MAIN OR;  Service: Robotics - DaVinci;  Laterality: Left;     /80   Pulse 83   Ht 193 cm (75.98\")   Wt 92.5 kg (204 lb)   SpO2 99%   BMI 24.84 kg/m²   Family History   Problem Relation Age of Onset    Diabetes Mother     " Dementia Mother     Sudden death Father        Current Outpatient Medications:     amLODIPine (NORVASC) 10 MG tablet, TAKE 1 TABLET BY MOUTH DAILY, Disp: 90 tablet, Rfl: 0    atorvastatin (LIPITOR) 40 MG tablet, TAKE 1 TABLET BY MOUTH EVERY NIGHT AT BEDTIME, Disp: 90 tablet, Rfl: 0    Blood Glucose Monitoring Suppl (Accu-Chek Guide) w/Device kit, 1 Device Daily., Disp: 1 kit, Rfl: 0    cloNIDine (CATAPRES) 0.1 MG tablet, Take 1 tablet by mouth 2 (Two) Times a Day., Disp: 180 tablet, Rfl: 1    gabapentin (NEURONTIN) 300 MG capsule, TAKE 1 CAPSULE BY MOUTH 3 TIMES A DAY, Disp: 90 capsule, Rfl: 5    glucose blood (Accu-Chek Guide) test strip, Test daily e11.9, Disp: 50 each, Rfl: 12    hydrALAZINE (APRESOLINE) 50 MG tablet, TAKE ONE TABLET BY MOUTH EVERY 8 HOURS, Disp: 90 tablet, Rfl: 1    metFORMIN (GLUCOPHAGE) 850 MG tablet, TAKE 1 TABLET BY MOUTH TWICE A DAY WITH MEALS, Disp: 144 tablet, Rfl: 1    metoprolol tartrate (LOPRESSOR) 50 MG tablet, TAKE THREE TABLETS BY MOUTH EVERY 12 HOURS, Disp: 540 tablet, Rfl: 0    ondansetron (ZOFRAN) 8 MG tablet, Take 1 tablet by mouth 3 (Three) Times a Day As Needed for Nausea or Vomiting., Disp: 30 tablet, Rfl: 5    oxyCODONE (ROXICODONE) 10 MG tablet, , Disp: , Rfl:     potassium chloride 10 MEQ CR tablet, Take 2 tablets by mouth Daily for 1 day, THEN 1 tablet Daily for 30 days., Disp: 32 tablet, Rfl: 0    rivaroxaban (Xarelto) 20 MG tablet, TAKE 1 TABLET BY MOUTH DAILY, Disp: 90 tablet, Rfl: 1    lisinopril-hydrochlorothiazide (PRINZIDE,ZESTORETIC) 20-25 MG per tablet, TAKE 1 TABLET BY MOUTH DAILY (Patient not taking: Reported on 11/18/2024), Disp: 90 tablet, Rfl: 0    magnesium oxide (MAG-OX) 400 MG tablet, Take 1 tablet by mouth 2 (Two) Times a Day. (Patient not taking: Reported on 11/18/2024), Disp: 60 tablet, Rfl: 0    Mounjaro 2.5 MG/0.5ML solution pen-injector pen, INJECT 2.5 MG UNDER THE SKIN ONCE WEEKLY (Patient not taking: Reported on 11/18/2024), Disp: 2 mL, Rfl: 0     prochlorperazine (COMPAZINE) 10 MG tablet, Take 1 tablet by mouth Every 6 (Six) Hours As Needed for Nausea or Vomiting. (Patient not taking: Reported on 11/18/2024), Disp: 90 tablet, Rfl: 11    Tirzepatide (MOUNJARO) 5 MG/0.5ML solution pen-injector pen, Inject 0.5 mL under the skin into the appropriate area as directed 1 (One) Time Per Week. (Patient not taking: Reported on 11/18/2024), Disp: 6 mL, Rfl: 1    traMADol (ULTRAM) 50 MG tablet, TAKE 1 TABLET BY MOUTH EVERY 6 HOURS AS NEEDED FOR MODERATE PAIN (Patient not taking: Reported on 11/18/2024), Disp: 28 tablet, Rfl: 0    vitamin D3 125 MCG (5000 UT) capsule capsule, Take 1 capsule by mouth Daily. (Patient not taking: Reported on 11/18/2024), Disp: , Rfl:   No Known Allergies  Social History     Socioeconomic History    Marital status:    Tobacco Use    Smoking status: Every Day     Current packs/day: 2.00     Average packs/day: 2.0 packs/day for 53.9 years (107.8 ttl pk-yrs)     Types: Cigarettes     Start date: 1971     Passive exposure: Current    Smokeless tobacco: Never   Vaping Use    Vaping status: Never Used   Substance and Sexual Activity    Alcohol use: No     Comment: Abstinent since around 2008    Drug use: Not Currently     Types: Marijuana     Comment: Abstinent since at least the 1980's    Sexual activity: Defer     Review of Systems   Constitutional: Negative for malaise/fatigue.   Cardiovascular:  Negative for chest pain, dyspnea on exertion, leg swelling and palpitations.   Respiratory:  Negative for cough and shortness of breath.    Gastrointestinal:  Negative for abdominal pain, nausea and vomiting.   Neurological:  Negative for dizziness, focal weakness, headaches, light-headedness and numbness.   All other systems reviewed and are negative.             Objective:     Constitutional:       Appearance: Well-developed.   Eyes:      General: No scleral icterus.     Conjunctiva/sclera: Conjunctivae normal.   HENT:      Head: Normocephalic  and atraumatic.   Neck:      Vascular: No carotid bruit or JVD.   Pulmonary:      Effort: Pulmonary effort is normal.      Breath sounds: Normal breath sounds. No wheezing. No rales.   Cardiovascular:      Normal rate. Regular rhythm.   Pulses:     Intact distal pulses.   Abdominal:      General: Bowel sounds are normal.      Palpations: Abdomen is soft.   Musculoskeletal:      Cervical back: Normal range of motion and neck supple. Skin:     General: Skin is warm and dry.      Findings: No rash.   Neurological:      Mental Status: Alert.       Procedures    Lab Review:         MDM    #1 history of type B aortic dissection  Patient is followed by the cardiac surgeons and does not have any symptoms at this time  2.  Diabetes  Patient is on oral medicines and followed by the primary care doctor  3.  Hypertension  Patient blood pressure currently stable on medications including beta-blockers and hydralazine but does not take ACE inhibitors because of renal insufficiency  4.  Hyperlipidemia  Patient on statins and the lipid levels are well within normal limits  5.  Atrial fibrillation  Patient has history of atrial fibrillation which is paroxysmal and is currently on Xarelto and metoprolol.    Patient's previous medical records, labs, and EKG were reviewed and discussed with the patient at today's visit.

## 2024-11-19 NOTE — PROGRESS NOTES
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     History of Present Illness:  2024: Mr. Zhou carried a long history of severe hypertension and of an aneurysm of the ascending aorta.  He had been receiving antihypertensive medication after correction of the aneurysm with good results.  In 2023 he underwent his first screening colonoscopy.  A large circumferential and obstructive tumor was identified at the sigmoid/rectosigmoid junction.  The tumor could not be traversed even with the smallest instrument.  Biopsies showed moderately differentiated colonic adenocarcinoma without loss of nuclear expression of the mismatch repair proteins.  Imaging studies at the time revealed the known thoracic aneurysm that has increased mildly since the previous scan.  There was a benign-appearing subpleural nodule in the right posteromedial chest wall and pulmonary nodules that have been identified since 2018 remained stable.  Some mediastinal nonspecific and stable adenopathy was reported as well.  In the abdomen the sigmoid tumor was confirmed and there was no suggestion of metastatic disease.  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  He underwent excision of the squamous cell carcinoma that proved to be a high-grade squamous intraepithelial lesion.  He also had a robotic left colectomy.  The final report of pathology was of moderately differentiated adenocarcinoma of the colon, that measured 3.5 cm.  The tumor involved the serosal surface and the antimesenteric serosa.  Lymphovascular space invasion was identified.  All margins were negative for disease but 2  of 27 lymph nodes had metastatic involvement.  As well there were at least 5 peritoneal deposits that were excised and that were confirmed to correspond to metastatic lesions.  The tumor had intact expression of MLH1, MSH 1, MSH 6 and PMS2.  Next-generation sequencing revealed a pathogenic mutation of exon 2 of the KRAS gene. ERBB2, BRAF, NTRK, RET, and PIK3CA were negative. PD-L1 was negative, as well.  He was started on treatment with FOLFOX and bevacizumab on November 28, 2023.  He reported adequate tolerance to the treatment, with the expected cold intolerance.  He had had no nausea but since the beginning of the present illness he had lost approximately 20 pounds.  He was eating reasonably well.  He had been free of chest pains and no longer had abdominal pain.  All his surgical incisions had healed and he was having regular defecations, at times of liquid stool.  The physical exam revealed him to be a chronically ill-appearing man who did not seem in distress.  He was conversant, in good spirits and without jaundice.  Lungs diminished bilaterally.  Heart regular.  Abdomen soft and nontender.  No edema.  The laboratory exams and all the records were reviewed and discussed with him and with his wife.  To resume treatment with the idea of obtaining a new set of scans after 6 cycles of chemotherapy.  After 12 cycles of chemotherapy discuss the possibility of additional surgery if there was any residual peritoneal disease at the time of surgery.  To see me at the end of February with the scans.    2/16/2024: In the office to review scans. In the last few days has had severe glossodynia and odynophagia. Also has been coughing and expectorating some clear sputum. No fevers. Has had pain on the left side of the chest. No dyspnea. He was prescribed Hiral's magic potion and fluconazole that has resulted in relief. On exam he does not appear acutely ill. No jaundice or pallor. Lungs are diminished bilaterally and heart  irregularly irregular. Abdomen soft. No edema. Reviewed the laboratory exams. Reviewed the images of the scans. Sent prescriptions for doxycycline as the lesion in the left lower lobe is likely infectious in nature, given his leukocytosis and symptoms. Will obtain an electrocardiogram. Will follow next week.     3/8/2024: Feeling poorly. Weak and not moving much. Has continued to have diffuse pain. No fevers. His spouse believes he has an ulcer on the backside. On exam he is conversant and oriented. No jaundice. No pallor. The lungs are diminished bilaterally. Heart regular. There is indeed an ulcer in the sacral region, in the intergluteal crease. Reviewed the laboratory exams. He is to continue with the same chemotherapy. Referred to the wound clinic. To have an electrocardiogram. He is to see me in a few weeks with new scans.     4/10/2024: Was admitted to the hospital shortly after the previous visit.  He had pneumonia and was very dyspneic.  On March 11, 2024 underwent a thoracoscopic decortication with parietal and visceral pleurectomy and intercostal nerve block.  He was treated with antibiotics and eventually discharged to a subacute rehabilitation facility.  Resumed treatment on 4/9/2024.  He tells me today he is feeling progressively better.  Stronger.  The surgical incisions are healing.  He is not having as much trouble breathing.  He has had no abdominal pain or diarrhea.  On exam alert, conversant and ill.  Seems much older than the stated age but is not in distress.  Lungs are diminished bilaterally.  Heart is regular.  Abdomen is soft.  No edema.  The laboratory exams were reviewed.  I reviewed the records from the hospital and reviewed all imaging studies done during that hospitalization.  No suggestion of progressive metastatic disease.  For now continue with the same treatment.  See me early in May 2024.    5/9/2024: Feeling progressively better.  Able to take the chemotherapy without much  difficulty.  Eating more.  Also more mobile.  Using a cane only when outside of his house.  On exam alert, chronically ill-appearing but in no distress.  No jaundice.  Conversant and oriented.  Lungs diminished bilaterally.  Heart regular.  Abdomen soft.  No edema.  Reviewed the laboratory exams and discussed with him.  He will see me again in approximately 6 weeks with new scans.  Continue with the same chemotherapy.    6/19/2024: Feeling reasonably well.  No weakness or chest pains and no cough.  On exam alert and conversant.  Not jaundiced or pale.  Lungs diminished bilaterally with bilateral wheezes and heart regular.  Abdomen soft.  No edema.  Reviewed the images of the recent scans with him and explained the finding of new metastatic deposits in the liver.  Explained to him that given the length of time that elapsed without chemotherapy because of his respiratory and cardiovascular issues, I do not know if the problem is that the chemotherapy was not working at all or if that Is what led to the development of metastatic disease.  I like to try the FOLFOX with bevacizumab in the way that he had been getting it to see if there is response with regular treatment.  For that reason we will proceed with treatment today.  He is to see me in approximately 4 weeks from today.    8/30/2024: Progressively better and stronger.  Eating well and more energetic.  Was able to get out of the house and do several different things 6 days in a row, which has been a change.  Continues to have some dyspnea with exertion but he also continues to smoke.  He has had no chest pains and is not coughing as much.  He denies abdominal pain and has maintained regular bowel activity.  On exam he appears chronically ill and much older than the stated age.  No distress.  No jaundice.  The lungs are clear bilaterally and the heart is regular.  The abdomen is soft.  The epigastric space seems taylor and the edge of the liver can be palpated, at  the level of the midsternal line, approximately 3 cm below the costal margin.  There is no edema.  Laboratory exams reviewed.  To obtain a scan and see me in 3 months.  Continue with the same chemotherapy for now.    9/25/2024: In the office before the next scheduled appointment. He is feeling as well as he had been feeling before and on direct questioning he denies new symptoms. The scans, however, reports progression of the disease with enlargement of the hepatic lesions. There has also been development of lymphadenopathy and a lymph node conglomerate results in left hydronephrosis by causing obstruction of the mid left ureter.     10/11/2024: Received the first dose of irinotecan. Had some nausea and emesis on at least a couple of occasions. He has felt tired. He has been able to eat some. No chest pain or cough. Remains free of abdominal pain. No diarrhea, he has rather tended to be constipated. No edema. No skin rash. On exam alert and conversant. Appears chronically ill but in no distress. No jaundice. Lungs diminished bilaterally and heart regular. Abdomen soft and not tender. No edema. Reviewed the laboratory exams. I have sent a new prescription for prochlorperazine. He is to continue with the same treatment and will see me in approximately 6 weeks.     11/22/2024: Continues to feel well.  He has not had many difficulties with the current chemotherapy and he has had nausea on a couple of occasions only.  The antiemetics seems to have worked, at least partially.  He has been eating well.  He is not needing the cane as much.  He does not have any more dyspnea than before but he continues to smoke.  No abdominal pain.  Maintains regular bowel activity.  Has had the nephrostomies removed.  He has no dysuria.  No edema.  On exam alert and conversant.  Ill but in no distress.  No jaundice.  Lungs diminished bilaterally and heart regular.  Abdomen soft.  No palpable tumors but the liver seems to be palpable  approximately 10 cm below the costal margin.  No edema.  Laboratory exams reviewed.  Discussed with him.  To see me again with new scans.  Continue with the same treatment.    Past Medical History:   Diagnosis Date    Aortic dissection     Diabetes mellitus     Heart murmur     History of noncompliance with medical treatment     Hyperlipidemia     Hypertension     Type B hypoplasia of aortic arch      Past Surgical History:   Procedure Laterality Date    COLECTOMY PARTIAL / TOTAL  2023    COLONOSCOPY N/A 08/31/2023    Procedure: COLONOSCOPY with sigmoid mass tattooing at 35cm, sigmoid and rectal polypectomy;  Surgeon: TORIBIO Jacob MD;  Location: [facility] ENDOSCOPY;  Service: Gastroenterology;  Laterality: N/A;  sigmoid mass, colon polyps    THORACOSCOPY WITH DECORTICATION Left 3/11/2024    Procedure: THORACOSCOPY VIDEO ASSISTED WITH DECORTICATION WITH DAVINCI ROBOT;  Surgeon: Saqib Keller MD;  Location: [facility] MAIN OR;  Service: Robotics - DaVinci;  Laterality: Left;       Current Outpatient Medications:     amLODIPine (NORVASC) 10 MG tablet, TAKE 1 TABLET BY MOUTH DAILY, Disp: 90 tablet, Rfl: 0    atorvastatin (LIPITOR) 40 MG tablet, TAKE 1 TABLET BY MOUTH EVERY NIGHT AT BEDTIME, Disp: 90 tablet, Rfl: 0    Blood Glucose Monitoring Suppl (Accu-Chek Guide) w/Device kit, 1 Device Daily., Disp: 1 kit, Rfl: 0    cloNIDine (CATAPRES) 0.1 MG tablet, Take 1 tablet by mouth 2 (Two) Times a Day., Disp: 180 tablet, Rfl: 1    gabapentin (NEURONTIN) 300 MG capsule, TAKE 1 CAPSULE BY MOUTH 3 TIMES A DAY, Disp: 90 capsule, Rfl: 5    glimepiride (AMARYL) 4 MG tablet, Take 1 tablet by mouth 2 (Two) Times a Day., Disp: 60 tablet, Rfl: 5    glucose blood (Accu-Chek Guide) test strip, Test daily e11.9, Disp: 50 each, Rfl: 12    hydrALAZINE (APRESOLINE) 50 MG tablet, TAKE ONE TABLET BY MOUTH EVERY 8 HOURS, Disp: 90 tablet, Rfl: 1    metFORMIN (GLUCOPHAGE) 850 MG tablet, TAKE 1 TABLET BY MOUTH TWICE A DAY WITH MEALS, Disp: 144  tablet, Rfl: 1    metoprolol tartrate (LOPRESSOR) 50 MG tablet, TAKE THREE TABLETS BY MOUTH EVERY 12 HOURS, Disp: 540 tablet, Rfl: 0    ondansetron (ZOFRAN) 8 MG tablet, Take 1 tablet by mouth 3 (Three) Times a Day As Needed for Nausea or Vomiting., Disp: 30 tablet, Rfl: 5    oxyCODONE (ROXICODONE) 10 MG tablet, , Disp: , Rfl:     potassium chloride 10 MEQ CR tablet, Take 2 tablets by mouth Daily for 1 day, THEN 1 tablet Daily for 30 days., Disp: 32 tablet, Rfl: 0    rivaroxaban (Xarelto) 20 MG tablet, TAKE 1 TABLET BY MOUTH DAILY, Disp: 90 tablet, Rfl: 1    No Known Allergies    Family History   Problem Relation Age of Onset    Diabetes Mother     Dementia Mother     Sudden death Father      Cancer-related family history is not on file.    Social History     Tobacco Use    Smoking status: Every Day     Current packs/day: 2.00     Average packs/day: 2.0 packs/day for 53.9 years (107.8 ttl pk-yrs)     Types: Cigarettes     Start date: 1971     Passive exposure: Current    Smokeless tobacco: Never   Vaping Use    Vaping status: Never Used   Substance Use Topics    Alcohol use: No     Comment: Abstinent since around 2008    Drug use: Not Currently     Types: Marijuana     Comment: Abstinent since at least the 1980's     Social History     Social History Narrative    Not on file     ROS:   Review of Systems   Constitutional:  Positive for fatigue. Negative for activity change, appetite change, chills, diaphoresis, fever and unexpected weight change.   HENT:  Negative for congestion, dental problem, drooling, ear discharge, ear pain, facial swelling, hearing loss, mouth sores, nosebleeds, postnasal drip, rhinorrhea, sinus pressure, sinus pain, sneezing, sore throat, tinnitus, trouble swallowing and voice change.    Eyes:  Negative for photophobia, pain, discharge, redness, itching and visual disturbance.   Respiratory:  Positive for cough and shortness of breath. Negative for apnea, choking, chest tightness, wheezing and  "stridor.    Cardiovascular:  Negative for chest pain, palpitations and leg swelling.   Gastrointestinal:  Negative for abdominal distention, abdominal pain, anal bleeding, blood in stool, constipation, diarrhea, nausea, rectal pain and vomiting.   Endocrine: Negative for cold intolerance, heat intolerance, polydipsia and polyuria.   Genitourinary:  Negative for decreased urine volume, difficulty urinating, dysuria, flank pain, frequency, genital sores, hematuria and urgency.   Musculoskeletal:  Negative for arthralgias, back pain, gait problem, joint swelling, myalgias, neck pain and neck stiffness.   Skin:  Negative for color change, pallor and rash.   Neurological:  Negative for dizziness, tremors, seizures, syncope, facial asymmetry, speech difficulty, weakness, light-headedness, numbness and headaches.        Increased cold sensitivity since the commencement of treatment with oxaliplatin   Hematological:  Negative for adenopathy. Does not bruise/bleed easily.   Psychiatric/Behavioral:  Negative for agitation, behavioral problems, confusion, decreased concentration, hallucinations, self-injury, sleep disturbance and suicidal ideas. The patient is not nervous/anxious.      Objective:    Vital Signs:  Vitals:    11/22/24 1449   BP: 100/67   Pulse: 74   Resp: 17   Temp: 97.8 °F (36.6 °C)   SpO2: 98%   Weight: 92.1 kg (203 lb)   Height: 193 cm (76\")   PainSc: 0-No pain     Body mass index is 24.71 kg/m².    ECOG  (1) Restricted in physically strenuous activity, ambulatory and able to do work of light nature    Physical Exam:   Physical Exam  Constitutional:       General: He is not in acute distress.     Appearance: He is not ill-appearing, toxic-appearing or diaphoretic.      Comments: Does not appear acutely ill. Conversant and oriented. No jaundice or pallor.    HENT:      Head: Normocephalic and atraumatic.      Right Ear: External ear normal.      Left Ear: External ear normal.      Nose: Nose normal.      " Mouth/Throat:      Mouth: Mucous membranes are moist.      Pharynx: Oropharynx is clear.   Eyes:      General: No scleral icterus.        Right eye: No discharge.         Left eye: No discharge.      Conjunctiva/sclera: Conjunctivae normal.      Pupils: Pupils are equal, round, and reactive to light.   Cardiovascular:      Rate and Rhythm: Normal rate.      Pulses: Normal pulses.      Heart sounds: Normal heart sounds. No murmur heard.     No friction rub. No gallop.   Pulmonary:      Effort: No respiratory distress.      Breath sounds: No stridor. No wheezing, rhonchi or rales.      Comments: Diminished bilaterally and with fine crackles in the bases.   Chest:      Chest wall: No tenderness.      Comments: Surgical incisions on the left side of the chest are healing well.  No evidence of infection.  Abdominal:      General: Bowel sounds are normal. There is no distension.      Palpations: Abdomen is soft. There is no mass.      Tenderness: There is no abdominal tenderness. There is no right CVA tenderness, left CVA tenderness, guarding or rebound.      Comments: Soft and nontender.   Musculoskeletal:         General: No swelling, tenderness, deformity or signs of injury.      Cervical back: No rigidity.      Right lower leg: No edema.      Left lower leg: No edema.   Lymphadenopathy:      Cervical: No cervical adenopathy.   Skin:     General: Skin is warm and dry.      Coloration: Skin is not jaundiced.      Findings: No bruising or rash.   Neurological:      General: No focal deficit present.      Mental Status: He is alert and oriented to person, place, and time.      Gait: Gait normal.   Psychiatric:         Mood and Affect: Mood normal.         Behavior: Behavior normal.         Thought Content: Thought content normal.         Judgment: Judgment normal.     MIGUEL Wagner MD performed the physical exam on 11/22/2024 as documented above.    Lab Results - Last 18 Months   Lab Units 11/22/24  3803  11/06/24  0807 10/23/24  0830   WBC 10*3/mm3 9.79 7.01 5.88   HEMOGLOBIN g/dL 12.7* 12.6* 12.4*   HEMATOCRIT % 39.7 38.9 37.7   PLATELETS 10*3/mm3 272 196 197   MCV fL 100.5* 99.5* 96.7     Lab Results - Last 18 Months   Lab Units 11/06/24  0842 11/06/24  0824 10/23/24  0902 10/23/24  0834 10/18/24  0825 10/09/24  0937 10/04/24  0811   SODIUM mmol/L 140  --  141  --  139  --  136   POTASSIUM mmol/L 3.2*  --  3.3*  --  3.3*  --  4.0   CHLORIDE mmol/L 99  --  100  --  101  --  97*   CO2 mmol/L 27.6  --  28.4  --  27.7  --  27.2   BUN mg/dL 6*  --  6*  --  9  --  16   CREATININE mg/dL 0.60* 0.50* 0.50*   < > 0.59*   < > 0.94   CALCIUM mg/dL 10.4  --  10.5  --  10.2  --  10.9*   BILIRUBIN mg/dL 0.4  --  0.5  --   --   --  0.5   ALK PHOS U/L 119*  --  136*  --   --   --  160*   ALT (SGPT) U/L 13  --  16  --   --   --  12   AST (SGOT) U/L 23  --  21  --   --   --  29   GLUCOSE mg/dL 192*  --  117*  --  115*  --  107*    < > = values in this interval not displayed.     Lab Results   Component Value Date    GLUCOSE 192 (H) 11/06/2024    BUN 6 (L) 11/06/2024    CREATININE 0.60 (L) 11/06/2024    EGFRIFNONA 100 01/20/2022    BCR 10.0 11/06/2024    K 3.2 (L) 11/06/2024    CO2 27.6 11/06/2024    CALCIUM 10.4 11/06/2024    ALBUMIN 4.2 11/06/2024    LABIL2 1.5 01/22/2019    AST 23 11/06/2024    ALT 13 11/06/2024     Lab Results - Last 18 Months   Lab Units 10/18/24  0825 10/04/24  0811 03/10/24  2236   INR  1.05 1.01 1.21*   APTT seconds 27.9 31.0 31.2*     Lab Results   Component Value Date    PTT 27.9 10/18/2024    INR 1.05 10/18/2024     Lab Results   Component Value Date    CEA 7.13 10/11/2024     Lab Results   Component Value Date    PSA 0.594 01/23/2023     Assessment & Plan     1.  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 Started FOLFIRI/bevacizumab.  Has tolerated the first 4 cycles with minor side effects.  The neuropathy is  resolving.  He has had minimal nausea and the antiemetics are working better.  2.  Recovered completely from the decortication.  No new symptoms.  2.  Cigarette smoker: Smokes the same.  Discussed with him.  3.  Aortic aneurysm: Observation only for now.  4.  Severe hypertension: Excellent control.   5.  Sacral decubitus ulcer: Resolved.  6.  Reviewed the laboratory exams and discussed with him.  7.  Continue same chemotherapy.  See me in a few weeks with new scans and laboratory exams.    Lars Wagner MD on 11/22/2024 at 1510.

## 2024-11-21 ENCOUNTER — TELEPHONE (OUTPATIENT)
Dept: FAMILY MEDICINE CLINIC | Facility: CLINIC | Age: 64
End: 2024-11-21
Payer: MEDICARE

## 2024-11-21 DIAGNOSIS — E11.65 TYPE 2 DIABETES MELLITUS WITH HYPERGLYCEMIA, WITHOUT LONG-TERM CURRENT USE OF INSULIN: ICD-10-CM

## 2024-11-21 RX ORDER — GLIMEPIRIDE 4 MG/1
4 TABLET ORAL 2 TIMES DAILY
Qty: 180 TABLET | Refills: 1 | OUTPATIENT
Start: 2024-11-21

## 2024-11-21 RX ORDER — GLIMEPIRIDE 4 MG/1
4 TABLET ORAL 2 TIMES DAILY
Qty: 60 TABLET | Refills: 5 | Status: SHIPPED | OUTPATIENT
Start: 2024-11-21

## 2024-11-21 NOTE — TELEPHONE ENCOUNTER
He had a few pills left of the glimepiride since we are waiting on the patient assistance for Ozempic and Elliquis.     Is he to take it and metformin? If so he was needing a refill on glimepiride until the patient assistance outcome is known.

## 2024-11-21 NOTE — TELEPHONE ENCOUNTER
Caller: TERESA GRUBBS [facility]    Relationship to patient: PATIENTS PHARMACY    Best call back number: [phone]     Patient is needing:   PHARMACY SAYS THAT PATIENT HAS CONFUSION ON MEDICATIONS THAT HE'S TAKING THAT AREN'T ON HIS PRESCRIPTION LIST AND IS REQUESTING A CALL TO PATIENT TO GO OVER MEDICATION LIST    PLEASE CALL TO DISCUSS     PATIENTS PHONE: [phone]

## 2024-11-22 ENCOUNTER — HOSPITAL ENCOUNTER (OUTPATIENT)
Dept: ONCOLOGY | Facility: HOSPITAL | Age: 64
Discharge: HOME | End: 2024-11-22
Payer: MEDICARE

## 2024-11-22 ENCOUNTER — OFFICE VISIT (OUTPATIENT)
Dept: ONCOLOGY | Facility: CLINIC | Age: 64
End: 2024-11-22
Payer: MEDICARE

## 2024-11-22 VITALS
OXYGEN SATURATION: 98 % | HEIGHT: 76 IN | DIASTOLIC BLOOD PRESSURE: 67 MMHG | WEIGHT: 203 LBS | HEART RATE: 74 BPM | BODY MASS INDEX: 24.72 KG/M2 | RESPIRATION RATE: 17 BRPM | SYSTOLIC BLOOD PRESSURE: 100 MMHG | TEMPERATURE: 97.8 F

## 2024-11-22 DIAGNOSIS — C78.6 METASTASIS TO PERITONEAL CAVITY: ICD-10-CM

## 2024-11-22 DIAGNOSIS — C20 RECTAL CANCER: Primary | ICD-10-CM

## 2024-11-22 DIAGNOSIS — C20 RECTAL CANCER: ICD-10-CM

## 2024-11-22 LAB
ALBUMIN SERPL-MCNC: 4.2 G/DL (ref 3.5–5.2)
ALBUMIN/GLOB SERPL: 1.4 G/DL
ALP SERPL-CCNC: 81 U/L (ref 39–117)
ALT SERPL W P-5'-P-CCNC: 14 U/L (ref 1–41)
ANION GAP SERPL CALCULATED.3IONS-SCNC: 11.2 MMOL/L (ref 5–15)
AST SERPL-CCNC: 19 U/L (ref 1–40)
BASOPHILS # BLD AUTO: 0.09 10*3/MM3 (ref 0–0.2)
BASOPHILS NFR BLD AUTO: 0.9 % (ref 0–1.5)
BILIRUB SERPL-MCNC: 0.3 MG/DL (ref 0–1.2)
BUN SERPL-MCNC: 10 MG/DL (ref 8–23)
BUN/CREAT SERPL: 13.5 (ref 7–25)
CALCIUM SPEC-SCNC: 10.6 MG/DL (ref 8.6–10.5)
CHLORIDE SERPL-SCNC: 104 MMOL/L (ref 98–107)
CO2 SERPL-SCNC: 24.8 MMOL/L (ref 22–29)
CREAT SERPL-MCNC: 0.74 MG/DL (ref 0.76–1.27)
DEPRECATED RDW RBC AUTO: 64.4 FL (ref 37–54)
EGFRCR SERPLBLD CKD-EPI 2021: 102.4 ML/MIN/1.73
EOSINOPHIL # BLD AUTO: 0.19 10*3/MM3 (ref 0–0.4)
EOSINOPHIL NFR BLD AUTO: 1.9 % (ref 0.3–6.2)
ERYTHROCYTE [DISTWIDTH] IN BLOOD BY AUTOMATED COUNT: 17.8 % (ref 12.3–15.4)
GLOBULIN UR ELPH-MCNC: 2.9 GM/DL
GLUCOSE SERPL-MCNC: 137 MG/DL (ref 65–99)
HCT VFR BLD AUTO: 39.7 % (ref 37.5–51)
HGB BLD-MCNC: 12.7 G/DL (ref 13–17.7)
LYMPHOCYTES # BLD AUTO: 4.04 10*3/MM3 (ref 0.7–3.1)
LYMPHOCYTES NFR BLD AUTO: 41.3 % (ref 19.6–45.3)
MCH RBC QN AUTO: 32.2 PG (ref 26.6–33)
MCHC RBC AUTO-ENTMCNC: 32 G/DL (ref 31.5–35.7)
MCV RBC AUTO: 100.5 FL (ref 79–97)
MONOCYTES # BLD AUTO: 1.14 10*3/MM3 (ref 0.1–0.9)
MONOCYTES NFR BLD AUTO: 11.6 % (ref 5–12)
NEUTROPHILS NFR BLD AUTO: 4.33 10*3/MM3 (ref 1.7–7)
NEUTROPHILS NFR BLD AUTO: 44.3 % (ref 42.7–76)
PLATELET # BLD AUTO: 272 10*3/MM3 (ref 140–450)
PMV BLD AUTO: 9.2 FL (ref 6–12)
POTASSIUM SERPL-SCNC: 3.5 MMOL/L (ref 3.5–5.2)
PROT SERPL-MCNC: 7.1 G/DL (ref 6–8.5)
RBC # BLD AUTO: 3.95 10*6/MM3 (ref 4.14–5.8)
SODIUM SERPL-SCNC: 140 MMOL/L (ref 136–145)
WBC NRBC COR # BLD AUTO: 9.79 10*3/MM3 (ref 3.4–10.8)

## 2024-11-22 PROCEDURE — 1160F RVW MEDS BY RX/DR IN RCRD: CPT | Performed by: INTERNAL MEDICINE

## 2024-11-22 PROCEDURE — 3078F DIAST BP <80 MM HG: CPT | Performed by: INTERNAL MEDICINE

## 2024-11-22 PROCEDURE — 80053 COMPREHEN METABOLIC PANEL: CPT | Performed by: INTERNAL MEDICINE

## 2024-11-22 PROCEDURE — 1159F MED LIST DOCD IN RCRD: CPT | Performed by: INTERNAL MEDICINE

## 2024-11-22 PROCEDURE — 85025 COMPLETE CBC W/AUTO DIFF WBC: CPT | Performed by: INTERNAL MEDICINE

## 2024-11-22 PROCEDURE — 99213 OFFICE O/P EST LOW 20 MIN: CPT | Performed by: INTERNAL MEDICINE

## 2024-11-22 PROCEDURE — 36415 COLL VENOUS BLD VENIPUNCTURE: CPT

## 2024-11-22 PROCEDURE — 1126F AMNT PAIN NOTED NONE PRSNT: CPT | Performed by: INTERNAL MEDICINE

## 2024-11-22 PROCEDURE — 3074F SYST BP LT 130 MM HG: CPT | Performed by: INTERNAL MEDICINE

## 2024-11-25 ENCOUNTER — HOSPITAL ENCOUNTER (OUTPATIENT)
Dept: GENERAL RADIOLOGY | Facility: HOSPITAL | Age: 64
Discharge: HOME | End: 2024-11-25
Payer: MEDICARE

## 2024-11-25 ENCOUNTER — HOSPITAL ENCOUNTER (OUTPATIENT)
Dept: ONCOLOGY | Facility: HOSPITAL | Age: 64
Discharge: HOME | End: 2024-11-25
Admitting: INTERNAL MEDICINE
Payer: MEDICARE

## 2024-11-25 VITALS
WEIGHT: 205 LBS | HEIGHT: 76 IN | RESPIRATION RATE: 18 BRPM | TEMPERATURE: 98.7 F | OXYGEN SATURATION: 93 % | HEART RATE: 95 BPM | BODY MASS INDEX: 24.96 KG/M2 | DIASTOLIC BLOOD PRESSURE: 78 MMHG | SYSTOLIC BLOOD PRESSURE: 123 MMHG
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
## 2024-11-25 DIAGNOSIS — C78.6 METASTASIS TO PERITONEAL CAVITY: ICD-10-CM

## 2024-11-25 DIAGNOSIS — C20 RECTAL CANCER: Primary | ICD-10-CM

## 2024-11-25 DIAGNOSIS — R20.0 NUMBNESS OF RIGHT HAND: ICD-10-CM

## 2024-11-25 DIAGNOSIS — R20.0 NUMBNESS OF RIGHT HAND: Primary | ICD-10-CM

## 2024-11-25 LAB
ALP BLD-CCNC: 75 U/L (ref 53–128)
BASOPHILS # BLD AUTO: 0.02 10*3/MM3 (ref 0–0.2)
BASOPHILS NFR BLD AUTO: 0.3 % (ref 0–1.5)
BILIRUB UR QL STRIP: NEGATIVE
BUN BLDA-MCNC: 8 MG/DL (ref 7–22)
CALCIUM BLD QL: 9.7 MG/DL (ref 8–10.3)
CEA SERPL-MCNC: 2.41 NG/ML
CHLORIDE BLDA-SCNC: 98 MMOL/L (ref 98–108)
CLARITY UR: ABNORMAL
CO2 BLDA-SCNC: 27 MMOL/L (ref 18–33)
COLOR UR: YELLOW
CREAT BLDA-MCNC: 0.5 MG/DL (ref 0.6–1.2)
DEPRECATED RDW RBC AUTO: 61.6 FL (ref 37–54)
EGFRCR SERPLBLD CKD-EPI 2021: 115.3 ML/MIN/1.73
EOSINOPHIL # BLD AUTO: 0.01 10*3/MM3 (ref 0–0.4)
EOSINOPHIL NFR BLD AUTO: 0.1 % (ref 0.3–6.2)
ERYTHROCYTE [DISTWIDTH] IN BLOOD BY AUTOMATED COUNT: 17.1 % (ref 12.3–15.4)
GLUCOSE BLDC GLUCOMTR-MCNC: 147 MG/DL (ref 73–118)
GLUCOSE UR STRIP-MCNC: NEGATIVE MG/DL
HCT VFR BLD AUTO: 38.1 % (ref 37.5–51)
HGB BLD-MCNC: 12.2 G/DL (ref 13–17.7)
HGB UR QL STRIP.AUTO: ABNORMAL
KETONES UR QL STRIP: NEGATIVE
LEUKOCYTE ESTERASE UR QL STRIP.AUTO: ABNORMAL
LYMPHOCYTES # BLD AUTO: 1.06 10*3/MM3 (ref 0.7–3.1)
LYMPHOCYTES NFR BLD AUTO: 15.2 % (ref 19.6–45.3)
MCH RBC QN AUTO: 31.9 PG (ref 26.6–33)
MCHC RBC AUTO-ENTMCNC: 32 G/DL (ref 31.5–35.7)
MCV RBC AUTO: 99.5 FL (ref 79–97)
MONOCYTES # BLD AUTO: 1.09 10*3/MM3 (ref 0.1–0.9)
MONOCYTES NFR BLD AUTO: 15.7 % (ref 5–12)
NEUTROPHILS NFR BLD AUTO: 4.78 10*3/MM3 (ref 1.7–7)
NEUTROPHILS NFR BLD AUTO: 68.7 % (ref 42.7–76)
NITRITE UR QL STRIP: NEGATIVE
PH UR STRIP.AUTO: 6.5 [PH] (ref 5–8)
PLATELET # BLD AUTO: 194 10*3/MM3 (ref 140–450)
PMV BLD AUTO: 9.2 FL (ref 6–12)
POC ALBUMIN: 3.7 G/L (ref 3.3–5.5)
POC ALT (SGPT): 21 U/L (ref 10–47)
POC AST (SGOT): 24 U/L (ref 11–38)
POC TOTAL BILIRUBIN: 0.7 MG/DL (ref 0.2–1.6)
POC TOTAL PROTEIN: 6.6 G/DL (ref 6.4–8.1)
POTASSIUM BLDA-SCNC: 3.3 MMOL/L (ref 3.6–5.1)
PROT UR QL STRIP: ABNORMAL
RBC # BLD AUTO: 3.83 10*6/MM3 (ref 4.14–5.8)
SODIUM BLD-SCNC: 139 MMOL/L (ref 128–145)
SP GR UR STRIP: >1.03 (ref 1–1.03)
UROBILINOGEN UR QL STRIP: ABNORMAL
WBC NRBC COR # BLD AUTO: 6.96 10*3/MM3 (ref 3.4–10.8)

## 2024-11-25 PROCEDURE — 96375 TX/PRO/DX INJ NEW DRUG ADDON: CPT

## 2024-11-25 PROCEDURE — 96413 CHEMO IV INFUSION 1 HR: CPT

## 2024-11-25 PROCEDURE — 80053 COMPREHEN METABOLIC PANEL: CPT

## 2024-11-25 PROCEDURE — 73060 X-RAY EXAM OF HUMERUS: CPT

## 2024-11-25 PROCEDURE — 25010000002 IRINOTECAN PER 20 MG: Performed by: INTERNAL MEDICINE

## 2024-11-25 PROCEDURE — 25010000002 PALONOSETRON 0.25 MG/5ML SOLUTION PREFILLED SYRINGE: Performed by: INTERNAL MEDICINE

## 2024-11-25 PROCEDURE — 96417 CHEMO IV INFUS EACH ADDL SEQ: CPT

## 2024-11-25 PROCEDURE — 25010000002 DEXAMETHASONE PER 1 MG: Performed by: INTERNAL MEDICINE

## 2024-11-25 PROCEDURE — 81003 URINALYSIS AUTO W/O SCOPE: CPT | Performed by: INTERNAL MEDICINE

## 2024-11-25 PROCEDURE — 25010000002 LEUCOVORIN CALCIUM PER 50 MG: Performed by: INTERNAL MEDICINE

## 2024-11-25 PROCEDURE — 82378 CARCINOEMBRYONIC ANTIGEN: CPT | Performed by: INTERNAL MEDICINE

## 2024-11-25 PROCEDURE — 25010000002 BEVACIZUMAB-BVZR 100 MG/4ML SOLUTION 4 ML VIAL: Performed by: INTERNAL MEDICINE

## 2024-11-25 PROCEDURE — 96411 CHEMO IV PUSH ADDL DRUG: CPT

## 2024-11-25 PROCEDURE — 73120 X-RAY EXAM OF HAND: CPT

## 2024-11-25 PROCEDURE — 25010000002 FLUOROURACIL PER 500 MG: Performed by: INTERNAL MEDICINE

## 2024-11-25 PROCEDURE — 96415 CHEMO IV INFUSION ADDL HR: CPT

## 2024-11-25 PROCEDURE — 25010000002 BEVACIZUMAB-BVZR 400 MG/16ML SOLUTION 16 ML VIAL: Performed by: INTERNAL MEDICINE

## 2024-11-25 PROCEDURE — 85025 COMPLETE CBC W/AUTO DIFF WBC: CPT | Performed by: INTERNAL MEDICINE

## 2024-11-25 PROCEDURE — 25010000002 ATROPINE SULFATE 0.4 MG/ML SOLUTION 1 ML VIAL: Performed by: INTERNAL MEDICINE

## 2024-11-25 PROCEDURE — 25010000002 LEUCOVORIN 200 MG RECONSTITUTED SOLUTION 1 EACH VIAL: Performed by: INTERNAL MEDICINE

## 2024-11-25 PROCEDURE — 25810000003 SODIUM CHLORIDE 0.9 % SOLUTION 250 ML FLEX CONT: Performed by: INTERNAL MEDICINE

## 2024-11-25 PROCEDURE — 73090 X-RAY EXAM OF FOREARM: CPT

## 2024-11-25 PROCEDURE — 96368 THER/DIAG CONCURRENT INF: CPT

## 2024-11-25 PROCEDURE — G0498 CHEMO EXTEND IV INFUS W/PUMP: HCPCS

## 2024-11-25 RX ORDER — PALONOSETRON 0.05 MG/ML
0.25 INJECTION, SOLUTION INTRAVENOUS ONCE
Status: CANCELLED | OUTPATIENT
Start: 2024-11-25

## 2024-11-25 RX ORDER — ATROPINE SULFATE 1 MG/ML
0.4 INJECTION, SOLUTION INTRAMUSCULAR; INTRAVENOUS; SUBCUTANEOUS
Status: DISCONTINUED | OUTPATIENT
Start: 2024-11-25 | End: 2024-11-25

## 2024-11-25 RX ORDER — SODIUM CHLORIDE 9 MG/ML
20 INJECTION, SOLUTION INTRAVENOUS ONCE
Status: CANCELLED | OUTPATIENT
Start: 2024-11-25

## 2024-11-25 RX ORDER — ATROPINE SULFATE 1 MG/ML
0.4 INJECTION, SOLUTION INTRAMUSCULAR; INTRAVENOUS; SUBCUTANEOUS
Status: CANCELLED | OUTPATIENT
Start: 2024-11-25

## 2024-11-25 RX ORDER — SODIUM CHLORIDE 9 MG/ML
20 INJECTION, SOLUTION INTRAVENOUS ONCE
Status: DISCONTINUED | OUTPATIENT
Start: 2024-11-25 | End: 2024-11-26 | Stop reason: HOSPADM

## 2024-11-25 RX ORDER — DEXAMETHASONE SODIUM PHOSPHATE 4 MG/ML
12 INJECTION, SOLUTION INTRA-ARTICULAR; INTRALESIONAL; INTRAMUSCULAR; INTRAVENOUS; SOFT TISSUE ONCE
Status: COMPLETED | OUTPATIENT
Start: 2024-11-25 | End: 2024-11-25

## 2024-11-25 RX ORDER — PALONOSETRON 0.05 MG/ML
0.25 INJECTION, SOLUTION INTRAVENOUS ONCE
Status: COMPLETED | OUTPATIENT
Start: 2024-11-25 | End: 2024-11-25

## 2024-11-25 RX ORDER — FLUOROURACIL 50 MG/ML
400 INJECTION, SOLUTION INTRAVENOUS ONCE
Status: CANCELLED | OUTPATIENT
Start: 2024-11-25

## 2024-11-25 RX ORDER — FLUOROURACIL 50 MG/ML
400 INJECTION, SOLUTION INTRAVENOUS ONCE
Status: COMPLETED | OUTPATIENT
Start: 2024-11-25 | End: 2024-11-25

## 2024-11-25 RX ORDER — DEXAMETHASONE SODIUM PHOSPHATE 4 MG/ML
12 INJECTION, SOLUTION INTRA-ARTICULAR; INTRALESIONAL; INTRAMUSCULAR; INTRAVENOUS; SOFT TISSUE ONCE
Status: CANCELLED
Start: 2024-11-25 | End: 2024-11-25

## 2024-11-25 RX ADMIN — SODIUM CHLORIDE 450 MG: 9 INJECTION, SOLUTION INTRAVENOUS at 09:56

## 2024-11-25 RX ADMIN — DEXAMETHASONE SODIUM PHOSPHATE 12 MG: 4 INJECTION INTRA-ARTICULAR; INTRALESIONAL; INTRAMUSCULAR; INTRAVENOUS; SOFT TISSUE at 09:47

## 2024-11-25 RX ADMIN — LEUCOVORIN CALCIUM 900 MG: 350 INJECTION, POWDER, LYOPHILIZED, FOR SOLUTION INTRAMUSCULAR; INTRAVENOUS at 10:29

## 2024-11-25 RX ADMIN — FLUOROURACIL 880 MG: 50 INJECTION, SOLUTION INTRAVENOUS at 12:12

## 2024-11-25 RX ADMIN — IRINOTECAN HYDROCHLORIDE 400 MG: 20 INJECTION, SOLUTION INTRAVENOUS at 10:33

## 2024-11-25 RX ADMIN — PALONOSETRON 0.25 MG: 0.25 INJECTION, SOLUTION INTRAVENOUS at 09:45

## 2024-11-25 RX ADMIN — FLUOROURACIL 5300 MG: 50 INJECTION, SOLUTION INTRAVENOUS at 12:18

## 2024-11-25 NOTE — PROGRESS NOTES
Pt here for tx.  Port accessed using sterile technique and labs drawn.  Pt tolerated well.  The following message sent to Dr. Wagner along with lab results:    Pt here for C4 folfiri/zirabev for his mets colon cancer. He had f/u appt on Friday. Had pretty good weekend until last night when he woke up to use restroom and fell. States he is normally unsteady when he wakes during the night to use restroom and he thought a door was there to stablize himself but it was open and he fell down on his buttock. He has carpet burn to left elbow. He went down on right arm in awkward position and states that fingers on right hand are numb. He did not hit his head. CBC and u/a resulted. Urine with 3+ protein but looks like he always' has 3+. CBC within parameters. CMP drawn on Friday within parameters. How would you like to proceed? His tx plan will need to be signed if ok to treat. Thanks!     Per Dr. Wagner:  Ok to treat.  Send pt to hospital for RUE x-ray after tx.  KERI Guerrier added to place order for xray.  Pt update on POC with understanding verbalized.  Tx given per MAR. Pt tolerated well.  Pt d/c home with 5FU pump infusing.

## 2024-11-26 ENCOUNTER — OFFICE VISIT (OUTPATIENT)
Dept: FAMILY MEDICINE CLINIC | Facility: CLINIC | Age: 64
End: 2024-11-26
Payer: MEDICARE

## 2024-11-26 VITALS
BODY MASS INDEX: 24.96 KG/M2 | HEIGHT: 76 IN | OXYGEN SATURATION: 99 % | DIASTOLIC BLOOD PRESSURE: 74 MMHG | TEMPERATURE: 97.3 F | SYSTOLIC BLOOD PRESSURE: 130 MMHG | HEART RATE: 87 BPM

## 2024-11-26 DIAGNOSIS — R31.9 HEMATURIA, UNSPECIFIED TYPE: ICD-10-CM

## 2024-11-26 DIAGNOSIS — N30.01 ACUTE CYSTITIS WITH HEMATURIA: Primary | ICD-10-CM

## 2024-11-26 LAB
BILIRUB BLD-MCNC: NEGATIVE MG/DL
CLARITY, POC: ABNORMAL
COLOR UR: YELLOW
EXPIRATION DATE: ABNORMAL
GLUCOSE UR STRIP-MCNC: NEGATIVE MG/DL
KETONES UR QL: NEGATIVE
LEUKOCYTE EST, POC: NEGATIVE
Lab: ABNORMAL
NITRITE UR-MCNC: NEGATIVE MG/ML
PH UR: 6 [PH] (ref 5–8)
PROT UR STRIP-MCNC: ABNORMAL MG/DL
RBC # UR STRIP: ABNORMAL /UL
SP GR UR: 1.03 (ref 1–1.03)
UROBILINOGEN UR QL: ABNORMAL

## 2024-11-26 PROCEDURE — 3051F HG A1C>EQUAL 7.0%<8.0%: CPT | Performed by: FAMILY MEDICINE

## 2024-11-26 PROCEDURE — 99213 OFFICE O/P EST LOW 20 MIN: CPT | Performed by: FAMILY MEDICINE

## 2024-11-26 PROCEDURE — 1125F AMNT PAIN NOTED PAIN PRSNT: CPT | Performed by: FAMILY MEDICINE

## 2024-11-26 PROCEDURE — 87086 URINE CULTURE/COLONY COUNT: CPT | Performed by: FAMILY MEDICINE

## 2024-11-26 PROCEDURE — 81003 URINALYSIS AUTO W/O SCOPE: CPT | Performed by: FAMILY MEDICINE

## 2024-11-26 PROCEDURE — 3078F DIAST BP <80 MM HG: CPT | Performed by: FAMILY MEDICINE

## 2024-11-26 PROCEDURE — 3075F SYST BP GE 130 - 139MM HG: CPT | Performed by: FAMILY MEDICINE

## 2024-11-26 RX ORDER — NITROFURANTOIN 25; 75 MG/1; MG/1
100 CAPSULE ORAL 2 TIMES DAILY
Qty: 20 CAPSULE | Refills: 0 | Status: SHIPPED | OUTPATIENT
Start: 2024-11-26 | End: 2024-12-06

## 2024-11-26 NOTE — PROGRESS NOTES
"Subjective   Prashant Zhou is a 63 y.o. male.     History of Present Illness  Prashant Zhou is in for some lingering urinary tract symptoms.  He is currently on some chemotherapy and is prone to infection as a result.  He has been having some urinary frequency and incontinence.  He he has not had any fever.. There is no history of chest pain or dyspnea. There is no history of issue with bowel  dysfunction. There is no history of dizziness or lightheadedness. There is no history of issue with sleep or mood. There is no history of issue with present medication.       Urinary Tract Infection            /74 (BP Location: Left arm, Patient Position: Sitting, Cuff Size: Large Adult)   Pulse 87   Temp 97.3 °F (36.3 °C) (Temporal)   Ht 193 cm (75.98\")   SpO2 99%   BMI 24.96 kg/m²       Chief Complaint   Patient presents with    Urinary Tract Infection    Med Refill     Was given samples and is about out            Current Outpatient Medications:     amLODIPine (NORVASC) 10 MG tablet, TAKE 1 TABLET BY MOUTH DAILY, Disp: 90 tablet, Rfl: 0    atorvastatin (LIPITOR) 40 MG tablet, TAKE 1 TABLET BY MOUTH EVERY NIGHT AT BEDTIME, Disp: 90 tablet, Rfl: 0    Blood Glucose Monitoring Suppl (Accu-Chek Guide) w/Device kit, 1 Device Daily., Disp: 1 kit, Rfl: 0    cloNIDine (CATAPRES) 0.1 MG tablet, Take 1 tablet by mouth 2 (Two) Times a Day., Disp: 180 tablet, Rfl: 1    gabapentin (NEURONTIN) 300 MG capsule, TAKE 1 CAPSULE BY MOUTH 3 TIMES A DAY, Disp: 90 capsule, Rfl: 5    glimepiride (AMARYL) 4 MG tablet, Take 1 tablet by mouth 2 (Two) Times a Day., Disp: 60 tablet, Rfl: 5    glucose blood (Accu-Chek Guide) test strip, Test daily e11.9, Disp: 50 each, Rfl: 12    hydrALAZINE (APRESOLINE) 50 MG tablet, TAKE ONE TABLET BY MOUTH EVERY 8 HOURS, Disp: 90 tablet, Rfl: 1    metFORMIN (GLUCOPHAGE) 850 MG tablet, TAKE 1 TABLET BY MOUTH TWICE A DAY WITH MEALS, Disp: 144 tablet, Rfl: 1    metoprolol tartrate (LOPRESSOR) 50 MG " tablet, TAKE THREE TABLETS BY MOUTH EVERY 12 HOURS, Disp: 540 tablet, Rfl: 0    ondansetron (ZOFRAN) 8 MG tablet, Take 1 tablet by mouth 3 (Three) Times a Day As Needed for Nausea or Vomiting., Disp: 30 tablet, Rfl: 5    oxyCODONE (ROXICODONE) 10 MG tablet, , Disp: , Rfl:     potassium chloride 10 MEQ CR tablet, Take 2 tablets by mouth Daily for 1 day, THEN 1 tablet Daily for 30 days., Disp: 32 tablet, Rfl: 0    rivaroxaban (Xarelto) 20 MG tablet, TAKE 1 TABLET BY MOUTH DAILY, Disp: 90 tablet, Rfl: 1    nitrofurantoin, macrocrystal-monohydrate, (Macrobid) 100 MG capsule, Take 1 capsule by mouth 2 (Two) Times a Day for 10 days., Disp: 20 capsule, Rfl: 0  No current facility-administered medications for this visit.    BMI is within normal parameters. No other follow-up for BMI required.       The following portions of the patient's history were reviewed and updated as appropriate: allergies, current medications, past family history, past medical history, past social history, past surgical history, and problem list.    Review of Systems   Unable to perform ROS: Acuity of condition       Objective   Physical Exam  Vitals and nursing note reviewed.   Constitutional:       Appearance: He is ill-appearing.   Cardiovascular:      Rate and Rhythm: Normal rate and regular rhythm.      Heart sounds: Normal heart sounds.   Pulmonary:      Effort: Pulmonary effort is normal.      Breath sounds: No wheezing or rales.   Abdominal:      General: Bowel sounds are normal.      Palpations: Abdomen is soft.      Tenderness: There is no abdominal tenderness. There is no guarding.   Musculoskeletal:      Cervical back: Neck supple.   Lymphadenopathy:      Cervical: No cervical adenopathy.   Neurological:      Mental Status: He is alert and oriented to person, place, and time. Mental status is at baseline.   Psychiatric:         Mood and Affect: Mood normal.           Assessment & Plan   Problems Addressed this Visit    None  Visit  Diagnoses       Acute cystitis with hematuria    -  Primary    Hematuria, unspecified type        Relevant Orders    POC Urinalysis Dipstick, Automated (Completed)    Urine Culture - Urine, Urine, Clean Catch          Diagnoses         Codes Comments    Acute cystitis with hematuria    -  Primary ICD-10-CM: N30.01  ICD-9-CM: 595.0     Hematuria, unspecified type     ICD-10-CM: R31.9  ICD-9-CM: 599.70           I will go ahead and put him on some Macrobid and culture the urine  I instructed his wife to call for an ambulance if he becomes confused or disoriented  I asked him to call with any other concerns  I will want a repeat a urine about 3 to 4 days after he finishes his treatment

## 2024-11-27 ENCOUNTER — HOSPITAL ENCOUNTER (OUTPATIENT)
Dept: ONCOLOGY | Facility: HOSPITAL | Age: 64
Discharge: HOME | End: 2024-11-27
Admitting: INTERNAL MEDICINE
Payer: MEDICARE

## 2024-11-27 DIAGNOSIS — C78.6 METASTASIS TO PERITONEAL CAVITY: Primary | ICD-10-CM

## 2024-11-27 DIAGNOSIS — C20 RECTAL CANCER: ICD-10-CM

## 2024-11-27 PROCEDURE — 25010000002 HEPARIN LOCK FLUSH PER 10 UNITS: Performed by: INTERNAL MEDICINE

## 2024-11-27 RX ORDER — HEPARIN SODIUM (PORCINE) LOCK FLUSH IV SOLN 100 UNIT/ML 100 UNIT/ML
500 SOLUTION INTRAVENOUS AS NEEDED
OUTPATIENT
Start: 2024-11-27

## 2024-11-27 RX ORDER — HEPARIN SODIUM (PORCINE) LOCK FLUSH IV SOLN 100 UNIT/ML 100 UNIT/ML
500 SOLUTION INTRAVENOUS AS NEEDED
Status: DISCONTINUED | OUTPATIENT
Start: 2024-11-27 | End: 2024-11-28 | Stop reason: HOSPADM

## 2024-11-27 RX ORDER — SODIUM CHLORIDE 0.9 % (FLUSH) 0.9 %
20 SYRINGE (ML) INJECTION AS NEEDED
Status: DISCONTINUED | OUTPATIENT
Start: 2024-11-27 | End: 2024-11-28 | Stop reason: HOSPADM

## 2024-11-27 RX ORDER — SODIUM CHLORIDE 0.9 % (FLUSH) 0.9 %
20 SYRINGE (ML) INJECTION AS NEEDED
OUTPATIENT
Start: 2024-11-27

## 2024-11-27 RX ADMIN — Medication 20 ML: at 14:11

## 2024-11-27 RX ADMIN — HEPARIN 500 UNITS: 100 SYRINGE at 14:12

## 2024-11-28 LAB — BACTERIA SPEC AEROBE CULT: NORMAL

## 2024-12-03 DIAGNOSIS — E83.42 HYPOMAGNESEMIA: ICD-10-CM

## 2024-12-03 DIAGNOSIS — E87.6 HYPOKALEMIA: ICD-10-CM

## 2024-12-05 RX ORDER — POTASSIUM CHLORIDE 750 MG/1
10 TABLET, EXTENDED RELEASE ORAL DAILY
Qty: 30 TABLET | Refills: 2 | Status: SHIPPED | OUTPATIENT
Start: 2024-12-05

## 2024-12-09 ENCOUNTER — HOSPITAL ENCOUNTER (OUTPATIENT)
Dept: ONCOLOGY | Facility: HOSPITAL | Age: 64
Discharge: HOME | End: 2024-12-09
Admitting: INTERNAL MEDICINE
Payer: MEDICARE

## 2024-12-09 VITALS
OXYGEN SATURATION: 96 % | DIASTOLIC BLOOD PRESSURE: 70 MMHG | HEART RATE: 69 BPM | HEIGHT: 76 IN | SYSTOLIC BLOOD PRESSURE: 118 MMHG | TEMPERATURE: 98.2 F | WEIGHT: 204 LBS | BODY MASS INDEX: 24.84 KG/M2 | RESPIRATION RATE: 16 BRPM

## 2024-12-09 DIAGNOSIS — C78.6 METASTASIS TO PERITONEAL CAVITY: ICD-10-CM

## 2024-12-09 DIAGNOSIS — C20 RECTAL CANCER: Primary | ICD-10-CM

## 2024-12-09 LAB
ALP BLD-CCNC: 89 U/L (ref 53–128)
BASOPHILS # BLD AUTO: 0.04 10*3/MM3 (ref 0–0.2)
BASOPHILS NFR BLD AUTO: 1 % (ref 0–1.5)
BILIRUB UR QL STRIP: ABNORMAL
BUN BLDA-MCNC: 10 MG/DL (ref 7–22)
CALCIUM BLD QL: 10.2 MG/DL (ref 8–10.3)
CHLORIDE BLDA-SCNC: 106 MMOL/L (ref 98–108)
CLARITY UR: ABNORMAL
CO2 BLDA-SCNC: 30 MMOL/L (ref 18–33)
COLOR UR: ABNORMAL
CREAT BLDA-MCNC: 0.6 MG/DL (ref 0.6–1.2)
DEPRECATED RDW RBC AUTO: 55.5 FL (ref 37–54)
EGFRCR SERPLBLD CKD-EPI 2021: 108.5 ML/MIN/1.73
EOSINOPHIL # BLD AUTO: 0.15 10*3/MM3 (ref 0–0.4)
EOSINOPHIL NFR BLD AUTO: 3.8 % (ref 0.3–6.2)
ERYTHROCYTE [DISTWIDTH] IN BLOOD BY AUTOMATED COUNT: 16.1 % (ref 12.3–15.4)
GLUCOSE BLDC GLUCOMTR-MCNC: 239 MG/DL (ref 73–118)
GLUCOSE UR STRIP-MCNC: ABNORMAL MG/DL
HCT VFR BLD AUTO: 34 % (ref 37.5–51)
HGB BLD-MCNC: 10.8 G/DL (ref 13–17.7)
HGB UR QL STRIP.AUTO: ABNORMAL
KETONES UR QL STRIP: NEGATIVE
LEUKOCYTE ESTERASE UR QL STRIP.AUTO: ABNORMAL
LYMPHOCYTES # BLD AUTO: 1.84 10*3/MM3 (ref 0.7–3.1)
LYMPHOCYTES NFR BLD AUTO: 46.8 % (ref 19.6–45.3)
MCH RBC QN AUTO: 31 PG (ref 26.6–33)
MCHC RBC AUTO-ENTMCNC: 31.8 G/DL (ref 31.5–35.7)
MCV RBC AUTO: 97.7 FL (ref 79–97)
MONOCYTES # BLD AUTO: 0.79 10*3/MM3 (ref 0.1–0.9)
MONOCYTES NFR BLD AUTO: 20.1 % (ref 5–12)
NEUTROPHILS NFR BLD AUTO: 1.11 10*3/MM3 (ref 1.7–7)
NEUTROPHILS NFR BLD AUTO: 28.3 % (ref 42.7–76)
NITRITE UR QL STRIP: NEGATIVE
PH UR STRIP.AUTO: 6 [PH] (ref 5–8)
PLATELET # BLD AUTO: 254 10*3/MM3 (ref 140–450)
PMV BLD AUTO: 9.4 FL (ref 6–12)
POC ALBUMIN: 2.9 G/L (ref 3.3–5.5)
POC ALT (SGPT): 18 U/L (ref 10–47)
POC AST (SGOT): 24 U/L (ref 11–38)
POC TOTAL BILIRUBIN: 0.7 MG/DL (ref 0.2–1.6)
POC TOTAL PROTEIN: 6.1 G/DL (ref 6.4–8.1)
POTASSIUM BLDA-SCNC: 4 MMOL/L (ref 3.6–5.1)
PROT UR QL STRIP: ABNORMAL
RBC # BLD AUTO: 3.48 10*6/MM3 (ref 4.14–5.8)
SODIUM BLD-SCNC: 139 MMOL/L (ref 128–145)
SP GR UR STRIP: >=1.03 (ref 1–1.03)
UROBILINOGEN UR QL STRIP: ABNORMAL
WBC NRBC COR # BLD AUTO: 3.93 10*3/MM3 (ref 3.4–10.8)

## 2024-12-09 PROCEDURE — 25010000002 IRINOTECAN PER 20 MG: Performed by: INTERNAL MEDICINE

## 2024-12-09 PROCEDURE — 25010000002 BEVACIZUMAB-BVZR 400 MG/16ML SOLUTION 16 ML VIAL: Performed by: INTERNAL MEDICINE

## 2024-12-09 PROCEDURE — 25010000003 DEXTROSE 5 % SOLUTION 250 ML FLEX CONT: Performed by: INTERNAL MEDICINE

## 2024-12-09 PROCEDURE — 25010000002 LEUCOVORIN 500 MG RECONSTITUTED SOLUTION 1 EACH VIAL: Performed by: INTERNAL MEDICINE

## 2024-12-09 PROCEDURE — 96417 CHEMO IV INFUS EACH ADDL SEQ: CPT

## 2024-12-09 PROCEDURE — 80053 COMPREHEN METABOLIC PANEL: CPT

## 2024-12-09 PROCEDURE — 96411 CHEMO IV PUSH ADDL DRUG: CPT

## 2024-12-09 PROCEDURE — 25010000002 PALONOSETRON 0.25 MG/5ML SOLUTION PREFILLED SYRINGE: Performed by: INTERNAL MEDICINE

## 2024-12-09 PROCEDURE — 96375 TX/PRO/DX INJ NEW DRUG ADDON: CPT

## 2024-12-09 PROCEDURE — 96368 THER/DIAG CONCURRENT INF: CPT

## 2024-12-09 PROCEDURE — 25010000002 DEXAMETHASONE PER 1 MG: Performed by: INTERNAL MEDICINE

## 2024-12-09 PROCEDURE — 96415 CHEMO IV INFUSION ADDL HR: CPT

## 2024-12-09 PROCEDURE — 81003 URINALYSIS AUTO W/O SCOPE: CPT | Performed by: INTERNAL MEDICINE

## 2024-12-09 PROCEDURE — 25010000002 FLUOROURACIL PER 500 MG: Performed by: INTERNAL MEDICINE

## 2024-12-09 PROCEDURE — G0498 CHEMO EXTEND IV INFUS W/PUMP: HCPCS

## 2024-12-09 PROCEDURE — 96413 CHEMO IV INFUSION 1 HR: CPT

## 2024-12-09 PROCEDURE — 85025 COMPLETE CBC W/AUTO DIFF WBC: CPT | Performed by: INTERNAL MEDICINE

## 2024-12-09 PROCEDURE — 25810000003 SODIUM CHLORIDE 0.9 % SOLUTION 250 ML FLEX CONT: Performed by: INTERNAL MEDICINE

## 2024-12-09 PROCEDURE — 25010000002 ATROPINE SULFATE 0.4 MG/ML SOLUTION 1 ML VIAL: Performed by: INTERNAL MEDICINE

## 2024-12-09 PROCEDURE — 25010000002 BEVACIZUMAB-BVZR 100 MG/4ML SOLUTION 4 ML VIAL: Performed by: INTERNAL MEDICINE

## 2024-12-09 PROCEDURE — 25010000002 LEUCOVORIN 200 MG RECONSTITUTED SOLUTION 1 EACH VIAL: Performed by: INTERNAL MEDICINE

## 2024-12-09 RX ORDER — FLUOROURACIL 50 MG/ML
400 INJECTION, SOLUTION INTRAVENOUS ONCE
Status: CANCELLED | OUTPATIENT
Start: 2024-12-09

## 2024-12-09 RX ORDER — FLUOROURACIL 50 MG/ML
400 INJECTION, SOLUTION INTRAVENOUS ONCE
Status: COMPLETED | OUTPATIENT
Start: 2024-12-09 | End: 2024-12-09

## 2024-12-09 RX ORDER — SODIUM CHLORIDE 9 MG/ML
20 INJECTION, SOLUTION INTRAVENOUS ONCE
Status: CANCELLED | OUTPATIENT
Start: 2024-12-09

## 2024-12-09 RX ORDER — ATROPINE SULFATE 1 MG/ML
0.4 INJECTION, SOLUTION INTRAMUSCULAR; INTRAVENOUS; SUBCUTANEOUS
Status: DISCONTINUED | OUTPATIENT
Start: 2024-12-09 | End: 2024-12-09

## 2024-12-09 RX ORDER — PALONOSETRON 0.05 MG/ML
0.25 INJECTION, SOLUTION INTRAVENOUS ONCE
Status: COMPLETED | OUTPATIENT
Start: 2024-12-09 | End: 2024-12-09

## 2024-12-09 RX ORDER — DEXAMETHASONE SODIUM PHOSPHATE 4 MG/ML
12 INJECTION, SOLUTION INTRA-ARTICULAR; INTRALESIONAL; INTRAMUSCULAR; INTRAVENOUS; SOFT TISSUE ONCE
Status: CANCELLED
Start: 2024-12-09 | End: 2024-12-09

## 2024-12-09 RX ORDER — DEXAMETHASONE SODIUM PHOSPHATE 4 MG/ML
12 INJECTION, SOLUTION INTRA-ARTICULAR; INTRALESIONAL; INTRAMUSCULAR; INTRAVENOUS; SOFT TISSUE ONCE
Status: COMPLETED | OUTPATIENT
Start: 2024-12-09 | End: 2024-12-09

## 2024-12-09 RX ORDER — ATROPINE SULFATE 1 MG/ML
0.4 INJECTION, SOLUTION INTRAMUSCULAR; INTRAVENOUS; SUBCUTANEOUS
Status: CANCELLED | OUTPATIENT
Start: 2024-12-09

## 2024-12-09 RX ORDER — PALONOSETRON 0.05 MG/ML
0.25 INJECTION, SOLUTION INTRAVENOUS ONCE
Status: CANCELLED | OUTPATIENT
Start: 2024-12-09

## 2024-12-09 RX ORDER — SODIUM CHLORIDE 9 MG/ML
20 INJECTION, SOLUTION INTRAVENOUS ONCE
Status: DISCONTINUED | OUTPATIENT
Start: 2024-12-09 | End: 2024-12-10 | Stop reason: HOSPADM

## 2024-12-09 RX ADMIN — SODIUM CHLORIDE 450 MG: 9 INJECTION, SOLUTION INTRAVENOUS at 10:21

## 2024-12-09 RX ADMIN — DEXAMETHASONE SODIUM PHOSPHATE 12 MG: 4 INJECTION INTRA-ARTICULAR; INTRALESIONAL; INTRAMUSCULAR; INTRAVENOUS; SOFT TISSUE at 09:55

## 2024-12-09 RX ADMIN — PALONOSETRON 0.25 MG: 0.25 INJECTION, SOLUTION INTRAVENOUS at 09:50

## 2024-12-09 RX ADMIN — FLUOROURACIL 880 MG: 50 INJECTION, SOLUTION INTRAVENOUS at 12:42

## 2024-12-09 RX ADMIN — IRINOTECAN HYDROCHLORIDE 400 MG: 20 INJECTION, SOLUTION INTRAVENOUS at 10:56

## 2024-12-09 RX ADMIN — FLUOROURACIL 5300 MG: 50 INJECTION, SOLUTION INTRAVENOUS at 12:47

## 2024-12-09 RX ADMIN — LEUCOVORIN CALCIUM 880 MG: 500 INJECTION, POWDER, LYOPHILIZED, FOR SOLUTION INTRAMUSCULAR; INTRAVENOUS at 10:56

## 2024-12-09 NOTE — PROGRESS NOTES
Pt here for tx.  Port accessed using sterile technique and labs drawn.  The following message along with cbc, cmp, u/a results sent to Dr. Wagner:    Pt here for C5 folfiri/avastin. CBC and CMP within parameters. U/A with 3+ protein and also positive for other things listed below. Pt just finished antibiotic last night for UTI prescribed by his pcp and urine culture done on 11/26. Pt with no new complaints. Still with some numbness to right hand from fall a few weeks ago but x-ray was normal. Tx plan will need to be signed if ok to proceed.     Per Dr. Wagner:  Ok to tx.  Tx given per MAR.  Pt tolerated well. Pt d/c home with 5FU pump infusing.

## 2024-12-11 ENCOUNTER — HOSPITAL ENCOUNTER (OUTPATIENT)
Dept: ONCOLOGY | Facility: HOSPITAL | Age: 64
Discharge: HOME | End: 2024-12-11
Admitting: INTERNAL MEDICINE
Payer: MEDICARE

## 2024-12-11 DIAGNOSIS — C78.6 METASTASIS TO PERITONEAL CAVITY: Primary | ICD-10-CM

## 2024-12-11 DIAGNOSIS — C20 RECTAL CANCER: ICD-10-CM

## 2024-12-11 PROCEDURE — 25010000002 HEPARIN LOCK FLUSH PER 10 UNITS: Performed by: INTERNAL MEDICINE

## 2024-12-11 RX ORDER — HEPARIN SODIUM (PORCINE) LOCK FLUSH IV SOLN 100 UNIT/ML 100 UNIT/ML
500 SOLUTION INTRAVENOUS AS NEEDED
Status: DISCONTINUED | OUTPATIENT
Start: 2024-12-11 | End: 2024-12-12 | Stop reason: HOSPADM

## 2024-12-11 RX ORDER — HEPARIN SODIUM (PORCINE) LOCK FLUSH IV SOLN 100 UNIT/ML 100 UNIT/ML
500 SOLUTION INTRAVENOUS AS NEEDED
OUTPATIENT
Start: 2024-12-11

## 2024-12-11 RX ORDER — SODIUM CHLORIDE 0.9 % (FLUSH) 0.9 %
20 SYRINGE (ML) INJECTION AS NEEDED
OUTPATIENT
Start: 2024-12-11

## 2024-12-11 RX ORDER — SODIUM CHLORIDE 0.9 % (FLUSH) 0.9 %
20 SYRINGE (ML) INJECTION AS NEEDED
Status: DISCONTINUED | OUTPATIENT
Start: 2024-12-11 | End: 2024-12-12 | Stop reason: HOSPADM

## 2024-12-11 RX ADMIN — HEPARIN SODIUM (PORCINE) LOCK FLUSH IV SOLN 100 UNIT/ML 500 UNITS: 100 SOLUTION at 14:22

## 2024-12-11 RX ADMIN — Medication 20 ML: at 14:21

## 2024-12-18 RX ORDER — HYDRALAZINE HYDROCHLORIDE 50 MG/1
50 TABLET, FILM COATED ORAL EVERY 8 HOURS
Qty: 90 TABLET | Refills: 1 | Status: SHIPPED | OUTPATIENT
Start: 2024-12-18

## 2024-12-23 ENCOUNTER — HOSPITAL ENCOUNTER (OUTPATIENT)
Dept: ONCOLOGY | Facility: HOSPITAL | Age: 64
Discharge: HOME | End: 2024-12-23
Admitting: INTERNAL MEDICINE
Payer: MEDICARE

## 2024-12-23 VITALS
DIASTOLIC BLOOD PRESSURE: 72 MMHG | OXYGEN SATURATION: 95 % | HEIGHT: 76 IN | RESPIRATION RATE: 14 BRPM | SYSTOLIC BLOOD PRESSURE: 133 MMHG | WEIGHT: 205.7 LBS | BODY MASS INDEX: 25.05 KG/M2 | HEART RATE: 74 BPM | TEMPERATURE: 97.5 F

## 2024-12-23 DIAGNOSIS — C78.6 METASTASIS TO PERITONEAL CAVITY: ICD-10-CM

## 2024-12-23 DIAGNOSIS — C20 RECTAL CANCER: Primary | ICD-10-CM

## 2024-12-23 LAB
ALP BLD-CCNC: 81 U/L (ref 53–128)
BASOPHILS # BLD AUTO: 0.04 10*3/MM3 (ref 0–0.2)
BASOPHILS NFR BLD AUTO: 0.6 % (ref 0–1.5)
BILIRUB UR QL STRIP: NEGATIVE
BUN BLDA-MCNC: 9 MG/DL (ref 7–22)
CALCIUM BLD QL: 10.6 MG/DL (ref 8–10.3)
CHLORIDE BLDA-SCNC: 102 MMOL/L (ref 98–108)
CLARITY UR: CLEAR
CO2 BLDA-SCNC: 28 MMOL/L (ref 18–33)
COLOR UR: YELLOW
CREAT BLDA-MCNC: 0.7 MG/DL (ref 0.6–1.2)
DEPRECATED RDW RBC AUTO: 60.3 FL (ref 37–54)
EGFRCR SERPLBLD CKD-EPI 2021: 103.5 ML/MIN/1.73
EOSINOPHIL # BLD AUTO: 0.08 10*3/MM3 (ref 0–0.4)
EOSINOPHIL NFR BLD AUTO: 1.3 % (ref 0.3–6.2)
ERYTHROCYTE [DISTWIDTH] IN BLOOD BY AUTOMATED COUNT: 16.9 % (ref 12.3–15.4)
GLUCOSE BLDC GLUCOMTR-MCNC: 188 MG/DL (ref 73–118)
GLUCOSE UR STRIP-MCNC: NEGATIVE MG/DL
HCT VFR BLD AUTO: 39.2 % (ref 37.5–51)
HGB BLD-MCNC: 12.4 G/DL (ref 13–17.7)
HGB UR QL STRIP.AUTO: ABNORMAL
KETONES UR QL STRIP: NEGATIVE
LEUKOCYTE ESTERASE UR QL STRIP.AUTO: ABNORMAL
LYMPHOCYTES # BLD AUTO: 1.79 10*3/MM3 (ref 0.7–3.1)
LYMPHOCYTES NFR BLD AUTO: 28.8 % (ref 19.6–45.3)
MCH RBC QN AUTO: 31.3 PG (ref 26.6–33)
MCHC RBC AUTO-ENTMCNC: 31.6 G/DL (ref 31.5–35.7)
MCV RBC AUTO: 99 FL (ref 79–97)
MONOCYTES # BLD AUTO: 0.67 10*3/MM3 (ref 0.1–0.9)
MONOCYTES NFR BLD AUTO: 10.8 % (ref 5–12)
NEUTROPHILS NFR BLD AUTO: 3.64 10*3/MM3 (ref 1.7–7)
NEUTROPHILS NFR BLD AUTO: 58.5 % (ref 42.7–76)
NITRITE UR QL STRIP: NEGATIVE
PH UR STRIP.AUTO: 6 [PH] (ref 5–8)
PLATELET # BLD AUTO: 189 10*3/MM3 (ref 140–450)
PMV BLD AUTO: 9.5 FL (ref 6–12)
POC ALBUMIN: 3.5 G/L (ref 3.3–5.5)
POC ALT (SGPT): 14 U/L (ref 10–47)
POC AST (SGOT): 20 U/L (ref 11–38)
POC TOTAL BILIRUBIN: 0.8 MG/DL (ref 0.2–1.6)
POC TOTAL PROTEIN: 7 G/DL (ref 6.4–8.1)
POTASSIUM BLDA-SCNC: 3.8 MMOL/L (ref 3.6–5.1)
PROT UR QL STRIP: ABNORMAL
RBC # BLD AUTO: 3.96 10*6/MM3 (ref 4.14–5.8)
SODIUM BLD-SCNC: 141 MMOL/L (ref 128–145)
SP GR UR STRIP: >=1.03 (ref 1–1.03)
UROBILINOGEN UR QL STRIP: ABNORMAL
WBC NRBC COR # BLD AUTO: 6.22 10*3/MM3 (ref 3.4–10.8)

## 2024-12-23 PROCEDURE — 25010000002 FLUOROURACIL PER 500 MG: Performed by: PHYSICIAN ASSISTANT

## 2024-12-23 PROCEDURE — 25010000002 BEVACIZUMAB-BVZR 400 MG/16ML SOLUTION 16 ML VIAL: Performed by: PHYSICIAN ASSISTANT

## 2024-12-23 PROCEDURE — 25010000002 DEXAMETHASONE PER 1 MG: Performed by: PHYSICIAN ASSISTANT

## 2024-12-23 PROCEDURE — 25010000002 IRINOTECAN PER 20 MG: Performed by: PHYSICIAN ASSISTANT

## 2024-12-23 PROCEDURE — 25010000002 LEUCOVORIN 200 MG RECONSTITUTED SOLUTION 1 EACH VIAL: Performed by: PHYSICIAN ASSISTANT

## 2024-12-23 PROCEDURE — 96411 CHEMO IV PUSH ADDL DRUG: CPT

## 2024-12-23 PROCEDURE — G0498 CHEMO EXTEND IV INFUS W/PUMP: HCPCS

## 2024-12-23 PROCEDURE — 96368 THER/DIAG CONCURRENT INF: CPT

## 2024-12-23 PROCEDURE — 85025 COMPLETE CBC W/AUTO DIFF WBC: CPT | Performed by: INTERNAL MEDICINE

## 2024-12-23 PROCEDURE — 96416 CHEMO PROLONG INFUSE W/PUMP: CPT

## 2024-12-23 PROCEDURE — 25010000002 LEUCOVORIN CALCIUM PER 50 MG: Performed by: PHYSICIAN ASSISTANT

## 2024-12-23 PROCEDURE — 25010000002 BEVACIZUMAB-BVZR 100 MG/4ML SOLUTION 4 ML VIAL: Performed by: PHYSICIAN ASSISTANT

## 2024-12-23 PROCEDURE — 96415 CHEMO IV INFUSION ADDL HR: CPT

## 2024-12-23 PROCEDURE — 96375 TX/PRO/DX INJ NEW DRUG ADDON: CPT

## 2024-12-23 PROCEDURE — 25810000003 SODIUM CHLORIDE 0.9 % SOLUTION 250 ML FLEX CONT: Performed by: PHYSICIAN ASSISTANT

## 2024-12-23 PROCEDURE — 96413 CHEMO IV INFUSION 1 HR: CPT

## 2024-12-23 PROCEDURE — 81003 URINALYSIS AUTO W/O SCOPE: CPT | Performed by: INTERNAL MEDICINE

## 2024-12-23 PROCEDURE — 80053 COMPREHEN METABOLIC PANEL: CPT

## 2024-12-23 PROCEDURE — 96417 CHEMO IV INFUS EACH ADDL SEQ: CPT

## 2024-12-23 PROCEDURE — 25010000002 PALONOSETRON PER 25 MCG: Performed by: PHYSICIAN ASSISTANT

## 2024-12-23 PROCEDURE — 25010000002 ATROPINE SULFATE 0.4 MG/ML SOLUTION 1 ML VIAL: Performed by: PHYSICIAN ASSISTANT

## 2024-12-23 RX ORDER — SODIUM CHLORIDE 9 MG/ML
20 INJECTION, SOLUTION INTRAVENOUS ONCE
Status: CANCELLED | OUTPATIENT
Start: 2024-12-23

## 2024-12-23 RX ORDER — ATROPINE SULFATE 1 MG/ML
0.4 INJECTION, SOLUTION INTRAMUSCULAR; INTRAVENOUS; SUBCUTANEOUS
Status: CANCELLED | OUTPATIENT
Start: 2024-12-23

## 2024-12-23 RX ORDER — ATROPINE SULFATE 1 MG/ML
0.4 INJECTION, SOLUTION INTRAMUSCULAR; INTRAVENOUS; SUBCUTANEOUS
Status: DISCONTINUED | OUTPATIENT
Start: 2024-12-23 | End: 2024-12-23

## 2024-12-23 RX ORDER — PALONOSETRON 0.05 MG/ML
0.25 INJECTION, SOLUTION INTRAVENOUS ONCE
Status: COMPLETED | OUTPATIENT
Start: 2024-12-23 | End: 2024-12-23

## 2024-12-23 RX ORDER — DEXAMETHASONE SODIUM PHOSPHATE 4 MG/ML
12 INJECTION, SOLUTION INTRA-ARTICULAR; INTRALESIONAL; INTRAMUSCULAR; INTRAVENOUS; SOFT TISSUE ONCE
Status: CANCELLED
Start: 2024-12-23 | End: 2024-12-23

## 2024-12-23 RX ORDER — PALONOSETRON 0.05 MG/ML
0.25 INJECTION, SOLUTION INTRAVENOUS ONCE
Status: CANCELLED | OUTPATIENT
Start: 2024-12-23

## 2024-12-23 RX ORDER — DEXAMETHASONE SODIUM PHOSPHATE 4 MG/ML
12 INJECTION, SOLUTION INTRA-ARTICULAR; INTRALESIONAL; INTRAMUSCULAR; INTRAVENOUS; SOFT TISSUE ONCE
Status: COMPLETED | OUTPATIENT
Start: 2024-12-23 | End: 2024-12-23

## 2024-12-23 RX ORDER — SODIUM CHLORIDE 9 MG/ML
20 INJECTION, SOLUTION INTRAVENOUS ONCE
Status: DISCONTINUED | OUTPATIENT
Start: 2024-12-23 | End: 2024-12-24 | Stop reason: HOSPADM

## 2024-12-23 RX ORDER — FLUOROURACIL 50 MG/ML
400 INJECTION, SOLUTION INTRAVENOUS ONCE
Status: CANCELLED | OUTPATIENT
Start: 2024-12-23

## 2024-12-23 RX ORDER — FLUOROURACIL 50 MG/ML
400 INJECTION, SOLUTION INTRAVENOUS ONCE
Status: COMPLETED | OUTPATIENT
Start: 2024-12-23 | End: 2024-12-23

## 2024-12-23 RX ADMIN — FLUOROURACIL 5300 MG: 50 INJECTION, SOLUTION INTRAVENOUS at 15:28

## 2024-12-23 RX ADMIN — IRINOTECAN HYDROCHLORIDE 400 MG: 20 INJECTION, SOLUTION INTRAVENOUS at 13:49

## 2024-12-23 RX ADMIN — DEXAMETHASONE SODIUM PHOSPHATE 12 MG: 4 INJECTION, SOLUTION INTRAMUSCULAR; INTRAVENOUS at 12:43

## 2024-12-23 RX ADMIN — LEUCOVORIN CALCIUM 880 MG: 350 INJECTION, POWDER, LYOPHILIZED, FOR SOLUTION INTRAMUSCULAR; INTRAVENOUS at 13:47

## 2024-12-23 RX ADMIN — FLUOROURACIL 880 MG: 50 INJECTION, SOLUTION INTRAVENOUS at 15:23

## 2024-12-23 RX ADMIN — PALONOSETRON HYDROCHLORIDE 0.25 MG: 0.25 INJECTION INTRAVENOUS at 12:41

## 2024-12-23 RX ADMIN — SODIUM CHLORIDE 450 MG: 9 INJECTION, SOLUTION INTRAVENOUS at 12:49

## 2024-12-26 ENCOUNTER — HOSPITAL ENCOUNTER (OUTPATIENT)
Dept: ONCOLOGY | Facility: HOSPITAL | Age: 64
Discharge: HOME | End: 2024-12-26
Admitting: PHYSICIAN ASSISTANT
Payer: MEDICARE

## 2024-12-26 DIAGNOSIS — C78.6 METASTASIS TO PERITONEAL CAVITY: Primary | ICD-10-CM

## 2024-12-26 DIAGNOSIS — C20 RECTAL CANCER: ICD-10-CM

## 2024-12-26 PROCEDURE — 25010000002 HEPARIN LOCK FLUSH PER 10 UNITS: Performed by: INTERNAL MEDICINE

## 2024-12-26 RX ORDER — HEPARIN SODIUM (PORCINE) LOCK FLUSH IV SOLN 100 UNIT/ML 100 UNIT/ML
500 SOLUTION INTRAVENOUS AS NEEDED
OUTPATIENT
Start: 2024-12-26

## 2024-12-26 RX ORDER — SODIUM CHLORIDE 0.9 % (FLUSH) 0.9 %
20 SYRINGE (ML) INJECTION AS NEEDED
OUTPATIENT
Start: 2024-12-26

## 2024-12-26 RX ORDER — SODIUM CHLORIDE 0.9 % (FLUSH) 0.9 %
20 SYRINGE (ML) INJECTION AS NEEDED
Status: DISCONTINUED | OUTPATIENT
Start: 2024-12-26 | End: 2024-12-28 | Stop reason: HOSPADM

## 2024-12-26 RX ORDER — HEPARIN SODIUM (PORCINE) LOCK FLUSH IV SOLN 100 UNIT/ML 100 UNIT/ML
500 SOLUTION INTRAVENOUS AS NEEDED
Status: DISCONTINUED | OUTPATIENT
Start: 2024-12-26 | End: 2024-12-28 | Stop reason: HOSPADM

## 2024-12-26 RX ADMIN — HEPARIN 500 UNITS: 100 SYRINGE at 08:20

## 2024-12-26 RX ADMIN — Medication 20 ML: at 08:20

## 2024-12-26 NOTE — PROGRESS NOTES
Pt here for Adrucil pump DC. Pump empty. Site clean, dry, and intact.  Pt denies having any issues. Port flushed with good blood return noted.  Port flushed with saline and heparin prior to needle removal.  Pt given AVS and d/c home by self.

## 2024-12-31 ENCOUNTER — HOSPITAL ENCOUNTER (OUTPATIENT)
Dept: PET IMAGING | Facility: HOSPITAL | Age: 64
Discharge: HOME | End: 2024-12-31
Admitting: INTERNAL MEDICINE
Payer: MEDICARE

## 2024-12-31 DIAGNOSIS — C20 RECTAL CANCER: ICD-10-CM

## 2024-12-31 DIAGNOSIS — C78.6 METASTASIS TO PERITONEAL CAVITY: ICD-10-CM

## 2024-12-31 PROCEDURE — 74177 CT ABD & PELVIS W/CONTRAST: CPT

## 2024-12-31 PROCEDURE — 25510000001 IOPAMIDOL PER 1 ML: Performed by: INTERNAL MEDICINE

## 2024-12-31 PROCEDURE — 71260 CT THORAX DX C+: CPT

## 2024-12-31 RX ORDER — IOPAMIDOL 755 MG/ML
100 INJECTION, SOLUTION INTRAVASCULAR
Status: COMPLETED | OUTPATIENT
Start: 2024-12-31 | End: 2024-12-31

## 2024-12-31 RX ADMIN — IOPAMIDOL 100 ML: 755 INJECTION, SOLUTION INTRAVENOUS at 09:26

## 2025-01-03 NOTE — PROGRESS NOTES
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     History of Present Illness:  2024: Mr. Zhou carried a long history of severe hypertension and of an aneurysm of the ascending aorta.  He had been receiving antihypertensive medication after correction of the aneurysm with good results.  In 2023 he underwent his first screening colonoscopy.  A large circumferential and obstructive tumor was identified at the sigmoid/rectosigmoid junction.  The tumor could not be traversed even with the smallest instrument.  Biopsies showed moderately differentiated colonic adenocarcinoma without loss of nuclear expression of the mismatch repair proteins.  Imaging studies at the time revealed the known thoracic aneurysm that has increased mildly since the previous scan.  There was a benign-appearing subpleural nodule in the right posteromedial chest wall and pulmonary nodules that have been identified since 2018 remained stable.  Some mediastinal nonspecific and stable adenopathy was reported as well.  In the abdomen the sigmoid tumor was confirmed and there was no suggestion of metastatic disease.  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  He underwent excision of the squamous cell carcinoma that proved to be a high-grade squamous intraepithelial lesion.  He also had a robotic left colectomy.  The final report of pathology was of moderately differentiated adenocarcinoma of the colon, that measured 3.5 cm.  The tumor involved the serosal surface and the antimesenteric serosa.  Lymphovascular space invasion was identified.  All margins were negative for disease but 2  of 27 lymph nodes had metastatic involvement.  As well there were at least 5 peritoneal deposits that were excised and that were confirmed to correspond to metastatic lesions.  The tumor had intact expression of MLH1, MSH 1, MSH 6 and PMS2.  Next-generation sequencing revealed a pathogenic mutation of exon 2 of the KRAS gene. ERBB2, BRAF, NTRK, RET, and PIK3CA were negative. PD-L1 was negative, as well.  He was started on treatment with FOLFOX and bevacizumab on November 28, 2023.  He reported adequate tolerance to the treatment, with the expected cold intolerance.  He had had no nausea but since the beginning of the present illness he had lost approximately 20 pounds.  He was eating reasonably well.  He had been free of chest pains and no longer had abdominal pain.  All his surgical incisions had healed and he was having regular defecations, at times of liquid stool.  The physical exam revealed him to be a chronically ill-appearing man who did not seem in distress.  He was conversant, in good spirits and without jaundice.  Lungs diminished bilaterally.  Heart regular.  Abdomen soft and nontender.  No edema.  The laboratory exams and all the records were reviewed and discussed with him and with his wife.  To resume treatment with the idea of obtaining a new set of scans after 6 cycles of chemotherapy.  After 12 cycles of chemotherapy discuss the possibility of additional surgery if there was any residual peritoneal disease at the time of surgery.  To see me at the end of February with the scans.    2/16/2024: In the office to review scans. In the last few days has had severe glossodynia and odynophagia. Also has been coughing and expectorating some clear sputum. No fevers. Has had pain on the left side of the chest. No dyspnea. He was prescribed Hiral's magic potion and fluconazole that has resulted in relief. On exam he does not appear acutely ill. No jaundice or pallor. Lungs are diminished bilaterally and heart  irregularly irregular. Abdomen soft. No edema. Reviewed the laboratory exams. Reviewed the images of the scans. Sent prescriptions for doxycycline as the lesion in the left lower lobe is likely infectious in nature, given his leukocytosis and symptoms. Will obtain an electrocardiogram. Will follow next week.     3/8/2024: Feeling poorly. Weak and not moving much. Has continued to have diffuse pain. No fevers. His spouse believes he has an ulcer on the backside. On exam he is conversant and oriented. No jaundice. No pallor. The lungs are diminished bilaterally. Heart regular. There is indeed an ulcer in the sacral region, in the intergluteal crease. Reviewed the laboratory exams. He is to continue with the same chemotherapy. Referred to the wound clinic. To have an electrocardiogram. He is to see me in a few weeks with new scans.     4/10/2024: Was admitted to the hospital shortly after the previous visit.  He had pneumonia and was very dyspneic.  On March 11, 2024 underwent a thoracoscopic decortication with parietal and visceral pleurectomy and intercostal nerve block.  He was treated with antibiotics and eventually discharged to a subacute rehabilitation facility.  Resumed treatment on 4/9/2024.  He tells me today he is feeling progressively better.  Stronger.  The surgical incisions are healing.  He is not having as much trouble breathing.  He has had no abdominal pain or diarrhea.  On exam alert, conversant and ill.  Seems much older than the stated age but is not in distress.  Lungs are diminished bilaterally.  Heart is regular.  Abdomen is soft.  No edema.  The laboratory exams were reviewed.  I reviewed the records from the hospital and reviewed all imaging studies done during that hospitalization.  No suggestion of progressive metastatic disease.  For now continue with the same treatment.  See me early in May 2024.    5/9/2024: Feeling progressively better.  Able to take the chemotherapy without much  difficulty.  Eating more.  Also more mobile.  Using a cane only when outside of his house.  On exam alert, chronically ill-appearing but in no distress.  No jaundice.  Conversant and oriented.  Lungs diminished bilaterally.  Heart regular.  Abdomen soft.  No edema.  Reviewed the laboratory exams and discussed with him.  He will see me again in approximately 6 weeks with new scans.  Continue with the same chemotherapy.    6/19/2024: Feeling reasonably well.  No weakness or chest pains and no cough.  On exam alert and conversant.  Not jaundiced or pale.  Lungs diminished bilaterally with bilateral wheezes and heart regular.  Abdomen soft.  No edema.  Reviewed the images of the recent scans with him and explained the finding of new metastatic deposits in the liver.  Explained to him that given the length of time that elapsed without chemotherapy because of his respiratory and cardiovascular issues, I do not know if the problem is that the chemotherapy was not working at all or if that Is what led to the development of metastatic disease.  I'd like to try the FOLFOX with bevacizumab in the way that he had been getting it to see if there is response with regular treatment.  For that reason we will proceed with treatment today.  He is to see me in approximately 4 weeks from today.    8/30/2024: Progressively better and stronger.  Eating well and more energetic.  Was able to get out of the house and do several different things 6 days in a row, which has been a change.  Continues to have some dyspnea with exertion but he also continues to smoke.  He has had no chest pains and is not coughing as much.  He denies abdominal pain and has maintained regular bowel activity.  On exam he appears chronically ill and much older than the stated age.  No distress.  No jaundice.  The lungs are clear bilaterally and the heart is regular.  The abdomen is soft.  The epigastric space seems taylor and the edge of the liver can be palpated,  at the level of the midsternal line, approximately 3 cm below the costal margin.  There is no edema.  Laboratory exams reviewed.  To obtain a scan and see me in 3 months.  Continue with the same chemotherapy for now.    9/25/2024: In the office before the next scheduled appointment. He is feeling as well as he had been feeling before and on direct questioning he denies new symptoms. The scans, however, reports progression of the disease with enlargement of the hepatic lesions. There has also been development of lymphadenopathy and a lymph node conglomerate results in left hydronephrosis by causing obstruction of the mid left ureter.  Plans to begin treatment with FOLFIRI/bevacizumab were made.    10/11/2024: Received the first dose of irinotecan. Had some nausea and emesis on at least a couple of occasions. He has felt tired. He has been able to eat some. No chest pain or cough. Remains free of abdominal pain. No diarrhea, he has rather tended to be constipated. No edema. No skin rash. On exam alert and conversant. Appears chronically ill but in no distress. No jaundice. Lungs diminished bilaterally and heart regular. Abdomen soft and not tender. No edema. Reviewed the laboratory exams. I have sent a new prescription for prochlorperazine. He is to continue with the same treatment and will see me in approximately 6 weeks.     11/22/2024: Continues to feel well.  He has not had many difficulties with the current chemotherapy and he has had nausea on a couple of occasions only.  The antiemetics seems to have worked, at least partially.  He has been eating well.  He is not needing the cane as much.  He does not have any more dyspnea than before but he continues to smoke.  No abdominal pain.  Maintains regular bowel activity.  Has had the nephrostomies removed.  He has no dysuria.  No edema.  On exam alert and conversant.  Ill but in no distress.  No jaundice.  Lungs diminished bilaterally and heart regular.  Abdomen  soft.  No palpable tumors but the liver seems to be palpable approximately 10 cm below the costal margin.  No edema.  Laboratory exams reviewed.  Discussed with him.  To see me again with new scans.  Continue with the same treatment.    1/10/2025: Continues to receive chemotherapy.  He does experience the side effects and for 1 to 2 days he feels fatigued after each treatment but he recovers promptly and is able to do the majority of his activities.  His appetite remains adequate and has had no nausea or vomiting.  He is weight is, as well stable.  No chest pains or cough.  Denies abdominal pain.  Maintains regular bowel activity and has not had diarrhea.  No dysuria.  No edema.  On exam he is chronically ill-appearing but conversant and in no distress.  No jaundice.  He is pale.  He is well-hydrated.  Lungs are diminished bilaterally and heart regular.  Abdomen soft and without palpable tumors.  No edema.  Laboratory exams reviewed.  I reviewed the images of the scans.  There is good evidence of response.  To continue with the same treatment.  See me in 6 weeks.    Past Medical History:   Diagnosis Date    Aortic dissection     Diabetes mellitus     Heart murmur     History of noncompliance with medical treatment     Hyperlipidemia     Hypertension     Type B hypoplasia of aortic arch      Past Surgical History:   Procedure Laterality Date    COLECTOMY PARTIAL / TOTAL  2023    COLONOSCOPY N/A 08/31/2023    Procedure: COLONOSCOPY with sigmoid mass tattooing at 35cm, sigmoid and rectal polypectomy;  Surgeon: TORIBIO Jacob MD;  Location: [facility] ENDOSCOPY;  Service: Gastroenterology;  Laterality: N/A;  sigmoid mass, colon polyps    THORACOSCOPY WITH DECORTICATION Left 3/11/2024    Procedure: THORACOSCOPY VIDEO ASSISTED WITH DECORTICATION WITH DAVINCI ROBOT;  Surgeon: Saqib Keller MD;  Location: [facility] MAIN OR;  Service: Robotics - DaVinci;  Laterality: Left;       Current Outpatient Medications:     amLODIPine  (NORVASC) 10 MG tablet, TAKE 1 TABLET BY MOUTH DAILY, Disp: 90 tablet, Rfl: 0    atorvastatin (LIPITOR) 40 MG tablet, TAKE 1 TABLET BY MOUTH EVERY NIGHT AT BEDTIME, Disp: 90 tablet, Rfl: 0    Blood Glucose Monitoring Suppl (Accu-Chek Guide) w/Device kit, 1 Device Daily., Disp: 1 kit, Rfl: 0    cloNIDine (CATAPRES) 0.1 MG tablet, Take 1 tablet by mouth 2 (Two) Times a Day., Disp: 180 tablet, Rfl: 1    gabapentin (NEURONTIN) 300 MG capsule, TAKE 1 CAPSULE BY MOUTH 3 TIMES A DAY, Disp: 90 capsule, Rfl: 5    glimepiride (AMARYL) 4 MG tablet, Take 1 tablet by mouth 2 (Two) Times a Day., Disp: 60 tablet, Rfl: 5    glucose blood (Accu-Chek Guide) test strip, Test daily e11.9, Disp: 50 each, Rfl: 12    hydrALAZINE (APRESOLINE) 50 MG tablet, TAKE ONE TABLET BY MOUTH EVERY 8 HOURS, Disp: 90 tablet, Rfl: 1    metFORMIN (GLUCOPHAGE) 850 MG tablet, TAKE 1 TABLET BY MOUTH TWICE A DAY WITH MEALS, Disp: 144 tablet, Rfl: 1    metoprolol tartrate (LOPRESSOR) 50 MG tablet, TAKE THREE TABLETS BY MOUTH EVERY 12 HOURS, Disp: 540 tablet, Rfl: 0    ondansetron (ZOFRAN) 8 MG tablet, Take 1 tablet by mouth 3 (Three) Times a Day As Needed for Nausea or Vomiting., Disp: 30 tablet, Rfl: 5    oxyCODONE (ROXICODONE) 10 MG tablet, , Disp: , Rfl:     potassium chloride 10 MEQ CR tablet, Take 1 tablet by mouth Daily., Disp: 30 tablet, Rfl: 2    rivaroxaban (Xarelto) 20 MG tablet, TAKE 1 TABLET BY MOUTH DAILY (Patient not taking: Reported on 1/10/2025), Disp: 90 tablet, Rfl: 1  No current facility-administered medications for this visit.    Facility-Administered Medications Ordered in Other Visits:     heparin injection 500 Units, 500 Units, Intravenous, PRNKristy Alfonso, MD, 500 Units at 01/10/25 0818    sodium chloride 0.9 % flush 20 mL, 20 mL, Intravenous, PRNKristy Alfonso, MD, 20 mL at 01/10/25 0818    No Known Allergies    Family History   Problem Relation Age of Onset    Diabetes Mother     Dementia Mother     Sudden death Father       Cancer-related family history is not on file.    Social History     Tobacco Use    Smoking status: Every Day     Current packs/day: 2.00     Average packs/day: 2.0 packs/day for 54.0 years (108.0 ttl pk-yrs)     Types: Cigarettes     Start date: 1971     Passive exposure: Current    Smokeless tobacco: Never   Vaping Use    Vaping status: Never Used   Substance Use Topics    Alcohol use: No     Comment: Abstinent since around 2008    Drug use: Not Currently     Types: Marijuana     Comment: Abstinent since at least the 1980's     Social History     Social History Narrative    Not on file     ROS:   Review of Systems   Constitutional:  Positive for fatigue. Negative for activity change, appetite change, chills, diaphoresis, fever and unexpected weight change.   HENT:  Negative for congestion, dental problem, drooling, ear discharge, ear pain, facial swelling, hearing loss, mouth sores, nosebleeds, postnasal drip, rhinorrhea, sinus pressure, sinus pain, sneezing, sore throat, tinnitus, trouble swallowing and voice change.    Eyes:  Negative for photophobia, pain, discharge, redness, itching and visual disturbance.   Respiratory:  Positive for cough and shortness of breath. Negative for apnea, choking, chest tightness, wheezing and stridor.    Cardiovascular:  Negative for chest pain, palpitations and leg swelling.   Gastrointestinal:  Negative for abdominal distention, abdominal pain, anal bleeding, blood in stool, constipation, diarrhea, nausea, rectal pain and vomiting.   Endocrine: Negative for cold intolerance, heat intolerance, polydipsia and polyuria.   Genitourinary:  Negative for decreased urine volume, difficulty urinating, dysuria, flank pain, frequency, genital sores, hematuria and urgency.   Musculoskeletal:  Negative for arthralgias, back pain, gait problem, joint swelling, myalgias, neck pain and neck stiffness.   Skin:  Negative for color change, pallor and rash.   Neurological:  Negative for  "dizziness, tremors, seizures, syncope, facial asymmetry, speech difficulty, weakness, light-headedness, numbness and headaches.        Increased cold sensitivity since the commencement of treatment with oxaliplatin   Hematological:  Negative for adenopathy. Does not bruise/bleed easily.   Psychiatric/Behavioral:  Negative for agitation, behavioral problems, confusion, decreased concentration, hallucinations, self-injury, sleep disturbance and suicidal ideas. The patient is not nervous/anxious.      Objective:    Vital Signs:  Vitals:    01/10/25 0828   BP: 108/70   Pulse: 66   Resp: 14   Temp: 97.4 °F (36.3 °C)   SpO2: 95%   Weight: 92.9 kg (204 lb 12.8 oz)   Height: 193 cm (76\")   PainSc: 0-No pain     Body mass index is 24.93 kg/m².    ECOG  (1) Restricted in physically strenuous activity, ambulatory and able to do work of light nature    Physical Exam:   Physical Exam  Constitutional:       General: He is not in acute distress.     Appearance: He is not ill-appearing, toxic-appearing or diaphoretic.      Comments: Does not appear acutely ill. Conversant and oriented. No jaundice or pallor.    HENT:      Head: Normocephalic and atraumatic.      Right Ear: External ear normal.      Left Ear: External ear normal.      Nose: Nose normal.      Mouth/Throat:      Mouth: Mucous membranes are moist.      Pharynx: Oropharynx is clear.   Eyes:      General: No scleral icterus.        Right eye: No discharge.         Left eye: No discharge.      Conjunctiva/sclera: Conjunctivae normal.      Pupils: Pupils are equal, round, and reactive to light.   Cardiovascular:      Rate and Rhythm: Normal rate.      Pulses: Normal pulses.      Heart sounds: Normal heart sounds. No murmur heard.     No friction rub. No gallop.   Pulmonary:      Effort: No respiratory distress.      Breath sounds: No stridor. No wheezing, rhonchi or rales.      Comments: Diminished bilaterally and with fine crackles in the bases.   Chest:      Chest wall: " No tenderness.      Comments: Surgical incisions on the left side of the chest are healing well.  No evidence of infection.  Abdominal:      General: Bowel sounds are normal. There is no distension.      Palpations: Abdomen is soft. There is no mass.      Tenderness: There is no abdominal tenderness. There is no right CVA tenderness, left CVA tenderness, guarding or rebound.      Comments: Soft.  Not tender.  No palpable tumors.   Musculoskeletal:         General: No tenderness, deformity or signs of injury.      Cervical back: No rigidity.      Right lower leg: No edema.      Left lower leg: No edema.   Lymphadenopathy:      Cervical: No cervical adenopathy.   Skin:     General: Skin is warm and dry.      Coloration: Skin is pale. Skin is not jaundiced.      Findings: No bruising or rash.   Neurological:      General: No focal deficit present.      Mental Status: He is alert and oriented to person, place, and time.      Cranial Nerves: No cranial nerve deficit.   Psychiatric:         Mood and Affect: Mood normal.         Behavior: Behavior normal.         Thought Content: Thought content normal.         Judgment: Judgment normal.     MIGUEL Wagner MD performed the physical exam on 1/10/2025 as documented above.    Lab Results - Last 18 Months   Lab Units 01/08/25  0820 12/23/24  1128 12/09/24  0830   WBC 10*3/mm3 5.28 6.22 3.93   HEMOGLOBIN g/dL 12.7* 12.4* 10.8*   HEMATOCRIT % 38.3 39.2 34.0*   PLATELETS 10*3/mm3 171 189 254   MCV fL 98.0* 99.0* 97.7*     Lab Results - Last 18 Months   Lab Units 01/08/25  0820 12/23/24  1142 12/09/24  0851 11/25/24  0913 11/22/24  1443 11/06/24  0842   SODIUM mmol/L 138  --   --   --  140 140   POTASSIUM mmol/L 3.7  --   --   --  3.5 3.2*   CHLORIDE mmol/L 103  --   --   --  104 99   CO2 mmol/L 25.4  --   --   --  24.8 27.6   BUN mg/dL 8  --   --   --  10 6*   CREATININE mg/dL 0.70* 0.70 0.60   < > 0.74* 0.60*   CALCIUM mg/dL 10.0  --   --   --  10.6* 10.4   BILIRUBIN mg/dL  0.4  --   --   --  0.3 0.4   ALK PHOS U/L 77  --   --   --  81 119*   ALT (SGPT) U/L 10  --   --   --  14 13   AST (SGOT) U/L 22  --   --   --  19 23   GLUCOSE mg/dL 226*  --   --   --  137* 192*    < > = values in this interval not displayed.     Lab Results   Component Value Date    GLUCOSE 226 (H) 01/08/2025    BUN 8 01/08/2025    CREATININE 0.70 (L) 01/08/2025    EGFRIFNONA 100 01/20/2022    BCR 11.4 01/08/2025    K 3.7 01/08/2025    CO2 25.4 01/08/2025    CALCIUM 10.0 01/08/2025    ALBUMIN 3.8 01/08/2025    LABIL2 1.5 01/22/2019    AST 22 01/08/2025    ALT 10 01/08/2025     Lab Results - Last 18 Months   Lab Units 10/18/24  0825 10/04/24  0811 03/10/24  2236   INR  1.05 1.01 1.21*   APTT seconds 27.9 31.0 31.2*     Lab Results   Component Value Date    PTT 27.9 10/18/2024    INR 1.05 10/18/2024     Lab Results   Component Value Date    CEA 2.41 11/25/2024     Lab Results   Component Value Date    PSA 0.594 01/23/2023     Assessment & Plan     1.  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 Started FOLFIRI/bevacizumab on October 10, 2024.  Scans in late December 2024 reveal good response.  To continue with the same treatment.  2.  Recovered completely from the decortication.  No evidence of complications.  2.  Cigarette smoker: Unchanged.  3.  Aortic aneurysm: Observation only for now.  4.  Severe hypertension: Maintains excellent control.  5.  Reviewed the images and the report of the CT scans.  Discussed the results with him and with his spouse.  Reviewed the laboratory exams.  7.  To continue with the same chemotherapy and see me in approximately 6 weeks.    Lars Wagner MD on 1/10/2025 at 8:48 AM.

## 2025-01-08 ENCOUNTER — HOSPITAL ENCOUNTER (OUTPATIENT)
Dept: ONCOLOGY | Facility: HOSPITAL | Age: 65
Discharge: HOME | End: 2025-01-08
Admitting: INTERNAL MEDICINE
Payer: MEDICARE

## 2025-01-08 VITALS
SYSTOLIC BLOOD PRESSURE: 118 MMHG | WEIGHT: 210.4 LBS | RESPIRATION RATE: 16 BRPM | DIASTOLIC BLOOD PRESSURE: 68 MMHG | OXYGEN SATURATION: 99 % | HEIGHT: 76 IN | HEART RATE: 97 BPM | TEMPERATURE: 97.7 F | BODY MASS INDEX: 25.62 KG/M2

## 2025-01-08 DIAGNOSIS — C20 RECTAL CANCER: Primary | ICD-10-CM

## 2025-01-08 DIAGNOSIS — C78.6 METASTASIS TO PERITONEAL CAVITY: ICD-10-CM

## 2025-01-08 LAB
ALBUMIN SERPL-MCNC: 3.8 G/DL (ref 3.5–5.2)
ALBUMIN/GLOB SERPL: 1.5 G/DL
ALP SERPL-CCNC: 77 U/L (ref 39–117)
ALT SERPL W P-5'-P-CCNC: 10 U/L (ref 1–41)
ANION GAP SERPL CALCULATED.3IONS-SCNC: 9.6 MMOL/L (ref 5–15)
AST SERPL-CCNC: 22 U/L (ref 1–40)
BASOPHILS # BLD AUTO: 0.1 10*3/MM3 (ref 0–0.2)
BASOPHILS NFR BLD AUTO: 1.9 % (ref 0–1.5)
BILIRUB SERPL-MCNC: 0.4 MG/DL (ref 0–1.2)
BILIRUB UR QL STRIP: ABNORMAL
BUN SERPL-MCNC: 8 MG/DL (ref 8–23)
BUN/CREAT SERPL: 11.4 (ref 7–25)
CALCIUM SPEC-SCNC: 10 MG/DL (ref 8.6–10.5)
CHLORIDE SERPL-SCNC: 103 MMOL/L (ref 98–107)
CLARITY UR: ABNORMAL
CO2 SERPL-SCNC: 25.4 MMOL/L (ref 22–29)
COLOR UR: ABNORMAL
CREAT SERPL-MCNC: 0.7 MG/DL (ref 0.76–1.27)
DEPRECATED RDW RBC AUTO: 59.8 FL (ref 37–54)
EGFRCR SERPLBLD CKD-EPI 2021: 103.5 ML/MIN/1.73
EOSINOPHIL # BLD AUTO: 0.19 10*3/MM3 (ref 0–0.4)
EOSINOPHIL NFR BLD AUTO: 3.6 % (ref 0.3–6.2)
ERYTHROCYTE [DISTWIDTH] IN BLOOD BY AUTOMATED COUNT: 16.6 % (ref 12.3–15.4)
GLOBULIN UR ELPH-MCNC: 2.6 GM/DL
GLUCOSE SERPL-MCNC: 226 MG/DL (ref 65–99)
GLUCOSE UR STRIP-MCNC: ABNORMAL MG/DL
HCT VFR BLD AUTO: 38.3 % (ref 37.5–51)
HGB BLD-MCNC: 12.7 G/DL (ref 13–17.7)
HGB UR QL STRIP.AUTO: ABNORMAL
KETONES UR QL STRIP: ABNORMAL
LEUKOCYTE ESTERASE UR QL STRIP.AUTO: NEGATIVE
LYMPHOCYTES # BLD AUTO: 1.8 10*3/MM3 (ref 0.7–3.1)
LYMPHOCYTES NFR BLD AUTO: 34.1 % (ref 19.6–45.3)
MCH RBC QN AUTO: 32.5 PG (ref 26.6–33)
MCHC RBC AUTO-ENTMCNC: 33.2 G/DL (ref 31.5–35.7)
MCV RBC AUTO: 98 FL (ref 79–97)
MONOCYTES # BLD AUTO: 0.72 10*3/MM3 (ref 0.1–0.9)
MONOCYTES NFR BLD AUTO: 13.6 % (ref 5–12)
NEUTROPHILS NFR BLD AUTO: 2.47 10*3/MM3 (ref 1.7–7)
NEUTROPHILS NFR BLD AUTO: 46.8 % (ref 42.7–76)
NITRITE UR QL STRIP: NEGATIVE
PH UR STRIP.AUTO: 5.5 [PH] (ref 5–8)
PLATELET # BLD AUTO: 171 10*3/MM3 (ref 140–450)
PMV BLD AUTO: 9.3 FL (ref 6–12)
POTASSIUM SERPL-SCNC: 3.7 MMOL/L (ref 3.5–5.2)
PROT SERPL-MCNC: 6.4 G/DL (ref 6–8.5)
PROT UR QL STRIP: ABNORMAL
RBC # BLD AUTO: 3.91 10*6/MM3 (ref 4.14–5.8)
SODIUM SERPL-SCNC: 138 MMOL/L (ref 136–145)
SP GR UR STRIP: >=1.03 (ref 1–1.03)
UROBILINOGEN UR QL STRIP: ABNORMAL
WBC NRBC COR # BLD AUTO: 5.28 10*3/MM3 (ref 3.4–10.8)

## 2025-01-08 PROCEDURE — 96366 THER/PROPH/DIAG IV INF ADDON: CPT

## 2025-01-08 PROCEDURE — 25010000002 ATROPINE SULFATE 0.4 MG/ML SOLUTION 1 ML VIAL: Performed by: INTERNAL MEDICINE

## 2025-01-08 PROCEDURE — 96375 TX/PRO/DX INJ NEW DRUG ADDON: CPT

## 2025-01-08 PROCEDURE — 25010000002 BEVACIZUMAB-BVZR 100 MG/4ML SOLUTION 4 ML VIAL: Performed by: INTERNAL MEDICINE

## 2025-01-08 PROCEDURE — 81003 URINALYSIS AUTO W/O SCOPE: CPT | Performed by: INTERNAL MEDICINE

## 2025-01-08 PROCEDURE — G0498 CHEMO EXTEND IV INFUS W/PUMP: HCPCS

## 2025-01-08 PROCEDURE — 85025 COMPLETE CBC W/AUTO DIFF WBC: CPT | Performed by: INTERNAL MEDICINE

## 2025-01-08 PROCEDURE — 25010000002 LEUCOVORIN CALCIUM PER 50 MG: Performed by: INTERNAL MEDICINE

## 2025-01-08 PROCEDURE — 25010000002 FLUOROURACIL PER 500 MG: Performed by: INTERNAL MEDICINE

## 2025-01-08 PROCEDURE — 25810000003 SODIUM CHLORIDE 0.9 % SOLUTION 250 ML FLEX CONT: Performed by: INTERNAL MEDICINE

## 2025-01-08 PROCEDURE — 96413 CHEMO IV INFUSION 1 HR: CPT

## 2025-01-08 PROCEDURE — 25010000002 LEUCOVORIN 200 MG RECONSTITUTED SOLUTION 1 EACH VIAL: Performed by: INTERNAL MEDICINE

## 2025-01-08 PROCEDURE — 25010000002 DEXAMETHASONE PER 1 MG: Performed by: INTERNAL MEDICINE

## 2025-01-08 PROCEDURE — 25010000002 BEVACIZUMAB-BVZR 400 MG/16ML SOLUTION 16 ML VIAL: Performed by: INTERNAL MEDICINE

## 2025-01-08 PROCEDURE — 80053 COMPREHEN METABOLIC PANEL: CPT | Performed by: INTERNAL MEDICINE

## 2025-01-08 PROCEDURE — 25010000002 PALONOSETRON PER 25 MCG: Performed by: INTERNAL MEDICINE

## 2025-01-08 PROCEDURE — 96415 CHEMO IV INFUSION ADDL HR: CPT

## 2025-01-08 PROCEDURE — 96368 THER/DIAG CONCURRENT INF: CPT

## 2025-01-08 PROCEDURE — 96411 CHEMO IV PUSH ADDL DRUG: CPT

## 2025-01-08 PROCEDURE — 96417 CHEMO IV INFUS EACH ADDL SEQ: CPT

## 2025-01-08 PROCEDURE — 25010000002 IRINOTECAN PER 20 MG: Performed by: INTERNAL MEDICINE

## 2025-01-08 RX ORDER — FLUOROURACIL 50 MG/ML
400 INJECTION, SOLUTION INTRAVENOUS ONCE
Status: COMPLETED | OUTPATIENT
Start: 2025-01-08 | End: 2025-01-08

## 2025-01-08 RX ORDER — ATROPINE SULFATE 1 MG/ML
0.4 INJECTION, SOLUTION INTRAMUSCULAR; INTRAVENOUS; SUBCUTANEOUS
Status: DISCONTINUED | OUTPATIENT
Start: 2025-01-08 | End: 2025-01-08

## 2025-01-08 RX ORDER — SODIUM CHLORIDE 9 MG/ML
20 INJECTION, SOLUTION INTRAVENOUS ONCE
Status: CANCELLED | OUTPATIENT
Start: 2025-01-08

## 2025-01-08 RX ORDER — FLUOROURACIL 50 MG/ML
400 INJECTION, SOLUTION INTRAVENOUS ONCE
Status: CANCELLED | OUTPATIENT
Start: 2025-01-08

## 2025-01-08 RX ORDER — PALONOSETRON 0.05 MG/ML
0.25 INJECTION, SOLUTION INTRAVENOUS ONCE
Status: COMPLETED | OUTPATIENT
Start: 2025-01-08 | End: 2025-01-08

## 2025-01-08 RX ORDER — SODIUM CHLORIDE 9 MG/ML
20 INJECTION, SOLUTION INTRAVENOUS ONCE
Status: DISCONTINUED | OUTPATIENT
Start: 2025-01-08 | End: 2025-01-09 | Stop reason: HOSPADM

## 2025-01-08 RX ORDER — PALONOSETRON 0.05 MG/ML
0.25 INJECTION, SOLUTION INTRAVENOUS ONCE
Status: CANCELLED | OUTPATIENT
Start: 2025-01-08

## 2025-01-08 RX ORDER — ATROPINE SULFATE 1 MG/ML
0.4 INJECTION, SOLUTION INTRAMUSCULAR; INTRAVENOUS; SUBCUTANEOUS
Status: CANCELLED | OUTPATIENT
Start: 2025-01-08

## 2025-01-08 RX ORDER — DEXAMETHASONE SODIUM PHOSPHATE 4 MG/ML
12 INJECTION, SOLUTION INTRA-ARTICULAR; INTRALESIONAL; INTRAMUSCULAR; INTRAVENOUS; SOFT TISSUE ONCE
Status: CANCELLED
Start: 2025-01-08 | End: 2025-01-08

## 2025-01-08 RX ORDER — DEXAMETHASONE SODIUM PHOSPHATE 4 MG/ML
12 INJECTION, SOLUTION INTRA-ARTICULAR; INTRALESIONAL; INTRAMUSCULAR; INTRAVENOUS; SOFT TISSUE ONCE
Status: COMPLETED | OUTPATIENT
Start: 2025-01-08 | End: 2025-01-08

## 2025-01-08 RX ADMIN — FLUOROURACIL 880 MG: 50 INJECTION, SOLUTION INTRAVENOUS at 11:39

## 2025-01-08 RX ADMIN — ATROPINE SULFATE 400 MG: 0.4 INJECTION, SOLUTION INTRAVENOUS at 09:54

## 2025-01-08 RX ADMIN — FLUOROURACIL 5300 MG: 50 INJECTION, SOLUTION INTRAVENOUS at 11:44

## 2025-01-08 RX ADMIN — SODIUM CHLORIDE 480 MG: 9 INJECTION, SOLUTION INTRAVENOUS at 09:15

## 2025-01-08 RX ADMIN — PALONOSETRON HYDROCHLORIDE 0.25 MG: 0.25 INJECTION INTRAVENOUS at 09:03

## 2025-01-08 RX ADMIN — DEXAMETHASONE SODIUM PHOSPHATE 12 MG: 4 INJECTION INTRA-ARTICULAR; INTRALESIONAL; INTRAMUSCULAR; INTRAVENOUS; SOFT TISSUE at 09:10

## 2025-01-08 RX ADMIN — LEUCOVORIN CALCIUM 880 MG: 200 INJECTION, POWDER, LYOPHILIZED, FOR SOLUTION INTRAMUSCULAR; INTRAVENOUS at 09:52

## 2025-01-08 NOTE — PROGRESS NOTES
Port accessed by Nely Garcia RN with sterile technique and positive blood return noted.  Blood drawn from port.  10 cc blood wasted prior to specimen collection.  Specimens sent to lab for processing. The following secure chat sent to Dr. Wagner with lab results: Good morning. Patient in clinic for C7 Zirabev/Folfiri for colon. /68, HR 97. Patient feels wells. He has a mild, diffuse rash/redness to back of bilateral hands. It does not itch but sometimes burns. He is not putting anything on it and it appeared after his last chemo treatment. He has chronic neuropathy from diabetes but states the neuropathy is a little worse than normal. He occasionally will drop items. His urine was very dark, tea colored this morning. No complaints of burning. He has urgency but states that has been his normal since his ureter stent was placed. Okay to treat with urine protein 3+? Plan will need to be signed.   Dr. Wagner replied: please proceed with treatment.   Patient educated to use lotion on hands to help with dry skin and rash. Patient was given supplies and educated on home pump disconnect and port de-accessing in the event the weather closes the office on Friday. Patient verbalized understanding. Patient tolerated treatment. Discharged with AVS.

## 2025-01-10 ENCOUNTER — OFFICE VISIT (OUTPATIENT)
Dept: ONCOLOGY | Facility: CLINIC | Age: 65
End: 2025-01-10
Payer: MEDICARE

## 2025-01-10 ENCOUNTER — LAB (OUTPATIENT)
Dept: LAB | Facility: HOSPITAL | Age: 65
End: 2025-01-10
Payer: MEDICARE

## 2025-01-10 ENCOUNTER — HOSPITAL ENCOUNTER (OUTPATIENT)
Dept: ONCOLOGY | Facility: HOSPITAL | Age: 65
Discharge: HOME | End: 2025-01-10
Payer: MEDICARE

## 2025-01-10 VITALS
DIASTOLIC BLOOD PRESSURE: 70 MMHG | SYSTOLIC BLOOD PRESSURE: 108 MMHG | HEIGHT: 76 IN | RESPIRATION RATE: 14 BRPM | HEART RATE: 66 BPM | WEIGHT: 204.8 LBS | OXYGEN SATURATION: 95 % | BODY MASS INDEX: 24.94 KG/M2 | TEMPERATURE: 97.4 F

## 2025-01-10 DIAGNOSIS — C20 RECTAL CANCER: Primary | ICD-10-CM

## 2025-01-10 DIAGNOSIS — C20 RECTAL CANCER: ICD-10-CM

## 2025-01-10 DIAGNOSIS — C78.6 METASTASIS TO PERITONEAL CAVITY: ICD-10-CM

## 2025-01-10 DIAGNOSIS — C78.6 METASTASIS TO PERITONEAL CAVITY: Primary | ICD-10-CM

## 2025-01-10 LAB
ALBUMIN SERPL-MCNC: 4 G/DL (ref 3.5–5.2)
ALBUMIN/GLOB SERPL: 1.5 G/DL
ALP SERPL-CCNC: 71 U/L (ref 39–117)
ALT SERPL W P-5'-P-CCNC: 20 U/L (ref 1–41)
ANION GAP SERPL CALCULATED.3IONS-SCNC: 12 MMOL/L (ref 5–15)
AST SERPL-CCNC: 24 U/L (ref 1–40)
BASOPHILS # BLD AUTO: 0.04 10*3/MM3 (ref 0–0.2)
BASOPHILS NFR BLD AUTO: 0.5 % (ref 0–1.5)
BILIRUB SERPL-MCNC: 0.5 MG/DL (ref 0–1.2)
BUN SERPL-MCNC: 16 MG/DL (ref 8–23)
BUN/CREAT SERPL: 23.9 (ref 7–25)
CALCIUM SPEC-SCNC: 10.5 MG/DL (ref 8.6–10.5)
CEA SERPL-MCNC: 2.41 NG/ML
CHLORIDE SERPL-SCNC: 99 MMOL/L (ref 98–107)
CO2 SERPL-SCNC: 27 MMOL/L (ref 22–29)
CREAT SERPL-MCNC: 0.67 MG/DL (ref 0.76–1.27)
DEPRECATED RDW RBC AUTO: 58.6 FL (ref 37–54)
EGFRCR SERPLBLD CKD-EPI 2021: 104.9 ML/MIN/1.73
EOSINOPHIL # BLD AUTO: 0.04 10*3/MM3 (ref 0–0.4)
EOSINOPHIL NFR BLD AUTO: 0.5 % (ref 0.3–6.2)
ERYTHROCYTE [DISTWIDTH] IN BLOOD BY AUTOMATED COUNT: 16.2 % (ref 12.3–15.4)
GLOBULIN UR ELPH-MCNC: 2.7 GM/DL
GLUCOSE SERPL-MCNC: 261 MG/DL (ref 65–99)
HCT VFR BLD AUTO: 40.9 % (ref 37.5–51)
HGB BLD-MCNC: 13.1 G/DL (ref 13–17.7)
LYMPHOCYTES # BLD AUTO: 2.05 10*3/MM3 (ref 0.7–3.1)
LYMPHOCYTES NFR BLD AUTO: 27.4 % (ref 19.6–45.3)
MCH RBC QN AUTO: 31.9 PG (ref 26.6–33)
MCHC RBC AUTO-ENTMCNC: 32 G/DL (ref 31.5–35.7)
MCV RBC AUTO: 99.5 FL (ref 79–97)
MONOCYTES # BLD AUTO: 0.61 10*3/MM3 (ref 0.1–0.9)
MONOCYTES NFR BLD AUTO: 8.2 % (ref 5–12)
NEUTROPHILS NFR BLD AUTO: 4.74 10*3/MM3 (ref 1.7–7)
NEUTROPHILS NFR BLD AUTO: 63.4 % (ref 42.7–76)
PLATELET # BLD AUTO: 218 10*3/MM3 (ref 140–450)
PMV BLD AUTO: 8.8 FL (ref 6–12)
POTASSIUM SERPL-SCNC: 3.5 MMOL/L (ref 3.5–5.2)
PROT SERPL-MCNC: 6.7 G/DL (ref 6–8.5)
RBC # BLD AUTO: 4.11 10*6/MM3 (ref 4.14–5.8)
SODIUM SERPL-SCNC: 138 MMOL/L (ref 136–145)
WBC NRBC COR # BLD AUTO: 7.48 10*3/MM3 (ref 3.4–10.8)

## 2025-01-10 PROCEDURE — 96523 IRRIG DRUG DELIVERY DEVICE: CPT

## 2025-01-10 PROCEDURE — 80053 COMPREHEN METABOLIC PANEL: CPT | Performed by: INTERNAL MEDICINE

## 2025-01-10 PROCEDURE — 1160F RVW MEDS BY RX/DR IN RCRD: CPT | Performed by: INTERNAL MEDICINE

## 2025-01-10 PROCEDURE — 1159F MED LIST DOCD IN RCRD: CPT | Performed by: INTERNAL MEDICINE

## 2025-01-10 PROCEDURE — 82378 CARCINOEMBRYONIC ANTIGEN: CPT | Performed by: INTERNAL MEDICINE

## 2025-01-10 PROCEDURE — 36415 COLL VENOUS BLD VENIPUNCTURE: CPT

## 2025-01-10 PROCEDURE — 1126F AMNT PAIN NOTED NONE PRSNT: CPT | Performed by: INTERNAL MEDICINE

## 2025-01-10 PROCEDURE — 3078F DIAST BP <80 MM HG: CPT | Performed by: INTERNAL MEDICINE

## 2025-01-10 PROCEDURE — 85025 COMPLETE CBC W/AUTO DIFF WBC: CPT

## 2025-01-10 PROCEDURE — 25010000002 HEPARIN LOCK FLUSH PER 10 UNITS: Performed by: INTERNAL MEDICINE

## 2025-01-10 PROCEDURE — 99214 OFFICE O/P EST MOD 30 MIN: CPT | Performed by: INTERNAL MEDICINE

## 2025-01-10 PROCEDURE — 3074F SYST BP LT 130 MM HG: CPT | Performed by: INTERNAL MEDICINE

## 2025-01-10 RX ORDER — SODIUM CHLORIDE 0.9 % (FLUSH) 0.9 %
20 SYRINGE (ML) INJECTION AS NEEDED
OUTPATIENT
Start: 2025-01-10

## 2025-01-10 RX ORDER — SODIUM CHLORIDE 0.9 % (FLUSH) 0.9 %
20 SYRINGE (ML) INJECTION AS NEEDED
Status: DISCONTINUED | OUTPATIENT
Start: 2025-01-10 | End: 2025-01-11 | Stop reason: HOSPADM

## 2025-01-10 RX ORDER — HEPARIN SODIUM (PORCINE) LOCK FLUSH IV SOLN 100 UNIT/ML 100 UNIT/ML
500 SOLUTION INTRAVENOUS AS NEEDED
Status: DISCONTINUED | OUTPATIENT
Start: 2025-01-10 | End: 2025-01-11 | Stop reason: HOSPADM

## 2025-01-10 RX ORDER — HEPARIN SODIUM (PORCINE) LOCK FLUSH IV SOLN 100 UNIT/ML 100 UNIT/ML
500 SOLUTION INTRAVENOUS AS NEEDED
OUTPATIENT
Start: 2025-01-10

## 2025-01-10 RX ADMIN — HEPARIN 500 UNITS: 100 SYRINGE at 08:18

## 2025-01-10 RX ADMIN — Medication 20 ML: at 08:18

## 2025-01-10 NOTE — PROGRESS NOTES
Pt to clinic for CIV d/c and appt with Dr. Wagner.  5FU pump empty. Port aspirated, positive blood return noted. Port flushed with 10mL NS and Heparin 500units, then de-accessed.  Pt to waiting room for appt with provider.

## 2025-01-17 RX ORDER — ATORVASTATIN CALCIUM 40 MG/1
40 TABLET, FILM COATED ORAL
Qty: 90 TABLET | Refills: 0 | Status: SHIPPED | OUTPATIENT
Start: 2025-01-17

## 2025-01-17 RX ORDER — METOPROLOL TARTRATE 50 MG
TABLET ORAL
Qty: 540 TABLET | Refills: 0 | Status: SHIPPED | OUTPATIENT
Start: 2025-01-17

## 2025-01-17 RX ORDER — AMLODIPINE BESYLATE 10 MG/1
10 TABLET ORAL DAILY
Qty: 90 TABLET | Refills: 0 | Status: SHIPPED | OUTPATIENT
Start: 2025-01-17

## 2025-01-22 ENCOUNTER — HOSPITAL ENCOUNTER (OUTPATIENT)
Dept: ONCOLOGY | Facility: HOSPITAL | Age: 65
Discharge: HOME | End: 2025-01-22
Admitting: INTERNAL MEDICINE
Payer: MEDICARE

## 2025-01-22 VITALS
TEMPERATURE: 98 F | OXYGEN SATURATION: 90 % | HEIGHT: 76 IN | HEART RATE: 68 BPM | DIASTOLIC BLOOD PRESSURE: 77 MMHG | BODY MASS INDEX: 25.41 KG/M2 | RESPIRATION RATE: 16 BRPM | WEIGHT: 208.7 LBS | SYSTOLIC BLOOD PRESSURE: 134 MMHG

## 2025-01-22 DIAGNOSIS — C20 RECTAL CANCER: Primary | ICD-10-CM

## 2025-01-22 DIAGNOSIS — C78.6 METASTASIS TO PERITONEAL CAVITY: ICD-10-CM

## 2025-01-22 LAB
ALP BLD-CCNC: 77 U/L (ref 53–128)
BASOPHILS # BLD AUTO: 0.06 10*3/MM3 (ref 0–0.2)
BASOPHILS NFR BLD AUTO: 1 % (ref 0–1.5)
BILIRUB UR QL STRIP: NEGATIVE
BUN BLDA-MCNC: 8 MG/DL (ref 7–22)
CALCIUM BLD QL: 10.4 MG/DL (ref 8–10.3)
CHLORIDE BLDA-SCNC: 101 MMOL/L (ref 98–108)
CLARITY UR: CLEAR
CO2 BLDA-SCNC: 30 MMOL/L (ref 18–33)
COLOR UR: YELLOW
CREAT BLDA-MCNC: 0.4 MG/DL (ref 0.6–1.2)
DEPRECATED RDW RBC AUTO: 55.5 FL (ref 37–54)
EGFRCR SERPLBLD CKD-EPI 2021: 122.6 ML/MIN/1.73
EOSINOPHIL # BLD AUTO: 0.14 10*3/MM3 (ref 0–0.4)
EOSINOPHIL NFR BLD AUTO: 2.4 % (ref 0.3–6.2)
ERYTHROCYTE [DISTWIDTH] IN BLOOD BY AUTOMATED COUNT: 15.7 % (ref 12.3–15.4)
GLUCOSE BLDC GLUCOMTR-MCNC: 237 MG/DL (ref 73–118)
GLUCOSE UR STRIP-MCNC: ABNORMAL MG/DL
HCT VFR BLD AUTO: 39.4 % (ref 37.5–51)
HGB BLD-MCNC: 12.3 G/DL (ref 13–17.7)
HGB UR QL STRIP.AUTO: ABNORMAL
KETONES UR QL STRIP: NEGATIVE
LEUKOCYTE ESTERASE UR QL STRIP.AUTO: ABNORMAL
LYMPHOCYTES # BLD AUTO: 2 10*3/MM3 (ref 0.7–3.1)
LYMPHOCYTES NFR BLD AUTO: 34.2 % (ref 19.6–45.3)
MCH RBC QN AUTO: 30.9 PG (ref 26.6–33)
MCHC RBC AUTO-ENTMCNC: 31.2 G/DL (ref 31.5–35.7)
MCV RBC AUTO: 99 FL (ref 79–97)
MONOCYTES # BLD AUTO: 0.69 10*3/MM3 (ref 0.1–0.9)
MONOCYTES NFR BLD AUTO: 11.8 % (ref 5–12)
NEUTROPHILS NFR BLD AUTO: 2.95 10*3/MM3 (ref 1.7–7)
NEUTROPHILS NFR BLD AUTO: 50.6 % (ref 42.7–76)
NITRITE UR QL STRIP: NEGATIVE
PH UR STRIP.AUTO: 7 [PH] (ref 5–8)
PLATELET # BLD AUTO: 238 10*3/MM3 (ref 140–450)
PMV BLD AUTO: 9.8 FL (ref 6–12)
POC ALBUMIN: 3.2 G/L (ref 3.3–5.5)
POC ALT (SGPT): 11 U/L (ref 10–47)
POC AST (SGOT): 20 U/L (ref 11–38)
POC TOTAL BILIRUBIN: 0.6 MG/DL (ref 0.2–1.6)
POC TOTAL PROTEIN: 6.4 G/DL (ref 6.4–8.1)
POTASSIUM BLDA-SCNC: 3.2 MMOL/L (ref 3.6–5.1)
PROT UR QL STRIP: ABNORMAL
RBC # BLD AUTO: 3.98 10*6/MM3 (ref 4.14–5.8)
SODIUM BLD-SCNC: 140 MMOL/L (ref 128–145)
SP GR UR STRIP: 1.02 (ref 1–1.03)
UROBILINOGEN UR QL STRIP: ABNORMAL
WBC NRBC COR # BLD AUTO: 5.84 10*3/MM3 (ref 3.4–10.8)

## 2025-01-22 PROCEDURE — 96375 TX/PRO/DX INJ NEW DRUG ADDON: CPT

## 2025-01-22 PROCEDURE — 25010000002 BEVACIZUMAB-BVZR 100 MG/4ML SOLUTION 4 ML VIAL: Performed by: PHYSICIAN ASSISTANT

## 2025-01-22 PROCEDURE — 85025 COMPLETE CBC W/AUTO DIFF WBC: CPT | Performed by: INTERNAL MEDICINE

## 2025-01-22 PROCEDURE — 25010000002 FLUOROURACIL PER 500 MG: Performed by: PHYSICIAN ASSISTANT

## 2025-01-22 PROCEDURE — 25010000002 LEUCOVORIN 200 MG RECONSTITUTED SOLUTION 1 EACH VIAL: Performed by: PHYSICIAN ASSISTANT

## 2025-01-22 PROCEDURE — 25010000002 ATROPINE SULFATE 0.4 MG/ML SOLUTION 1 ML VIAL: Performed by: PHYSICIAN ASSISTANT

## 2025-01-22 PROCEDURE — 25010000002 IRINOTECAN PER 20 MG: Performed by: PHYSICIAN ASSISTANT

## 2025-01-22 PROCEDURE — 25810000003 SODIUM CHLORIDE 0.9 % SOLUTION 500 ML FLEX CONT: Performed by: PHYSICIAN ASSISTANT

## 2025-01-22 PROCEDURE — 96413 CHEMO IV INFUSION 1 HR: CPT

## 2025-01-22 PROCEDURE — 96415 CHEMO IV INFUSION ADDL HR: CPT

## 2025-01-22 PROCEDURE — 81003 URINALYSIS AUTO W/O SCOPE: CPT | Performed by: INTERNAL MEDICINE

## 2025-01-22 PROCEDURE — 25010000002 LEUCOVORIN CALCIUM PER 50 MG: Performed by: PHYSICIAN ASSISTANT

## 2025-01-22 PROCEDURE — 96368 THER/DIAG CONCURRENT INF: CPT

## 2025-01-22 PROCEDURE — 80053 COMPREHEN METABOLIC PANEL: CPT

## 2025-01-22 PROCEDURE — 96416 CHEMO PROLONG INFUSE W/PUMP: CPT

## 2025-01-22 PROCEDURE — 96411 CHEMO IV PUSH ADDL DRUG: CPT

## 2025-01-22 PROCEDURE — 25010000003 DEXTROSE 5 % SOLUTION 250 ML FLEX CONT: Performed by: PHYSICIAN ASSISTANT

## 2025-01-22 PROCEDURE — 25010000002 DEXAMETHASONE PER 1 MG: Performed by: INTERNAL MEDICINE

## 2025-01-22 PROCEDURE — 25010000002 BEVACIZUMAB-BVZR 400 MG/16ML SOLUTION 16 ML VIAL: Performed by: PHYSICIAN ASSISTANT

## 2025-01-22 PROCEDURE — 96417 CHEMO IV INFUS EACH ADDL SEQ: CPT

## 2025-01-22 PROCEDURE — G0498 CHEMO EXTEND IV INFUS W/PUMP: HCPCS

## 2025-01-22 PROCEDURE — 25010000002 PALONOSETRON 0.25 MG/5ML SOLUTION PREFILLED SYRINGE: Performed by: INTERNAL MEDICINE

## 2025-01-22 RX ORDER — PALONOSETRON 0.05 MG/ML
0.25 INJECTION, SOLUTION INTRAVENOUS ONCE
Status: COMPLETED | OUTPATIENT
Start: 2025-01-22 | End: 2025-01-22

## 2025-01-22 RX ORDER — FLUOROURACIL 50 MG/ML
400 INJECTION, SOLUTION INTRAVENOUS ONCE
Status: COMPLETED | OUTPATIENT
Start: 2025-01-22 | End: 2025-01-22

## 2025-01-22 RX ORDER — SODIUM CHLORIDE 9 MG/ML
20 INJECTION, SOLUTION INTRAVENOUS ONCE
Status: DISCONTINUED | OUTPATIENT
Start: 2025-01-22 | End: 2025-01-23 | Stop reason: HOSPADM

## 2025-01-22 RX ORDER — FLUOROURACIL 50 MG/ML
400 INJECTION, SOLUTION INTRAVENOUS ONCE
Status: CANCELLED | OUTPATIENT
Start: 2025-01-22

## 2025-01-22 RX ORDER — ATROPINE SULFATE 1 MG/ML
0.4 INJECTION, SOLUTION INTRAMUSCULAR; INTRAVENOUS; SUBCUTANEOUS
Status: DISCONTINUED | OUTPATIENT
Start: 2025-01-22 | End: 2025-01-22

## 2025-01-22 RX ORDER — DEXAMETHASONE SODIUM PHOSPHATE 4 MG/ML
12 INJECTION, SOLUTION INTRA-ARTICULAR; INTRALESIONAL; INTRAMUSCULAR; INTRAVENOUS; SOFT TISSUE ONCE
Status: COMPLETED | OUTPATIENT
Start: 2025-01-22 | End: 2025-01-22

## 2025-01-22 RX ORDER — SODIUM CHLORIDE 9 MG/ML
20 INJECTION, SOLUTION INTRAVENOUS ONCE
Status: CANCELLED | OUTPATIENT
Start: 2025-01-22

## 2025-01-22 RX ORDER — ATROPINE SULFATE 1 MG/ML
0.4 INJECTION, SOLUTION INTRAMUSCULAR; INTRAVENOUS; SUBCUTANEOUS
Status: CANCELLED | OUTPATIENT
Start: 2025-01-22

## 2025-01-22 RX ADMIN — SODIUM CHLORIDE 450 MG: 9 INJECTION, SOLUTION INTRAVENOUS at 10:38

## 2025-01-22 RX ADMIN — FLUOROURACIL 880 MG: 50 INJECTION, SOLUTION INTRAVENOUS at 12:52

## 2025-01-22 RX ADMIN — LEUCOVORIN CALCIUM 880 MG: 200 INJECTION, POWDER, LYOPHILIZED, FOR SOLUTION INTRAMUSCULAR; INTRAVENOUS at 11:13

## 2025-01-22 RX ADMIN — IRINOTECAN HYDROCHLORIDE 400 MG: 20 INJECTION INTRAVENOUS at 11:15

## 2025-01-22 RX ADMIN — FLUOROURACIL 5300 MG: 50 INJECTION, SOLUTION INTRAVENOUS at 12:57

## 2025-01-22 RX ADMIN — DEXAMETHASONE SODIUM PHOSPHATE 12 MG: 4 INJECTION, SOLUTION INTRAMUSCULAR; INTRAVENOUS at 09:19

## 2025-01-22 RX ADMIN — PALONOSETRON 0.25 MG: 0.25 INJECTION, SOLUTION INTRAVENOUS at 09:17

## 2025-01-22 NOTE — PROGRESS NOTES
Potassium 3.2 today. Instructed pt to take an extra tablet of 10 meq today only and then resume regular dose per MIA Alonzo. Pt verbalized understanding.

## 2025-01-24 ENCOUNTER — HOSPITAL ENCOUNTER (OUTPATIENT)
Dept: ONCOLOGY | Facility: HOSPITAL | Age: 65
Discharge: HOME | End: 2025-01-24
Payer: MEDICARE

## 2025-01-24 DIAGNOSIS — C20 RECTAL CANCER: Primary | ICD-10-CM

## 2025-01-24 DIAGNOSIS — C78.6 METASTASIS TO PERITONEAL CAVITY: ICD-10-CM

## 2025-01-24 PROCEDURE — 25010000002 HEPARIN LOCK FLUSH PER 10 UNITS: Performed by: INTERNAL MEDICINE

## 2025-01-24 RX ORDER — SODIUM CHLORIDE 0.9 % (FLUSH) 0.9 %
20 SYRINGE (ML) INJECTION AS NEEDED
Status: DISCONTINUED | OUTPATIENT
Start: 2025-01-24 | End: 2025-01-25 | Stop reason: HOSPADM

## 2025-01-24 RX ORDER — HEPARIN SODIUM (PORCINE) LOCK FLUSH IV SOLN 100 UNIT/ML 100 UNIT/ML
500 SOLUTION INTRAVENOUS AS NEEDED
Status: DISCONTINUED | OUTPATIENT
Start: 2025-01-24 | End: 2025-01-25 | Stop reason: HOSPADM

## 2025-01-24 RX ORDER — HEPARIN SODIUM (PORCINE) LOCK FLUSH IV SOLN 100 UNIT/ML 100 UNIT/ML
500 SOLUTION INTRAVENOUS AS NEEDED
OUTPATIENT
Start: 2025-01-24

## 2025-01-24 RX ORDER — SODIUM CHLORIDE 0.9 % (FLUSH) 0.9 %
20 SYRINGE (ML) INJECTION AS NEEDED
OUTPATIENT
Start: 2025-01-24

## 2025-01-24 RX ADMIN — Medication 300 UNITS: at 13:07

## 2025-01-24 RX ADMIN — Medication 20 ML: at 13:07

## 2025-01-24 NOTE — PROGRESS NOTES
Pt here for CIV disconnect, no new issues reported,pump empty with blood return noted.AVS provided, pt discharged.

## 2025-01-28 RX ORDER — GABAPENTIN 300 MG/1
300 CAPSULE ORAL 3 TIMES DAILY
Qty: 270 CAPSULE | Refills: 1 | Status: SHIPPED | OUTPATIENT
Start: 2025-01-28

## 2025-01-28 RX ORDER — CLONIDINE HYDROCHLORIDE 0.1 MG/1
0.1 TABLET ORAL 2 TIMES DAILY
Qty: 180 TABLET | Refills: 1 | Status: SHIPPED | OUTPATIENT
Start: 2025-01-28

## 2025-02-05 ENCOUNTER — HOSPITAL ENCOUNTER (OUTPATIENT)
Dept: ONCOLOGY | Facility: HOSPITAL | Age: 65
Discharge: HOME | End: 2025-02-05
Admitting: INTERNAL MEDICINE
Payer: MEDICARE

## 2025-02-05 VITALS
HEART RATE: 92 BPM | WEIGHT: 203 LBS | DIASTOLIC BLOOD PRESSURE: 57 MMHG | OXYGEN SATURATION: 96 % | RESPIRATION RATE: 16 BRPM | SYSTOLIC BLOOD PRESSURE: 91 MMHG | TEMPERATURE: 97.5 F | HEIGHT: 76 IN | BODY MASS INDEX: 24.72 KG/M2

## 2025-02-05 DIAGNOSIS — C78.6 METASTASIS TO PERITONEAL CAVITY: ICD-10-CM

## 2025-02-05 DIAGNOSIS — C20 RECTAL CANCER: Primary | ICD-10-CM

## 2025-02-05 DIAGNOSIS — Z45.2 ENCOUNTER FOR CARE RELATED TO VASCULAR ACCESS PORT: ICD-10-CM

## 2025-02-05 LAB
ALP BLD-CCNC: 70 U/L (ref 53–128)
BASOPHILS # BLD AUTO: 0.07 10*3/MM3 (ref 0–0.2)
BASOPHILS NFR BLD AUTO: 0.8 % (ref 0–1.5)
BILIRUB UR QL STRIP: NEGATIVE
BUN BLDA-MCNC: 11 MG/DL (ref 7–22)
CALCIUM BLD QL: 10 MG/DL (ref 8–10.3)
CHLORIDE BLDA-SCNC: 101 MMOL/L (ref 98–108)
CLARITY UR: CLEAR
CO2 BLDA-SCNC: 30 MMOL/L (ref 18–33)
COLOR UR: YELLOW
CREAT BLDA-MCNC: 0.5 MG/DL (ref 0.6–1.2)
DEPRECATED RDW RBC AUTO: 53.2 FL (ref 37–54)
EGFRCR SERPLBLD CKD-EPI 2021: 114.6 ML/MIN/1.73
EOSINOPHIL # BLD AUTO: 0.1 10*3/MM3 (ref 0–0.4)
EOSINOPHIL NFR BLD AUTO: 1.2 % (ref 0.3–6.2)
ERYTHROCYTE [DISTWIDTH] IN BLOOD BY AUTOMATED COUNT: 15.4 % (ref 12.3–15.4)
GLUCOSE BLDC GLUCOMTR-MCNC: 334 MG/DL (ref 73–118)
GLUCOSE UR STRIP-MCNC: ABNORMAL MG/DL
HCT VFR BLD AUTO: 38.6 % (ref 37.5–51)
HGB BLD-MCNC: 12.3 G/DL (ref 13–17.7)
HGB UR QL STRIP.AUTO: ABNORMAL
KETONES UR QL STRIP: NEGATIVE
LEUKOCYTE ESTERASE UR QL STRIP.AUTO: NEGATIVE
LYMPHOCYTES # BLD AUTO: 2.82 10*3/MM3 (ref 0.7–3.1)
LYMPHOCYTES NFR BLD AUTO: 32.6 % (ref 19.6–45.3)
MCH RBC QN AUTO: 30.9 PG (ref 26.6–33)
MCHC RBC AUTO-ENTMCNC: 31.9 G/DL (ref 31.5–35.7)
MCV RBC AUTO: 97 FL (ref 79–97)
MONOCYTES # BLD AUTO: 0.78 10*3/MM3 (ref 0.1–0.9)
MONOCYTES NFR BLD AUTO: 9 % (ref 5–12)
NEUTROPHILS NFR BLD AUTO: 4.88 10*3/MM3 (ref 1.7–7)
NEUTROPHILS NFR BLD AUTO: 56.4 % (ref 42.7–76)
NITRITE UR QL STRIP: NEGATIVE
PH UR STRIP.AUTO: 6 [PH] (ref 5–8)
PLATELET # BLD AUTO: 187 10*3/MM3 (ref 140–450)
PMV BLD AUTO: 9.8 FL (ref 6–12)
POC ALBUMIN: 3.2 G/L (ref 3.3–5.5)
POC ALT (SGPT): 13 U/L (ref 10–47)
POC AST (SGOT): 21 U/L (ref 11–38)
POC TOTAL BILIRUBIN: 0.5 MG/DL (ref 0.2–1.6)
POC TOTAL PROTEIN: 6.3 G/DL (ref 6.4–8.1)
POTASSIUM BLDA-SCNC: 3.7 MMOL/L (ref 3.6–5.1)
PROT UR QL STRIP: ABNORMAL
RBC # BLD AUTO: 3.98 10*6/MM3 (ref 4.14–5.8)
SODIUM BLD-SCNC: 138 MMOL/L (ref 128–145)
SP GR UR STRIP: >=1.03 (ref 1–1.03)
UROBILINOGEN UR QL STRIP: ABNORMAL
WBC NRBC COR # BLD AUTO: 8.65 10*3/MM3 (ref 3.4–10.8)

## 2025-02-05 PROCEDURE — 25010000002 FLUOROURACIL PER 500 MG: Performed by: INTERNAL MEDICINE

## 2025-02-05 PROCEDURE — 25010000002 PALONOSETRON 0.25 MG/5ML SOLUTION PREFILLED SYRINGE: Performed by: INTERNAL MEDICINE

## 2025-02-05 PROCEDURE — 25010000002 ATROPINE SULFATE 0.4 MG/ML SOLUTION 1 ML VIAL: Performed by: INTERNAL MEDICINE

## 2025-02-05 PROCEDURE — 25010000002 BEVACIZUMAB-BVZR 100 MG/4ML SOLUTION 4 ML VIAL: Performed by: INTERNAL MEDICINE

## 2025-02-05 PROCEDURE — 96413 CHEMO IV INFUSION 1 HR: CPT

## 2025-02-05 PROCEDURE — 25810000003 SODIUM CHLORIDE 0.9 % SOLUTION 250 ML FLEX CONT: Performed by: INTERNAL MEDICINE

## 2025-02-05 PROCEDURE — 81003 URINALYSIS AUTO W/O SCOPE: CPT | Performed by: INTERNAL MEDICINE

## 2025-02-05 PROCEDURE — 80053 COMPREHEN METABOLIC PANEL: CPT

## 2025-02-05 PROCEDURE — 25010000002 LEUCOVORIN CALCIUM PER 50 MG: Performed by: INTERNAL MEDICINE

## 2025-02-05 PROCEDURE — 96375 TX/PRO/DX INJ NEW DRUG ADDON: CPT

## 2025-02-05 PROCEDURE — 96368 THER/DIAG CONCURRENT INF: CPT

## 2025-02-05 PROCEDURE — 85025 COMPLETE CBC W/AUTO DIFF WBC: CPT | Performed by: INTERNAL MEDICINE

## 2025-02-05 PROCEDURE — 25010000002 LEUCOVORIN 200 MG RECONSTITUTED SOLUTION 1 EACH VIAL: Performed by: INTERNAL MEDICINE

## 2025-02-05 PROCEDURE — 25010000002 IRINOTECAN PER 20 MG: Performed by: INTERNAL MEDICINE

## 2025-02-05 PROCEDURE — 96417 CHEMO IV INFUS EACH ADDL SEQ: CPT

## 2025-02-05 PROCEDURE — 96411 CHEMO IV PUSH ADDL DRUG: CPT

## 2025-02-05 PROCEDURE — 96415 CHEMO IV INFUSION ADDL HR: CPT

## 2025-02-05 PROCEDURE — 25010000002 DEXAMETHASONE PER 1 MG: Performed by: INTERNAL MEDICINE

## 2025-02-05 PROCEDURE — G0498 CHEMO EXTEND IV INFUS W/PUMP: HCPCS

## 2025-02-05 PROCEDURE — 25010000002 BEVACIZUMAB-BVZR 400 MG/16ML SOLUTION 16 ML VIAL: Performed by: INTERNAL MEDICINE

## 2025-02-05 PROCEDURE — 25810000003 SODIUM CHLORIDE 0.9 % SOLUTION: Performed by: INTERNAL MEDICINE

## 2025-02-05 RX ORDER — PALONOSETRON 0.05 MG/ML
0.25 INJECTION, SOLUTION INTRAVENOUS ONCE
Status: CANCELLED | OUTPATIENT
Start: 2025-02-05

## 2025-02-05 RX ORDER — ATROPINE SULFATE 1 MG/ML
0.4 INJECTION, SOLUTION INTRAMUSCULAR; INTRAVENOUS; SUBCUTANEOUS
Status: CANCELLED | OUTPATIENT
Start: 2025-02-05

## 2025-02-05 RX ORDER — FLUOROURACIL 50 MG/ML
400 INJECTION, SOLUTION INTRAVENOUS ONCE
Status: COMPLETED | OUTPATIENT
Start: 2025-02-05 | End: 2025-02-05

## 2025-02-05 RX ORDER — SODIUM CHLORIDE 0.9 % (FLUSH) 0.9 %
20 SYRINGE (ML) INJECTION AS NEEDED
Status: CANCELLED | OUTPATIENT
Start: 2025-02-05

## 2025-02-05 RX ORDER — SODIUM CHLORIDE 9 MG/ML
20 INJECTION, SOLUTION INTRAVENOUS ONCE
Status: CANCELLED | OUTPATIENT
Start: 2025-02-05

## 2025-02-05 RX ORDER — DEXAMETHASONE SODIUM PHOSPHATE 4 MG/ML
12 INJECTION, SOLUTION INTRA-ARTICULAR; INTRALESIONAL; INTRAMUSCULAR; INTRAVENOUS; SOFT TISSUE ONCE
Status: CANCELLED
Start: 2025-02-05 | End: 2025-02-05

## 2025-02-05 RX ORDER — DEXAMETHASONE SODIUM PHOSPHATE 4 MG/ML
12 INJECTION, SOLUTION INTRA-ARTICULAR; INTRALESIONAL; INTRAMUSCULAR; INTRAVENOUS; SOFT TISSUE ONCE
Status: COMPLETED | OUTPATIENT
Start: 2025-02-05 | End: 2025-02-05

## 2025-02-05 RX ORDER — FLUOROURACIL 50 MG/ML
400 INJECTION, SOLUTION INTRAVENOUS ONCE
Status: CANCELLED | OUTPATIENT
Start: 2025-02-05

## 2025-02-05 RX ORDER — HEPARIN SODIUM (PORCINE) LOCK FLUSH IV SOLN 100 UNIT/ML 100 UNIT/ML
500 SOLUTION INTRAVENOUS AS NEEDED
Status: CANCELLED | OUTPATIENT
Start: 2025-02-05

## 2025-02-05 RX ORDER — SODIUM CHLORIDE 9 MG/ML
20 INJECTION, SOLUTION INTRAVENOUS ONCE
Status: COMPLETED | OUTPATIENT
Start: 2025-02-05 | End: 2025-02-05

## 2025-02-05 RX ORDER — ATROPINE SULFATE 1 MG/ML
0.4 INJECTION, SOLUTION INTRAMUSCULAR; INTRAVENOUS; SUBCUTANEOUS
Status: DISCONTINUED | OUTPATIENT
Start: 2025-02-05 | End: 2025-02-05

## 2025-02-05 RX ORDER — PALONOSETRON 0.05 MG/ML
0.25 INJECTION, SOLUTION INTRAVENOUS ONCE
Status: COMPLETED | OUTPATIENT
Start: 2025-02-05 | End: 2025-02-05

## 2025-02-05 RX ADMIN — FLUOROURACIL 880 MG: 50 INJECTION, SOLUTION INTRAVENOUS at 11:56

## 2025-02-05 RX ADMIN — LEUCOVORIN CALCIUM 880 MG: 200 INJECTION, POWDER, LYOPHILIZED, FOR SOLUTION INTRAMUSCULAR; INTRAVENOUS at 10:22

## 2025-02-05 RX ADMIN — FLUOROURACIL 5300 MG: 50 INJECTION, SOLUTION INTRAVENOUS at 12:02

## 2025-02-05 RX ADMIN — DEXAMETHASONE SODIUM PHOSPHATE 12 MG: 4 INJECTION, SOLUTION INTRAMUSCULAR; INTRAVENOUS at 09:32

## 2025-02-05 RX ADMIN — SODIUM CHLORIDE 20 ML/HR: 9 INJECTION, SOLUTION INTRAVENOUS at 09:28

## 2025-02-05 RX ADMIN — SODIUM CHLORIDE 460 MG: 900 INJECTION, SOLUTION INTRAVENOUS at 09:50

## 2025-02-05 RX ADMIN — PALONOSETRON 0.25 MG: 0.25 INJECTION, SOLUTION INTRAVENOUS at 09:29

## 2025-02-05 RX ADMIN — ATROPINE SULFATE 400 MG: 0.4 INJECTION, SOLUTION INTRAVENOUS at 10:22

## 2025-02-07 ENCOUNTER — HOSPITAL ENCOUNTER (OUTPATIENT)
Dept: ONCOLOGY | Facility: HOSPITAL | Age: 65
Discharge: HOME | End: 2025-02-07
Payer: MEDICARE

## 2025-02-07 DIAGNOSIS — C20 RECTAL CANCER: ICD-10-CM

## 2025-02-07 DIAGNOSIS — Z45.2 ENCOUNTER FOR CARE RELATED TO VASCULAR ACCESS PORT: ICD-10-CM

## 2025-02-07 DIAGNOSIS — C78.6 METASTASIS TO PERITONEAL CAVITY: Primary | ICD-10-CM

## 2025-02-07 PROCEDURE — 25010000002 HEPARIN LOCK FLUSH PER 10 UNITS: Performed by: INTERNAL MEDICINE

## 2025-02-07 RX ORDER — HEPARIN SODIUM (PORCINE) LOCK FLUSH IV SOLN 100 UNIT/ML 100 UNIT/ML
500 SOLUTION INTRAVENOUS AS NEEDED
Status: DISCONTINUED | OUTPATIENT
Start: 2025-02-07 | End: 2025-02-08 | Stop reason: HOSPADM

## 2025-02-07 RX ORDER — SODIUM CHLORIDE 0.9 % (FLUSH) 0.9 %
20 SYRINGE (ML) INJECTION AS NEEDED
OUTPATIENT
Start: 2025-02-07

## 2025-02-07 RX ORDER — HEPARIN SODIUM (PORCINE) LOCK FLUSH IV SOLN 100 UNIT/ML 100 UNIT/ML
500 SOLUTION INTRAVENOUS AS NEEDED
OUTPATIENT
Start: 2025-02-07

## 2025-02-07 RX ORDER — SODIUM CHLORIDE 0.9 % (FLUSH) 0.9 %
20 SYRINGE (ML) INJECTION AS NEEDED
Status: DISCONTINUED | OUTPATIENT
Start: 2025-02-07 | End: 2025-02-08 | Stop reason: HOSPADM

## 2025-02-07 RX ADMIN — Medication 20 ML: at 13:45

## 2025-02-07 RX ADMIN — HEPARIN 500 UNITS: 100 SYRINGE at 13:45

## 2025-02-19 ENCOUNTER — HOSPITAL ENCOUNTER (OUTPATIENT)
Dept: ONCOLOGY | Facility: HOSPITAL | Age: 65
Discharge: HOME | End: 2025-02-19
Admitting: INTERNAL MEDICINE
Payer: MEDICARE

## 2025-02-19 VITALS
OXYGEN SATURATION: 94 % | DIASTOLIC BLOOD PRESSURE: 68 MMHG | HEART RATE: 67 BPM | SYSTOLIC BLOOD PRESSURE: 106 MMHG | BODY MASS INDEX: 25.57 KG/M2 | HEIGHT: 76 IN | RESPIRATION RATE: 16 BRPM | WEIGHT: 210 LBS | TEMPERATURE: 97.5 F

## 2025-02-19 DIAGNOSIS — C20 RECTAL CANCER: Primary | ICD-10-CM

## 2025-02-19 DIAGNOSIS — C78.6 METASTASIS TO PERITONEAL CAVITY: ICD-10-CM

## 2025-02-19 LAB
ALP BLD-CCNC: 66 U/L (ref 53–128)
BASOPHILS # BLD AUTO: 0.08 10*3/MM3 (ref 0–0.2)
BASOPHILS NFR BLD AUTO: 1.2 % (ref 0–1.5)
BILIRUB UR QL STRIP: NEGATIVE
BUN BLDA-MCNC: 12 MG/DL (ref 7–22)
CALCIUM BLD QL: 10.3 MG/DL (ref 8–10.3)
CEA SERPL-MCNC: 2.18 NG/ML
CHLORIDE BLDA-SCNC: 101 MMOL/L (ref 98–108)
CLARITY UR: ABNORMAL
CO2 BLDA-SCNC: 28 MMOL/L (ref 18–33)
COLOR UR: YELLOW
CREAT BLDA-MCNC: 0.8 MG/DL (ref 0.6–1.2)
DEPRECATED RDW RBC AUTO: 55.1 FL (ref 37–54)
EGFRCR SERPLBLD CKD-EPI 2021: 99.4 ML/MIN/1.73
EOSINOPHIL # BLD AUTO: 0.16 10*3/MM3 (ref 0–0.4)
EOSINOPHIL NFR BLD AUTO: 2.4 % (ref 0.3–6.2)
ERYTHROCYTE [DISTWIDTH] IN BLOOD BY AUTOMATED COUNT: 15.9 % (ref 12.3–15.4)
GLUCOSE BLDC GLUCOMTR-MCNC: 354 MG/DL (ref 73–118)
GLUCOSE UR STRIP-MCNC: ABNORMAL MG/DL
HCT VFR BLD AUTO: 38.6 % (ref 37.5–51)
HGB BLD-MCNC: 12.3 G/DL (ref 13–17.7)
HGB UR QL STRIP.AUTO: NEGATIVE
KETONES UR QL STRIP: NEGATIVE
LEUKOCYTE ESTERASE UR QL STRIP.AUTO: NEGATIVE
LYMPHOCYTES # BLD AUTO: 2.06 10*3/MM3 (ref 0.7–3.1)
LYMPHOCYTES NFR BLD AUTO: 31.1 % (ref 19.6–45.3)
MCH RBC QN AUTO: 31.1 PG (ref 26.6–33)
MCHC RBC AUTO-ENTMCNC: 31.9 G/DL (ref 31.5–35.7)
MCV RBC AUTO: 97.5 FL (ref 79–97)
MONOCYTES # BLD AUTO: 0.66 10*3/MM3 (ref 0.1–0.9)
MONOCYTES NFR BLD AUTO: 10 % (ref 5–12)
NEUTROPHILS NFR BLD AUTO: 3.67 10*3/MM3 (ref 1.7–7)
NEUTROPHILS NFR BLD AUTO: 55.3 % (ref 42.7–76)
NITRITE UR QL STRIP: NEGATIVE
PH UR STRIP.AUTO: 5.5 [PH] (ref 5–8)
PLATELET # BLD AUTO: 144 10*3/MM3 (ref 140–450)
PMV BLD AUTO: 9.8 FL (ref 6–12)
POC ALBUMIN: 3.3 G/L (ref 3.3–5.5)
POC ALT (SGPT): 17 U/L (ref 10–47)
POC AST (SGOT): 17 U/L (ref 11–38)
POC TOTAL BILIRUBIN: 0.7 MG/DL (ref 0.2–1.6)
POC TOTAL PROTEIN: 6.2 G/DL (ref 6.4–8.1)
POTASSIUM BLDA-SCNC: 3.7 MMOL/L (ref 3.6–5.1)
PROT UR QL STRIP: ABNORMAL
RBC # BLD AUTO: 3.96 10*6/MM3 (ref 4.14–5.8)
SODIUM BLD-SCNC: 140 MMOL/L (ref 128–145)
SP GR UR STRIP: >=1.03 (ref 1–1.03)
UROBILINOGEN UR QL STRIP: ABNORMAL
WBC NRBC COR # BLD AUTO: 6.63 10*3/MM3 (ref 3.4–10.8)

## 2025-02-19 PROCEDURE — 96411 CHEMO IV PUSH ADDL DRUG: CPT

## 2025-02-19 PROCEDURE — 81003 URINALYSIS AUTO W/O SCOPE: CPT | Performed by: INTERNAL MEDICINE

## 2025-02-19 PROCEDURE — 96417 CHEMO IV INFUS EACH ADDL SEQ: CPT

## 2025-02-19 PROCEDURE — 96413 CHEMO IV INFUSION 1 HR: CPT

## 2025-02-19 PROCEDURE — 25010000002 BEVACIZUMAB-BVZR 400 MG/16ML SOLUTION 16 ML VIAL: Performed by: PHYSICIAN ASSISTANT

## 2025-02-19 PROCEDURE — 25010000003 DEXTROSE 5 % SOLUTION 250 ML FLEX CONT: Performed by: PHYSICIAN ASSISTANT

## 2025-02-19 PROCEDURE — 25810000003 SODIUM CHLORIDE 0.9 % SOLUTION 500 ML FLEX CONT: Performed by: PHYSICIAN ASSISTANT

## 2025-02-19 PROCEDURE — 96415 CHEMO IV INFUSION ADDL HR: CPT

## 2025-02-19 PROCEDURE — 25010000002 DEXAMETHASONE PER 1 MG: Performed by: PHYSICIAN ASSISTANT

## 2025-02-19 PROCEDURE — G0498 CHEMO EXTEND IV INFUS W/PUMP: HCPCS

## 2025-02-19 PROCEDURE — 25010000002 BEVACIZUMAB-BVZR 100 MG/4ML SOLUTION 4 ML VIAL: Performed by: PHYSICIAN ASSISTANT

## 2025-02-19 PROCEDURE — 85025 COMPLETE CBC W/AUTO DIFF WBC: CPT | Performed by: INTERNAL MEDICINE

## 2025-02-19 PROCEDURE — 96368 THER/DIAG CONCURRENT INF: CPT

## 2025-02-19 PROCEDURE — 25010000002 LEUCOVORIN 200 MG RECONSTITUTED SOLUTION 1 EACH VIAL: Performed by: PHYSICIAN ASSISTANT

## 2025-02-19 PROCEDURE — 25010000002 PALONOSETRON 0.25 MG/5ML SOLUTION PREFILLED SYRINGE: Performed by: PHYSICIAN ASSISTANT

## 2025-02-19 PROCEDURE — 80053 COMPREHEN METABOLIC PANEL: CPT

## 2025-02-19 PROCEDURE — 82378 CARCINOEMBRYONIC ANTIGEN: CPT | Performed by: INTERNAL MEDICINE

## 2025-02-19 PROCEDURE — 25010000002 FLUOROURACIL PER 500 MG: Performed by: PHYSICIAN ASSISTANT

## 2025-02-19 PROCEDURE — 25010000002 ATROPINE SULFATE 0.4 MG/ML SOLUTION 1 ML VIAL: Performed by: PHYSICIAN ASSISTANT

## 2025-02-19 PROCEDURE — 25010000002 LEUCOVORIN CALCIUM PER 50 MG: Performed by: PHYSICIAN ASSISTANT

## 2025-02-19 PROCEDURE — 25810000003 SODIUM CHLORIDE 0.9 % SOLUTION: Performed by: PHYSICIAN ASSISTANT

## 2025-02-19 PROCEDURE — 96375 TX/PRO/DX INJ NEW DRUG ADDON: CPT

## 2025-02-19 PROCEDURE — 25010000002 IRINOTECAN PER 20 MG: Performed by: PHYSICIAN ASSISTANT

## 2025-02-19 RX ORDER — FLUOROURACIL 50 MG/ML
400 INJECTION, SOLUTION INTRAVENOUS ONCE
Status: CANCELLED | OUTPATIENT
Start: 2025-02-19

## 2025-02-19 RX ORDER — DEXAMETHASONE SODIUM PHOSPHATE 4 MG/ML
12 INJECTION, SOLUTION INTRA-ARTICULAR; INTRALESIONAL; INTRAMUSCULAR; INTRAVENOUS; SOFT TISSUE ONCE
Status: CANCELLED
Start: 2025-02-19 | End: 2025-02-19

## 2025-02-19 RX ORDER — FLUOROURACIL 50 MG/ML
400 INJECTION, SOLUTION INTRAVENOUS ONCE
Status: COMPLETED | OUTPATIENT
Start: 2025-02-19 | End: 2025-02-19

## 2025-02-19 RX ORDER — SODIUM CHLORIDE 9 MG/ML
20 INJECTION, SOLUTION INTRAVENOUS ONCE
Status: CANCELLED | OUTPATIENT
Start: 2025-02-19

## 2025-02-19 RX ORDER — DEXAMETHASONE SODIUM PHOSPHATE 4 MG/ML
12 INJECTION, SOLUTION INTRA-ARTICULAR; INTRALESIONAL; INTRAMUSCULAR; INTRAVENOUS; SOFT TISSUE ONCE
Status: COMPLETED | OUTPATIENT
Start: 2025-02-19 | End: 2025-02-19

## 2025-02-19 RX ORDER — PALONOSETRON 0.05 MG/ML
0.25 INJECTION, SOLUTION INTRAVENOUS ONCE
Status: COMPLETED | OUTPATIENT
Start: 2025-02-19 | End: 2025-02-19

## 2025-02-19 RX ORDER — SODIUM CHLORIDE 9 MG/ML
20 INJECTION, SOLUTION INTRAVENOUS ONCE
Status: COMPLETED | OUTPATIENT
Start: 2025-02-19 | End: 2025-02-19

## 2025-02-19 RX ORDER — PALONOSETRON 0.05 MG/ML
0.25 INJECTION, SOLUTION INTRAVENOUS ONCE
Status: CANCELLED | OUTPATIENT
Start: 2025-02-19

## 2025-02-19 RX ORDER — ATROPINE SULFATE 1 MG/ML
0.4 INJECTION, SOLUTION INTRAMUSCULAR; INTRAVENOUS; SUBCUTANEOUS
Status: CANCELLED | OUTPATIENT
Start: 2025-02-19

## 2025-02-19 RX ORDER — ATROPINE SULFATE 1 MG/ML
0.4 INJECTION, SOLUTION INTRAMUSCULAR; INTRAVENOUS; SUBCUTANEOUS
Status: DISCONTINUED | OUTPATIENT
Start: 2025-02-19 | End: 2025-02-19

## 2025-02-19 RX ADMIN — LEUCOVORIN CALCIUM 880 MG: 350 INJECTION, POWDER, LYOPHILIZED, FOR SOLUTION INTRAMUSCULAR; INTRAVENOUS at 11:10

## 2025-02-19 RX ADMIN — SODIUM CHLORIDE 480 MG: 900 INJECTION, SOLUTION INTRAVENOUS at 10:38

## 2025-02-19 RX ADMIN — DEXAMETHASONE SODIUM PHOSPHATE 12 MG: 4 INJECTION, SOLUTION INTRAMUSCULAR; INTRAVENOUS at 09:57

## 2025-02-19 RX ADMIN — PALONOSETRON 0.25 MG: 0.25 INJECTION, SOLUTION INTRAVENOUS at 09:52

## 2025-02-19 RX ADMIN — IRINOTECAN HYDROCHLORIDE 400 MG: 20 INJECTION, SOLUTION INTRAVENOUS at 11:15

## 2025-02-19 RX ADMIN — SODIUM CHLORIDE 20 ML/HR: 9 INJECTION, SOLUTION INTRAVENOUS at 09:52

## 2025-02-19 RX ADMIN — FLUOROURACIL 5300 MG: 50 INJECTION, SOLUTION INTRAVENOUS at 13:00

## 2025-02-19 RX ADMIN — FLUOROURACIL 880 MG: 50 INJECTION, SOLUTION INTRAVENOUS at 12:55

## 2025-02-19 NOTE — PROGRESS NOTES
Pt here for tx.  Port accessed using sterile technique and labs drawn.  Pt tolerated well.  Pt tolerating treatment well and only change is he had a little more sensitivity in his mouth with spicy and hot temperature foods.   Has gisela's magic mouthwash that he is using with some improvement seen.  No sores noted. He has f/u appointment with Dr. Wagner on Friday. Tx given per MAR.  Message sent to MIA Alonzo for ok to treat and tx plan to be signed.  Ok to treat order entered by MIA Alonzo and orders signed.  Pt instructed to f/u with PCP about increased glucose.  Understanding verbalized.  Pt tolerated well.  Pt d/c home with 5FU pump infusing.

## 2025-02-20 ENCOUNTER — TELEPHONE (OUTPATIENT)
Dept: ONCOLOGY | Facility: CLINIC | Age: 65
End: 2025-02-20
Payer: MEDICARE

## 2025-02-20 NOTE — TELEPHONE ENCOUNTER
Hub is instructed to read the documentation below to patient  Left v/m letting Pt know we changed his appt to come in 15 min earlier at 2pm

## 2025-02-21 ENCOUNTER — OFFICE VISIT (OUTPATIENT)
Dept: ONCOLOGY | Facility: CLINIC | Age: 65
End: 2025-02-21
Payer: MEDICARE

## 2025-02-21 ENCOUNTER — HOSPITAL ENCOUNTER (OUTPATIENT)
Dept: ONCOLOGY | Facility: HOSPITAL | Age: 65
Discharge: HOME | End: 2025-02-21
Payer: MEDICARE

## 2025-02-21 VITALS
DIASTOLIC BLOOD PRESSURE: 76 MMHG | SYSTOLIC BLOOD PRESSURE: 122 MMHG | TEMPERATURE: 98.2 F | WEIGHT: 203.8 LBS | HEIGHT: 76 IN | RESPIRATION RATE: 14 BRPM | OXYGEN SATURATION: 96 % | HEART RATE: 62 BPM | BODY MASS INDEX: 24.82 KG/M2

## 2025-02-21 DIAGNOSIS — C20 RECTAL CANCER: Primary | ICD-10-CM

## 2025-02-21 DIAGNOSIS — C20 RECTAL CANCER: ICD-10-CM

## 2025-02-21 DIAGNOSIS — Z45.2 ENCOUNTER FOR CARE RELATED TO VASCULAR ACCESS PORT: ICD-10-CM

## 2025-02-21 DIAGNOSIS — C78.6 METASTASIS TO PERITONEAL CAVITY: Primary | ICD-10-CM

## 2025-02-21 DIAGNOSIS — C78.6 METASTASIS TO PERITONEAL CAVITY: ICD-10-CM

## 2025-02-21 LAB
BASOPHILS # BLD AUTO: 0.05 10*3/MM3 (ref 0–0.2)
BASOPHILS NFR BLD AUTO: 0.5 % (ref 0–1.5)
DEPRECATED RDW RBC AUTO: 56.1 FL (ref 37–54)
EOSINOPHIL # BLD AUTO: 0.04 10*3/MM3 (ref 0–0.4)
EOSINOPHIL NFR BLD AUTO: 0.4 % (ref 0.3–6.2)
ERYTHROCYTE [DISTWIDTH] IN BLOOD BY AUTOMATED COUNT: 16.1 % (ref 12.3–15.4)
HCT VFR BLD AUTO: 41.2 % (ref 37.5–51)
HGB BLD-MCNC: 13.3 G/DL (ref 13–17.7)
LYMPHOCYTES # BLD AUTO: 2.62 10*3/MM3 (ref 0.7–3.1)
LYMPHOCYTES NFR BLD AUTO: 27.3 % (ref 19.6–45.3)
MCH RBC QN AUTO: 31.2 PG (ref 26.6–33)
MCHC RBC AUTO-ENTMCNC: 32.3 G/DL (ref 31.5–35.7)
MCV RBC AUTO: 96.7 FL (ref 79–97)
MONOCYTES # BLD AUTO: 0.48 10*3/MM3 (ref 0.1–0.9)
MONOCYTES NFR BLD AUTO: 5 % (ref 5–12)
NEUTROPHILS NFR BLD AUTO: 6.41 10*3/MM3 (ref 1.7–7)
NEUTROPHILS NFR BLD AUTO: 66.8 % (ref 42.7–76)
PLATELET # BLD AUTO: 178 10*3/MM3 (ref 140–450)
PMV BLD AUTO: 9.2 FL (ref 6–12)
RBC # BLD AUTO: 4.26 10*6/MM3 (ref 4.14–5.8)
WBC NRBC COR # BLD AUTO: 9.6 10*3/MM3 (ref 3.4–10.8)

## 2025-02-21 PROCEDURE — 3078F DIAST BP <80 MM HG: CPT | Performed by: INTERNAL MEDICINE

## 2025-02-21 PROCEDURE — 3074F SYST BP LT 130 MM HG: CPT | Performed by: INTERNAL MEDICINE

## 2025-02-21 PROCEDURE — 1159F MED LIST DOCD IN RCRD: CPT | Performed by: INTERNAL MEDICINE

## 2025-02-21 PROCEDURE — 1126F AMNT PAIN NOTED NONE PRSNT: CPT | Performed by: INTERNAL MEDICINE

## 2025-02-21 PROCEDURE — G0463 HOSPITAL OUTPT CLINIC VISIT: HCPCS

## 2025-02-21 PROCEDURE — 25010000002 HEPARIN LOCK FLUSH PER 10 UNITS: Performed by: INTERNAL MEDICINE

## 2025-02-21 PROCEDURE — 1160F RVW MEDS BY RX/DR IN RCRD: CPT | Performed by: INTERNAL MEDICINE

## 2025-02-21 PROCEDURE — 99213 OFFICE O/P EST LOW 20 MIN: CPT | Performed by: INTERNAL MEDICINE

## 2025-02-21 PROCEDURE — 85025 COMPLETE CBC W/AUTO DIFF WBC: CPT | Performed by: INTERNAL MEDICINE

## 2025-02-21 PROCEDURE — 36591 DRAW BLOOD OFF VENOUS DEVICE: CPT

## 2025-02-21 RX ORDER — HEPARIN SODIUM (PORCINE) LOCK FLUSH IV SOLN 100 UNIT/ML 100 UNIT/ML
500 SOLUTION INTRAVENOUS AS NEEDED
Status: DISCONTINUED | OUTPATIENT
Start: 2025-02-21 | End: 2025-02-22 | Stop reason: HOSPADM

## 2025-02-21 RX ORDER — HEPARIN SODIUM (PORCINE) LOCK FLUSH IV SOLN 100 UNIT/ML 100 UNIT/ML
500 SOLUTION INTRAVENOUS AS NEEDED
OUTPATIENT
Start: 2025-02-21

## 2025-02-21 RX ORDER — SODIUM CHLORIDE 0.9 % (FLUSH) 0.9 %
20 SYRINGE (ML) INJECTION AS NEEDED
OUTPATIENT
Start: 2025-02-21

## 2025-02-21 RX ORDER — SODIUM CHLORIDE 0.9 % (FLUSH) 0.9 %
20 SYRINGE (ML) INJECTION AS NEEDED
Status: DISCONTINUED | OUTPATIENT
Start: 2025-02-21 | End: 2025-02-22 | Stop reason: HOSPADM

## 2025-02-21 RX ADMIN — Medication 20 ML: at 14:06

## 2025-02-21 RX ADMIN — HEPARIN 500 UNITS: 100 SYRINGE at 14:07

## 2025-02-21 NOTE — PROGRESS NOTES
Pt had no complaints today. Pt here for 5FU pump discontinue, 5FU pump empty, positive blood return noted and flushed with NS and heparin.extra tubes drawn for labs for MD garzont.    General

## 2025-02-21 NOTE — PROGRESS NOTES
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     History of Present Illness:  2024: Mr. Zhou carried a long history of severe hypertension and of an aneurysm of the ascending aorta.  He had been receiving antihypertensive medication after correction of the aneurysm with good results.  In 2023 he underwent his first screening colonoscopy.  A large circumferential and obstructive tumor was identified at the sigmoid/rectosigmoid junction.  The tumor could not be traversed even with the smallest instrument.  Biopsies showed moderately differentiated colonic adenocarcinoma without loss of nuclear expression of the mismatch repair proteins.  Imaging studies at the time revealed the known thoracic aneurysm that has increased mildly since the previous scan.  There was a benign-appearing subpleural nodule in the right posteromedial chest wall and pulmonary nodules that have been identified since 2018 remained stable.  Some mediastinal nonspecific and stable adenopathy was reported as well.  In the abdomen the sigmoid tumor was confirmed and there was no suggestion of metastatic disease.  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  He underwent excision of the squamous cell carcinoma that proved to be a high-grade squamous intraepithelial lesion.  He also had a robotic left colectomy.  The final report of pathology was of moderately differentiated adenocarcinoma of the colon, that measured 3.5 cm.  The tumor involved the serosal surface and the antimesenteric serosa.  Lymphovascular space invasion was identified.  All margins were negative for disease but 2  of 27 lymph nodes had metastatic involvement.  As well there were at least 5 peritoneal deposits that were excised and that were confirmed to correspond to metastatic lesions.  The tumor had intact expression of MLH1, MSH 1, MSH 6 and PMS2.  Next-generation sequencing revealed a pathogenic mutation of exon 2 of the KRAS gene. ERBB2, BRAF, NTRK, RET, and PIK3CA were negative. PD-L1 was negative, as well.  He was started on treatment with FOLFOX and bevacizumab on November 28, 2023.  He reported adequate tolerance to the treatment, with the expected cold intolerance.  He had had no nausea but since the beginning of the present illness he had lost approximately 20 pounds.  He was eating reasonably well.  He had been free of chest pains and no longer had abdominal pain.  All his surgical incisions had healed and he was having regular defecations, at times of liquid stool.  The physical exam revealed him to be a chronically ill-appearing man who did not seem in distress.  He was conversant, in good spirits and without jaundice.  Lungs diminished bilaterally.  Heart regular.  Abdomen soft and nontender.  No edema.  The laboratory exams and all the records were reviewed and discussed with him and with his wife.  To resume treatment with the idea of obtaining a new set of scans after 6 cycles of chemotherapy.  After 12 cycles of chemotherapy discuss the possibility of additional surgery if there was any residual peritoneal disease at the time of surgery.  To see me at the end of February with the scans.    2/16/2024: In the office to review scans. In the last few days has had severe glossodynia and odynophagia. Also has been coughing and expectorating some clear sputum. No fevers. Has had pain on the left side of the chest. No dyspnea. He was prescribed Hiral's magic potion and fluconazole that has resulted in relief. On exam he does not appear acutely ill. No jaundice or pallor. Lungs are diminished bilaterally and heart  irregularly irregular. Abdomen soft. No edema. Reviewed the laboratory exams. Reviewed the images of the scans. Sent prescriptions for doxycycline as the lesion in the left lower lobe is likely infectious in nature, given his leukocytosis and symptoms. Will obtain an electrocardiogram. Will follow next week.     3/8/2024: Feeling poorly. Weak and not moving much. Has continued to have diffuse pain. No fevers. His spouse believes he has an ulcer on the backside. On exam he is conversant and oriented. No jaundice. No pallor. The lungs are diminished bilaterally. Heart regular. There is indeed an ulcer in the sacral region, in the intergluteal crease. Reviewed the laboratory exams. He is to continue with the same chemotherapy. Referred to the wound clinic. To have an electrocardiogram. He is to see me in a few weeks with new scans.     4/10/2024: Was admitted to the hospital shortly after the previous visit.  He had pneumonia and was very dyspneic.  On March 11, 2024 underwent a thoracoscopic decortication with parietal and visceral pleurectomy and intercostal nerve block.  He was treated with antibiotics and eventually discharged to a subacute rehabilitation facility.  Resumed treatment on 4/9/2024.  He tells me today he is feeling progressively better.  Stronger.  The surgical incisions are healing.  He is not having as much trouble breathing.  He has had no abdominal pain or diarrhea.  On exam alert, conversant and ill.  Seems much older than the stated age but is not in distress.  Lungs are diminished bilaterally.  Heart is regular.  Abdomen is soft.  No edema.  The laboratory exams were reviewed.  I reviewed the records from the hospital and reviewed all imaging studies done during that hospitalization.  No suggestion of progressive metastatic disease.  For now continue with the same treatment.  See me early in May 2024.    5/9/2024: Feeling progressively better.  Able to take the chemotherapy without much  difficulty.  Eating more.  Also more mobile.  Using a cane only when outside of his house.  On exam alert, chronically ill-appearing but in no distress.  No jaundice.  Conversant and oriented.  Lungs diminished bilaterally.  Heart regular.  Abdomen soft.  No edema.  Reviewed the laboratory exams and discussed with him.  He will see me again in approximately 6 weeks with new scans.  Continue with the same chemotherapy.    6/19/2024: Feeling reasonably well.  No weakness or chest pains and no cough.  On exam alert and conversant.  Not jaundiced or pale.  Lungs diminished bilaterally with bilateral wheezes and heart regular.  Abdomen soft.  No edema.  Reviewed the images of the recent scans with him and explained the finding of new metastatic deposits in the liver.  Explained to him that given the length of time that elapsed without chemotherapy because of his respiratory and cardiovascular issues, I do not know if the problem is that the chemotherapy was not working at all or if that Is what led to the development of metastatic disease.  I'd like to try the FOLFOX with bevacizumab in the way that he had been getting it to see if there is response with regular treatment.  For that reason we will proceed with treatment today.  He is to see me in approximately 4 weeks from today.    8/30/2024: Progressively better and stronger.  Eating well and more energetic.  Was able to get out of the house and do several different things 6 days in a row, which has been a change.  Continues to have some dyspnea with exertion but he also continues to smoke.  He has had no chest pains and is not coughing as much.  He denies abdominal pain and has maintained regular bowel activity.  On exam he appears chronically ill and much older than the stated age.  No distress.  No jaundice.  The lungs are clear bilaterally and the heart is regular.  The abdomen is soft.  The epigastric space seems taylor and the edge of the liver can be palpated,  at the level of the midsternal line, approximately 3 cm below the costal margin.  There is no edema.  Laboratory exams reviewed.  To obtain a scan and see me in 3 months.  Continue with the same chemotherapy for now.    9/25/2024: In the office before the next scheduled appointment. He is feeling as well as he had been feeling before and on direct questioning he denies new symptoms. The scans, however, reports progression of the disease with enlargement of the hepatic lesions. There has also been development of lymphadenopathy and a lymph node conglomerate results in left hydronephrosis by causing obstruction of the mid left ureter.  Plans to begin treatment with FOLFIRI/bevacizumab were made.    10/11/2024: Received the first dose of irinotecan. Had some nausea and emesis on at least a couple of occasions. He has felt tired. He has been able to eat some. No chest pain or cough. Remains free of abdominal pain. No diarrhea, he has rather tended to be constipated. No edema. No skin rash. On exam alert and conversant. Appears chronically ill but in no distress. No jaundice. Lungs diminished bilaterally and heart regular. Abdomen soft and not tender. No edema. Reviewed the laboratory exams. I have sent a new prescription for prochlorperazine. He is to continue with the same treatment and will see me in approximately 6 weeks.     11/22/2024: Continues to feel well.  He has not had many difficulties with the current chemotherapy and he has had nausea on a couple of occasions only.  The antiemetics seems to have worked, at least partially.  He has been eating well.  He is not needing the cane as much.  He does not have any more dyspnea than before but he continues to smoke.  No abdominal pain.  Maintains regular bowel activity.  Has had the nephrostomies removed.  He has no dysuria.  No edema.  On exam alert and conversant.  Ill but in no distress.  No jaundice.  Lungs diminished bilaterally and heart regular.  Abdomen  soft.  No palpable tumors but the liver seems to be palpable approximately 10 cm below the costal margin.  No edema.  Laboratory exams reviewed.  Discussed with him.  To see me again with new scans.  Continue with the same treatment.    1/10/2025: Continues to receive chemotherapy.  He does experience the side effects and for 1 to 2 days he feels fatigued after each treatment but he recovers promptly and is able to do the majority of his activities.  His appetite remains adequate and has had no nausea or vomiting.  He is weight is, as well stable.  No chest pains or cough.  Denies abdominal pain.  Maintains regular bowel activity and has not had diarrhea.  No dysuria.  No edema.  On exam he is chronically ill-appearing but conversant and in no distress.  No jaundice.  He is pale.  He is well-hydrated.  Lungs are diminished bilaterally and heart regular.  Abdomen soft and without palpable tumors.  No edema.  Laboratory exams reviewed.  I reviewed the images of the scans.  There is good evidence of response.  To continue with the same treatment.  See me in 6 weeks.    2/21/2025: Continues to take treatment without difficulties and continues to feel reasonably well.  Has had no new symptoms and in particular he denies pain.  He maintains a reasonable appetite and his weight has been stable.  He does not have any more dyspnea than usual.  No chest pains.  On exam alert, conversant, oriented and chronically ill-appearing but in no distress.  No jaundice.  No pallor.  The lungs are diminished bilaterally.  The heart is regular.  The abdomen is soft and I cannot palpate any tumors.  No edema.  Laboratory exams reviewed.  Reviewed the previous scans.  Discussed with him.  To continue with the same treatment and obtain scans in early April 2025.    Past Medical History:   Diagnosis Date    Aortic dissection     Diabetes mellitus     Heart murmur     History of noncompliance with medical treatment     Hyperlipidemia      Hypertension     Type B hypoplasia of aortic arch      Past Surgical History:   Procedure Laterality Date    COLECTOMY PARTIAL / TOTAL  2023    COLONOSCOPY N/A 08/31/2023    Procedure: COLONOSCOPY with sigmoid mass tattooing at 35cm, sigmoid and rectal polypectomy;  Surgeon: TORIBIO Jacob MD;  Location: [facility] ENDOSCOPY;  Service: Gastroenterology;  Laterality: N/A;  sigmoid mass, colon polyps    THORACOSCOPY WITH DECORTICATION Left 3/11/2024    Procedure: THORACOSCOPY VIDEO ASSISTED WITH DECORTICATION WITH DAVINCI ROBOT;  Surgeon: Saqib Keller MD;  Location: [facility] MAIN OR;  Service: Robotics - DaVinci;  Laterality: Left;       Current Outpatient Medications:     amLODIPine (NORVASC) 10 MG tablet, TAKE 1 TABLET BY MOUTH DAILY, Disp: 90 tablet, Rfl: 0    atorvastatin (LIPITOR) 40 MG tablet, TAKE 1 TABLET BY MOUTH EVERY NIGHT AT BEDTIME, Disp: 90 tablet, Rfl: 0    Blood Glucose Monitoring Suppl (Accu-Chek Guide) w/Device kit, 1 Device Daily., Disp: 1 kit, Rfl: 0    cloNIDine (CATAPRES) 0.1 MG tablet, TAKE 1 TABLET BY MOUTH 2 TIMES A DAY, Disp: 180 tablet, Rfl: 1    gabapentin (NEURONTIN) 300 MG capsule, TAKE 1 CAPSULE BY MOUTH 3 TIMES A DAY, Disp: 270 capsule, Rfl: 1    glimepiride (AMARYL) 4 MG tablet, Take 1 tablet by mouth 2 (Two) Times a Day., Disp: 60 tablet, Rfl: 5    glucose blood (Accu-Chek Guide) test strip, Test daily e11.9, Disp: 50 each, Rfl: 12    hydrALAZINE (APRESOLINE) 50 MG tablet, TAKE ONE TABLET BY MOUTH EVERY 8 HOURS, Disp: 90 tablet, Rfl: 1    metFORMIN (GLUCOPHAGE) 850 MG tablet, TAKE 1 TABLET BY MOUTH TWICE A DAY WITH MEALS, Disp: 144 tablet, Rfl: 1    metoprolol tartrate (LOPRESSOR) 50 MG tablet, TAKE THREE TABLETS BY MOUTH EVERY 12 HOURS, Disp: 540 tablet, Rfl: 0    ondansetron (ZOFRAN) 8 MG tablet, Take 1 tablet by mouth 3 (Three) Times a Day As Needed for Nausea or Vomiting., Disp: 30 tablet, Rfl: 5    oxyCODONE (ROXICODONE) 10 MG tablet, , Disp: , Rfl:     potassium chloride 10 MEQ CR  tablet, Take 1 tablet by mouth Daily., Disp: 30 tablet, Rfl: 2  No current facility-administered medications for this visit.    Facility-Administered Medications Ordered in Other Visits:     heparin injection 500 Units, 500 Units, Intravenous, Kristy SEALS Alfonso, MD, 500 Units at 02/21/25 1407    sodium chloride 0.9 % flush 20 mL, 20 mL, Intravenous, PRNKristy Alfonso, MD, 20 mL at 02/21/25 1406    No Known Allergies    Family History   Problem Relation Age of Onset    Diabetes Mother     Dementia Mother     Sudden death Father      Cancer-related family history is not on file.    Social History     Tobacco Use    Smoking status: Every Day     Current packs/day: 2.00     Average packs/day: 2.0 packs/day for 54.1 years (108.3 ttl pk-yrs)     Types: Cigarettes     Start date: 1971     Passive exposure: Current    Smokeless tobacco: Never   Vaping Use    Vaping status: Never Used   Substance Use Topics    Alcohol use: No     Comment: Abstinent since around 2008    Drug use: Not Currently     Types: Marijuana     Comment: Abstinent since at least the 1980's     Social History     Social History Narrative    Not on file     ROS:   Review of Systems   Constitutional:  Positive for fatigue. Negative for activity change, appetite change, chills, diaphoresis, fever and unexpected weight change.   HENT:  Negative for congestion, dental problem, drooling, ear discharge, ear pain, facial swelling, hearing loss, mouth sores, nosebleeds, postnasal drip, rhinorrhea, sinus pressure, sinus pain, sneezing, sore throat, tinnitus, trouble swallowing and voice change.    Eyes:  Negative for photophobia, pain, discharge, redness, itching and visual disturbance.   Respiratory:  Positive for cough and shortness of breath. Negative for apnea, choking, chest tightness, wheezing and stridor.    Cardiovascular:  Negative for chest pain, palpitations and leg swelling.   Gastrointestinal:  Negative for abdominal distention, abdominal  "pain, anal bleeding, blood in stool, constipation, diarrhea, nausea, rectal pain and vomiting.   Endocrine: Negative for cold intolerance, heat intolerance, polydipsia and polyuria.   Genitourinary:  Negative for decreased urine volume, difficulty urinating, dysuria, flank pain, frequency, genital sores, hematuria and urgency.   Musculoskeletal:  Negative for arthralgias, back pain, gait problem, joint swelling, myalgias, neck pain and neck stiffness.   Skin:  Negative for color change, pallor and rash.   Neurological:  Negative for dizziness, tremors, seizures, syncope, facial asymmetry, speech difficulty, weakness, light-headedness, numbness and headaches.        Increased cold sensitivity since the commencement of treatment with oxaliplatin   Hematological:  Negative for adenopathy. Does not bruise/bleed easily.   Psychiatric/Behavioral:  Negative for agitation, behavioral problems, confusion, decreased concentration, hallucinations, self-injury, sleep disturbance and suicidal ideas. The patient is not nervous/anxious.      Objective:    Vital Signs:  Vitals:    02/21/25 1410   BP: 122/76   Pulse: 62   Resp: 14   Temp: 98.2 °F (36.8 °C)   SpO2: 96%   Weight: 92.4 kg (203 lb 12.8 oz)   Height: 193 cm (76\")   PainSc: 0-No pain     Body mass index is 24.81 kg/m².    ECOG  (1) Restricted in physically strenuous activity, ambulatory and able to do work of light nature    Physical Exam:   Physical Exam  Constitutional:       General: He is not in acute distress.     Appearance: He is not ill-appearing, toxic-appearing or diaphoretic.      Comments: Alert, well-oriented and conversant.  No distress.  Appears chronically ill.   HENT:      Head: Normocephalic and atraumatic.      Right Ear: External ear normal.      Left Ear: External ear normal.      Nose: Nose normal.      Mouth/Throat:      Mouth: Mucous membranes are moist.      Pharynx: Oropharynx is clear.   Eyes:      General: No scleral icterus.        Right eye: " No discharge.         Left eye: No discharge.      Conjunctiva/sclera: Conjunctivae normal.      Pupils: Pupils are equal, round, and reactive to light.   Cardiovascular:      Rate and Rhythm: Normal rate.      Pulses: Normal pulses.      Heart sounds: Normal heart sounds. No murmur heard.     No friction rub. No gallop.   Pulmonary:      Effort: No respiratory distress.      Breath sounds: No stridor. No wheezing, rhonchi or rales.      Comments: Diminished bilaterally and with fine crackles in the bases.   Chest:      Chest wall: No tenderness.      Comments: Surgical incisions on the left side of the chest are healing well.  No evidence of infection.  Abdominal:      General: Bowel sounds are normal. There is no distension.      Palpations: Abdomen is soft. There is no mass.      Tenderness: There is no abdominal tenderness. There is no right CVA tenderness, left CVA tenderness, guarding or rebound.      Comments: Soft.  Not tender.  No palpable tumors.   Musculoskeletal:         General: No tenderness, deformity or signs of injury.      Cervical back: No rigidity.      Right lower leg: No edema.      Left lower leg: No edema.   Lymphadenopathy:      Cervical: No cervical adenopathy.   Skin:     General: Skin is warm and dry.      Coloration: Skin is not jaundiced or pale.      Findings: No bruising or rash.   Neurological:      General: No focal deficit present.      Mental Status: He is alert and oriented to person, place, and time.      Cranial Nerves: No cranial nerve deficit.   Psychiatric:         Mood and Affect: Mood normal.         Behavior: Behavior normal.         Thought Content: Thought content normal.         Judgment: Judgment normal.     MIGUEL Wagner MD performed the physical exam on 2/21/2025 as documented above.    Lab Results - Last 18 Months   Lab Units 02/21/25  1407 02/19/25  0845 02/05/25  0816   WBC 10*3/mm3 9.60 6.63 8.65   HEMOGLOBIN g/dL 13.3 12.3* 12.3*   HEMATOCRIT % 41.2 38.6  38.6   PLATELETS 10*3/mm3 178 144 187   MCV fL 96.7 97.5* 97.0     Lab Results - Last 18 Months   Lab Units 02/19/25  0853 02/05/25  0842 01/22/25  0909 01/10/25  0819 01/08/25  0820 11/25/24  0913 11/22/24  1443   SODIUM mmol/L  --   --   --  138 138  --  140   POTASSIUM mmol/L  --   --   --  3.5 3.7  --  3.5   CHLORIDE mmol/L  --   --   --  99 103  --  104   CO2 mmol/L  --   --   --  27.0 25.4  --  24.8   BUN mg/dL  --   --   --  16 8  --  10   CREATININE mg/dL 0.80 0.50* 0.40* 0.67* 0.70*   < > 0.74*   CALCIUM mg/dL  --   --   --  10.5 10.0  --  10.6*   BILIRUBIN mg/dL  --   --   --  0.5 0.4  --  0.3   ALK PHOS U/L  --   --   --  71 77  --  81   ALT (SGPT) U/L  --   --   --  20 10  --  14   AST (SGOT) U/L  --   --   --  24 22  --  19   GLUCOSE mg/dL  --   --   --  261* 226*  --  137*    < > = values in this interval not displayed.     Lab Results   Component Value Date    GLUCOSE 261 (H) 01/10/2025    BUN 16 01/10/2025    CREATININE 0.80 02/19/2025    EGFRIFNONA 100 01/20/2022    BCR 23.9 01/10/2025    K 3.5 01/10/2025    CO2 27.0 01/10/2025    CALCIUM 10.5 01/10/2025    ALBUMIN 4.0 01/10/2025    AST 24 01/10/2025    ALT 20 01/10/2025     Lab Results - Last 18 Months   Lab Units 10/18/24  0825 10/04/24  0811 03/10/24  2236   INR  1.05 1.01 1.21*   APTT seconds 27.9 31.0 31.2*     Lab Results   Component Value Date    PTT 27.9 10/18/2024    INR 1.05 10/18/2024     Lab Results   Component Value Date    CEA 2.18 02/19/2025     Lab Results   Component Value Date    PSA 0.594 01/23/2023     Assessment & Plan     1.  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 Started FOLFIRI/bevacizumab on October 10, 2024.  With evidence of response.  Continue same treatment.  Good tolerance.  2.  Recovered completely from the decortication.  No evidence of complications.  2.  Cigarette smoker: Unchanged.  3.  Aortic aneurysm: Observation  only for now.  4.  Severe hypertension: Maintains excellent control.  5.  Reviewed all the laboratory exams and reviewed the recent scans.  Discussed with him.  7.  Continue same treatment.  See me in March.  Scans in April.    Lars Wagner MD on 2/21/2025 at 1446.

## 2025-02-21 NOTE — ADDENDUM NOTE
Addended by: WILFRED SMITH on: 2/21/2025 02:51 PM     Modules accepted: Orders     well developed, well nourished , in no acute distress , ambulating without difficulty , normal communication ability

## 2025-02-24 RX ORDER — AMLODIPINE BESYLATE 10 MG/1
10 TABLET ORAL DAILY
Qty: 90 TABLET | Refills: 0 | Status: SHIPPED | OUTPATIENT
Start: 2025-02-24

## 2025-03-05 ENCOUNTER — HOSPITAL ENCOUNTER (OUTPATIENT)
Dept: ONCOLOGY | Facility: HOSPITAL | Age: 65
Discharge: HOME | End: 2025-03-05
Admitting: INTERNAL MEDICINE
Payer: MEDICARE

## 2025-03-05 VITALS
HEART RATE: 73 BPM | BODY MASS INDEX: 25.45 KG/M2 | WEIGHT: 209 LBS | TEMPERATURE: 97.5 F | HEIGHT: 76 IN | SYSTOLIC BLOOD PRESSURE: 117 MMHG | RESPIRATION RATE: 14 BRPM | OXYGEN SATURATION: 94 % | DIASTOLIC BLOOD PRESSURE: 70 MMHG

## 2025-03-05 DIAGNOSIS — C78.6 METASTASIS TO PERITONEAL CAVITY: ICD-10-CM

## 2025-03-05 DIAGNOSIS — C20 RECTAL CANCER: Primary | ICD-10-CM

## 2025-03-05 LAB
ALP BLD-CCNC: 75 U/L (ref 53–128)
BASOPHILS # BLD AUTO: 0.12 10*3/MM3 (ref 0–0.2)
BASOPHILS NFR BLD AUTO: 1.4 % (ref 0–1.5)
BILIRUB UR QL STRIP: ABNORMAL
BUN BLDA-MCNC: 9 MG/DL (ref 7–22)
CALCIUM BLD QL: 10 MG/DL (ref 8–10.3)
CHLORIDE BLDA-SCNC: 103 MMOL/L (ref 98–108)
CLARITY UR: ABNORMAL
CO2 BLDA-SCNC: 28 MMOL/L (ref 18–33)
COLOR UR: ABNORMAL
CREAT BLDA-MCNC: 0.6 MG/DL (ref 0.6–1.2)
DEPRECATED RDW RBC AUTO: 54.5 FL (ref 37–54)
EGFRCR SERPLBLD CKD-EPI 2021: 108.5 ML/MIN/1.73
EOSINOPHIL # BLD AUTO: 0.26 10*3/MM3 (ref 0–0.4)
EOSINOPHIL NFR BLD AUTO: 3.1 % (ref 0.3–6.2)
ERYTHROCYTE [DISTWIDTH] IN BLOOD BY AUTOMATED COUNT: 16.1 % (ref 12.3–15.4)
GLUCOSE BLDC GLUCOMTR-MCNC: 220 MG/DL (ref 73–118)
GLUCOSE UR STRIP-MCNC: ABNORMAL MG/DL
HCT VFR BLD AUTO: 41.3 % (ref 37.5–51)
HGB BLD-MCNC: 13.3 G/DL (ref 13–17.7)
HGB UR QL STRIP.AUTO: NEGATIVE
KETONES UR QL STRIP: ABNORMAL
LEUKOCYTE ESTERASE UR QL STRIP.AUTO: ABNORMAL
LYMPHOCYTES # BLD AUTO: 2.21 10*3/MM3 (ref 0.7–3.1)
LYMPHOCYTES NFR BLD AUTO: 26.6 % (ref 19.6–45.3)
MCH RBC QN AUTO: 31 PG (ref 26.6–33)
MCHC RBC AUTO-ENTMCNC: 32.2 G/DL (ref 31.5–35.7)
MCV RBC AUTO: 96.3 FL (ref 79–97)
MONOCYTES # BLD AUTO: 0.84 10*3/MM3 (ref 0.1–0.9)
MONOCYTES NFR BLD AUTO: 10.1 % (ref 5–12)
NEUTROPHILS NFR BLD AUTO: 4.88 10*3/MM3 (ref 1.7–7)
NEUTROPHILS NFR BLD AUTO: 58.8 % (ref 42.7–76)
NITRITE UR QL STRIP: NEGATIVE
PH UR STRIP.AUTO: 5.5 [PH] (ref 5–8)
PLATELET # BLD AUTO: 172 10*3/MM3 (ref 140–450)
PMV BLD AUTO: 9.6 FL (ref 6–12)
POC ALBUMIN: 3.6 G/L (ref 3.3–5.5)
POC ALT (SGPT): 15 U/L (ref 10–47)
POC AST (SGOT): 18 U/L (ref 11–38)
POC TOTAL BILIRUBIN: 0.8 MG/DL (ref 0.2–1.6)
POC TOTAL PROTEIN: 6.7 G/DL (ref 6.4–8.1)
POTASSIUM BLDA-SCNC: 4 MMOL/L (ref 3.6–5.1)
PROT UR QL STRIP: ABNORMAL
RBC # BLD AUTO: 4.29 10*6/MM3 (ref 4.14–5.8)
SODIUM BLD-SCNC: 142 MMOL/L (ref 128–145)
SP GR UR STRIP: >=1.03 (ref 1–1.03)
UROBILINOGEN UR QL STRIP: ABNORMAL
WBC NRBC COR # BLD AUTO: 8.31 10*3/MM3 (ref 3.4–10.8)

## 2025-03-05 PROCEDURE — 25010000002 FLUOROURACIL PER 500 MG: Performed by: PHYSICIAN ASSISTANT

## 2025-03-05 PROCEDURE — 80053 COMPREHEN METABOLIC PANEL: CPT

## 2025-03-05 PROCEDURE — 25010000002 BEVACIZUMAB-BVZR 400 MG/16ML SOLUTION 16 ML VIAL: Performed by: PHYSICIAN ASSISTANT

## 2025-03-05 PROCEDURE — 96366 THER/PROPH/DIAG IV INF ADDON: CPT

## 2025-03-05 PROCEDURE — 25010000002 PALONOSETRON PER 25 MCG: Performed by: PHYSICIAN ASSISTANT

## 2025-03-05 PROCEDURE — 25010000002 BEVACIZUMAB-BVZR 100 MG/4ML SOLUTION 4 ML VIAL: Performed by: PHYSICIAN ASSISTANT

## 2025-03-05 PROCEDURE — 25010000002 DEXAMETHASONE PER 1 MG: Performed by: PHYSICIAN ASSISTANT

## 2025-03-05 PROCEDURE — 25010000002 IRINOTECAN PER 20 MG: Performed by: PHYSICIAN ASSISTANT

## 2025-03-05 PROCEDURE — 25010000002 LEUCOVORIN CALCIUM PER 50 MG: Performed by: PHYSICIAN ASSISTANT

## 2025-03-05 PROCEDURE — G0498 CHEMO EXTEND IV INFUS W/PUMP: HCPCS

## 2025-03-05 PROCEDURE — 25010000002 ATROPINE SULFATE 0.4 MG/ML SOLUTION 1 ML VIAL: Performed by: PHYSICIAN ASSISTANT

## 2025-03-05 PROCEDURE — 96375 TX/PRO/DX INJ NEW DRUG ADDON: CPT

## 2025-03-05 PROCEDURE — 25810000003 SODIUM CHLORIDE 0.9 % SOLUTION 500 ML FLEX CONT: Performed by: PHYSICIAN ASSISTANT

## 2025-03-05 PROCEDURE — 85025 COMPLETE CBC W/AUTO DIFF WBC: CPT | Performed by: INTERNAL MEDICINE

## 2025-03-05 PROCEDURE — 96413 CHEMO IV INFUSION 1 HR: CPT

## 2025-03-05 PROCEDURE — 96411 CHEMO IV PUSH ADDL DRUG: CPT

## 2025-03-05 PROCEDURE — 25810000003 SODIUM CHLORIDE 0.9 % SOLUTION 250 ML FLEX CONT: Performed by: PHYSICIAN ASSISTANT

## 2025-03-05 PROCEDURE — 96415 CHEMO IV INFUSION ADDL HR: CPT

## 2025-03-05 PROCEDURE — 96417 CHEMO IV INFUS EACH ADDL SEQ: CPT

## 2025-03-05 PROCEDURE — 81003 URINALYSIS AUTO W/O SCOPE: CPT | Performed by: INTERNAL MEDICINE

## 2025-03-05 PROCEDURE — 96368 THER/DIAG CONCURRENT INF: CPT

## 2025-03-05 PROCEDURE — 25010000002 LEUCOVORIN 200 MG RECONSTITUTED SOLUTION 1 EACH VIAL: Performed by: PHYSICIAN ASSISTANT

## 2025-03-05 RX ORDER — ATROPINE SULFATE 1 MG/ML
0.4 INJECTION, SOLUTION INTRAMUSCULAR; INTRAVENOUS; SUBCUTANEOUS
Status: CANCELLED | OUTPATIENT
Start: 2025-03-05

## 2025-03-05 RX ORDER — SODIUM CHLORIDE 9 MG/ML
20 INJECTION, SOLUTION INTRAVENOUS ONCE
Status: CANCELLED | OUTPATIENT
Start: 2025-03-05

## 2025-03-05 RX ORDER — PALONOSETRON 0.05 MG/ML
0.25 INJECTION, SOLUTION INTRAVENOUS ONCE
Status: CANCELLED | OUTPATIENT
Start: 2025-03-05

## 2025-03-05 RX ORDER — FLUOROURACIL 50 MG/ML
400 INJECTION, SOLUTION INTRAVENOUS ONCE
Status: CANCELLED | OUTPATIENT
Start: 2025-03-05

## 2025-03-05 RX ORDER — FLUOROURACIL 50 MG/ML
900 INJECTION, SOLUTION INTRAVENOUS ONCE
Status: COMPLETED | OUTPATIENT
Start: 2025-03-05 | End: 2025-03-05

## 2025-03-05 RX ORDER — SODIUM CHLORIDE 9 MG/ML
20 INJECTION, SOLUTION INTRAVENOUS ONCE
Status: DISCONTINUED | OUTPATIENT
Start: 2025-03-05 | End: 2025-03-06 | Stop reason: HOSPADM

## 2025-03-05 RX ORDER — TRIAMCINOLONE ACETONIDE 1 MG/G
1 CREAM TOPICAL 2 TIMES DAILY
Qty: 15 G | Refills: 0 | Status: SHIPPED | OUTPATIENT
Start: 2025-03-05

## 2025-03-05 RX ORDER — DEXAMETHASONE SODIUM PHOSPHATE 4 MG/ML
12 INJECTION, SOLUTION INTRA-ARTICULAR; INTRALESIONAL; INTRAMUSCULAR; INTRAVENOUS; SOFT TISSUE ONCE
Status: CANCELLED
Start: 2025-03-05 | End: 2025-03-05

## 2025-03-05 RX ORDER — PALONOSETRON 0.05 MG/ML
0.25 INJECTION, SOLUTION INTRAVENOUS ONCE
Status: COMPLETED | OUTPATIENT
Start: 2025-03-05 | End: 2025-03-05

## 2025-03-05 RX ORDER — ATROPINE SULFATE 1 MG/ML
0.4 INJECTION, SOLUTION INTRAMUSCULAR; INTRAVENOUS; SUBCUTANEOUS
Status: DISCONTINUED | OUTPATIENT
Start: 2025-03-05 | End: 2025-03-05

## 2025-03-05 RX ORDER — DEXAMETHASONE SODIUM PHOSPHATE 4 MG/ML
12 INJECTION, SOLUTION INTRA-ARTICULAR; INTRALESIONAL; INTRAMUSCULAR; INTRAVENOUS; SOFT TISSUE ONCE
Status: COMPLETED | OUTPATIENT
Start: 2025-03-05 | End: 2025-03-05

## 2025-03-05 RX ADMIN — FLUOROURACIL 900 MG: 50 INJECTION, SOLUTION INTRAVENOUS at 12:08

## 2025-03-05 RX ADMIN — FLUOROURACIL 5400 MG: 50 INJECTION, SOLUTION INTRAVENOUS at 12:15

## 2025-03-05 RX ADMIN — IRINOTECAN HYDROCHLORIDE 400 MG: 20 INJECTION, SOLUTION INTRAVENOUS at 10:32

## 2025-03-05 RX ADMIN — LEUCOVORIN CALCIUM 900 MG: 350 INJECTION, POWDER, LYOPHILIZED, FOR SOLUTION INTRAMUSCULAR; INTRAVENOUS at 10:30

## 2025-03-05 RX ADMIN — SODIUM CHLORIDE 470 MG: 9 INJECTION, SOLUTION INTRAVENOUS at 09:53

## 2025-03-05 RX ADMIN — DEXAMETHASONE SODIUM PHOSPHATE 12 MG: 4 INJECTION, SOLUTION INTRAMUSCULAR; INTRAVENOUS at 09:26

## 2025-03-05 RX ADMIN — PALONOSETRON 0.25 MG: 0.05 INJECTION, SOLUTION INTRAVENOUS at 09:27

## 2025-03-05 NOTE — PROGRESS NOTES
Pt here for C11 D1 ziomid ervini. Using sterile technique, port accessed for blood collection and treatment. 10 ml blood wasted prior to collecting blood for ordered labs.

## 2025-03-07 ENCOUNTER — HOSPITAL ENCOUNTER (OUTPATIENT)
Dept: ONCOLOGY | Facility: HOSPITAL | Age: 65
Discharge: HOME | End: 2025-03-07
Payer: MEDICARE

## 2025-03-07 DIAGNOSIS — C20 RECTAL CANCER: ICD-10-CM

## 2025-03-07 DIAGNOSIS — C78.6 METASTASIS TO PERITONEAL CAVITY: Primary | ICD-10-CM

## 2025-03-07 DIAGNOSIS — Z45.2 ENCOUNTER FOR CARE RELATED TO VASCULAR ACCESS PORT: ICD-10-CM

## 2025-03-07 PROCEDURE — 25010000002 HEPARIN LOCK FLUSH PER 10 UNITS: Performed by: INTERNAL MEDICINE

## 2025-03-07 RX ORDER — HEPARIN SODIUM (PORCINE) LOCK FLUSH IV SOLN 100 UNIT/ML 100 UNIT/ML
500 SOLUTION INTRAVENOUS AS NEEDED
OUTPATIENT
Start: 2025-03-07

## 2025-03-07 RX ORDER — HEPARIN SODIUM (PORCINE) LOCK FLUSH IV SOLN 100 UNIT/ML 100 UNIT/ML
500 SOLUTION INTRAVENOUS AS NEEDED
Status: DISCONTINUED | OUTPATIENT
Start: 2025-03-07 | End: 2025-03-08 | Stop reason: HOSPADM

## 2025-03-07 RX ORDER — SODIUM CHLORIDE 0.9 % (FLUSH) 0.9 %
20 SYRINGE (ML) INJECTION AS NEEDED
Status: DISCONTINUED | OUTPATIENT
Start: 2025-03-07 | End: 2025-03-08 | Stop reason: HOSPADM

## 2025-03-07 RX ORDER — SODIUM CHLORIDE 0.9 % (FLUSH) 0.9 %
20 SYRINGE (ML) INJECTION AS NEEDED
OUTPATIENT
Start: 2025-03-07

## 2025-03-07 RX ADMIN — HEPARIN 500 UNITS: 100 SYRINGE at 13:41

## 2025-03-07 RX ADMIN — Medication 20 ML: at 13:41

## 2025-03-07 NOTE — PROGRESS NOTES
Patient to port chair for 5FU pump disconnect. After disconnect, port with good blood return noted. No labs collected. Port flushed with saline and heparin prior to needle removal.

## 2025-03-18 NOTE — PROGRESS NOTES
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     History of Present Illness:  2024: Mr. Zhou carried a long history of severe hypertension and of an aneurysm of the ascending aorta.  He had been receiving antihypertensive medication after correction of the aneurysm with good results.  In 2023 he underwent his first screening colonoscopy.  A large circumferential and obstructive tumor was identified at the sigmoid/rectosigmoid junction.  The tumor could not be traversed even with the smallest instrument.  Biopsies showed moderately differentiated colonic adenocarcinoma without loss of nuclear expression of the mismatch repair proteins.  Imaging studies at the time revealed the known thoracic aneurysm that has increased mildly since the previous scan.  There was a benign-appearing subpleural nodule in the right posteromedial chest wall and pulmonary nodules that have been identified since 2018 remained stable.  Some mediastinal nonspecific and stable adenopathy was reported as well.  In the abdomen the sigmoid tumor was confirmed and there was no suggestion of metastatic disease.  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  He underwent excision of the squamous cell carcinoma that proved to be a high-grade squamous intraepithelial lesion.  He also had a robotic left colectomy.  The final report of pathology was of moderately differentiated adenocarcinoma of the colon, that measured 3.5 cm.  The tumor involved the serosal surface and the antimesenteric serosa.  Lymphovascular space invasion was identified.  All margins were negative for disease but 2  of 27 lymph nodes had metastatic involvement.  As well there were at least 5 peritoneal deposits that were excised and that were confirmed to correspond to metastatic lesions.  The tumor had intact expression of MLH1, MSH 1, MSH 6 and PMS2.  Next-generation sequencing revealed a pathogenic mutation of exon 2 of the KRAS gene. ERBB2, BRAF, NTRK, RET, and PIK3CA were negative. PD-L1 was negative, as well.  He was started on treatment with FOLFOX and bevacizumab on November 28, 2023.  He reported adequate tolerance to the treatment, with the expected cold intolerance.  He had had no nausea but since the beginning of the present illness he had lost approximately 20 pounds.  He was eating reasonably well.  He had been free of chest pains and no longer had abdominal pain.  All his surgical incisions had healed and he was having regular defecations, at times of liquid stool.  The physical exam revealed him to be a chronically ill-appearing man who did not seem in distress.  He was conversant, in good spirits and without jaundice.  Lungs diminished bilaterally.  Heart regular.  Abdomen soft and nontender.  No edema.  The laboratory exams and all the records were reviewed and discussed with him and with his wife.  To resume treatment with the idea of obtaining a new set of scans after 6 cycles of chemotherapy.  After 12 cycles of chemotherapy discuss the possibility of additional surgery if there was any residual peritoneal disease at the time of surgery.  To see me at the end of February with the scans.    2/16/2024: In the office to review scans. In the last few days has had severe glossodynia and odynophagia. Also has been coughing and expectorating some clear sputum. No fevers. Has had pain on the left side of the chest. No dyspnea. He was prescribed Hiral's magic potion and fluconazole that has resulted in relief. On exam he does not appear acutely ill. No jaundice or pallor. Lungs are diminished bilaterally and heart  irregularly irregular. Abdomen soft. No edema. Reviewed the laboratory exams. Reviewed the images of the scans. Sent prescriptions for doxycycline as the lesion in the left lower lobe is likely infectious in nature, given his leukocytosis and symptoms. Will obtain an electrocardiogram. Will follow next week.     3/8/2024: Feeling poorly. Weak and not moving much. Has continued to have diffuse pain. No fevers. His spouse believes he has an ulcer on the backside. On exam he is conversant and oriented. No jaundice. No pallor. The lungs are diminished bilaterally. Heart regular. There is indeed an ulcer in the sacral region, in the intergluteal crease. Reviewed the laboratory exams. He is to continue with the same chemotherapy. Referred to the wound clinic. To have an electrocardiogram. He is to see me in a few weeks with new scans.     4/10/2024: Was admitted to the hospital shortly after the previous visit.  He had pneumonia and was very dyspneic.  On March 11, 2024 underwent a thoracoscopic decortication with parietal and visceral pleurectomy and intercostal nerve block.  He was treated with antibiotics and eventually discharged to a subacute rehabilitation facility.  Resumed treatment on 4/9/2024.  He tells me today he is feeling progressively better.  Stronger.  The surgical incisions are healing.  He is not having as much trouble breathing.  He has had no abdominal pain or diarrhea.  On exam alert, conversant and ill.  Seems much older than the stated age but is not in distress.  Lungs are diminished bilaterally.  Heart is regular.  Abdomen is soft.  No edema.  The laboratory exams were reviewed.  I reviewed the records from the hospital and reviewed all imaging studies done during that hospitalization.  No suggestion of progressive metastatic disease.  For now continue with the same treatment.  See me early in May 2024.    5/9/2024: Feeling progressively better.  Able to take the chemotherapy without much  difficulty.  Eating more.  Also more mobile.  Using a cane only when outside of his house.  On exam alert, chronically ill-appearing but in no distress.  No jaundice.  Conversant and oriented.  Lungs diminished bilaterally.  Heart regular.  Abdomen soft.  No edema.  Reviewed the laboratory exams and discussed with him.  He will see me again in approximately 6 weeks with new scans.  Continue with the same chemotherapy.    6/19/2024: Feeling reasonably well.  No weakness or chest pains and no cough.  On exam alert and conversant.  Not jaundiced or pale.  Lungs diminished bilaterally with bilateral wheezes and heart regular.  Abdomen soft.  No edema.  Reviewed the images of the recent scans with him and explained the finding of new metastatic deposits in the liver.  Explained to him that given the length of time that elapsed without chemotherapy because of his respiratory and cardiovascular issues, I do not know if the problem is that the chemotherapy was not working at all or if that Is what led to the development of metastatic disease.  I'd like to try the FOLFOX with bevacizumab in the way that he had been getting it to see if there is response with regular treatment.  For that reason we will proceed with treatment today.  He is to see me in approximately 4 weeks from today.    8/30/2024: Progressively better and stronger.  Eating well and more energetic.  Was able to get out of the house and do several different things 6 days in a row, which has been a change.  Continues to have some dyspnea with exertion but he also continues to smoke.  He has had no chest pains and is not coughing as much.  He denies abdominal pain and has maintained regular bowel activity.  On exam he appears chronically ill and much older than the stated age.  No distress.  No jaundice.  The lungs are clear bilaterally and the heart is regular.  The abdomen is soft.  The epigastric space seems taylor and the edge of the liver can be palpated,  at the level of the midsternal line, approximately 3 cm below the costal margin.  There is no edema.  Laboratory exams reviewed.  To obtain a scan and see me in 3 months.  Continue with the same chemotherapy for now.    9/25/2024: In the office before the next scheduled appointment. He is feeling as well as he had been feeling before and on direct questioning he denies new symptoms. The scans, however, reports progression of the disease with enlargement of the hepatic lesions. There has also been development of lymphadenopathy and a lymph node conglomerate results in left hydronephrosis by causing obstruction of the mid left ureter.  Plans to begin treatment with FOLFIRI/bevacizumab were made.    10/11/2024: Received the first dose of irinotecan. Had some nausea and emesis on at least a couple of occasions. He has felt tired. He has been able to eat some. No chest pain or cough. Remains free of abdominal pain. No diarrhea, he has rather tended to be constipated. No edema. No skin rash. On exam alert and conversant. Appears chronically ill but in no distress. No jaundice. Lungs diminished bilaterally and heart regular. Abdomen soft and not tender. No edema. Reviewed the laboratory exams. I have sent a new prescription for prochlorperazine. He is to continue with the same treatment and will see me in approximately 6 weeks.     11/22/2024: Continues to feel well.  He has not had many difficulties with the current chemotherapy and he has had nausea on a couple of occasions only.  The antiemetics seems to have worked, at least partially.  He has been eating well.  He is not needing the cane as much.  He does not have any more dyspnea than before but he continues to smoke.  No abdominal pain.  Maintains regular bowel activity.  Has had the nephrostomies removed.  He has no dysuria.  No edema.  On exam alert and conversant.  Ill but in no distress.  No jaundice.  Lungs diminished bilaterally and heart regular.  Abdomen  soft.  No palpable tumors but the liver seems to be palpable approximately 10 cm below the costal margin.  No edema.  Laboratory exams reviewed.  Discussed with him.  To see me again with new scans.  Continue with the same treatment.    1/10/2025: Continues to receive chemotherapy.  He does experience the side effects and for 1 to 2 days he feels fatigued after each treatment but he recovers promptly and is able to do the majority of his activities.  His appetite remains adequate and has had no nausea or vomiting.  He is weight is, as well stable.  No chest pains or cough.  Denies abdominal pain.  Maintains regular bowel activity and has not had diarrhea.  No dysuria.  No edema.  On exam he is chronically ill-appearing but conversant and in no distress.  No jaundice.  He is pale.  He is well-hydrated.  Lungs are diminished bilaterally and heart regular.  Abdomen soft and without palpable tumors.  No edema.  Laboratory exams reviewed.  I reviewed the images of the scans.  There is good evidence of response.  To continue with the same treatment.  See me in 6 weeks.    2/21/2025: Continues to take treatment without difficulties and continues to feel reasonably well.  Has had no new symptoms and in particular he denies pain.  He maintains a reasonable appetite and his weight has been stable.  He does not have any more dyspnea than usual.  No chest pains.  On exam alert, conversant, oriented and chronically ill-appearing but in no distress.  No jaundice.  No pallor.  The lungs are diminished bilaterally.  The heart is regular.  The abdomen is soft and I cannot palpate any tumors.  No edema.  Laboratory exams reviewed.  Reviewed the previous scans.  Discussed with him.  To continue with the same treatment and obtain scans in early April 2025.    3/21/2025: Feels reasonably well.  Fatigued at times.  Maintains a good appetite and stable weight.  No fevers.  Denies pain.  No more dyspnea than usual and no cough.   Intermittently having diarrhea and constipation.  No dysuria although he has a hard time starting the stream of urine.  No edema.  On exam he is alert, conversant and chronically ill-appearing but in no distress.  No jaundice.  He is pale.  The lungs are diminished bilaterally.  The heart is regular.  The abdomen is soft.  I cannot palpate any tumors.  There is no edema.  Laboratory exams reviewed and discussed with him.  Reviewed the tumor marker endograft and explained the significance.  There is continued evidence of response.  Will confirm with new scans prior to the next visit.  For now continue with the same treatment.    Past Medical History:   Diagnosis Date    Aortic dissection     Diabetes mellitus     Heart murmur     History of noncompliance with medical treatment     Hyperlipidemia     Hypertension     Type B hypoplasia of aortic arch      Past Surgical History:   Procedure Laterality Date    COLECTOMY PARTIAL / TOTAL  2023    COLONOSCOPY N/A 08/31/2023    Procedure: COLONOSCOPY with sigmoid mass tattooing at 35cm, sigmoid and rectal polypectomy;  Surgeon: TORIBIO Jacob MD;  Location: [facility] ENDOSCOPY;  Service: Gastroenterology;  Laterality: N/A;  sigmoid mass, colon polyps    THORACOSCOPY WITH DECORTICATION Left 3/11/2024    Procedure: THORACOSCOPY VIDEO ASSISTED WITH DECORTICATION WITH DAVINCI ROBOT;  Surgeon: Saqib Keller MD;  Location: [facility] MAIN OR;  Service: Robotics - DaVinci;  Laterality: Left;       Current Outpatient Medications:     amLODIPine (NORVASC) 10 MG tablet, TAKE 1 TABLET BY MOUTH DAILY, Disp: 90 tablet, Rfl: 0    atorvastatin (LIPITOR) 40 MG tablet, TAKE 1 TABLET BY MOUTH EVERY NIGHT AT BEDTIME, Disp: 90 tablet, Rfl: 0    Blood Glucose Monitoring Suppl (Accu-Chek Guide) w/Device kit, 1 Device Daily., Disp: 1 kit, Rfl: 0    cloNIDine (CATAPRES) 0.1 MG tablet, TAKE 1 TABLET BY MOUTH 2 TIMES A DAY, Disp: 180 tablet, Rfl: 1    gabapentin (NEURONTIN) 300 MG capsule, TAKE 1 CAPSULE  BY MOUTH 3 TIMES A DAY, Disp: 270 capsule, Rfl: 1    glimepiride (AMARYL) 4 MG tablet, Take 1 tablet by mouth 2 (Two) Times a Day., Disp: 60 tablet, Rfl: 5    glucose blood (Accu-Chek Guide) test strip, Test daily e11.9, Disp: 50 each, Rfl: 12    hydrALAZINE (APRESOLINE) 50 MG tablet, TAKE ONE TABLET BY MOUTH EVERY 8 HOURS, Disp: 90 tablet, Rfl: 1    metFORMIN (GLUCOPHAGE) 850 MG tablet, Take 1 tablet by mouth 2 (Two) Times a Day With Meals., Disp: 144 tablet, Rfl: 1    metoprolol tartrate (LOPRESSOR) 50 MG tablet, TAKE THREE TABLETS BY MOUTH EVERY 12 HOURS, Disp: 540 tablet, Rfl: 0    ondansetron (ZOFRAN) 8 MG tablet, Take 1 tablet by mouth 3 (Three) Times a Day As Needed for Nausea or Vomiting., Disp: 30 tablet, Rfl: 5    oxyCODONE (ROXICODONE) 10 MG tablet, , Disp: , Rfl:     potassium chloride 10 MEQ CR tablet, Take 1 tablet by mouth Daily., Disp: 30 tablet, Rfl: 2    triamcinolone (KENALOG) 0.1 % cream, Apply 1 Application topically to the appropriate area as directed 2 (Two) Times a Day., Disp: 15 g, Rfl: 0  No current facility-administered medications for this visit.    Facility-Administered Medications Ordered in Other Visits:     heparin injection 500 Units, 500 Units, Intravenous, Kristy SEALS Alfonso, MD, 500 Units at 03/21/25 1134    sodium chloride 0.9 % flush 20 mL, 20 mL, Intravenous, Kristy SEALS Alfonso, MD, 20 mL at 03/21/25 1133    No Known Allergies    Family History   Problem Relation Age of Onset    Diabetes Mother     Dementia Mother     Sudden death Father      Cancer-related family history is not on file.    Social History     Tobacco Use    Smoking status: Every Day     Current packs/day: 2.00     Average packs/day: 2.0 packs/day for 54.2 years (108.4 ttl pk-yrs)     Types: Cigarettes     Start date: 1971     Passive exposure: Current    Smokeless tobacco: Never   Vaping Use    Vaping status: Never Used   Substance Use Topics    Alcohol use: No     Comment: Abstinent since around 2008     Drug use: Not Currently     Types: Marijuana     Comment: Abstinent since at least the 1980's     Social History     Social History Narrative    Not on file     ROS:   Review of Systems   Constitutional:  Positive for fatigue. Negative for activity change, appetite change, chills, diaphoresis, fever and unexpected weight change.   HENT:  Negative for congestion, dental problem, drooling, ear discharge, ear pain, facial swelling, hearing loss, mouth sores, nosebleeds, postnasal drip, rhinorrhea, sinus pressure, sinus pain, sneezing, sore throat, tinnitus, trouble swallowing and voice change.    Eyes:  Negative for photophobia, pain, discharge, redness, itching and visual disturbance.   Respiratory:  Positive for cough and shortness of breath. Negative for apnea, choking, chest tightness, wheezing and stridor.    Cardiovascular:  Negative for chest pain, palpitations and leg swelling.   Gastrointestinal:  Negative for abdominal distention, abdominal pain, anal bleeding, blood in stool, constipation, diarrhea, nausea, rectal pain and vomiting.   Endocrine: Negative for cold intolerance, heat intolerance, polydipsia and polyuria.   Genitourinary:  Negative for decreased urine volume, difficulty urinating, dysuria, flank pain, frequency, genital sores, hematuria and urgency.   Musculoskeletal:  Negative for arthralgias, back pain, gait problem, joint swelling, myalgias, neck pain and neck stiffness.   Skin:  Negative for color change, pallor and rash.   Neurological:  Negative for dizziness, tremors, seizures, syncope, facial asymmetry, speech difficulty, weakness, light-headedness, numbness and headaches.        Increased cold sensitivity since the commencement of treatment with oxaliplatin   Hematological:  Negative for adenopathy. Does not bruise/bleed easily.   Psychiatric/Behavioral:  Negative for agitation, behavioral problems, confusion, decreased concentration, hallucinations, self-injury, sleep disturbance and  "suicidal ideas. The patient is not nervous/anxious.      Objective:    Vital Signs:  Vitals:    03/21/25 1201   BP: 131/77   Pulse: 77   Resp: 14   Temp: 97.5 °F (36.4 °C)   SpO2: 96%   Weight: 93.9 kg (207 lb)   Height: 193 cm (76\")   PainSc: 0-No pain     Body mass index is 25.2 kg/m².    ECOG  (1) Restricted in physically strenuous activity, ambulatory and able to do work of light nature    Physical Exam:   Physical Exam  Constitutional:       General: He is not in acute distress.     Appearance: He is not ill-appearing, toxic-appearing or diaphoretic.      Comments: Alert, well-oriented and conversant.  No distress.  Appears chronically ill.   HENT:      Head: Normocephalic and atraumatic.      Right Ear: External ear normal.      Left Ear: External ear normal.      Nose: Nose normal.      Mouth/Throat:      Mouth: Mucous membranes are moist.      Pharynx: Oropharynx is clear.   Eyes:      General: No scleral icterus.        Right eye: No discharge.         Left eye: No discharge.      Conjunctiva/sclera: Conjunctivae normal.      Pupils: Pupils are equal, round, and reactive to light.   Cardiovascular:      Rate and Rhythm: Normal rate.      Pulses: Normal pulses.      Heart sounds: Normal heart sounds. No murmur heard.     No friction rub. No gallop.   Pulmonary:      Effort: No respiratory distress.      Breath sounds: No stridor. No wheezing, rhonchi or rales.      Comments: Diminished bilaterally and with fine crackles in the bases.   Chest:      Chest wall: No tenderness.      Comments: Surgical incisions on the left side of the chest are healing well.  No evidence of infection.  Abdominal:      General: Bowel sounds are normal. There is no distension.      Palpations: Abdomen is soft. There is no mass.      Tenderness: There is no abdominal tenderness. There is no right CVA tenderness, left CVA tenderness, guarding or rebound.      Comments: Soft.  Not tender.  No palpable tumors.   Musculoskeletal:       "   General: No tenderness, deformity or signs of injury.      Cervical back: No rigidity.      Right lower leg: No edema.      Left lower leg: No edema.   Lymphadenopathy:      Cervical: No cervical adenopathy.   Skin:     General: Skin is warm and dry.      Coloration: Skin is not jaundiced or pale.      Findings: No bruising or rash.   Neurological:      General: No focal deficit present.      Mental Status: He is alert and oriented to person, place, and time.      Cranial Nerves: No cranial nerve deficit.   Psychiatric:         Mood and Affect: Mood normal.         Behavior: Behavior normal.         Thought Content: Thought content normal.         Judgment: Judgment normal.     MIGUEL Wagner MD performed the physical exam on 3/21/2025 as documented above.    Lab Results - Last 18 Months   Lab Units 03/19/25  0805 03/05/25  0832 02/21/25  1407   WBC 10*3/mm3 7.16 8.31 9.60   HEMOGLOBIN g/dL 13.2 13.3 13.3   HEMATOCRIT % 40.3 41.3 41.2   PLATELETS 10*3/mm3 147 172 178   MCV fL 97.1* 96.3 96.7     Lab Results - Last 18 Months   Lab Units 03/19/25  0813 03/05/25  0844 02/19/25  0853 01/22/25  0909 01/10/25  0819 01/08/25  0820 11/25/24  0913 11/22/24  1443   SODIUM mmol/L  --   --   --   --  138 138  --  140   POTASSIUM mmol/L  --   --   --   --  3.5 3.7  --  3.5   CHLORIDE mmol/L  --   --   --   --  99 103  --  104   CO2 mmol/L  --   --   --   --  27.0 25.4  --  24.8   BUN mg/dL  --   --   --   --  16 8  --  10   CREATININE mg/dL 0.50* 0.60 0.80   < > 0.67* 0.70*   < > 0.74*   CALCIUM mg/dL  --   --   --   --  10.5 10.0  --  10.6*   BILIRUBIN mg/dL  --   --   --   --  0.5 0.4  --  0.3   ALK PHOS U/L  --   --   --   --  71 77  --  81   ALT (SGPT) U/L  --   --   --   --  20 10  --  14   AST (SGOT) U/L  --   --   --   --  24 22  --  19   GLUCOSE mg/dL  --   --   --   --  261* 226*  --  137*    < > = values in this interval not displayed.     Lab Results   Component Value Date    GLUCOSE 261 (H) 01/10/2025    BUN 16  01/10/2025    CREATININE 0.50 (L) 03/19/2025    EGFRIFNONA 100 01/20/2022    BCR 23.9 01/10/2025    K 3.5 01/10/2025    CO2 27.0 01/10/2025    CALCIUM 10.5 01/10/2025    ALBUMIN 4.0 01/10/2025    AST 24 01/10/2025    ALT 20 01/10/2025     Lab Results - Last 18 Months   Lab Units 10/18/24  0825 10/04/24  0811 03/10/24  2236   INR  1.05 1.01 1.21*   APTT seconds 27.9 31.0 31.2*     Lab Results   Component Value Date    PTT 27.9 10/18/2024    INR 1.05 10/18/2024     Lab Results   Component Value Date    CEA 1.95 03/19/2025     Lab Results   Component Value Date    PSA 0.594 01/23/2023     Assessment & Plan     1.  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 Started FOLFIRI/bevacizumab on October 10, 2024.  Continues to tolerate the treatment well and has evidence of response.  Continue same.   2.  Recovered completely from the decortication.  No evidence of complications.  2.  Cigarette smoker: Unchanged.  3.  Aortic aneurysm: Observation only for now.  4.  Severe hypertension: Under reasonable control.  5.  Reviewed the recent laboratory Ana including tumor markers, blood counts and chemistry.  Discussed with him.  7.  He is to see me in approximately 4 weeks with new scans and tumor markers.    Lars Wagner MD on 3/21/2025 at 12:34 PM.

## 2025-03-19 ENCOUNTER — HOSPITAL ENCOUNTER (OUTPATIENT)
Dept: ONCOLOGY | Facility: HOSPITAL | Age: 65
Discharge: HOME | End: 2025-03-19
Payer: MEDICARE

## 2025-03-19 VITALS
OXYGEN SATURATION: 93 % | TEMPERATURE: 97.4 F | HEART RATE: 84 BPM | BODY MASS INDEX: 25.33 KG/M2 | RESPIRATION RATE: 16 BRPM | SYSTOLIC BLOOD PRESSURE: 115 MMHG | HEIGHT: 76 IN | WEIGHT: 208 LBS | DIASTOLIC BLOOD PRESSURE: 69 MMHG

## 2025-03-19 DIAGNOSIS — C20 RECTAL CANCER: Primary | ICD-10-CM

## 2025-03-19 DIAGNOSIS — C78.6 METASTASIS TO PERITONEAL CAVITY: ICD-10-CM

## 2025-03-19 LAB
ALP BLD-CCNC: 68 U/L (ref 53–128)
BASOPHILS # BLD AUTO: 0.06 10*3/MM3 (ref 0–0.2)
BASOPHILS NFR BLD AUTO: 0.8 % (ref 0–1.5)
BILIRUB UR QL STRIP: ABNORMAL
BUN BLDA-MCNC: 10 MG/DL (ref 7–22)
CALCIUM BLD QL: 9.6 MG/DL (ref 8–10.3)
CEA SERPL-MCNC: 1.95 NG/ML
CHLORIDE BLDA-SCNC: 103 MMOL/L (ref 98–108)
CLARITY UR: ABNORMAL
CO2 BLDA-SCNC: 27 MMOL/L (ref 18–33)
COLOR UR: YELLOW
CREAT BLDA-MCNC: 0.5 MG/DL (ref 0.6–1.2)
DEPRECATED RDW RBC AUTO: 57.7 FL (ref 37–54)
EGFRCR SERPLBLD CKD-EPI 2021: 114.6 ML/MIN/1.73
EOSINOPHIL # BLD AUTO: 0.17 10*3/MM3 (ref 0–0.4)
EOSINOPHIL NFR BLD AUTO: 2.4 % (ref 0.3–6.2)
ERYTHROCYTE [DISTWIDTH] IN BLOOD BY AUTOMATED COUNT: 16.6 % (ref 12.3–15.4)
GLUCOSE BLDC GLUCOMTR-MCNC: 208 MG/DL (ref 73–118)
GLUCOSE UR STRIP-MCNC: ABNORMAL MG/DL
HCT VFR BLD AUTO: 40.3 % (ref 37.5–51)
HGB BLD-MCNC: 13.2 G/DL (ref 13–17.7)
HGB UR QL STRIP.AUTO: NEGATIVE
KETONES UR QL STRIP: NEGATIVE
LEUKOCYTE ESTERASE UR QL STRIP.AUTO: ABNORMAL
LYMPHOCYTES # BLD AUTO: 2.1 10*3/MM3 (ref 0.7–3.1)
LYMPHOCYTES NFR BLD AUTO: 29.3 % (ref 19.6–45.3)
MCH RBC QN AUTO: 31.8 PG (ref 26.6–33)
MCHC RBC AUTO-ENTMCNC: 32.8 G/DL (ref 31.5–35.7)
MCV RBC AUTO: 97.1 FL (ref 79–97)
MONOCYTES # BLD AUTO: 0.81 10*3/MM3 (ref 0.1–0.9)
MONOCYTES NFR BLD AUTO: 11.3 % (ref 5–12)
NEUTROPHILS NFR BLD AUTO: 4.02 10*3/MM3 (ref 1.7–7)
NEUTROPHILS NFR BLD AUTO: 56.2 % (ref 42.7–76)
NITRITE UR QL STRIP: NEGATIVE
PH UR STRIP.AUTO: 5.5 [PH] (ref 5–8)
PLATELET # BLD AUTO: 147 10*3/MM3 (ref 140–450)
PMV BLD AUTO: 9.9 FL (ref 6–12)
POC ALBUMIN: 3.5 G/L (ref 3.3–5.5)
POC ALT (SGPT): 16 U/L (ref 10–47)
POC AST (SGOT): 21 U/L (ref 11–38)
POC TOTAL BILIRUBIN: 0.8 MG/DL (ref 0.2–1.6)
POC TOTAL PROTEIN: 6.1 G/DL (ref 6.4–8.1)
POTASSIUM BLDA-SCNC: 3.5 MMOL/L (ref 3.6–5.1)
PROT UR QL STRIP: ABNORMAL
RBC # BLD AUTO: 4.15 10*6/MM3 (ref 4.14–5.8)
SODIUM BLD-SCNC: 139 MMOL/L (ref 128–145)
SP GR UR STRIP: >=1.03 (ref 1–1.03)
UROBILINOGEN UR QL STRIP: ABNORMAL
WBC NRBC COR # BLD AUTO: 7.16 10*3/MM3 (ref 3.4–10.8)

## 2025-03-19 PROCEDURE — 81003 URINALYSIS AUTO W/O SCOPE: CPT | Performed by: INTERNAL MEDICINE

## 2025-03-19 PROCEDURE — 96415 CHEMO IV INFUSION ADDL HR: CPT

## 2025-03-19 PROCEDURE — 96411 CHEMO IV PUSH ADDL DRUG: CPT

## 2025-03-19 PROCEDURE — 25010000002 BEVACIZUMAB-BVZR 100 MG/4ML SOLUTION 4 ML VIAL: Performed by: NURSE PRACTITIONER

## 2025-03-19 PROCEDURE — 25010000002 PALONOSETRON PER 25 MCG: Performed by: NURSE PRACTITIONER

## 2025-03-19 PROCEDURE — 25010000002 ATROPINE SULFATE 0.4 MG/ML SOLUTION 1 ML VIAL: Performed by: NURSE PRACTITIONER

## 2025-03-19 PROCEDURE — 25010000002 DEXAMETHASONE PER 1 MG: Performed by: NURSE PRACTITIONER

## 2025-03-19 PROCEDURE — 96417 CHEMO IV INFUS EACH ADDL SEQ: CPT

## 2025-03-19 PROCEDURE — 96375 TX/PRO/DX INJ NEW DRUG ADDON: CPT

## 2025-03-19 PROCEDURE — G0498 CHEMO EXTEND IV INFUS W/PUMP: HCPCS

## 2025-03-19 PROCEDURE — 85025 COMPLETE CBC W/AUTO DIFF WBC: CPT | Performed by: INTERNAL MEDICINE

## 2025-03-19 PROCEDURE — 96413 CHEMO IV INFUSION 1 HR: CPT

## 2025-03-19 PROCEDURE — 80053 COMPREHEN METABOLIC PANEL: CPT

## 2025-03-19 PROCEDURE — 25810000003 SODIUM CHLORIDE 0.9 % SOLUTION 250 ML FLEX CONT: Performed by: NURSE PRACTITIONER

## 2025-03-19 PROCEDURE — 25810000003 SODIUM CHLORIDE 0.9 % SOLUTION 500 ML FLEX CONT: Performed by: NURSE PRACTITIONER

## 2025-03-19 PROCEDURE — 25010000002 LEUCOVORIN 200 MG RECONSTITUTED SOLUTION 1 EACH VIAL: Performed by: NURSE PRACTITIONER

## 2025-03-19 PROCEDURE — 25010000002 LEUCOVORIN CALCIUM PER 50 MG: Performed by: NURSE PRACTITIONER

## 2025-03-19 PROCEDURE — 25010000002 BEVACIZUMAB-BVZR 400 MG/16ML SOLUTION 16 ML VIAL: Performed by: NURSE PRACTITIONER

## 2025-03-19 PROCEDURE — 25010000002 IRINOTECAN PER 20 MG: Performed by: NURSE PRACTITIONER

## 2025-03-19 PROCEDURE — 82378 CARCINOEMBRYONIC ANTIGEN: CPT | Performed by: INTERNAL MEDICINE

## 2025-03-19 PROCEDURE — 25010000002 FLUOROURACIL PER 500 MG: Performed by: NURSE PRACTITIONER

## 2025-03-19 PROCEDURE — 96368 THER/DIAG CONCURRENT INF: CPT

## 2025-03-19 RX ORDER — ATROPINE SULFATE 1 MG/ML
0.4 INJECTION, SOLUTION INTRAMUSCULAR; INTRAVENOUS; SUBCUTANEOUS
Status: DISCONTINUED | OUTPATIENT
Start: 2025-03-19 | End: 2025-03-19

## 2025-03-19 RX ORDER — ATROPINE SULFATE 1 MG/ML
0.4 INJECTION, SOLUTION INTRAMUSCULAR; INTRAVENOUS; SUBCUTANEOUS
Status: CANCELLED | OUTPATIENT
Start: 2025-03-19

## 2025-03-19 RX ORDER — PALONOSETRON 0.05 MG/ML
0.25 INJECTION, SOLUTION INTRAVENOUS ONCE
Status: COMPLETED | OUTPATIENT
Start: 2025-03-19 | End: 2025-03-19

## 2025-03-19 RX ORDER — FLUOROURACIL 50 MG/ML
400 INJECTION, SOLUTION INTRAVENOUS ONCE
Status: COMPLETED | OUTPATIENT
Start: 2025-03-19 | End: 2025-03-19

## 2025-03-19 RX ORDER — DEXAMETHASONE SODIUM PHOSPHATE 4 MG/ML
12 INJECTION, SOLUTION INTRA-ARTICULAR; INTRALESIONAL; INTRAMUSCULAR; INTRAVENOUS; SOFT TISSUE ONCE
Status: CANCELLED
Start: 2025-03-19 | End: 2025-03-19

## 2025-03-19 RX ORDER — DEXAMETHASONE SODIUM PHOSPHATE 4 MG/ML
12 INJECTION, SOLUTION INTRA-ARTICULAR; INTRALESIONAL; INTRAMUSCULAR; INTRAVENOUS; SOFT TISSUE ONCE
Status: COMPLETED | OUTPATIENT
Start: 2025-03-19 | End: 2025-03-19

## 2025-03-19 RX ORDER — SODIUM CHLORIDE 9 MG/ML
20 INJECTION, SOLUTION INTRAVENOUS ONCE
Status: CANCELLED | OUTPATIENT
Start: 2025-03-19

## 2025-03-19 RX ORDER — PALONOSETRON 0.05 MG/ML
0.25 INJECTION, SOLUTION INTRAVENOUS ONCE
Status: CANCELLED | OUTPATIENT
Start: 2025-03-19

## 2025-03-19 RX ORDER — FLUOROURACIL 50 MG/ML
400 INJECTION, SOLUTION INTRAVENOUS ONCE
Status: CANCELLED | OUTPATIENT
Start: 2025-03-19

## 2025-03-19 RX ORDER — SODIUM CHLORIDE 9 MG/ML
20 INJECTION, SOLUTION INTRAVENOUS ONCE
Status: DISCONTINUED | OUTPATIENT
Start: 2025-03-19 | End: 2025-03-20 | Stop reason: HOSPADM

## 2025-03-19 RX ADMIN — FLUOROURACIL 5400 MG: 50 INJECTION, SOLUTION INTRAVENOUS at 11:58

## 2025-03-19 RX ADMIN — PALONOSETRON 0.25 MG: 0.05 INJECTION, SOLUTION INTRAVENOUS at 09:12

## 2025-03-19 RX ADMIN — SODIUM CHLORIDE 400 MG: 9 INJECTION, SOLUTION INTRAVENOUS at 10:19

## 2025-03-19 RX ADMIN — FLUOROURACIL 900 MG: 50 INJECTION, SOLUTION INTRAVENOUS at 11:53

## 2025-03-19 RX ADMIN — SODIUM CHLORIDE 470 MG: 900 INJECTION, SOLUTION INTRAVENOUS at 09:43

## 2025-03-19 RX ADMIN — DEXAMETHASONE SODIUM PHOSPHATE 12 MG: 4 INJECTION INTRA-ARTICULAR; INTRALESIONAL; INTRAMUSCULAR; INTRAVENOUS; SOFT TISSUE at 09:14

## 2025-03-19 RX ADMIN — SODIUM CHLORIDE 900 MG: 9 INJECTION, SOLUTION INTRAVENOUS at 10:17

## 2025-03-19 NOTE — PROGRESS NOTES
"Pt here for C12D1 zirabev and folfiri. Patient stated the only new complaints are his numbness in his hands is getting progressivly worse with each treatment but only a tiny bit per pt. Pt stated the numbness in his fingers will get more intense at times then go back down-he stated it is only a \"tiny\" bit worse than last time and is not affecting his ADL-just feels like someone else is touching his face or buttoning his shirt. His feet are unchanged. Pt stated the bilateral rash he has on his hands and fingers has started to go up his bilateral forearms-Pt stated it is not bothering him and looks like his hands did when it first started-Patient stated a salve was called in last time he was here and it is keeping the rash from bothering him-if he forgets to use it then he gets dryness and burning. The only cream on his medlist was the kenolog-pt thought it started with an M and dose not remember its name. Patients urine was out of parameters with protein but looks like his baseline lately. Alena CRANE stated she reviewed chart-plan is signed and she put in an ok to treat for the urine protein. Alena CRANE is checking with Dr. Wagner about a 24 hour urine if he wants one or not. Dr. Wagner responded: its chronic lets proceed. Pt tolerated treatment and was discharged with pump infusion. Pt declined AVS.   "

## 2025-03-21 ENCOUNTER — OFFICE VISIT (OUTPATIENT)
Dept: ONCOLOGY | Facility: CLINIC | Age: 65
End: 2025-03-21
Payer: MEDICARE

## 2025-03-21 ENCOUNTER — HOSPITAL ENCOUNTER (OUTPATIENT)
Dept: ONCOLOGY | Facility: HOSPITAL | Age: 65
Discharge: HOME | End: 2025-03-21
Payer: MEDICARE

## 2025-03-21 VITALS
HEART RATE: 77 BPM | SYSTOLIC BLOOD PRESSURE: 131 MMHG | HEIGHT: 76 IN | BODY MASS INDEX: 25.21 KG/M2 | OXYGEN SATURATION: 96 % | RESPIRATION RATE: 14 BRPM | WEIGHT: 207 LBS | TEMPERATURE: 97.5 F | DIASTOLIC BLOOD PRESSURE: 77 MMHG

## 2025-03-21 DIAGNOSIS — C20 RECTAL CANCER: Primary | ICD-10-CM

## 2025-03-21 DIAGNOSIS — Z45.2 ENCOUNTER FOR CARE RELATED TO VASCULAR ACCESS PORT: ICD-10-CM

## 2025-03-21 DIAGNOSIS — C78.6 METASTASIS TO PERITONEAL CAVITY: Primary | ICD-10-CM

## 2025-03-21 DIAGNOSIS — C78.6 METASTASIS TO PERITONEAL CAVITY: ICD-10-CM

## 2025-03-21 DIAGNOSIS — C20 RECTAL CANCER: ICD-10-CM

## 2025-03-21 PROCEDURE — 99213 OFFICE O/P EST LOW 20 MIN: CPT | Performed by: INTERNAL MEDICINE

## 2025-03-21 PROCEDURE — 1159F MED LIST DOCD IN RCRD: CPT | Performed by: INTERNAL MEDICINE

## 2025-03-21 PROCEDURE — 3075F SYST BP GE 130 - 139MM HG: CPT | Performed by: INTERNAL MEDICINE

## 2025-03-21 PROCEDURE — 1160F RVW MEDS BY RX/DR IN RCRD: CPT | Performed by: INTERNAL MEDICINE

## 2025-03-21 PROCEDURE — 1126F AMNT PAIN NOTED NONE PRSNT: CPT | Performed by: INTERNAL MEDICINE

## 2025-03-21 PROCEDURE — 3078F DIAST BP <80 MM HG: CPT | Performed by: INTERNAL MEDICINE

## 2025-03-21 PROCEDURE — 25010000002 HEPARIN LOCK FLUSH PER 10 UNITS: Performed by: INTERNAL MEDICINE

## 2025-03-21 RX ORDER — HEPARIN SODIUM (PORCINE) LOCK FLUSH IV SOLN 100 UNIT/ML 100 UNIT/ML
500 SOLUTION INTRAVENOUS AS NEEDED
Status: DISCONTINUED | OUTPATIENT
Start: 2025-03-21 | End: 2025-03-22 | Stop reason: HOSPADM

## 2025-03-21 RX ORDER — SODIUM CHLORIDE 0.9 % (FLUSH) 0.9 %
20 SYRINGE (ML) INJECTION AS NEEDED
OUTPATIENT
Start: 2025-03-21

## 2025-03-21 RX ORDER — SODIUM CHLORIDE 0.9 % (FLUSH) 0.9 %
20 SYRINGE (ML) INJECTION AS NEEDED
Status: DISCONTINUED | OUTPATIENT
Start: 2025-03-21 | End: 2025-03-22 | Stop reason: HOSPADM

## 2025-03-21 RX ORDER — HEPARIN SODIUM (PORCINE) LOCK FLUSH IV SOLN 100 UNIT/ML 100 UNIT/ML
500 SOLUTION INTRAVENOUS AS NEEDED
OUTPATIENT
Start: 2025-03-21

## 2025-03-21 RX ADMIN — HEPARIN 500 UNITS: 100 SYRINGE at 11:34

## 2025-03-21 RX ADMIN — Medication 20 ML: at 11:33

## 2025-03-21 NOTE — PROGRESS NOTES
1133 Pt to clinic for CIV d/c.  5FU pump empty. Port aspirated, positive blood return noted. Port flushed with 10mL NS and Heparin 500units, then de-accessed.  To see the MD next today, reviewed with KERI Guerrier, no labs need to be drawn today since they were drawn on 3/19/25

## 2025-04-02 ENCOUNTER — HOSPITAL ENCOUNTER (OUTPATIENT)
Dept: ONCOLOGY | Facility: HOSPITAL | Age: 65
Discharge: HOME | End: 2025-04-02
Payer: MEDICARE

## 2025-04-02 VITALS
DIASTOLIC BLOOD PRESSURE: 68 MMHG | OXYGEN SATURATION: 96 % | HEART RATE: 74 BPM | HEIGHT: 76 IN | SYSTOLIC BLOOD PRESSURE: 111 MMHG | WEIGHT: 212.6 LBS | BODY MASS INDEX: 25.89 KG/M2 | TEMPERATURE: 98.4 F | RESPIRATION RATE: 14 BRPM

## 2025-04-02 DIAGNOSIS — C78.6 METASTASIS TO PERITONEAL CAVITY: ICD-10-CM

## 2025-04-02 DIAGNOSIS — C20 RECTAL CANCER: Primary | ICD-10-CM

## 2025-04-02 LAB
ALP BLD-CCNC: 66 U/L (ref 53–128)
BASOPHILS # BLD AUTO: 0.11 10*3/MM3 (ref 0–0.2)
BASOPHILS NFR BLD AUTO: 1 % (ref 0–1.5)
BILIRUB UR QL STRIP: ABNORMAL
BUN BLDA-MCNC: 8 MG/DL (ref 7–22)
CALCIUM BLD QL: 9.8 MG/DL (ref 8–10.3)
CHLORIDE BLDA-SCNC: 104 MMOL/L (ref 98–108)
CLARITY UR: CLEAR
CO2 BLDA-SCNC: 27 MMOL/L (ref 18–33)
COLOR UR: YELLOW
CREAT BLDA-MCNC: 0.7 MG/DL (ref 0.6–1.2)
DEPRECATED RDW RBC AUTO: 57.8 FL (ref 37–54)
EGFRCR SERPLBLD CKD-EPI 2021: 103.5 ML/MIN/1.73
EOSINOPHIL # BLD AUTO: 0.17 10*3/MM3 (ref 0–0.4)
EOSINOPHIL NFR BLD AUTO: 1.6 % (ref 0.3–6.2)
ERYTHROCYTE [DISTWIDTH] IN BLOOD BY AUTOMATED COUNT: 16.6 % (ref 12.3–15.4)
GLUCOSE BLDC GLUCOMTR-MCNC: 198 MG/DL (ref 73–118)
GLUCOSE UR STRIP-MCNC: ABNORMAL MG/DL
HCT VFR BLD AUTO: 41.5 % (ref 37.5–51)
HGB BLD-MCNC: 13.5 G/DL (ref 13–17.7)
HGB UR QL STRIP.AUTO: ABNORMAL
KETONES UR QL STRIP: ABNORMAL
LEUKOCYTE ESTERASE UR QL STRIP.AUTO: ABNORMAL
LYMPHOCYTES # BLD AUTO: 1.47 10*3/MM3 (ref 0.7–3.1)
LYMPHOCYTES NFR BLD AUTO: 13.6 % (ref 19.6–45.3)
MCH RBC QN AUTO: 31.8 PG (ref 26.6–33)
MCHC RBC AUTO-ENTMCNC: 32.5 G/DL (ref 31.5–35.7)
MCV RBC AUTO: 97.9 FL (ref 79–97)
MONOCYTES # BLD AUTO: 1.01 10*3/MM3 (ref 0.1–0.9)
MONOCYTES NFR BLD AUTO: 9.3 % (ref 5–12)
NEUTROPHILS NFR BLD AUTO: 74.5 % (ref 42.7–76)
NEUTROPHILS NFR BLD AUTO: 8.06 10*3/MM3 (ref 1.7–7)
NITRITE UR QL STRIP: NEGATIVE
PH UR STRIP.AUTO: 5.5 [PH] (ref 5–8)
PLATELET # BLD AUTO: 143 10*3/MM3 (ref 140–450)
PMV BLD AUTO: 9.8 FL (ref 6–12)
POC ALBUMIN: 3.6 G/L (ref 3.3–5.5)
POC ALT (SGPT): 17 U/L (ref 10–47)
POC AST (SGOT): 19 U/L (ref 11–38)
POC TOTAL BILIRUBIN: 0.7 MG/DL (ref 0.2–1.6)
POC TOTAL PROTEIN: 6.4 G/DL (ref 6.4–8.1)
POTASSIUM BLDA-SCNC: 3.8 MMOL/L (ref 3.6–5.1)
PROT UR QL STRIP: ABNORMAL
RBC # BLD AUTO: 4.24 10*6/MM3 (ref 4.14–5.8)
SODIUM BLD-SCNC: 141 MMOL/L (ref 128–145)
SP GR UR STRIP: >=1.03 (ref 1–1.03)
UROBILINOGEN UR QL STRIP: ABNORMAL
WBC NRBC COR # BLD AUTO: 10.82 10*3/MM3 (ref 3.4–10.8)

## 2025-04-02 PROCEDURE — 25010000002 FLUOROURACIL PER 500 MG: Performed by: NURSE PRACTITIONER

## 2025-04-02 PROCEDURE — 25010000002 LEUCOVORIN 200 MG RECONSTITUTED SOLUTION 1 EACH VIAL: Performed by: NURSE PRACTITIONER

## 2025-04-02 PROCEDURE — 96411 CHEMO IV PUSH ADDL DRUG: CPT

## 2025-04-02 PROCEDURE — 25010000002 LEUCOVORIN 500 MG RECONSTITUTED SOLUTION 1 EACH VIAL: Performed by: NURSE PRACTITIONER

## 2025-04-02 PROCEDURE — 96415 CHEMO IV INFUSION ADDL HR: CPT

## 2025-04-02 PROCEDURE — 25010000002 ATROPINE SULFATE 0.4 MG/ML SOLUTION 1 ML VIAL: Performed by: NURSE PRACTITIONER

## 2025-04-02 PROCEDURE — 25810000003 SODIUM CHLORIDE 0.9 % SOLUTION 500 ML FLEX CONT: Performed by: NURSE PRACTITIONER

## 2025-04-02 PROCEDURE — G0498 CHEMO EXTEND IV INFUS W/PUMP: HCPCS

## 2025-04-02 PROCEDURE — 25010000002 BEVACIZUMAB-BVZR 100 MG/4ML SOLUTION 4 ML VIAL: Performed by: NURSE PRACTITIONER

## 2025-04-02 PROCEDURE — 25010000003 DEXTROSE 5 % SOLUTION 250 ML FLEX CONT: Performed by: NURSE PRACTITIONER

## 2025-04-02 PROCEDURE — 85025 COMPLETE CBC W/AUTO DIFF WBC: CPT | Performed by: INTERNAL MEDICINE

## 2025-04-02 PROCEDURE — 80053 COMPREHEN METABOLIC PANEL: CPT

## 2025-04-02 PROCEDURE — 25010000002 DEXAMETHASONE PER 1 MG: Performed by: NURSE PRACTITIONER

## 2025-04-02 PROCEDURE — 25010000002 BEVACIZUMAB-BVZR 400 MG/16ML SOLUTION 16 ML VIAL: Performed by: NURSE PRACTITIONER

## 2025-04-02 PROCEDURE — 96368 THER/DIAG CONCURRENT INF: CPT

## 2025-04-02 PROCEDURE — 25010000002 IRINOTECAN PER 20 MG: Performed by: NURSE PRACTITIONER

## 2025-04-02 PROCEDURE — 81003 URINALYSIS AUTO W/O SCOPE: CPT | Performed by: INTERNAL MEDICINE

## 2025-04-02 PROCEDURE — 25010000002 PALONOSETRON PER 25 MCG: Performed by: NURSE PRACTITIONER

## 2025-04-02 PROCEDURE — 96417 CHEMO IV INFUS EACH ADDL SEQ: CPT

## 2025-04-02 PROCEDURE — 96413 CHEMO IV INFUSION 1 HR: CPT

## 2025-04-02 PROCEDURE — 96375 TX/PRO/DX INJ NEW DRUG ADDON: CPT

## 2025-04-02 RX ORDER — SODIUM CHLORIDE 9 MG/ML
20 INJECTION, SOLUTION INTRAVENOUS ONCE
Status: DISCONTINUED | OUTPATIENT
Start: 2025-04-02 | End: 2025-04-03 | Stop reason: HOSPADM

## 2025-04-02 RX ORDER — SODIUM CHLORIDE 9 MG/ML
20 INJECTION, SOLUTION INTRAVENOUS ONCE
Status: CANCELLED | OUTPATIENT
Start: 2025-04-02

## 2025-04-02 RX ORDER — FLUOROURACIL 50 MG/ML
400 INJECTION, SOLUTION INTRAVENOUS ONCE
Status: CANCELLED | OUTPATIENT
Start: 2025-04-02

## 2025-04-02 RX ORDER — DEXAMETHASONE SODIUM PHOSPHATE 4 MG/ML
12 INJECTION, SOLUTION INTRA-ARTICULAR; INTRALESIONAL; INTRAMUSCULAR; INTRAVENOUS; SOFT TISSUE ONCE
Status: COMPLETED | OUTPATIENT
Start: 2025-04-02 | End: 2025-04-02

## 2025-04-02 RX ORDER — PALONOSETRON 0.05 MG/ML
0.25 INJECTION, SOLUTION INTRAVENOUS ONCE
Status: COMPLETED | OUTPATIENT
Start: 2025-04-02 | End: 2025-04-02

## 2025-04-02 RX ORDER — DEXAMETHASONE SODIUM PHOSPHATE 4 MG/ML
12 INJECTION, SOLUTION INTRA-ARTICULAR; INTRALESIONAL; INTRAMUSCULAR; INTRAVENOUS; SOFT TISSUE ONCE
Status: CANCELLED
Start: 2025-04-02 | End: 2025-04-02

## 2025-04-02 RX ORDER — ATROPINE SULFATE 1 MG/ML
0.4 INJECTION, SOLUTION INTRAMUSCULAR; INTRAVENOUS; SUBCUTANEOUS
Status: CANCELLED | OUTPATIENT
Start: 2025-04-02

## 2025-04-02 RX ORDER — FLUOROURACIL 50 MG/ML
400 INJECTION, SOLUTION INTRAVENOUS ONCE
Status: COMPLETED | OUTPATIENT
Start: 2025-04-02 | End: 2025-04-02

## 2025-04-02 RX ORDER — ATROPINE SULFATE 1 MG/ML
0.4 INJECTION, SOLUTION INTRAMUSCULAR; INTRAVENOUS; SUBCUTANEOUS
Status: DISCONTINUED | OUTPATIENT
Start: 2025-04-02 | End: 2025-04-02

## 2025-04-02 RX ORDER — PALONOSETRON 0.05 MG/ML
0.25 INJECTION, SOLUTION INTRAVENOUS ONCE
Status: CANCELLED | OUTPATIENT
Start: 2025-04-02

## 2025-04-02 RX ADMIN — SODIUM CHLORIDE 480 MG: 900 INJECTION, SOLUTION INTRAVENOUS at 09:48

## 2025-04-02 RX ADMIN — FLUOROURACIL 5400 MG: 50 INJECTION, SOLUTION INTRAVENOUS at 12:05

## 2025-04-02 RX ADMIN — IRINOTECAN HYDROCHLORIDE 400 MG: 20 INJECTION, SOLUTION INTRAVENOUS at 10:18

## 2025-04-02 RX ADMIN — DEXAMETHASONE SODIUM PHOSPHATE 12 MG: 4 INJECTION INTRA-ARTICULAR; INTRALESIONAL; INTRAMUSCULAR; INTRAVENOUS; SOFT TISSUE at 09:21

## 2025-04-02 RX ADMIN — PALONOSETRON 0.25 MG: 0.05 INJECTION, SOLUTION INTRAVENOUS at 09:19

## 2025-04-02 RX ADMIN — FLUOROURACIL 900 MG: 50 INJECTION, SOLUTION INTRAVENOUS at 12:00

## 2025-04-02 RX ADMIN — LEUCOVORIN CALCIUM 900 MG: 500 INJECTION, POWDER, LYOPHILIZED, FOR SOLUTION INTRAMUSCULAR; INTRAVENOUS at 10:20

## 2025-04-02 NOTE — PROGRESS NOTES
Pt here for C13D1 zirabev and folfiri. Pt stated no new complaints today-neuropathy is unchanged since last visit and his rash on his hands is about the same but the part going up his forearms is a little better. Pt's WBC and ANC were high today-Pt stated he has had no fevers or sign of infection-Per Pt he has not been on any steroids. Pt has protein 3+ in his urine but that is pt's baseline. Alena CRANE stated she would review the chart. Alena CRANE signed treatment plan. Treatment plan signed and ok to treat put in by Alena. Pt tolerated treatment and was discharged with 5FU pump infusing and AVS.     Port accessed and flushed with good blood return noted. 10cc of blood wasted prior to specimen collection. Blood specimen obtained and sent to lab for processing per protocol.

## 2025-04-04 ENCOUNTER — HOSPITAL ENCOUNTER (OUTPATIENT)
Dept: ONCOLOGY | Facility: HOSPITAL | Age: 65
Discharge: HOME | End: 2025-04-04
Payer: MEDICARE

## 2025-04-04 DIAGNOSIS — C78.6 METASTASIS TO PERITONEAL CAVITY: Primary | ICD-10-CM

## 2025-04-04 DIAGNOSIS — Z45.2 ENCOUNTER FOR CARE RELATED TO VASCULAR ACCESS PORT: ICD-10-CM

## 2025-04-04 DIAGNOSIS — C20 RECTAL CANCER: ICD-10-CM

## 2025-04-04 PROCEDURE — 25010000002 HEPARIN LOCK FLUSH PER 10 UNITS: Performed by: INTERNAL MEDICINE

## 2025-04-04 RX ORDER — SODIUM CHLORIDE 0.9 % (FLUSH) 0.9 %
20 SYRINGE (ML) INJECTION AS NEEDED
OUTPATIENT
Start: 2025-04-04

## 2025-04-04 RX ORDER — SODIUM CHLORIDE 0.9 % (FLUSH) 0.9 %
20 SYRINGE (ML) INJECTION AS NEEDED
Status: DISCONTINUED | OUTPATIENT
Start: 2025-04-04 | End: 2025-04-05 | Stop reason: HOSPADM

## 2025-04-04 RX ORDER — HEPARIN SODIUM (PORCINE) LOCK FLUSH IV SOLN 100 UNIT/ML 100 UNIT/ML
500 SOLUTION INTRAVENOUS AS NEEDED
Status: DISCONTINUED | OUTPATIENT
Start: 2025-04-04 | End: 2025-04-05 | Stop reason: HOSPADM

## 2025-04-04 RX ORDER — HEPARIN SODIUM (PORCINE) LOCK FLUSH IV SOLN 100 UNIT/ML 100 UNIT/ML
500 SOLUTION INTRAVENOUS AS NEEDED
OUTPATIENT
Start: 2025-04-04

## 2025-04-04 RX ADMIN — Medication 20 ML: at 12:13

## 2025-04-04 RX ADMIN — Medication 500 UNITS: at 12:13

## 2025-04-04 NOTE — PROGRESS NOTES
Pt here for C13D3 civ dc. Positive blood return. CIV empty. Pt denies any problems or concerns. Pt declined AVS.

## 2025-04-09 ENCOUNTER — HOSPITAL ENCOUNTER (OUTPATIENT)
Dept: PET IMAGING | Facility: HOSPITAL | Age: 65
Discharge: HOME | End: 2025-04-09
Admitting: INTERNAL MEDICINE
Payer: MEDICARE

## 2025-04-09 DIAGNOSIS — C78.6 METASTASIS TO PERITONEAL CAVITY: ICD-10-CM

## 2025-04-09 DIAGNOSIS — C20 RECTAL CANCER: ICD-10-CM

## 2025-04-09 PROCEDURE — 25510000001 IOPAMIDOL PER 1 ML: Performed by: INTERNAL MEDICINE

## 2025-04-09 PROCEDURE — 74177 CT ABD & PELVIS W/CONTRAST: CPT

## 2025-04-09 PROCEDURE — 71260 CT THORAX DX C+: CPT

## 2025-04-09 RX ORDER — IOPAMIDOL 755 MG/ML
100 INJECTION, SOLUTION INTRAVASCULAR
Status: COMPLETED | OUTPATIENT
Start: 2025-04-09 | End: 2025-04-09

## 2025-04-09 RX ADMIN — IOPAMIDOL 100 ML: 755 INJECTION, SOLUTION INTRAVENOUS at 08:25

## 2025-04-15 NOTE — PROGRESS NOTES
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     History of Present Illness:  2024: Mr. Zhou carried a long history of severe hypertension and of an aneurysm of the ascending aorta.  He had been receiving antihypertensive medication after correction of the aneurysm with good results.  In 2023 he underwent his first screening colonoscopy.  A large circumferential and obstructive tumor was identified at the sigmoid/rectosigmoid junction.  The tumor could not be traversed even with the smallest instrument.  Biopsies showed moderately differentiated colonic adenocarcinoma without loss of nuclear expression of the mismatch repair proteins.  Imaging studies at the time revealed the known thoracic aneurysm that has increased mildly since the previous scan.  There was a benign-appearing subpleural nodule in the right posteromedial chest wall and pulmonary nodules that have been identified since 2018 remained stable.  Some mediastinal nonspecific and stable adenopathy was reported as well.  In the abdomen the sigmoid tumor was confirmed and there was no suggestion of metastatic disease.  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  He underwent excision of the squamous cell carcinoma that proved to be a high-grade squamous intraepithelial lesion.  He also had a robotic left colectomy.  The final report of pathology was of moderately differentiated adenocarcinoma of the colon, that measured 3.5 cm.  The tumor involved the serosal surface and the antimesenteric serosa.  Lymphovascular space invasion was identified.  All margins were negative for disease but 2  of 27 lymph nodes had metastatic involvement.  As well there were at least 5 peritoneal deposits that were excised and that were confirmed to correspond to metastatic lesions.  The tumor had intact expression of MLH1, MSH 1, MSH 6 and PMS2.  Next-generation sequencing revealed a pathogenic mutation of exon 2 of the KRAS gene. ERBB2, BRAF, NTRK, RET, and PIK3CA were negative. PD-L1 was negative, as well.  He was started on treatment with FOLFOX and bevacizumab on November 28, 2023.  He reported adequate tolerance to the treatment, with the expected cold intolerance.  He had had no nausea but since the beginning of the present illness he had lost approximately 20 pounds.  He was eating reasonably well.  He had been free of chest pains and no longer had abdominal pain.  All his surgical incisions had healed and he was having regular defecations, at times of liquid stool.  The physical exam revealed him to be a chronically ill-appearing man who did not seem in distress.  He was conversant, in good spirits and without jaundice.  Lungs diminished bilaterally.  Heart regular.  Abdomen soft and nontender.  No edema.  The laboratory exams and all the records were reviewed and discussed with him and with his wife.  To resume treatment with the idea of obtaining a new set of scans after 6 cycles of chemotherapy.  After 12 cycles of chemotherapy discuss the possibility of additional surgery if there was any residual peritoneal disease at the time of surgery.  To see me at the end of February with the scans.    2/16/2024: In the office to review scans. In the last few days has had severe glossodynia and odynophagia. Also has been coughing and expectorating some clear sputum. No fevers. Has had pain on the left side of the chest. No dyspnea. He was prescribed Hiral's magic potion and fluconazole that has resulted in relief. On exam he does not appear acutely ill. No jaundice or pallor. Lungs are diminished bilaterally and heart  irregularly irregular. Abdomen soft. No edema. Reviewed the laboratory exams. Reviewed the images of the scans. Sent prescriptions for doxycycline as the lesion in the left lower lobe is likely infectious in nature, given his leukocytosis and symptoms. Will obtain an electrocardiogram. Will follow next week.     3/8/2024: Feeling poorly. Weak and not moving much. Has continued to have diffuse pain. No fevers. His spouse believes he has an ulcer on the backside. On exam he is conversant and oriented. No jaundice. No pallor. The lungs are diminished bilaterally. Heart regular. There is indeed an ulcer in the sacral region, in the intergluteal crease. Reviewed the laboratory exams. He is to continue with the same chemotherapy. Referred to the wound clinic. To have an electrocardiogram. He is to see me in a few weeks with new scans.     4/10/2024: Was admitted to the hospital shortly after the previous visit.  He had pneumonia and was very dyspneic.  On March 11, 2024 underwent a thoracoscopic decortication with parietal and visceral pleurectomy and intercostal nerve block.  He was treated with antibiotics and eventually discharged to a subacute rehabilitation facility.  Resumed treatment on 4/9/2024.  He tells me today he is feeling progressively better.  Stronger.  The surgical incisions are healing.  He is not having as much trouble breathing.  He has had no abdominal pain or diarrhea.  On exam alert, conversant and ill.  Seems much older than the stated age but is not in distress.  Lungs are diminished bilaterally.  Heart is regular.  Abdomen is soft.  No edema.  The laboratory exams were reviewed.  I reviewed the records from the hospital and reviewed all imaging studies done during that hospitalization.  No suggestion of progressive metastatic disease.  For now continue with the same treatment.  See me early in May 2024.    5/9/2024: Feeling progressively better.  Able to take the chemotherapy without much  difficulty.  Eating more.  Also more mobile.  Using a cane only when outside of his house.  On exam alert, chronically ill-appearing but in no distress.  No jaundice.  Conversant and oriented.  Lungs diminished bilaterally.  Heart regular.  Abdomen soft.  No edema.  Reviewed the laboratory exams and discussed with him.  He will see me again in approximately 6 weeks with new scans.  Continue with the same chemotherapy.    6/19/2024: Feeling reasonably well.  No weakness or chest pains and no cough.  On exam alert and conversant.  Not jaundiced or pale.  Lungs diminished bilaterally with bilateral wheezes and heart regular.  Abdomen soft.  No edema.  Reviewed the images of the recent scans with him and explained the finding of new metastatic deposits in the liver.  Explained to him that given the length of time that elapsed without chemotherapy because of his respiratory and cardiovascular issues, I do not know if the problem is that the chemotherapy was not working at all or if that Is what led to the development of metastatic disease.  I'd like to try the FOLFOX with bevacizumab in the way that he had been getting it to see if there is response with regular treatment.  For that reason we will proceed with treatment today.  He is to see me in approximately 4 weeks from today.    8/30/2024: Progressively better and stronger.  Eating well and more energetic.  Was able to get out of the house and do several different things 6 days in a row, which has been a change.  Continues to have some dyspnea with exertion but he also continues to smoke.  He has had no chest pains and is not coughing as much.  He denies abdominal pain and has maintained regular bowel activity.  On exam he appears chronically ill and much older than the stated age.  No distress.  No jaundice.  The lungs are clear bilaterally and the heart is regular.  The abdomen is soft.  The epigastric space seems taylor and the edge of the liver can be palpated,  at the level of the midsternal line, approximately 3 cm below the costal margin.  There is no edema.  Laboratory exams reviewed.  To obtain a scan and see me in 3 months.  Continue with the same chemotherapy for now.    9/25/2024: In the office before the next scheduled appointment. He is feeling as well as he had been feeling before and on direct questioning he denies new symptoms. The scans, however, reports progression of the disease with enlargement of the hepatic lesions. There has also been development of lymphadenopathy and a lymph node conglomerate results in left hydronephrosis by causing obstruction of the mid left ureter.  Plans to begin treatment with FOLFIRI/bevacizumab were made.    10/11/2024: Received the first dose of irinotecan. Had some nausea and emesis on at least a couple of occasions. He has felt tired. He has been able to eat some. No chest pain or cough. Remains free of abdominal pain. No diarrhea, he has rather tended to be constipated. No edema. No skin rash. On exam alert and conversant. Appears chronically ill but in no distress. No jaundice. Lungs diminished bilaterally and heart regular. Abdomen soft and not tender. No edema. Reviewed the laboratory exams. I have sent a new prescription for prochlorperazine. He is to continue with the same treatment and will see me in approximately 6 weeks.     11/22/2024: Continues to feel well.  He has not had many difficulties with the current chemotherapy and he has had nausea on a couple of occasions only.  The antiemetics seems to have worked, at least partially.  He has been eating well.  He is not needing the cane as much.  He does not have any more dyspnea than before but he continues to smoke.  No abdominal pain.  Maintains regular bowel activity.  Has had the nephrostomies removed.  He has no dysuria.  No edema.  On exam alert and conversant.  Ill but in no distress.  No jaundice.  Lungs diminished bilaterally and heart regular.  Abdomen  soft.  No palpable tumors but the liver seems to be palpable approximately 10 cm below the costal margin.  No edema.  Laboratory exams reviewed.  Discussed with him.  To see me again with new scans.  Continue with the same treatment.    1/10/2025: Continues to receive chemotherapy.  He does experience the side effects and for 1 to 2 days he feels fatigued after each treatment but he recovers promptly and is able to do the majority of his activities.  His appetite remains adequate and has had no nausea or vomiting.  He is weight is, as well stable.  No chest pains or cough.  Denies abdominal pain.  Maintains regular bowel activity and has not had diarrhea.  No dysuria.  No edema.  On exam he is chronically ill-appearing but conversant and in no distress.  No jaundice.  He is pale.  He is well-hydrated.  Lungs are diminished bilaterally and heart regular.  Abdomen soft and without palpable tumors.  No edema.  Laboratory exams reviewed.  I reviewed the images of the scans.  There is good evidence of response.  To continue with the same treatment.  See me in 6 weeks.    2/21/2025: Continues to take treatment without difficulties and continues to feel reasonably well.  Has had no new symptoms and in particular he denies pain.  He maintains a reasonable appetite and his weight has been stable.  He does not have any more dyspnea than usual.  No chest pains.  On exam alert, conversant, oriented and chronically ill-appearing but in no distress.  No jaundice.  No pallor.  The lungs are diminished bilaterally.  The heart is regular.  The abdomen is soft and I cannot palpate any tumors.  No edema.  Laboratory exams reviewed.  Reviewed the previous scans.  Discussed with him.  To continue with the same treatment and obtain scans in early April 2025.    3/21/2025: Feels reasonably well.  Fatigued at times.  Maintains a good appetite and stable weight.  No fevers.  Denies pain.  No more dyspnea than usual and no cough.   Intermittently having diarrhea and constipation.  No dysuria although he has a hard time starting the stream of urine.  No edema.  On exam he is alert, conversant and chronically ill-appearing but in no distress.  No jaundice.  He is pale.  The lungs are diminished bilaterally.  The heart is regular.  The abdomen is soft.  I cannot palpate any tumors.  There is no edema.  Laboratory exams reviewed and discussed with him.  Reviewed the tumor marker endograft and explained the significance.  There is continued evidence of response.  Will confirm with new scans prior to the next visit.  For now continue with the same treatment.    4/16/2025: Feels well and has no new problems.  Has been made to active with some days of fatigue, during which he does not do much.  His appetite continues to be adequate and he has not lost weight.  For the most part he remains pain-free and has had no fevers.  Continues to have some breathing difficulties but not any worse.  No chest pains.  Denies diarrhea or dysuria.  He continues to have some discomfort associated to the ureter stent.  No edema.  The scans reveal continued response with reduction in the size of the metastatic deposits in the liver.  No evidence of peritoneal carcinomatosis is also improved.  Laboratory exams reviewed and discussed with him.  To collect urine for protein measurement as he continues to have proteinuria.  Continue with the same treatment.    Past Medical History:   Diagnosis Date    Aortic dissection     Diabetes mellitus     Heart murmur     History of noncompliance with medical treatment     Hyperlipidemia     Hypertension     Type B hypoplasia of aortic arch      Past Surgical History:   Procedure Laterality Date    COLECTOMY PARTIAL / TOTAL  2023    COLONOSCOPY N/A 08/31/2023    Procedure: COLONOSCOPY with sigmoid mass tattooing at 35cm, sigmoid and rectal polypectomy;  Surgeon: TORIBIO Jacob MD;  Location: [facility] ENDOSCOPY;  Service:  Gastroenterology;  Laterality: N/A;  sigmoid mass, colon polyps    THORACOSCOPY WITH DECORTICATION Left 3/11/2024    Procedure: THORACOSCOPY VIDEO ASSISTED WITH DECORTICATION WITH DAVINCI ROBOT;  Surgeon: Saqib Keller MD;  Location: [facility] MAIN OR;  Service: Robotics - DaVinci;  Laterality: Left;       Current Outpatient Medications:     amLODIPine (NORVASC) 10 MG tablet, TAKE 1 TABLET BY MOUTH DAILY, Disp: 90 tablet, Rfl: 0    atorvastatin (LIPITOR) 40 MG tablet, TAKE 1 TABLET BY MOUTH EVERY NIGHT AT BEDTIME, Disp: 90 tablet, Rfl: 0    Blood Glucose Monitoring Suppl (Accu-Chek Guide) w/Device kit, 1 Device Daily., Disp: 1 kit, Rfl: 0    cloNIDine (CATAPRES) 0.1 MG tablet, TAKE 1 TABLET BY MOUTH 2 TIMES A DAY, Disp: 180 tablet, Rfl: 1    gabapentin (NEURONTIN) 300 MG capsule, TAKE 1 CAPSULE BY MOUTH 3 TIMES A DAY, Disp: 270 capsule, Rfl: 1    glimepiride (AMARYL) 4 MG tablet, Take 1 tablet by mouth 2 (Two) Times a Day., Disp: 60 tablet, Rfl: 5    glucose blood (Accu-Chek Guide) test strip, Test daily e11.9, Disp: 50 each, Rfl: 12    hydrALAZINE (APRESOLINE) 50 MG tablet, TAKE ONE TABLET BY MOUTH EVERY 8 HOURS, Disp: 90 tablet, Rfl: 1    metFORMIN (GLUCOPHAGE) 850 MG tablet, Take 1 tablet by mouth 2 (Two) Times a Day With Meals., Disp: 144 tablet, Rfl: 1    metoprolol tartrate (LOPRESSOR) 50 MG tablet, TAKE THREE TABLETS BY MOUTH EVERY 12 HOURS, Disp: 540 tablet, Rfl: 0    ondansetron (ZOFRAN) 8 MG tablet, Take 1 tablet by mouth 3 (Three) Times a Day As Needed for Nausea or Vomiting., Disp: 30 tablet, Rfl: 5    oxyCODONE (ROXICODONE) 10 MG tablet, , Disp: , Rfl:     potassium chloride 10 MEQ CR tablet, Take 1 tablet by mouth Daily., Disp: 30 tablet, Rfl: 2    triamcinolone (KENALOG) 0.1 % cream, Apply 1 Application topically to the appropriate area as directed 2 (Two) Times a Day., Disp: 15 g, Rfl: 0    No Known Allergies    Family History   Problem Relation Age of Onset    Diabetes Mother     Dementia Mother      Sudden death Father      Cancer-related family history is not on file.    Social History     Tobacco Use    Smoking status: Every Day     Current packs/day: 2.00     Average packs/day: 2.0 packs/day for 54.3 years (108.6 ttl pk-yrs)     Types: Cigarettes     Start date: 1971     Passive exposure: Current    Smokeless tobacco: Never   Vaping Use    Vaping status: Never Used   Substance Use Topics    Alcohol use: No     Comment: Abstinent since around 2008    Drug use: Not Currently     Types: Marijuana     Comment: Abstinent since at least the 1980's     Social History     Social History Narrative    Not on file     ROS:   Review of Systems   Constitutional:  Positive for fatigue. Negative for activity change, appetite change, chills, diaphoresis, fever and unexpected weight change.   HENT:  Negative for congestion, dental problem, drooling, ear discharge, ear pain, facial swelling, hearing loss, mouth sores, nosebleeds, postnasal drip, rhinorrhea, sinus pressure, sinus pain, sneezing, sore throat, tinnitus, trouble swallowing and voice change.    Eyes:  Negative for photophobia, pain, discharge, redness, itching and visual disturbance.   Respiratory:  Positive for cough and shortness of breath. Negative for apnea, choking, chest tightness, wheezing and stridor.    Cardiovascular:  Negative for chest pain, palpitations and leg swelling.   Gastrointestinal:  Negative for abdominal distention, abdominal pain, anal bleeding, blood in stool, constipation, diarrhea, nausea, rectal pain and vomiting.   Endocrine: Negative for cold intolerance, heat intolerance, polydipsia and polyuria.   Genitourinary:  Negative for decreased urine volume, difficulty urinating, dysuria, flank pain, frequency, genital sores, hematuria and urgency.   Musculoskeletal:  Negative for arthralgias, back pain, gait problem, joint swelling, myalgias, neck pain and neck stiffness.   Skin:  Negative for color change, pallor and rash.   Neurological:   "Negative for dizziness, tremors, seizures, syncope, facial asymmetry, speech difficulty, weakness, light-headedness, numbness and headaches.        Increased cold sensitivity since the commencement of treatment with oxaliplatin   Hematological:  Negative for adenopathy. Does not bruise/bleed easily.   Psychiatric/Behavioral:  Negative for agitation, behavioral problems, confusion, decreased concentration, hallucinations, self-injury, sleep disturbance and suicidal ideas. The patient is not nervous/anxious.      Objective:    Vital Signs:  Vitals:    04/16/25 0813   BP: 102/67   Pulse: 72   Resp: 18   Temp: 97.1 °F (36.2 °C)   SpO2: 96%   Weight: 94.8 kg (209 lb)   Height: 193 cm (76\")   PainSc: 0-No pain     Body mass index is 25.44 kg/m².    ECOG  (1) Restricted in physically strenuous activity, ambulatory and able to do work of light nature    Physical Exam:   Physical Exam  Constitutional:       General: He is not in acute distress.     Appearance: He is not ill-appearing, toxic-appearing or diaphoretic.      Comments: Alert, well-oriented and conversant.  No distress.  Appears chronically ill.   HENT:      Head: Normocephalic and atraumatic.      Right Ear: External ear normal.      Left Ear: External ear normal.      Nose: Nose normal.      Mouth/Throat:      Mouth: Mucous membranes are moist.      Pharynx: Oropharynx is clear.   Eyes:      General: No scleral icterus.        Right eye: No discharge.         Left eye: No discharge.      Conjunctiva/sclera: Conjunctivae normal.      Pupils: Pupils are equal, round, and reactive to light.   Cardiovascular:      Rate and Rhythm: Normal rate.      Pulses: Normal pulses.      Heart sounds: Normal heart sounds. No murmur heard.     No friction rub. No gallop.   Pulmonary:      Effort: No respiratory distress.      Breath sounds: No stridor. No wheezing, rhonchi or rales.      Comments: Diminished bilaterally and with fine crackles in the bases.   Chest:      Chest " wall: No tenderness.      Comments: Surgical incisions on the left side of the chest are healing well.  No evidence of infection.  Abdominal:      General: Bowel sounds are normal. There is no distension.      Palpations: Abdomen is soft. There is no mass.      Tenderness: There is no abdominal tenderness. There is no right CVA tenderness, left CVA tenderness, guarding or rebound.      Comments: Soft.  Not tender.  No palpable tumors.   Musculoskeletal:         General: No tenderness, deformity or signs of injury.      Cervical back: No rigidity.      Right lower leg: No edema.      Left lower leg: No edema.   Lymphadenopathy:      Cervical: No cervical adenopathy.   Skin:     General: Skin is warm and dry.      Coloration: Skin is not jaundiced or pale.      Findings: No bruising or rash.   Neurological:      General: No focal deficit present.      Mental Status: He is alert and oriented to person, place, and time.      Cranial Nerves: No cranial nerve deficit.   Psychiatric:         Mood and Affect: Mood normal.         Behavior: Behavior normal.         Thought Content: Thought content normal.         Judgment: Judgment normal.     MIGUEL Wagner MD performed the physical exam on 4/16/2025 as documented above.    Lab Results - Last 18 Months   Lab Units 04/16/25  0759 04/02/25  0808 03/19/25  0805   WBC 10*3/mm3 8.41 10.82* 7.16   HEMOGLOBIN g/dL 13.2 13.5 13.2   HEMATOCRIT % 40.9 41.5 40.3   PLATELETS 10*3/mm3 210 143 147   MCV fL 96.9 97.9* 97.1*     Lab Results - Last 18 Months   Lab Units 04/02/25  0818 03/19/25  0813 03/05/25  0844 01/22/25  0909 01/10/25  0819 01/08/25  0820 11/25/24  0913 11/22/24  1443   SODIUM mmol/L  --   --   --   --  138 138  --  140   POTASSIUM mmol/L  --   --   --   --  3.5 3.7  --  3.5   CHLORIDE mmol/L  --   --   --   --  99 103  --  104   CO2 mmol/L  --   --   --   --  27.0 25.4  --  24.8   BUN mg/dL  --   --   --   --  16 8  --  10   CREATININE mg/dL 0.70 0.50* 0.60   < >  0.67* 0.70*   < > 0.74*   CALCIUM mg/dL  --   --   --   --  10.5 10.0  --  10.6*   BILIRUBIN mg/dL  --   --   --   --  0.5 0.4  --  0.3   ALK PHOS U/L  --   --   --   --  71 77  --  81   ALT (SGPT) U/L  --   --   --   --  20 10  --  14   AST (SGOT) U/L  --   --   --   --  24 22  --  19   GLUCOSE mg/dL  --   --   --   --  261* 226*  --  137*    < > = values in this interval not displayed.     Lab Results   Component Value Date    GLUCOSE 261 (H) 01/10/2025    BUN 16 01/10/2025    CREATININE 0.70 04/02/2025    EGFRIFNONA 100 01/20/2022    BCR 23.9 01/10/2025    K 3.5 01/10/2025    CO2 27.0 01/10/2025    CALCIUM 10.5 01/10/2025    ALBUMIN 4.0 01/10/2025    AST 24 01/10/2025    ALT 20 01/10/2025     Lab Results - Last 18 Months   Lab Units 10/18/24  0825 10/04/24  0811 03/10/24  2236   INR  1.05 1.01 1.21*   APTT seconds 27.9 31.0 31.2*     Lab Results   Component Value Date    PTT 27.9 10/18/2024    INR 1.05 10/18/2024     Lab Results   Component Value Date    CEA 1.95 03/19/2025     Lab Results   Component Value Date    PSA 0.594 01/23/2023     Assessment & Plan     1.  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 Started FOLFIRI/bevacizumab on October 10, 2024.  The images revealed continued response.  Good tolerance to the treatment as it is.  Continue same.  See me in approximately 6 weeks.  2.  Recovered completely from the decortication.  Remains unchanged.  No dyspnea or any evidence of further complications.  2.  Cigarette smoker: Unchanged.  3.  Aortic aneurysm: Observation only for now.  4.  Hypertension: Under very good control at this point.  Continue to monitor proteinuria.  24-hour urine collection before the next visit.  5.  Independently reviewed the images of the scans and discussed with him.  Reviewed the laboratory exams.  7.  Continue same treatment.  See me in approximately 6 weeks with laboratory  exams.    Lars Wagner MD on 4/16/2025 at 8:32 AM.

## 2025-04-16 ENCOUNTER — HOSPITAL ENCOUNTER (OUTPATIENT)
Dept: ONCOLOGY | Facility: HOSPITAL | Age: 65
Discharge: HOME | End: 2025-04-16
Payer: MEDICARE

## 2025-04-16 ENCOUNTER — OFFICE VISIT (OUTPATIENT)
Dept: ONCOLOGY | Facility: CLINIC | Age: 65
End: 2025-04-16
Payer: MEDICARE

## 2025-04-16 ENCOUNTER — HOSPITAL ENCOUNTER (OUTPATIENT)
Dept: ONCOLOGY | Facility: HOSPITAL | Age: 65
Discharge: HOME | End: 2025-04-16
Admitting: INTERNAL MEDICINE
Payer: MEDICARE

## 2025-04-16 VITALS
DIASTOLIC BLOOD PRESSURE: 67 MMHG | SYSTOLIC BLOOD PRESSURE: 102 MMHG | RESPIRATION RATE: 18 BRPM | BODY MASS INDEX: 25.45 KG/M2 | HEART RATE: 72 BPM | WEIGHT: 209 LBS | OXYGEN SATURATION: 96 % | TEMPERATURE: 97.1 F | HEIGHT: 76 IN

## 2025-04-16 DIAGNOSIS — C78.6 METASTASIS TO PERITONEAL CAVITY: ICD-10-CM

## 2025-04-16 DIAGNOSIS — C78.6 METASTASIS TO PERITONEAL CAVITY: Primary | ICD-10-CM

## 2025-04-16 DIAGNOSIS — C20 RECTAL CANCER: ICD-10-CM

## 2025-04-16 DIAGNOSIS — C20 RECTAL CANCER: Primary | ICD-10-CM

## 2025-04-16 DIAGNOSIS — R80.9 PROTEINURIA, UNSPECIFIED TYPE: ICD-10-CM

## 2025-04-16 LAB
ALP BLD-CCNC: 61 U/L (ref 53–128)
BASOPHILS # BLD AUTO: 0.06 10*3/MM3 (ref 0–0.2)
BASOPHILS NFR BLD AUTO: 0.7 % (ref 0–1.5)
BILIRUB UR QL STRIP: ABNORMAL
BUN BLDA-MCNC: 8 MG/DL (ref 7–22)
CALCIUM BLD QL: 10.1 MG/DL (ref 8–10.3)
CEA SERPL-MCNC: 2.01 NG/ML
CHLORIDE BLDA-SCNC: 103 MMOL/L (ref 98–108)
CLARITY UR: ABNORMAL
CO2 BLDA-SCNC: 27 MMOL/L (ref 18–33)
COLOR UR: ABNORMAL
CREAT BLDA-MCNC: 0.7 MG/DL (ref 0.6–1.2)
DEPRECATED RDW RBC AUTO: 53.8 FL (ref 37–54)
EGFRCR SERPLBLD CKD-EPI 2021: 103.5 ML/MIN/1.73
EOSINOPHIL # BLD AUTO: 0.18 10*3/MM3 (ref 0–0.4)
EOSINOPHIL NFR BLD AUTO: 2.1 % (ref 0.3–6.2)
ERYTHROCYTE [DISTWIDTH] IN BLOOD BY AUTOMATED COUNT: 15.7 % (ref 12.3–15.4)
GLUCOSE BLDC GLUCOMTR-MCNC: 170 MG/DL (ref 73–118)
GLUCOSE UR STRIP-MCNC: NEGATIVE MG/DL
HCT VFR BLD AUTO: 40.9 % (ref 37.5–51)
HGB BLD-MCNC: 13.2 G/DL (ref 13–17.7)
HGB UR QL STRIP.AUTO: ABNORMAL
KETONES UR QL STRIP: ABNORMAL
LEUKOCYTE ESTERASE UR QL STRIP.AUTO: ABNORMAL
LYMPHOCYTES # BLD AUTO: 1.59 10*3/MM3 (ref 0.7–3.1)
LYMPHOCYTES NFR BLD AUTO: 18.9 % (ref 19.6–45.3)
MCH RBC QN AUTO: 31.3 PG (ref 26.6–33)
MCHC RBC AUTO-ENTMCNC: 32.3 G/DL (ref 31.5–35.7)
MCV RBC AUTO: 96.9 FL (ref 79–97)
MONOCYTES # BLD AUTO: 0.91 10*3/MM3 (ref 0.1–0.9)
MONOCYTES NFR BLD AUTO: 10.8 % (ref 5–12)
NEUTROPHILS NFR BLD AUTO: 5.67 10*3/MM3 (ref 1.7–7)
NEUTROPHILS NFR BLD AUTO: 67.5 % (ref 42.7–76)
NITRITE UR QL STRIP: NEGATIVE
PH UR STRIP.AUTO: 5.5 [PH] (ref 5–8)
PLATELET # BLD AUTO: 210 10*3/MM3 (ref 140–450)
PMV BLD AUTO: 9.3 FL (ref 6–12)
POC ALBUMIN: 3.4 G/L (ref 3.3–5.5)
POC ALT (SGPT): 16 U/L (ref 10–47)
POC AST (SGOT): 21 U/L (ref 11–38)
POC TOTAL BILIRUBIN: 0.8 MG/DL (ref 0.2–1.6)
POC TOTAL PROTEIN: 6.6 G/DL (ref 6.4–8.1)
POTASSIUM BLDA-SCNC: 3.8 MMOL/L (ref 3.6–5.1)
PROT UR QL STRIP: ABNORMAL
RBC # BLD AUTO: 4.22 10*6/MM3 (ref 4.14–5.8)
SODIUM BLD-SCNC: 140 MMOL/L (ref 128–145)
SP GR UR STRIP: >=1.03 (ref 1–1.03)
UROBILINOGEN UR QL STRIP: ABNORMAL
WBC NRBC COR # BLD AUTO: 8.41 10*3/MM3 (ref 3.4–10.8)

## 2025-04-16 PROCEDURE — 81003 URINALYSIS AUTO W/O SCOPE: CPT | Performed by: INTERNAL MEDICINE

## 2025-04-16 PROCEDURE — 1126F AMNT PAIN NOTED NONE PRSNT: CPT | Performed by: INTERNAL MEDICINE

## 2025-04-16 PROCEDURE — 25010000002 BEVACIZUMAB-BVZR 100 MG/4ML SOLUTION 4 ML VIAL: Performed by: INTERNAL MEDICINE

## 2025-04-16 PROCEDURE — 96375 TX/PRO/DX INJ NEW DRUG ADDON: CPT

## 2025-04-16 PROCEDURE — 3074F SYST BP LT 130 MM HG: CPT | Performed by: INTERNAL MEDICINE

## 2025-04-16 PROCEDURE — 80053 COMPREHEN METABOLIC PANEL: CPT

## 2025-04-16 PROCEDURE — 25810000003 SODIUM CHLORIDE 0.9 % SOLUTION 500 ML FLEX CONT: Performed by: INTERNAL MEDICINE

## 2025-04-16 PROCEDURE — 96417 CHEMO IV INFUS EACH ADDL SEQ: CPT

## 2025-04-16 PROCEDURE — 1159F MED LIST DOCD IN RCRD: CPT | Performed by: INTERNAL MEDICINE

## 2025-04-16 PROCEDURE — 96411 CHEMO IV PUSH ADDL DRUG: CPT

## 2025-04-16 PROCEDURE — G0498 CHEMO EXTEND IV INFUS W/PUMP: HCPCS

## 2025-04-16 PROCEDURE — 36591 DRAW BLOOD OFF VENOUS DEVICE: CPT

## 2025-04-16 PROCEDURE — 3078F DIAST BP <80 MM HG: CPT | Performed by: INTERNAL MEDICINE

## 2025-04-16 PROCEDURE — 25010000002 ATROPINE SULFATE 0.4 MG/ML SOLUTION 1 ML VIAL: Performed by: INTERNAL MEDICINE

## 2025-04-16 PROCEDURE — 99214 OFFICE O/P EST MOD 30 MIN: CPT | Performed by: INTERNAL MEDICINE

## 2025-04-16 PROCEDURE — 25010000002 LEUCOVORIN 500 MG RECONSTITUTED SOLUTION 1 EACH VIAL: Performed by: INTERNAL MEDICINE

## 2025-04-16 PROCEDURE — 96415 CHEMO IV INFUSION ADDL HR: CPT

## 2025-04-16 PROCEDURE — 25010000002 BEVACIZUMAB-BVZR 400 MG/16ML SOLUTION 16 ML VIAL: Performed by: INTERNAL MEDICINE

## 2025-04-16 PROCEDURE — 25010000002 FLUOROURACIL PER 500 MG: Performed by: INTERNAL MEDICINE

## 2025-04-16 PROCEDURE — 25010000002 DEXAMETHASONE PER 1 MG: Performed by: INTERNAL MEDICINE

## 2025-04-16 PROCEDURE — 25810000003 SODIUM CHLORIDE 0.9 % SOLUTION 250 ML FLEX CONT: Performed by: INTERNAL MEDICINE

## 2025-04-16 PROCEDURE — 85025 COMPLETE CBC W/AUTO DIFF WBC: CPT | Performed by: INTERNAL MEDICINE

## 2025-04-16 PROCEDURE — 96413 CHEMO IV INFUSION 1 HR: CPT

## 2025-04-16 PROCEDURE — 82378 CARCINOEMBRYONIC ANTIGEN: CPT | Performed by: INTERNAL MEDICINE

## 2025-04-16 PROCEDURE — 25010000002 PALONOSETRON PER 25 MCG: Performed by: INTERNAL MEDICINE

## 2025-04-16 PROCEDURE — 1160F RVW MEDS BY RX/DR IN RCRD: CPT | Performed by: INTERNAL MEDICINE

## 2025-04-16 PROCEDURE — 25010000002 LEUCOVORIN 200 MG RECONSTITUTED SOLUTION 1 EACH VIAL: Performed by: INTERNAL MEDICINE

## 2025-04-16 PROCEDURE — 25010000002 IRINOTECAN PER 20 MG: Performed by: INTERNAL MEDICINE

## 2025-04-16 PROCEDURE — 96368 THER/DIAG CONCURRENT INF: CPT

## 2025-04-16 RX ORDER — DEXAMETHASONE SODIUM PHOSPHATE 4 MG/ML
12 INJECTION, SOLUTION INTRA-ARTICULAR; INTRALESIONAL; INTRAMUSCULAR; INTRAVENOUS; SOFT TISSUE ONCE
Status: CANCELLED
Start: 2025-04-16 | End: 2025-04-16

## 2025-04-16 RX ORDER — ATROPINE SULFATE 1 MG/ML
0.4 INJECTION, SOLUTION INTRAMUSCULAR; INTRAVENOUS; SUBCUTANEOUS
Status: DISCONTINUED | OUTPATIENT
Start: 2025-04-16 | End: 2025-04-16

## 2025-04-16 RX ORDER — ATROPINE SULFATE 1 MG/ML
0.4 INJECTION, SOLUTION INTRAMUSCULAR; INTRAVENOUS; SUBCUTANEOUS
Status: CANCELLED | OUTPATIENT
Start: 2025-04-16

## 2025-04-16 RX ORDER — PALONOSETRON 0.05 MG/ML
0.25 INJECTION, SOLUTION INTRAVENOUS ONCE
Status: CANCELLED | OUTPATIENT
Start: 2025-04-16

## 2025-04-16 RX ORDER — PALONOSETRON 0.05 MG/ML
0.25 INJECTION, SOLUTION INTRAVENOUS ONCE
Status: COMPLETED | OUTPATIENT
Start: 2025-04-16 | End: 2025-04-16

## 2025-04-16 RX ORDER — FLUOROURACIL 50 MG/ML
400 INJECTION, SOLUTION INTRAVENOUS ONCE
Status: CANCELLED | OUTPATIENT
Start: 2025-04-16

## 2025-04-16 RX ORDER — SODIUM CHLORIDE 9 MG/ML
20 INJECTION, SOLUTION INTRAVENOUS ONCE
Status: CANCELLED | OUTPATIENT
Start: 2025-04-16

## 2025-04-16 RX ORDER — SODIUM CHLORIDE 9 MG/ML
20 INJECTION, SOLUTION INTRAVENOUS ONCE
Status: DISCONTINUED | OUTPATIENT
Start: 2025-04-16 | End: 2025-04-17 | Stop reason: HOSPADM

## 2025-04-16 RX ORDER — DEXAMETHASONE SODIUM PHOSPHATE 4 MG/ML
12 INJECTION, SOLUTION INTRA-ARTICULAR; INTRALESIONAL; INTRAMUSCULAR; INTRAVENOUS; SOFT TISSUE ONCE
Status: COMPLETED | OUTPATIENT
Start: 2025-04-16 | End: 2025-04-16

## 2025-04-16 RX ORDER — FLUOROURACIL 50 MG/ML
400 INJECTION, SOLUTION INTRAVENOUS ONCE
Status: COMPLETED | OUTPATIENT
Start: 2025-04-16 | End: 2025-04-16

## 2025-04-16 RX ADMIN — PALONOSETRON 0.25 MG: 0.05 INJECTION, SOLUTION INTRAVENOUS at 09:17

## 2025-04-16 RX ADMIN — SODIUM CHLORIDE 470 MG: 900 INJECTION, SOLUTION INTRAVENOUS at 09:44

## 2025-04-16 RX ADMIN — DEXAMETHASONE SODIUM PHOSPHATE 12 MG: 4 INJECTION INTRA-ARTICULAR; INTRALESIONAL; INTRAMUSCULAR; INTRAVENOUS; SOFT TISSUE at 09:21

## 2025-04-16 RX ADMIN — IRINOTECAN HYDROCHLORIDE 400 MG: 20 INJECTION, SOLUTION INTRAVENOUS at 10:19

## 2025-04-16 RX ADMIN — FLUOROURACIL 900 MG: 50 INJECTION, SOLUTION INTRAVENOUS at 12:03

## 2025-04-16 RX ADMIN — LEUCOVORIN CALCIUM 900 MG: 500 INJECTION, POWDER, LYOPHILIZED, FOR SOLUTION INTRAMUSCULAR; INTRAVENOUS at 10:18

## 2025-04-16 RX ADMIN — FLUOROURACIL 5400 MG: 50 INJECTION, SOLUTION INTRAVENOUS at 12:08

## 2025-04-16 NOTE — PROGRESS NOTES
Pt. Was brought back to infusion area after his f/u appt. With Dr. Wagner,   Pt. Seems to be doing well today.   Lab results reviewed, pt's urine protein 3+ today,   Per Dr. Wagner rap up note OK to treat and pt. Is to collect a 24 hr urine,   Clarified wrap up note with Dr. Wagner and orders given OK to treat with urine protein 3+ today per Dr. Wagner  I gave pt. Supplies for 24 hr. Urine collection and reviewed instructions with him.   Pt. Verbalized understanding of all orders/instructions.   Treatment given as ordered and pt. Tolerated well.   Pt. Discharged from clinic with no complaints and AVS was given.

## 2025-04-16 NOTE — PROGRESS NOTES
Port accessed and flushed with good blood return noted. 10cc of blood wasted prior to specimen collection. Blood specimen obtained and sent to lab for processing per protocol.  Port flushed with saline. Pt sent to lab with specimen cup.

## 2025-04-18 ENCOUNTER — HOSPITAL ENCOUNTER (OUTPATIENT)
Dept: ONCOLOGY | Facility: HOSPITAL | Age: 65
Discharge: HOME | End: 2025-04-18
Payer: MEDICARE

## 2025-04-18 DIAGNOSIS — Z45.2 ENCOUNTER FOR CARE RELATED TO VASCULAR ACCESS PORT: ICD-10-CM

## 2025-04-18 DIAGNOSIS — C20 RECTAL CANCER: ICD-10-CM

## 2025-04-18 DIAGNOSIS — C78.6 METASTASIS TO PERITONEAL CAVITY: Primary | ICD-10-CM

## 2025-04-18 PROCEDURE — 25010000002 HEPARIN LOCK FLUSH PER 10 UNITS: Performed by: INTERNAL MEDICINE

## 2025-04-18 RX ORDER — HEPARIN SODIUM (PORCINE) LOCK FLUSH IV SOLN 100 UNIT/ML 100 UNIT/ML
500 SOLUTION INTRAVENOUS AS NEEDED
OUTPATIENT
Start: 2025-04-18

## 2025-04-18 RX ORDER — SODIUM CHLORIDE 0.9 % (FLUSH) 0.9 %
20 SYRINGE (ML) INJECTION AS NEEDED
Status: DISCONTINUED | OUTPATIENT
Start: 2025-04-18 | End: 2025-04-19 | Stop reason: HOSPADM

## 2025-04-18 RX ORDER — SODIUM CHLORIDE 0.9 % (FLUSH) 0.9 %
20 SYRINGE (ML) INJECTION AS NEEDED
OUTPATIENT
Start: 2025-04-18

## 2025-04-18 RX ORDER — HEPARIN SODIUM (PORCINE) LOCK FLUSH IV SOLN 100 UNIT/ML 100 UNIT/ML
500 SOLUTION INTRAVENOUS AS NEEDED
Status: DISCONTINUED | OUTPATIENT
Start: 2025-04-18 | End: 2025-04-19 | Stop reason: HOSPADM

## 2025-04-18 RX ADMIN — HEPARIN 500 UNITS: 100 SYRINGE at 11:50

## 2025-04-18 RX ADMIN — Medication 20 ML: at 11:50

## 2025-04-18 NOTE — PROGRESS NOTES
Pt here for CIV DC, pump empty with blood return noted,   pt denies new problems/issues.  Pt brought 24 hour urine and specimen to lab for processing.   Pt deaccessed and declines new AVS and has appts.

## 2025-04-22 LAB
ALBUMIN 24H MFR UR ELPH: 53.9 %
ALPHA1 GLOB 24H MFR UR ELPH: 6.1 %
ALPHA2 GLOB 24H MFR UR ELPH: 12.4 %
B-GLOBULIN 24H MFR UR ELPH: 17.7 %
GAMMA GLOB 24H MFR UR ELPH: 10 %
LABORATORY COMMENT REPORT: ABNORMAL
M PROTEIN 24H MFR UR ELPH: ABNORMAL %
PROT 24H UR-MRATE: 1589 MG/24 HR (ref 30–150)
PROT UR-MCNC: 61.1 MG/DL

## 2025-04-24 RX ORDER — ATORVASTATIN CALCIUM 40 MG/1
40 TABLET, FILM COATED ORAL
Qty: 90 TABLET | Refills: 0 | Status: SHIPPED | OUTPATIENT
Start: 2025-04-24

## 2025-04-24 RX ORDER — METOPROLOL TARTRATE 50 MG
TABLET ORAL
Qty: 540 TABLET | Refills: 0 | Status: SHIPPED | OUTPATIENT
Start: 2025-04-24

## 2025-04-30 ENCOUNTER — HOSPITAL ENCOUNTER (OUTPATIENT)
Dept: ONCOLOGY | Facility: HOSPITAL | Age: 65
Discharge: HOME | End: 2025-04-30
Admitting: INTERNAL MEDICINE
Payer: MEDICARE

## 2025-04-30 VITALS
BODY MASS INDEX: 25.35 KG/M2 | DIASTOLIC BLOOD PRESSURE: 73 MMHG | OXYGEN SATURATION: 90 % | TEMPERATURE: 98 F | RESPIRATION RATE: 16 BRPM | HEIGHT: 76 IN | SYSTOLIC BLOOD PRESSURE: 116 MMHG | HEART RATE: 73 BPM | WEIGHT: 208.2 LBS

## 2025-04-30 DIAGNOSIS — C20 RECTAL CANCER: Primary | ICD-10-CM

## 2025-04-30 DIAGNOSIS — C78.6 METASTASIS TO PERITONEAL CAVITY: ICD-10-CM

## 2025-04-30 LAB
ALP BLD-CCNC: 64 U/L (ref 53–128)
BASOPHILS # BLD AUTO: 0.06 10*3/MM3 (ref 0–0.2)
BASOPHILS NFR BLD AUTO: 1 % (ref 0–1.5)
BILIRUB UR QL STRIP: NEGATIVE
BUN BLDA-MCNC: 9 MG/DL (ref 7–22)
CALCIUM BLD QL: 10.1 MG/DL (ref 8–10.3)
CHLORIDE BLDA-SCNC: 103 MMOL/L (ref 98–108)
CLARITY UR: ABNORMAL
CO2 BLDA-SCNC: 27 MMOL/L (ref 18–33)
COLOR UR: YELLOW
CREAT BLDA-MCNC: 0.6 MG/DL (ref 0.6–1.2)
DEPRECATED RDW RBC AUTO: 57.7 FL (ref 37–54)
EGFRCR SERPLBLD CKD-EPI 2021: 108.5 ML/MIN/1.73
EOSINOPHIL # BLD AUTO: 0.15 10*3/MM3 (ref 0–0.4)
EOSINOPHIL NFR BLD AUTO: 2.5 % (ref 0.3–6.2)
ERYTHROCYTE [DISTWIDTH] IN BLOOD BY AUTOMATED COUNT: 16.5 % (ref 12.3–15.4)
GLUCOSE BLDC GLUCOMTR-MCNC: 279 MG/DL (ref 73–118)
GLUCOSE UR STRIP-MCNC: ABNORMAL MG/DL
HCT VFR BLD AUTO: 39.4 % (ref 37.5–51)
HGB BLD-MCNC: 12.7 G/DL (ref 13–17.7)
HGB UR QL STRIP.AUTO: ABNORMAL
KETONES UR QL STRIP: ABNORMAL
LEUKOCYTE ESTERASE UR QL STRIP.AUTO: ABNORMAL
LYMPHOCYTES # BLD AUTO: 1.74 10*3/MM3 (ref 0.7–3.1)
LYMPHOCYTES NFR BLD AUTO: 29.2 % (ref 19.6–45.3)
MCH RBC QN AUTO: 31.4 PG (ref 26.6–33)
MCHC RBC AUTO-ENTMCNC: 32.2 G/DL (ref 31.5–35.7)
MCV RBC AUTO: 97.3 FL (ref 79–97)
MONOCYTES # BLD AUTO: 0.64 10*3/MM3 (ref 0.1–0.9)
MONOCYTES NFR BLD AUTO: 10.7 % (ref 5–12)
NEUTROPHILS NFR BLD AUTO: 3.37 10*3/MM3 (ref 1.7–7)
NEUTROPHILS NFR BLD AUTO: 56.6 % (ref 42.7–76)
NITRITE UR QL STRIP: NEGATIVE
PH UR STRIP.AUTO: 5.5 [PH] (ref 5–8)
PLATELET # BLD AUTO: 187 10*3/MM3 (ref 140–450)
PMV BLD AUTO: 9.6 FL (ref 6–12)
POC ALBUMIN: 3.3 G/L (ref 3.3–5.5)
POC ALT (SGPT): 11 U/L (ref 10–47)
POC AST (SGOT): 17 U/L (ref 11–38)
POC TOTAL BILIRUBIN: 0.8 MG/DL (ref 0.2–1.6)
POC TOTAL PROTEIN: 6.4 G/DL (ref 6.4–8.1)
POTASSIUM BLDA-SCNC: 3.4 MMOL/L (ref 3.6–5.1)
PROT UR QL STRIP: ABNORMAL
RBC # BLD AUTO: 4.05 10*6/MM3 (ref 4.14–5.8)
SODIUM BLD-SCNC: 139 MMOL/L (ref 128–145)
SP GR UR STRIP: 1.02 (ref 1–1.03)
UROBILINOGEN UR QL STRIP: ABNORMAL
WBC NRBC COR # BLD AUTO: 5.96 10*3/MM3 (ref 3.4–10.8)

## 2025-04-30 PROCEDURE — 25810000003 SODIUM CHLORIDE 0.9 % SOLUTION 500 ML FLEX CONT: Performed by: PHYSICIAN ASSISTANT

## 2025-04-30 PROCEDURE — G0498 CHEMO EXTEND IV INFUS W/PUMP: HCPCS

## 2025-04-30 PROCEDURE — 25010000002 FLUOROURACIL PER 500 MG: Performed by: PHYSICIAN ASSISTANT

## 2025-04-30 PROCEDURE — 25010000002 ATROPINE SULFATE 0.4 MG/ML SOLUTION 1 ML VIAL: Performed by: PHYSICIAN ASSISTANT

## 2025-04-30 PROCEDURE — 25010000002 PALONOSETRON PER 25 MCG: Performed by: PHYSICIAN ASSISTANT

## 2025-04-30 PROCEDURE — 25010000003 DEXTROSE 5 % SOLUTION 250 ML FLEX CONT: Performed by: PHYSICIAN ASSISTANT

## 2025-04-30 PROCEDURE — 25010000002 IRINOTECAN PER 20 MG: Performed by: PHYSICIAN ASSISTANT

## 2025-04-30 PROCEDURE — 25810000003 SODIUM CHLORIDE 0.9 % SOLUTION: Performed by: PHYSICIAN ASSISTANT

## 2025-04-30 PROCEDURE — 96415 CHEMO IV INFUSION ADDL HR: CPT

## 2025-04-30 PROCEDURE — 96417 CHEMO IV INFUS EACH ADDL SEQ: CPT

## 2025-04-30 PROCEDURE — 96411 CHEMO IV PUSH ADDL DRUG: CPT

## 2025-04-30 PROCEDURE — 25010000002 BEVACIZUMAB-BVZR 100 MG/4ML SOLUTION 4 ML VIAL: Performed by: PHYSICIAN ASSISTANT

## 2025-04-30 PROCEDURE — 25010000002 DEXAMETHASONE PER 1 MG: Performed by: PHYSICIAN ASSISTANT

## 2025-04-30 PROCEDURE — 81003 URINALYSIS AUTO W/O SCOPE: CPT | Performed by: INTERNAL MEDICINE

## 2025-04-30 PROCEDURE — 96413 CHEMO IV INFUSION 1 HR: CPT

## 2025-04-30 PROCEDURE — 80053 COMPREHEN METABOLIC PANEL: CPT

## 2025-04-30 PROCEDURE — 85025 COMPLETE CBC W/AUTO DIFF WBC: CPT | Performed by: INTERNAL MEDICINE

## 2025-04-30 PROCEDURE — 96368 THER/DIAG CONCURRENT INF: CPT

## 2025-04-30 PROCEDURE — 25010000002 LEUCOVORIN CALCIUM PER 50 MG: Performed by: PHYSICIAN ASSISTANT

## 2025-04-30 PROCEDURE — 25010000002 BEVACIZUMAB-BVZR 400 MG/16ML SOLUTION 16 ML VIAL: Performed by: PHYSICIAN ASSISTANT

## 2025-04-30 PROCEDURE — 96375 TX/PRO/DX INJ NEW DRUG ADDON: CPT

## 2025-04-30 RX ORDER — PALONOSETRON 0.05 MG/ML
0.25 INJECTION, SOLUTION INTRAVENOUS ONCE
Status: COMPLETED | OUTPATIENT
Start: 2025-04-30 | End: 2025-04-30

## 2025-04-30 RX ORDER — ATROPINE SULFATE 1 MG/ML
0.4 INJECTION, SOLUTION INTRAMUSCULAR; INTRAVENOUS; SUBCUTANEOUS
Status: CANCELLED | OUTPATIENT
Start: 2025-04-30

## 2025-04-30 RX ORDER — FLUOROURACIL 50 MG/ML
400 INJECTION, SOLUTION INTRAVENOUS ONCE
Status: COMPLETED | OUTPATIENT
Start: 2025-04-30 | End: 2025-04-30

## 2025-04-30 RX ORDER — SODIUM CHLORIDE 9 MG/ML
20 INJECTION, SOLUTION INTRAVENOUS ONCE
Status: COMPLETED | OUTPATIENT
Start: 2025-04-30 | End: 2025-04-30

## 2025-04-30 RX ORDER — ATROPINE SULFATE 1 MG/ML
0.4 INJECTION, SOLUTION INTRAMUSCULAR; INTRAVENOUS; SUBCUTANEOUS
Status: DISCONTINUED | OUTPATIENT
Start: 2025-04-30 | End: 2025-04-30

## 2025-04-30 RX ORDER — FLUOROURACIL 50 MG/ML
400 INJECTION, SOLUTION INTRAVENOUS ONCE
Status: CANCELLED | OUTPATIENT
Start: 2025-04-30

## 2025-04-30 RX ORDER — SODIUM CHLORIDE 9 MG/ML
20 INJECTION, SOLUTION INTRAVENOUS ONCE
Status: CANCELLED | OUTPATIENT
Start: 2025-04-30

## 2025-04-30 RX ORDER — DEXAMETHASONE SODIUM PHOSPHATE 4 MG/ML
12 INJECTION, SOLUTION INTRA-ARTICULAR; INTRALESIONAL; INTRAMUSCULAR; INTRAVENOUS; SOFT TISSUE ONCE
Status: COMPLETED | OUTPATIENT
Start: 2025-04-30 | End: 2025-04-30

## 2025-04-30 RX ORDER — DEXAMETHASONE SODIUM PHOSPHATE 4 MG/ML
12 INJECTION, SOLUTION INTRA-ARTICULAR; INTRALESIONAL; INTRAMUSCULAR; INTRAVENOUS; SOFT TISSUE ONCE
Status: CANCELLED
Start: 2025-04-30 | End: 2025-04-30

## 2025-04-30 RX ORDER — PALONOSETRON 0.05 MG/ML
0.25 INJECTION, SOLUTION INTRAVENOUS ONCE
Status: CANCELLED | OUTPATIENT
Start: 2025-04-30

## 2025-04-30 RX ADMIN — FLUOROURACIL 5400 MG: 50 INJECTION, SOLUTION INTRAVENOUS at 12:46

## 2025-04-30 RX ADMIN — DEXAMETHASONE SODIUM PHOSPHATE 12 MG: 4 INJECTION INTRA-ARTICULAR; INTRALESIONAL; INTRAMUSCULAR; INTRAVENOUS; SOFT TISSUE at 10:05

## 2025-04-30 RX ADMIN — PALONOSETRON 0.25 MG: 0.05 INJECTION, SOLUTION INTRAVENOUS at 10:02

## 2025-04-30 RX ADMIN — LEUCOVORIN CALCIUM 900 MG: 350 INJECTION, POWDER, LYOPHILIZED, FOR SOLUTION INTRAMUSCULAR; INTRAVENOUS at 10:59

## 2025-04-30 RX ADMIN — FLUOROURACIL 900 MG: 50 INJECTION, SOLUTION INTRAVENOUS at 12:41

## 2025-04-30 RX ADMIN — SODIUM CHLORIDE 470 MG: 9 INJECTION, SOLUTION INTRAVENOUS at 10:24

## 2025-04-30 RX ADMIN — IRINOTECAN HYDROCHLORIDE 400 MG: 20 INJECTION, SOLUTION INTRAVENOUS at 11:00

## 2025-04-30 RX ADMIN — SODIUM CHLORIDE 20 ML/HR: 9 INJECTION, SOLUTION INTRAVENOUS at 10:01

## 2025-04-30 NOTE — PROGRESS NOTES
Pt. Is here at clinic for [id number] Kathie Mortensen,   Pt. Is doing well today and he has no new complaints.   Rechecked pt's /73  Pt's urine protein 3+ today, pt. Had a 24 hr. Urine protein done on 4/18/25 (results show urine protein 1589),   Pt's glucose level 279 today,   Need ok to treat for elevated urine protein?   Treatment plan needs signed if ok to treat?   Message sent to NP/PA  Treatment plan signed and have pt. F/u with his PCP for elevated blood sugar per Cheri BELLAMY  Pt. Verbalized understanding of all orders/instructions.   Treatment given as ordered and pt. Tolerated well.   Pt. Discharged from clinic with no complaints and AVS was given.

## 2025-05-02 ENCOUNTER — HOSPITAL ENCOUNTER (OUTPATIENT)
Dept: ONCOLOGY | Facility: HOSPITAL | Age: 65
Discharge: HOME | End: 2025-05-02
Payer: MEDICARE

## 2025-05-02 DIAGNOSIS — C20 RECTAL CANCER: ICD-10-CM

## 2025-05-02 DIAGNOSIS — C78.6 METASTASIS TO PERITONEAL CAVITY: Primary | ICD-10-CM

## 2025-05-02 DIAGNOSIS — Z45.2 ENCOUNTER FOR CARE RELATED TO VASCULAR ACCESS PORT: ICD-10-CM

## 2025-05-02 PROCEDURE — 25010000002 HEPARIN LOCK FLUSH PER 10 UNITS: Performed by: INTERNAL MEDICINE

## 2025-05-02 RX ORDER — HEPARIN SODIUM (PORCINE) LOCK FLUSH IV SOLN 100 UNIT/ML 100 UNIT/ML
500 SOLUTION INTRAVENOUS AS NEEDED
Status: DISCONTINUED | OUTPATIENT
Start: 2025-05-02 | End: 2025-05-03 | Stop reason: HOSPADM

## 2025-05-02 RX ORDER — HEPARIN SODIUM (PORCINE) LOCK FLUSH IV SOLN 100 UNIT/ML 100 UNIT/ML
500 SOLUTION INTRAVENOUS AS NEEDED
OUTPATIENT
Start: 2025-05-02

## 2025-05-02 RX ORDER — SODIUM CHLORIDE 0.9 % (FLUSH) 0.9 %
20 SYRINGE (ML) INJECTION AS NEEDED
Status: DISCONTINUED | OUTPATIENT
Start: 2025-05-02 | End: 2025-05-03 | Stop reason: HOSPADM

## 2025-05-02 RX ORDER — SODIUM CHLORIDE 0.9 % (FLUSH) 0.9 %
20 SYRINGE (ML) INJECTION AS NEEDED
OUTPATIENT
Start: 2025-05-02

## 2025-05-02 RX ADMIN — HEPARIN 500 UNITS: 100 SYRINGE at 13:00

## 2025-05-02 RX ADMIN — Medication 20 ML: at 13:00

## 2025-05-02 NOTE — PROGRESS NOTES
Pt here for 5FU pump DC. Pump empty. Site clean, dry, and intact.  Pt denies having any issues. Port flushed with good blood return noted.  Port flushed with saline and heparin prior to needle removal.  Pt aware of next appt and d/c home.

## 2025-05-12 ENCOUNTER — TRANSCRIBE ORDERS (OUTPATIENT)
Dept: ADMINISTRATIVE | Facility: HOSPITAL | Age: 65
End: 2025-05-12
Payer: MEDICARE

## 2025-05-12 ENCOUNTER — HOSPITAL ENCOUNTER (OUTPATIENT)
Dept: CARDIOLOGY | Facility: HOSPITAL | Age: 65
Discharge: HOME | End: 2025-05-12
Admitting: UROLOGY
Payer: MEDICARE

## 2025-05-12 DIAGNOSIS — Z01.818 PRE-OP TESTING: ICD-10-CM

## 2025-05-12 DIAGNOSIS — Z01.818 PRE-OP TESTING: Primary | ICD-10-CM

## 2025-05-12 LAB
QT INTERVAL: 410 MS
QTC INTERVAL: 479 MS

## 2025-05-12 PROCEDURE — 93005 ELECTROCARDIOGRAM TRACING: CPT | Performed by: UROLOGY

## 2025-05-14 ENCOUNTER — HOSPITAL ENCOUNTER (OUTPATIENT)
Dept: ONCOLOGY | Facility: HOSPITAL | Age: 65
Discharge: HOME | End: 2025-05-14
Admitting: INTERNAL MEDICINE
Payer: MEDICARE

## 2025-05-14 VITALS
WEIGHT: 206 LBS | HEIGHT: 76 IN | SYSTOLIC BLOOD PRESSURE: 114 MMHG | BODY MASS INDEX: 25.09 KG/M2 | OXYGEN SATURATION: 93 % | TEMPERATURE: 97.5 F | RESPIRATION RATE: 14 BRPM | HEART RATE: 79 BPM | DIASTOLIC BLOOD PRESSURE: 70 MMHG

## 2025-05-14 DIAGNOSIS — C20 RECTAL CANCER: Primary | ICD-10-CM

## 2025-05-14 DIAGNOSIS — C78.6 METASTASIS TO PERITONEAL CAVITY: ICD-10-CM

## 2025-05-14 LAB
ALP BLD-CCNC: 69 U/L (ref 53–128)
BASOPHILS # BLD AUTO: 0.08 10*3/MM3 (ref 0–0.2)
BASOPHILS NFR BLD AUTO: 1.1 % (ref 0–1.5)
BILIRUB UR QL STRIP: ABNORMAL
BUN BLDA-MCNC: 11 MG/DL (ref 7–22)
CALCIUM BLD QL: 10.2 MG/DL (ref 8–10.3)
CHLORIDE BLDA-SCNC: 104 MMOL/L (ref 98–108)
CLARITY UR: ABNORMAL
CO2 BLDA-SCNC: 28 MMOL/L (ref 18–33)
COLOR UR: YELLOW
CREAT BLDA-MCNC: 0.7 MG/DL (ref 0.6–1.2)
DEPRECATED RDW RBC AUTO: 55.6 FL (ref 37–54)
EGFRCR SERPLBLD CKD-EPI 2021: 103.5 ML/MIN/1.73
EOSINOPHIL # BLD AUTO: 0.19 10*3/MM3 (ref 0–0.4)
EOSINOPHIL NFR BLD AUTO: 2.7 % (ref 0.3–6.2)
ERYTHROCYTE [DISTWIDTH] IN BLOOD BY AUTOMATED COUNT: 16 % (ref 12.3–15.4)
GLUCOSE BLDC GLUCOMTR-MCNC: 266 MG/DL (ref 73–118)
GLUCOSE UR STRIP-MCNC: ABNORMAL MG/DL
HCT VFR BLD AUTO: 39.4 % (ref 37.5–51)
HGB BLD-MCNC: 12.5 G/DL (ref 13–17.7)
HGB UR QL STRIP.AUTO: ABNORMAL
KETONES UR QL STRIP: ABNORMAL
LEUKOCYTE ESTERASE UR QL STRIP.AUTO: ABNORMAL
LYMPHOCYTES # BLD AUTO: 1.56 10*3/MM3 (ref 0.7–3.1)
LYMPHOCYTES NFR BLD AUTO: 22.2 % (ref 19.6–45.3)
MCH RBC QN AUTO: 31.2 PG (ref 26.6–33)
MCHC RBC AUTO-ENTMCNC: 31.7 G/DL (ref 31.5–35.7)
MCV RBC AUTO: 98.3 FL (ref 79–97)
MONOCYTES # BLD AUTO: 1.02 10*3/MM3 (ref 0.1–0.9)
MONOCYTES NFR BLD AUTO: 14.5 % (ref 5–12)
NEUTROPHILS NFR BLD AUTO: 4.18 10*3/MM3 (ref 1.7–7)
NEUTROPHILS NFR BLD AUTO: 59.5 % (ref 42.7–76)
NITRITE UR QL STRIP: NEGATIVE
PH UR STRIP.AUTO: 6 [PH] (ref 5–8)
PLATELET # BLD AUTO: 185 10*3/MM3 (ref 140–450)
PMV BLD AUTO: 9.6 FL (ref 6–12)
POC ALBUMIN: 3.2 G/L (ref 3.3–5.5)
POC ALT (SGPT): 14 U/L (ref 10–47)
POC AST (SGOT): 17 U/L (ref 11–38)
POC TOTAL BILIRUBIN: 0.8 MG/DL (ref 0.2–1.6)
POC TOTAL PROTEIN: 6.3 G/DL (ref 6.4–8.1)
POTASSIUM BLDA-SCNC: 3.7 MMOL/L (ref 3.6–5.1)
PROT UR QL STRIP: ABNORMAL
RBC # BLD AUTO: 4.01 10*6/MM3 (ref 4.14–5.8)
SODIUM BLD-SCNC: 139 MMOL/L (ref 128–145)
SP GR UR STRIP: >=1.03 (ref 1–1.03)
UROBILINOGEN UR QL STRIP: ABNORMAL
WBC NRBC COR # BLD AUTO: 7.03 10*3/MM3 (ref 3.4–10.8)

## 2025-05-14 PROCEDURE — 25010000003 DEXTROSE 5 % SOLUTION 250 ML FLEX CONT: Performed by: NURSE PRACTITIONER

## 2025-05-14 PROCEDURE — 96415 CHEMO IV INFUSION ADDL HR: CPT

## 2025-05-14 PROCEDURE — 96417 CHEMO IV INFUS EACH ADDL SEQ: CPT

## 2025-05-14 PROCEDURE — 25010000002 BEVACIZUMAB-BVZR 100 MG/4ML SOLUTION 4 ML VIAL: Performed by: NURSE PRACTITIONER

## 2025-05-14 PROCEDURE — 25010000002 PALONOSETRON PER 25 MCG: Performed by: NURSE PRACTITIONER

## 2025-05-14 PROCEDURE — 25010000002 BEVACIZUMAB-BVZR 400 MG/16ML SOLUTION 16 ML VIAL: Performed by: NURSE PRACTITIONER

## 2025-05-14 PROCEDURE — 25010000002 IRINOTECAN PER 20 MG: Performed by: NURSE PRACTITIONER

## 2025-05-14 PROCEDURE — 96375 TX/PRO/DX INJ NEW DRUG ADDON: CPT

## 2025-05-14 PROCEDURE — 25010000002 FLUOROURACIL PER 500 MG: Performed by: NURSE PRACTITIONER

## 2025-05-14 PROCEDURE — 25810000003 SODIUM CHLORIDE 0.9 % SOLUTION: Performed by: NURSE PRACTITIONER

## 2025-05-14 PROCEDURE — 96413 CHEMO IV INFUSION 1 HR: CPT

## 2025-05-14 PROCEDURE — 25010000002 ATROPINE SULFATE 0.4 MG/ML SOLUTION 1 ML VIAL: Performed by: NURSE PRACTITIONER

## 2025-05-14 PROCEDURE — G0498 CHEMO EXTEND IV INFUS W/PUMP: HCPCS

## 2025-05-14 PROCEDURE — 25010000002 LEUCOVORIN CALCIUM PER 50 MG: Performed by: NURSE PRACTITIONER

## 2025-05-14 PROCEDURE — 25010000002 DEXAMETHASONE PER 1 MG: Performed by: NURSE PRACTITIONER

## 2025-05-14 PROCEDURE — 96368 THER/DIAG CONCURRENT INF: CPT

## 2025-05-14 PROCEDURE — 85025 COMPLETE CBC W/AUTO DIFF WBC: CPT | Performed by: INTERNAL MEDICINE

## 2025-05-14 PROCEDURE — 25810000003 SODIUM CHLORIDE 0.9 % SOLUTION 500 ML FLEX CONT: Performed by: NURSE PRACTITIONER

## 2025-05-14 PROCEDURE — 81003 URINALYSIS AUTO W/O SCOPE: CPT | Performed by: INTERNAL MEDICINE

## 2025-05-14 PROCEDURE — 80053 COMPREHEN METABOLIC PANEL: CPT

## 2025-05-14 PROCEDURE — 96411 CHEMO IV PUSH ADDL DRUG: CPT

## 2025-05-14 RX ORDER — DEXAMETHASONE SODIUM PHOSPHATE 4 MG/ML
12 INJECTION, SOLUTION INTRA-ARTICULAR; INTRALESIONAL; INTRAMUSCULAR; INTRAVENOUS; SOFT TISSUE ONCE
Status: COMPLETED | OUTPATIENT
Start: 2025-05-14 | End: 2025-05-14

## 2025-05-14 RX ORDER — ATROPINE SULFATE 1 MG/ML
0.4 INJECTION, SOLUTION INTRAMUSCULAR; INTRAVENOUS; SUBCUTANEOUS
Status: CANCELLED | OUTPATIENT
Start: 2025-05-14

## 2025-05-14 RX ORDER — SODIUM CHLORIDE 9 MG/ML
20 INJECTION, SOLUTION INTRAVENOUS ONCE
Status: COMPLETED | OUTPATIENT
Start: 2025-05-14 | End: 2025-05-14

## 2025-05-14 RX ORDER — PALONOSETRON 0.05 MG/ML
0.25 INJECTION, SOLUTION INTRAVENOUS ONCE
Status: CANCELLED | OUTPATIENT
Start: 2025-05-14

## 2025-05-14 RX ORDER — FLUOROURACIL 50 MG/ML
400 INJECTION, SOLUTION INTRAVENOUS ONCE
Status: CANCELLED | OUTPATIENT
Start: 2025-05-14

## 2025-05-14 RX ORDER — FLUOROURACIL 50 MG/ML
400 INJECTION, SOLUTION INTRAVENOUS ONCE
Status: COMPLETED | OUTPATIENT
Start: 2025-05-14 | End: 2025-05-14

## 2025-05-14 RX ORDER — DEXAMETHASONE SODIUM PHOSPHATE 4 MG/ML
12 INJECTION, SOLUTION INTRA-ARTICULAR; INTRALESIONAL; INTRAMUSCULAR; INTRAVENOUS; SOFT TISSUE ONCE
Status: CANCELLED
Start: 2025-05-14 | End: 2025-05-14

## 2025-05-14 RX ORDER — PALONOSETRON 0.05 MG/ML
0.25 INJECTION, SOLUTION INTRAVENOUS ONCE
Status: COMPLETED | OUTPATIENT
Start: 2025-05-14 | End: 2025-05-14

## 2025-05-14 RX ORDER — ATROPINE SULFATE 1 MG/ML
0.4 INJECTION, SOLUTION INTRAMUSCULAR; INTRAVENOUS; SUBCUTANEOUS
Status: DISCONTINUED | OUTPATIENT
Start: 2025-05-14 | End: 2025-05-14

## 2025-05-14 RX ORDER — SODIUM CHLORIDE 9 MG/ML
20 INJECTION, SOLUTION INTRAVENOUS ONCE
Status: CANCELLED | OUTPATIENT
Start: 2025-05-14

## 2025-05-14 RX ADMIN — SODIUM CHLORIDE 470 MG: 9 INJECTION, SOLUTION INTRAVENOUS at 10:16

## 2025-05-14 RX ADMIN — DEXAMETHASONE SODIUM PHOSPHATE 12 MG: 4 INJECTION INTRA-ARTICULAR; INTRALESIONAL; INTRAMUSCULAR; INTRAVENOUS; SOFT TISSUE at 09:42

## 2025-05-14 RX ADMIN — SODIUM CHLORIDE 20 ML/HR: 9 INJECTION, SOLUTION INTRAVENOUS at 09:38

## 2025-05-14 RX ADMIN — LEUCOVORIN CALCIUM 900 MG: 350 INJECTION, POWDER, LYOPHILIZED, FOR SOLUTION INTRAMUSCULAR; INTRAVENOUS at 10:50

## 2025-05-14 RX ADMIN — IRINOTECAN HYDROCHLORIDE 400 MG: 20 INJECTION INTRAVENOUS at 10:52

## 2025-05-14 RX ADMIN — FLUOROURACIL 900 MG: 50 INJECTION, SOLUTION INTRAVENOUS at 12:29

## 2025-05-14 RX ADMIN — PALONOSETRON 0.25 MG: 0.05 INJECTION, SOLUTION INTRAVENOUS at 09:38

## 2025-05-14 RX ADMIN — FLUOROURACIL 5400 MG: 50 INJECTION, SOLUTION INTRAVENOUS at 12:35

## 2025-05-14 NOTE — PROGRESS NOTES
Pt here for tx.  Port accessed using sterile technique and labs drawn.  Pt tolerated well.  Pt states he has been feeling more fatigued this past 2 weeks.  States he is having more pain with urination and is due to have his Left ureter stent changed out next Tuesday by Dr. Beckett.  Also has f/u appointment with PCP Dr. Paulino next week who monitors his glucose.  Pt denies any other new issues. Message sent to ROSA MARIA Carvajal.  Plan signed and given per MAR. Pt tolerated well. Pt d/c home with 5FU pump infusing.

## 2025-05-16 ENCOUNTER — HOSPITAL ENCOUNTER (OUTPATIENT)
Dept: ONCOLOGY | Facility: HOSPITAL | Age: 65
Discharge: HOME | End: 2025-05-16
Payer: MEDICARE

## 2025-05-16 DIAGNOSIS — C78.6 METASTASIS TO PERITONEAL CAVITY: Primary | ICD-10-CM

## 2025-05-16 DIAGNOSIS — Z45.2 ENCOUNTER FOR CARE RELATED TO VASCULAR ACCESS PORT: ICD-10-CM

## 2025-05-16 DIAGNOSIS — C20 RECTAL CANCER: ICD-10-CM

## 2025-05-16 PROCEDURE — 25010000002 HEPARIN LOCK FLUSH PER 10 UNITS: Performed by: INTERNAL MEDICINE

## 2025-05-16 RX ORDER — HEPARIN SODIUM (PORCINE) LOCK FLUSH IV SOLN 100 UNIT/ML 100 UNIT/ML
500 SOLUTION INTRAVENOUS AS NEEDED
Status: DISCONTINUED | OUTPATIENT
Start: 2025-05-16 | End: 2025-05-17 | Stop reason: HOSPADM

## 2025-05-16 RX ORDER — SODIUM CHLORIDE 0.9 % (FLUSH) 0.9 %
20 SYRINGE (ML) INJECTION AS NEEDED
Status: DISCONTINUED | OUTPATIENT
Start: 2025-05-16 | End: 2025-05-17 | Stop reason: HOSPADM

## 2025-05-16 RX ORDER — HEPARIN SODIUM (PORCINE) LOCK FLUSH IV SOLN 100 UNIT/ML 100 UNIT/ML
500 SOLUTION INTRAVENOUS AS NEEDED
OUTPATIENT
Start: 2025-05-16

## 2025-05-16 RX ORDER — SODIUM CHLORIDE 0.9 % (FLUSH) 0.9 %
20 SYRINGE (ML) INJECTION AS NEEDED
OUTPATIENT
Start: 2025-05-16

## 2025-05-16 RX ADMIN — Medication 20 ML: at 14:06

## 2025-05-16 RX ADMIN — Medication 500 UNITS: at 14:06

## 2025-05-16 NOTE — PROGRESS NOTES
Patient to injection chair for port de-access and 5FU pump d/c. Pump empty, positive blood return and port de-accessed. Declined AVS, discharged home.

## 2025-05-20 ENCOUNTER — OFFICE VISIT (OUTPATIENT)
Dept: FAMILY MEDICINE CLINIC | Facility: CLINIC | Age: 65
End: 2025-05-20
Payer: MEDICARE

## 2025-05-20 VITALS
WEIGHT: 201 LBS | HEIGHT: 76 IN | SYSTOLIC BLOOD PRESSURE: 122 MMHG | DIASTOLIC BLOOD PRESSURE: 76 MMHG | OXYGEN SATURATION: 97 % | BODY MASS INDEX: 24.48 KG/M2 | HEART RATE: 65 BPM | TEMPERATURE: 98.7 F

## 2025-05-20 DIAGNOSIS — I10 ESSENTIAL HYPERTENSION: ICD-10-CM

## 2025-05-20 DIAGNOSIS — E11.65 TYPE 2 DIABETES MELLITUS WITH HYPERGLYCEMIA, WITHOUT LONG-TERM CURRENT USE OF INSULIN: Primary | ICD-10-CM

## 2025-05-20 DIAGNOSIS — E87.1 HYPONATREMIA: ICD-10-CM

## 2025-05-20 NOTE — PROGRESS NOTES
"Subjective   Prashant Zhou is a 64 y.o. male.     History of Present Illness  Prashant Zhou is in for follow up on his issues with diabetes and high blood pressure.  He also has high cholesterol that we monitor and manage..  He is still under the care of oncology for treatment of his rectal cancer.  He continues to get chemotherapy every other Wednesday.  There is no history of chest pain or dyspnea. There is no history of issue with bladder dysfunction. There is no history of dizziness or lightheadedness. There is no history of issue with sleep or mood. There is no history of issue with present medication.       Diabetes  Associated symptoms:     no chest pain, no fatigue and no weakness    Hypoglycemia symptoms:     no dizziness and is not nervous/anxious           /76 (BP Location: Left arm, Patient Position: Sitting, Cuff Size: Large Adult)   Pulse 65   Temp 98.7 °F (37.1 °C) (Temporal)   Ht 193 cm (75.98\")   Wt 91.2 kg (201 lb)   SpO2 97%   BMI 24.48 kg/m²       Chief Complaint   Patient presents with    Diabetes     4 month f/u            Current Outpatient Medications:     amLODIPine (NORVASC) 10 MG tablet, TAKE 1 TABLET BY MOUTH DAILY, Disp: 90 tablet, Rfl: 0    atorvastatin (LIPITOR) 40 MG tablet, TAKE 1 TABLET BY MOUTH EVERY NIGHT AT BEDTIME, Disp: 90 tablet, Rfl: 0    Blood Glucose Monitoring Suppl (Accu-Chek Guide) w/Device kit, 1 Device Daily., Disp: 1 kit, Rfl: 0    cloNIDine (CATAPRES) 0.1 MG tablet, TAKE 1 TABLET BY MOUTH 2 TIMES A DAY, Disp: 180 tablet, Rfl: 1    gabapentin (NEURONTIN) 300 MG capsule, TAKE 1 CAPSULE BY MOUTH 3 TIMES A DAY, Disp: 270 capsule, Rfl: 1    glimepiride (AMARYL) 4 MG tablet, Take 1 tablet by mouth 2 (Two) Times a Day., Disp: 60 tablet, Rfl: 5    glucose blood (Accu-Chek Guide) test strip, Test daily e11.9, Disp: 50 each, Rfl: 12    hydrALAZINE (APRESOLINE) 50 MG tablet, TAKE ONE TABLET BY MOUTH EVERY 8 HOURS, Disp: 90 tablet, Rfl: 1    metFORMIN " (GLUCOPHAGE) 850 MG tablet, Take 1 tablet by mouth 2 (Two) Times a Day With Meals., Disp: 144 tablet, Rfl: 1    metoprolol tartrate (LOPRESSOR) 50 MG tablet, TAKE THREE TABLETS BY MOUTH EVERY 12 HOURS, Disp: 540 tablet, Rfl: 0    ondansetron (ZOFRAN) 8 MG tablet, Take 1 tablet by mouth 3 (Three) Times a Day As Needed for Nausea or Vomiting., Disp: 30 tablet, Rfl: 5    potassium chloride 10 MEQ CR tablet, Take 1 tablet by mouth Daily., Disp: 30 tablet, Rfl: 2    triamcinolone (KENALOG) 0.1 % cream, Apply 1 Application topically to the appropriate area as directed 2 (Two) Times a Day., Disp: 15 g, Rfl: 0    BMI is within normal parameters. No other follow-up for BMI required.       The following portions of the patient's history were reviewed and updated as appropriate: allergies, current medications, past family history, past medical history, past social history, past surgical history, and problem list.    Review of Systems   Constitutional:  Negative for activity change, fatigue and fever.   HENT:  Negative for congestion, sinus pressure, sinus pain, sore throat and trouble swallowing.    Eyes:  Negative for visual disturbance.   Respiratory:  Negative for chest tightness, shortness of breath and wheezing.    Cardiovascular:  Negative for chest pain.   Gastrointestinal:  Positive for constipation. Negative for abdominal distention, abdominal pain, diarrhea, nausea and vomiting.   Genitourinary:  Positive for frequency. Negative for difficulty urinating, dysuria and urgency.   Musculoskeletal:  Positive for arthralgias. Negative for back pain and neck pain.   Neurological:  Negative for dizziness, weakness, light-headedness and numbness.   Psychiatric/Behavioral:  Positive for sleep disturbance. Negative for agitation, hallucinations and suicidal ideas. The patient is not nervous/anxious.        Objective   Physical Exam  Vitals and nursing note reviewed.   Constitutional:       Appearance: Normal appearance.   HENT:       Right Ear: Tympanic membrane and ear canal normal.      Left Ear: Tympanic membrane and ear canal normal.   Cardiovascular:      Rate and Rhythm: Normal rate and regular rhythm.      Heart sounds: Normal heart sounds. No murmur heard.  Pulmonary:      Effort: Pulmonary effort is normal.      Breath sounds: No wheezing or rales.   Abdominal:      General: Bowel sounds are normal.      Palpations: Abdomen is soft.      Tenderness: There is no abdominal tenderness. There is no guarding or rebound.      Hernia: No hernia is present.   Musculoskeletal:      Cervical back: Neck supple. No rigidity.      Right lower leg: No edema.      Left lower leg: No edema.   Lymphadenopathy:      Cervical: No cervical adenopathy.   Neurological:      Mental Status: He is alert and oriented to person, place, and time. Mental status is at baseline.      Comments: Continues to mention numbness in all 4 extremities   Psychiatric:         Mood and Affect: Mood normal.           Assessment & Plan   Problems Addressed this Visit          Endocrine and Metabolic    Type 2 diabetes mellitus with hyperglycemia - Primary    Relevant Orders    Comprehensive Metabolic Panel    Hemoglobin A1c    Lipid Panel    CBC & Differential    Microalbumin / Creatinine Urine Ratio - Urine, Clean Catch       Genitourinary and Reproductive     Hyponatremia     Other Visit Diagnoses         Essential hypertension              Diagnoses         Codes Comments      Type 2 diabetes mellitus with hyperglycemia, without long-term current use of insulin    -  Primary ICD-10-CM: E11.65  ICD-9-CM: 250.00, 790.29       Essential hypertension     ICD-10-CM: I10  ICD-9-CM: 401.9       Hyponatremia     ICD-10-CM: E87.1  ICD-9-CM: 276.1             I did ask him to get some labs with his next chemo session  I did ask him to stay on his current medication plan for now  I did ask him to be mindful of his portion size and volume  I did ask him to stay as active as he can  tolerate and see me again in 6 months

## 2025-05-27 ENCOUNTER — OFFICE VISIT (OUTPATIENT)
Dept: CARDIOLOGY | Facility: CLINIC | Age: 65
End: 2025-05-27
Payer: MEDICARE

## 2025-05-27 VITALS
HEART RATE: 71 BPM | BODY MASS INDEX: 24.96 KG/M2 | HEIGHT: 76 IN | DIASTOLIC BLOOD PRESSURE: 70 MMHG | SYSTOLIC BLOOD PRESSURE: 113 MMHG | WEIGHT: 205 LBS | OXYGEN SATURATION: 98 %

## 2025-05-27 DIAGNOSIS — I48.0 PAROXYSMAL A-FIB: ICD-10-CM

## 2025-05-27 DIAGNOSIS — E11.65 TYPE 2 DIABETES MELLITUS WITH HYPERGLYCEMIA, WITHOUT LONG-TERM CURRENT USE OF INSULIN: ICD-10-CM

## 2025-05-27 DIAGNOSIS — E78.00 PURE HYPERCHOLESTEROLEMIA: ICD-10-CM

## 2025-05-27 DIAGNOSIS — C78.6 METASTASIS TO PERITONEAL CAVITY: ICD-10-CM

## 2025-05-27 DIAGNOSIS — I71.03 AORTIC DISSECTION, THORACOABDOMINAL: ICD-10-CM

## 2025-05-27 DIAGNOSIS — I10 PRIMARY HYPERTENSION: Primary | ICD-10-CM

## 2025-05-27 DIAGNOSIS — Z72.0 TOBACCO USE: ICD-10-CM

## 2025-05-27 PROCEDURE — 99214 OFFICE O/P EST MOD 30 MIN: CPT

## 2025-05-27 PROCEDURE — 3078F DIAST BP <80 MM HG: CPT

## 2025-05-27 PROCEDURE — 3074F SYST BP LT 130 MM HG: CPT

## 2025-05-27 PROCEDURE — 1159F MED LIST DOCD IN RCRD: CPT

## 2025-05-27 PROCEDURE — 1160F RVW MEDS BY RX/DR IN RCRD: CPT

## 2025-05-27 NOTE — PROGRESS NOTES
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     History of Present Illness:  2024: Mr. Zhou carried a long history of severe hypertension and of an aneurysm of the ascending aorta.  He had been receiving antihypertensive medication after correction of the aneurysm with good results.  In 2023 he underwent his first screening colonoscopy.  A large circumferential and obstructive tumor was identified at the sigmoid/rectosigmoid junction.  The tumor could not be traversed even with the smallest instrument.  Biopsies showed moderately differentiated colonic adenocarcinoma without loss of nuclear expression of the mismatch repair proteins.  Imaging studies at the time revealed the known thoracic aneurysm that has increased mildly since the previous scan.  There was a benign-appearing subpleural nodule in the right posteromedial chest wall and pulmonary nodules that have been identified since 2018 remained stable.  Some mediastinal nonspecific and stable adenopathy was reported as well.  In the abdomen the sigmoid tumor was confirmed and there was no suggestion of metastatic disease.  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  He underwent excision of the squamous cell carcinoma that proved to be a high-grade squamous intraepithelial lesion.  He also had a robotic left colectomy.  The final report of pathology was of moderately differentiated adenocarcinoma of the colon, that measured 3.5 cm.  The tumor involved the serosal surface and the antimesenteric serosa.  Lymphovascular space invasion was identified.  All margins were negative for disease but 2  of 27 lymph nodes had metastatic involvement.  As well there were at least 5 peritoneal deposits that were excised and that were confirmed to correspond to metastatic lesions.  The tumor had intact expression of MLH1, MSH 1, MSH 6 and PMS2.  Next-generation sequencing revealed a pathogenic mutation of exon 2 of the KRAS gene. ERBB2, BRAF, NTRK, RET, and PIK3CA were negative. PD-L1 was negative, as well.  He was started on treatment with FOLFOX and bevacizumab on November 28, 2023.  He reported adequate tolerance to the treatment, with the expected cold intolerance.  He had had no nausea but since the beginning of the present illness he had lost approximately 20 pounds.  He was eating reasonably well.  He had been free of chest pains and no longer had abdominal pain.  All his surgical incisions had healed and he was having regular defecations, at times of liquid stool.  The physical exam revealed him to be a chronically ill-appearing man who did not seem in distress.  He was conversant, in good spirits and without jaundice.  Lungs diminished bilaterally.  Heart regular.  Abdomen soft and nontender.  No edema.  The laboratory exams and all the records were reviewed and discussed with him and with his wife.  To resume treatment with the idea of obtaining a new set of scans after 6 cycles of chemotherapy.  After 12 cycles of chemotherapy discuss the possibility of additional surgery if there was any residual peritoneal disease at the time of surgery.  To see me at the end of February with the scans.    2/16/2024: In the office to review scans. In the last few days has had severe glossodynia and odynophagia. Also has been coughing and expectorating some clear sputum. No fevers. Has had pain on the left side of the chest. No dyspnea. He was prescribed Hiral's magic potion and fluconazole that has resulted in relief. On exam he does not appear acutely ill. No jaundice or pallor. Lungs are diminished bilaterally and heart  irregularly irregular. Abdomen soft. No edema. Reviewed the laboratory exams. Reviewed the images of the scans. Sent prescriptions for doxycycline as the lesion in the left lower lobe is likely infectious in nature, given his leukocytosis and symptoms. Will obtain an electrocardiogram. Will follow next week.     3/8/2024: Feeling poorly. Weak and not moving much. Has continued to have diffuse pain. No fevers. His spouse believes he has an ulcer on the backside. On exam he is conversant and oriented. No jaundice. No pallor. The lungs are diminished bilaterally. Heart regular. There is indeed an ulcer in the sacral region, in the intergluteal crease. Reviewed the laboratory exams. He is to continue with the same chemotherapy. Referred to the wound clinic. To have an electrocardiogram. He is to see me in a few weeks with new scans.     4/10/2024: Was admitted to the hospital shortly after the previous visit.  He had pneumonia and was very dyspneic.  On March 11, 2024 underwent a thoracoscopic decortication with parietal and visceral pleurectomy and intercostal nerve block.  He was treated with antibiotics and eventually discharged to a subacute rehabilitation facility.  Resumed treatment on 4/9/2024.  He tells me today he is feeling progressively better.  Stronger.  The surgical incisions are healing.  He is not having as much trouble breathing.  He has had no abdominal pain or diarrhea.  On exam alert, conversant and ill.  Seems much older than the stated age but is not in distress.  Lungs are diminished bilaterally.  Heart is regular.  Abdomen is soft.  No edema.  The laboratory exams were reviewed.  I reviewed the records from the hospital and reviewed all imaging studies done during that hospitalization.  No suggestion of progressive metastatic disease.  For now continue with the same treatment.  See me early in May 2024.    5/9/2024: Feeling progressively better.  Able to take the chemotherapy without much  difficulty.  Eating more.  Also more mobile.  Using a cane only when outside of his house.  On exam alert, chronically ill-appearing but in no distress.  No jaundice.  Conversant and oriented.  Lungs diminished bilaterally.  Heart regular.  Abdomen soft.  No edema.  Reviewed the laboratory exams and discussed with him.  He will see me again in approximately 6 weeks with new scans.  Continue with the same chemotherapy.    6/19/2024: Feeling reasonably well.  No weakness or chest pains and no cough.  On exam alert and conversant.  Not jaundiced or pale.  Lungs diminished bilaterally with bilateral wheezes and heart regular.  Abdomen soft.  No edema.  Reviewed the images of the recent scans with him and explained the finding of new metastatic deposits in the liver.  Explained to him that given the length of time that elapsed without chemotherapy because of his respiratory and cardiovascular issues, I do not know if the problem is that the chemotherapy was not working at all or if that Is what led to the development of metastatic disease.  I'd like to try the FOLFOX with bevacizumab in the way that he had been getting it to see if there is response with regular treatment.  For that reason we will proceed with treatment today.  He is to see me in approximately 4 weeks from today.    8/30/2024: Progressively better and stronger.  Eating well and more energetic.  Was able to get out of the house and do several different things 6 days in a row, which has been a change.  Continues to have some dyspnea with exertion but he also continues to smoke.  He has had no chest pains and is not coughing as much.  He denies abdominal pain and has maintained regular bowel activity.  On exam he appears chronically ill and much older than the stated age.  No distress.  No jaundice.  The lungs are clear bilaterally and the heart is regular.  The abdomen is soft.  The epigastric space seems taylor and the edge of the liver can be palpated,  at the level of the midsternal line, approximately 3 cm below the costal margin.  There is no edema.  Laboratory exams reviewed.  To obtain a scan and see me in 3 months.  Continue with the same chemotherapy for now.    9/25/2024: In the office before the next scheduled appointment. He is feeling as well as he had been feeling before and on direct questioning he denies new symptoms. The scans, however, reports progression of the disease with enlargement of the hepatic lesions. There has also been development of lymphadenopathy and a lymph node conglomerate results in left hydronephrosis by causing obstruction of the mid left ureter.  Plans to begin treatment with FOLFIRI/bevacizumab were made.    10/11/2024: Received the first dose of irinotecan. Had some nausea and emesis on at least a couple of occasions. He has felt tired. He has been able to eat some. No chest pain or cough. Remains free of abdominal pain. No diarrhea, he has rather tended to be constipated. No edema. No skin rash. On exam alert and conversant. Appears chronically ill but in no distress. No jaundice. Lungs diminished bilaterally and heart regular. Abdomen soft and not tender. No edema. Reviewed the laboratory exams. I have sent a new prescription for prochlorperazine. He is to continue with the same treatment and will see me in approximately 6 weeks.     11/22/2024: Continues to feel well.  He has not had many difficulties with the current chemotherapy and he has had nausea on a couple of occasions only.  The antiemetics seems to have worked, at least partially.  He has been eating well.  He is not needing the cane as much.  He does not have any more dyspnea than before but he continues to smoke.  No abdominal pain.  Maintains regular bowel activity.  Has had the nephrostomies removed.  He has no dysuria.  No edema.  On exam alert and conversant.  Ill but in no distress.  No jaundice.  Lungs diminished bilaterally and heart regular.  Abdomen  soft.  No palpable tumors but the liver seems to be palpable approximately 10 cm below the costal margin.  No edema.  Laboratory exams reviewed.  Discussed with him.  To see me again with new scans.  Continue with the same treatment.    1/10/2025: Continues to receive chemotherapy.  He does experience the side effects and for 1 to 2 days he feels fatigued after each treatment but he recovers promptly and is able to do the majority of his activities.  His appetite remains adequate and has had no nausea or vomiting.  He is weight is, as well stable.  No chest pains or cough.  Denies abdominal pain.  Maintains regular bowel activity and has not had diarrhea.  No dysuria.  No edema.  On exam he is chronically ill-appearing but conversant and in no distress.  No jaundice.  He is pale.  He is well-hydrated.  Lungs are diminished bilaterally and heart regular.  Abdomen soft and without palpable tumors.  No edema.  Laboratory exams reviewed.  I reviewed the images of the scans.  There is good evidence of response.  To continue with the same treatment.  See me in 6 weeks.    2/21/2025: Continues to take treatment without difficulties and continues to feel reasonably well.  Has had no new symptoms and in particular he denies pain.  He maintains a reasonable appetite and his weight has been stable.  He does not have any more dyspnea than usual.  No chest pains.  On exam alert, conversant, oriented and chronically ill-appearing but in no distress.  No jaundice.  No pallor.  The lungs are diminished bilaterally.  The heart is regular.  The abdomen is soft and I cannot palpate any tumors.  No edema.  Laboratory exams reviewed.  Reviewed the previous scans.  Discussed with him.  To continue with the same treatment and obtain scans in early April 2025.    3/21/2025: Feels reasonably well.  Fatigued at times.  Maintains a good appetite and stable weight.  No fevers.  Denies pain.  No more dyspnea than usual and no cough.   Intermittently having diarrhea and constipation.  No dysuria although he has a hard time starting the stream of urine.  No edema.  On exam he is alert, conversant and chronically ill-appearing but in no distress.  No jaundice.  He is pale.  The lungs are diminished bilaterally.  The heart is regular.  The abdomen is soft.  I cannot palpate any tumors.  There is no edema.  Laboratory exams reviewed and discussed with him.  Reviewed the tumor marker endograft and explained the significance.  There is continued evidence of response.  Will confirm with new scans prior to the next visit.  For now continue with the same treatment.    4/16/2025: Feels well and has no new problems.  Has been made to active with some days of fatigue, during which he does not do much.  His appetite continues to be adequate and he has not lost weight.  For the most part he remains pain-free and has had no fevers.  Continues to have some breathing difficulties but not any worse.  No chest pains.  Denies diarrhea or dysuria.  He continues to have some discomfort associated to the ureter stent.  No edema.  The scans reveal continued response with reduction in the size of the metastatic deposits in the liver.  No evidence of peritoneal carcinomatosis is also improved.  Laboratory exams reviewed and discussed with him.  To collect urine for protein measurement as he continues to have proteinuria.  Continue with the same treatment.    5/28/2025: Feels very well and has no new symptoms.  Has been reasonably active and without new limitations.  Maintains a reasonable appetite.  No pain.  No nausea.  Fatigued at times.  On exam alert and conversant.  Oriented.  No distress.  No jaundice.  The lungs are clear bilaterally and the heart is regular.  The abdomen is soft and not tender.  There are no tumors.  No edema.  Laboratory exams reviewed.  Reviewed the images and the report of the recent scans and discussed again with him as he had some questions.   To continue with same treatment and see me in approximately 6 weeks.  Scans at the end of July 2025.    Past Medical History:   Diagnosis Date    Aortic dissection     Diabetes mellitus     Heart murmur     History of noncompliance with medical treatment     Hyperlipidemia     Hypertension     Type B hypoplasia of aortic arch      Past Surgical History:   Procedure Laterality Date    COLECTOMY PARTIAL / TOTAL  2023    COLONOSCOPY N/A 08/31/2023    Procedure: COLONOSCOPY with sigmoid mass tattooing at 35cm, sigmoid and rectal polypectomy;  Surgeon: TORIBIO Jacob MD;  Location: [facility] ENDOSCOPY;  Service: Gastroenterology;  Laterality: N/A;  sigmoid mass, colon polyps    THORACOSCOPY WITH DECORTICATION Left 3/11/2024    Procedure: THORACOSCOPY VIDEO ASSISTED WITH DECORTICATION WITH DAVINCI ROBOT;  Surgeon: Saqib Keller MD;  Location: [facility] MAIN OR;  Service: Robotics - DaVinci;  Laterality: Left;       Current Outpatient Medications:     amLODIPine (NORVASC) 10 MG tablet, TAKE 1 TABLET BY MOUTH DAILY, Disp: 90 tablet, Rfl: 0    atorvastatin (LIPITOR) 40 MG tablet, TAKE 1 TABLET BY MOUTH EVERY NIGHT AT BEDTIME, Disp: 90 tablet, Rfl: 0    Blood Glucose Monitoring Suppl (Accu-Chek Guide) w/Device kit, 1 Device Daily., Disp: 1 kit, Rfl: 0    cloNIDine (CATAPRES) 0.1 MG tablet, TAKE 1 TABLET BY MOUTH 2 TIMES A DAY, Disp: 180 tablet, Rfl: 1    gabapentin (NEURONTIN) 300 MG capsule, TAKE 1 CAPSULE BY MOUTH 3 TIMES A DAY, Disp: 270 capsule, Rfl: 1    glimepiride (AMARYL) 4 MG tablet, Take 1 tablet by mouth 2 (Two) Times a Day., Disp: 60 tablet, Rfl: 5    glucose blood (Accu-Chek Guide) test strip, Test daily e11.9, Disp: 50 each, Rfl: 12    hydrALAZINE (APRESOLINE) 50 MG tablet, TAKE ONE TABLET BY MOUTH EVERY 8 HOURS, Disp: 90 tablet, Rfl: 1    metFORMIN (GLUCOPHAGE) 850 MG tablet, Take 1 tablet by mouth 2 (Two) Times a Day With Meals., Disp: 144 tablet, Rfl: 1    metoprolol tartrate (LOPRESSOR) 50 MG tablet, TAKE THREE  TABLETS BY MOUTH EVERY 12 HOURS, Disp: 540 tablet, Rfl: 0    ondansetron (ZOFRAN) 8 MG tablet, Take 1 tablet by mouth 3 (Three) Times a Day As Needed for Nausea or Vomiting., Disp: 30 tablet, Rfl: 5    oxyCODONE-acetaminophen (PERCOCET) 7.5-325 MG per tablet, , Disp: , Rfl:     potassium chloride 10 MEQ CR tablet, Take 1 tablet by mouth Daily., Disp: 30 tablet, Rfl: 2    triamcinolone (KENALOG) 0.1 % cream, Apply 1 Application topically to the appropriate area as directed 2 (Two) Times a Day., Disp: 15 g, Rfl: 0    No Known Allergies    Family History   Problem Relation Age of Onset    Diabetes Mother     Dementia Mother     Sudden death Father     No Known Problems Sister     No Known Problems Brother     No Known Problems Maternal Aunt     No Known Problems Maternal Uncle     No Known Problems Paternal Aunt     No Known Problems Paternal Uncle     No Known Problems Maternal Grandmother     No Known Problems Maternal Grandfather     No Known Problems Paternal Grandmother     No Known Problems Paternal Grandfather     No Known Problems Other     Anemia Neg Hx     Arrhythmia Neg Hx     Asthma Neg Hx     Clotting disorder Neg Hx     Fainting Neg Hx     Heart attack Neg Hx     Heart disease Neg Hx     Heart failure Neg Hx     Hyperlipidemia Neg Hx     Hypertension Neg Hx      Cancer-related family history is not on file.    Social History     Tobacco Use    Smoking status: Every Day     Current packs/day: 2.00     Average packs/day: 2.0 packs/day for 54.4 years (108.8 ttl pk-yrs)     Types: Cigarettes     Start date: 1971     Passive exposure: Current    Smokeless tobacco: Never   Vaping Use    Vaping status: Never Used   Substance Use Topics    Alcohol use: No     Comment: Abstinent since around 2008    Drug use: Not Currently     Types: Marijuana     Comment: Abstinent since at least the 1980's     Social History     Social History Narrative    Not on file     ROS:   Review of Systems   Constitutional:  Positive for  fatigue. Negative for activity change, appetite change, chills, diaphoresis, fever and unexpected weight change.   HENT:  Negative for congestion, dental problem, drooling, ear discharge, ear pain, facial swelling, hearing loss, mouth sores, nosebleeds, postnasal drip, rhinorrhea, sinus pressure, sinus pain, sneezing, sore throat, tinnitus, trouble swallowing and voice change.    Eyes:  Negative for photophobia, pain, discharge, redness, itching and visual disturbance.   Respiratory:  Positive for cough and shortness of breath. Negative for apnea, choking, chest tightness, wheezing and stridor.    Cardiovascular:  Negative for chest pain, palpitations and leg swelling.   Gastrointestinal:  Negative for abdominal distention, abdominal pain, anal bleeding, blood in stool, constipation, diarrhea, nausea, rectal pain and vomiting.   Endocrine: Negative for cold intolerance, heat intolerance, polydipsia and polyuria.   Genitourinary:  Negative for decreased urine volume, difficulty urinating, dysuria, flank pain, frequency, genital sores, hematuria and urgency.   Musculoskeletal:  Negative for arthralgias, back pain, gait problem, joint swelling, myalgias, neck pain and neck stiffness.   Skin:  Negative for color change, pallor and rash.   Neurological:  Negative for dizziness, tremors, seizures, syncope, facial asymmetry, speech difficulty, weakness, light-headedness, numbness and headaches.        Increased cold sensitivity since the commencement of treatment with oxaliplatin   Hematological:  Negative for adenopathy. Does not bruise/bleed easily.   Psychiatric/Behavioral:  Negative for agitation, behavioral problems, confusion, decreased concentration, hallucinations, self-injury, sleep disturbance and suicidal ideas. The patient is not nervous/anxious.      Objective:    Vital Signs:  Vitals:    05/28/25 0910   BP: 107/68   Pulse: 70   Resp: 18   Temp: 98.2 °F (36.8 °C)   SpO2: 96%   Weight: 93.1 kg (205 lb 3.2 oz)  "  Height: 193 cm (76\")   PainSc: 0-No pain     Body mass index is 24.98 kg/m².    ECOG  (1) Restricted in physically strenuous activity, ambulatory and able to do work of light nature    Physical Exam:   Physical Exam  Constitutional:       General: He is not in acute distress.     Appearance: He is not ill-appearing, toxic-appearing or diaphoretic.      Comments: Alert, well-oriented and conversant.  No distress.  Appears chronically ill.   HENT:      Head: Normocephalic and atraumatic.      Right Ear: External ear normal.      Left Ear: External ear normal.      Nose: Nose normal.      Mouth/Throat:      Mouth: Mucous membranes are moist.      Pharynx: Oropharynx is clear.   Eyes:      General: No scleral icterus.        Right eye: No discharge.         Left eye: No discharge.      Conjunctiva/sclera: Conjunctivae normal.      Pupils: Pupils are equal, round, and reactive to light.   Cardiovascular:      Rate and Rhythm: Normal rate.      Pulses: Normal pulses.      Heart sounds: Normal heart sounds. No murmur heard.     No friction rub. No gallop.   Pulmonary:      Effort: No respiratory distress.      Breath sounds: No stridor. No wheezing, rhonchi or rales.      Comments: Diminished bilaterally and with fine crackles in the bases.   Chest:      Chest wall: No tenderness.      Comments: Surgical incisions on the left side of the chest are healing well.  No evidence of infection.  Abdominal:      General: Bowel sounds are normal. There is no distension.      Palpations: Abdomen is soft. There is no mass.      Tenderness: There is no abdominal tenderness. There is no right CVA tenderness, left CVA tenderness, guarding or rebound.      Comments: Soft.  Not tender.  No palpable tumors.   Musculoskeletal:         General: No tenderness, deformity or signs of injury.      Cervical back: No rigidity.      Right lower leg: No edema.      Left lower leg: No edema.   Lymphadenopathy:      Cervical: No cervical adenopathy. "   Skin:     General: Skin is warm and dry.      Coloration: Skin is not jaundiced or pale.      Findings: No bruising or rash.   Neurological:      General: No focal deficit present.      Mental Status: He is alert and oriented to person, place, and time.      Cranial Nerves: No cranial nerve deficit.   Psychiatric:         Mood and Affect: Mood normal.         Behavior: Behavior normal.         Thought Content: Thought content normal.         Judgment: Judgment normal.     MIGUEL Wagner MD performed the physical exam on 5/28/2025 as documented above.    Lab Results - Last 18 Months   Lab Units 05/28/25  0840 05/14/25  0846 04/30/25  0850   WBC 10*3/mm3 6.28 7.03 5.96   HEMOGLOBIN g/dL 13.8 12.5* 12.7*   HEMATOCRIT % 42.9 39.4 39.4   PLATELETS 10*3/mm3 211 185 187   MCV fL 97.5* 98.3* 97.3*     Lab Results - Last 18 Months   Lab Units 05/28/25  0915 05/14/25  0902 04/30/25  0908 01/22/25  0909 01/10/25  0819 01/08/25  0820 11/25/24  0913 11/22/24  1443   SODIUM mmol/L  --   --   --   --  138 138  --  140   POTASSIUM mmol/L  --   --   --   --  3.5 3.7  --  3.5   CHLORIDE mmol/L  --   --   --   --  99 103  --  104   CO2 mmol/L  --   --   --   --  27.0 25.4  --  24.8   BUN mg/dL  --   --   --   --  16 8  --  10   CREATININE mg/dL 0.90 0.70 0.60   < > 0.67* 0.70*   < > 0.74*   CALCIUM mg/dL  --   --   --   --  10.5 10.0  --  10.6*   BILIRUBIN mg/dL  --   --   --   --  0.5 0.4  --  0.3   ALK PHOS U/L  --   --   --   --  71 77  --  81   ALT (SGPT) U/L  --   --   --   --  20 10  --  14   AST (SGOT) U/L  --   --   --   --  24 22  --  19   GLUCOSE mg/dL  --   --   --   --  261* 226*  --  137*    < > = values in this interval not displayed.     Lab Results   Component Value Date    GLUCOSE 261 (H) 01/10/2025    BUN 16 01/10/2025    CREATININE 0.90 05/28/2025    EGFRIFNONA 100 01/20/2022    BCR 23.9 01/10/2025    K 3.5 01/10/2025    CO2 27.0 01/10/2025    CALCIUM 10.5 01/10/2025    ALBUMIN 4.0 01/10/2025    AST 24  01/10/2025    ALT 20 01/10/2025     Lab Results - Last 18 Months   Lab Units 10/18/24  0825 10/04/24  0811 03/10/24  2236   INR  1.05 1.01 1.21*   APTT seconds 27.9 31.0 31.2*     Lab Results   Component Value Date    PTT 27.9 10/18/2024    INR 1.05 10/18/2024     Lab Results   Component Value Date    CEA 2.01 04/16/2025     Lab Results   Component Value Date    PSA 0.594 01/23/2023     Assessment & Plan     1.  [id number] invasive moderately differentiated adenocarcinoma of the sigmoid with metastatic involvement of the peritoneum and KRAS mutated. Started FOLFIRI/bevacizumab on October 10, 2024.  The images revealed continued response.  He continues to have good tolerance to the treatment.  Continue same and see me in approximately 6 weeks.  Scans in late July 2025.  2.  Recovered completely from the decortication.  Remains unchanged.  No dyspnea or any evidence of further complications.  2.  Cigarette smoker: Unchanged.  3.  Aortic aneurysm: Observation only for now.  4.  Hypertension: Remains under excellent control.  5.  Reviewed laboratory exams and discussed results with him.  Reviewed the scans and discussed with him.  7.  See me in approximately 6 weeks.    Lars Wagner MD on 5/28/2025 at 9:45 AM.

## 2025-05-27 NOTE — PROGRESS NOTES
Subjective:     Encounter Date:05/27/2025      Patient ID: Prashant Zhou is a 64 y.o. male.    Chief Complaint:  History of Present Illness    Prashant Zhou is a 64 y.o. male with a history of atrial fibrillation, hyperlipidemia, hypertension, type B thoracic aortic dissection, diabetes, tobacco abuse who presents to the office today for routine cardiac care/follow-up.   Patient seen and examined, labs and chart reviewed. Patient states he is doing well from cardiology standpoint as he denies chest pain, shortness of breath, fatigue/weakness, potation, lightheadedness/dizziness, numbness, tingling, nausea, vomiting.  Notes mild lower extremity edema but reports it is his baseline.  Vital signs are stable today.  He takes all medications as prescribed.  He is a current pack per day smoker.      The following portions of the patient's history were reviewed and updated as appropriate: allergies, current medications, past family history, past medical history, past social history, past surgical history, and problem list.    Past Medical History:   Diagnosis Date    Aortic dissection     Diabetes mellitus     Heart murmur     History of noncompliance with medical treatment     Hyperlipidemia     Hypertension     Type B hypoplasia of aortic arch      Past Surgical History:   Procedure Laterality Date    COLECTOMY PARTIAL / TOTAL  2023    COLONOSCOPY N/A 08/31/2023    Procedure: COLONOSCOPY with sigmoid mass tattooing at 35cm, sigmoid and rectal polypectomy;  Surgeon: TORIBIO Jacob MD;  Location: [facility] ENDOSCOPY;  Service: Gastroenterology;  Laterality: N/A;  sigmoid mass, colon polyps    THORACOSCOPY WITH DECORTICATION Left 3/11/2024    Procedure: THORACOSCOPY VIDEO ASSISTED WITH DECORTICATION WITH DAVINCI ROBOT;  Surgeon: Saqib Keller MD;  Location: [facility] MAIN OR;  Service: Robotics - DaVinci;  Laterality: Left;     /70 (BP Location: Left arm, Patient Position: Sitting, Cuff Size: Adult)   Pulse  "71   Ht 193 cm (76\")   Wt 93 kg (205 lb)   SpO2 98%   BMI 24.95 kg/m²   Family History   Problem Relation Age of Onset    Diabetes Mother     Dementia Mother     Sudden death Father     No Known Problems Sister     No Known Problems Brother     No Known Problems Maternal Aunt     No Known Problems Maternal Uncle     No Known Problems Paternal Aunt     No Known Problems Paternal Uncle     No Known Problems Maternal Grandmother     No Known Problems Maternal Grandfather     No Known Problems Paternal Grandmother     No Known Problems Paternal Grandfather     No Known Problems Other     Anemia Neg Hx     Arrhythmia Neg Hx     Asthma Neg Hx     Clotting disorder Neg Hx     Fainting Neg Hx     Heart attack Neg Hx     Heart disease Neg Hx     Heart failure Neg Hx     Hyperlipidemia Neg Hx     Hypertension Neg Hx        Current Outpatient Medications:     amLODIPine (NORVASC) 10 MG tablet, TAKE 1 TABLET BY MOUTH DAILY, Disp: 90 tablet, Rfl: 0    atorvastatin (LIPITOR) 40 MG tablet, TAKE 1 TABLET BY MOUTH EVERY NIGHT AT BEDTIME, Disp: 90 tablet, Rfl: 0    Blood Glucose Monitoring Suppl (Accu-Chek Guide) w/Device kit, 1 Device Daily., Disp: 1 kit, Rfl: 0    cloNIDine (CATAPRES) 0.1 MG tablet, TAKE 1 TABLET BY MOUTH 2 TIMES A DAY, Disp: 180 tablet, Rfl: 1    gabapentin (NEURONTIN) 300 MG capsule, TAKE 1 CAPSULE BY MOUTH 3 TIMES A DAY, Disp: 270 capsule, Rfl: 1    glimepiride (AMARYL) 4 MG tablet, Take 1 tablet by mouth 2 (Two) Times a Day., Disp: 60 tablet, Rfl: 5    glucose blood (Accu-Chek Guide) test strip, Test daily e11.9, Disp: 50 each, Rfl: 12    metFORMIN (GLUCOPHAGE) 850 MG tablet, Take 1 tablet by mouth 2 (Two) Times a Day With Meals., Disp: 144 tablet, Rfl: 1    metoprolol tartrate (LOPRESSOR) 50 MG tablet, TAKE THREE TABLETS BY MOUTH EVERY 12 HOURS, Disp: 540 tablet, Rfl: 0    ondansetron (ZOFRAN) 8 MG tablet, Take 1 tablet by mouth 3 (Three) Times a Day As Needed for Nausea or Vomiting., Disp: 30 tablet, Rfl: " 5    potassium chloride 10 MEQ CR tablet, Take 1 tablet by mouth Daily., Disp: 30 tablet, Rfl: 2    triamcinolone (KENALOG) 0.1 % cream, Apply 1 Application topically to the appropriate area as directed 2 (Two) Times a Day., Disp: 15 g, Rfl: 0    hydrALAZINE (APRESOLINE) 50 MG tablet, TAKE ONE TABLET BY MOUTH EVERY 8 HOURS, Disp: 90 tablet, Rfl: 1    oxyCODONE-acetaminophen (PERCOCET) 7.5-325 MG per tablet, , Disp: , Rfl:   No current facility-administered medications for this visit.    Facility-Administered Medications Ordered in Other Visits:     Bevacizumab-bvzr (ZIRABEV) 470 mg in sodium chloride 0.9 % 118.8 mL chemo IVPB, 5 mg/kg (Treatment Plan Recorded), Intravenous, Once, Lars Wagner MD, Last Rate: 237.6 mL/hr at 05/28/25 1057, 470 mg at 05/28/25 1057    fluorouracil (ADRUCIL) 5,400 mg in sodium chloride 0.9 % 240 mL chemo infusion - FOR HOME USE, 5,400 mg, Intravenous, Once, Lars Wagner MD    fluorouracil (ADRUCIL) chemo injection 900 mg, 400 mg/m2 (Treatment Plan Recorded), Intravenous, Once, Lars Wagner MD    irinotecan (CAMPTOSAR) 400 mg, Atropine Sulfate (PF) 0.4 mg in dextrose (D5W) 5 % 271 mL chemo IVPB, 400 mg, Intravenous, Once, Lars Wagner MD    leucovorin 900 mg in dextrose (D5W) 5 % 295 mL IVPB, 400 mg/m2 (Treatment Plan Recorded), Intravenous, Once, Lars Wagner MD  No Known Allergies  Social History     Socioeconomic History    Marital status:    Tobacco Use    Smoking status: Every Day     Current packs/day: 2.00     Average packs/day: 2.0 packs/day for 54.4 years (108.8 ttl pk-yrs)     Types: Cigarettes     Start date: 1971     Passive exposure: Current    Smokeless tobacco: Never   Vaping Use    Vaping status: Never Used   Substance and Sexual Activity    Alcohol use: No     Comment: Abstinent since around 2008    Drug use: Not Currently     Types: Marijuana     Comment: Abstinent since at least the 1980's    Sexual activity: Defer     Review of Systems    Constitutional: Negative for chills, fever and malaise/fatigue.   Cardiovascular:  Negative for chest pain, dyspnea on exertion, near-syncope, palpitations and syncope.   Respiratory:  Negative for cough and shortness of breath.    Gastrointestinal:  Negative for abdominal pain, nausea and vomiting.   Neurological:  Negative for dizziness, light-headedness and weakness.   Psychiatric/Behavioral:  Negative for altered mental status.               Objective:     Vitals reviewed.   Constitutional:       Appearance: Well-developed and not in distress.   Eyes:      Pupils: Pupils are equal, round, and reactive to light.   Pulmonary:      Effort: Pulmonary effort is normal.      Breath sounds: Normal breath sounds.   Cardiovascular:      Normal rate. Regular rhythm.   Edema:     Peripheral edema absent.   Abdominal:      Palpations: Abdomen is soft.   Musculoskeletal: Normal range of motion.      Cervical back: Normal range of motion and neck supple. Skin:     General: Skin is warm and dry.   Neurological:      General: No focal deficit present.      Mental Status: Alert and oriented to person, place and time.         Procedures      LAB RESULTS (LAST 7 DAYS)  Lab Review:   Echocardiogram   Results for orders placed during the hospital encounter of 03/09/24    Adult Transthoracic Echo Complete W/ Cont if Necessary Per Protocol    Interpretation Summary    Left ventricular systolic function is normal. Calculated left ventricular EF = 65% Left ventricular ejection fraction appears to be 61 - 65%.    Left ventricular diastolic function is consistent with (grade I) impaired relaxation.    There is calcification of the aortic valve mainly affecting the non-coronary cusp(s).    Estimated right ventricular systolic pressure from tricuspid regurgitation is normal (<35 mmHg).    No significant valvular abnormalities noted.      Cardiac Catheterization   No results found for this or any previous visit.      CBC  Results from last  7 days   Lab Units 05/28/25  0840   WBC 10*3/mm3 6.28   RBC 10*6/mm3 4.40   HEMOGLOBIN g/dL 13.8   HEMATOCRIT % 42.9   MCV fL 97.5*   PLATELETS 10*3/mm3 211       BMP  Results from last 7 days   Lab Units 05/28/25  0915   CREATININE mg/dL 0.90       CMP   Results from last 7 days   Lab Units 05/28/25  0915   CREATININE mg/dL 0.90         BNP        TROPONIN        CoAg        Creatinine Clearance  Estimated Creatinine Clearance: 110.5 mL/min (by C-G formula based on SCr of 0.9 mg/dL).    ABG        Radiology  No radiology results for the last day            Assessment    Diagnoses and all orders for this visit:    1. Primary hypertension (Primary)    2. Aortic dissection, thoracoabdominal    3. Type 2 diabetes mellitus with hyperglycemia, without long-term current use of insulin    4. Pure hypercholesterolemia    5. 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 Tobacco use    7. Paroxysmal A-fib              MDM    Hypertension  Blood pressure well-controlled  Lopressor 50 mg, Norvasc 10 mg, clonidine 0.1 mg, hydralazine 50 mg    History of aortic type B dissection  Medically managed  Previously followed with vascular surgery  Blood pressure well-controlled    Type 2 diabetes  Without long-term use insulin  Glimepiride  Metformin    History of paroxysmal atrial fibrillation  Beta-blocker for rate control  Currently in sinus rhythm  Previously on Xarelto for anticoagulation  Discontinued due to price of medication in February   Patient understand increased risk of stroke without anticoagulation    Tobacco abuse  Current pack per day smoker  Counseled complete cessation    Adenocarcinoma of the sigmoid   Follow with oncology     Patient's previous medical records, labs, and EKG were reviewed and discussed with the patient at today's visit.     Patient to be seen as needed or in 6 months with cardiology    Electronically signed by ROSA MARIA Nevarez, 05/28/25, 11:25 AM EDT.

## 2025-05-28 ENCOUNTER — HOSPITAL ENCOUNTER (OUTPATIENT)
Dept: ONCOLOGY | Facility: HOSPITAL | Age: 65
Discharge: HOME | End: 2025-05-28
Payer: MEDICARE

## 2025-05-28 ENCOUNTER — OFFICE VISIT (OUTPATIENT)
Dept: ONCOLOGY | Facility: CLINIC | Age: 65
End: 2025-05-28
Payer: MEDICARE

## 2025-05-28 VITALS
DIASTOLIC BLOOD PRESSURE: 68 MMHG | WEIGHT: 205.2 LBS | HEIGHT: 76 IN | RESPIRATION RATE: 18 BRPM | SYSTOLIC BLOOD PRESSURE: 107 MMHG | OXYGEN SATURATION: 96 % | TEMPERATURE: 98.2 F | BODY MASS INDEX: 24.99 KG/M2 | HEART RATE: 70 BPM

## 2025-05-28 DIAGNOSIS — C78.6 METASTASIS TO PERITONEAL CAVITY: ICD-10-CM

## 2025-05-28 DIAGNOSIS — C20 RECTAL CANCER: Primary | ICD-10-CM

## 2025-05-28 DIAGNOSIS — C20 RECTAL CANCER: ICD-10-CM

## 2025-05-28 DIAGNOSIS — C78.6 METASTASIS TO PERITONEAL CAVITY: Primary | ICD-10-CM

## 2025-05-28 LAB
ALBUMIN UR-MCNC: 185.9 MG/DL
ALP BLD-CCNC: 84 U/L (ref 53–128)
BASOPHILS # BLD AUTO: 0.08 10*3/MM3 (ref 0–0.2)
BASOPHILS NFR BLD AUTO: 1.3 % (ref 0–1.5)
BILIRUB UR QL STRIP: NEGATIVE
BUN BLDA-MCNC: 13 MG/DL (ref 7–22)
CALCIUM BLD QL: 11 MG/DL (ref 8–10.3)
CEA SERPL-MCNC: 2.7 NG/ML
CHLORIDE BLDA-SCNC: 106 MMOL/L (ref 98–108)
CHOLEST SERPL-MCNC: 121 MG/DL (ref 0–200)
CLARITY UR: ABNORMAL
CO2 BLDA-SCNC: 26 MMOL/L (ref 18–33)
COLOR UR: YELLOW
CREAT BLDA-MCNC: 0.9 MG/DL (ref 0.6–1.2)
CREAT UR-MCNC: 121.8 MG/DL
DEPRECATED RDW RBC AUTO: 58 FL (ref 37–54)
EGFRCR SERPLBLD CKD-EPI 2021: 96 ML/MIN/1.73
EOSINOPHIL # BLD AUTO: 0.2 10*3/MM3 (ref 0–0.4)
EOSINOPHIL NFR BLD AUTO: 3.2 % (ref 0.3–6.2)
ERYTHROCYTE [DISTWIDTH] IN BLOOD BY AUTOMATED COUNT: 17 % (ref 12.3–15.4)
GLUCOSE BLDC GLUCOMTR-MCNC: 206 MG/DL (ref 73–118)
GLUCOSE UR STRIP-MCNC: NEGATIVE MG/DL
HBA1C MFR BLD: 7.53 % (ref 4.8–5.6)
HCT VFR BLD AUTO: 42.9 % (ref 37.5–51)
HDLC SERPL-MCNC: 34 MG/DL (ref 40–60)
HGB BLD-MCNC: 13.8 G/DL (ref 13–17.7)
HGB UR QL STRIP.AUTO: ABNORMAL
KETONES UR QL STRIP: NEGATIVE
LDLC SERPL CALC-MCNC: 52 MG/DL (ref 0–100)
LDLC/HDLC SERPL: 1.27 {RATIO}
LEUKOCYTE ESTERASE UR QL STRIP.AUTO: ABNORMAL
LYMPHOCYTES # BLD AUTO: 2.05 10*3/MM3 (ref 0.7–3.1)
LYMPHOCYTES NFR BLD AUTO: 32.6 % (ref 19.6–45.3)
MCH RBC QN AUTO: 31.4 PG (ref 26.6–33)
MCHC RBC AUTO-ENTMCNC: 32.2 G/DL (ref 31.5–35.7)
MCV RBC AUTO: 97.5 FL (ref 79–97)
MICROALBUMIN/CREAT UR: 1526.3 MG/G (ref 0–29)
MONOCYTES # BLD AUTO: 0.79 10*3/MM3 (ref 0.1–0.9)
MONOCYTES NFR BLD AUTO: 12.6 % (ref 5–12)
NEUTROPHILS NFR BLD AUTO: 3.16 10*3/MM3 (ref 1.7–7)
NEUTROPHILS NFR BLD AUTO: 50.3 % (ref 42.7–76)
NITRITE UR QL STRIP: NEGATIVE
PH UR STRIP.AUTO: 6 [PH] (ref 5–8)
PLATELET # BLD AUTO: 211 10*3/MM3 (ref 140–450)
PMV BLD AUTO: 9.7 FL (ref 6–12)
POC ALBUMIN: 3.5 G/L (ref 3.3–5.5)
POC ALT (SGPT): 11 U/L (ref 10–47)
POC AST (SGOT): 19 U/L (ref 11–38)
POC TOTAL BILIRUBIN: 0.7 MG/DL (ref 0.2–1.6)
POC TOTAL PROTEIN: 6.6 G/DL (ref 6.4–8.1)
POTASSIUM BLDA-SCNC: 4.5 MMOL/L (ref 3.6–5.1)
PROT UR QL STRIP: ABNORMAL
RBC # BLD AUTO: 4.4 10*6/MM3 (ref 4.14–5.8)
SODIUM BLD-SCNC: 141 MMOL/L (ref 128–145)
SP GR UR STRIP: >1.03 (ref 1–1.03)
TRIGL SERPL-MCNC: 219 MG/DL (ref 0–150)
UROBILINOGEN UR QL STRIP: ABNORMAL
VLDLC SERPL-MCNC: 35 MG/DL (ref 5–40)
WBC NRBC COR # BLD AUTO: 6.28 10*3/MM3 (ref 3.4–10.8)

## 2025-05-28 PROCEDURE — 96375 TX/PRO/DX INJ NEW DRUG ADDON: CPT

## 2025-05-28 PROCEDURE — 82378 CARCINOEMBRYONIC ANTIGEN: CPT | Performed by: INTERNAL MEDICINE

## 2025-05-28 PROCEDURE — 85025 COMPLETE CBC W/AUTO DIFF WBC: CPT | Performed by: INTERNAL MEDICINE

## 2025-05-28 PROCEDURE — 83036 HEMOGLOBIN GLYCOSYLATED A1C: CPT | Performed by: FAMILY MEDICINE

## 2025-05-28 PROCEDURE — 25010000002 ATROPINE SULFATE 0.4 MG/ML SOLUTION 1 ML VIAL: Performed by: INTERNAL MEDICINE

## 2025-05-28 PROCEDURE — 25010000002 IRINOTECAN PER 20 MG: Performed by: INTERNAL MEDICINE

## 2025-05-28 PROCEDURE — 96413 CHEMO IV INFUSION 1 HR: CPT

## 2025-05-28 PROCEDURE — 1126F AMNT PAIN NOTED NONE PRSNT: CPT | Performed by: INTERNAL MEDICINE

## 2025-05-28 PROCEDURE — 1160F RVW MEDS BY RX/DR IN RCRD: CPT | Performed by: INTERNAL MEDICINE

## 2025-05-28 PROCEDURE — 25010000002 PALONOSETRON 0.25 MG/5ML SOLUTION PREFILLED SYRINGE: Performed by: INTERNAL MEDICINE

## 2025-05-28 PROCEDURE — G0498 CHEMO EXTEND IV INFUS W/PUMP: HCPCS

## 2025-05-28 PROCEDURE — 25010000002 BEVACIZUMAB-BVZR 400 MG/16ML SOLUTION 16 ML VIAL: Performed by: INTERNAL MEDICINE

## 2025-05-28 PROCEDURE — 80053 COMPREHEN METABOLIC PANEL: CPT

## 2025-05-28 PROCEDURE — 1159F MED LIST DOCD IN RCRD: CPT | Performed by: INTERNAL MEDICINE

## 2025-05-28 PROCEDURE — 96417 CHEMO IV INFUS EACH ADDL SEQ: CPT

## 2025-05-28 PROCEDURE — 3078F DIAST BP <80 MM HG: CPT | Performed by: INTERNAL MEDICINE

## 2025-05-28 PROCEDURE — 80061 LIPID PANEL: CPT | Performed by: FAMILY MEDICINE

## 2025-05-28 PROCEDURE — 96411 CHEMO IV PUSH ADDL DRUG: CPT

## 2025-05-28 PROCEDURE — 25010000002 BEVACIZUMAB-BVZR 100 MG/4ML SOLUTION 4 ML VIAL: Performed by: INTERNAL MEDICINE

## 2025-05-28 PROCEDURE — 25010000002 LEUCOVORIN CALCIUM PER 50 MG: Performed by: INTERNAL MEDICINE

## 2025-05-28 PROCEDURE — 3074F SYST BP LT 130 MM HG: CPT | Performed by: INTERNAL MEDICINE

## 2025-05-28 PROCEDURE — 82570 ASSAY OF URINE CREATININE: CPT | Performed by: FAMILY MEDICINE

## 2025-05-28 PROCEDURE — 96368 THER/DIAG CONCURRENT INF: CPT

## 2025-05-28 PROCEDURE — 25810000003 SODIUM CHLORIDE 0.9 % SOLUTION: Performed by: INTERNAL MEDICINE

## 2025-05-28 PROCEDURE — 82043 UR ALBUMIN QUANTITATIVE: CPT | Performed by: FAMILY MEDICINE

## 2025-05-28 PROCEDURE — 96415 CHEMO IV INFUSION ADDL HR: CPT

## 2025-05-28 PROCEDURE — 25810000003 SODIUM CHLORIDE 0.9 % SOLUTION 500 ML FLEX CONT: Performed by: INTERNAL MEDICINE

## 2025-05-28 PROCEDURE — 25010000003 DEXTROSE 5 % SOLUTION 250 ML FLEX CONT: Performed by: INTERNAL MEDICINE

## 2025-05-28 PROCEDURE — 25010000002 FLUOROURACIL PER 500 MG: Performed by: INTERNAL MEDICINE

## 2025-05-28 PROCEDURE — 81003 URINALYSIS AUTO W/O SCOPE: CPT | Performed by: INTERNAL MEDICINE

## 2025-05-28 PROCEDURE — 25010000002 DEXAMETHASONE PER 1 MG: Performed by: INTERNAL MEDICINE

## 2025-05-28 PROCEDURE — 99213 OFFICE O/P EST LOW 20 MIN: CPT | Performed by: INTERNAL MEDICINE

## 2025-05-28 RX ORDER — HYDRALAZINE HYDROCHLORIDE 50 MG/1
50 TABLET, FILM COATED ORAL EVERY 8 HOURS
Qty: 90 TABLET | Refills: 1 | Status: SHIPPED | OUTPATIENT
Start: 2025-05-28

## 2025-05-28 RX ORDER — FLUOROURACIL 50 MG/ML
400 INJECTION, SOLUTION INTRAVENOUS ONCE
Status: COMPLETED | OUTPATIENT
Start: 2025-05-28 | End: 2025-05-28

## 2025-05-28 RX ORDER — PALONOSETRON 0.05 MG/ML
0.25 INJECTION, SOLUTION INTRAVENOUS ONCE
Status: COMPLETED | OUTPATIENT
Start: 2025-05-28 | End: 2025-05-28

## 2025-05-28 RX ORDER — DEXAMETHASONE SODIUM PHOSPHATE 4 MG/ML
12 INJECTION, SOLUTION INTRA-ARTICULAR; INTRALESIONAL; INTRAMUSCULAR; INTRAVENOUS; SOFT TISSUE ONCE
Status: CANCELLED
Start: 2025-05-28 | End: 2025-05-28

## 2025-05-28 RX ORDER — DEXAMETHASONE SODIUM PHOSPHATE 4 MG/ML
12 INJECTION, SOLUTION INTRA-ARTICULAR; INTRALESIONAL; INTRAMUSCULAR; INTRAVENOUS; SOFT TISSUE ONCE
Status: COMPLETED | OUTPATIENT
Start: 2025-05-28 | End: 2025-05-28

## 2025-05-28 RX ORDER — FLUOROURACIL 50 MG/ML
400 INJECTION, SOLUTION INTRAVENOUS ONCE
Status: CANCELLED | OUTPATIENT
Start: 2025-05-28

## 2025-05-28 RX ORDER — PALONOSETRON 0.05 MG/ML
0.25 INJECTION, SOLUTION INTRAVENOUS ONCE
Status: CANCELLED | OUTPATIENT
Start: 2025-05-28

## 2025-05-28 RX ORDER — ATROPINE SULFATE 1 MG/ML
0.4 INJECTION, SOLUTION INTRAMUSCULAR; INTRAVENOUS; SUBCUTANEOUS
Status: CANCELLED | OUTPATIENT
Start: 2025-05-28

## 2025-05-28 RX ORDER — SODIUM CHLORIDE 9 MG/ML
20 INJECTION, SOLUTION INTRAVENOUS ONCE
Status: COMPLETED | OUTPATIENT
Start: 2025-05-28 | End: 2025-05-28

## 2025-05-28 RX ORDER — SODIUM CHLORIDE 9 MG/ML
20 INJECTION, SOLUTION INTRAVENOUS ONCE
Status: CANCELLED | OUTPATIENT
Start: 2025-05-28

## 2025-05-28 RX ORDER — OXYCODONE AND ACETAMINOPHEN 7.5; 325 MG/1; MG/1
TABLET ORAL
COMMUNITY
Start: 2025-02-03

## 2025-05-28 RX ORDER — ATROPINE SULFATE 1 MG/ML
0.4 INJECTION, SOLUTION INTRAMUSCULAR; INTRAVENOUS; SUBCUTANEOUS
Status: DISCONTINUED | OUTPATIENT
Start: 2025-05-28 | End: 2025-05-28

## 2025-05-28 RX ADMIN — DEXAMETHASONE SODIUM PHOSPHATE 12 MG: 4 INJECTION, SOLUTION INTRAMUSCULAR; INTRAVENOUS at 10:13

## 2025-05-28 RX ADMIN — FLUOROURACIL 5400 MG: 50 INJECTION, SOLUTION INTRAVENOUS at 13:15

## 2025-05-28 RX ADMIN — SODIUM CHLORIDE 470 MG: 9 INJECTION, SOLUTION INTRAVENOUS at 10:57

## 2025-05-28 RX ADMIN — FLUOROURACIL 900 MG: 50 INJECTION, SOLUTION INTRAVENOUS at 13:13

## 2025-05-28 RX ADMIN — IRINOTECAN HYDROCHLORIDE 400 MG: 20 INJECTION INTRAVENOUS at 11:33

## 2025-05-28 RX ADMIN — SODIUM CHLORIDE 20 ML/HR: 9 INJECTION, SOLUTION INTRAVENOUS at 10:09

## 2025-05-28 RX ADMIN — LEUCOVORIN CALCIUM 900 MG: 350 INJECTION, POWDER, LYOPHILIZED, FOR SOLUTION INTRAMUSCULAR; INTRAVENOUS at 11:33

## 2025-05-28 RX ADMIN — PALONOSETRON 0.25 MG: 0.25 INJECTION, SOLUTION INTRAVENOUS at 10:10

## 2025-05-30 ENCOUNTER — HOSPITAL ENCOUNTER (OUTPATIENT)
Dept: ONCOLOGY | Facility: HOSPITAL | Age: 65
Discharge: HOME | End: 2025-05-30
Payer: MEDICARE

## 2025-05-30 DIAGNOSIS — Z45.2 ENCOUNTER FOR CARE RELATED TO VASCULAR ACCESS PORT: ICD-10-CM

## 2025-05-30 DIAGNOSIS — C78.6 METASTASIS TO PERITONEAL CAVITY: Primary | ICD-10-CM

## 2025-05-30 DIAGNOSIS — C20 RECTAL CANCER: ICD-10-CM

## 2025-05-30 PROCEDURE — 25010000002 HEPARIN LOCK FLUSH PER 10 UNITS: Performed by: INTERNAL MEDICINE

## 2025-05-30 RX ORDER — HEPARIN SODIUM (PORCINE) LOCK FLUSH IV SOLN 100 UNIT/ML 100 UNIT/ML
500 SOLUTION INTRAVENOUS AS NEEDED
OUTPATIENT
Start: 2025-05-30

## 2025-05-30 RX ORDER — HEPARIN SODIUM (PORCINE) LOCK FLUSH IV SOLN 100 UNIT/ML 100 UNIT/ML
500 SOLUTION INTRAVENOUS AS NEEDED
Status: DISCONTINUED | OUTPATIENT
Start: 2025-05-30 | End: 2025-05-31 | Stop reason: HOSPADM

## 2025-05-30 RX ORDER — SODIUM CHLORIDE 0.9 % (FLUSH) 0.9 %
20 SYRINGE (ML) INJECTION AS NEEDED
Status: DISCONTINUED | OUTPATIENT
Start: 2025-05-30 | End: 2025-05-31 | Stop reason: HOSPADM

## 2025-05-30 RX ORDER — SODIUM CHLORIDE 0.9 % (FLUSH) 0.9 %
20 SYRINGE (ML) INJECTION AS NEEDED
OUTPATIENT
Start: 2025-05-30

## 2025-05-30 RX ADMIN — HEPARIN 500 UNITS: 100 SYRINGE at 13:44

## 2025-05-30 RX ADMIN — Medication 20 ML: at 13:43

## 2025-06-11 ENCOUNTER — HOSPITAL ENCOUNTER (OUTPATIENT)
Dept: ONCOLOGY | Facility: HOSPITAL | Age: 65
Discharge: HOME | End: 2025-06-11
Admitting: INTERNAL MEDICINE
Payer: MEDICARE

## 2025-06-11 VITALS
OXYGEN SATURATION: 95 % | WEIGHT: 208 LBS | HEART RATE: 65 BPM | TEMPERATURE: 97.3 F | HEIGHT: 76 IN | SYSTOLIC BLOOD PRESSURE: 92 MMHG | DIASTOLIC BLOOD PRESSURE: 60 MMHG | RESPIRATION RATE: 16 BRPM | BODY MASS INDEX: 25.33 KG/M2

## 2025-06-11 DIAGNOSIS — N06.0 ISOLATED PROTEINURIA WITH MINOR GLOMERULAR ABNORMALITY: ICD-10-CM

## 2025-06-11 DIAGNOSIS — C78.6 METASTASIS TO PERITONEAL CAVITY: ICD-10-CM

## 2025-06-11 DIAGNOSIS — C20 RECTAL CANCER: Primary | ICD-10-CM

## 2025-06-11 LAB
ALP BLD-CCNC: 71 U/L (ref 53–128)
BASOPHILS # BLD AUTO: 0.05 10*3/MM3 (ref 0–0.2)
BASOPHILS NFR BLD AUTO: 0.7 % (ref 0–1.5)
BILIRUB UR QL STRIP: ABNORMAL
BUN BLDA-MCNC: 11 MG/DL (ref 7–22)
CALCIUM BLD QL: 10.4 MG/DL (ref 8–10.3)
CHLORIDE BLDA-SCNC: 106 MMOL/L (ref 98–108)
CLARITY UR: ABNORMAL
CO2 BLDA-SCNC: 27 MMOL/L (ref 18–33)
COLOR UR: YELLOW
CREAT BLDA-MCNC: 0.7 MG/DL (ref 0.6–1.2)
DEPRECATED RDW RBC AUTO: 58.5 FL (ref 37–54)
EGFRCR SERPLBLD CKD-EPI 2021: 103.5 ML/MIN/1.73
EOSINOPHIL # BLD AUTO: 0.16 10*3/MM3 (ref 0–0.4)
EOSINOPHIL NFR BLD AUTO: 2.1 % (ref 0.3–6.2)
ERYTHROCYTE [DISTWIDTH] IN BLOOD BY AUTOMATED COUNT: 17.1 % (ref 12.3–15.4)
GLUCOSE BLDC GLUCOMTR-MCNC: 173 MG/DL (ref 73–118)
GLUCOSE UR STRIP-MCNC: NEGATIVE MG/DL
HCT VFR BLD AUTO: 40 % (ref 37.5–51)
HGB BLD-MCNC: 12.9 G/DL (ref 13–17.7)
HGB UR QL STRIP.AUTO: ABNORMAL
KETONES UR QL STRIP: ABNORMAL
LEUKOCYTE ESTERASE UR QL STRIP.AUTO: ABNORMAL
LYMPHOCYTES # BLD AUTO: 1.91 10*3/MM3 (ref 0.7–3.1)
LYMPHOCYTES NFR BLD AUTO: 25.4 % (ref 19.6–45.3)
MCH RBC QN AUTO: 31.4 PG (ref 26.6–33)
MCHC RBC AUTO-ENTMCNC: 32.3 G/DL (ref 31.5–35.7)
MCV RBC AUTO: 97.3 FL (ref 79–97)
MONOCYTES # BLD AUTO: 0.79 10*3/MM3 (ref 0.1–0.9)
MONOCYTES NFR BLD AUTO: 10.5 % (ref 5–12)
NEUTROPHILS NFR BLD AUTO: 4.62 10*3/MM3 (ref 1.7–7)
NEUTROPHILS NFR BLD AUTO: 61.3 % (ref 42.7–76)
NITRITE UR QL STRIP: NEGATIVE
PH UR STRIP.AUTO: 5.5 [PH] (ref 5–8)
PLATELET # BLD AUTO: 169 10*3/MM3 (ref 140–450)
PMV BLD AUTO: 9.5 FL (ref 6–12)
POC ALBUMIN: 3.3 G/L (ref 3.3–5.5)
POC ALT (SGPT): 9 U/L (ref 10–47)
POC AST (SGOT): 18 U/L (ref 11–38)
POC TOTAL BILIRUBIN: 0.8 MG/DL (ref 0.2–1.6)
POC TOTAL PROTEIN: 6.2 G/DL (ref 6.4–8.1)
POTASSIUM BLDA-SCNC: 4 MMOL/L (ref 3.6–5.1)
PROT UR QL STRIP: ABNORMAL
RBC # BLD AUTO: 4.11 10*6/MM3 (ref 4.14–5.8)
SODIUM BLD-SCNC: 139 MMOL/L (ref 128–145)
SP GR UR STRIP: >=1.03 (ref 1–1.03)
UROBILINOGEN UR QL STRIP: ABNORMAL
WBC NRBC COR # BLD AUTO: 7.53 10*3/MM3 (ref 3.4–10.8)

## 2025-06-11 PROCEDURE — 81003 URINALYSIS AUTO W/O SCOPE: CPT | Performed by: INTERNAL MEDICINE

## 2025-06-11 PROCEDURE — 25010000002 IRINOTECAN PER 20 MG: Performed by: INTERNAL MEDICINE

## 2025-06-11 PROCEDURE — 96417 CHEMO IV INFUS EACH ADDL SEQ: CPT

## 2025-06-11 PROCEDURE — 25010000002 PALONOSETRON PER 25 MCG: Performed by: INTERNAL MEDICINE

## 2025-06-11 PROCEDURE — 96411 CHEMO IV PUSH ADDL DRUG: CPT

## 2025-06-11 PROCEDURE — 25010000002 BEVACIZUMAB-BVZR 400 MG/16ML SOLUTION 16 ML VIAL: Performed by: INTERNAL MEDICINE

## 2025-06-11 PROCEDURE — 25010000002 ATROPINE SULFATE 0.4 MG/ML SOLUTION 1 ML VIAL: Performed by: INTERNAL MEDICINE

## 2025-06-11 PROCEDURE — 96413 CHEMO IV INFUSION 1 HR: CPT

## 2025-06-11 PROCEDURE — 85025 COMPLETE CBC W/AUTO DIFF WBC: CPT | Performed by: INTERNAL MEDICINE

## 2025-06-11 PROCEDURE — 96375 TX/PRO/DX INJ NEW DRUG ADDON: CPT

## 2025-06-11 PROCEDURE — 25010000002 DEXAMETHASONE PER 1 MG: Performed by: INTERNAL MEDICINE

## 2025-06-11 PROCEDURE — 25810000003 SODIUM CHLORIDE 0.9 % SOLUTION 500 ML FLEX CONT: Performed by: INTERNAL MEDICINE

## 2025-06-11 PROCEDURE — 25010000002 LEUCOVORIN CALCIUM PER 50 MG: Performed by: INTERNAL MEDICINE

## 2025-06-11 PROCEDURE — 25010000003 DEXTROSE 5 % SOLUTION 250 ML FLEX CONT: Performed by: INTERNAL MEDICINE

## 2025-06-11 PROCEDURE — 96368 THER/DIAG CONCURRENT INF: CPT

## 2025-06-11 PROCEDURE — 25010000002 FLUOROURACIL PER 500 MG: Performed by: INTERNAL MEDICINE

## 2025-06-11 PROCEDURE — 80053 COMPREHEN METABOLIC PANEL: CPT

## 2025-06-11 PROCEDURE — 25010000002 BEVACIZUMAB-BVZR 100 MG/4ML SOLUTION 4 ML VIAL: Performed by: INTERNAL MEDICINE

## 2025-06-11 PROCEDURE — G0498 CHEMO EXTEND IV INFUS W/PUMP: HCPCS

## 2025-06-11 PROCEDURE — 25810000003 SODIUM CHLORIDE 0.9 % SOLUTION: Performed by: INTERNAL MEDICINE

## 2025-06-11 PROCEDURE — 96415 CHEMO IV INFUSION ADDL HR: CPT

## 2025-06-11 RX ORDER — DEXAMETHASONE SODIUM PHOSPHATE 4 MG/ML
12 INJECTION, SOLUTION INTRA-ARTICULAR; INTRALESIONAL; INTRAMUSCULAR; INTRAVENOUS; SOFT TISSUE ONCE
Status: CANCELLED
Start: 2025-06-11 | End: 2025-06-11

## 2025-06-11 RX ORDER — SODIUM CHLORIDE 9 MG/ML
20 INJECTION, SOLUTION INTRAVENOUS ONCE
Status: CANCELLED | OUTPATIENT
Start: 2025-06-11

## 2025-06-11 RX ORDER — FLUOROURACIL 50 MG/ML
400 INJECTION, SOLUTION INTRAVENOUS ONCE
Status: CANCELLED | OUTPATIENT
Start: 2025-06-11

## 2025-06-11 RX ORDER — ATROPINE SULFATE 1 MG/ML
0.4 INJECTION, SOLUTION INTRAMUSCULAR; INTRAVENOUS; SUBCUTANEOUS
Status: DISCONTINUED | OUTPATIENT
Start: 2025-06-11 | End: 2025-06-11

## 2025-06-11 RX ORDER — PALONOSETRON 0.05 MG/ML
0.25 INJECTION, SOLUTION INTRAVENOUS ONCE
Status: CANCELLED | OUTPATIENT
Start: 2025-06-11

## 2025-06-11 RX ORDER — FLUOROURACIL 50 MG/ML
400 INJECTION, SOLUTION INTRAVENOUS ONCE
Status: COMPLETED | OUTPATIENT
Start: 2025-06-11 | End: 2025-06-11

## 2025-06-11 RX ORDER — SODIUM CHLORIDE 9 MG/ML
20 INJECTION, SOLUTION INTRAVENOUS ONCE
Status: COMPLETED | OUTPATIENT
Start: 2025-06-11 | End: 2025-06-11

## 2025-06-11 RX ORDER — ATROPINE SULFATE 1 MG/ML
0.4 INJECTION, SOLUTION INTRAMUSCULAR; INTRAVENOUS; SUBCUTANEOUS
Status: CANCELLED | OUTPATIENT
Start: 2025-06-11

## 2025-06-11 RX ORDER — DEXAMETHASONE SODIUM PHOSPHATE 4 MG/ML
12 INJECTION, SOLUTION INTRA-ARTICULAR; INTRALESIONAL; INTRAMUSCULAR; INTRAVENOUS; SOFT TISSUE ONCE
Status: COMPLETED | OUTPATIENT
Start: 2025-06-11 | End: 2025-06-11

## 2025-06-11 RX ORDER — PALONOSETRON 0.05 MG/ML
0.25 INJECTION, SOLUTION INTRAVENOUS ONCE
Status: COMPLETED | OUTPATIENT
Start: 2025-06-11 | End: 2025-06-11

## 2025-06-11 RX ADMIN — IRINOTECAN HYDROCHLORIDE 400 MG: 20 INJECTION INTRAVENOUS at 12:16

## 2025-06-11 RX ADMIN — LEUCOVORIN CALCIUM 900 MG: 350 INJECTION, POWDER, LYOPHILIZED, FOR SUSPENSION INTRAMUSCULAR; INTRAVENOUS at 12:16

## 2025-06-11 RX ADMIN — SODIUM CHLORIDE 20 ML/HR: 9 INJECTION, SOLUTION INTRAVENOUS at 11:00

## 2025-06-11 RX ADMIN — FLUOROURACIL 5400 MG: 50 INJECTION, SOLUTION INTRAVENOUS at 13:55

## 2025-06-11 RX ADMIN — DEXAMETHASONE SODIUM PHOSPHATE 12 MG: 4 INJECTION, SOLUTION INTRAMUSCULAR; INTRAVENOUS at 11:00

## 2025-06-11 RX ADMIN — FLUOROURACIL 900 MG: 50 INJECTION, SOLUTION INTRAVENOUS at 13:50

## 2025-06-11 RX ADMIN — PALONOSETRON HYDROCHLORIDE 0.25 MG: 0.25 INJECTION, SOLUTION INTRAVENOUS at 11:00

## 2025-06-11 RX ADMIN — SODIUM CHLORIDE 470 MG: 9 INJECTION, SOLUTION INTRAVENOUS at 11:41

## 2025-06-11 NOTE — PROGRESS NOTES
Patient denies complaints today, request for plan to be signed sent to NP/PA. Per carla Lane to treat today with Urine protein over 300. New order for 24 hour urine. Patient given supplies and verbalized understanding. Treatment given and tolerated. Patient denies further needs today, AVS given.

## 2025-06-13 ENCOUNTER — HOSPITAL ENCOUNTER (OUTPATIENT)
Dept: ONCOLOGY | Facility: HOSPITAL | Age: 65
Discharge: HOME | End: 2025-06-13
Payer: MEDICARE

## 2025-06-13 DIAGNOSIS — C20 RECTAL CANCER: ICD-10-CM

## 2025-06-13 DIAGNOSIS — Z45.2 ENCOUNTER FOR CARE RELATED TO VASCULAR ACCESS PORT: ICD-10-CM

## 2025-06-13 DIAGNOSIS — C78.6 METASTASIS TO PERITONEAL CAVITY: Primary | ICD-10-CM

## 2025-06-13 LAB
COLLECT DURATION TIME UR: 24 HRS
PROT 24H UR-MRATE: 822.5 MG/24HOURS (ref 0–150)
SPECIMEN VOL 24H UR: 2500 ML

## 2025-06-13 PROCEDURE — 81050 URINALYSIS VOLUME MEASURE: CPT | Performed by: INTERNAL MEDICINE

## 2025-06-13 PROCEDURE — 25010000002 HEPARIN LOCK FLUSH PER 10 UNITS: Performed by: INTERNAL MEDICINE

## 2025-06-13 PROCEDURE — 84156 ASSAY OF PROTEIN URINE: CPT | Performed by: INTERNAL MEDICINE

## 2025-06-13 RX ORDER — SODIUM CHLORIDE 0.9 % (FLUSH) 0.9 %
20 SYRINGE (ML) INJECTION AS NEEDED
Status: DISCONTINUED | OUTPATIENT
Start: 2025-06-13 | End: 2025-06-14 | Stop reason: HOSPADM

## 2025-06-13 RX ORDER — SODIUM CHLORIDE 0.9 % (FLUSH) 0.9 %
20 SYRINGE (ML) INJECTION AS NEEDED
OUTPATIENT
Start: 2025-06-13

## 2025-06-13 RX ORDER — HEPARIN SODIUM (PORCINE) LOCK FLUSH IV SOLN 100 UNIT/ML 100 UNIT/ML
500 SOLUTION INTRAVENOUS AS NEEDED
Status: DISCONTINUED | OUTPATIENT
Start: 2025-06-13 | End: 2025-06-14 | Stop reason: HOSPADM

## 2025-06-13 RX ORDER — HEPARIN SODIUM (PORCINE) LOCK FLUSH IV SOLN 100 UNIT/ML 100 UNIT/ML
500 SOLUTION INTRAVENOUS AS NEEDED
OUTPATIENT
Start: 2025-06-13

## 2025-06-13 RX ADMIN — Medication 500 UNITS: at 13:47

## 2025-06-13 RX ADMIN — Medication 20 ML: at 13:47

## 2025-06-13 NOTE — ADDENDUM NOTE
Encounter addended by: Teresa Boles on: 6/13/2025 1:37 PM   Actions taken: Child order released for a procedure order

## 2025-06-13 NOTE — PROGRESS NOTES
Pt to clinic for CIV d/c.  5FU pump empty. Port aspirated, positive blood return noted. Port flushed with 20mL NS and Heparin 500units, then de-accessed.  Pt discharged without issue. Pt aware of next appt.

## 2025-06-25 ENCOUNTER — HOSPITAL ENCOUNTER (OUTPATIENT)
Dept: ONCOLOGY | Facility: HOSPITAL | Age: 65
Discharge: HOME | End: 2025-06-25
Admitting: INTERNAL MEDICINE
Payer: MEDICARE

## 2025-06-25 VITALS
DIASTOLIC BLOOD PRESSURE: 70 MMHG | OXYGEN SATURATION: 93 % | SYSTOLIC BLOOD PRESSURE: 113 MMHG | WEIGHT: 205.9 LBS | HEIGHT: 76 IN | BODY MASS INDEX: 25.07 KG/M2 | HEART RATE: 71 BPM | TEMPERATURE: 97.9 F

## 2025-06-25 DIAGNOSIS — C78.6 METASTASIS TO PERITONEAL CAVITY: ICD-10-CM

## 2025-06-25 DIAGNOSIS — C20 RECTAL CANCER: Primary | ICD-10-CM

## 2025-06-25 LAB
ALP BLD-CCNC: 65 U/L (ref 53–128)
BASOPHILS # BLD AUTO: 0.05 10*3/MM3 (ref 0–0.2)
BASOPHILS NFR BLD AUTO: 0.8 % (ref 0–1.5)
BILIRUB UR QL STRIP: NEGATIVE
BUN BLDA-MCNC: 9 MG/DL (ref 7–22)
CALCIUM BLD QL: 10.4 MG/DL (ref 8–10.3)
CHLORIDE BLDA-SCNC: 103 MMOL/L (ref 98–108)
CLARITY UR: CLEAR
CO2 BLDA-SCNC: 28 MMOL/L (ref 18–33)
COLOR UR: YELLOW
CREAT BLDA-MCNC: 0.5 MG/DL (ref 0.6–1.2)
DEPRECATED RDW RBC AUTO: 57 FL (ref 37–54)
EGFRCR SERPLBLD CKD-EPI 2021: 114.6 ML/MIN/1.73
EOSINOPHIL # BLD AUTO: 0.12 10*3/MM3 (ref 0–0.4)
EOSINOPHIL NFR BLD AUTO: 1.9 % (ref 0.3–6.2)
ERYTHROCYTE [DISTWIDTH] IN BLOOD BY AUTOMATED COUNT: 16.4 % (ref 12.3–15.4)
GLUCOSE BLDC GLUCOMTR-MCNC: 183 MG/DL (ref 73–118)
GLUCOSE UR STRIP-MCNC: ABNORMAL MG/DL
HCT VFR BLD AUTO: 40.3 % (ref 37.5–51)
HGB BLD-MCNC: 12.6 G/DL (ref 13–17.7)
HGB UR QL STRIP.AUTO: ABNORMAL
KETONES UR QL STRIP: ABNORMAL
LEUKOCYTE ESTERASE UR QL STRIP.AUTO: ABNORMAL
LYMPHOCYTES # BLD AUTO: 1.57 10*3/MM3 (ref 0.7–3.1)
LYMPHOCYTES NFR BLD AUTO: 24.7 % (ref 19.6–45.3)
MCH RBC QN AUTO: 30.7 PG (ref 26.6–33)
MCHC RBC AUTO-ENTMCNC: 31.3 G/DL (ref 31.5–35.7)
MCV RBC AUTO: 98.1 FL (ref 79–97)
MONOCYTES # BLD AUTO: 0.7 10*3/MM3 (ref 0.1–0.9)
MONOCYTES NFR BLD AUTO: 11 % (ref 5–12)
NEUTROPHILS NFR BLD AUTO: 3.92 10*3/MM3 (ref 1.7–7)
NEUTROPHILS NFR BLD AUTO: 61.6 % (ref 42.7–76)
NITRITE UR QL STRIP: NEGATIVE
PH UR STRIP.AUTO: 5.5 [PH] (ref 5–8)
PLATELET # BLD AUTO: 191 10*3/MM3 (ref 140–450)
PMV BLD AUTO: 9.1 FL (ref 6–12)
POC ALBUMIN: 3.6 G/L (ref 3.3–5.5)
POC ALT (SGPT): 11 U/L (ref 10–47)
POC AST (SGOT): 16 U/L (ref 11–38)
POC TOTAL BILIRUBIN: 0.7 MG/DL (ref 0.2–1.6)
POC TOTAL PROTEIN: 6.4 G/DL (ref 6.4–8.1)
POTASSIUM BLDA-SCNC: 3.4 MMOL/L (ref 3.6–5.1)
PROT UR QL STRIP: ABNORMAL
RBC # BLD AUTO: 4.11 10*6/MM3 (ref 4.14–5.8)
SODIUM BLD-SCNC: 140 MMOL/L (ref 128–145)
SP GR UR STRIP: >=1.03 (ref 1–1.03)
UROBILINOGEN UR QL STRIP: ABNORMAL
WBC NRBC COR # BLD AUTO: 6.36 10*3/MM3 (ref 3.4–10.8)

## 2025-06-25 PROCEDURE — 25010000003 DEXTROSE 5 % SOLUTION 250 ML FLEX CONT: Performed by: NURSE PRACTITIONER

## 2025-06-25 PROCEDURE — 25010000002 DEXAMETHASONE PER 1 MG: Performed by: NURSE PRACTITIONER

## 2025-06-25 PROCEDURE — 85025 COMPLETE CBC W/AUTO DIFF WBC: CPT | Performed by: INTERNAL MEDICINE

## 2025-06-25 PROCEDURE — 96415 CHEMO IV INFUSION ADDL HR: CPT

## 2025-06-25 PROCEDURE — 80053 COMPREHEN METABOLIC PANEL: CPT

## 2025-06-25 PROCEDURE — 96417 CHEMO IV INFUS EACH ADDL SEQ: CPT

## 2025-06-25 PROCEDURE — 25010000002 ATROPINE SULFATE 0.4 MG/ML SOLUTION 1 ML VIAL: Performed by: NURSE PRACTITIONER

## 2025-06-25 PROCEDURE — 25810000003 SODIUM CHLORIDE 0.9 % SOLUTION: Performed by: NURSE PRACTITIONER

## 2025-06-25 PROCEDURE — 96375 TX/PRO/DX INJ NEW DRUG ADDON: CPT

## 2025-06-25 PROCEDURE — 25010000002 FLUOROURACIL PER 500 MG: Performed by: NURSE PRACTITIONER

## 2025-06-25 PROCEDURE — 25010000002 IRINOTECAN PER 20 MG: Performed by: NURSE PRACTITIONER

## 2025-06-25 PROCEDURE — 25010000002 BEVACIZUMAB-BVZR 400 MG/16ML SOLUTION 16 ML VIAL: Performed by: NURSE PRACTITIONER

## 2025-06-25 PROCEDURE — 25810000003 SODIUM CHLORIDE 0.9 % SOLUTION 500 ML FLEX CONT: Performed by: NURSE PRACTITIONER

## 2025-06-25 PROCEDURE — 25010000002 BEVACIZUMAB-BVZR 100 MG/4ML SOLUTION 4 ML VIAL: Performed by: NURSE PRACTITIONER

## 2025-06-25 PROCEDURE — 96416 CHEMO PROLONG INFUSE W/PUMP: CPT

## 2025-06-25 PROCEDURE — 96411 CHEMO IV PUSH ADDL DRUG: CPT

## 2025-06-25 PROCEDURE — 25010000002 LEUCOVORIN CALCIUM PER 50 MG: Performed by: NURSE PRACTITIONER

## 2025-06-25 PROCEDURE — 81003 URINALYSIS AUTO W/O SCOPE: CPT | Performed by: INTERNAL MEDICINE

## 2025-06-25 PROCEDURE — G0498 CHEMO EXTEND IV INFUS W/PUMP: HCPCS

## 2025-06-25 PROCEDURE — 25010000002 PALONOSETRON 0.25 MG/5ML SOLUTION PREFILLED SYRINGE: Performed by: NURSE PRACTITIONER

## 2025-06-25 PROCEDURE — 96413 CHEMO IV INFUSION 1 HR: CPT

## 2025-06-25 PROCEDURE — 96368 THER/DIAG CONCURRENT INF: CPT

## 2025-06-25 RX ORDER — DEXAMETHASONE SODIUM PHOSPHATE 4 MG/ML
12 INJECTION, SOLUTION INTRA-ARTICULAR; INTRALESIONAL; INTRAMUSCULAR; INTRAVENOUS; SOFT TISSUE ONCE
Status: COMPLETED | OUTPATIENT
Start: 2025-06-25 | End: 2025-06-25

## 2025-06-25 RX ORDER — FLUOROURACIL 50 MG/ML
400 INJECTION, SOLUTION INTRAVENOUS ONCE
Status: CANCELLED | OUTPATIENT
Start: 2025-06-25

## 2025-06-25 RX ORDER — ATROPINE SULFATE 1 MG/ML
0.4 INJECTION, SOLUTION INTRAMUSCULAR; INTRAVENOUS; SUBCUTANEOUS
Status: CANCELLED | OUTPATIENT
Start: 2025-06-25

## 2025-06-25 RX ORDER — PALONOSETRON 0.05 MG/ML
0.25 INJECTION, SOLUTION INTRAVENOUS ONCE
Status: COMPLETED | OUTPATIENT
Start: 2025-06-25 | End: 2025-06-25

## 2025-06-25 RX ORDER — FLUOROURACIL 50 MG/ML
400 INJECTION, SOLUTION INTRAVENOUS ONCE
Status: COMPLETED | OUTPATIENT
Start: 2025-06-25 | End: 2025-06-25

## 2025-06-25 RX ORDER — ATROPINE SULFATE 1 MG/ML
0.4 INJECTION, SOLUTION INTRAMUSCULAR; INTRAVENOUS; SUBCUTANEOUS
Status: DISCONTINUED | OUTPATIENT
Start: 2025-06-25 | End: 2025-06-25 | Stop reason: SDUPTHER

## 2025-06-25 RX ORDER — SODIUM CHLORIDE 9 MG/ML
20 INJECTION, SOLUTION INTRAVENOUS ONCE
Status: CANCELLED | OUTPATIENT
Start: 2025-06-25

## 2025-06-25 RX ORDER — SODIUM CHLORIDE 9 MG/ML
20 INJECTION, SOLUTION INTRAVENOUS ONCE
Status: COMPLETED | OUTPATIENT
Start: 2025-06-25 | End: 2025-06-25

## 2025-06-25 RX ORDER — PALONOSETRON 0.05 MG/ML
0.25 INJECTION, SOLUTION INTRAVENOUS ONCE
Status: CANCELLED | OUTPATIENT
Start: 2025-06-25

## 2025-06-25 RX ORDER — DEXAMETHASONE SODIUM PHOSPHATE 4 MG/ML
12 INJECTION, SOLUTION INTRA-ARTICULAR; INTRALESIONAL; INTRAMUSCULAR; INTRAVENOUS; SOFT TISSUE ONCE
Status: CANCELLED
Start: 2025-06-25 | End: 2025-06-25

## 2025-06-25 RX ADMIN — IRINOTECAN HYDROCHLORIDE 400 MG: 20 INJECTION INTRAVENOUS at 12:04

## 2025-06-25 RX ADMIN — PALONOSETRON 0.25 MG: 0.25 INJECTION, SOLUTION INTRAVENOUS at 10:54

## 2025-06-25 RX ADMIN — FLUOROURACIL 900 MG: 50 INJECTION, SOLUTION INTRAVENOUS at 13:37

## 2025-06-25 RX ADMIN — SODIUM CHLORIDE 470 MG: 9 INJECTION, SOLUTION INTRAVENOUS at 11:29

## 2025-06-25 RX ADMIN — FLUOROURACIL 5400 MG: 50 INJECTION, SOLUTION INTRAVENOUS at 13:41

## 2025-06-25 RX ADMIN — DEXAMETHASONE SODIUM PHOSPHATE 12 MG: 4 INJECTION, SOLUTION INTRAMUSCULAR; INTRAVENOUS at 10:56

## 2025-06-25 RX ADMIN — LEUCOVORIN CALCIUM 900 MG: 350 INJECTION, POWDER, LYOPHILIZED, FOR SUSPENSION INTRAMUSCULAR; INTRAVENOUS at 12:01

## 2025-06-25 RX ADMIN — SODIUM CHLORIDE 20 ML/HR: 9 INJECTION, SOLUTION INTRAVENOUS at 10:54

## 2025-06-27 ENCOUNTER — HOSPITAL ENCOUNTER (OUTPATIENT)
Dept: ONCOLOGY | Facility: HOSPITAL | Age: 65
Discharge: HOME | End: 2025-06-27
Payer: MEDICARE

## 2025-06-27 DIAGNOSIS — C20 RECTAL CANCER: Primary | ICD-10-CM

## 2025-06-27 DIAGNOSIS — Z45.2 ENCOUNTER FOR CARE RELATED TO VASCULAR ACCESS PORT: ICD-10-CM

## 2025-06-27 DIAGNOSIS — C78.6 METASTASIS TO PERITONEAL CAVITY: ICD-10-CM

## 2025-06-27 PROCEDURE — 25010000002 HEPARIN LOCK FLUSH PER 10 UNITS: Performed by: INTERNAL MEDICINE

## 2025-06-27 RX ORDER — HEPARIN SODIUM (PORCINE) LOCK FLUSH IV SOLN 100 UNIT/ML 100 UNIT/ML
500 SOLUTION INTRAVENOUS AS NEEDED
OUTPATIENT
Start: 2025-06-27

## 2025-06-27 RX ORDER — HEPARIN SODIUM (PORCINE) LOCK FLUSH IV SOLN 100 UNIT/ML 100 UNIT/ML
500 SOLUTION INTRAVENOUS AS NEEDED
Status: DISCONTINUED | OUTPATIENT
Start: 2025-06-27 | End: 2025-06-28 | Stop reason: HOSPADM

## 2025-06-27 RX ORDER — SODIUM CHLORIDE 0.9 % (FLUSH) 0.9 %
20 SYRINGE (ML) INJECTION AS NEEDED
OUTPATIENT
Start: 2025-06-27

## 2025-06-27 RX ORDER — SODIUM CHLORIDE 0.9 % (FLUSH) 0.9 %
20 SYRINGE (ML) INJECTION AS NEEDED
Status: DISCONTINUED | OUTPATIENT
Start: 2025-06-27 | End: 2025-06-28 | Stop reason: HOSPADM

## 2025-06-27 RX ADMIN — Medication 500 UNITS: at 14:14

## 2025-06-27 RX ADMIN — Medication 20 ML: at 14:14

## 2025-07-07 NOTE — PROGRESS NOTES
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     History of Present Illness:  2024: Mr. Zhou carried a long history of severe hypertension and of an aneurysm of the ascending aorta.  He had been receiving antihypertensive medication after correction of the aneurysm with good results.  In 2023 he underwent his first screening colonoscopy.  A large circumferential and obstructive tumor was identified at the sigmoid/rectosigmoid junction.  The tumor could not be traversed even with the smallest instrument.  Biopsies showed moderately differentiated colonic adenocarcinoma without loss of nuclear expression of the mismatch repair proteins.  Imaging studies at the time revealed the known thoracic aneurysm that has increased mildly since the previous scan.  There was a benign-appearing subpleural nodule in the right posteromedial chest wall and pulmonary nodules that have been identified since 2018 remained stable.  Some mediastinal nonspecific and stable adenopathy was reported as well.  In the abdomen the sigmoid tumor was confirmed and there was no suggestion of metastatic disease.  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  He underwent excision of the squamous cell carcinoma that proved to be a high-grade squamous intraepithelial lesion.  He also had a robotic left colectomy.  The final report of pathology was of moderately differentiated adenocarcinoma of the colon, that measured 3.5 cm.  The tumor involved the serosal surface and the antimesenteric serosa.  Lymphovascular space invasion was identified.  All margins were negative for disease but 2  of 27 lymph nodes had metastatic involvement.  As well there were at least 5 peritoneal deposits that were excised and that were confirmed to correspond to metastatic lesions.  The tumor had intact expression of MLH1, MSH 1, MSH 6 and PMS2.  Next-generation sequencing revealed a pathogenic mutation of exon 2 of the KRAS gene. ERBB2, BRAF, NTRK, RET, and PIK3CA were negative. PD-L1 was negative, as well.  He was started on treatment with FOLFOX and bevacizumab on November 28, 2023.  He reported adequate tolerance to the treatment, with the expected cold intolerance.  He had had no nausea but since the beginning of the present illness he had lost approximately 20 pounds.  He was eating reasonably well.  He had been free of chest pains and no longer had abdominal pain.  All his surgical incisions had healed and he was having regular defecations, at times of liquid stool.  The physical exam revealed him to be a chronically ill-appearing man who did not seem in distress.  He was conversant, in good spirits and without jaundice.  Lungs diminished bilaterally.  Heart regular.  Abdomen soft and nontender.  No edema.  The laboratory exams and all the records were reviewed and discussed with him and with his wife.  To resume treatment with the idea of obtaining a new set of scans after 6 cycles of chemotherapy.  After 12 cycles of chemotherapy discuss the possibility of additional surgery if there was any residual peritoneal disease at the time of surgery.  To see me at the end of February with the scans.    2/16/2024: In the office to review scans. In the last few days has had severe glossodynia and odynophagia. Also has been coughing and expectorating some clear sputum. No fevers. Has had pain on the left side of the chest. No dyspnea. He was prescribed Hiral's magic potion and fluconazole that has resulted in relief. On exam he does not appear acutely ill. No jaundice or pallor. Lungs are diminished bilaterally and heart  irregularly irregular. Abdomen soft. No edema. Reviewed the laboratory exams. Reviewed the images of the scans. Sent prescriptions for doxycycline as the lesion in the left lower lobe is likely infectious in nature, given his leukocytosis and symptoms. Will obtain an electrocardiogram. Will follow next week.     3/8/2024: Feeling poorly. Weak and not moving much. Has continued to have diffuse pain. No fevers. His spouse believes he has an ulcer on the backside. On exam he is conversant and oriented. No jaundice. No pallor. The lungs are diminished bilaterally. Heart regular. There is indeed an ulcer in the sacral region, in the intergluteal crease. Reviewed the laboratory exams. He is to continue with the same chemotherapy. Referred to the wound clinic. To have an electrocardiogram. He is to see me in a few weeks with new scans.     4/10/2024: Was admitted to the hospital shortly after the previous visit.  He had pneumonia and was very dyspneic.  On March 11, 2024 underwent a thoracoscopic decortication with parietal and visceral pleurectomy and intercostal nerve block.  He was treated with antibiotics and eventually discharged to a subacute rehabilitation facility.  Resumed treatment on 4/9/2024.  He tells me today he is feeling progressively better.  Stronger.  The surgical incisions are healing.  He is not having as much trouble breathing.  He has had no abdominal pain or diarrhea.  On exam alert, conversant and ill.  Seems much older than the stated age but is not in distress.  Lungs are diminished bilaterally.  Heart is regular.  Abdomen is soft.  No edema.  The laboratory exams were reviewed.  I reviewed the records from the hospital and reviewed all imaging studies done during that hospitalization.  No suggestion of progressive metastatic disease.  For now continue with the same treatment.  See me early in May 2024.    5/9/2024: Feeling progressively better.  Able to take the chemotherapy without much  difficulty.  Eating more.  Also more mobile.  Using a cane only when outside of his house.  On exam alert, chronically ill-appearing but in no distress.  No jaundice.  Conversant and oriented.  Lungs diminished bilaterally.  Heart regular.  Abdomen soft.  No edema.  Reviewed the laboratory exams and discussed with him.  He will see me again in approximately 6 weeks with new scans.  Continue with the same chemotherapy.    6/19/2024: Feeling reasonably well.  No weakness or chest pains and no cough.  On exam alert and conversant.  Not jaundiced or pale.  Lungs diminished bilaterally with bilateral wheezes and heart regular.  Abdomen soft.  No edema.  Reviewed the images of the recent scans with him and explained the finding of new metastatic deposits in the liver.  Explained to him that given the length of time that elapsed without chemotherapy because of his respiratory and cardiovascular issues, I do not know if the problem is that the chemotherapy was not working at all or if that Is what led to the development of metastatic disease.  I'd like to try the FOLFOX with bevacizumab in the way that he had been getting it to see if there is response with regular treatment.  For that reason we will proceed with treatment today.  He is to see me in approximately 4 weeks from today.    8/30/2024: Progressively better and stronger.  Eating well and more energetic.  Was able to get out of the house and do several different things 6 days in a row, which has been a change.  Continues to have some dyspnea with exertion but he also continues to smoke.  He has had no chest pains and is not coughing as much.  He denies abdominal pain and has maintained regular bowel activity.  On exam he appears chronically ill and much older than the stated age.  No distress.  No jaundice.  The lungs are clear bilaterally and the heart is regular.  The abdomen is soft.  The epigastric space seems taylor and the edge of the liver can be palpated,  at the level of the midsternal line, approximately 3 cm below the costal margin.  There is no edema.  Laboratory exams reviewed.  To obtain a scan and see me in 3 months.  Continue with the same chemotherapy for now.    9/25/2024: In the office before the next scheduled appointment. He is feeling as well as he had been feeling before and on direct questioning he denies new symptoms. The scans, however, reports progression of the disease with enlargement of the hepatic lesions. There has also been development of lymphadenopathy and a lymph node conglomerate results in left hydronephrosis by causing obstruction of the mid left ureter.  Plans to begin treatment with FOLFIRI/bevacizumab were made.    10/11/2024: Received the first dose of irinotecan. Had some nausea and emesis on at least a couple of occasions. He has felt tired. He has been able to eat some. No chest pain or cough. Remains free of abdominal pain. No diarrhea, he has rather tended to be constipated. No edema. No skin rash. On exam alert and conversant. Appears chronically ill but in no distress. No jaundice. Lungs diminished bilaterally and heart regular. Abdomen soft and not tender. No edema. Reviewed the laboratory exams. I have sent a new prescription for prochlorperazine. He is to continue with the same treatment and will see me in approximately 6 weeks.     11/22/2024: Continues to feel well.  He has not had many difficulties with the current chemotherapy and he has had nausea on a couple of occasions only.  The antiemetics seems to have worked, at least partially.  He has been eating well.  He is not needing the cane as much.  He does not have any more dyspnea than before but he continues to smoke.  No abdominal pain.  Maintains regular bowel activity.  Has had the nephrostomies removed.  He has no dysuria.  No edema.  On exam alert and conversant.  Ill but in no distress.  No jaundice.  Lungs diminished bilaterally and heart regular.  Abdomen  soft.  No palpable tumors but the liver seems to be palpable approximately 10 cm below the costal margin.  No edema.  Laboratory exams reviewed.  Discussed with him.  To see me again with new scans.  Continue with the same treatment.    1/10/2025: Continues to receive chemotherapy.  He does experience the side effects and for 1 to 2 days he feels fatigued after each treatment but he recovers promptly and is able to do the majority of his activities.  His appetite remains adequate and has had no nausea or vomiting.  He is weight is, as well stable.  No chest pains or cough.  Denies abdominal pain.  Maintains regular bowel activity and has not had diarrhea.  No dysuria.  No edema.  On exam he is chronically ill-appearing but conversant and in no distress.  No jaundice.  He is pale.  He is well-hydrated.  Lungs are diminished bilaterally and heart regular.  Abdomen soft and without palpable tumors.  No edema.  Laboratory exams reviewed.  I reviewed the images of the scans.  There is good evidence of response.  To continue with the same treatment.  See me in 6 weeks.    2/21/2025: Continues to take treatment without difficulties and continues to feel reasonably well.  Has had no new symptoms and in particular he denies pain.  He maintains a reasonable appetite and his weight has been stable.  He does not have any more dyspnea than usual.  No chest pains.  On exam alert, conversant, oriented and chronically ill-appearing but in no distress.  No jaundice.  No pallor.  The lungs are diminished bilaterally.  The heart is regular.  The abdomen is soft and I cannot palpate any tumors.  No edema.  Laboratory exams reviewed.  Reviewed the previous scans.  Discussed with him.  To continue with the same treatment and obtain scans in early April 2025.    3/21/2025: Feels reasonably well.  Fatigued at times.  Maintains a good appetite and stable weight.  No fevers.  Denies pain.  No more dyspnea than usual and no cough.   Intermittently having diarrhea and constipation.  No dysuria although he has a hard time starting the stream of urine.  No edema.  On exam he is alert, conversant and chronically ill-appearing but in no distress.  No jaundice.  He is pale.  The lungs are diminished bilaterally.  The heart is regular.  The abdomen is soft.  I cannot palpate any tumors.  There is no edema.  Laboratory exams reviewed and discussed with him.  Reviewed the tumor marker endograft and explained the significance.  There is continued evidence of response.  Will confirm with new scans prior to the next visit.  For now continue with the same treatment.    4/16/2025: Feels well and has no new problems.  Has been made to active with some days of fatigue, during which he does not do much.  His appetite continues to be adequate and he has not lost weight.  For the most part he remains pain-free and has had no fevers.  Continues to have some breathing difficulties but not any worse.  No chest pains.  Denies diarrhea or dysuria.  He continues to have some discomfort associated to the ureter stent.  No edema.  The scans reveal continued response with reduction in the size of the metastatic deposits in the liver.  No evidence of peritoneal carcinomatosis is also improved.  Laboratory exams reviewed and discussed with him.  To collect urine for protein measurement as he continues to have proteinuria.  Continue with the same treatment.    5/28/2025: Feels very well and has no new symptoms.  Has been reasonably active and without new limitations.  Maintains a reasonable appetite.  No pain.  No nausea.  Fatigued at times.  On exam alert and conversant.  Oriented.  No distress.  No jaundice.  The lungs are clear bilaterally and the heart is regular.  The abdomen is soft and not tender.  There are no tumors.  No edema.  Laboratory exams reviewed.  Reviewed the images and the report of the recent scans and discussed again with him as he had some questions.   To continue with same treatment and see me in approximately 6 weeks.  Scans at the end of July 2025.    7/9/2025: Feels reasonably well.  He states he has been having sinus congestion and drainage for the past week.  Associated headache.  He has tried over-the-counter decongestants with minimal relief.  He denies fever or chills.  Patient has remained reasonably active without any new limitations.  Appetite has remained good.  He denies any new areas of pain.  He does have fatigue at times as well as nausea but this is usually a few days after his infusion and resolves relatively quickly.  On exam he is alert conversant.  No acute distress.  No jaundice nor pallor noted.  Lungs clear bilaterally heart regular.  Abdomen soft and nontender.  Laboratory exams reviewed.  Will continue with the same treatment.  Plan for scans at the end of this month.    Past Medical History:   Diagnosis Date    Aortic dissection     Diabetes mellitus     Heart murmur     History of noncompliance with medical treatment     Hyperlipidemia     Hypertension     Type B hypoplasia of aortic arch      Past Surgical History:   Procedure Laterality Date    COLECTOMY PARTIAL / TOTAL  2023    COLONOSCOPY N/A 08/31/2023    Procedure: COLONOSCOPY with sigmoid mass tattooing at 35cm, sigmoid and rectal polypectomy;  Surgeon: TORIBIO Jacob MD;  Location: [facility] ENDOSCOPY;  Service: Gastroenterology;  Laterality: N/A;  sigmoid mass, colon polyps    THORACOSCOPY WITH DECORTICATION Left 3/11/2024    Procedure: THORACOSCOPY VIDEO ASSISTED WITH DECORTICATION WITH DAVINCI ROBOT;  Surgeon: Saqib Keller MD;  Location: [facility] MAIN OR;  Service: Robotics - DaVinci;  Laterality: Left;       Current Outpatient Medications:     amLODIPine (NORVASC) 10 MG tablet, TAKE 1 TABLET BY MOUTH DAILY, Disp: 90 tablet, Rfl: 0    atorvastatin (LIPITOR) 40 MG tablet, TAKE 1 TABLET BY MOUTH EVERY NIGHT AT BEDTIME, Disp: 90 tablet, Rfl: 0    Blood Glucose Monitoring Suppl  (Accu-Chek Guide) w/Device kit, 1 Device Daily., Disp: 1 kit, Rfl: 0    cloNIDine (CATAPRES) 0.1 MG tablet, TAKE 1 TABLET BY MOUTH 2 TIMES A DAY, Disp: 180 tablet, Rfl: 1    gabapentin (NEURONTIN) 300 MG capsule, TAKE 1 CAPSULE BY MOUTH 3 TIMES A DAY, Disp: 270 capsule, Rfl: 1    glimepiride (AMARYL) 4 MG tablet, Take 1 tablet by mouth 2 (Two) Times a Day., Disp: 60 tablet, Rfl: 5    glucose blood (Accu-Chek Guide) test strip, Test daily e11.9, Disp: 50 each, Rfl: 12    hydrALAZINE (APRESOLINE) 50 MG tablet, TAKE ONE TABLET BY MOUTH EVERY 8 HOURS, Disp: 90 tablet, Rfl: 1    metFORMIN (GLUCOPHAGE) 850 MG tablet, Take 1 tablet by mouth 2 (Two) Times a Day With Meals., Disp: 144 tablet, Rfl: 1    metoprolol tartrate (LOPRESSOR) 50 MG tablet, TAKE THREE TABLETS BY MOUTH EVERY 12 HOURS, Disp: 540 tablet, Rfl: 0    ondansetron (ZOFRAN) 8 MG tablet, Take 1 tablet by mouth 3 (Three) Times a Day As Needed for Nausea or Vomiting., Disp: 30 tablet, Rfl: 5    oxyCODONE-acetaminophen (PERCOCET) 7.5-325 MG per tablet, , Disp: , Rfl:     potassium chloride 10 MEQ CR tablet, Take 1 tablet by mouth Daily., Disp: 30 tablet, Rfl: 2    Tirzepatide 2.5 MG/0.5ML solution auto-injector, Inject 2.5 mg under the skin into the appropriate area as directed 1 (One) Time Per Week., Disp: 2 mL, Rfl: 0    triamcinolone (KENALOG) 0.1 % cream, Apply 1 Application topically to the appropriate area as directed 2 (Two) Times a Day., Disp: 15 g, Rfl: 0    amoxicillin-clavulanate (AUGMENTIN) 875-125 MG per tablet, Take 1 tablet by mouth 2 (Two) Times a Day., Disp: 14 tablet, Rfl: 0  No current facility-administered medications for this visit.    Facility-Administered Medications Ordered in Other Visits:     fluorouracil (ADRUCIL) 5,400 mg in sodium chloride 0.9 % 240 mL chemo infusion - FOR HOME USE, 5,400 mg, Intravenous, Once, Cheri Veloz PA-C    fluorouracil (ADRUCIL) chemo injection 900 mg, 400 mg/m2 (Treatment Plan Recorded), Intravenous,  Once, Cheri Veloz PA-C    irinotecan (CAMPTOSAR) 400 mg, Atropine Sulfate 0.4 mg in dextrose (D5W) 5 % 271 mL chemo IVPB, 400 mg, Intravenous, Once, Cheri Veloz PA-C, Last Rate: 185 mL/hr at 07/09/25 1035, 400 mg at 07/09/25 1035    leucovorin 900 mg in dextrose (D5W) 5 % 295 mL IVPB, 400 mg/m2 (Treatment Plan Recorded), Intravenous, Once, Cheri Veloz PA-C, Last Rate: 200 mL/hr at 07/09/25 1033, 900 mg at 07/09/25 1033    No Known Allergies    Family History   Problem Relation Age of Onset    Diabetes Mother     Dementia Mother     Sudden death Father     No Known Problems Sister     No Known Problems Brother     No Known Problems Maternal Aunt     No Known Problems Maternal Uncle     No Known Problems Paternal Aunt     No Known Problems Paternal Uncle     No Known Problems Maternal Grandmother     No Known Problems Maternal Grandfather     No Known Problems Paternal Grandmother     No Known Problems Paternal Grandfather     No Known Problems Other     Anemia Neg Hx     Arrhythmia Neg Hx     Asthma Neg Hx     Clotting disorder Neg Hx     Fainting Neg Hx     Heart attack Neg Hx     Heart disease Neg Hx     Heart failure Neg Hx     Hyperlipidemia Neg Hx     Hypertension Neg Hx      Cancer-related family history is not on file.    Social History     Tobacco Use    Smoking status: Every Day     Current packs/day: 2.00     Average packs/day: 2.0 packs/day for 54.5 years (109.0 ttl pk-yrs)     Types: Cigarettes     Start date: 1971     Passive exposure: Current    Smokeless tobacco: Never   Vaping Use    Vaping status: Never Used   Substance Use Topics    Alcohol use: No     Comment: Abstinent since around 2008    Drug use: Not Currently     Types: Marijuana     Comment: Abstinent since at least the 1980's     Social History     Social History Narrative    Not on file     ROS:   Review of Systems   Constitutional:  Positive for fatigue. Negative for activity change, appetite change, chills,  "diaphoresis, fever and unexpected weight change.   HENT:  Positive for congestion, postnasal drip and sinus pressure. Negative for dental problem, drooling, ear discharge, ear pain, facial swelling, hearing loss, mouth sores, nosebleeds, rhinorrhea, sinus pain, sneezing, sore throat, tinnitus, trouble swallowing and voice change.    Eyes:  Negative for photophobia, pain, discharge, redness, itching and visual disturbance.   Respiratory:  Negative for apnea, cough, choking, chest tightness, shortness of breath, wheezing and stridor.    Cardiovascular:  Negative for chest pain, palpitations and leg swelling.   Gastrointestinal:  Negative for abdominal distention, abdominal pain, anal bleeding, blood in stool, constipation, diarrhea, nausea, rectal pain and vomiting.   Endocrine: Negative for cold intolerance, heat intolerance, polydipsia and polyuria.   Genitourinary:  Negative for decreased urine volume, difficulty urinating, dysuria, flank pain, frequency, genital sores, hematuria and urgency.   Musculoskeletal:  Negative for arthralgias, back pain, gait problem, joint swelling, myalgias, neck pain and neck stiffness.   Skin:  Negative for color change, pallor and rash.   Neurological:  Negative for dizziness, tremors, seizures, syncope, facial asymmetry, speech difficulty, weakness, light-headedness, numbness and headaches.   Hematological:  Negative for adenopathy. Does not bruise/bleed easily.   Psychiatric/Behavioral:  Negative for agitation, behavioral problems, confusion, decreased concentration, hallucinations, self-injury, sleep disturbance and suicidal ideas. The patient is not nervous/anxious.      Objective:    Vital Signs:  Vitals:    07/09/25 0817   BP: 108/69   Pulse: 74   Temp: 97.4 °F (36.3 °C)   SpO2: 94%   Weight: 91.2 kg (201 lb)   Height: 193 cm (75.98\")   PainSc: 0-No pain       Body mass index is 24.48 kg/m².    ECOG  (1) Restricted in physically strenuous activity, ambulatory and able to do " work of light nature    Physical Exam:   Physical Exam  Vitals reviewed.   Constitutional:       General: He is not in acute distress.     Appearance: He is not diaphoretic.   HENT:      Head: Normocephalic and atraumatic.   Eyes:      General: No scleral icterus.        Right eye: No discharge.         Left eye: No discharge.      Conjunctiva/sclera: Conjunctivae normal.   Neck:      Thyroid: No thyromegaly.   Cardiovascular:      Rate and Rhythm: Normal rate and regular rhythm.      Heart sounds: Normal heart sounds.      No friction rub. No gallop.   Pulmonary:      Effort: Pulmonary effort is normal. No respiratory distress.      Breath sounds: No stridor. No wheezing.   Abdominal:      General: Bowel sounds are normal.      Palpations: Abdomen is soft. There is no mass.      Tenderness: There is no abdominal tenderness. There is no guarding or rebound.   Musculoskeletal:         General: No tenderness. Normal range of motion.      Cervical back: Normal range of motion and neck supple.   Lymphadenopathy:      Cervical: No cervical adenopathy.   Skin:     General: Skin is warm.      Findings: No erythema or rash.   Neurological:      Mental Status: He is alert and oriented to person, place, and time.      Motor: No abnormal muscle tone.   Psychiatric:         Mood and Affect: Mood normal.         Behavior: Behavior normal.         Thought Content: Thought content normal.         Judgment: Judgment normal.         Lab Results - Last 18 Months   Lab Units 07/09/25  0758 06/25/25  0930 06/11/25  0931   WBC 10*3/mm3 6.65 6.36 7.53   HEMOGLOBIN g/dL 11.6* 12.6* 12.9*   HEMATOCRIT % 36.1* 40.3 40.0   PLATELETS 10*3/mm3 254 191 169   MCV fL 95.5 98.1* 97.3*     Lab Results - Last 18 Months   Lab Units 07/09/25  0826 06/25/25  0958 06/11/25  0956 01/22/25  0909 01/10/25  0819 01/08/25  0820 11/25/24  0913 11/22/24  1443   SODIUM mmol/L  --   --   --   --  138 138  --  140   POTASSIUM mmol/L  --   --   --   --  3.5 3.7   --  3.5   CHLORIDE mmol/L  --   --   --   --  99 103  --  104   CO2 mmol/L  --   --   --   --  27.0 25.4  --  24.8   BUN mg/dL  --   --   --   --  16 8  --  10   CREATININE mg/dL 0.70 0.50* 0.70   < > 0.67* 0.70*   < > 0.74*   CALCIUM mg/dL  --   --   --   --  10.5 10.0  --  10.6*   BILIRUBIN mg/dL  --   --   --   --  0.5 0.4  --  0.3   ALK PHOS U/L  --   --   --   --  71 77  --  81   ALT (SGPT) U/L  --   --   --   --  20 10  --  14   AST (SGOT) U/L  --   --   --   --  24 22  --  19   GLUCOSE mg/dL  --   --   --   --  261* 226*  --  137*    < > = values in this interval not displayed.     Lab Results   Component Value Date    GLUCOSE 261 (H) 01/10/2025    BUN 16 01/10/2025    CREATININE 0.70 07/09/2025    EGFRIFNONA 100 01/20/2022    BCR 23.9 01/10/2025    K 3.5 01/10/2025    CO2 27.0 01/10/2025    CALCIUM 10.5 01/10/2025    ALBUMIN 4.0 01/10/2025    AST 24 01/10/2025    ALT 20 01/10/2025     Lab Results - Last 18 Months   Lab Units 10/18/24  0825 10/04/24  0811 03/10/24  2236   INR  1.05 1.01 1.21*   APTT seconds 27.9 31.0 31.2*     Lab Results   Component Value Date    PTT 27.9 10/18/2024    INR 1.05 10/18/2024     Lab Results   Component Value Date    CEA 2.70 05/28/2025     Lab Results   Component Value Date    PSA 0.594 01/23/2023     Assessment & Plan     1.  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 Started FOLFIRI/bevacizumab on October 10, 2024.  The images revealed continued response.  He continues to have good tolerance to the treatment.  Scans in late July 2025.  2.  Recovered completely from the decortication.  Remains unchanged.  No dyspnea or any evidence of further complications.  3.  Cigarette smoker: Unchanged.  4.  Aortic aneurysm: Observation only for now.  5.  Hypertension: Remains under excellent control.  6.  Reviewed laboratory exams and discussed results with him.  Will continue with current  treatment.  7.  Follow-up with Dr. Wagner with repeat scans, sooner if condition indicates    Electronically signed by Cheri Veloz PA-C

## 2025-07-09 ENCOUNTER — HOSPITAL ENCOUNTER (OUTPATIENT)
Dept: ONCOLOGY | Facility: HOSPITAL | Age: 65
Discharge: HOME | End: 2025-07-09
Payer: MEDICARE

## 2025-07-09 ENCOUNTER — OFFICE VISIT (OUTPATIENT)
Dept: ONCOLOGY | Facility: CLINIC | Age: 65
End: 2025-07-09
Payer: MEDICARE

## 2025-07-09 VITALS
OXYGEN SATURATION: 94 % | DIASTOLIC BLOOD PRESSURE: 69 MMHG | WEIGHT: 201 LBS | TEMPERATURE: 97.4 F | HEIGHT: 76 IN | HEART RATE: 74 BPM | BODY MASS INDEX: 24.48 KG/M2 | SYSTOLIC BLOOD PRESSURE: 108 MMHG

## 2025-07-09 DIAGNOSIS — C78.6 METASTASIS TO PERITONEAL CAVITY: ICD-10-CM

## 2025-07-09 DIAGNOSIS — C78.6 METASTASIS TO PERITONEAL CAVITY: Primary | ICD-10-CM

## 2025-07-09 DIAGNOSIS — C20 RECTAL CANCER: ICD-10-CM

## 2025-07-09 DIAGNOSIS — C20 RECTAL CANCER: Primary | ICD-10-CM

## 2025-07-09 LAB
ALP BLD-CCNC: 77 U/L (ref 53–128)
BASOPHILS # BLD AUTO: 0.04 10*3/MM3 (ref 0–0.2)
BASOPHILS NFR BLD AUTO: 0.6 % (ref 0–1.5)
BILIRUB UR QL STRIP: ABNORMAL
BUN BLDA-MCNC: 10 MG/DL (ref 7–22)
CALCIUM BLD QL: 10.3 MG/DL (ref 8–10.3)
CEA SERPL-MCNC: 2.67 NG/ML
CHLORIDE BLDA-SCNC: 102 MMOL/L (ref 98–108)
CLARITY UR: ABNORMAL
CO2 BLDA-SCNC: 28 MMOL/L (ref 18–33)
COLOR UR: ABNORMAL
CREAT BLDA-MCNC: 0.7 MG/DL (ref 0.6–1.2)
DEPRECATED RDW RBC AUTO: 54.5 FL (ref 37–54)
EGFRCR SERPLBLD CKD-EPI 2021: 103.5 ML/MIN/1.73
EOSINOPHIL # BLD AUTO: 0.11 10*3/MM3 (ref 0–0.4)
EOSINOPHIL NFR BLD AUTO: 1.7 % (ref 0.3–6.2)
ERYTHROCYTE [DISTWIDTH] IN BLOOD BY AUTOMATED COUNT: 16.2 % (ref 12.3–15.4)
GLUCOSE BLDC GLUCOMTR-MCNC: 170 MG/DL (ref 73–118)
GLUCOSE UR STRIP-MCNC: NEGATIVE MG/DL
HCT VFR BLD AUTO: 36.1 % (ref 37.5–51)
HGB BLD-MCNC: 11.6 G/DL (ref 13–17.7)
HGB UR QL STRIP.AUTO: ABNORMAL
KETONES UR QL STRIP: NEGATIVE
LEUKOCYTE ESTERASE UR QL STRIP.AUTO: NEGATIVE
LYMPHOCYTES # BLD AUTO: 1.53 10*3/MM3 (ref 0.7–3.1)
LYMPHOCYTES NFR BLD AUTO: 23 % (ref 19.6–45.3)
MCH RBC QN AUTO: 30.7 PG (ref 26.6–33)
MCHC RBC AUTO-ENTMCNC: 32.1 G/DL (ref 31.5–35.7)
MCV RBC AUTO: 95.5 FL (ref 79–97)
MONOCYTES # BLD AUTO: 1.15 10*3/MM3 (ref 0.1–0.9)
MONOCYTES NFR BLD AUTO: 17.3 % (ref 5–12)
NEUTROPHILS NFR BLD AUTO: 3.82 10*3/MM3 (ref 1.7–7)
NEUTROPHILS NFR BLD AUTO: 57.4 % (ref 42.7–76)
NITRITE UR QL STRIP: NEGATIVE
PH UR STRIP.AUTO: 5.5 [PH] (ref 5–8)
PLATELET # BLD AUTO: 254 10*3/MM3 (ref 140–450)
PMV BLD AUTO: 8.9 FL (ref 6–12)
POC ALBUMIN: 3.4 G/L (ref 3.3–5.5)
POC ALT (SGPT): 13 U/L (ref 10–47)
POC AST (SGOT): 19 U/L (ref 11–38)
POC TOTAL BILIRUBIN: 0.7 MG/DL (ref 0.2–1.6)
POC TOTAL PROTEIN: 6.3 G/DL (ref 6.4–8.1)
POTASSIUM BLDA-SCNC: 3 MMOL/L (ref 3.6–5.1)
PROT UR QL STRIP: ABNORMAL
RBC # BLD AUTO: 3.78 10*6/MM3 (ref 4.14–5.8)
SODIUM BLD-SCNC: 140 MMOL/L (ref 128–145)
SP GR UR STRIP: >=1.03 (ref 1–1.03)
UROBILINOGEN UR QL STRIP: ABNORMAL
WBC NRBC COR # BLD AUTO: 6.65 10*3/MM3 (ref 3.4–10.8)

## 2025-07-09 PROCEDURE — 25010000002 FLUOROURACIL PER 500 MG: Performed by: PHYSICIAN ASSISTANT

## 2025-07-09 PROCEDURE — 25010000002 IRINOTECAN PER 20 MG: Performed by: PHYSICIAN ASSISTANT

## 2025-07-09 PROCEDURE — 96413 CHEMO IV INFUSION 1 HR: CPT

## 2025-07-09 PROCEDURE — G0498 CHEMO EXTEND IV INFUS W/PUMP: HCPCS

## 2025-07-09 PROCEDURE — 80053 COMPREHEN METABOLIC PANEL: CPT

## 2025-07-09 PROCEDURE — 96417 CHEMO IV INFUS EACH ADDL SEQ: CPT

## 2025-07-09 PROCEDURE — 25010000002 BEVACIZUMAB-BVZR 100 MG/4ML SOLUTION 4 ML VIAL: Performed by: PHYSICIAN ASSISTANT

## 2025-07-09 PROCEDURE — 25810000003 SODIUM CHLORIDE 0.9 % SOLUTION: Performed by: PHYSICIAN ASSISTANT

## 2025-07-09 PROCEDURE — 25010000002 PALONOSETRON 0.25 MG/5ML SOLUTION PREFILLED SYRINGE: Performed by: PHYSICIAN ASSISTANT

## 2025-07-09 PROCEDURE — 25010000002 ATROPINE SULFATE 0.4 MG/ML SOLUTION 1 ML VIAL: Performed by: PHYSICIAN ASSISTANT

## 2025-07-09 PROCEDURE — 25010000002 LEUCOVORIN CALCIUM PER 50 MG: Performed by: PHYSICIAN ASSISTANT

## 2025-07-09 PROCEDURE — 25810000003 SODIUM CHLORIDE 0.9 % SOLUTION 500 ML FLEX CONT: Performed by: PHYSICIAN ASSISTANT

## 2025-07-09 PROCEDURE — 25010000003 DEXTROSE 5 % SOLUTION 250 ML FLEX CONT: Performed by: PHYSICIAN ASSISTANT

## 2025-07-09 PROCEDURE — 25010000002 DEXAMETHASONE PER 1 MG: Performed by: PHYSICIAN ASSISTANT

## 2025-07-09 PROCEDURE — 96415 CHEMO IV INFUSION ADDL HR: CPT

## 2025-07-09 PROCEDURE — 96375 TX/PRO/DX INJ NEW DRUG ADDON: CPT

## 2025-07-09 PROCEDURE — 81003 URINALYSIS AUTO W/O SCOPE: CPT | Performed by: INTERNAL MEDICINE

## 2025-07-09 PROCEDURE — 85025 COMPLETE CBC W/AUTO DIFF WBC: CPT | Performed by: INTERNAL MEDICINE

## 2025-07-09 PROCEDURE — 25010000002 BEVACIZUMAB-BVZR 400 MG/16ML SOLUTION 16 ML VIAL: Performed by: PHYSICIAN ASSISTANT

## 2025-07-09 PROCEDURE — 96411 CHEMO IV PUSH ADDL DRUG: CPT

## 2025-07-09 PROCEDURE — 96368 THER/DIAG CONCURRENT INF: CPT

## 2025-07-09 PROCEDURE — 82378 CARCINOEMBRYONIC ANTIGEN: CPT | Performed by: INTERNAL MEDICINE

## 2025-07-09 RX ORDER — FLUOROURACIL 50 MG/ML
400 INJECTION, SOLUTION INTRAVENOUS ONCE
Status: COMPLETED | OUTPATIENT
Start: 2025-07-09 | End: 2025-07-09

## 2025-07-09 RX ORDER — PALONOSETRON 0.05 MG/ML
0.25 INJECTION, SOLUTION INTRAVENOUS ONCE
Status: COMPLETED | OUTPATIENT
Start: 2025-07-09 | End: 2025-07-09

## 2025-07-09 RX ORDER — ATROPINE SULFATE 1 MG/ML
0.4 INJECTION, SOLUTION INTRAMUSCULAR; INTRAVENOUS; SUBCUTANEOUS
Status: DISCONTINUED | OUTPATIENT
Start: 2025-07-09 | End: 2025-07-09

## 2025-07-09 RX ORDER — DEXAMETHASONE SODIUM PHOSPHATE 4 MG/ML
12 INJECTION, SOLUTION INTRA-ARTICULAR; INTRALESIONAL; INTRAMUSCULAR; INTRAVENOUS; SOFT TISSUE ONCE
Status: COMPLETED | OUTPATIENT
Start: 2025-07-09 | End: 2025-07-09

## 2025-07-09 RX ORDER — AMOXICILLIN AND CLAVULANATE POTASSIUM 500; 125 MG/1; MG/1
1 TABLET, FILM COATED ORAL 3 TIMES DAILY
Status: CANCELLED | OUTPATIENT
Start: 2025-07-09

## 2025-07-09 RX ORDER — ATROPINE SULFATE 1 MG/ML
0.4 INJECTION, SOLUTION INTRAMUSCULAR; INTRAVENOUS; SUBCUTANEOUS
Status: CANCELLED | OUTPATIENT
Start: 2025-07-09

## 2025-07-09 RX ORDER — DEXAMETHASONE SODIUM PHOSPHATE 4 MG/ML
12 INJECTION, SOLUTION INTRA-ARTICULAR; INTRALESIONAL; INTRAMUSCULAR; INTRAVENOUS; SOFT TISSUE ONCE
Status: CANCELLED
Start: 2025-07-09 | End: 2025-07-09

## 2025-07-09 RX ORDER — FLUOROURACIL 50 MG/ML
400 INJECTION, SOLUTION INTRAVENOUS ONCE
Status: CANCELLED | OUTPATIENT
Start: 2025-07-09

## 2025-07-09 RX ORDER — SODIUM CHLORIDE 9 MG/ML
20 INJECTION, SOLUTION INTRAVENOUS ONCE
Status: COMPLETED | OUTPATIENT
Start: 2025-07-09 | End: 2025-07-09

## 2025-07-09 RX ORDER — SODIUM CHLORIDE 9 MG/ML
20 INJECTION, SOLUTION INTRAVENOUS ONCE
Status: CANCELLED | OUTPATIENT
Start: 2025-07-09

## 2025-07-09 RX ORDER — PALONOSETRON 0.05 MG/ML
0.25 INJECTION, SOLUTION INTRAVENOUS ONCE
Status: CANCELLED | OUTPATIENT
Start: 2025-07-09

## 2025-07-09 RX ADMIN — FLUOROURACIL 900 MG: 50 INJECTION, SOLUTION INTRAVENOUS at 12:21

## 2025-07-09 RX ADMIN — LEUCOVORIN CALCIUM 900 MG: 350 INJECTION, POWDER, LYOPHILIZED, FOR SUSPENSION INTRAMUSCULAR; INTRAVENOUS at 10:33

## 2025-07-09 RX ADMIN — SODIUM CHLORIDE 20 ML/HR: 9 INJECTION, SOLUTION INTRAVENOUS at 09:27

## 2025-07-09 RX ADMIN — PALONOSETRON 0.25 MG: 0.25 INJECTION, SOLUTION INTRAVENOUS at 09:31

## 2025-07-09 RX ADMIN — FLUOROURACIL 5400 MG: 50 INJECTION, SOLUTION INTRAVENOUS at 12:25

## 2025-07-09 RX ADMIN — ATROPINE SULFATE 400 MG: 0.4 INJECTION, SOLUTION INTRAVENOUS at 10:35

## 2025-07-09 RX ADMIN — SODIUM CHLORIDE 470 MG: 9 INJECTION, SOLUTION INTRAVENOUS at 09:58

## 2025-07-09 RX ADMIN — DEXAMETHASONE SODIUM PHOSPHATE 12 MG: 4 INJECTION, SOLUTION INTRAMUSCULAR; INTRAVENOUS at 09:29

## 2025-07-09 NOTE — PROGRESS NOTES
Pt here for C20 of Zirabev and Folfiri. Infusaport accessed prior to arriving in infusion. Cbc/cmp drawn and resulted. Np visit completed. Treatment given as directed.

## 2025-07-09 NOTE — PROGRESS NOTES
Port accessed and flushed with good blood return noted. 10cc of blood wasted prior to specimen collection. Blood specimen obtained and sent to lab for processing per protocol.  Port flushed with saline and capped pt back to waiting room for MD appt.

## 2025-07-11 ENCOUNTER — HOSPITAL ENCOUNTER (OUTPATIENT)
Dept: ONCOLOGY | Facility: HOSPITAL | Age: 65
Discharge: HOME | End: 2025-07-11
Payer: MEDICARE

## 2025-07-11 DIAGNOSIS — C78.6 METASTASIS TO PERITONEAL CAVITY: Primary | ICD-10-CM

## 2025-07-11 DIAGNOSIS — C20 RECTAL CANCER: ICD-10-CM

## 2025-07-11 DIAGNOSIS — Z45.2 ENCOUNTER FOR CARE RELATED TO VASCULAR ACCESS PORT: ICD-10-CM

## 2025-07-11 PROCEDURE — 25010000002 HEPARIN LOCK FLUSH PER 10 UNITS: Performed by: INTERNAL MEDICINE

## 2025-07-11 RX ORDER — SODIUM CHLORIDE 0.9 % (FLUSH) 0.9 %
20 SYRINGE (ML) INJECTION AS NEEDED
Status: DISCONTINUED | OUTPATIENT
Start: 2025-07-11 | End: 2025-07-12 | Stop reason: HOSPADM

## 2025-07-11 RX ORDER — SODIUM CHLORIDE 0.9 % (FLUSH) 0.9 %
20 SYRINGE (ML) INJECTION AS NEEDED
OUTPATIENT
Start: 2025-07-11

## 2025-07-11 RX ORDER — HEPARIN SODIUM (PORCINE) LOCK FLUSH IV SOLN 100 UNIT/ML 100 UNIT/ML
500 SOLUTION INTRAVENOUS AS NEEDED
Status: DISCONTINUED | OUTPATIENT
Start: 2025-07-11 | End: 2025-07-12 | Stop reason: HOSPADM

## 2025-07-11 RX ORDER — HEPARIN SODIUM (PORCINE) LOCK FLUSH IV SOLN 100 UNIT/ML 100 UNIT/ML
500 SOLUTION INTRAVENOUS AS NEEDED
OUTPATIENT
Start: 2025-07-11

## 2025-07-11 RX ORDER — TIRZEPATIDE 2.5 MG/.5ML
INJECTION, SOLUTION SUBCUTANEOUS
Qty: 2 ML | Refills: 0 | Status: SHIPPED | OUTPATIENT
Start: 2025-07-11

## 2025-07-11 RX ADMIN — Medication 20 ML: at 13:51

## 2025-07-11 RX ADMIN — Medication 500 UNITS: at 13:51

## 2025-07-14 DIAGNOSIS — E83.42 HYPOMAGNESEMIA: ICD-10-CM

## 2025-07-14 DIAGNOSIS — E87.6 HYPOKALEMIA: ICD-10-CM

## 2025-07-14 RX ORDER — POTASSIUM CHLORIDE 750 MG/1
10 TABLET, EXTENDED RELEASE ORAL DAILY
Qty: 30 TABLET | Refills: 2 | Status: SHIPPED | OUTPATIENT
Start: 2025-07-14

## 2025-07-23 ENCOUNTER — HOSPITAL ENCOUNTER (OUTPATIENT)
Dept: ONCOLOGY | Facility: HOSPITAL | Age: 65
Discharge: HOME | End: 2025-07-23
Admitting: INTERNAL MEDICINE
Payer: MEDICARE

## 2025-07-23 ENCOUNTER — HOSPITAL ENCOUNTER (OUTPATIENT)
Dept: PET IMAGING | Facility: HOSPITAL | Age: 65
Discharge: HOME | End: 2025-07-23
Admitting: PHYSICIAN ASSISTANT
Payer: MEDICARE

## 2025-07-23 VITALS
BODY MASS INDEX: 24.26 KG/M2 | SYSTOLIC BLOOD PRESSURE: 115 MMHG | OXYGEN SATURATION: 94 % | HEART RATE: 76 BPM | DIASTOLIC BLOOD PRESSURE: 70 MMHG | RESPIRATION RATE: 18 BRPM | HEIGHT: 76 IN | WEIGHT: 199.2 LBS | TEMPERATURE: 96.8 F

## 2025-07-23 DIAGNOSIS — C20 RECTAL CANCER: Primary | ICD-10-CM

## 2025-07-23 DIAGNOSIS — C78.6 METASTASIS TO PERITONEAL CAVITY: ICD-10-CM

## 2025-07-23 DIAGNOSIS — C20 RECTAL CANCER: ICD-10-CM

## 2025-07-23 LAB
ALP BLD-CCNC: 92 U/L (ref 53–128)
BASOPHILS # BLD AUTO: 0.02 10*3/MM3 (ref 0–0.2)
BASOPHILS NFR BLD AUTO: 0.5 % (ref 0–1.5)
BILIRUB UR QL STRIP: NEGATIVE
BUN BLDA-MCNC: 10 MG/DL (ref 7–22)
CALCIUM BLD QL: 10.1 MG/DL (ref 8–10.3)
CHLORIDE BLDA-SCNC: 109 MMOL/L (ref 98–108)
CLARITY UR: ABNORMAL
CO2 BLDA-SCNC: 26 MMOL/L (ref 18–33)
COLOR UR: YELLOW
CREAT BLDA-MCNC: 0.7 MG/DL (ref 0.6–1.2)
DEPRECATED RDW RBC AUTO: 57.6 FL (ref 37–54)
EGFRCR SERPLBLD CKD-EPI 2021: 103.5 ML/MIN/1.73
EOSINOPHIL # BLD AUTO: 0.09 10*3/MM3 (ref 0–0.4)
EOSINOPHIL NFR BLD AUTO: 2.2 % (ref 0.3–6.2)
ERYTHROCYTE [DISTWIDTH] IN BLOOD BY AUTOMATED COUNT: 16.7 % (ref 12.3–15.4)
GLUCOSE BLDC GLUCOMTR-MCNC: 163 MG/DL (ref 73–118)
GLUCOSE UR STRIP-MCNC: NEGATIVE MG/DL
HCT VFR BLD AUTO: 36.3 % (ref 37.5–51)
HGB BLD-MCNC: 12 G/DL (ref 13–17.7)
HGB UR QL STRIP.AUTO: ABNORMAL
IMM GRANULOCYTES # BLD AUTO: 0.01 10*3/MM3 (ref 0–0.05)
IMM GRANULOCYTES NFR BLD AUTO: 0.2 % (ref 0–0.5)
KETONES UR QL STRIP: NEGATIVE
LEUKOCYTE ESTERASE UR QL STRIP.AUTO: ABNORMAL
LYMPHOCYTES # BLD AUTO: 1.77 10*3/MM3 (ref 0.7–3.1)
LYMPHOCYTES NFR BLD AUTO: 42.7 % (ref 19.6–45.3)
MCH RBC QN AUTO: 31 PG (ref 26.6–33)
MCHC RBC AUTO-ENTMCNC: 33.1 G/DL (ref 31.5–35.7)
MCV RBC AUTO: 93.8 FL (ref 79–97)
MONOCYTES # BLD AUTO: 0.58 10*3/MM3 (ref 0.1–0.9)
MONOCYTES NFR BLD AUTO: 14 % (ref 5–12)
NEUTROPHILS NFR BLD AUTO: 1.68 10*3/MM3 (ref 1.7–7)
NEUTROPHILS NFR BLD AUTO: 40.4 % (ref 42.7–76)
NITRITE UR QL STRIP: NEGATIVE
PH UR STRIP.AUTO: 5.5 [PH] (ref 5–8)
PLATELET # BLD AUTO: 211 10*3/MM3 (ref 140–450)
PMV BLD AUTO: 9.6 FL (ref 6–12)
POC ALBUMIN: 3.8 G/L (ref 3.3–5.5)
POC ALT (SGPT): 12 U/L (ref 10–47)
POC AST (SGOT): 18 U/L (ref 11–38)
POC TOTAL BILIRUBIN: 0.7 MG/DL (ref 0.2–1.6)
POC TOTAL PROTEIN: 6.4 G/DL (ref 6.4–8.1)
POTASSIUM BLDA-SCNC: 3.5 MMOL/L (ref 3.6–5.1)
PROT UR QL STRIP: ABNORMAL
RBC # BLD AUTO: 3.87 10*6/MM3 (ref 4.14–5.8)
SODIUM BLD-SCNC: 141 MMOL/L (ref 128–145)
SP GR UR STRIP: 1.01 (ref 1–1.03)
UROBILINOGEN UR QL STRIP: ABNORMAL
WBC NRBC COR # BLD AUTO: 4.15 10*3/MM3 (ref 3.4–10.8)

## 2025-07-23 PROCEDURE — 25010000002 LEUCOVORIN CALCIUM PER 50 MG: Performed by: PHYSICIAN ASSISTANT

## 2025-07-23 PROCEDURE — 25010000002 BEVACIZUMAB-BVZR 400 MG/16ML SOLUTION 16 ML VIAL: Performed by: PHYSICIAN ASSISTANT

## 2025-07-23 PROCEDURE — 80053 COMPREHEN METABOLIC PANEL: CPT

## 2025-07-23 PROCEDURE — 96368 THER/DIAG CONCURRENT INF: CPT

## 2025-07-23 PROCEDURE — 25810000003 SODIUM CHLORIDE 0.9 % SOLUTION 500 ML FLEX CONT: Performed by: PHYSICIAN ASSISTANT

## 2025-07-23 PROCEDURE — 96417 CHEMO IV INFUS EACH ADDL SEQ: CPT

## 2025-07-23 PROCEDURE — 96413 CHEMO IV INFUSION 1 HR: CPT

## 2025-07-23 PROCEDURE — 71260 CT THORAX DX C+: CPT

## 2025-07-23 PROCEDURE — 96415 CHEMO IV INFUSION ADDL HR: CPT

## 2025-07-23 PROCEDURE — 25010000003 DEXTROSE 5 % SOLUTION 250 ML FLEX CONT: Performed by: PHYSICIAN ASSISTANT

## 2025-07-23 PROCEDURE — 74177 CT ABD & PELVIS W/CONTRAST: CPT

## 2025-07-23 PROCEDURE — 25010000002 FLUOROURACIL PER 500 MG: Performed by: PHYSICIAN ASSISTANT

## 2025-07-23 PROCEDURE — G0498 CHEMO EXTEND IV INFUS W/PUMP: HCPCS

## 2025-07-23 PROCEDURE — 96411 CHEMO IV PUSH ADDL DRUG: CPT

## 2025-07-23 PROCEDURE — 25810000003 SODIUM CHLORIDE 0.9 % SOLUTION: Performed by: PHYSICIAN ASSISTANT

## 2025-07-23 PROCEDURE — 81003 URINALYSIS AUTO W/O SCOPE: CPT | Performed by: INTERNAL MEDICINE

## 2025-07-23 PROCEDURE — 25010000002 PALONOSETRON 0.25 MG/5ML SOLUTION PREFILLED SYRINGE: Performed by: PHYSICIAN ASSISTANT

## 2025-07-23 PROCEDURE — 25010000002 ATROPINE SULFATE 0.4 MG/ML SOLUTION 1 ML VIAL: Performed by: PHYSICIAN ASSISTANT

## 2025-07-23 PROCEDURE — 25010000002 IRINOTECAN PER 20 MG: Performed by: PHYSICIAN ASSISTANT

## 2025-07-23 PROCEDURE — 25010000002 BEVACIZUMAB-BVZR 100 MG/4ML SOLUTION 4 ML VIAL: Performed by: PHYSICIAN ASSISTANT

## 2025-07-23 PROCEDURE — 96375 TX/PRO/DX INJ NEW DRUG ADDON: CPT

## 2025-07-23 PROCEDURE — 85025 COMPLETE CBC W/AUTO DIFF WBC: CPT | Performed by: INTERNAL MEDICINE

## 2025-07-23 PROCEDURE — 25510000001 IOPAMIDOL PER 1 ML: Performed by: PHYSICIAN ASSISTANT

## 2025-07-23 PROCEDURE — 25010000002 DEXAMETHASONE PER 1 MG: Performed by: PHYSICIAN ASSISTANT

## 2025-07-23 RX ORDER — IOPAMIDOL 755 MG/ML
100 INJECTION, SOLUTION INTRAVASCULAR
Status: COMPLETED | OUTPATIENT
Start: 2025-07-23 | End: 2025-07-23

## 2025-07-23 RX ORDER — PALONOSETRON 0.05 MG/ML
0.25 INJECTION, SOLUTION INTRAVENOUS ONCE
Status: CANCELLED | OUTPATIENT
Start: 2025-07-23

## 2025-07-23 RX ORDER — ATROPINE SULFATE 1 MG/ML
0.4 INJECTION, SOLUTION INTRAMUSCULAR; INTRAVENOUS; SUBCUTANEOUS
Status: DISCONTINUED | OUTPATIENT
Start: 2025-07-23 | End: 2025-07-23

## 2025-07-23 RX ORDER — FLUOROURACIL 50 MG/ML
400 INJECTION, SOLUTION INTRAVENOUS ONCE
Status: COMPLETED | OUTPATIENT
Start: 2025-07-23 | End: 2025-07-23

## 2025-07-23 RX ORDER — PALONOSETRON 0.05 MG/ML
0.25 INJECTION, SOLUTION INTRAVENOUS ONCE
Status: COMPLETED | OUTPATIENT
Start: 2025-07-23 | End: 2025-07-23

## 2025-07-23 RX ORDER — ATROPINE SULFATE 1 MG/ML
0.4 INJECTION, SOLUTION INTRAMUSCULAR; INTRAVENOUS; SUBCUTANEOUS
Status: CANCELLED | OUTPATIENT
Start: 2025-07-23

## 2025-07-23 RX ORDER — DEXAMETHASONE SODIUM PHOSPHATE 4 MG/ML
12 INJECTION, SOLUTION INTRA-ARTICULAR; INTRALESIONAL; INTRAMUSCULAR; INTRAVENOUS; SOFT TISSUE ONCE
Status: CANCELLED | OUTPATIENT
Start: 2025-07-23 | End: 2025-07-23

## 2025-07-23 RX ORDER — FLUOROURACIL 50 MG/ML
400 INJECTION, SOLUTION INTRAVENOUS ONCE
Status: CANCELLED | OUTPATIENT
Start: 2025-07-23

## 2025-07-23 RX ORDER — SODIUM CHLORIDE 9 MG/ML
20 INJECTION, SOLUTION INTRAVENOUS ONCE
Status: CANCELLED | OUTPATIENT
Start: 2025-07-23

## 2025-07-23 RX ORDER — DEXAMETHASONE SODIUM PHOSPHATE 4 MG/ML
12 INJECTION, SOLUTION INTRA-ARTICULAR; INTRALESIONAL; INTRAMUSCULAR; INTRAVENOUS; SOFT TISSUE ONCE
Status: COMPLETED | OUTPATIENT
Start: 2025-07-23 | End: 2025-07-23

## 2025-07-23 RX ORDER — SODIUM CHLORIDE 9 MG/ML
20 INJECTION, SOLUTION INTRAVENOUS ONCE
Status: COMPLETED | OUTPATIENT
Start: 2025-07-23 | End: 2025-07-23

## 2025-07-23 RX ADMIN — FLUOROURACIL 900 MG: 50 INJECTION, SOLUTION INTRAVENOUS at 13:29

## 2025-07-23 RX ADMIN — IOPAMIDOL 100 ML: 755 INJECTION, SOLUTION INTRAVENOUS at 09:15

## 2025-07-23 RX ADMIN — LEUCOVORIN CALCIUM 900 MG: 350 INJECTION, POWDER, LYOPHILIZED, FOR SUSPENSION INTRAMUSCULAR; INTRAVENOUS at 11:50

## 2025-07-23 RX ADMIN — DEXAMETHASONE SODIUM PHOSPHATE 12 MG: 4 INJECTION, SOLUTION INTRAMUSCULAR; INTRAVENOUS at 10:42

## 2025-07-23 RX ADMIN — PALONOSETRON 0.25 MG: 0.25 INJECTION, SOLUTION INTRAVENOUS at 10:40

## 2025-07-23 RX ADMIN — SODIUM CHLORIDE 450 MG: 9 INJECTION, SOLUTION INTRAVENOUS at 11:09

## 2025-07-23 RX ADMIN — SODIUM CHLORIDE 20 ML/HR: 9 INJECTION, SOLUTION INTRAVENOUS at 10:39

## 2025-07-23 RX ADMIN — DEXTROSE MONOHYDRATE 400 MG: 50 INJECTION, SOLUTION INTRAVENOUS at 11:50

## 2025-07-23 RX ADMIN — FLUOROURACIL 5400 MG: 50 INJECTION, SOLUTION INTRAVENOUS at 13:35

## 2025-07-23 NOTE — PROGRESS NOTES
Pt here for C21D1 Zirabev/Folfiri,  infusaport already in place with blood return noted,  reviewed labs/assessment with MIA Torres and pt to proceed with treatment as planned,  treatment administered per MAR and pt tolerated well,  AVS provided and pt discharged.

## 2025-07-24 RX ORDER — AMLODIPINE BESYLATE 10 MG/1
10 TABLET ORAL DAILY
Qty: 90 TABLET | Refills: 0 | Status: SHIPPED | OUTPATIENT
Start: 2025-07-24

## 2025-07-24 RX ORDER — CLONIDINE HYDROCHLORIDE 0.1 MG/1
0.1 TABLET ORAL 2 TIMES DAILY
Qty: 180 TABLET | Refills: 1 | Status: SHIPPED | OUTPATIENT
Start: 2025-07-24

## 2025-07-24 RX ORDER — ATORVASTATIN CALCIUM 40 MG/1
40 TABLET, FILM COATED ORAL
Qty: 90 TABLET | Refills: 0 | Status: SHIPPED | OUTPATIENT
Start: 2025-07-24

## 2025-07-24 RX ORDER — HYDRALAZINE HYDROCHLORIDE 50 MG/1
50 TABLET, FILM COATED ORAL EVERY 8 HOURS
Qty: 90 TABLET | Refills: 1 | Status: SHIPPED | OUTPATIENT
Start: 2025-07-24

## 2025-07-24 RX ORDER — METOPROLOL TARTRATE 50 MG
150 TABLET ORAL EVERY 12 HOURS
Qty: 540 TABLET | Refills: 0 | Status: SHIPPED | OUTPATIENT
Start: 2025-07-24

## 2025-07-25 ENCOUNTER — HOSPITAL ENCOUNTER (OUTPATIENT)
Dept: ONCOLOGY | Facility: HOSPITAL | Age: 65
Discharge: HOME | End: 2025-07-25
Payer: MEDICARE

## 2025-07-25 DIAGNOSIS — Z45.2 ENCOUNTER FOR CARE RELATED TO VASCULAR ACCESS PORT: ICD-10-CM

## 2025-07-25 DIAGNOSIS — C20 RECTAL CANCER: ICD-10-CM

## 2025-07-25 DIAGNOSIS — C78.6 METASTASIS TO PERITONEAL CAVITY: Primary | ICD-10-CM

## 2025-07-25 PROCEDURE — 25010000002 HEPARIN LOCK FLUSH PER 10 UNITS: Performed by: INTERNAL MEDICINE

## 2025-07-25 RX ORDER — HEPARIN SODIUM (PORCINE) LOCK FLUSH IV SOLN 100 UNIT/ML 100 UNIT/ML
500 SOLUTION INTRAVENOUS AS NEEDED
Status: DISCONTINUED | OUTPATIENT
Start: 2025-07-25 | End: 2025-07-26 | Stop reason: HOSPADM

## 2025-07-25 RX ORDER — HEPARIN SODIUM (PORCINE) LOCK FLUSH IV SOLN 100 UNIT/ML 100 UNIT/ML
500 SOLUTION INTRAVENOUS AS NEEDED
OUTPATIENT
Start: 2025-07-25

## 2025-07-25 RX ORDER — SODIUM CHLORIDE 0.9 % (FLUSH) 0.9 %
20 SYRINGE (ML) INJECTION AS NEEDED
Status: DISCONTINUED | OUTPATIENT
Start: 2025-07-25 | End: 2025-07-26 | Stop reason: HOSPADM

## 2025-07-25 RX ORDER — SODIUM CHLORIDE 0.9 % (FLUSH) 0.9 %
20 SYRINGE (ML) INJECTION AS NEEDED
OUTPATIENT
Start: 2025-07-25

## 2025-07-25 RX ADMIN — HEPARIN 500 UNITS: 100 SYRINGE at 13:17

## 2025-07-25 RX ADMIN — Medication 20 ML: at 13:17

## 2025-07-29 NOTE — PROGRESS NOTES
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     History of Present Illness:  2024: Mr. Zhou carried a long history of severe hypertension and of an aneurysm of the ascending aorta.  He had been receiving antihypertensive medication after correction of the aneurysm with good results.  In 2023 he underwent his first screening colonoscopy.  A large circumferential and obstructive tumor was identified at the sigmoid/rectosigmoid junction.  The tumor could not be traversed even with the smallest instrument.  Biopsies showed moderately differentiated colonic adenocarcinoma without loss of nuclear expression of the mismatch repair proteins.  Imaging studies at the time revealed the known thoracic aneurysm that has increased mildly since the previous scan.  There was a benign-appearing subpleural nodule in the right posteromedial chest wall and pulmonary nodules that have been identified since 2018 remained stable.  Some mediastinal nonspecific and stable adenopathy was reported as well.  In the abdomen the sigmoid tumor was confirmed and there was no suggestion of metastatic disease.  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  He underwent excision of the squamous cell carcinoma that proved to be a high-grade squamous intraepithelial lesion.  He also had a robotic left colectomy.  The final report of pathology was of moderately differentiated adenocarcinoma of the colon, that measured 3.5 cm.  The tumor involved the serosal surface and the antimesenteric serosa.  Lymphovascular space invasion was identified.  All margins were negative for disease but 2  of 27 lymph nodes had metastatic involvement.  As well there were at least 5 peritoneal deposits that were excised and that were confirmed to correspond to metastatic lesions.  The tumor had intact expression of MLH1, MSH 1, MSH 6 and PMS2.  Next-generation sequencing revealed a pathogenic mutation of exon 2 of the KRAS gene. ERBB2, BRAF, NTRK, RET, and PIK3CA were negative. PD-L1 was negative, as well.  He was started on treatment with FOLFOX and bevacizumab on November 28, 2023.  He reported adequate tolerance to the treatment, with the expected cold intolerance.  He had had no nausea but since the beginning of the present illness he had lost approximately 20 pounds.  He was eating reasonably well.  He had been free of chest pains and no longer had abdominal pain.  All his surgical incisions had healed and he was having regular defecations, at times of liquid stool.  The physical exam revealed him to be a chronically ill-appearing man who did not seem in distress.  He was conversant, in good spirits and without jaundice.  Lungs diminished bilaterally.  Heart regular.  Abdomen soft and nontender.  No edema.  The laboratory exams and all the records were reviewed and discussed with him and with his wife.  To resume treatment with the idea of obtaining a new set of scans after 6 cycles of chemotherapy.  After 12 cycles of chemotherapy discuss the possibility of additional surgery if there was any residual peritoneal disease at the time of surgery.  To see me at the end of February with the scans.    2/16/2024: In the office to review scans. In the last few days has had severe glossodynia and odynophagia. Also has been coughing and expectorating some clear sputum. No fevers. Has had pain on the left side of the chest. No dyspnea. He was prescribed Hiral's magic potion and fluconazole that has resulted in relief. On exam he does not appear acutely ill. No jaundice or pallor. Lungs are diminished bilaterally and heart  irregularly irregular. Abdomen soft. No edema. Reviewed the laboratory exams. Reviewed the images of the scans. Sent prescriptions for doxycycline as the lesion in the left lower lobe is likely infectious in nature, given his leukocytosis and symptoms. Will obtain an electrocardiogram. Will follow next week.     3/8/2024: Feeling poorly. Weak and not moving much. Has continued to have diffuse pain. No fevers. His spouse believes he has an ulcer on the backside. On exam he is conversant and oriented. No jaundice. No pallor. The lungs are diminished bilaterally. Heart regular. There is indeed an ulcer in the sacral region, in the intergluteal crease. Reviewed the laboratory exams. He is to continue with the same chemotherapy. Referred to the wound clinic. To have an electrocardiogram. He is to see me in a few weeks with new scans.     4/10/2024: Was admitted to the hospital shortly after the previous visit.  He had pneumonia and was very dyspneic.  On March 11, 2024 underwent a thoracoscopic decortication with parietal and visceral pleurectomy and intercostal nerve block.  He was treated with antibiotics and eventually discharged to a subacute rehabilitation facility.  Resumed treatment on 4/9/2024.  He tells me today he is feeling progressively better.  Stronger.  The surgical incisions are healing.  He is not having as much trouble breathing.  He has had no abdominal pain or diarrhea.  On exam alert, conversant and ill.  Seems much older than the stated age but is not in distress.  Lungs are diminished bilaterally.  Heart is regular.  Abdomen is soft.  No edema.  The laboratory exams were reviewed.  I reviewed the records from the hospital and reviewed all imaging studies done during that hospitalization.  No suggestion of progressive metastatic disease.  For now continue with the same treatment.  See me early in May 2024.    5/9/2024: Feeling progressively better.  Able to take the chemotherapy without much  difficulty.  Eating more.  Also more mobile.  Using a cane only when outside of his house.  On exam alert, chronically ill-appearing but in no distress.  No jaundice.  Conversant and oriented.  Lungs diminished bilaterally.  Heart regular.  Abdomen soft.  No edema.  Reviewed the laboratory exams and discussed with him.  He will see me again in approximately 6 weeks with new scans.  Continue with the same chemotherapy.    6/19/2024: Feeling reasonably well.  No weakness or chest pains and no cough.  On exam alert and conversant.  Not jaundiced or pale.  Lungs diminished bilaterally with bilateral wheezes and heart regular.  Abdomen soft.  No edema.  Reviewed the images of the recent scans with him and explained the finding of new metastatic deposits in the liver.  Explained to him that given the length of time that elapsed without chemotherapy because of his respiratory and cardiovascular issues, I do not know if the problem is that the chemotherapy was not working at all or if that Is what led to the development of metastatic disease.  I'd like to try the FOLFOX with bevacizumab in the way that he had been getting it to see if there is response with regular treatment.  For that reason we will proceed with treatment today.  He is to see me in approximately 4 weeks from today.    8/30/2024: Progressively better and stronger.  Eating well and more energetic.  Was able to get out of the house and do several different things 6 days in a row, which has been a change.  Continues to have some dyspnea with exertion but he also continues to smoke.  He has had no chest pains and is not coughing as much.  He denies abdominal pain and has maintained regular bowel activity.  On exam he appears chronically ill and much older than the stated age.  No distress.  No jaundice.  The lungs are clear bilaterally and the heart is regular.  The abdomen is soft.  The epigastric space seems taylor and the edge of the liver can be palpated,  at the level of the midsternal line, approximately 3 cm below the costal margin.  There is no edema.  Laboratory exams reviewed.  To obtain a scan and see me in 3 months.  Continue with the same chemotherapy for now.    9/25/2024: In the office before the next scheduled appointment. He is feeling as well as he had been feeling before and on direct questioning he denies new symptoms. The scans, however, reports progression of the disease with enlargement of the hepatic lesions. There has also been development of lymphadenopathy and a lymph node conglomerate results in left hydronephrosis by causing obstruction of the mid left ureter.  Plans to begin treatment with FOLFIRI/bevacizumab were made.    10/11/2024: Received the first dose of irinotecan. Had some nausea and emesis on at least a couple of occasions. He has felt tired. He has been able to eat some. No chest pain or cough. Remains free of abdominal pain. No diarrhea, he has rather tended to be constipated. No edema. No skin rash. On exam alert and conversant. Appears chronically ill but in no distress. No jaundice. Lungs diminished bilaterally and heart regular. Abdomen soft and not tender. No edema. Reviewed the laboratory exams. I have sent a new prescription for prochlorperazine. He is to continue with the same treatment and will see me in approximately 6 weeks.     11/22/2024: Continues to feel well.  He has not had many difficulties with the current chemotherapy and he has had nausea on a couple of occasions only.  The antiemetics seems to have worked, at least partially.  He has been eating well.  He is not needing the cane as much.  He does not have any more dyspnea than before but he continues to smoke.  No abdominal pain.  Maintains regular bowel activity.  Has had the nephrostomies removed.  He has no dysuria.  No edema.  On exam alert and conversant.  Ill but in no distress.  No jaundice.  Lungs diminished bilaterally and heart regular.  Abdomen  soft.  No palpable tumors but the liver seems to be palpable approximately 10 cm below the costal margin.  No edema.  Laboratory exams reviewed.  Discussed with him.  To see me again with new scans.  Continue with the same treatment.    1/10/2025: Continues to receive chemotherapy.  He does experience the side effects and for 1 to 2 days he feels fatigued after each treatment but he recovers promptly and is able to do the majority of his activities.  His appetite remains adequate and has had no nausea or vomiting.  He is weight is, as well stable.  No chest pains or cough.  Denies abdominal pain.  Maintains regular bowel activity and has not had diarrhea.  No dysuria.  No edema.  On exam he is chronically ill-appearing but conversant and in no distress.  No jaundice.  He is pale.  He is well-hydrated.  Lungs are diminished bilaterally and heart regular.  Abdomen soft and without palpable tumors.  No edema.  Laboratory exams reviewed.  I reviewed the images of the scans.  There is good evidence of response.  To continue with the same treatment.  See me in 6 weeks.    2/21/2025: Continues to take treatment without difficulties and continues to feel reasonably well.  Has had no new symptoms and in particular he denies pain.  He maintains a reasonable appetite and his weight has been stable.  He does not have any more dyspnea than usual.  No chest pains.  On exam alert, conversant, oriented and chronically ill-appearing but in no distress.  No jaundice.  No pallor.  The lungs are diminished bilaterally.  The heart is regular.  The abdomen is soft and I cannot palpate any tumors.  No edema.  Laboratory exams reviewed.  Reviewed the previous scans.  Discussed with him.  To continue with the same treatment and obtain scans in early April 2025.    3/21/2025: Feels reasonably well.  Fatigued at times.  Maintains a good appetite and stable weight.  No fevers.  Denies pain.  No more dyspnea than usual and no cough.   Intermittently having diarrhea and constipation.  No dysuria although he has a hard time starting the stream of urine.  No edema.  On exam he is alert, conversant and chronically ill-appearing but in no distress.  No jaundice.  He is pale.  The lungs are diminished bilaterally.  The heart is regular.  The abdomen is soft.  I cannot palpate any tumors.  There is no edema.  Laboratory exams reviewed and discussed with him.  Reviewed the tumor marker endograft and explained the significance.  There is continued evidence of response.  Will confirm with new scans prior to the next visit.  For now continue with the same treatment.    4/16/2025: Feels well and has no new problems.  Has been made to active with some days of fatigue, during which he does not do much.  His appetite continues to be adequate and he has not lost weight.  For the most part he remains pain-free and has had no fevers.  Continues to have some breathing difficulties but not any worse.  No chest pains.  Denies diarrhea or dysuria.  He continues to have some discomfort associated to the ureter stent.  No edema.  The scans reveal continued response with reduction in the size of the metastatic deposits in the liver.  No evidence of peritoneal carcinomatosis is also improved.  Laboratory exams reviewed and discussed with him.  To collect urine for protein measurement as he continues to have proteinuria.  Continue with the same treatment.    5/28/2025: Feels very well and has no new symptoms.  Has been reasonably active and without new limitations.  Maintains a reasonable appetite.  No pain.  No nausea.  Fatigued at times.  On exam alert and conversant.  Oriented.  No distress.  No jaundice.  The lungs are clear bilaterally and the heart is regular.  The abdomen is soft and not tender.  There are no tumors.  No edema.  Laboratory exams reviewed.  Reviewed the images and the report of the recent scans and discussed again with him as he had some questions.   To continue with same treatment and see me in approximately 6 weeks.  Scans at the end of July 2025.    7/9/2025: Feels reasonably well.  He states he has been having sinus congestion and drainage for the past week.  Associated headache.  He has tried over-the-counter decongestants with minimal relief.  He denies fever or chills.  Patient has remained reasonably active without any new limitations.  Appetite has remained good.  He denies any new areas of pain.  He does have fatigue at times as well as nausea but this is usually a few days after his infusion and resolves relatively quickly.  On exam he is alert conversant.  No acute distress.  No jaundice nor pallor noted.  Lungs clear bilaterally heart regular.  Abdomen soft and nontender.  Laboratory exams reviewed.  Will continue with the same treatment.  Plan for scans at the end of this month.    7/30/2025: Feels well and has not had any new symptoms. He complains of different side effects from the treatment but generally has no new problems. He is fatigued and at time has had nausea. No chest pain and no abdominal pain. Intermittently has had diarrhea. No edema. No skin rash. On exam he appears chronically ill. No distress and no jaundice. No oral lesions and respirations not labored. Lungs diminished bilaterally and heart regular. Abdomen soft and not tender. No palpable tumor. No edema. Reviewed the images and the report of the scans. Reviewed all the laboratory exams and discussed with him. Thre is evidence of response. To continue same for now.     Past Medical History:   Diagnosis Date    Aortic dissection     Diabetes mellitus     Heart murmur     History of noncompliance with medical treatment     Hyperlipidemia     Hypertension     Type B hypoplasia of aortic arch      Past Surgical History:   Procedure Laterality Date    COLECTOMY PARTIAL / TOTAL  2023    COLONOSCOPY N/A 08/31/2023    Procedure: COLONOSCOPY with sigmoid mass tattooing at 35cm, sigmoid  and rectal polypectomy;  Surgeon: TORIBIO Jacob MD;  Location: [facility] ENDOSCOPY;  Service: Gastroenterology;  Laterality: N/A;  sigmoid mass, colon polyps    THORACOSCOPY WITH DECORTICATION Left 3/11/2024    Procedure: THORACOSCOPY VIDEO ASSISTED WITH DECORTICATION WITH DAVINCI ROBOT;  Surgeon: Saqib Keller MD;  Location: [facility] MAIN OR;  Service: Robotics - DaVinci;  Laterality: Left;       Current Outpatient Medications:     amLODIPine (NORVASC) 10 MG tablet, TAKE 1 TABLET BY MOUTH DAILY, Disp: 90 tablet, Rfl: 0    amoxicillin-clavulanate (AUGMENTIN) 875-125 MG per tablet, Take 1 tablet by mouth 2 (Two) Times a Day., Disp: 14 tablet, Rfl: 0    atorvastatin (LIPITOR) 40 MG tablet, TAKE 1 TABLET BY MOUTH EVERY NIGHT AT BEDTIME, Disp: 90 tablet, Rfl: 0    Blood Glucose Monitoring Suppl (Accu-Chek Guide) w/Device kit, 1 Device Daily., Disp: 1 kit, Rfl: 0    cloNIDine (CATAPRES) 0.1 MG tablet, TAKE 1 TABLET BY MOUTH 2 TIMES A DAY, Disp: 180 tablet, Rfl: 1    gabapentin (NEURONTIN) 300 MG capsule, TAKE 1 CAPSULE BY MOUTH 3 TIMES A DAY, Disp: 270 capsule, Rfl: 1    glimepiride (AMARYL) 4 MG tablet, Take 1 tablet by mouth 2 (Two) Times a Day., Disp: 60 tablet, Rfl: 5    glucose blood (Accu-Chek Guide) test strip, Test daily e11.9, Disp: 50 each, Rfl: 12    hydrALAZINE (APRESOLINE) 50 MG tablet, TAKE 1 TABLET BY MOUTH EVERY 8 HOURS, Disp: 90 tablet, Rfl: 1    metFORMIN (GLUCOPHAGE) 850 MG tablet, Take 1 tablet by mouth 2 (Two) Times a Day With Meals., Disp: 144 tablet, Rfl: 1    metoprolol tartrate (LOPRESSOR) 50 MG tablet, TAKE 3 TABLETS BY MOUTH EVERY 12 HOURS, Disp: 540 tablet, Rfl: 0    Mounjaro 2.5 MG/0.5ML solution auto-injector, INJECT 2.5 MG UNDER THE SKIN ONCE WEEKLY, Disp: 2 mL, Rfl: 0    ondansetron (ZOFRAN) 8 MG tablet, Take 1 tablet by mouth 3 (Three) Times a Day As Needed for Nausea or Vomiting., Disp: 30 tablet, Rfl: 5    oxyCODONE-acetaminophen (PERCOCET) 7.5-325 MG per tablet, , Disp: , Rfl:      potassium chloride 10 MEQ CR tablet, Take 1 tablet by mouth Daily., Disp: 30 tablet, Rfl: 2    triamcinolone (KENALOG) 0.1 % cream, Apply 1 Application topically to the appropriate area as directed 2 (Two) Times a Day., Disp: 15 g, Rfl: 0  No current facility-administered medications for this visit.    Facility-Administered Medications Ordered in Other Visits:     heparin injection 500 Units, 500 Units, Intravenous, Kristy SEALS Alfonso, MD, 500 Units at 07/30/25 1049    sodium chloride 0.9 % flush 20 mL, 20 mL, Intravenous, PRN, Lars Wagner MD, 20 mL at 07/30/25 1049    No Known Allergies    Family History   Problem Relation Age of Onset    Diabetes Mother     Dementia Mother     Sudden death Father     No Known Problems Sister     No Known Problems Brother     No Known Problems Maternal Aunt     No Known Problems Maternal Uncle     No Known Problems Paternal Aunt     No Known Problems Paternal Uncle     No Known Problems Maternal Grandmother     No Known Problems Maternal Grandfather     No Known Problems Paternal Grandmother     No Known Problems Paternal Grandfather     No Known Problems Other     Anemia Neg Hx     Arrhythmia Neg Hx     Asthma Neg Hx     Clotting disorder Neg Hx     Fainting Neg Hx     Heart attack Neg Hx     Heart disease Neg Hx     Heart failure Neg Hx     Hyperlipidemia Neg Hx     Hypertension Neg Hx      Cancer-related family history is not on file.    Social History     Tobacco Use    Smoking status: Every Day     Current packs/day: 2.00     Average packs/day: 2.0 packs/day for 54.6 years (109.2 ttl pk-yrs)     Types: Cigarettes     Start date: 1971     Passive exposure: Current    Smokeless tobacco: Never   Vaping Use    Vaping status: Never Used   Substance Use Topics    Alcohol use: No     Comment: Abstinent since around 2008    Drug use: Not Currently     Types: Marijuana     Comment: Abstinent since at least the 1980's     Social History     Social History Narrative    Not on  file     ROS:   Review of Systems   Constitutional:  Positive for fatigue. Negative for activity change, appetite change, chills, diaphoresis, fever and unexpected weight change.   HENT:  Positive for congestion, postnasal drip and sinus pressure. Negative for dental problem, drooling, ear discharge, ear pain, facial swelling, hearing loss, mouth sores, nosebleeds, rhinorrhea, sinus pain, sneezing, sore throat, tinnitus, trouble swallowing and voice change.    Eyes:  Negative for photophobia, pain, discharge, redness, itching and visual disturbance.   Respiratory:  Negative for apnea, cough, choking, chest tightness, shortness of breath, wheezing and stridor.    Cardiovascular:  Negative for chest pain, palpitations and leg swelling.   Gastrointestinal:  Negative for abdominal distention, abdominal pain, anal bleeding, blood in stool, constipation, diarrhea, nausea, rectal pain and vomiting.   Endocrine: Negative for cold intolerance, heat intolerance, polydipsia and polyuria.   Genitourinary:  Negative for decreased urine volume, difficulty urinating, dysuria, flank pain, frequency, genital sores, hematuria and urgency.   Musculoskeletal:  Negative for arthralgias, back pain, gait problem, joint swelling, myalgias, neck pain and neck stiffness.   Skin:  Negative for color change, pallor and rash.   Neurological:  Negative for dizziness, tremors, seizures, syncope, facial asymmetry, speech difficulty, weakness, light-headedness, numbness and headaches.   Hematological:  Negative for adenopathy. Does not bruise/bleed easily.   Psychiatric/Behavioral:  Negative for agitation, behavioral problems, confusion, decreased concentration, hallucinations, self-injury, sleep disturbance and suicidal ideas. The patient is not nervous/anxious.      Objective:    Vital Signs:  Vitals:    07/30/25 1136   BP: 115/77   Pulse: 78   Resp: 14   Temp: 97.4 °F (36.3 °C)   SpO2: 96%   Weight: 90.2 kg (198 lb 12.8 oz)   Height: 193 cm  "(76\")   PainSc: 0-No pain     Body mass index is 24.2 kg/m².    ECOG  (1) Restricted in physically strenuous activity, ambulatory and able to do work of light nature    Physical Exam:   Physical Exam  Vitals reviewed.   Constitutional:       General: He is not in acute distress.     Appearance: He is not diaphoretic.   HENT:      Head: Normocephalic and atraumatic.   Eyes:      General: No scleral icterus.        Right eye: No discharge.         Left eye: No discharge.      Conjunctiva/sclera: Conjunctivae normal.   Neck:      Thyroid: No thyromegaly.   Cardiovascular:      Rate and Rhythm: Normal rate and regular rhythm.      Heart sounds: Normal heart sounds.      No friction rub. No gallop.   Pulmonary:      Effort: Pulmonary effort is normal. No respiratory distress.      Breath sounds: No stridor. No wheezing.   Abdominal:      General: Bowel sounds are normal.      Palpations: Abdomen is soft. There is no mass.      Tenderness: There is no abdominal tenderness. There is no guarding or rebound.   Musculoskeletal:         General: No tenderness. Normal range of motion.      Cervical back: Normal range of motion and neck supple.   Lymphadenopathy:      Cervical: No cervical adenopathy.   Skin:     General: Skin is warm.      Findings: No erythema or rash.   Neurological:      Mental Status: He is alert and oriented to person, place, and time.      Motor: No abnormal muscle tone.   Psychiatric:         Mood and Affect: Mood normal.         Behavior: Behavior normal.         Thought Content: Thought content normal.         Judgment: Judgment normal.     MIGUEL Wagner MD performed the physical exam on 7/30/2025 as documented above.     Lab Results - Last 18 Months   Lab Units 07/30/25  1042 07/23/25  0843 07/09/25  0758   WBC 10*3/mm3 4.32 4.15 6.65   HEMOGLOBIN g/dL 11.9* 12.0* 11.6*   HEMATOCRIT % 36.1* 36.3* 36.1*   PLATELETS 10*3/mm3 193 211 254   MCV fL 93.3 93.8 95.5     Lab Results - Last 18 Months   Lab " Units 07/23/25  0931 07/09/25  0826 06/25/25  0958 01/22/25  0909 01/10/25  0819 01/08/25  0820 11/25/24  0913 11/22/24  1443   SODIUM mmol/L  --   --   --   --  138 138  --  140   POTASSIUM mmol/L  --   --   --   --  3.5 3.7  --  3.5   CHLORIDE mmol/L  --   --   --   --  99 103  --  104   CO2 mmol/L  --   --   --   --  27.0 25.4  --  24.8   BUN mg/dL  --   --   --   --  16 8  --  10   CREATININE mg/dL 0.70 0.70 0.50*   < > 0.67* 0.70*   < > 0.74*   CALCIUM mg/dL  --   --   --   --  10.5 10.0  --  10.6*   BILIRUBIN mg/dL  --   --   --   --  0.5 0.4  --  0.3   ALK PHOS U/L  --   --   --   --  71 77  --  81   ALT (SGPT) U/L  --   --   --   --  20 10  --  14   AST (SGOT) U/L  --   --   --   --  24 22  --  19   GLUCOSE mg/dL  --   --   --   --  261* 226*  --  137*    < > = values in this interval not displayed.     Lab Results   Component Value Date    GLUCOSE 261 (H) 01/10/2025    BUN 16 01/10/2025    CREATININE 0.70 07/23/2025    EGFRIFNONA 100 01/20/2022    BCR 23.9 01/10/2025    K 3.5 01/10/2025    CO2 27.0 01/10/2025    CALCIUM 10.5 01/10/2025    ALBUMIN 4.0 01/10/2025    AST 24 01/10/2025    ALT 20 01/10/2025     Lab Results - Last 18 Months   Lab Units 10/18/24  0825 10/04/24  0811 03/10/24  2236   INR  1.05 1.01 1.21*   APTT seconds 27.9 31.0 31.2*     Lab Results   Component Value Date    PTT 27.9 10/18/2024    INR 1.05 10/18/2024     Lab Results   Component Value Date    CEA 2.67 07/09/2025     Lab Results   Component Value Date    PSA 0.594 01/23/2023     Assessment & Plan     1.  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 Started FOLFIRI/bevacizumab on October 10, 2024.  Reviewed the images of the recent scans. Reviewed the laboratory exams. There is continued evidence of response. To contnue e same.   2.  Recovered completely from the decortication.  Remains unchanged.  No dyspnea or any evidence of further  complications.  3.  Cigarette smoker: Unchanged.  4.  Aortic aneurysm: He will continue with observation only.   5.  Hypertension: Under excellent control.   6.  Reviewed the images of the recent scans. Discussed with him. Reviewed all laboratory exams. Discussed with him.   7.  He's to se me in approximately 6 weeks. Continue same treatment.     Lars Wagner MD on 7/30/2025 at 15:12

## 2025-07-30 ENCOUNTER — OFFICE VISIT (OUTPATIENT)
Dept: ONCOLOGY | Facility: CLINIC | Age: 65
End: 2025-07-30
Payer: MEDICARE

## 2025-07-30 ENCOUNTER — HOSPITAL ENCOUNTER (OUTPATIENT)
Dept: ONCOLOGY | Facility: HOSPITAL | Age: 65
Discharge: HOME | End: 2025-07-30
Admitting: INTERNAL MEDICINE
Payer: MEDICARE

## 2025-07-30 VITALS
RESPIRATION RATE: 14 BRPM | WEIGHT: 198.8 LBS | HEIGHT: 76 IN | DIASTOLIC BLOOD PRESSURE: 77 MMHG | TEMPERATURE: 97.4 F | BODY MASS INDEX: 24.21 KG/M2 | HEART RATE: 78 BPM | OXYGEN SATURATION: 96 % | SYSTOLIC BLOOD PRESSURE: 115 MMHG

## 2025-07-30 DIAGNOSIS — C20 RECTAL CANCER: ICD-10-CM

## 2025-07-30 DIAGNOSIS — C78.6 METASTASIS TO PERITONEAL CAVITY: Primary | ICD-10-CM

## 2025-07-30 DIAGNOSIS — Z45.2 ENCOUNTER FOR CARE RELATED TO VASCULAR ACCESS PORT: ICD-10-CM

## 2025-07-30 LAB
ALBUMIN SERPL-MCNC: 4.2 G/DL (ref 3.5–5.2)
ALBUMIN/GLOB SERPL: 1.8 G/DL
ALP SERPL-CCNC: 64 U/L (ref 39–117)
ALT SERPL W P-5'-P-CCNC: 9 U/L (ref 1–41)
ANION GAP SERPL CALCULATED.3IONS-SCNC: 14.6 MMOL/L (ref 5–15)
AST SERPL-CCNC: 16 U/L (ref 1–40)
BASOPHILS # BLD AUTO: 0.02 10*3/MM3 (ref 0–0.2)
BASOPHILS NFR BLD AUTO: 0.5 % (ref 0–1.5)
BILIRUB SERPL-MCNC: 0.4 MG/DL (ref 0–1.2)
BUN SERPL-MCNC: 12.4 MG/DL (ref 8–23)
BUN/CREAT SERPL: 19.1 (ref 7–25)
CALCIUM SPEC-SCNC: 10.3 MG/DL (ref 8.6–10.5)
CHLORIDE SERPL-SCNC: 100 MMOL/L (ref 98–107)
CO2 SERPL-SCNC: 24.4 MMOL/L (ref 22–29)
CREAT SERPL-MCNC: 0.65 MG/DL (ref 0.76–1.27)
DEPRECATED RDW RBC AUTO: 55.4 FL (ref 37–54)
EGFRCR SERPLBLD CKD-EPI 2021: 105.9 ML/MIN/1.73
EOSINOPHIL # BLD AUTO: 0.09 10*3/MM3 (ref 0–0.4)
EOSINOPHIL NFR BLD AUTO: 2.1 % (ref 0.3–6.2)
ERYTHROCYTE [DISTWIDTH] IN BLOOD BY AUTOMATED COUNT: 15.9 % (ref 12.3–15.4)
GLOBULIN UR ELPH-MCNC: 2.4 GM/DL
GLUCOSE SERPL-MCNC: 204 MG/DL (ref 65–99)
HCT VFR BLD AUTO: 36.1 % (ref 37.5–51)
HGB BLD-MCNC: 11.9 G/DL (ref 13–17.7)
IMM GRANULOCYTES # BLD AUTO: 0.01 10*3/MM3 (ref 0–0.05)
IMM GRANULOCYTES NFR BLD AUTO: 0.2 % (ref 0–0.5)
LYMPHOCYTES # BLD AUTO: 1.75 10*3/MM3 (ref 0.7–3.1)
LYMPHOCYTES NFR BLD AUTO: 40.5 % (ref 19.6–45.3)
MCH RBC QN AUTO: 30.7 PG (ref 26.6–33)
MCHC RBC AUTO-ENTMCNC: 33 G/DL (ref 31.5–35.7)
MCV RBC AUTO: 93.3 FL (ref 79–97)
MONOCYTES # BLD AUTO: 0.26 10*3/MM3 (ref 0.1–0.9)
MONOCYTES NFR BLD AUTO: 6 % (ref 5–12)
NEUTROPHILS NFR BLD AUTO: 2.19 10*3/MM3 (ref 1.7–7)
NEUTROPHILS NFR BLD AUTO: 50.7 % (ref 42.7–76)
PLATELET # BLD AUTO: 193 10*3/MM3 (ref 140–450)
PMV BLD AUTO: 9.5 FL (ref 6–12)
POTASSIUM SERPL-SCNC: 3.3 MMOL/L (ref 3.5–5.2)
PROT SERPL-MCNC: 6.6 G/DL (ref 6–8.5)
RBC # BLD AUTO: 3.87 10*6/MM3 (ref 4.14–5.8)
SODIUM SERPL-SCNC: 139 MMOL/L (ref 136–145)
WBC NRBC COR # BLD AUTO: 4.32 10*3/MM3 (ref 3.4–10.8)

## 2025-07-30 PROCEDURE — 99213 OFFICE O/P EST LOW 20 MIN: CPT | Performed by: INTERNAL MEDICINE

## 2025-07-30 PROCEDURE — 80053 COMPREHEN METABOLIC PANEL: CPT | Performed by: INTERNAL MEDICINE

## 2025-07-30 PROCEDURE — 1126F AMNT PAIN NOTED NONE PRSNT: CPT | Performed by: INTERNAL MEDICINE

## 2025-07-30 PROCEDURE — 3074F SYST BP LT 130 MM HG: CPT | Performed by: INTERNAL MEDICINE

## 2025-07-30 PROCEDURE — 36591 DRAW BLOOD OFF VENOUS DEVICE: CPT

## 2025-07-30 PROCEDURE — 85025 COMPLETE CBC W/AUTO DIFF WBC: CPT | Performed by: INTERNAL MEDICINE

## 2025-07-30 PROCEDURE — 3078F DIAST BP <80 MM HG: CPT | Performed by: INTERNAL MEDICINE

## 2025-07-30 PROCEDURE — 1159F MED LIST DOCD IN RCRD: CPT | Performed by: INTERNAL MEDICINE

## 2025-07-30 PROCEDURE — 1160F RVW MEDS BY RX/DR IN RCRD: CPT | Performed by: INTERNAL MEDICINE

## 2025-07-30 PROCEDURE — 25010000002 HEPARIN LOCK FLUSH PER 10 UNITS: Performed by: INTERNAL MEDICINE

## 2025-07-30 RX ORDER — SODIUM CHLORIDE 0.9 % (FLUSH) 0.9 %
20 SYRINGE (ML) INJECTION AS NEEDED
Status: DISCONTINUED | OUTPATIENT
Start: 2025-07-30 | End: 2025-07-31 | Stop reason: HOSPADM

## 2025-07-30 RX ORDER — HEPARIN SODIUM (PORCINE) LOCK FLUSH IV SOLN 100 UNIT/ML 100 UNIT/ML
500 SOLUTION INTRAVENOUS AS NEEDED
OUTPATIENT
Start: 2025-07-30

## 2025-07-30 RX ORDER — HEPARIN SODIUM (PORCINE) LOCK FLUSH IV SOLN 100 UNIT/ML 100 UNIT/ML
500 SOLUTION INTRAVENOUS AS NEEDED
Status: DISCONTINUED | OUTPATIENT
Start: 2025-07-30 | End: 2025-07-31 | Stop reason: HOSPADM

## 2025-07-30 RX ORDER — SODIUM CHLORIDE 0.9 % (FLUSH) 0.9 %
20 SYRINGE (ML) INJECTION AS NEEDED
OUTPATIENT
Start: 2025-07-30

## 2025-07-30 RX ADMIN — HEPARIN 500 UNITS: 100 SYRINGE at 10:49

## 2025-07-30 RX ADMIN — Medication 20 ML: at 10:49

## 2025-08-03 RX ORDER — GABAPENTIN 300 MG/1
300 CAPSULE ORAL 3 TIMES DAILY
Qty: 270 CAPSULE | Refills: 1 | Status: SHIPPED | OUTPATIENT
Start: 2025-08-03

## 2025-08-06 ENCOUNTER — HOSPITAL ENCOUNTER (OUTPATIENT)
Dept: ONCOLOGY | Facility: HOSPITAL | Age: 65
Discharge: HOME | End: 2025-08-06
Admitting: INTERNAL MEDICINE
Payer: MEDICARE

## 2025-08-06 VITALS
RESPIRATION RATE: 18 BRPM | HEART RATE: 79 BPM | BODY MASS INDEX: 24.38 KG/M2 | HEIGHT: 76 IN | SYSTOLIC BLOOD PRESSURE: 109 MMHG | DIASTOLIC BLOOD PRESSURE: 67 MMHG | TEMPERATURE: 96.9 F | WEIGHT: 200.2 LBS | OXYGEN SATURATION: 94 %

## 2025-08-06 DIAGNOSIS — C78.6 METASTASIS TO PERITONEAL CAVITY: ICD-10-CM

## 2025-08-06 DIAGNOSIS — C20 RECTAL CANCER: Primary | ICD-10-CM

## 2025-08-06 LAB
ALP BLD-CCNC: 69 U/L (ref 53–128)
BASOPHILS # BLD AUTO: 0.03 10*3/MM3 (ref 0–0.2)
BASOPHILS NFR BLD AUTO: 0.6 % (ref 0–1.5)
BILIRUB UR QL STRIP: ABNORMAL
BUN BLDA-MCNC: 9 MG/DL (ref 7–22)
CALCIUM BLD QL: 10.1 MG/DL (ref 8–10.3)
CHLORIDE BLDA-SCNC: 106 MMOL/L (ref 98–108)
CLARITY UR: CLEAR
CO2 BLDA-SCNC: 26 MMOL/L (ref 18–33)
COLOR UR: YELLOW
CREAT BLDA-MCNC: 0.6 MG/DL (ref 0.6–1.2)
DEPRECATED RDW RBC AUTO: 59 FL (ref 37–54)
EGFRCR SERPLBLD CKD-EPI 2021: 108.5 ML/MIN/1.73
EOSINOPHIL # BLD AUTO: 0.09 10*3/MM3 (ref 0–0.4)
EOSINOPHIL NFR BLD AUTO: 1.7 % (ref 0.3–6.2)
ERYTHROCYTE [DISTWIDTH] IN BLOOD BY AUTOMATED COUNT: 16.8 % (ref 12.3–15.4)
GLUCOSE BLDC GLUCOMTR-MCNC: 166 MG/DL (ref 73–118)
GLUCOSE UR STRIP-MCNC: ABNORMAL MG/DL
HCT VFR BLD AUTO: 36.1 % (ref 37.5–51)
HGB BLD-MCNC: 11.7 G/DL (ref 13–17.7)
HGB UR QL STRIP.AUTO: ABNORMAL
IMM GRANULOCYTES # BLD AUTO: 0.01 10*3/MM3 (ref 0–0.05)
IMM GRANULOCYTES NFR BLD AUTO: 0.2 % (ref 0–0.5)
KETONES UR QL STRIP: ABNORMAL
LEUKOCYTE ESTERASE UR QL STRIP.AUTO: ABNORMAL
LYMPHOCYTES # BLD AUTO: 1.71 10*3/MM3 (ref 0.7–3.1)
LYMPHOCYTES NFR BLD AUTO: 31.5 % (ref 19.6–45.3)
MCH RBC QN AUTO: 30.7 PG (ref 26.6–33)
MCHC RBC AUTO-ENTMCNC: 32.4 G/DL (ref 31.5–35.7)
MCV RBC AUTO: 94.8 FL (ref 79–97)
MONOCYTES # BLD AUTO: 0.66 10*3/MM3 (ref 0.1–0.9)
MONOCYTES NFR BLD AUTO: 12.2 % (ref 5–12)
NEUTROPHILS NFR BLD AUTO: 2.92 10*3/MM3 (ref 1.7–7)
NEUTROPHILS NFR BLD AUTO: 53.8 % (ref 42.7–76)
NITRITE UR QL STRIP: NEGATIVE
PH UR STRIP.AUTO: 6 [PH] (ref 5–8)
PLATELET # BLD AUTO: 187 10*3/MM3 (ref 140–450)
PMV BLD AUTO: 9.4 FL (ref 6–12)
POC ALBUMIN: 3.6 G/L (ref 3.3–5.5)
POC ALT (SGPT): 10 U/L (ref 10–47)
POC AST (SGOT): 17 U/L (ref 11–38)
POC TOTAL BILIRUBIN: 0.7 MG/DL (ref 0.2–1.6)
POC TOTAL PROTEIN: 6.3 G/DL (ref 6.4–8.1)
POTASSIUM BLDA-SCNC: 3.6 MMOL/L (ref 3.6–5.1)
PROT UR QL STRIP: ABNORMAL
RBC # BLD AUTO: 3.81 10*6/MM3 (ref 4.14–5.8)
SODIUM BLD-SCNC: 138 MMOL/L (ref 128–145)
SP GR UR STRIP: >=1.03 (ref 1–1.03)
UROBILINOGEN UR QL STRIP: ABNORMAL
WBC NRBC COR # BLD AUTO: 5.42 10*3/MM3 (ref 3.4–10.8)

## 2025-08-06 PROCEDURE — 25810000003 SODIUM CHLORIDE 0.9 % SOLUTION 500 ML FLEX CONT: Performed by: NURSE PRACTITIONER

## 2025-08-06 PROCEDURE — 96417 CHEMO IV INFUS EACH ADDL SEQ: CPT

## 2025-08-06 PROCEDURE — 25010000002 IRINOTECAN PER 20 MG: Performed by: NURSE PRACTITIONER

## 2025-08-06 PROCEDURE — G0498 CHEMO EXTEND IV INFUS W/PUMP: HCPCS

## 2025-08-06 PROCEDURE — 25010000002 PALONOSETRON PER 25 MCG: Performed by: NURSE PRACTITIONER

## 2025-08-06 PROCEDURE — 25010000003 DEXTROSE 5 % SOLUTION 250 ML FLEX CONT: Performed by: NURSE PRACTITIONER

## 2025-08-06 PROCEDURE — 25010000002 ATROPINE SULFATE 0.4 MG/ML SOLUTION 1 ML VIAL: Performed by: NURSE PRACTITIONER

## 2025-08-06 PROCEDURE — 96368 THER/DIAG CONCURRENT INF: CPT

## 2025-08-06 PROCEDURE — 96411 CHEMO IV PUSH ADDL DRUG: CPT

## 2025-08-06 PROCEDURE — 80053 COMPREHEN METABOLIC PANEL: CPT

## 2025-08-06 PROCEDURE — 81003 URINALYSIS AUTO W/O SCOPE: CPT | Performed by: INTERNAL MEDICINE

## 2025-08-06 PROCEDURE — 85025 COMPLETE CBC W/AUTO DIFF WBC: CPT | Performed by: INTERNAL MEDICINE

## 2025-08-06 PROCEDURE — 96375 TX/PRO/DX INJ NEW DRUG ADDON: CPT

## 2025-08-06 PROCEDURE — 25810000003 SODIUM CHLORIDE 0.9 % SOLUTION: Performed by: NURSE PRACTITIONER

## 2025-08-06 PROCEDURE — 25010000002 LEUCOVORIN CALCIUM PER 50 MG: Performed by: NURSE PRACTITIONER

## 2025-08-06 PROCEDURE — 96413 CHEMO IV INFUSION 1 HR: CPT

## 2025-08-06 PROCEDURE — 25010000002 DEXAMETHASONE PER 1 MG: Performed by: NURSE PRACTITIONER

## 2025-08-06 PROCEDURE — 25010000002 BEVACIZUMAB-BVZR 100 MG/4ML SOLUTION 4 ML VIAL: Performed by: NURSE PRACTITIONER

## 2025-08-06 PROCEDURE — 25010000002 FLUOROURACIL PER 500 MG: Performed by: NURSE PRACTITIONER

## 2025-08-06 PROCEDURE — 25010000002 BEVACIZUMAB-BVZR 400 MG/16ML SOLUTION 16 ML VIAL: Performed by: NURSE PRACTITIONER

## 2025-08-06 RX ORDER — FLUOROURACIL 50 MG/ML
400 INJECTION, SOLUTION INTRAVENOUS ONCE
Status: COMPLETED | OUTPATIENT
Start: 2025-08-06 | End: 2025-08-06

## 2025-08-06 RX ORDER — DEXAMETHASONE SODIUM PHOSPHATE 4 MG/ML
12 INJECTION, SOLUTION INTRA-ARTICULAR; INTRALESIONAL; INTRAMUSCULAR; INTRAVENOUS; SOFT TISSUE ONCE
Status: COMPLETED | OUTPATIENT
Start: 2025-08-06 | End: 2025-08-06

## 2025-08-06 RX ORDER — ATROPINE SULFATE 1 MG/ML
0.4 INJECTION, SOLUTION INTRAMUSCULAR; INTRAVENOUS; SUBCUTANEOUS
Status: CANCELLED | OUTPATIENT
Start: 2025-08-06

## 2025-08-06 RX ORDER — SODIUM CHLORIDE 9 MG/ML
20 INJECTION, SOLUTION INTRAVENOUS ONCE
Status: CANCELLED | OUTPATIENT
Start: 2025-08-06

## 2025-08-06 RX ORDER — DEXAMETHASONE SODIUM PHOSPHATE 4 MG/ML
12 INJECTION, SOLUTION INTRA-ARTICULAR; INTRALESIONAL; INTRAMUSCULAR; INTRAVENOUS; SOFT TISSUE ONCE
Status: CANCELLED | OUTPATIENT
Start: 2025-08-06 | End: 2025-08-06

## 2025-08-06 RX ORDER — PALONOSETRON 0.05 MG/ML
0.25 INJECTION, SOLUTION INTRAVENOUS ONCE
Status: CANCELLED | OUTPATIENT
Start: 2025-08-06

## 2025-08-06 RX ORDER — SODIUM CHLORIDE 9 MG/ML
20 INJECTION, SOLUTION INTRAVENOUS ONCE
Status: COMPLETED | OUTPATIENT
Start: 2025-08-06 | End: 2025-08-06

## 2025-08-06 RX ORDER — ATROPINE SULFATE 1 MG/ML
0.4 INJECTION, SOLUTION INTRAMUSCULAR; INTRAVENOUS; SUBCUTANEOUS
Status: DISCONTINUED | OUTPATIENT
Start: 2025-08-06 | End: 2025-08-06

## 2025-08-06 RX ORDER — FLUOROURACIL 50 MG/ML
400 INJECTION, SOLUTION INTRAVENOUS ONCE
Status: CANCELLED | OUTPATIENT
Start: 2025-08-06

## 2025-08-06 RX ORDER — PALONOSETRON 0.05 MG/ML
0.25 INJECTION, SOLUTION INTRAVENOUS ONCE
Status: COMPLETED | OUTPATIENT
Start: 2025-08-06 | End: 2025-08-06

## 2025-08-06 RX ADMIN — PALONOSETRON HYDROCHLORIDE 0.25 MG: 0.25 INJECTION, SOLUTION INTRAVENOUS at 10:40

## 2025-08-06 RX ADMIN — FLUOROURACIL 5400 MG: 50 INJECTION, SOLUTION INTRAVENOUS at 13:33

## 2025-08-06 RX ADMIN — IRINOTECAN HYDROCHLORIDE 400 MG: 20 INJECTION, SOLUTION INTRAVENOUS at 11:54

## 2025-08-06 RX ADMIN — SODIUM CHLORIDE 20 ML/HR: 9 INJECTION, SOLUTION INTRAVENOUS at 10:39

## 2025-08-06 RX ADMIN — SODIUM CHLORIDE 450 MG: 9 INJECTION, SOLUTION INTRAVENOUS at 11:20

## 2025-08-06 RX ADMIN — FLUOROURACIL 900 MG: 50 INJECTION, SOLUTION INTRAVENOUS at 13:30

## 2025-08-06 RX ADMIN — DEXAMETHASONE SODIUM PHOSPHATE 12 MG: 4 INJECTION, SOLUTION INTRAMUSCULAR; INTRAVENOUS at 10:43

## 2025-08-06 RX ADMIN — LEUCOVORIN CALCIUM 900 MG: 350 INJECTION, POWDER, LYOPHILIZED, FOR SUSPENSION INTRAMUSCULAR; INTRAVENOUS at 11:53

## 2025-08-07 DIAGNOSIS — E83.42 HYPOMAGNESEMIA: ICD-10-CM

## 2025-08-07 DIAGNOSIS — E87.6 HYPOKALEMIA: ICD-10-CM

## 2025-08-07 RX ORDER — POTASSIUM CHLORIDE 750 MG/1
10 TABLET, EXTENDED RELEASE ORAL DAILY
Qty: 30 TABLET | Refills: 2 | Status: SHIPPED | OUTPATIENT
Start: 2025-08-07

## 2025-08-08 ENCOUNTER — HOSPITAL ENCOUNTER (OUTPATIENT)
Dept: ONCOLOGY | Facility: HOSPITAL | Age: 65
Discharge: HOME | End: 2025-08-08
Payer: MEDICARE

## 2025-08-08 DIAGNOSIS — C20 RECTAL CANCER: ICD-10-CM

## 2025-08-08 DIAGNOSIS — C78.6 METASTASIS TO PERITONEAL CAVITY: Primary | ICD-10-CM

## 2025-08-08 DIAGNOSIS — Z45.2 ENCOUNTER FOR CARE RELATED TO VASCULAR ACCESS PORT: ICD-10-CM

## 2025-08-08 PROCEDURE — 25010000002 HEPARIN LOCK FLUSH PER 10 UNITS: Performed by: INTERNAL MEDICINE

## 2025-08-08 RX ORDER — HEPARIN SODIUM (PORCINE) LOCK FLUSH IV SOLN 100 UNIT/ML 100 UNIT/ML
500 SOLUTION INTRAVENOUS AS NEEDED
OUTPATIENT
Start: 2025-08-08

## 2025-08-08 RX ORDER — SODIUM CHLORIDE 0.9 % (FLUSH) 0.9 %
20 SYRINGE (ML) INJECTION AS NEEDED
OUTPATIENT
Start: 2025-08-08

## 2025-08-08 RX ORDER — HEPARIN SODIUM (PORCINE) LOCK FLUSH IV SOLN 100 UNIT/ML 100 UNIT/ML
500 SOLUTION INTRAVENOUS AS NEEDED
Status: DISCONTINUED | OUTPATIENT
Start: 2025-08-08 | End: 2025-08-09 | Stop reason: HOSPADM

## 2025-08-08 RX ORDER — SODIUM CHLORIDE 0.9 % (FLUSH) 0.9 %
20 SYRINGE (ML) INJECTION AS NEEDED
Status: DISCONTINUED | OUTPATIENT
Start: 2025-08-08 | End: 2025-08-09 | Stop reason: HOSPADM

## 2025-08-08 RX ADMIN — HEPARIN 500 UNITS: 100 SYRINGE at 13:14

## 2025-08-08 RX ADMIN — Medication 20 ML: at 13:14

## 2025-08-15 RX ORDER — TIRZEPATIDE 2.5 MG/.5ML
INJECTION, SOLUTION SUBCUTANEOUS
Qty: 2 ML | Refills: 0 | Status: SHIPPED | OUTPATIENT
Start: 2025-08-15

## 2025-08-20 ENCOUNTER — HOSPITAL ENCOUNTER (OUTPATIENT)
Dept: ONCOLOGY | Facility: HOSPITAL | Age: 65
Discharge: HOME | End: 2025-08-20
Admitting: INTERNAL MEDICINE
Payer: MEDICARE

## 2025-08-20 VITALS
HEIGHT: 76 IN | TEMPERATURE: 98.6 F | RESPIRATION RATE: 18 BRPM | BODY MASS INDEX: 24.55 KG/M2 | OXYGEN SATURATION: 96 % | DIASTOLIC BLOOD PRESSURE: 69 MMHG | SYSTOLIC BLOOD PRESSURE: 108 MMHG | HEART RATE: 73 BPM | WEIGHT: 201.6 LBS

## 2025-08-20 DIAGNOSIS — C78.6 METASTASIS TO PERITONEAL CAVITY: ICD-10-CM

## 2025-08-20 DIAGNOSIS — C20 RECTAL CANCER: Primary | ICD-10-CM

## 2025-08-20 DIAGNOSIS — E11.65 TYPE 2 DIABETES MELLITUS WITH HYPERGLYCEMIA, WITHOUT LONG-TERM CURRENT USE OF INSULIN: ICD-10-CM

## 2025-08-20 LAB
ALBUMIN SERPL-MCNC: 4.1 G/DL (ref 3.5–5.2)
ALBUMIN/GLOB SERPL: 1.8 G/DL
ALP SERPL-CCNC: 65 U/L (ref 39–117)
ALT SERPL W P-5'-P-CCNC: 8 U/L (ref 1–41)
ANION GAP SERPL CALCULATED.3IONS-SCNC: 15.4 MMOL/L (ref 5–15)
AST SERPL-CCNC: 16 U/L (ref 1–40)
BASOPHILS # BLD AUTO: 0.03 10*3/MM3 (ref 0–0.2)
BASOPHILS NFR BLD AUTO: 0.4 % (ref 0–1.5)
BILIRUB SERPL-MCNC: 0.5 MG/DL (ref 0–1.2)
BILIRUB UR QL STRIP: ABNORMAL
BUN SERPL-MCNC: 10 MG/DL (ref 8–23)
BUN/CREAT SERPL: 15.6 (ref 7–25)
CALCIUM SPEC-SCNC: 10.2 MG/DL (ref 8.6–10.5)
CHLORIDE SERPL-SCNC: 102 MMOL/L (ref 98–107)
CLARITY UR: ABNORMAL
CO2 SERPL-SCNC: 23.6 MMOL/L (ref 22–29)
COLOR UR: YELLOW
CREAT SERPL-MCNC: 0.64 MG/DL (ref 0.76–1.27)
DEPRECATED RDW RBC AUTO: 59.9 FL (ref 37–54)
EGFRCR SERPLBLD CKD-EPI 2021: 106.4 ML/MIN/1.73
EOSINOPHIL # BLD AUTO: 0.12 10*3/MM3 (ref 0–0.4)
EOSINOPHIL NFR BLD AUTO: 1.8 % (ref 0.3–6.2)
ERYTHROCYTE [DISTWIDTH] IN BLOOD BY AUTOMATED COUNT: 17 % (ref 12.3–15.4)
GLOBULIN UR ELPH-MCNC: 2.3 GM/DL
GLUCOSE SERPL-MCNC: 209 MG/DL (ref 65–99)
GLUCOSE UR STRIP-MCNC: NEGATIVE MG/DL
HCT VFR BLD AUTO: 37.8 % (ref 37.5–51)
HGB BLD-MCNC: 12.4 G/DL (ref 13–17.7)
HGB UR QL STRIP.AUTO: ABNORMAL
IMM GRANULOCYTES # BLD AUTO: 0.01 10*3/MM3 (ref 0–0.05)
IMM GRANULOCYTES NFR BLD AUTO: 0.1 % (ref 0–0.5)
KETONES UR QL STRIP: ABNORMAL
LEUKOCYTE ESTERASE UR QL STRIP.AUTO: ABNORMAL
LYMPHOCYTES # BLD AUTO: 1.87 10*3/MM3 (ref 0.7–3.1)
LYMPHOCYTES NFR BLD AUTO: 27.4 % (ref 19.6–45.3)
MCH RBC QN AUTO: 31.3 PG (ref 26.6–33)
MCHC RBC AUTO-ENTMCNC: 32.8 G/DL (ref 31.5–35.7)
MCV RBC AUTO: 95.5 FL (ref 79–97)
MONOCYTES # BLD AUTO: 0.68 10*3/MM3 (ref 0.1–0.9)
MONOCYTES NFR BLD AUTO: 10 % (ref 5–12)
NEUTROPHILS NFR BLD AUTO: 4.12 10*3/MM3 (ref 1.7–7)
NEUTROPHILS NFR BLD AUTO: 60.3 % (ref 42.7–76)
NITRITE UR QL STRIP: NEGATIVE
PH UR STRIP.AUTO: 5.5 [PH] (ref 5–8)
PLATELET # BLD AUTO: 188 10*3/MM3 (ref 140–450)
PMV BLD AUTO: 9.2 FL (ref 6–12)
POTASSIUM SERPL-SCNC: 4.4 MMOL/L (ref 3.5–5.2)
PROT SERPL-MCNC: 6.4 G/DL (ref 6–8.5)
PROT UR QL STRIP: ABNORMAL
RBC # BLD AUTO: 3.96 10*6/MM3 (ref 4.14–5.8)
SODIUM SERPL-SCNC: 141 MMOL/L (ref 136–145)
SP GR UR STRIP: >=1.03 (ref 1–1.03)
UROBILINOGEN UR QL STRIP: ABNORMAL
WBC NRBC COR # BLD AUTO: 6.83 10*3/MM3 (ref 3.4–10.8)

## 2025-08-20 PROCEDURE — 25010000002 LEUCOVORIN CALCIUM PER 50 MG: Performed by: NURSE PRACTITIONER

## 2025-08-20 PROCEDURE — 96413 CHEMO IV INFUSION 1 HR: CPT

## 2025-08-20 PROCEDURE — 25810000003 SODIUM CHLORIDE 0.9 % SOLUTION 500 ML FLEX CONT: Performed by: NURSE PRACTITIONER

## 2025-08-20 PROCEDURE — 85025 COMPLETE CBC W/AUTO DIFF WBC: CPT | Performed by: INTERNAL MEDICINE

## 2025-08-20 PROCEDURE — 25010000003 DEXTROSE 5 % SOLUTION 250 ML FLEX CONT: Performed by: NURSE PRACTITIONER

## 2025-08-20 PROCEDURE — 25010000002 BEVACIZUMAB-BVZR 100 MG/4ML SOLUTION 4 ML VIAL: Performed by: NURSE PRACTITIONER

## 2025-08-20 PROCEDURE — 96411 CHEMO IV PUSH ADDL DRUG: CPT

## 2025-08-20 PROCEDURE — 81003 URINALYSIS AUTO W/O SCOPE: CPT | Performed by: INTERNAL MEDICINE

## 2025-08-20 PROCEDURE — 96368 THER/DIAG CONCURRENT INF: CPT

## 2025-08-20 PROCEDURE — 25010000002 BEVACIZUMAB-BVZR 400 MG/16ML SOLUTION 16 ML VIAL: Performed by: NURSE PRACTITIONER

## 2025-08-20 PROCEDURE — 25010000002 ATROPINE SULFATE 0.4 MG/ML SOLUTION 1 ML VIAL: Performed by: NURSE PRACTITIONER

## 2025-08-20 PROCEDURE — 96375 TX/PRO/DX INJ NEW DRUG ADDON: CPT

## 2025-08-20 PROCEDURE — 25010000002 DEXAMETHASONE PER 1 MG: Performed by: NURSE PRACTITIONER

## 2025-08-20 PROCEDURE — 80053 COMPREHEN METABOLIC PANEL: CPT | Performed by: INTERNAL MEDICINE

## 2025-08-20 PROCEDURE — 25010000002 FLUOROURACIL PER 500 MG: Performed by: NURSE PRACTITIONER

## 2025-08-20 PROCEDURE — 25010000002 PALONOSETRON 0.25 MG/5ML SOLUTION PREFILLED SYRINGE: Performed by: NURSE PRACTITIONER

## 2025-08-20 PROCEDURE — G0498 CHEMO EXTEND IV INFUS W/PUMP: HCPCS

## 2025-08-20 PROCEDURE — 25810000003 SODIUM CHLORIDE 0.9 % SOLUTION: Performed by: NURSE PRACTITIONER

## 2025-08-20 PROCEDURE — 25010000002 IRINOTECAN PER 20 MG: Performed by: NURSE PRACTITIONER

## 2025-08-20 PROCEDURE — 96417 CHEMO IV INFUS EACH ADDL SEQ: CPT

## 2025-08-20 RX ORDER — GLIMEPIRIDE 4 MG/1
4 TABLET ORAL 2 TIMES DAILY
Qty: 180 TABLET | Refills: 0 | Status: SHIPPED | OUTPATIENT
Start: 2025-08-20

## 2025-08-20 RX ORDER — DEXAMETHASONE SODIUM PHOSPHATE 4 MG/ML
12 INJECTION, SOLUTION INTRA-ARTICULAR; INTRALESIONAL; INTRAMUSCULAR; INTRAVENOUS; SOFT TISSUE ONCE
Status: COMPLETED | OUTPATIENT
Start: 2025-08-20 | End: 2025-08-20

## 2025-08-20 RX ORDER — ATROPINE SULFATE 1 MG/ML
0.4 INJECTION, SOLUTION INTRAMUSCULAR; INTRAVENOUS; SUBCUTANEOUS
Status: CANCELLED | OUTPATIENT
Start: 2025-08-20

## 2025-08-20 RX ORDER — FLUOROURACIL 50 MG/ML
400 INJECTION, SOLUTION INTRAVENOUS ONCE
Status: CANCELLED | OUTPATIENT
Start: 2025-08-20

## 2025-08-20 RX ORDER — SODIUM CHLORIDE 9 MG/ML
20 INJECTION, SOLUTION INTRAVENOUS ONCE
Status: COMPLETED | OUTPATIENT
Start: 2025-08-20 | End: 2025-08-20

## 2025-08-20 RX ORDER — PALONOSETRON 0.05 MG/ML
0.25 INJECTION, SOLUTION INTRAVENOUS ONCE
Status: COMPLETED | OUTPATIENT
Start: 2025-08-20 | End: 2025-08-20

## 2025-08-20 RX ORDER — ATROPINE SULFATE 1 MG/ML
0.4 INJECTION, SOLUTION INTRAMUSCULAR; INTRAVENOUS; SUBCUTANEOUS
Status: DISCONTINUED | OUTPATIENT
Start: 2025-08-20 | End: 2025-08-20

## 2025-08-20 RX ORDER — FLUOROURACIL 50 MG/ML
400 INJECTION, SOLUTION INTRAVENOUS ONCE
Status: COMPLETED | OUTPATIENT
Start: 2025-08-20 | End: 2025-08-20

## 2025-08-20 RX ORDER — SODIUM CHLORIDE 9 MG/ML
20 INJECTION, SOLUTION INTRAVENOUS ONCE
Status: CANCELLED | OUTPATIENT
Start: 2025-08-20

## 2025-08-20 RX ORDER — DEXAMETHASONE SODIUM PHOSPHATE 4 MG/ML
12 INJECTION, SOLUTION INTRA-ARTICULAR; INTRALESIONAL; INTRAMUSCULAR; INTRAVENOUS; SOFT TISSUE ONCE
Status: CANCELLED | OUTPATIENT
Start: 2025-08-20 | End: 2025-08-20

## 2025-08-20 RX ORDER — PALONOSETRON 0.05 MG/ML
0.25 INJECTION, SOLUTION INTRAVENOUS ONCE
Status: CANCELLED | OUTPATIENT
Start: 2025-08-20

## 2025-08-20 RX ADMIN — PALONOSETRON 0.25 MG: 0.25 INJECTION, SOLUTION INTRAVENOUS at 10:41

## 2025-08-20 RX ADMIN — DEXTROSE MONOHYDRATE 400 MG: 50 INJECTION, SOLUTION INTRAVENOUS at 11:44

## 2025-08-20 RX ADMIN — SODIUM CHLORIDE 5400 MG: 9 INJECTION, SOLUTION INTRAVENOUS at 13:15

## 2025-08-20 RX ADMIN — SODIUM CHLORIDE 460 MG: 9 INJECTION, SOLUTION INTRAVENOUS at 11:01

## 2025-08-20 RX ADMIN — FLUOROURACIL 900 MG: 50 INJECTION, SOLUTION INTRAVENOUS at 13:12

## 2025-08-20 RX ADMIN — SODIUM CHLORIDE 20 ML/HR: 9 INJECTION, SOLUTION INTRAVENOUS at 10:41

## 2025-08-20 RX ADMIN — LEUCOVORIN CALCIUM 900 MG: 350 INJECTION, POWDER, LYOPHILIZED, FOR SUSPENSION INTRAMUSCULAR; INTRAVENOUS at 11:40

## 2025-08-20 RX ADMIN — DEXAMETHASONE SODIUM PHOSPHATE 12 MG: 4 INJECTION INTRA-ARTICULAR; INTRALESIONAL; INTRAMUSCULAR; INTRAVENOUS; SOFT TISSUE at 10:43

## 2025-08-22 ENCOUNTER — HOSPITAL ENCOUNTER (OUTPATIENT)
Dept: ONCOLOGY | Facility: HOSPITAL | Age: 65
Discharge: HOME | End: 2025-08-22
Payer: MEDICARE

## 2025-08-22 DIAGNOSIS — C20 RECTAL CANCER: Primary | ICD-10-CM

## 2025-08-22 DIAGNOSIS — C78.6 METASTASIS TO PERITONEAL CAVITY: ICD-10-CM

## 2025-08-22 DIAGNOSIS — Z45.2 ENCOUNTER FOR CARE RELATED TO VASCULAR ACCESS PORT: ICD-10-CM

## 2025-08-22 PROCEDURE — 25010000002 HEPARIN LOCK FLUSH PER 10 UNITS: Performed by: INTERNAL MEDICINE

## 2025-08-22 RX ORDER — SODIUM CHLORIDE 0.9 % (FLUSH) 0.9 %
20 SYRINGE (ML) INJECTION AS NEEDED
Status: DISCONTINUED | OUTPATIENT
Start: 2025-08-22 | End: 2025-08-23 | Stop reason: HOSPADM

## 2025-08-22 RX ORDER — HEPARIN SODIUM (PORCINE) LOCK FLUSH IV SOLN 100 UNIT/ML 100 UNIT/ML
500 SOLUTION INTRAVENOUS AS NEEDED
OUTPATIENT
Start: 2025-08-22

## 2025-08-22 RX ORDER — SODIUM CHLORIDE 0.9 % (FLUSH) 0.9 %
20 SYRINGE (ML) INJECTION AS NEEDED
OUTPATIENT
Start: 2025-08-22

## 2025-08-22 RX ORDER — HEPARIN SODIUM (PORCINE) LOCK FLUSH IV SOLN 100 UNIT/ML 100 UNIT/ML
500 SOLUTION INTRAVENOUS AS NEEDED
Status: DISCONTINUED | OUTPATIENT
Start: 2025-08-22 | End: 2025-08-23 | Stop reason: HOSPADM

## 2025-08-22 RX ADMIN — HEPARIN 500 UNITS: 100 SYRINGE at 13:24

## 2025-08-22 RX ADMIN — Medication 20 ML: at 13:24

## (undated) DEVICE — REDUCER: Brand: ENDOWRIST

## (undated) DEVICE — SINGLE-USE BIOPSY FORCEPS: Brand: RADIAL JAW 4

## (undated) DEVICE — ANTIBACTERIAL UNDYED BRAIDED (POLYGLACTIN 910), SYNTHETIC ABSORBABLE SUTURE: Brand: COATED VICRYL

## (undated) DEVICE — PK CHST THORACOSCOPY 50

## (undated) DEVICE — TRAP,MUCUS SPECIMEN, 80CC: Brand: MEDLINE

## (undated) DEVICE — LAPAROVUE VISIBILITY SYSTEM LAPAROSCOPIC SOLUTIONS: Brand: LAPAROVUE

## (undated) DEVICE — GOWN,REINFRCE,POLY,SIRUS,BREATH SLV,XXLG: Brand: MEDLINE

## (undated) DEVICE — OASIS DRAIN, SINGLE, INLINE & ATS COMPATIBLE: Brand: OASIS

## (undated) DEVICE — ST TBG AIRSEAL BIF FLTR W/ACT/CHARCOAL/FLTR

## (undated) DEVICE — SOL NACL 0.9PCT 1000ML

## (undated) DEVICE — PK ENDO GI 50

## (undated) DEVICE — TROC BLADLES ANCHORPORT/OPTI LP 5X120MM 1P/U

## (undated) DEVICE — GLV SURG BIOGEL LTX PF 8

## (undated) DEVICE — DEV COND GAS LAP INSUFLOW W/LUER CONN

## (undated) DEVICE — SUCTION IRRIGATOR: Brand: ENDOWRIST

## (undated) DEVICE — 3M™ STERI-DRAPE™ INSTRUMENT POUCH 9097: Brand: 3M™ STERI-DRAPE™

## (undated) DEVICE — SUT MONOCRYL 4/0 PS2 27IN Y426H ETY426H

## (undated) DEVICE — 3M™ IOBAN™ 2 ANTIMICROBIAL INCISE DRAPE 6650EZ: Brand: IOBAN™ 2

## (undated) DEVICE — TRAP WIDEEYE POLYP

## (undated) DEVICE — GENERAL LAPAROSCOPY CDS: Brand: MEDLINE INDUSTRIES, INC.

## (undated) DEVICE — DRAPE,UTILITY,XL,4/PK,STERILE: Brand: MEDLINE

## (undated) DEVICE — MARKR SKIN W/RULR

## (undated) DEVICE — ELECTRD BLD EZ CLN MOD XLNG 2.75IN

## (undated) DEVICE — ADHS SKIN SURG TISS VISC PREMIERPRO EXOFIN HI/VISC FAST/DRY

## (undated) DEVICE — DRN WND EVAC BULB 100CC

## (undated) DEVICE — SEAL

## (undated) DEVICE — COLUMN DRAPE

## (undated) DEVICE — 3M™ STERI-DRAPE™ INSTRUMENT POUCH 1018L: Brand: STERI-DRAPE™

## (undated) DEVICE — 24 FR STRAIGHT – SOFT PVC CATHETER: Brand: PVC THORACIC CATHETERS

## (undated) DEVICE — BLADELESS OBTURATOR: Brand: WECK VISTA

## (undated) DEVICE — SUT ETHIB 2 CV V37 MS/4 30IN MX69G

## (undated) DEVICE — 1/2 IN. X 18 IN. LENGTH: Brand: SILICONE TUBING, PENROSE DRAIN

## (undated) DEVICE — WOUND RETRACTOR AND PROTECTOR: Brand: ALEXIS WOUND PROTECTOR-RETRACTOR

## (undated) DEVICE — 1000ML,PRESSURE INFUSER W/STOPCOCK: Brand: MEDLINE

## (undated) DEVICE — DRAPE SHEET ULTRAGARD: Brand: MEDLINE

## (undated) DEVICE — Device: Brand: BLACK EYE

## (undated) DEVICE — BLAKE SILICONE DRAIN, 19 FR ROUND, HUBLESS WITH 1/4" BENDABLE TROCAR: Brand: BLAKE

## (undated) DEVICE — THE CARR-LOCKE INJECTION NEEDLE IS A SINGLE USE, DISPOSABLE, FLEXIBLE SHEATH INJECTION NEEDLE USED FOR THE INJECTION OF VARIOUS TYPES OF MEDIA THROUGH FLEXIBLE ENDOSCOPES.

## (undated) DEVICE — SUT MNCRYL 4/0 PS2 27IN UD MCP426H

## (undated) DEVICE — PENCL HND ROCKRSWTCH HOLSTR EZ CLEAN TP CRD 10FT

## (undated) DEVICE — GOWN,REINF,POLY,ECL,PP SLV,XXL: Brand: MEDLINE

## (undated) DEVICE — DBD-DRAPE,LAP,CHOLE,W/TROUGHS,STERILE: Brand: MEDLINE

## (undated) DEVICE — APPL CHLORAPREP HI/LITE 26ML ORNG

## (undated) DEVICE — ARM DRAPE

## (undated) DEVICE — CANNULA SEAL